# Patient Record
Sex: MALE | Race: WHITE | NOT HISPANIC OR LATINO | Employment: OTHER | URBAN - METROPOLITAN AREA
[De-identification: names, ages, dates, MRNs, and addresses within clinical notes are randomized per-mention and may not be internally consistent; named-entity substitution may affect disease eponyms.]

---

## 2017-01-18 RX ORDER — ALPRAZOLAM 0.5 MG/1
0.5 TABLET ORAL 2 TIMES DAILY PRN
Qty: 60 TABLET | Refills: 2 | OUTPATIENT
Start: 2017-01-18 | End: 2017-09-22 | Stop reason: SDUPTHER

## 2017-01-18 RX ORDER — LISINOPRIL AND HYDROCHLOROTHIAZIDE 20; 12.5 MG/1; MG/1
2 TABLET ORAL DAILY
Qty: 180 TABLET | Refills: 1 | Status: SHIPPED | OUTPATIENT
Start: 2017-01-18 | End: 2018-01-01 | Stop reason: SDUPTHER

## 2017-01-31 ENCOUNTER — CLINICAL SUPPORT (OUTPATIENT)
Dept: INTERNAL MEDICINE | Facility: CLINIC | Age: 82
End: 2017-01-31

## 2017-01-31 DIAGNOSIS — E53.8 VITAMIN B12 DEFICIENCY: Primary | ICD-10-CM

## 2017-01-31 PROCEDURE — 96372 THER/PROPH/DIAG INJ SC/IM: CPT | Performed by: INTERNAL MEDICINE

## 2017-01-31 RX ADMIN — CYANOCOBALAMIN 2 MCG: 1000 INJECTION, SOLUTION INTRAMUSCULAR; SUBCUTANEOUS at 15:49

## 2017-03-02 ENCOUNTER — CLINICAL SUPPORT (OUTPATIENT)
Dept: INTERNAL MEDICINE | Facility: CLINIC | Age: 82
End: 2017-03-02

## 2017-03-02 ENCOUNTER — CONVERSION ENCOUNTER (OUTPATIENT)
Dept: INTERNAL MEDICINE | Facility: CLINIC | Age: 82
End: 2017-03-02

## 2017-03-02 DIAGNOSIS — E53.8 VITAMIN B12 DEFICIENCY: Primary | ICD-10-CM

## 2017-03-02 PROCEDURE — 96372 THER/PROPH/DIAG INJ SC/IM: CPT | Performed by: INTERNAL MEDICINE

## 2017-03-02 RX ADMIN — CYANOCOBALAMIN 2000 MCG: 1000 INJECTION, SOLUTION INTRAMUSCULAR; SUBCUTANEOUS at 09:46

## 2017-03-05 LAB
25(OH)D3+25(OH)D2 SERPL-MCNC: 47.4 NG/ML (ref 30–100)
ALBUMIN SERPL-MCNC: 4.7 G/DL (ref 3.5–5.2)
ALBUMIN/GLOB SERPL: 2.4 G/DL
ALP SERPL-CCNC: 69 U/L (ref 39–117)
ALT SERPL-CCNC: 20 U/L (ref 1–41)
AST SERPL-CCNC: 18 U/L (ref 1–40)
BILIRUB SERPL-MCNC: 0.5 MG/DL (ref 0.1–1.2)
BUN SERPL-MCNC: 25 MG/DL (ref 8–23)
BUN/CREAT SERPL: 12.6 (ref 7–25)
CALCIUM SERPL-MCNC: 9.6 MG/DL (ref 8.6–10.5)
CHLORIDE SERPL-SCNC: 103 MMOL/L (ref 98–107)
CHOLEST SERPL-MCNC: 137 MG/DL (ref 100–199)
CK SERPL-CCNC: 91 U/L (ref 20–200)
CO2 SERPL-SCNC: 29.9 MMOL/L (ref 22–29)
CREAT SERPL-MCNC: 1.98 MG/DL (ref 0.76–1.27)
ERYTHROCYTE [DISTWIDTH] IN BLOOD BY AUTOMATED COUNT: 13.7 % (ref 11.5–14.5)
GLOBULIN SER CALC-MCNC: 2 GM/DL
GLUCOSE SERPL-MCNC: 117 MG/DL (ref 65–99)
HBA1C MFR BLD: 6.1 % (ref 4.8–5.6)
HCT VFR BLD AUTO: 38.5 % (ref 40.4–52.2)
HDL SERPL-SCNC: 34.5 UMOL/L
HDLC SERPL-MCNC: 47 MG/DL
HGB BLD-MCNC: 12.4 G/DL (ref 13.7–17.6)
LDL SERPL QN: 20.5 NM
LDL SERPL-SCNC: 823 NMOL/L
LDL SMALL SERPL-SCNC: 434 NMOL/L
LDLC SERPL CALC-MCNC: 69 MG/DL (ref 0–99)
MCH RBC QN AUTO: 29.9 PG (ref 27–32.7)
MCHC RBC AUTO-ENTMCNC: 32.2 G/DL (ref 32.6–36.4)
MCV RBC AUTO: 92.8 FL (ref 79.8–96.2)
PLATELET # BLD AUTO: 224 10*3/MM3 (ref 140–500)
POTASSIUM SERPL-SCNC: 4.6 MMOL/L (ref 3.5–5.2)
PROT SERPL-MCNC: 6.7 G/DL (ref 6–8.5)
RBC # BLD AUTO: 4.15 10*6/MM3 (ref 4.6–6)
SODIUM SERPL-SCNC: 143 MMOL/L (ref 136–145)
TRIGL SERPL-MCNC: 105 MG/DL (ref 0–149)
WBC # BLD AUTO: 5.71 10*3/MM3 (ref 4.5–10.7)

## 2017-03-08 PROBLEM — Z01.00 DIABETIC EYE EXAM (HCC): Status: ACTIVE | Noted: 2017-03-08

## 2017-03-08 PROBLEM — E11.9 ENCOUNTER FOR DIABETIC FOOT EXAM: Status: ACTIVE | Noted: 2017-03-08

## 2017-03-08 PROBLEM — E11.9 DIABETIC EYE EXAM: Status: ACTIVE | Noted: 2017-03-08

## 2017-03-09 ENCOUNTER — OFFICE VISIT (OUTPATIENT)
Dept: INTERNAL MEDICINE | Facility: CLINIC | Age: 82
End: 2017-03-09

## 2017-03-09 VITALS
BODY MASS INDEX: 30.13 KG/M2 | HEIGHT: 67 IN | WEIGHT: 192 LBS | HEART RATE: 107 BPM | OXYGEN SATURATION: 96 % | SYSTOLIC BLOOD PRESSURE: 136 MMHG | DIASTOLIC BLOOD PRESSURE: 74 MMHG

## 2017-03-09 DIAGNOSIS — E11.9 DIABETIC EYE EXAM (HCC): Chronic | ICD-10-CM

## 2017-03-09 DIAGNOSIS — Z01.00 DIABETIC EYE EXAM (HCC): Chronic | ICD-10-CM

## 2017-03-09 DIAGNOSIS — I65.23 ATHEROSCLEROSIS OF BOTH CAROTID ARTERIES: Chronic | ICD-10-CM

## 2017-03-09 DIAGNOSIS — D63.1 ANEMIA DUE TO CHRONIC KIDNEY DISEASE: Chronic | ICD-10-CM

## 2017-03-09 DIAGNOSIS — M85.852 OSTEOPENIA OF THIGH, BILATERAL: ICD-10-CM

## 2017-03-09 DIAGNOSIS — E55.9 VITAMIN D DEFICIENCY: Chronic | ICD-10-CM

## 2017-03-09 DIAGNOSIS — I10 BENIGN ESSENTIAL HYPERTENSION: Chronic | ICD-10-CM

## 2017-03-09 DIAGNOSIS — E11.9 ENCOUNTER FOR DIABETIC FOOT EXAM (HCC): Chronic | ICD-10-CM

## 2017-03-09 DIAGNOSIS — E78.5 HYPERLIPIDEMIA, UNSPECIFIED HYPERLIPIDEMIA TYPE: Chronic | ICD-10-CM

## 2017-03-09 DIAGNOSIS — E53.8 VITAMIN B12 DEFICIENCY: Chronic | ICD-10-CM

## 2017-03-09 DIAGNOSIS — M85.80 OSTEOPENIA: Chronic | ICD-10-CM

## 2017-03-09 DIAGNOSIS — Z51.81 THERAPEUTIC DRUG MONITORING: ICD-10-CM

## 2017-03-09 DIAGNOSIS — E11.9 TYPE 2 DIABETES MELLITUS WITHOUT COMPLICATION, WITHOUT LONG-TERM CURRENT USE OF INSULIN (HCC): Primary | Chronic | ICD-10-CM

## 2017-03-09 DIAGNOSIS — M85.851 OSTEOPENIA OF THIGH, BILATERAL: ICD-10-CM

## 2017-03-09 DIAGNOSIS — N40.0 BENIGN NON-NODULAR PROSTATIC HYPERPLASIA WITHOUT LOWER URINARY TRACT SYMPTOMS: Chronic | ICD-10-CM

## 2017-03-09 DIAGNOSIS — N18.9 ANEMIA DUE TO CHRONIC KIDNEY DISEASE: Chronic | ICD-10-CM

## 2017-03-09 DIAGNOSIS — N18.4 CHRONIC RENAL INSUFFICIENCY, STAGE IV (SEVERE) (HCC): Chronic | ICD-10-CM

## 2017-03-09 DIAGNOSIS — K21.9 GASTROESOPHAGEAL REFLUX DISEASE WITHOUT ESOPHAGITIS: Chronic | ICD-10-CM

## 2017-03-09 DIAGNOSIS — R73.01 IMPAIRED FASTING GLUCOSE: Chronic | ICD-10-CM

## 2017-03-09 PROBLEM — Z92.89 HISTORY OF DOPPLER ULTRASOUND: Status: ACTIVE | Noted: 2017-03-09

## 2017-03-09 PROCEDURE — 99214 OFFICE O/P EST MOD 30 MIN: CPT | Performed by: INTERNAL MEDICINE

## 2017-03-09 NOTE — PROGRESS NOTES
03/09/2017    Patient Information  Radha Win                                                                                          [address]  [facility] [place] [address]      7/26/1932  [phone]      Chief Complaint:     Follow-up type 2 diabetes, hyperlipidemia, chronic renal insufficiency, BPH, recent prostate biopsy, carotid artery plaque and recent carotid Doppler study, vitamin B12 and vitamin D deficiency, anemia of chronic renal disease, hypertension, reflux, osteopenia.  No new acute complaints.    History of Present Illness:    Patient with a history of multiple medical problems as outlined in the chief complaint that have been fairly stable over the past year presents today for a routine follow-up with lab to monitor the chronic medical issues.  He is tolerating his medications well and has no new acute complaints.  His past medical history reviewed and updated where necessary including his health maintenance parameters.  This reveals he needs a diabetic foot examination which we will do today.  Also patient has a history of elevated PSA and BPH and underwent a prostate biopsy in December of last year which fortunately returned negative.  Unfortunately, I do not have the formal report but I am attempting to obtain that currently.    Review of Systems   Constitution: Negative.   HENT: Negative.    Eyes: Negative.    Cardiovascular: Negative.    Respiratory: Negative.    Endocrine: Negative.    Hematologic/Lymphatic: Negative.    Skin: Negative.    Musculoskeletal: Negative.    Gastrointestinal: Negative.    Genitourinary: Negative.    Neurological: Negative.    Psychiatric/Behavioral: Negative.    Allergic/Immunologic: Negative.        Active Problems:    Patient Active Problem List   Diagnosis   • Bilateral carotid artery plaque, 09/21/2016--16-49% bilateral carotid artery stenosis   • Benign prostatic hypertrophy   • Chronic renal insufficiency, stage IV (severe),  02/09/2016--creatinine 1.85   • Diverticulosis of colon   • Hyperlipidemia   • Osteopenia, 07/07/2014--lumbar spine -0.5.  Left femoral neck -1.3.  Right femoral neck -2.0.   • Vitamin B12 deficiency   • Vitamin D deficiency   • Allergic rhinitis   • Anemia due to chronic kidney disease   • Generalized anxiety disorder   • Benign essential hypertension   • Gastroesophageal reflux disease without esophagitis   • Folic acid deficiency   • Multiple environmental allergies   • Therapeutic drug monitoring   • Bilateral renal cysts   • Primary osteoarthritis of knees, bilateral   • Diabetic eye exam   • Diabetic foot exam   • Impaired fasting glucose         Past Medical History   Diagnosis Date   • History of Biceps tendinitis on right 7/25/2016 08/09/2016--patient seen in follow-up and the severe pain anteriorly appears to have resolved but now he has an ache that seems to be in the joint itself and is worse at night particularly when sleeping.  I think that patient may actually have had 2 problems consisting of bicipital tendinitis and rotator cuff tendinitis.  Depo-Medrol 40 mg with 3 cc of Xylocaine injected into the shoulder via a posterior approach.  If this does not take care of the patient's pain then we will need to pursue with further workup including MRI.  07/25/2016--patient reports a 2 to three-week history of pain involving his right upper arm below the shoulder anteriorly.  No known trauma.  The pain is worse with certain movements and activities.  The pain is quite severe particularly at night and this interferes with his sleep.  Examination reveals no gross deformity.  There is tenderness to palpation over the long head of the biceps shoulder joint proper.  The area was injected with 40 mg of Depo-Medrol and 3 cc of Xylocaine.   • History of Bicipital tendinitis of left shoulder 01/16/2015 01/16/2015--patient presents with a two-month history of left anterior shoulder pain without known injury.  The pain is worse with certain movements. Examination reveals tenderness over the bicipital tendons. X-ray ordered and revealed no acute fracture or bony malalignment. Degenerative arthritis noted in the left acromioclavicular joint.   • History of bone density study 07/07/2014 07/07/2014-DEXA scan revealed average lumbar spine T score -0.5. Left proximal femoral T score is zero. Left femoral neck T score -1.3. Right proximal femoral T score 0.1. Right femoral neck T score -2.0. Osteopenia.   • History of BPPV (benign paroxysmal positional vertigo) 3/21/2016     08/09/2016--patient seen in follow-up and reports his symptoms have resolved.  05/13/2016--patient was evaluated by ENT and [place]-[facility] testing was negative at this time.  However, patient was asymptomatic.  03/21/2016--patient presents with a 2 day history of dizziness that he describes as an off-balance or spinning sensation.  This seems to be precipitated by movement such as when he stands up or bends over.  Rolling over in bed does not seem to precipitate it.  Patient has no other symptoms associated with this.  The episodes last 5-10 minutes and he seems to get relief by not moving.  He was evaluated earlier at the urgent care and the physician there 100 start him on diabetes medication because his blood sugar was 157 nonfasting.  His matter fact she gave me a phone call to explain the situation to me and I had her just go ahead and send the patient here.  Patient was a little concerned because his blood sugars have been running a little high of late.  Examination today unremarkable.  We were able t   • History of carotid Doppler/vascular screen 09/21/2016 09/21/2016--vascular screen reveals 16-49% bilateral carotid artery stenosis, negative for AAA, negative for PAD.  10/10/2008--vascular screen revealed mild bilateral cartoid plaque, no eveidence of aortic aneurysm, no eveidence of PAD.   • History of chest x-ray 04/23/2014      04/23/2014-chest x-ray reveals aortic calcification. Mild interstitial prominence, granulomatous calcification. No acute process. 03/03/2009-chest x-ray reveals no active disease.   • History of chronic right knee pain 06/26/2014 08/20/2014--patient reports his knee pain has resolved.  07/03/2014--patient seen in follow up and reports his pain is much improved with the prednisone.  06/26/2014--x-ray of the right knee was negative except for osteopenia. DEXA scan ordered.  06/26/2014--patient presents with a 5-6 week history of right knee pain that is primarily anterior. The pain only comes on when patient kneels down such    • History of diverticulitis of colon 2009 and 2003 05/08/2009-acute diverticulitis without complication. 09/05/2003-acute diverticulitis without complication.    • History of Plantar fasciitis of right foot 06/26/2014 08/20/2014--patient reports his symptoms are much improved but not resolved. He has some discomfort approximately one hour after awakening and walking.  07/03/2014--patient seen in follow up and reports his pain is much improved with the prednisone. DEXA scan ordered.   06/26/2014--x-ray of the right foot is negative except for osteopenia.   06/26/2014--patient presents with a 3-4 week history of    • History of pneumococcal vaccination 06/01/2015 06/01/2015--Prevnar 13 given. Patient reports he received his initial pneumococcal vaccination after the age of 65 and therefore no further pneumococcal vaccination required after today's Prevnar.   • History of Right rotator cuff tendinitis 8/9/2016 08/26/2016--patient seen in follow-up and reports his pain has resolved.  08/09/2016--patient seen in follow-up and the severe pain anteriorly appears to have resolved but now he has an ache that seems to be in the joint itself and is worse at night particularly when sleeping.  I think that patient may actually have had 2 problems consisting of bicipital tendinitis and  rotator cuff tendinitis.  Depo-Medrol 40 mg with 3 cc of Xylocaine injected into the shoulder via a posterior approach.  If this does not take care of the patient's pain then we will need to pursue with further workup including MRI.  07/25/2016--patient reports a 2 to three-week history of pain involving his right upper arm below the shoulder anteriorly.  No known trauma.  The pain is worse with certain movements and activities.  The pain is quite severe particularly at night and this interferes with his sleep.  Examination reveals no gross deformity.  There is tenderness to palpation over the long head of the biceps shoulder joint proper.    • History of trigger finger 06/01/2015 08/09/2016--patient seen in follow-up and reports his trigger finger resolved without any intervention.  06/01/2015--patient presents with a 3 month history of discomfort involving his left index finger and occasionally the finger will lock in the flexed position requiring him to forcibly extended.  I gave patient Dr. Chacorta Oneal phone number and he can call him if he decides that this is bad e   • History of Zostavax 08/2009 August 2009-Zostavax given         Past Surgical History   Procedure Laterality Date   • Colonoscopy  06/11/2002 06/11/2002--incomplete colonoscopy due to redundant colon. Not able to get past the hepatic flexure. Patient had followup barium contrast enema which showed extensive diverticulosis   • Colonoscopy  10/03/2013     10/03/2013--colonoscopy revealed markedly redundant colon, pancolonic diverticulosis with several impacted fecalith. No evidence of diverticulitis or stricture. Was only able to reach the ascending colon. Follow up barium enema revealed extensive diverticulosis. No polyp or other mucosal mass seen.   • Cataract extraction Left 12/12/2011 12/12/2011--cataract extirpation with intraocular lens implantation left eye.   • Cystoscopy  05/18/2016 05/18/2016--urology consultation.   Cystoscopy performed for possible bladder mass is negative.   • Cataract extraction Right 12/2011 December 2011--right cataract extirpation with intraocular lens implantation.   • Prostate biopsy  12/21/2016 12/21/2016--prostate biopsy reportedly negative.  I will try to obtain the report.         No Known Allergies        Current Outpatient Prescriptions:   •  ALPRAZolam (XANAX) 0.5 MG tablet, Take 1 tablet by mouth 2 (Two) Times a Day As Needed for anxiety., Disp: 60 tablet, Rfl: 2  •  amLODIPine (NORVASC) 5 MG tablet, Take 1 tablet by mouth every morning., Disp: , Rfl:   •  aspirin (ASPIRIN LOW DOSE) 81 MG tablet, Take 1 tablet by mouth daily., Disp: , Rfl:   •  Calcium Carb-Cholecalciferol (CALCIUM 600 + D) 600-200 MG-UNIT tablet, Take 2 tablets by mouth daily., Disp: , Rfl:   •  Cholecalciferol (VITAMIN D3) 5000 UNITS capsule capsule, Take 1 capsule by mouth daily., Disp: , Rfl:   •  cyanocobalamin 1000 MCG/ML injection, 1 mL every 30 (thirty) days., Disp: , Rfl:   •  ezetimibe-simvastatin (VYTORIN) 10-40 MG per tablet, Take 1 tablet by mouth nightly., Disp: , Rfl:   •  folic acid (FOLVITE) 1 MG tablet, TAKE ONE TABLET BY MOUTH TWICE A DAY, Disp: 180 tablet, Rfl: 0  •  lisinopril-hydrochlorothiazide (PRINZIDE,ZESTORETIC) 20-12.5 MG per tablet, Take 2 tablets by mouth Daily., Disp: 180 tablet, Rfl: 1  •  omeprazole (priLOSEC) 40 MG capsule, TAKE ONE CAPSULE BY MOUTH DAILY, Disp: 30 capsule, Rfl: 3    Current Facility-Administered Medications:   •  cyanocobalamin injection 1,000 mcg, 1,000 mcg, Intramuscular, Q28 Days, Delgado Patricia MD, 2,000 mcg at 03/02/17 0946      Family History   Problem Relation Age of Onset   • Diabetes Mother    • Heart disease Mother          Social History     Social History   • Marital status:      Spouse name: N/A   • Number of children: N/A   • Years of education: N/A     Occupational History   • Retired  [employer]     Social History Main Topics   • Smoking  "status: Never Smoker   • Smokeless tobacco: Never Used   • Alcohol use Yes      Comment: Moderate alcohol consumption   • Drug use: No   • Sexual activity: Yes     Partners: Female     Other Topics Concern   • Not on file     Social History Narrative         Vitals:    03/09/17 0930 03/09/17 1015   BP: 152/70 136/74   BP Location:  Right arm   Patient Position:  Sitting   Cuff Size:  Large Adult   Pulse: 107    SpO2: 96%    Weight: 192 lb (87.1 kg)    Height: 67\" (170.2 cm)           Physical Exam:    General: Alert and oriented x 3.  No acute distress.  Normal affect.  HEENT: Pupils equal, round, reactive to light; extraocular movements intact; sclerae nonicteric; pharynx, ear canals and TMs normal.  Neck: Without JVD, thyromegaly, bruit, or adenopathy.  Lungs: Clear to auscultation in all fields.  Heart: Regular rate and rhythm without murmur, rub, gallop, or click.  Abdomen: Soft, nontender, without hepatosplenomegaly or hernia.  Bowel sounds normal.  : Deferred.  Rectal: Deferred.  Extremities: Without clubbing, cyanosis, edema, or pulse deficit.  Neurologic: Intact without focal deficit.  Normal station and gait observed during ingress and egress from the examination room.  Skin: Without significant lesion.  Musculoskeletal: Unremarkable.      Lab/other results:    NMR is absolutely perfect.  CMP normal except blood sugar elevated 117, BUN 25, creatinine 1.98.  CBC normal except hemoglobin low at 12.4, hematocrit low at 38.5.  Hemoglobin A1c 6.1.  Vitamin D normal at 47.4.  CPK normal.    Assessment/Plan:     Diagnosis Plan   1. Type 2 diabetes mellitus without complication, without long-term current use of insulin     2. Hyperlipidemia, unspecified hyperlipidemia type     3. Chronic renal insufficiency, stage IV (severe), 02/09/2016--creatinine 1.85     4. Benign prostatic hypertrophy     5. Bilateral carotid artery plaque, 10/10/2008--mild bilateral carotid artery plaque.     6. Vitamin B12 deficiency   "   7. Vitamin D deficiency     8. Anemia due to chronic kidney disease     9. Benign essential hypertension     10. Gastroesophageal reflux disease without esophagitis     11. Diabetic eye exam     12. Diabetic foot exam     13. Osteopenia, 07/07/2014--lumbar spine -0.5.  Left femoral neck -1.3.  Right femoral neck -2.0.     14. Therapeutic drug monitoring     15. Impaired fasting glucose       Patient's type 2 diabetes has resolved.  He now has impaired fasting glucose.  Despite that fact, we did a diabetic foot exam which is well documented under that diagnosis and is normal.  Even though patient does not have diabetes she gets a regular eye examination and we will continue to monitor this.  Hyperlipidemia under excellent control.  His chronic renal insufficiency is stable.  He is followed by the nephrologist.  He has BPH and underwent a prostate biopsy that was negative in December 2016.  He has carotid artery plaque and had a fairly recent carotid Doppler study that is documented under that diagnosis.  He has vitamin B12 deficiency and receives B12 injections on a regular basis.  Vitamin D is therapeutic.  His anemia of chronic kidney disease is stable.  Reflux symptoms stable.  He has osteopenia and needs a DEXA scan.  New diagnosis of impaired fasting glucose entered.    Plan is as follows: DEXA scan ordered.  No change in current medical regimen.  Patient will follow-up in 3 months with lab prior.  We will do his Medicare wellness visit at that time.            Procedures

## 2017-03-10 ENCOUNTER — RESULTS ENCOUNTER (OUTPATIENT)
Dept: INTERNAL MEDICINE | Facility: CLINIC | Age: 82
End: 2017-03-10

## 2017-03-10 DIAGNOSIS — E55.9 VITAMIN D DEFICIENCY: Chronic | ICD-10-CM

## 2017-03-10 DIAGNOSIS — N18.4 CHRONIC RENAL INSUFFICIENCY, STAGE IV (SEVERE) (HCC): Chronic | ICD-10-CM

## 2017-03-10 DIAGNOSIS — E78.5 HYPERLIPIDEMIA, UNSPECIFIED HYPERLIPIDEMIA TYPE: Chronic | ICD-10-CM

## 2017-03-10 DIAGNOSIS — E11.9 TYPE 2 DIABETES MELLITUS WITHOUT COMPLICATION, WITHOUT LONG-TERM CURRENT USE OF INSULIN (HCC): Chronic | ICD-10-CM

## 2017-03-14 ENCOUNTER — HOSPITAL ENCOUNTER (OUTPATIENT)
Dept: BONE DENSITY | Facility: HOSPITAL | Age: 82
Discharge: HOME | End: 2017-03-14
Admitting: INTERNAL MEDICINE

## 2017-03-14 PROCEDURE — 77080 DXA BONE DENSITY AXIAL: CPT

## 2017-03-31 ENCOUNTER — CLINICAL SUPPORT (OUTPATIENT)
Dept: INTERNAL MEDICINE | Facility: CLINIC | Age: 82
End: 2017-03-31

## 2017-03-31 DIAGNOSIS — E53.8 VITAMIN B12 DEFICIENCY: Primary | Chronic | ICD-10-CM

## 2017-03-31 PROCEDURE — 96372 THER/PROPH/DIAG INJ SC/IM: CPT | Performed by: INTERNAL MEDICINE

## 2017-03-31 RX ADMIN — CYANOCOBALAMIN 1000 MCG: 1000 INJECTION, SOLUTION INTRAMUSCULAR; SUBCUTANEOUS at 14:33

## 2017-04-03 RX ORDER — OMEPRAZOLE 40 MG/1
CAPSULE, DELAYED RELEASE ORAL
Qty: 30 CAPSULE | Refills: 0 | Status: SHIPPED | OUTPATIENT
Start: 2017-04-03 | End: 2017-04-28 | Stop reason: SDUPTHER

## 2017-04-06 RX ORDER — EZETIMIBE AND SIMVASTATIN 10; 40 MG/1; MG/1
1 TABLET ORAL NIGHTLY
Qty: 90 TABLET | Refills: 1 | Status: SHIPPED | OUTPATIENT
Start: 2017-04-06 | End: 2017-04-13 | Stop reason: SDUPTHER

## 2017-04-13 RX ORDER — EZETIMIBE AND SIMVASTATIN 10; 40 MG/1; MG/1
1 TABLET ORAL NIGHTLY
Qty: 90 TABLET | Refills: 1 | Status: SHIPPED | OUTPATIENT
Start: 2017-04-13 | End: 2017-06-16

## 2017-04-28 RX ORDER — OMEPRAZOLE 40 MG/1
CAPSULE, DELAYED RELEASE ORAL
Qty: 30 CAPSULE | Refills: 2 | Status: SHIPPED | OUTPATIENT
Start: 2017-04-28 | End: 2017-07-30 | Stop reason: SDUPTHER

## 2017-05-11 ENCOUNTER — CLINICAL SUPPORT (OUTPATIENT)
Dept: INTERNAL MEDICINE | Facility: CLINIC | Age: 82
End: 2017-05-11

## 2017-05-11 DIAGNOSIS — E53.8 VITAMIN B12 DEFICIENCY: Primary | Chronic | ICD-10-CM

## 2017-05-11 PROCEDURE — 96372 THER/PROPH/DIAG INJ SC/IM: CPT | Performed by: INTERNAL MEDICINE

## 2017-05-11 RX ADMIN — CYANOCOBALAMIN 2000 MCG: 1000 INJECTION, SOLUTION INTRAMUSCULAR; SUBCUTANEOUS at 15:42

## 2017-06-02 RX ORDER — FOLIC ACID 1 MG/1
TABLET ORAL
Qty: 180 TABLET | Refills: 1 | Status: SHIPPED | OUTPATIENT
Start: 2017-06-02 | End: 2017-09-07 | Stop reason: DRUGHIGH

## 2017-06-11 LAB
25(OH)D3+25(OH)D2 SERPL-MCNC: 46.4 NG/ML (ref 30–100)
ALBUMIN SERPL-MCNC: 4.3 G/DL (ref 3.5–4.7)
ALBUMIN/GLOB SERPL: 1.9 {RATIO} (ref 1.2–2.2)
ALP SERPL-CCNC: 68 IU/L (ref 39–117)
ALT SERPL-CCNC: 15 IU/L (ref 0–44)
AST SERPL-CCNC: 19 IU/L (ref 0–40)
BILIRUB SERPL-MCNC: 0.5 MG/DL (ref 0–1.2)
BUN SERPL-MCNC: 30 MG/DL (ref 8–27)
BUN/CREAT SERPL: 15 (ref 10–24)
CALCIUM SERPL-MCNC: 8.8 MG/DL (ref 8.6–10.2)
CHLORIDE SERPL-SCNC: 102 MMOL/L (ref 96–106)
CHOLEST SERPL-MCNC: 154 MG/DL (ref 100–199)
CK SERPL-CCNC: 115 U/L (ref 24–204)
CO2 SERPL-SCNC: 23 MMOL/L (ref 18–29)
CREAT SERPL-MCNC: 1.97 MG/DL (ref 0.76–1.27)
ERYTHROCYTE [DISTWIDTH] IN BLOOD BY AUTOMATED COUNT: 13.5 % (ref 12.3–15.4)
GLOBULIN SER CALC-MCNC: 2.3 G/DL (ref 1.5–4.5)
GLUCOSE SERPL-MCNC: 110 MG/DL (ref 65–99)
HBA1C MFR BLD: 6.9 % (ref 4.8–5.6)
HCT VFR BLD AUTO: 40 % (ref 37.5–51)
HDL SERPL-SCNC: 31.5 UMOL/L
HDLC SERPL-MCNC: 43 MG/DL
HGB BLD-MCNC: 13 G/DL (ref 12.6–17.7)
LDL SERPL QN: 20.4 NM
LDL SERPL-SCNC: 1046 NMOL/L
LDL SMALL SERPL-SCNC: 538 NMOL/L
LDLC SERPL CALC-MCNC: 87 MG/DL (ref 0–99)
MCH RBC QN AUTO: 28.1 PG (ref 26.6–33)
MCHC RBC AUTO-ENTMCNC: 32.5 G/DL (ref 31.5–35.7)
MCV RBC AUTO: 86 FL (ref 79–97)
PLATELET # BLD AUTO: 196 X10E3/UL (ref 150–379)
POTASSIUM SERPL-SCNC: 5.4 MMOL/L (ref 3.5–5.2)
PROT SERPL-MCNC: 6.6 G/DL (ref 6–8.5)
RBC # BLD AUTO: 4.63 X10E6/UL (ref 4.14–5.8)
SODIUM SERPL-SCNC: 142 MMOL/L (ref 134–144)
T3FREE SERPL-MCNC: 3.2 PG/ML (ref 2–4.4)
T4 FREE SERPL-MCNC: 1.19 NG/DL (ref 0.82–1.77)
TRIGL SERPL-MCNC: 122 MG/DL (ref 0–149)
TSH SERPL DL<=0.005 MIU/L-ACNC: 2.56 UIU/ML (ref 0.45–4.5)
WBC # BLD AUTO: 5.6 X10E3/UL (ref 3.4–10.8)

## 2017-06-16 ENCOUNTER — OFFICE VISIT (OUTPATIENT)
Dept: INTERNAL MEDICINE | Facility: CLINIC | Age: 82
End: 2017-06-16

## 2017-06-16 VITALS
HEIGHT: 67 IN | DIASTOLIC BLOOD PRESSURE: 60 MMHG | HEART RATE: 117 BPM | SYSTOLIC BLOOD PRESSURE: 134 MMHG | WEIGHT: 186 LBS | OXYGEN SATURATION: 96 % | BODY MASS INDEX: 29.19 KG/M2

## 2017-06-16 DIAGNOSIS — N18.9 ANEMIA DUE TO CHRONIC KIDNEY DISEASE: Chronic | ICD-10-CM

## 2017-06-16 DIAGNOSIS — M85.80 OSTEOPENIA: Chronic | ICD-10-CM

## 2017-06-16 DIAGNOSIS — G72.0 STATIN MYOPATHY: ICD-10-CM

## 2017-06-16 DIAGNOSIS — Z00.00 INITIAL MEDICARE ANNUAL WELLNESS VISIT: Primary | ICD-10-CM

## 2017-06-16 DIAGNOSIS — E53.8 VITAMIN B12 DEFICIENCY: Chronic | ICD-10-CM

## 2017-06-16 DIAGNOSIS — N18.4 CHRONIC RENAL INSUFFICIENCY, STAGE IV (SEVERE) (HCC): Chronic | ICD-10-CM

## 2017-06-16 DIAGNOSIS — I10 BENIGN ESSENTIAL HYPERTENSION: Chronic | ICD-10-CM

## 2017-06-16 DIAGNOSIS — M85.852 OSTEOPENIA OF THIGH, BILATERAL: ICD-10-CM

## 2017-06-16 DIAGNOSIS — E55.9 VITAMIN D DEFICIENCY: Chronic | ICD-10-CM

## 2017-06-16 DIAGNOSIS — N40.0 BENIGN NON-NODULAR PROSTATIC HYPERPLASIA WITHOUT LOWER URINARY TRACT SYMPTOMS: Chronic | ICD-10-CM

## 2017-06-16 DIAGNOSIS — Z91.09 MULTIPLE ENVIRONMENTAL ALLERGIES: Chronic | ICD-10-CM

## 2017-06-16 DIAGNOSIS — T46.6X5A STATIN MYOPATHY: ICD-10-CM

## 2017-06-16 DIAGNOSIS — I65.23 ATHEROSCLEROSIS OF BOTH CAROTID ARTERIES: Chronic | ICD-10-CM

## 2017-06-16 DIAGNOSIS — M17.0 PRIMARY OSTEOARTHRITIS OF KNEES, BILATERAL: Chronic | ICD-10-CM

## 2017-06-16 DIAGNOSIS — E78.5 HYPERLIPIDEMIA, UNSPECIFIED HYPERLIPIDEMIA TYPE: Chronic | ICD-10-CM

## 2017-06-16 DIAGNOSIS — R73.01 IMPAIRED FASTING GLUCOSE: Chronic | ICD-10-CM

## 2017-06-16 DIAGNOSIS — E29.1 HYPOGONADISM IN MALE: ICD-10-CM

## 2017-06-16 DIAGNOSIS — M85.851 OSTEOPENIA OF THIGH, BILATERAL: ICD-10-CM

## 2017-06-16 DIAGNOSIS — D63.1 ANEMIA DUE TO CHRONIC KIDNEY DISEASE: Chronic | ICD-10-CM

## 2017-06-16 DIAGNOSIS — Z51.81 THERAPEUTIC DRUG MONITORING: ICD-10-CM

## 2017-06-16 DIAGNOSIS — K21.9 GASTROESOPHAGEAL REFLUX DISEASE WITHOUT ESOPHAGITIS: Chronic | ICD-10-CM

## 2017-06-16 PROCEDURE — 99214 OFFICE O/P EST MOD 30 MIN: CPT | Performed by: INTERNAL MEDICINE

## 2017-06-16 PROCEDURE — G0438 PPPS, INITIAL VISIT: HCPCS | Performed by: INTERNAL MEDICINE

## 2017-06-16 RX ADMIN — CYANOCOBALAMIN 2000 MCG: 1000 INJECTION, SOLUTION INTRAMUSCULAR; SUBCUTANEOUS at 10:01

## 2017-06-16 NOTE — PROGRESS NOTES
06/16/2017    Patient Information  Radha Win                                                                                          [address]  [place] [address]      7/26/1932  [phone]      Chief Complaint:     Follow-up impaired fasting glucose, hyperlipidemia, chronic renal insufficiency, carotid artery plaque, osteopenia, vitamin B12 and vitamin D deficiency, anemia of chronic renal disease, hypertension, esophageal reflux, environmental allergies, osteoarthritis of the knees, BPH, hypogonadism.  Medicare wellness visit.    History of Present Illness:    Patient with a history of medical problems as outlined in the chief complaint presents today for a follow-up with lab prior in order to assess his chronic medical issues he is tolerating his medications well and has no new acute complaints.  Past medical history reviewed and updated where necessary including health maintenance parameters.  This reveals he will be up-to-date after today's visit.    Review of Systems   Constitution: Negative.   HENT: Negative.    Eyes: Negative.    Cardiovascular: Negative.    Respiratory: Negative.    Endocrine: Negative.    Hematologic/Lymphatic: Negative.    Skin: Negative.    Musculoskeletal: Positive for arthritis and joint pain.   Gastrointestinal: Negative.    Genitourinary: Negative.    Neurological: Negative.    Psychiatric/Behavioral: Negative.    Allergic/Immunologic: Negative.        Active Problems:    Patient Active Problem List   Diagnosis   • Bilateral carotid artery plaque, 09/21/2016--16-49% bilateral carotid artery stenosis   • Benign prostatic hypertrophy   • Chronic renal insufficiency, stage IV (severe), 02/09/2016--creatinine 1.85   • Diverticulosis of colon   • Hyperlipidemia   • Osteopenia, 07/07/2014--lumbar spine -0.5.  Left femoral neck -1.3.  Right femoral neck -2.0.   • Vitamin B12 deficiency   • Vitamin D deficiency   • Allergic rhinitis   • Anemia due to  chronic kidney disease   • Generalized anxiety disorder   • Benign essential hypertension   • Gastroesophageal reflux disease without esophagitis   • Folic acid deficiency   • Multiple environmental allergies   • Therapeutic drug monitoring   • Bilateral renal cysts   • Primary osteoarthritis of knees, bilateral   • Diabetic eye exam   • Diabetic foot exam   • Impaired fasting glucose   • Statin myopathy   • Hypogonadism in male, on TRT, testosterone pellets, per          Past Medical History:   Diagnosis Date   • History of Biceps tendinitis on right 7/25/2016 08/09/2016--patient seen in follow-up and the severe pain anteriorly appears to have resolved but now he has an ache that seems to be in the joint itself and is worse at night particularly when sleeping.  I think that patient may actually have had 2 problems consisting of bicipital tendinitis and rotator cuff tendinitis.  Depo-Medrol 40 mg with 3 cc of Xylocaine injected into the shoulder via a posterior approach.  If this does not take care of the patient's pain then we will need to pursue with further workup including MRI.  07/25/2016--patient reports a 2 to three-week history of pain involving his right upper arm below the shoulder anteriorly.  No known trauma.  The pain is worse with certain movements and activities.  The pain is quite severe particularly at night and this interferes with his sleep.  Examination reveals no gross deformity.  There is tenderness to palpation over the long head of the biceps shoulder joint proper.  The area was injected with 40 mg of Depo-Medrol and 3 cc of Xylocaine.   • History of Bicipital tendinitis of left shoulder 01/16/2015 01/16/2015--patient presents with a two-month history of left anterior shoulder pain without known injury. The pain is worse with certain movements. Examination reveals tenderness over the bicipital tendons. X-ray ordered and revealed no acute fracture or bony malalignment. Degenerative  arthritis noted in the left acromioclavicular joint.   • History of bone density study 07/07/2014 07/07/2014-DEXA scan revealed average lumbar spine T score -0.5. Left proximal femoral T score is zero. Left femoral neck T score -1.3. Right proximal femoral T score 0.1. Right femoral neck T score -2.0. Osteopenia.   • History of BPPV (benign paroxysmal positional vertigo) 3/21/2016    08/09/2016--patient seen in follow-up and reports his symptoms have resolved.  05/13/2016--patient was evaluated by ENT and [place] testing was negative at this time.  However, patient was asymptomatic.  03/21/2016--patient presents with a 2 day history of dizziness that he describes as an off-balance or spinning sensation.  This seems to be precipitated by movement such as when he stands up or bends over.  Rolling over in bed does not seem to precipitate it.  Patient has no other symptoms associated with this.  The episodes last 5-10 minutes and he seems to get relief by not moving.  He was evaluated earlier at the urgent care and the physician there 100 start him on diabetes medication because his blood sugar was 157 nonfasting.  His matter fact she gave me a phone call to explain the situation to me and I had her just go ahead and send the patient here.  Patient was a little concerned because his blood sugars have been running a little high of late.  Examination today unremarkable.  We were able t   • History of carotid Doppler/vascular screen 09/21/2016 09/21/2016--vascular screen reveals 16-49% bilateral carotid artery stenosis, negative for AAA, negative for PAD.  10/10/2008--vascular screen revealed mild bilateral cartoid plaque, no eveidence of aortic aneurysm, no eveidence of PAD.   • History of chest x-ray 04/23/2014 04/23/2014-chest x-ray reveals aortic calcification. Mild interstitial prominence, granulomatous calcification. No acute process. 03/03/2009-chest x-ray reveals no active disease.   • History of  chronic right knee pain 06/26/2014 08/20/2014--patient reports his knee pain has resolved.  07/03/2014--patient seen in follow up and reports his pain is much improved with the prednisone.  06/26/2014--x-ray of the right knee was negative except for osteopenia. DEXA scan ordered.  06/26/2014--patient presents with a 5-6 week history of right knee pain that is primarily anterior. The pain only comes on when patient kneels down such    • History of diverticulitis of colon 2009 and 2003 05/08/2009-acute diverticulitis without complication. 09/05/2003-acute diverticulitis without complication.    • History of Plantar fasciitis of right foot 06/26/2014 08/20/2014--patient reports his symptoms are much improved but not resolved. He has some discomfort approximately one hour after awakening and walking.  07/03/2014--patient seen in follow up and reports his pain is much improved with the prednisone. DEXA scan ordered.   06/26/2014--x-ray of the right foot is negative except for osteopenia.   06/26/2014--patient presents with a 3-4 week history of    • History of pneumococcal vaccination 06/01/2015 06/01/2015--Prevnar 13 given. Patient reports he received his initial pneumococcal vaccination after the age of 65 and therefore no further pneumococcal vaccination required after today's Prevnar.   • History of Right rotator cuff tendinitis 8/9/2016 08/26/2016--patient seen in follow-up and reports his pain has resolved.  08/09/2016--patient seen in follow-up and the severe pain anteriorly appears to have resolved but now he has an ache that seems to be in the joint itself and is worse at night particularly when sleeping.  I think that patient may actually have had 2 problems consisting of bicipital tendinitis and rotator cuff tendinitis.  Depo-Medrol 40 mg with 3 cc of Xylocaine injected into the shoulder via a posterior approach.  If this does not take care of the patient's pain then we will need to pursue with  further workup including MRI.  07/25/2016--patient reports a 2 to three-week history of pain involving his right upper arm below the shoulder anteriorly.  No known trauma.  The pain is worse with certain movements and activities.  The pain is quite severe particularly at night and this interferes with his sleep.  Examination reveals no gross deformity.  There is tenderness to palpation over the long head of the biceps shoulder joint proper.    • History of trigger finger 06/01/2015 08/09/2016--patient seen in follow-up and reports his trigger finger resolved without any intervention.  06/01/2015--patient presents with a 3 month history of discomfort involving his left index finger and occasionally the finger will lock in the flexed position requiring him to forcibly extended.  I gave patient Dr. Chacorta Oneal phone number and he can call him if he decides that this is bad e   • History of Zostavax 08/2009 August 2009-Zostavax given         Past Surgical History:   Procedure Laterality Date   • CATARACT EXTRACTION Left 12/12/2011 12/12/2011--cataract extirpation with intraocular lens implantation left eye.   • CATARACT EXTRACTION Right 12/2011 December 2011--right cataract extirpation with intraocular lens implantation.   • COLONOSCOPY  06/11/2002 06/11/2002--incomplete colonoscopy due to redundant colon. Not able to get past the hepatic flexure. Patient had followup barium contrast enema which showed extensive diverticulosis   • COLONOSCOPY  10/03/2013    10/03/2013--colonoscopy revealed markedly redundant colon, pancolonic diverticulosis with several impacted fecalith. No evidence of diverticulitis or stricture. Was only able to reach the ascending colon. Follow up barium enema revealed extensive diverticulosis. No polyp or other mucosal mass seen.   • CYSTOSCOPY  05/18/2016 05/18/2016--urology consultation.  Cystoscopy performed for possible bladder mass is negative.   • PROSTATE BIOPSY   12/21/2016 12/21/2016--prostate biopsy reportedly negative.  I will try to obtain the report.         No Known Allergies        Current Outpatient Prescriptions:   •  ALPRAZolam (XANAX) 0.5 MG tablet, Take 1 tablet by mouth 2 (Two) Times a Day As Needed for anxiety., Disp: 60 tablet, Rfl: 2  •  amLODIPine (NORVASC) 5 MG tablet, Take 1 tablet by mouth every morning., Disp: , Rfl:   •  aspirin (ASPIRIN LOW DOSE) 81 MG tablet, Take 1 tablet by mouth daily., Disp: , Rfl:   •  Calcium Carb-Cholecalciferol (CALCIUM 600 + D) 600-200 MG-UNIT tablet, Take 2 tablets by mouth daily., Disp: , Rfl:   •  Cholecalciferol (VITAMIN D3) 5000 UNITS capsule capsule, Take 1 capsule by mouth daily., Disp: , Rfl:   •  cyanocobalamin 1000 MCG/ML injection, 1 mL every 30 (thirty) days., Disp: , Rfl:   •  ezetimibe-simvastatin (VYTORIN) 10-40 MG per tablet, Take 1 tablet by mouth Every Night., Disp: 90 tablet, Rfl: 1  •  folic acid (FOLVITE) 1 MG tablet, TAKE ONE TABLET BY MOUTH TWICE A DAY, Disp: 180 tablet, Rfl: 1  •  lisinopril-hydrochlorothiazide (PRINZIDE,ZESTORETIC) 20-12.5 MG per tablet, Take 2 tablets by mouth Daily., Disp: 180 tablet, Rfl: 1  •  omeprazole (priLOSEC) 40 MG capsule, TAKE ONE CAPSULE BY MOUTH DAILY, Disp: 30 capsule, Rfl: 2    Current Facility-Administered Medications:   •  cyanocobalamin injection 1,000 mcg, 1,000 mcg, Intramuscular, Q28 Days, Delgado Patricia MD, 2,000 mcg at 06/16/17 1001      Family History   Problem Relation Age of Onset   • Diabetes Mother    • Heart disease Mother          Social History     Social History   • Marital status:      Spouse name: N/A   • Number of children: N/A   • Years of education: N/A     Occupational History   • Retired  [employer]     Social History Main Topics   • Smoking status: Never Smoker   • Smokeless tobacco: Never Used   • Alcohol use Yes      Comment: Moderate alcohol consumption   • Drug use: No   • Sexual activity: Yes     Partners: Female     Other  "Topics Concern   • Not on file     Social History Narrative         Vitals:    06/16/17 0953   BP: 134/60   BP Location: Right arm   Patient Position: Sitting   Cuff Size: Large Adult   Pulse: 117   SpO2: 96%   Weight: 186 lb (84.4 kg)   Height: 67\" (170.2 cm)          Physical Exam:    General: Alert and oriented x 3.  No acute distress. Obese.   Normal affect.  HEENT: Pupils equal, round, reactive to light; extraocular movements intact; sclerae nonicteric; pharynx, ear canals and TMs normal.  Neck: Without JVD, thyromegaly, bruit, or adenopathy.  Lungs: Clear to auscultation in all fields.  Heart: Regular rate and rhythm without murmur, rub, gallop, or click.  Abdomen: Soft, nontender, without hepatosplenomegaly or hernia.  Bowel sounds normal.  : Deferred.  Rectal: Deferred.  Extremities: Without clubbing, cyanosis, edema, or pulse deficit.  Neurologic: Intact without focal deficit.  Normal station and gait observed during ingress and egress from the examination room.  Skin: Without significant lesion.  Musculoskeletal: Unremarkable.    Lab/other results:    NMR reveals total cholesterol 254.  Triglycerides 122.  LDL particle number mildly elevated 1046.  Small LDL particle number mildly elevated at 538.  HDL particle number is normal at 31.5.  CMP normal except blood sugar elevated at 110, BUN elevated at 30, creatinine elevated at 1.97, potassium slightly elevated at 5.4.  CBC is normal.  Hemoglobin A1c excellent at 6.9.  Thyroid function tests are normal.  Vitamin D normal.  CPK is normal.    Assessment/Plan:     Diagnosis Plan   1. Initial Medicare annual wellness visit     2. Impaired fasting glucose     3. Hyperlipidemia, unspecified hyperlipidemia type     4. Chronic renal insufficiency, stage IV (severe), 02/09/2016--creatinine 1.85     5. Bilateral carotid artery plaque, 09/21/2016--16-49% bilateral carotid artery stenosis     6. Osteopenia, 07/07/2014--lumbar spine -0.5.  Left femoral neck -1.3.  " Right femoral neck -2.0.     7. Vitamin B12 deficiency     8. Vitamin D deficiency     9. Anemia due to chronic kidney disease     10. Benign essential hypertension     11. Gastroesophageal reflux disease without esophagitis     12. Multiple environmental allergies     13. Primary osteoarthritis of knees, bilateral     14. Therapeutic drug monitoring     15. Benign prostatic hypertrophy     16. Statin myopathy     17. Hypogonadism in male, on TRT, testosterone pellets, per          The initial Medicare wellness visit is documented on a separate note.  It appears that his impaired fasting glucose may have evolved into overt diabetes given his hemoglobin A1c of 6.9.  3 months ago his hemoglobin A1c was 6.1.  Patient has had significant hyperlipidemia in the past requiring Vytorin 10/40.  He reports that he discontinued the Vytorin 10-14 days prior to this blood draw.  However, the NMR profile does not look all that bad.  I suspect his cholesterol has not equilibrated back to baseline.  This will need to be reassessed in the near future.  His chronic renal insufficiency is stable.  He has carotid artery plaque and had a carotid Doppler study less than one year ago.  He has osteopenia and Fosamax was initiated 07/18/2014 and remained on his medication list up until the first part of this year.  Patient does not think he ever started at all.  This needs to be reassessed with a repeat DEXA scan.  He has a vitamin B12 deficiency and receives monthly B12 injections.  Vitamin D is therapeutic.  His anemia of chronic renal disease has actually improved.  Blood pressure appears to be controlled.  Reflux is controlled with omeprazole.  He has BPH and had a negative prostate biopsy in December of last year.  Also patient tells me that the urologist started testosterone replacement therapy for hypogonadism consisting of testosterone pellets every 6 months.  Certainly this could help his osteopenia that has been severe and  probably by mouth may be osteoporosis.  Patient has new complaint of muscle cramps related to Vytorin.    Plan is as follows: Patient should stay off the Vytorin and I will formerly discontinue that medication today.  I will have him obtain fasting lab work, specifically NMR, and follow-up after the results are known.  We will order a DEXA scan and review that at the next visit as well.          Procedures         99

## 2017-06-16 NOTE — PROGRESS NOTES
QUICK REFERENCE INFORMATION:  The ABCs of the Annual Wellness Visit    Initial Medicare Wellness Visit    HEALTH RISK ASSESSMENT    7/26/1932    Recent Hospitalizations:  No hospitalization(s) within the last year..        Current Medical Providers:  Patient Care Team:  Delgado Patricia MD as PCP - General (Internal Medicine)  Shreyas Lozada MD as PCP - Claims Attributed        Smoking Status:  History   Smoking Status   • Never Smoker   Smokeless Tobacco   • Never Used       Alcohol Consumption:  History   Alcohol Use   • Yes     Comment: Moderate alcohol consumption       Depression Screen:   PHQ-9 Depression Screening 6/16/2017   Little interest or pleasure in doing things 0   Feeling down, depressed, or hopeless 0   Trouble falling or staying asleep, or sleeping too much -   Feeling tired or having little energy -   Poor appetite or overeating -   Feeling bad about yourself - or that you are a failure or have let yourself or your family down -   Trouble concentrating on things, such as reading the newspaper or watching television -   Moving or speaking so slowly that other people could have noticed. Or the opposite - being so fidgety or restless that you have been moving around a lot more than usual -   Thoughts that you would be better off dead, or of hurting yourself in some way -   PHQ-9 Total Score 0       Health Habits and Functional and Cognitive Screening:  Functional & Cognitive Status 6/16/2017   Do you have difficulty preparing food and eating? No   Do you have difficulty bathing yourself? No   Do you have difficulty getting dressed? No   Do you have difficulty using the toilet? No   Do you have difficulty moving around from place to place? No   In the past year have you fallen or experienced a near fall? No   Do you need help using the phone?  No   Are you deaf or do you have serious difficulty hearing?  No   Do you need help with transportation? No   Do you need help shopping? No   Do you need  help preparing meals?  No   Do you need help with housework?  No   Do you need help with laundry? No   Do you need help taking your medications? No   Do you need help managing money? No   Do you have difficulty concentrating, remembering or making decisions? No       Health Habits  Current Diet: Well Balanced Diet  Dental Exam: Up to date  Eye Exam: Up to date  Exercise (times per week): 2 times per week  Current Exercise Activities Include: Yard Work          Does the patient have evidence of cognitive impairment? No    Asiprin use counseling: Taking ASA appropriately as indicated      Recent Lab Results:    Visual Acuity:  No exam data present    Age-appropriate Screening Schedule:  Refer to the list below for future screening recommendations based on patient's age, sex and/or medical conditions. Orders for these recommended tests are listed in the plan section. The patient has been provided with a written plan.    Health Maintenance   Topic Date Due   • DIABETIC EYE EXAM  07/01/2017   • INFLUENZA VACCINE  08/01/2017   • HEMOGLOBIN A1C  12/09/2017   • DIABETIC FOOT EXAM  03/09/2018   • LIPID PANEL  06/09/2018   • DXA SCAN  03/14/2019   • TDAP/TD VACCINES (2 - Td) 03/09/2027   • PNEUMOCOCCAL VACCINES (65+ LOW/MEDIUM RISK)  Completed   • ZOSTER VACCINE  Completed        Subjective   History of Present Illness    Radha Win is a 84 y.o. male who presents for an Annual Wellness Visit.    The following portions of the patient's history were reviewed and updated as appropriate: allergies, current medications, past family history, past medical history, past social history, past surgical history and problem list.    Outpatient Medications Prior to Visit   Medication Sig Dispense Refill   • ALPRAZolam (XANAX) 0.5 MG tablet Take 1 tablet by mouth 2 (Two) Times a Day As Needed for anxiety. 60 tablet 2   • amLODIPine (NORVASC) 5 MG tablet Take 1 tablet by mouth every morning.     • aspirin (ASPIRIN LOW DOSE) 81 MG  tablet Take 1 tablet by mouth daily.     • Calcium Carb-Cholecalciferol (CALCIUM 600 + D) 600-200 MG-UNIT tablet Take 2 tablets by mouth daily.     • Cholecalciferol (VITAMIN D3) 5000 UNITS capsule capsule Take 1 capsule by mouth daily.     • cyanocobalamin 1000 MCG/ML injection 1 mL every 30 (thirty) days.     • ezetimibe-simvastatin (VYTORIN) 10-40 MG per tablet Take 1 tablet by mouth Every Night. 90 tablet 1   • folic acid (FOLVITE) 1 MG tablet TAKE ONE TABLET BY MOUTH TWICE A  tablet 1   • lisinopril-hydrochlorothiazide (PRINZIDE,ZESTORETIC) 20-12.5 MG per tablet Take 2 tablets by mouth Daily. 180 tablet 1   • omeprazole (priLOSEC) 40 MG capsule TAKE ONE CAPSULE BY MOUTH DAILY 30 capsule 2     Facility-Administered Medications Prior to Visit   Medication Dose Route Frequency Provider Last Rate Last Dose   • cyanocobalamin injection 1,000 mcg  1,000 mcg Intramuscular Q28 Days Delgado Patricia MD   2,000 mcg at 06/16/17 1001   • methylPREDNISolone acetate (DEPO-medrol) injection 40 mg  40 mg Intramuscular Once Delgado Patricia MD           Patient Active Problem List   Diagnosis   • Bilateral carotid artery plaque, 09/21/2016--16-49% bilateral carotid artery stenosis   • Benign prostatic hypertrophy   • Chronic renal insufficiency, stage IV (severe), 02/09/2016--creatinine 1.85   • Diverticulosis of colon   • Hyperlipidemia   • Osteopenia, 07/07/2014--lumbar spine -0.5.  Left femoral neck -1.3.  Right femoral neck -2.0.   • Vitamin B12 deficiency   • Vitamin D deficiency   • Allergic rhinitis   • Anemia due to chronic kidney disease   • Generalized anxiety disorder   • Benign essential hypertension   • Gastroesophageal reflux disease without esophagitis   • Folic acid deficiency   • Multiple environmental allergies   • Therapeutic drug monitoring   • Bilateral renal cysts   • Primary osteoarthritis of knees, bilateral   • Diabetic eye exam   • Diabetic foot exam   • Impaired fasting glucose       Advance  "Care Planning:  has an advance directive - a copy has been provided and is in file    Identification of Risk Factors:  Risk factors include: weight  and cardiovascular risk.    Review of Systems   Constitutional: Negative.    HENT: Negative.    Eyes: Negative.    Respiratory: Negative.    Cardiovascular: Negative.    Gastrointestinal: Negative.    Endocrine: Negative.    Genitourinary: Negative.    Musculoskeletal:        Muscle cramps   Skin: Negative.    Allergic/Immunologic: Negative.    Neurological: Negative.    Hematological: Negative.    Psychiatric/Behavioral: Negative.        Compared to one year ago, the patient feels his physical health is the same.  Compared to one year ago, the patient feels his mental health is the same.    Objective       Physical Exam    General: Alert and oriented x 3.  No acute distress. Obese.   Normal affect.  HEENT: Pupils equal, round, reactive to light; extraocular movements intact; sclerae nonicteric; pharynx, ear canals and TMs normal.  Neck: Without JVD, thyromegaly, bruit, or adenopathy.  Lungs: Clear to auscultation in all fields.  Heart: Regular rate and rhythm without murmur, rub, gallop, or click.  Abdomen: Soft, nontender, without hepatosplenomegaly or hernia.  Bowel sounds normal.  : Deferred.  Rectal: Deferred.  Extremities: Without clubbing, cyanosis, edema, or pulse deficit.  Neurologic: Intact without focal deficit.  Normal station and gait observed during ingress and egress from the examination room.  Skin: Without significant lesion.  Musculoskeletal: Unremarkable.    Vitals:    06/16/17 0953   BP: 134/60   BP Location: Right arm   Patient Position: Sitting   Cuff Size: Large Adult   Pulse: 117   SpO2: 96%   Weight: 186 lb (84.4 kg)   Height: 67\" (170.2 cm)   PainSc: 0-No pain       Body mass index is 29.13 kg/(m^2).  Discussed the patient's BMI with him. The BMI is above average; BMI management plan is completed.    Assessment/Plan   Patient Self-Management " and Personalized Health Advice  The patient has been provided with information about: diet, exercise, weight management, prevention of cardiac or vascular disease and fall prevention and preventive services including:   · Bone densitometry screening, Exercise counseling provided, Fall Risk assessment done, Glaucoma screening recommended, Prostate cancer screening discussed.    Visit Diagnoses:    ICD-10-CM ICD-9-CM   1. Initial Medicare annual wellness visit Z00.00 V70.0   2. Impaired fasting glucose R73.01 790.21   3. Hyperlipidemia, unspecified hyperlipidemia type E78.5 272.4   4. Chronic renal insufficiency, stage IV (severe), 02/09/2016--creatinine 1.85 N18.4 585.4   5. Bilateral carotid artery plaque, 09/21/2016--16-49% bilateral carotid artery stenosis I65.23 433.10     433.30   6. Osteopenia, 07/07/2014--lumbar spine -0.5.  Left femoral neck -1.3.  Right femoral neck -2.0. M85.80 733.90   7. Vitamin B12 deficiency E53.8 266.2   8. Vitamin D deficiency E55.9 268.9   9. Anemia due to chronic kidney disease N18.9 285.21    D63.1    10. Benign essential hypertension I10 401.1   11. Gastroesophageal reflux disease without esophagitis K21.9 530.81   12. Multiple environmental allergies Z91.09 V15.09   13. Primary osteoarthritis of knees, bilateral M17.0 715.16   14. Therapeutic drug monitoring Z51.81 V58.83   15. Benign prostatic hypertrophy N40.0 600.90       No orders of the defined types were placed in this encounter.      Outpatient Encounter Prescriptions as of 6/16/2017   Medication Sig Dispense Refill   • ALPRAZolam (XANAX) 0.5 MG tablet Take 1 tablet by mouth 2 (Two) Times a Day As Needed for anxiety. 60 tablet 2   • amLODIPine (NORVASC) 5 MG tablet Take 1 tablet by mouth every morning.     • aspirin (ASPIRIN LOW DOSE) 81 MG tablet Take 1 tablet by mouth daily.     • Calcium Carb-Cholecalciferol (CALCIUM 600 + D) 600-200 MG-UNIT tablet Take 2 tablets by mouth daily.     • Cholecalciferol (VITAMIN D3) 5000  UNITS capsule capsule Take 1 capsule by mouth daily.     • cyanocobalamin 1000 MCG/ML injection 1 mL every 30 (thirty) days.     • ezetimibe-simvastatin (VYTORIN) 10-40 MG per tablet Take 1 tablet by mouth Every Night. 90 tablet 1   • folic acid (FOLVITE) 1 MG tablet TAKE ONE TABLET BY MOUTH TWICE A  tablet 1   • lisinopril-hydrochlorothiazide (PRINZIDE,ZESTORETIC) 20-12.5 MG per tablet Take 2 tablets by mouth Daily. 180 tablet 1   • omeprazole (priLOSEC) 40 MG capsule TAKE ONE CAPSULE BY MOUTH DAILY 30 capsule 2     Facility-Administered Encounter Medications as of 6/16/2017   Medication Dose Route Frequency Provider Last Rate Last Dose   • cyanocobalamin injection 1,000 mcg  1,000 mcg Intramuscular Q28 Days Delgado Patricia MD   2,000 mcg at 06/16/17 1001   • [DISCONTINUED] methylPREDNISolone acetate (DEPO-medrol) injection 40 mg  40 mg Intramuscular Once Delgado Patricia MD           Reviewed use of high risk medication in the elderly: yes  Reviewed for potential of harmful drug interactions in the elderly: yes    Follow Up:  No Follow-up on file.     An After Visit Summary and PPPS with all of these plans were given to the patient.

## 2017-07-10 DIAGNOSIS — E78.5 HYPERLIPIDEMIA, UNSPECIFIED HYPERLIPIDEMIA TYPE: Chronic | ICD-10-CM

## 2017-07-11 LAB
CHOLEST SERPL-MCNC: 205 MG/DL (ref 100–199)
HDL SERPL-SCNC: 28.8 UMOL/L
HDLC SERPL-MCNC: 42 MG/DL
LDL SERPL QN: 20.7 NM
LDL SERPL-SCNC: 1734 NMOL/L
LDL SMALL SERPL-SCNC: 703 NMOL/L
LDLC SERPL CALC-MCNC: 137 MG/DL (ref 0–99)
TRIGL SERPL-MCNC: 130 MG/DL (ref 0–149)

## 2017-07-17 ENCOUNTER — OFFICE VISIT (OUTPATIENT)
Dept: INTERNAL MEDICINE | Facility: CLINIC | Age: 82
End: 2017-07-17

## 2017-07-17 VITALS
DIASTOLIC BLOOD PRESSURE: 70 MMHG | SYSTOLIC BLOOD PRESSURE: 130 MMHG | HEIGHT: 67 IN | WEIGHT: 187 LBS | BODY MASS INDEX: 29.35 KG/M2 | HEART RATE: 91 BPM | OXYGEN SATURATION: 97 %

## 2017-07-17 DIAGNOSIS — M85.80 OSTEOPENIA: Chronic | ICD-10-CM

## 2017-07-17 DIAGNOSIS — E78.5 HYPERLIPIDEMIA, UNSPECIFIED HYPERLIPIDEMIA TYPE: Primary | Chronic | ICD-10-CM

## 2017-07-17 DIAGNOSIS — R73.01 IMPAIRED FASTING GLUCOSE: Chronic | ICD-10-CM

## 2017-07-17 DIAGNOSIS — Z51.81 THERAPEUTIC DRUG MONITORING: ICD-10-CM

## 2017-07-17 DIAGNOSIS — I10 BENIGN ESSENTIAL HYPERTENSION: Chronic | ICD-10-CM

## 2017-07-17 DIAGNOSIS — E53.8 VITAMIN B12 DEFICIENCY: Chronic | ICD-10-CM

## 2017-07-17 DIAGNOSIS — R25.2 MUSCLE CRAMPS: ICD-10-CM

## 2017-07-17 DIAGNOSIS — N18.4 CHRONIC RENAL INSUFFICIENCY, STAGE IV (SEVERE) (HCC): Chronic | ICD-10-CM

## 2017-07-17 PROBLEM — E29.1 HYPOGONADISM IN MALE: Chronic | Status: ACTIVE | Noted: 2017-06-16

## 2017-07-17 PROCEDURE — 99214 OFFICE O/P EST MOD 30 MIN: CPT | Performed by: INTERNAL MEDICINE

## 2017-07-17 PROCEDURE — 96372 THER/PROPH/DIAG INJ SC/IM: CPT | Performed by: INTERNAL MEDICINE

## 2017-07-17 RX ORDER — CYANOCOBALAMIN 1000 UG/ML
1000 INJECTION, SOLUTION INTRAMUSCULAR; SUBCUTANEOUS
Status: DISCONTINUED | OUTPATIENT
Start: 2017-07-17 | End: 2018-06-28

## 2017-07-17 RX ORDER — PRAVASTATIN SODIUM 40 MG
TABLET ORAL
Qty: 30 TABLET | Refills: 2 | Status: SHIPPED | OUTPATIENT
Start: 2017-07-17 | End: 2017-08-18 | Stop reason: SDUPTHER

## 2017-07-17 NOTE — PROGRESS NOTES
07/17/2017    Patient Information  Radha Win                                                                                          [address]  [facility] [address]      7/26/1932  [phone]      Chief Complaint:     Follow-up hyperlipidemia, osteopenia, muscle cramps and possible statin intolerance, recent DEXA scan, chronic renal insufficiency, hypertension, vitamin B12 deficiency, impaired fasting glucose.  No new acute complaints.    History of Present Illness:    Patient with history of medical problems as outlined in the chief complaint that have been fairly stable over the past year except patient recently began to develop muscle cramps.  He discontinued Vytorin which he had been on for some time other than the fact that he started getting it from a [place] pharmacy.  He began to develop cramps primarily in his legs and he went off of the Vytorin.  The history regarding this is described below.  Past medical history reviewed and updated where necessary including health maintenance parameters.  This reveals he is up-to-date.  Patient also has osteopenia and had a DEXA scan back in March of this year which we will review.  We will also assess his blood pressure, particularly given his chronic renal insufficiency.  Patient has a vitamin B12 deficiency and needs a B12 injection today.    The history regarding possible statin intolerance and muscle cramps:    07/17/2017--patient seen in follow-up and reports his muscle cramps are better but he still gets muscle cramps in his feet.  NMR reveals a total cholesterol 205.  LDL particle number elevated at 1734.  Small LDL particle number elevated at 703.  HDL particle number is low at 28.8.  We will start pravastatin 40 mg one half by mouth per day and reassess in about 6 weeks.    06/16/2017--patient reports he had to discontinue Vytorin which he has been receiving from [facility] because of worsening muscle cramps.  He has been  taking coenzyme Q10 but only at a 200 mg per day dose.  He reports the cramps got better after discontinuation of the Vytorin but had not totally resolved.  Vytorin formally discontinued.    Review of Systems   Constitution: Negative.   HENT: Negative.    Eyes: Negative.    Cardiovascular: Negative.    Respiratory: Negative.    Endocrine: Negative.    Hematologic/Lymphatic: Negative.    Skin: Negative.    Musculoskeletal: Negative.    Gastrointestinal: Negative.    Genitourinary: Negative.    Neurological: Negative.    Psychiatric/Behavioral: Negative.    Allergic/Immunologic: Negative.        Active Problems:    Patient Active Problem List   Diagnosis   • Bilateral carotid artery plaque, 09/21/2016--16-49% bilateral carotid artery stenosis   • Benign prostatic hypertrophy   • Chronic renal insufficiency, stage IV (severe), 02/09/2016--creatinine 1.85   • Diverticulosis of colon   • Hyperlipidemia   • Osteopenia, 03/14/2017--lumbar spine -0.3.  Left femoral neck -1.6.  Right femoral neck -2.0.   • Vitamin B12 deficiency   • Vitamin D deficiency   • Allergic rhinitis   • Anemia due to chronic kidney disease   • Generalized anxiety disorder   • Benign essential hypertension   • Gastroesophageal reflux disease without esophagitis   • Folic acid deficiency   • Multiple environmental allergies   • Therapeutic drug monitoring   • Bilateral renal cysts   • Primary osteoarthritis of knees, bilateral   • Diabetic eye exam   • Diabetic foot exam   • Impaired fasting glucose   • Statin myopathy   • Hypogonadism in male, on TRT, testosterone pellets, per          Past Medical History:   Diagnosis Date   • History of Biceps tendinitis on right 7/25/2016 08/09/2016--patient seen in follow-up and the severe pain anteriorly appears to have resolved but now he has an ache that seems to be in the joint itself and is worse at night particularly when sleeping.  I think that patient may actually have had 2 problems consisting of  bicipital tendinitis and rotator cuff tendinitis.  Depo-Medrol 40 mg with 3 cc of Xylocaine injected into the shoulder via a posterior approach.  If this does not take care of the patient's pain then we will need to pursue with further workup including MRI.  07/25/2016--patient reports a 2 to three-week history of pain involving his right upper arm below the shoulder anteriorly.  No known trauma.  The pain is worse with certain movements and activities.  The pain is quite severe particularly at night and this interferes with his sleep.  Examination reveals no gross deformity.  There is tenderness to palpation over the long head of the biceps shoulder joint proper.  The area was injected with 40 mg of Depo-Medrol and 3 cc of Xylocaine.   • History of Bicipital tendinitis of left shoulder 01/16/2015 01/16/2015--patient presents with a two-month history of left anterior shoulder pain without known injury. The pain is worse with certain movements. Examination reveals tenderness over the bicipital tendons. X-ray ordered and revealed no acute fracture or bony malalignment. Degenerative arthritis noted in the left acromioclavicular joint.   • History of bone density study 07/07/2014 07/07/2014-DEXA scan revealed average lumbar spine T score -0.5. Left proximal femoral T score is zero. Left femoral neck T score -1.3. Right proximal femoral T score 0.1. Right femoral neck T score -2.0. Osteopenia.   • History of BPPV (benign paroxysmal positional vertigo) 3/21/2016    08/09/2016--patient seen in follow-up and reports his symptoms have resolved.  05/13/2016--patient was evaluated by ENT and Izzy-Hallpike testing was negative at this time.  However, patient was asymptomatic.  03/21/2016--patient presents with a 2 day history of dizziness that he describes as an off-balance or spinning sensation.  This seems to be precipitated by movement such as when he stands up or bends over.  Rolling over in bed does not seem to  precipitate it.  Patient has no other symptoms associated with this.  The episodes last 5-10 minutes and he seems to get relief by not moving.  He was evaluated earlier at the urgent care and the physician there 100 start him on diabetes medication because his blood sugar was 157 nonfasting.  His matter fact she gave me a phone call to explain the situation to me and I had her just go ahead and send the patient here.  Patient was a little concerned because his blood sugars have been running a little high of late.  Examination today unremarkable.  We were able t   • History of carotid Doppler/vascular screen 09/21/2016 09/21/2016--vascular screen reveals 16-49% bilateral carotid artery stenosis, negative for AAA, negative for PAD.  10/10/2008--vascular screen revealed mild bilateral cartoid plaque, no eveidence of aortic aneurysm, no eveidence of PAD.   • History of chest x-ray 04/23/2014 04/23/2014-chest x-ray reveals aortic calcification. Mild interstitial prominence, granulomatous calcification. No acute process. 03/03/2009-chest x-ray reveals no active disease.   • History of chronic right knee pain 06/26/2014 08/20/2014--patient reports his knee pain has resolved.  07/03/2014--patient seen in follow up and reports his pain is much improved with the prednisone.  06/26/2014--x-ray of the right knee was negative except for osteopenia. DEXA scan ordered.  06/26/2014--patient presents with a 5-6 week history of right knee pain that is primarily anterior. The pain only comes on when patient kneels down such    • History of diverticulitis of colon 2009 and 2003 05/08/2009-acute diverticulitis without complication. 09/05/2003-acute diverticulitis without complication.    • History of Plantar fasciitis of right foot 06/26/2014 08/20/2014--patient reports his symptoms are much improved but not resolved. He has some discomfort approximately one hour after awakening and walking.  07/03/2014--patient seen in  follow up and reports his pain is much improved with the prednisone. DEXA scan ordered.   06/26/2014--x-ray of the right foot is negative except for osteopenia.   06/26/2014--patient presents with a 3-4 week history of    • History of pneumococcal vaccination 06/01/2015 06/01/2015--Prevnar 13 given. Patient reports he received his initial pneumococcal vaccination after the age of 65 and therefore no further pneumococcal vaccination required after today's Prevnar.   • History of Right rotator cuff tendinitis 8/9/2016 08/26/2016--patient seen in follow-up and reports his pain has resolved.  08/09/2016--patient seen in follow-up and the severe pain anteriorly appears to have resolved but now he has an ache that seems to be in the joint itself and is worse at night particularly when sleeping.  I think that patient may actually have had 2 problems consisting of bicipital tendinitis and rotator cuff tendinitis.  Depo-Medrol 40 mg with 3 cc of Xylocaine injected into the shoulder via a posterior approach.  If this does not take care of the patient's pain then we will need to pursue with further workup including MRI.  07/25/2016--patient reports a 2 to three-week history of pain involving his right upper arm below the shoulder anteriorly.  No known trauma.  The pain is worse with certain movements and activities.  The pain is quite severe particularly at night and this interferes with his sleep.  Examination reveals no gross deformity.  There is tenderness to palpation over the long head of the biceps shoulder joint proper.    • History of trigger finger 06/01/2015 08/09/2016--patient seen in follow-up and reports his trigger finger resolved without any intervention.  06/01/2015--patient presents with a 3 month history of discomfort involving his left index finger and occasionally the finger will lock in the flexed position requiring him to forcibly extended.  I gave patient Dr. Chacorta Oneal phone number and he  can call him if he decides that this is bad e   • History of Zostavax 08/2009 August 2009-Zostavax given         Past Surgical History:   Procedure Laterality Date   • CATARACT EXTRACTION Left 12/12/2011 12/12/2011--cataract extirpation with intraocular lens implantation left eye.   • CATARACT EXTRACTION Right 12/2011 December 2011--right cataract extirpation with intraocular lens implantation.   • COLONOSCOPY  06/11/2002 06/11/2002--incomplete colonoscopy due to redundant colon. Not able to get past the hepatic flexure. Patient had followup barium contrast enema which showed extensive diverticulosis   • COLONOSCOPY  10/03/2013    10/03/2013--colonoscopy revealed markedly redundant colon, pancolonic diverticulosis with several impacted fecalith. No evidence of diverticulitis or stricture. Was only able to reach the ascending colon. Follow up barium enema revealed extensive diverticulosis. No polyp or other mucosal mass seen.   • CYSTOSCOPY  05/18/2016 05/18/2016--urology consultation.  Cystoscopy performed for possible bladder mass is negative.   • PROSTATE BIOPSY  12/21/2016 12/21/2016--prostate biopsy reportedly negative.  I will try to obtain the report.         No Known Allergies        Current Outpatient Prescriptions:   •  ALPRAZolam (XANAX) 0.5 MG tablet, Take 1 tablet by mouth 2 (Two) Times a Day As Needed for anxiety., Disp: 60 tablet, Rfl: 2  •  amLODIPine (NORVASC) 5 MG tablet, Take 1 tablet by mouth every morning., Disp: , Rfl:   •  aspirin (ASPIRIN LOW DOSE) 81 MG tablet, Take 1 tablet by mouth daily., Disp: , Rfl:   •  Calcium Carb-Cholecalciferol (CALCIUM 600 + D) 600-200 MG-UNIT tablet, Take 2 tablets by mouth daily., Disp: , Rfl:   •  Cholecalciferol (VITAMIN D3) 5000 UNITS capsule capsule, Take 1 capsule by mouth daily., Disp: , Rfl:   •  cyanocobalamin 1000 MCG/ML injection, 1 mL every 30 (thirty) days., Disp: , Rfl:   •  folic acid (FOLVITE) 1 MG tablet, TAKE ONE TABLET BY MOUTH  "TWICE A DAY, Disp: 180 tablet, Rfl: 1  •  lisinopril-hydrochlorothiazide (PRINZIDE,ZESTORETIC) 20-12.5 MG per tablet, Take 2 tablets by mouth Daily., Disp: 180 tablet, Rfl: 1  •  omeprazole (priLOSEC) 40 MG capsule, TAKE ONE CAPSULE BY MOUTH DAILY, Disp: 30 capsule, Rfl: 2    Current Facility-Administered Medications:   •  cyanocobalamin injection 1,000 mcg, 1,000 mcg, Intramuscular, Q28 Days, Delgado Patricia MD, 2,000 mcg at 06/16/17 1001      Family History   Problem Relation Age of Onset   • Diabetes Mother    • Heart disease Mother          Social History     Social History   • Marital status:      Spouse name: N/A   • Number of children: N/A   • Years of education: N/A     Occupational History   • Retired  [employer]     Social History Main Topics   • Smoking status: Never Smoker   • Smokeless tobacco: Never Used   • Alcohol use Yes      Comment: Moderate alcohol consumption   • Drug use: No   • Sexual activity: Yes     Partners: Female     Other Topics Concern   • Not on file     Social History Narrative         Vitals:    07/17/17 1455   BP: 130/70   Pulse: 91   SpO2: 97%   Weight: 187 lb (84.8 kg)   Height: 67\" (170.2 cm)          Physical Exam:    General: Alert and oriented x 3.  No acute distress.  Normal affect.  HEENT: Pupils equal, round, reactive to light; extraocular movements intact; sclerae nonicteric; pharynx, ear canals and TMs normal.  Neck: Without JVD, thyromegaly, bruit, or adenopathy.  Lungs: Clear to auscultation in all fields.  Heart: Regular rate and rhythm without murmur, rub, gallop, or click.  Abdomen: Soft, nontender, without hepatosplenomegaly or hernia.  Bowel sounds normal.  : Deferred.  Rectal: Deferred.  Extremities: Without clubbing, cyanosis, edema, or pulse deficit.  Neurologic: Intact without focal deficit.  Normal station and gait observed during ingress and egress from the examination room.  Skin: Without significant lesion.  Musculoskeletal: " Unremarkable.      Lab/other results:    NMR reveals a total cholesterol 205, triglycerides normal at 130, LDL particle number elevated at 1734.  Small LDL particle number elevated at 703.  HDL particle number low at 28.8.    Assessment/Plan:     Diagnosis Plan   1. Hyperlipidemia, unspecified hyperlipidemia type     2. Osteopenia, 03/14/2017--lumbar spine -0.3.  Left femoral neck -1.6.  Right femoral neck -2.0.     3. Chronic renal insufficiency, stage IV (severe), 02/09/2016--creatinine 1.85     4. Benign essential hypertension     5. Vitamin B12 deficiency     6. Statin myopathy       Patient with hyperlipidemia that should be treated.  However, he developed intolerance to Vytorin as described.  After reviewing his NMR profile, I think he may be able to get by with pravastatin which has much less incidence of muscle symptoms.  He has osteopenia which shows just mild worsening.  We will continue to monitor this with a DEXA scan every other year.  His chronic renal insufficiency is stable.  Blood pressure seems adequately controlled.  He needs a B12 injection.    Plan is as follows: Start pravastatin 40 mg, one half by mouth daily.  Coenzyme Q 10 400 mg by mouth daily with food.  Patient will obtain fasting lab work in 6 weeks in follow-up to assess his cholesterol, his muscle cramps, as well as his impaired fasting glucose.              Procedures

## 2017-07-31 RX ORDER — OMEPRAZOLE 40 MG/1
CAPSULE, DELAYED RELEASE ORAL
Qty: 30 CAPSULE | Refills: 5 | Status: SHIPPED | OUTPATIENT
Start: 2017-07-31 | End: 2018-01-26 | Stop reason: SDUPTHER

## 2017-08-18 DIAGNOSIS — E78.5 HYPERLIPIDEMIA, UNSPECIFIED HYPERLIPIDEMIA TYPE: Chronic | ICD-10-CM

## 2017-08-18 RX ORDER — PRAVASTATIN SODIUM 40 MG
TABLET ORAL
Qty: 90 TABLET | Refills: 3 | Status: SHIPPED | OUTPATIENT
Start: 2017-08-18 | End: 2018-08-30

## 2017-08-25 DIAGNOSIS — R73.01 IMPAIRED FASTING GLUCOSE: Chronic | ICD-10-CM

## 2017-08-25 DIAGNOSIS — E78.5 HYPERLIPIDEMIA, UNSPECIFIED HYPERLIPIDEMIA TYPE: Chronic | ICD-10-CM

## 2017-08-25 DIAGNOSIS — Z51.81 THERAPEUTIC DRUG MONITORING: ICD-10-CM

## 2017-08-28 LAB
ALBUMIN SERPL-MCNC: 4.1 G/DL (ref 3.5–5.2)
ALBUMIN/GLOB SERPL: 2.1 G/DL
ALP SERPL-CCNC: 77 U/L (ref 39–117)
ALT SERPL-CCNC: 17 U/L (ref 1–41)
AST SERPL-CCNC: 16 U/L (ref 1–40)
BILIRUB SERPL-MCNC: 0.3 MG/DL (ref 0.1–1.2)
BUN SERPL-MCNC: 30 MG/DL (ref 8–23)
BUN/CREAT SERPL: 16 (ref 7–25)
CALCIUM SERPL-MCNC: 8.6 MG/DL (ref 8.6–10.5)
CHLORIDE SERPL-SCNC: 106 MMOL/L (ref 98–107)
CK SERPL-CCNC: 77 U/L (ref 20–200)
CO2 SERPL-SCNC: 25.5 MMOL/L (ref 22–29)
CREAT SERPL-MCNC: 1.87 MG/DL (ref 0.76–1.27)
ERYTHROCYTE [DISTWIDTH] IN BLOOD BY AUTOMATED COUNT: 13.8 % (ref 11.5–14.5)
GLOBULIN SER CALC-MCNC: 2 GM/DL
GLUCOSE SERPL-MCNC: 126 MG/DL (ref 65–99)
HBA1C MFR BLD: 6.7 % (ref 4.8–5.6)
HCT VFR BLD AUTO: 35.7 % (ref 40.4–52.2)
HGB BLD-MCNC: 11.5 G/DL (ref 13.7–17.6)
MCH RBC QN AUTO: 29.6 PG (ref 27–32.7)
MCHC RBC AUTO-ENTMCNC: 32.2 G/DL (ref 32.6–36.4)
MCV RBC AUTO: 91.8 FL (ref 79.8–96.2)
PLATELET # BLD AUTO: 192 10*3/MM3 (ref 140–500)
POTASSIUM SERPL-SCNC: 4.9 MMOL/L (ref 3.5–5.2)
PROT SERPL-MCNC: 6.1 G/DL (ref 6–8.5)
RBC # BLD AUTO: 3.89 10*6/MM3 (ref 4.6–6)
SODIUM SERPL-SCNC: 144 MMOL/L (ref 136–145)
T4 FREE SERPL-MCNC: 1.08 NG/DL (ref 0.93–1.7)
TSH SERPL DL<=0.005 MIU/L-ACNC: 2.51 MIU/ML (ref 0.27–4.2)
WBC # BLD AUTO: 5.43 10*3/MM3 (ref 4.5–10.7)

## 2017-08-29 LAB
CHOLEST SERPL-MCNC: 168 MG/DL (ref 100–199)
HDL SERPL-SCNC: 33.9 UMOL/L
HDLC SERPL-MCNC: 44 MG/DL
LDL SERPL QN: 20.5 NM
LDL SERPL-SCNC: 1329 NMOL/L
LDL SMALL SERPL-SCNC: 800 NMOL/L
LDLC SERPL CALC-MCNC: 99 MG/DL (ref 0–99)
T3FREE SERPL-MCNC: 2.6 PG/ML (ref 2–4.4)
TRIGL SERPL-MCNC: 125 MG/DL (ref 0–149)

## 2017-09-07 ENCOUNTER — OFFICE VISIT (OUTPATIENT)
Dept: INTERNAL MEDICINE | Facility: CLINIC | Age: 82
End: 2017-09-07

## 2017-09-07 VITALS
HEART RATE: 97 BPM | SYSTOLIC BLOOD PRESSURE: 126 MMHG | WEIGHT: 189 LBS | OXYGEN SATURATION: 95 % | DIASTOLIC BLOOD PRESSURE: 66 MMHG | HEIGHT: 67 IN | BODY MASS INDEX: 29.66 KG/M2

## 2017-09-07 DIAGNOSIS — D63.1 ANEMIA DUE TO CHRONIC KIDNEY DISEASE: Chronic | ICD-10-CM

## 2017-09-07 DIAGNOSIS — R25.2 MUSCLE CRAMPS: Primary | ICD-10-CM

## 2017-09-07 DIAGNOSIS — Z51.81 THERAPEUTIC DRUG MONITORING: ICD-10-CM

## 2017-09-07 DIAGNOSIS — E55.9 VITAMIN D DEFICIENCY: Chronic | ICD-10-CM

## 2017-09-07 DIAGNOSIS — N18.9 ANEMIA DUE TO CHRONIC KIDNEY DISEASE: Chronic | ICD-10-CM

## 2017-09-07 DIAGNOSIS — E53.8 VITAMIN B12 DEFICIENCY: Chronic | ICD-10-CM

## 2017-09-07 DIAGNOSIS — E78.5 HYPERLIPIDEMIA, UNSPECIFIED HYPERLIPIDEMIA TYPE: Chronic | ICD-10-CM

## 2017-09-07 DIAGNOSIS — I10 BENIGN ESSENTIAL HYPERTENSION: Chronic | ICD-10-CM

## 2017-09-07 DIAGNOSIS — R73.01 IMPAIRED FASTING GLUCOSE: Chronic | ICD-10-CM

## 2017-09-07 DIAGNOSIS — N18.4 CHRONIC RENAL INSUFFICIENCY, STAGE IV (SEVERE) (HCC): Chronic | ICD-10-CM

## 2017-09-07 DIAGNOSIS — I65.23 ATHEROSCLEROSIS OF BOTH CAROTID ARTERIES: Chronic | ICD-10-CM

## 2017-09-07 PROCEDURE — 96372 THER/PROPH/DIAG INJ SC/IM: CPT | Performed by: INTERNAL MEDICINE

## 2017-09-07 PROCEDURE — 99214 OFFICE O/P EST MOD 30 MIN: CPT | Performed by: INTERNAL MEDICINE

## 2017-09-07 RX ORDER — FOLIC ACID 1 MG/1
1 TABLET ORAL DAILY
COMMUNITY
End: 2018-06-28 | Stop reason: SDUPTHER

## 2017-09-07 RX ADMIN — CYANOCOBALAMIN 1000 MCG: 1000 INJECTION, SOLUTION INTRAMUSCULAR; SUBCUTANEOUS at 09:17

## 2017-09-07 NOTE — PROGRESS NOTES
09/07/2017    Patient Information  Radha Win                                                                                          [address]  [facility] [address]      7/26/1932  [phone]      Chief Complaint:     Follow-up muscle cramps/myalgias, recent medication change, hyperlipidemia, chronic renal insufficiency, carotid artery plaque, vitamin B12 deficiency, anemia of chronic renal disease, hypertension, impaired fasting glucose.  Patient has no new acute complaints.    History of Present Illness:    Patient with a history of medical problems as outlined in the chief complaint that have been fairly stable over the past year with the exception of complaints of muscle cramps that are possibly related to Vytorin.  This will be described in detail below.  Patient also has impaired fasting glucose which we will assess along with his blood pressure.  His past medical history reviewed and updated where necessary including health maintenance parameters.  This reveals he is up-to-date except for influenza vaccination and it is too early for that at this time.  He is aware to get in mid October.    Review of Systems   Constitution: Negative.   HENT: Negative.    Eyes: Negative.    Cardiovascular: Negative.    Respiratory: Negative.    Endocrine: Negative.    Hematologic/Lymphatic: Negative.    Skin: Negative.    Musculoskeletal: Negative.    Gastrointestinal: Negative.    Genitourinary: Negative.    Neurological: Negative.    Psychiatric/Behavioral: Negative.    Allergic/Immunologic: Negative.        Active Problems:    Patient Active Problem List   Diagnosis   • Bilateral carotid artery plaque, 09/21/2016--16-49% bilateral carotid artery stenosis   • Benign prostatic hypertrophy   • Chronic renal insufficiency, stage IV (severe), 02/09/2016--creatinine 1.85   • Diverticulosis of colon   • Hyperlipidemia   • Osteopenia, 03/14/2017--lumbar spine -0.3.  Left femoral neck -1.6.   Assessment/Plan:       ICD-10-CM ICD-9-CM    1. Controlled type 2 diabetes mellitus without complication, without long-term current use of insulin (HCC) E11.9 250.00 AMB POC GLUCOSE BLOOD, BY GLUCOSE MONITORING DEVICE      metFORMIN ER (GLUCOPHAGE XR) 500 mg tablet   2. Unspecified essential hypertension I10 401.9 metoprolol tartrate (LOPRESSOR) 25 mg tablet      triamterene-hydroCHLOROthiazide (MAXZIDE) 37.5-25 mg per tablet   3. High cholesterol E78.00 272.0 LIPID PANEL      lovastatin (MEVACOR) 20 mg tablet   4. Colon cancer screening Z12.11 V76.51 AMB POC OCCULT, OTHER SOURCE      AMB POC FECAL BLOOD, OCCULT, QL 3 CARDS    Greater than 50% of this 25 minute visit was spent in face-to-face counseling/coordination of care regarding diabetes management. Follow-up Disposition:  Return in about 3 months (around 7/4/2017) for diabetes, hypertension. Health Maintenance Due   Topic    Hepatitis C Screening     EYE EXAM RETINAL OR DILATED Q1     Pneumococcal 19-64 Medium Risk (1 of 1 - PPSV23)    DTaP/Tdap/Td series (1 - Tdap)    FOBT Q 1 YEAR AGE 50-75 - done today, but I am not able to air[place] [facility] [place]         [facility]  Subjective:   Sinai Callahan is a 46 y.o. PATIENT REFUSED male who speaks [place]. Chief Complaint   Patient presents with    Diabetes     f/u, med refill   Brought in medications? no Last took them: metformin yesterday, bp was this morning. Measuring glucoses? no    Last ate: yesterday; he is spending more time gardening and exercising. He ran the [employer] race and finished it. He has been walking his dogs more. Eating salads, some from his garden. Also growing peas, onions. Spinach. No hamburger since January. Activity/exercise: yes   ROS:   no polyuria or polydipsia, no chest pain, dyspnea or TIA's, no numbness, tingling or pain in extremities. Social History: He reports that he has never smoked.  He does not have any smokeless tobacco Right femoral neck -2.0.   • Vitamin B12 deficiency   • Vitamin D deficiency   • Allergic rhinitis   • Anemia due to chronic kidney disease   • Generalized anxiety disorder   • Benign essential hypertension   • Gastroesophageal reflux disease without esophagitis   • Folic acid deficiency   • Multiple environmental allergies   • Therapeutic drug monitoring   • Bilateral renal cysts   • Primary osteoarthritis of knees, bilateral   • Diabetic eye exam   • Diabetic foot exam   • Impaired fasting glucose   • Muscle cramps, possibly statin related, specifically Vytorin.   • Hypogonadism in male, on TRT, testosterone pellets, per          Past Medical History:   Diagnosis Date   • Allergic rhinitis 2/11/2016   • Anemia due to chronic kidney disease 2/11/2016   • Benign essential hypertension 2/11/2016   • Benign prostatic hypertrophy 2/11/2016 12/21/2016--prostate biopsy reportedly negative.  I will try to obtain the report.  Patient sees urologist.  PSAs run from the 3-8 range   • Bilateral carotid artery plaque, 09/21/2016--16-49% bilateral carotid artery stenosis 2/11/2016 09/21/2016--vascular screen reveals 16-49% bilateral carotid artery stenosis, negative for AAA, negative for PAD.  10/10/2008--vascular screening study revealed mild bilateral carotid plaque, no aortic aneurysm, no evidence of PAD   • Bilateral renal cysts 2/14/2016 04/07/2016--ultrasound of the kidneys reveals the previously described renal cysts are not well seen.  However, questionable incidental bladder tumor noted.  05/26/2010--ultrasound the kidneys was negative bilaterally except for small renal cysts.   • Chronic renal insufficiency, stage IV (severe), 02/09/2016--creatinine 1.85 2/11/2016 02/09/2016--serum creatinine 1.85.  BUN 29.  07/20/2015--patient was evaluated by the nephrologist.  Ultrasound of the kidneys ordered but this was never done.  05/22/2015--BUN 35, creatinine 2.3.  Patient referred to nephrology, Dr. Devyn Muniz.  history on file. He reports that he does not drink alcohol or use illicit drugs. Family History: His family history is not on file. Outpatient Medications Prior to Visit   Medication Sig Dispense Refill    metoprolol tartrate (LOPRESSOR) 25 mg tablet TAKE ONE TABLET BY MOUTH TWICE DAILY for hypertension 60 Tab 1    metFORMIN ER (GLUCOPHAGE XR) 500 mg tablet Take 2 pills with breakfast and then 2 with dinner 120 Tab 1    triamterene-hydroCHLOROthiazide (MAXZIDE) 37.5-25 mg per tablet TAKE ONE TABLET BY MOUTH ONE TIME DAILY 30 Tab 1    lovastatin (MEVACOR) 20 mg tablet Take 2 Tabs by mouth nightly. 60 Tab 5     No facility-administered medications prior to visit. Surgical History: No past surgical history on file. Objective:     Vitals:    04/04/17 1256   BP: 134/87   Pulse: 64   Temp: 98.6 °F (37 °C)   TempSrc: Oral   Weight: 247 lb 3.2 oz (112.1 kg)   Height: 5' 8.58\" (1.742 m)     Results for orders placed or performed in visit on 04/04/17   AMB POC GLUCOSE BLOOD, BY GLUCOSE MONITORING DEVICE   Result Value Ref Range    Glucose POC fasting 132 mg/dL   AMB POC OCCULT, OTHER SOURCE   Result Value Ref Range    VALID INTERNAL CONTROL POC Yes     Hemoccult (POC) Negative Negative     Wt Readings from Last 2 Encounters:   04/04/17 247 lb 3.2 oz (112.1 kg)   02/20/17 254 lb (115.2 kg)    Weight decreased; Hemoglobin A1c   Date Value Ref Range Status   02/20/2017 8.2 (H) 4.2 - 6.3 % Final   07/29/2016 7.5 (H) 4.2 - 6.3 % Final    A1C increased;   Lab Results   Component Value Date/Time    Microalbumin/Creat ratio (mg/g creat)  02/20/2017 10:54 AM     Cannot calculate ratio due to micro albumin result outside reportable range.     Microalbumin,urine random <0.50 02/20/2017 10:54 AM    Creatinine 1.21 02/20/2017 10:54 AM      Lab Results   Component Value Date/Time    GFR est AA >60 02/20/2017 10:54 AM    GFR est non-AA >60 02/20/2017 10:54 AM       Lab Results   Component Value Date/Time    Cholesterol, total  04/16/2014--BUN 25, creatinine 1.77.  01/03/2013--BUN 37, creatinine 2.14.    05/26/2010---ultrasound of the kidneys reveal a small cyst x 2 right kidney.  Otherwise normal.    Baseline creatinine approximately 1.9-2.0.   • Diabetic eye exam 3/8/2017    07/03/2017--patient reports a negative diabetic eye exam.  07/01/2016--patient reports a negative diabetic eye exam.   • Diabetic foot exam 3/8/2017    03/09/2017--routine diabetic eye examination reveals no evidence of diabetic foot ulcer or pre-ulcerative callus.  Very bounding distal pulses without signs of ischemia.  No skin changes.  Sensation intact per monofilament.   • Diverticulosis of colon 2/11/2016    10/03/2013--colonoscopy revealed markedly redundant colon, pancolonic diverticulosis with several impacted fecalith.  No evidence of diverticulitis or stricture.  Was only able to reach the ascending colon.  Follow up barium enema revealed extensive diverticulosis.  No polyp or other mucosal mass seen.    06/11/2002--incomplete colonoscopy due to redundant colon.  Not able to get past the hepatic flexure.  Patient had followup barium contrast enema which showed extensive diverticulosis.   • Folic acid deficiency 2/11/2016   • Gastroesophageal reflux disease without esophagitis 2/11/2016   • Generalized anxiety disorder 2/11/2016   • History of Biceps tendinitis on right 7/25/2016 08/09/2016--patient seen in follow-up and the severe pain anteriorly appears to have resolved but now he has an ache that seems to be in the joint itself and is worse at night particularly when sleeping.  I think that patient may actually have had 2 problems consisting of bicipital tendinitis and rotator cuff tendinitis.  Depo-Medrol 40 mg with 3 cc of Xylocaine injected into the shoulder via a posterior approach.  If this does not take care of the patient's pain then we will need to pursue with further workup including MRI.  07/25/2016--patient reports a 2 to three-week history  143 11/24/2015 02:03 PM    HDL Cholesterol 47 11/24/2015 02:03 PM    LDL, calculated 71 11/24/2015 02:03 PM    Triglyceride 125 11/24/2015 02:03 PM    CHOL/HDL Ratio 3.0 11/24/2015 02:03 PM      Lab Results   Component Value Date/Time    ALT (SGPT) 51 02/20/2017 10:54 AM    AST (SGOT) 31 02/20/2017 10:54 AM    Alk. phosphatase 102 02/20/2017 10:54 AM    Bilirubin, total 0.4 02/20/2017 10:54 AM     Constitutional: He appears well-developed. Eyes: EOM are normal. Pupils are equal, round, and reactive to light. Neck: Neck supple. No thyromegaly present. Cardiovascular: Normal rate, regular rhythm, normal heart sounds and intact distal pulses. No murmur heard. Pulmonary/Chest: Effort normal and breath sounds normal.   Musculoskeletal: He exhibits no edema. No ulcers of the lower extremities. Assessment/Plan:   Rodney Pina was seen today for diabetes. Diagnoses and all orders for this visit:    Controlled type 2 diabetes mellitus without complication, without long-term current use of insulin (HCC)  -     AMB POC GLUCOSE BLOOD, BY GLUCOSE MONITORING DEVICE  -     metFORMIN ER (GLUCOPHAGE XR) 500 mg tablet; Take 2 pills with breakfast and then 2 with dinner    Unspecified essential hypertension  -     metoprolol tartrate (LOPRESSOR) 25 mg tablet; TAKE ONE TABLET BY MOUTH TWICE DAILY for hypertension  -     triamterene-hydroCHLOROthiazide (MAXZIDE) 37.5-25 mg per tablet; TAKE ONE TABLET BY MOUTH ONE TIME DAILY    High cholesterol  -     LIPID PANEL; Future  -     lovastatin (MEVACOR) 20 mg tablet; Take 2 Tabs by mouth nightly. Colon cancer screening  -     AMB FECAL OCCULT BLOOD QL-3 CARDS - negative    Diabetes Mellitus type 2, under better control based on increased activity, change in dietary habits/more vegetables, exercise, fasting glucose today, and weight loss. Blood pressure under better control.   Renal function, normal.    Greater than 50% of this 25 minute visit was spent in face-to-face counseling/coordination of care regarding diabetes management. Follow-up Disposition:  Return in about 3 months (around 7/4/2017) for diabetes, hypertension. Discussed today that he is feeling well and is enjoying being more active. Discussed that activity levels do vary over time; keep in mind that some activity on a regular basis is one of the best ways to maintain health, even if it is not as much as he is doing now. Last winter he did no activity. As the seasons change, he can choose to avoid being inactive at whatever level he can maintain. Emely Baker, MSN, RN, FNP-BC, BC-ADM  Frankjake Wilder expressed understanding of this plan. of pain involving his right upper arm below the shoulder anteriorly.  No known trauma.  The pain is worse with certain movements and activities.  The pain is quite severe particularly at night and this interferes with his sleep.  Examination reveals no gross deformity.  There is tenderness to palpation over the long head of the biceps shoulder joint proper.  The area was injected with 40 mg of Depo-Medrol and 3 cc of Xylocaine.   • History of Bicipital tendinitis of left shoulder 01/16/2015 01/16/2015--patient presents with a two-month history of left anterior shoulder pain without known injury. The pain is worse with certain movements. Examination reveals tenderness over the bicipital tendons. X-ray ordered and revealed no acute fracture or bony malalignment. Degenerative arthritis noted in the left acromioclavicular joint.   • History of bone density study 07/07/2014 07/07/2014-DEXA scan revealed average lumbar spine T score -0.5. Left proximal femoral T score is zero. Left femoral neck T score -1.3. Right proximal femoral T score 0.1. Right femoral neck T score -2.0. Osteopenia.   • History of BPPV (benign paroxysmal positional vertigo) 3/21/2016    08/09/2016--patient seen in follow-up and reports his symptoms have resolved.  05/13/2016--patient was evaluated by ENT and Izzy-Hallpike testing was negative at this time.  However, patient was asymptomatic.  03/21/2016--patient presents with a 2 day history of dizziness that he describes as an off-balance or spinning sensation.  This seems to be precipitated by movement such as when he stands up or bends over.  Rolling over in bed does not seem to precipitate it.  Patient has no other symptoms associated with this.  The episodes last 5-10 minutes and he seems to get relief by not moving.  He was evaluated earlier at the urgent care and the physician there 100 start him on diabetes medication because his blood sugar was 157 nonfasting.  His matter fact she gave  me a phone call to explain the situation to me and I had her just go ahead and send the patient here.  Patient was a little concerned because his blood sugars have been running a little high of late.  Examination today unremarkable.  We were able t   • History of carotid Doppler/vascular screen 09/21/2016 09/21/2016--vascular screen reveals 16-49% bilateral carotid artery stenosis, negative for AAA, negative for PAD.  10/10/2008--vascular screen revealed mild bilateral cartoid plaque, no eveidence of aortic aneurysm, no eveidence of PAD.   • History of chest x-ray 04/23/2014 04/23/2014-chest x-ray reveals aortic calcification. Mild interstitial prominence, granulomatous calcification. No acute process. 03/03/2009-chest x-ray reveals no active disease.   • History of chronic right knee pain 06/26/2014 08/20/2014--patient reports his knee pain has resolved.  07/03/2014--patient seen in follow up and reports his pain is much improved with the prednisone.  06/26/2014--x-ray of the right knee was negative except for osteopenia. DEXA scan ordered.  06/26/2014--patient presents with a 5-6 week history of right knee pain that is primarily anterior. The pain only comes on when patient kneels down such    • History of diverticulitis of colon 2009 and 2003 05/08/2009-acute diverticulitis without complication. 09/05/2003-acute diverticulitis without complication.    • History of Plantar fasciitis of right foot 06/26/2014 08/20/2014--patient reports his symptoms are much improved but not resolved. He has some discomfort approximately one hour after awakening and walking.  07/03/2014--patient seen in follow up and reports his pain is much improved with the prednisone. DEXA scan ordered.   06/26/2014--x-ray of the right foot is negative except for osteopenia.   06/26/2014--patient presents with a 3-4 week history of    • History of pneumococcal vaccination 06/01/2015 06/01/2015--Prevnar 13 given. Patient reports  he received his initial pneumococcal vaccination after the age of 65 and therefore no further pneumococcal vaccination required after today's Prevnar.   • History of Right rotator cuff tendinitis 8/9/2016 08/26/2016--patient seen in follow-up and reports his pain has resolved.  08/09/2016--patient seen in follow-up and the severe pain anteriorly appears to have resolved but now he has an ache that seems to be in the joint itself and is worse at night particularly when sleeping.  I think that patient may actually have had 2 problems consisting of bicipital tendinitis and rotator cuff tendinitis.  Depo-Medrol 40 mg with 3 cc of Xylocaine injected into the shoulder via a posterior approach.  If this does not take care of the patient's pain then we will need to pursue with further workup including MRI.  07/25/2016--patient reports a 2 to three-week history of pain involving his right upper arm below the shoulder anteriorly.  No known trauma.  The pain is worse with certain movements and activities.  The pain is quite severe particularly at night and this interferes with his sleep.  Examination reveals no gross deformity.  There is tenderness to palpation over the long head of the biceps shoulder joint proper.    • History of trigger finger 06/01/2015 08/09/2016--patient seen in follow-up and reports his trigger finger resolved without any intervention.  06/01/2015--patient presents with a 3 month history of discomfort involving his left index finger and occasionally the finger will lock in the flexed position requiring him to forcibly extended.  I gave patient Dr. Chacorta Oneal phone number and he can call him if he decides that this is bad e   • History of Zostavax 08/2009 August 2009-Zostavax given   • Hyperlipidemia 2/11/2016 01/29/2005--treatment for hyperlipidemia begun.   • Hypogonadism in male, on TRT, testosterone pellets, per  6/16/2017 January 2017--patient reports his urologist initiated  testosterone replacement therapy consisting of testosterone pellets every 6 months.   • Multiple environmental allergies 2/12/2016    Patient sees the allergist regularly and has been on immunotherapy.   • Osteopenia, 03/14/2017--lumbar spine -0.3.  Left femoral neck -1.6.  Right femoral neck -2.0. 2/11/2016 06/16/2017--patient seen in follow-up and reports he doesn't think he ever started Fosamax.  Osteopenia and needs to be reassessed with a DEXA scan.  03/14/2017--DEXA scan reveals lumbar spine T score -0.3.  Unchanged.  Left femoral neck T score -1.6, 6% decrease.  Right femoral neck T score -2.0.  Unchanged.  Assessment is osteopenia with a statistically significant interval decrease in the left hip.  07/18/2014--Fosamax 70 mg per day  initiated.  Calcium 1200 mg per day.    07/07/2014--DEXA scan revealed average lumbar spine T score -0.5.  Left proximal femoral T score is zero.  Left femoral neck T score -1.3.  Right proximal femoral T score 0.1.  Right femoral neck T score -2.0.  Osteopenia.   • Primary osteoarthritis of knees, bilateral 9/12/2016 09/12/2016--patient called in the office requesting a steriod injection in his knees.  I explained to him that I do no longer perform these injections and have referred him to Dr. Manuel.   • Vitamin B12 deficiency 2/11/2016 06/25/2009--B12 injections begun.   • Vitamin D deficiency 2/11/2016         Past Surgical History:   Procedure Laterality Date   • CATARACT EXTRACTION Left 12/12/2011 12/12/2011--cataract extirpation with intraocular lens implantation left eye.   • CATARACT EXTRACTION Right 12/2011 December 2011--right cataract extirpation with intraocular lens implantation.   • COLONOSCOPY  06/11/2002 06/11/2002--incomplete colonoscopy due to redundant colon. Not able to get past the hepatic flexure. Patient had followup barium contrast enema which showed extensive diverticulosis   • COLONOSCOPY  10/03/2013    10/03/2013--colonoscopy revealed  markedly redundant colon, pancolonic diverticulosis with several impacted fecalith. No evidence of diverticulitis or stricture. Was only able to reach the ascending colon. Follow up barium enema revealed extensive diverticulosis. No polyp or other mucosal mass seen.   • CYSTOSCOPY  05/18/2016 05/18/2016--urology consultation.  Cystoscopy performed for possible bladder mass is negative.   • PROSTATE BIOPSY  12/21/2016 12/21/2016--prostate biopsy reportedly negative.  I will try to obtain the report.         No Known Allergies        Current Outpatient Prescriptions:   •  ALPRAZolam (XANAX) 0.5 MG tablet, Take 1 tablet by mouth 2 (Two) Times a Day As Needed for anxiety., Disp: 60 tablet, Rfl: 2  •  amLODIPine (NORVASC) 5 MG tablet, Take 1 tablet by mouth every morning., Disp: , Rfl:   •  aspirin (ASPIRIN LOW DOSE) 81 MG tablet, Take 1 tablet by mouth daily., Disp: , Rfl:   •  Calcium Carb-Cholecalciferol (CALCIUM 600 + D) 600-200 MG-UNIT tablet, Take 2 tablets by mouth daily., Disp: , Rfl:   •  Cholecalciferol (VITAMIN D3) 5000 UNITS capsule capsule, Take 1 capsule by mouth daily., Disp: , Rfl:   •  Coenzyme Q10 (COQ10 MAXIMUM STRENGTH) 400 MG capsule, 1 by mouth daily with food, Disp: 30 each, Rfl: 0  •  folic acid (FOLVITE) 1 MG tablet, Take 1 mg by mouth Daily., Disp: , Rfl:   •  lisinopril-hydrochlorothiazide (PRINZIDE,ZESTORETIC) 20-12.5 MG per tablet, Take 2 tablets by mouth Daily., Disp: 180 tablet, Rfl: 1  •  omeprazole (priLOSEC) 40 MG capsule, TAKE ONE CAPSULE BY MOUTH DAILY, Disp: 30 capsule, Rfl: 5  •  pravastatin (PRAVACHOL) 40 MG tablet, 1 by mouth daily for cholesterol, Disp: 90 tablet, Rfl: 3    Current Facility-Administered Medications:   •  cyanocobalamin injection 1,000 mcg, 1,000 mcg, Intramuscular, Q28 Days, Delgado Patricia MD, 1,000 mcg at 09/07/17 0917      Family History   Problem Relation Age of Onset   • Diabetes Mother    • Heart disease Mother          Social History     Social  "History   • Marital status:      Spouse name: N/A   • Number of children: N/A   • Years of education: N/A     Occupational History   • Retired  [place]     Social History Main Topics   • Smoking status: Never Smoker   • Smokeless tobacco: Never Used   • Alcohol use Yes      Comment: Moderate alcohol consumption   • Drug use: No   • Sexual activity: Yes     Partners: Female     Other Topics Concern   • Not on file     Social History Narrative         Vitals:    09/07/17 0913   BP: 126/66   Pulse: 97   SpO2: 95%   Weight: 189 lb (85.7 kg)   Height: 67\" (170.2 cm)          Physical Exam:    General: Alert and oriented x 3.  No acute distress.  Normal affect.  HEENT: Pupils equal, round, reactive to light; extraocular movements intact; sclerae nonicteric; pharynx, ear canals and TMs normal.  Neck: Without JVD, thyromegaly, bruit, or adenopathy.  Lungs: Clear to auscultation in all fields.  Heart: Regular rate and rhythm without murmur, rub, gallop, or click.  Abdomen: Soft, nontender, without hepatosplenomegaly or hernia.  Bowel sounds normal.  : Deferred.  Rectal: Deferred.  Extremities: Without clubbing, cyanosis, edema, or pulse deficit.  Neurologic: Intact without focal deficit.  Normal station and gait observed during ingress and egress from the examination room.  Skin: Without significant lesion.  Musculoskeletal: Unremarkable.      Lab/other results:    NMR reveals a total cholesterol 168.  Triglycerides are 125.  LDL particle number elevated at 1329.  Small LDL particle number elevated at 800.  HDL particle number is normal at 33.9.  CMP normal except blood sugar elevated at 126, BUN 30, creatinine 1.87.  CBC is normal except hemoglobin low 11.5 and hematocrit low at 35.7.  Hemoglobin A1c 6.7.  Thyroid function tests normal.  CPK normal.    Assessment/Plan:     Diagnosis Plan   1. Muscle cramps, possibly statin related, specifically Vytorin.     2. Hyperlipidemia, unspecified hyperlipidemia type   "   3. Chronic renal insufficiency, stage IV (severe), 02/09/2016--creatinine 1.85     4. Bilateral carotid artery plaque, 09/21/2016--16-49% bilateral carotid artery stenosis     5. Vitamin B12 deficiency     6. Anemia due to chronic kidney disease     7. Benign essential hypertension     8. Impaired fasting glucose     9. Therapeutic drug monitoring         Patient's muscle cramps appear to be related to the use of Vytorin.  This is somewhat unfortunate in that his cholesterol profile was excellent on that drug and I think this is particularly important because of his known carotid artery disease.  However, it may be that if we increased the pravastatin dose to 40 mg that patient's cholesterol profile with the as good as I feel is necessary and hopefully he does not develop muscle cramps.  See below.  He has chronic renal insufficiency that is stable.  The carotid artery plaque was assessed about one year ago with a carotid Doppler study.  He has a vitamin B12 deficiency and received a vitamin B12 injection today.  His anemia of chronic renal disease appears to be mild and stable.  His blood pressure also appears to be controlled.  He has mild impaired fasting glucose that has progressed to the point of mild diabetes.  However, I will not change the diagnosis of impaired fasting glucose at this time and instead will have patient work on his diet and exercise as well as decrease carbohydrate intake and hopefully the A1c will return less than 6.4 when he follows up in the near future.    Plan is as follows: Cyanocobalamin 1000 µg subcutaneously ×1.  I will have patient increase the pravastatin to a full 40 mg pill daily if he has return of muscle cramps then we would have no choice but to decrease it back to a half a pill.  However, if he tolerates  The 40 mg dose and I will have him follow back up in 4 months to reassess the entire situation.  I've asked him to give me a call if he has to reduce the pravastatin dose  back down to 20 mg.  Continue monthly B12 injections as directed.          Procedures

## 2017-09-22 RX ORDER — ALPRAZOLAM 0.5 MG/1
0.5 TABLET ORAL 2 TIMES DAILY PRN
Qty: 60 TABLET | Refills: 3 | OUTPATIENT
Start: 2017-09-22 | End: 2018-07-20 | Stop reason: SDUPTHER

## 2017-10-17 ENCOUNTER — CLINICAL SUPPORT (OUTPATIENT)
Dept: INTERNAL MEDICINE | Facility: CLINIC | Age: 82
End: 2017-10-17

## 2017-10-17 DIAGNOSIS — Z23 NEED FOR IMMUNIZATION AGAINST INFLUENZA: ICD-10-CM

## 2017-10-17 DIAGNOSIS — E53.8 VITAMIN B12 DEFICIENCY: Primary | Chronic | ICD-10-CM

## 2017-10-17 PROCEDURE — 96372 THER/PROPH/DIAG INJ SC/IM: CPT | Performed by: INTERNAL MEDICINE

## 2017-10-17 PROCEDURE — G0008 ADMIN INFLUENZA VIRUS VAC: HCPCS | Performed by: INTERNAL MEDICINE

## 2017-10-17 RX ADMIN — CYANOCOBALAMIN 1000 MCG: 1000 INJECTION, SOLUTION INTRAMUSCULAR; SUBCUTANEOUS at 15:41

## 2017-12-18 ENCOUNTER — CLINICAL SUPPORT (OUTPATIENT)
Dept: INTERNAL MEDICINE | Facility: CLINIC | Age: 82
End: 2017-12-18

## 2017-12-18 DIAGNOSIS — E53.8 VITAMIN B12 DEFICIENCY: Primary | Chronic | ICD-10-CM

## 2017-12-18 PROCEDURE — 96372 THER/PROPH/DIAG INJ SC/IM: CPT | Performed by: INTERNAL MEDICINE

## 2017-12-18 RX ADMIN — CYANOCOBALAMIN 1000 MCG: 1000 INJECTION, SOLUTION INTRAMUSCULAR; SUBCUTANEOUS at 14:15

## 2018-01-02 RX ORDER — LISINOPRIL AND HYDROCHLOROTHIAZIDE 20; 12.5 MG/1; MG/1
TABLET ORAL
Qty: 180 TABLET | Refills: 0 | Status: SHIPPED | OUTPATIENT
Start: 2018-01-02 | End: 2018-07-19 | Stop reason: SDUPTHER

## 2018-01-10 DIAGNOSIS — E78.5 HYPERLIPIDEMIA, UNSPECIFIED HYPERLIPIDEMIA TYPE: Chronic | ICD-10-CM

## 2018-01-10 DIAGNOSIS — R73.01 IMPAIRED FASTING GLUCOSE: Chronic | ICD-10-CM

## 2018-01-10 DIAGNOSIS — E55.9 VITAMIN D DEFICIENCY: Chronic | ICD-10-CM

## 2018-01-10 DIAGNOSIS — N18.4 CHRONIC RENAL INSUFFICIENCY, STAGE IV (SEVERE) (HCC): Chronic | ICD-10-CM

## 2018-01-10 DIAGNOSIS — N18.9 ANEMIA DUE TO CHRONIC KIDNEY DISEASE: Chronic | ICD-10-CM

## 2018-01-10 DIAGNOSIS — D63.1 ANEMIA DUE TO CHRONIC KIDNEY DISEASE: Chronic | ICD-10-CM

## 2018-01-12 LAB
25(OH)D3+25(OH)D2 SERPL-MCNC: 44.3 NG/ML (ref 30–100)
ALBUMIN SERPL-MCNC: 4.6 G/DL (ref 3.5–5.2)
ALBUMIN/CREAT UR: 15.6 MG/G CREAT (ref 0–30)
ALBUMIN/GLOB SERPL: 2 G/DL
ALP SERPL-CCNC: 72 U/L (ref 39–117)
ALT SERPL-CCNC: 13 U/L (ref 1–41)
AST SERPL-CCNC: 14 U/L (ref 1–40)
BILIRUB SERPL-MCNC: 0.4 MG/DL (ref 0.1–1.2)
BUN SERPL-MCNC: 32 MG/DL (ref 8–23)
BUN/CREAT SERPL: 15.2 (ref 7–25)
CALCIUM SERPL-MCNC: 8.8 MG/DL (ref 8.6–10.5)
CHLORIDE SERPL-SCNC: 103 MMOL/L (ref 98–107)
CHOLEST SERPL-MCNC: 166 MG/DL (ref 100–199)
CK SERPL-CCNC: 119 U/L (ref 20–200)
CO2 SERPL-SCNC: 25.4 MMOL/L (ref 22–29)
CREAT SERPL-MCNC: 2.1 MG/DL (ref 0.76–1.27)
CREAT UR-MCNC: 136.9 MG/DL
ERYTHROCYTE [DISTWIDTH] IN BLOOD BY AUTOMATED COUNT: 12.6 % (ref 11.5–14.5)
GLOBULIN SER CALC-MCNC: 2.3 GM/DL
GLUCOSE SERPL-MCNC: 106 MG/DL (ref 65–99)
HBA1C MFR BLD: 6.1 % (ref 4.8–5.6)
HCT VFR BLD AUTO: 43.1 % (ref 40.4–52.2)
HDL SERPL-SCNC: 28.7 UMOL/L
HDLC SERPL-MCNC: 38 MG/DL
HGB BLD-MCNC: 13.5 G/DL (ref 13.7–17.6)
LDL SERPL QN: 20.8 NM
LDL SERPL-SCNC: 1160 NMOL/L
LDL SMALL SERPL-SCNC: 526 NMOL/L
LDLC SERPL CALC-MCNC: 101 MG/DL (ref 0–99)
MCH RBC QN AUTO: 29.3 PG (ref 27–32.7)
MCHC RBC AUTO-ENTMCNC: 31.3 G/DL (ref 32.6–36.4)
MCV RBC AUTO: 93.5 FL (ref 79.8–96.2)
MICROALBUMIN UR-MCNC: 21.4 UG/ML
PLATELET # BLD AUTO: 189 10*3/MM3 (ref 140–500)
POTASSIUM SERPL-SCNC: 5 MMOL/L (ref 3.5–5.2)
PROT SERPL-MCNC: 6.9 G/DL (ref 6–8.5)
RBC # BLD AUTO: 4.61 10*6/MM3 (ref 4.6–6)
SODIUM SERPL-SCNC: 142 MMOL/L (ref 136–145)
TRIGL SERPL-MCNC: 137 MG/DL (ref 0–149)
WBC # BLD AUTO: 5.72 10*3/MM3 (ref 4.5–10.7)

## 2018-01-19 ENCOUNTER — OFFICE VISIT (OUTPATIENT)
Dept: INTERNAL MEDICINE | Facility: CLINIC | Age: 83
End: 2018-01-19

## 2018-01-19 VITALS
OXYGEN SATURATION: 98 % | HEART RATE: 91 BPM | DIASTOLIC BLOOD PRESSURE: 78 MMHG | BODY MASS INDEX: 29.98 KG/M2 | HEIGHT: 67 IN | WEIGHT: 191 LBS | SYSTOLIC BLOOD PRESSURE: 142 MMHG

## 2018-01-19 DIAGNOSIS — E53.8 VITAMIN B12 DEFICIENCY: Chronic | ICD-10-CM

## 2018-01-19 DIAGNOSIS — E78.5 HYPERLIPIDEMIA, UNSPECIFIED HYPERLIPIDEMIA TYPE: Chronic | ICD-10-CM

## 2018-01-19 DIAGNOSIS — N18.4 CHRONIC RENAL INSUFFICIENCY, STAGE IV (SEVERE) (HCC): Chronic | ICD-10-CM

## 2018-01-19 DIAGNOSIS — R25.2 MUSCLE CRAMPS: ICD-10-CM

## 2018-01-19 DIAGNOSIS — D63.1 ANEMIA DUE TO CHRONIC KIDNEY DISEASE: Chronic | ICD-10-CM

## 2018-01-19 DIAGNOSIS — E55.9 VITAMIN D DEFICIENCY: Chronic | ICD-10-CM

## 2018-01-19 DIAGNOSIS — R73.01 IMPAIRED FASTING GLUCOSE: Primary | Chronic | ICD-10-CM

## 2018-01-19 DIAGNOSIS — N18.9 ANEMIA DUE TO CHRONIC KIDNEY DISEASE: Chronic | ICD-10-CM

## 2018-01-19 DIAGNOSIS — M85.89 OSTEOPENIA OF MULTIPLE SITES: Chronic | ICD-10-CM

## 2018-01-19 DIAGNOSIS — Z51.81 THERAPEUTIC DRUG MONITORING: ICD-10-CM

## 2018-01-19 DIAGNOSIS — N40.0 BENIGN NON-NODULAR PROSTATIC HYPERPLASIA WITHOUT LOWER URINARY TRACT SYMPTOMS: Chronic | ICD-10-CM

## 2018-01-19 DIAGNOSIS — I10 BENIGN ESSENTIAL HYPERTENSION: Chronic | ICD-10-CM

## 2018-01-19 DIAGNOSIS — E29.1 HYPOGONADISM IN MALE: Chronic | ICD-10-CM

## 2018-01-19 DIAGNOSIS — K21.9 GASTROESOPHAGEAL REFLUX DISEASE WITHOUT ESOPHAGITIS: Chronic | ICD-10-CM

## 2018-01-19 DIAGNOSIS — I65.23 ATHEROSCLEROSIS OF BOTH CAROTID ARTERIES: Chronic | ICD-10-CM

## 2018-01-19 PROCEDURE — 99214 OFFICE O/P EST MOD 30 MIN: CPT | Performed by: INTERNAL MEDICINE

## 2018-01-19 RX ORDER — KETOCONAZOLE 20 MG/G
CREAM TOPICAL
COMMUNITY
Start: 2018-01-09 | End: 2018-06-28

## 2018-01-19 NOTE — PROGRESS NOTES
01/19/2018    Patient Information  Radha Win                                                                                          [address]  [place] [address]      7/26/1932  [phone]      Chief Complaint:     Follow-up impaired fasting glucose, hyperlipidemia, chronic renal insufficiency, carotid artery stenosis/plaque, BPH, osteopenia, vitamin B12 and vitamin D deficiency, anemia of chronic renal disease, hypertension, esophageal reflux, symptomatic hypogonadism, muscle cramps related to Vytorin.    History of Present Illness:    Patient with a history of medical problems as outlined in the chief complaint that have been fairly stable over the past year.  He presents today for a follow-up with lab prior in order to monitor his chronic medical issues.  He said problems with myalgias related to Vytorin seems to be tolerating pravastatin 40 mg, one half by mouth daily.  Past medical history reviewed and updated where necessary including health maintenance parameters.  This reveals he is up-to-date.    Review of Systems   Constitution: Negative.   HENT: Negative.    Eyes: Negative.    Cardiovascular: Negative.    Respiratory: Negative.    Endocrine: Negative.    Hematologic/Lymphatic: Negative.    Skin: Negative.    Musculoskeletal: Negative.    Gastrointestinal: Negative.    Genitourinary: Negative.    Neurological: Negative.    Psychiatric/Behavioral: Negative.    Allergic/Immunologic: Negative.        Active Problems:    Patient Active Problem List   Diagnosis   • Bilateral carotid artery plaque, 09/21/2016--16-49% bilateral carotid artery stenosis   • Benign prostatic hypertrophy   • Chronic renal insufficiency, stage IV (severe), 02/09/2016--creatinine 1.85   • Diverticulosis of colon   • Hyperlipidemia   • Osteopenia, 03/14/2017--lumbar spine -0.3.  Left femoral neck -1.6.  Right femoral neck -2.0.   • Vitamin B12 deficiency   • Vitamin D deficiency   • Allergic  rhinitis   • Anemia due to chronic kidney disease   • Generalized anxiety disorder   • Benign essential hypertension   • Gastroesophageal reflux disease without esophagitis   • Folic acid deficiency   • Multiple environmental allergies   • Therapeutic drug monitoring   • Bilateral renal cysts   • Primary osteoarthritis of knees, bilateral   • Diabetic eye exam   • Diabetic foot exam   • Impaired fasting glucose   • Muscle cramps, possibly statin related, specifically Vytorin.   • Hypogonadism in male, on TRT, testosterone pellets, per          Past Medical History:   Diagnosis Date   • Allergic rhinitis 2/11/2016   • Anemia due to chronic kidney disease 2/11/2016   • Benign essential hypertension 2/11/2016   • Benign prostatic hypertrophy 2/11/2016 12/21/2016--prostate biopsy reportedly negative.  I will try to obtain the report.  Patient sees urologist.  PSAs run from the 3-8 range   • Bilateral carotid artery plaque, 09/21/2016--16-49% bilateral carotid artery stenosis 2/11/2016 09/21/2016--vascular screen reveals 16-49% bilateral carotid artery stenosis, negative for AAA, negative for PAD.  10/10/2008--vascular screening study revealed mild bilateral carotid plaque, no aortic aneurysm, no evidence of PAD   • Bilateral renal cysts 2/14/2016 04/07/2016--ultrasound of the kidneys reveals the previously described renal cysts are not well seen.  However, questionable incidental bladder tumor noted.  05/26/2010--ultrasound the kidneys was negative bilaterally except for small renal cysts.   • Chronic renal insufficiency, stage IV (severe), 02/09/2016--creatinine 1.85 2/11/2016 02/09/2016--serum creatinine 1.85.  BUN 29.  07/20/2015--patient was evaluated by the nephrologist.  Ultrasound of the kidneys ordered but this was never done.  05/22/2015--BUN 35, creatinine 2.3.  Patient referred to nephrology, Dr. Devyn Muniz.  04/16/2014--BUN 25, creatinine 1.77.  01/03/2013--BUN 37, creatinine 2.14.     05/26/2010---ultrasound of the kidneys reveal a small cyst x 2 right kidney.  Otherwise normal.    Baseline creatinine approximately 1.9-2.0.   • Diabetic eye exam 3/8/2017    07/03/2017--patient reports a negative diabetic eye exam.  07/01/2016--patient reports a negative diabetic eye exam.   • Diabetic foot exam 3/8/2017    03/09/2017--routine diabetic eye examination reveals no evidence of diabetic foot ulcer or pre-ulcerative callus.  Very bounding distal pulses without signs of ischemia.  No skin changes.  Sensation intact per monofilament.   • Diverticulosis of colon 2/11/2016    10/03/2013--colonoscopy revealed markedly redundant colon, pancolonic diverticulosis with several impacted fecalith.  No evidence of diverticulitis or stricture.  Was only able to reach the ascending colon.  Follow up barium enema revealed extensive diverticulosis.  No polyp or other mucosal mass seen.    06/11/2002--incomplete colonoscopy due to redundant colon.  Not able to get past the hepatic flexure.  Patient had followup barium contrast enema which showed extensive diverticulosis.   • Folic acid deficiency 2/11/2016   • Gastroesophageal reflux disease without esophagitis 2/11/2016   • Generalized anxiety disorder 2/11/2016   • History of Biceps tendinitis on right 7/25/2016 08/09/2016--patient seen in follow-up and the severe pain anteriorly appears to have resolved but now he has an ache that seems to be in the joint itself and is worse at night particularly when sleeping.  I think that patient may actually have had 2 problems consisting of bicipital tendinitis and rotator cuff tendinitis.  Depo-Medrol 40 mg with 3 cc of Xylocaine injected into the shoulder via a posterior approach.  If this does not take care of the patient's pain then we will need to pursue with further workup including MRI.  07/25/2016--patient reports a 2 to three-week history of pain involving his right upper arm below the shoulder anteriorly.  No known  trauma.  The pain is worse with certain movements and activities.  The pain is quite severe particularly at night and this interferes with his sleep.  Examination reveals no gross deformity.  There is tenderness to palpation over the long head of the biceps shoulder joint proper.  The area was injected with 40 mg of Depo-Medrol and 3 cc of Xylocaine.   • History of Bicipital tendinitis of left shoulder 01/16/2015 01/16/2015--patient presents with a two-month history of left anterior shoulder pain without known injury. The pain is worse with certain movements. Examination reveals tenderness over the bicipital tendons. X-ray ordered and revealed no acute fracture or bony malalignment. Degenerative arthritis noted in the left acromioclavicular joint.   • History of bone density study 07/07/2014 07/07/2014-DEXA scan revealed average lumbar spine T score -0.5. Left proximal femoral T score is zero. Left femoral neck T score -1.3. Right proximal femoral T score 0.1. Right femoral neck T score -2.0. Osteopenia.   • History of BPPV (benign paroxysmal positional vertigo) 3/21/2016    08/09/2016--patient seen in follow-up and reports his symptoms have resolved.  05/13/2016--patient was evaluated by ENT and Izzy-Hallpike testing was negative at this time.  However, patient was asymptomatic.  03/21/2016--patient presents with a 2 day history of dizziness that he describes as an off-balance or spinning sensation.  This seems to be precipitated by movement such as when he stands up or bends over.  Rolling over in bed does not seem to precipitate it.  Patient has no other symptoms associated with this.  The episodes last 5-10 minutes and he seems to get relief by not moving.  He was evaluated earlier at the urgent care and the physician there 100 start him on diabetes medication because his blood sugar was 157 nonfasting.  His matter fact she gave me a phone call to explain the situation to me and I had her just go ahead and  send the patient here.  Patient was a little concerned because his blood sugars have been running a little high of late.  Examination today unremarkable.  We were able t   • History of carotid Doppler/vascular screen 09/21/2016 09/21/2016--vascular screen reveals 16-49% bilateral carotid artery stenosis, negative for AAA, negative for PAD.  10/10/2008--vascular screen revealed mild bilateral cartoid plaque, no eveidence of aortic aneurysm, no eveidence of PAD.   • History of chest x-ray 04/23/2014 04/23/2014-chest x-ray reveals aortic calcification. Mild interstitial prominence, granulomatous calcification. No acute process. 03/03/2009-chest x-ray reveals no active disease.   • History of chronic right knee pain 06/26/2014 08/20/2014--patient reports his knee pain has resolved.  07/03/2014--patient seen in follow up and reports his pain is much improved with the prednisone.  06/26/2014--x-ray of the right knee was negative except for osteopenia. DEXA scan ordered.  06/26/2014--patient presents with a 5-6 week history of right knee pain that is primarily anterior. The pain only comes on when patient kneels down such    • History of diverticulitis of colon 2009 and 2003 05/08/2009-acute diverticulitis without complication. 09/05/2003-acute diverticulitis without complication.    • History of Plantar fasciitis of right foot 06/26/2014 08/20/2014--patient reports his symptoms are much improved but not resolved. He has some discomfort approximately one hour after awakening and walking.  07/03/2014--patient seen in follow up and reports his pain is much improved with the prednisone. DEXA scan ordered.   06/26/2014--x-ray of the right foot is negative except for osteopenia.   06/26/2014--patient presents with a 3-4 week history of    • History of pneumococcal vaccination 06/01/2015 06/01/2015--Prevnar 13 given. Patient reports he received his initial pneumococcal vaccination after the age of 65 and  therefore no further pneumococcal vaccination required after today's Prevnar.   • History of Right rotator cuff tendinitis 8/9/2016 08/26/2016--patient seen in follow-up and reports his pain has resolved.  08/09/2016--patient seen in follow-up and the severe pain anteriorly appears to have resolved but now he has an ache that seems to be in the joint itself and is worse at night particularly when sleeping.  I think that patient may actually have had 2 problems consisting of bicipital tendinitis and rotator cuff tendinitis.  Depo-Medrol 40 mg with 3 cc of Xylocaine injected into the shoulder via a posterior approach.  If this does not take care of the patient's pain then we will need to pursue with further workup including MRI.  07/25/2016--patient reports a 2 to three-week history of pain involving his right upper arm below the shoulder anteriorly.  No known trauma.  The pain is worse with certain movements and activities.  The pain is quite severe particularly at night and this interferes with his sleep.  Examination reveals no gross deformity.  There is tenderness to palpation over the long head of the biceps shoulder joint proper.    • History of trigger finger 06/01/2015 08/09/2016--patient seen in follow-up and reports his trigger finger resolved without any intervention.  06/01/2015--patient presents with a 3 month history of discomfort involving his left index finger and occasionally the finger will lock in the flexed position requiring him to forcibly extended.  I gave patient Dr. Chacorta Oneal phone number and he can call him if he decides that this is bad e   • History of Zostavax 08/2009 August 2009-Zostavax given   • Hyperlipidemia 2/11/2016 01/29/2005--treatment for hyperlipidemia begun.   • Hypogonadism in male, on TRT, testosterone pellets, per  6/16/2017 January 2017--patient reports his urologist initiated testosterone replacement therapy consisting of testosterone pellets every 6  months.   • Multiple environmental allergies 2/12/2016    Patient sees the allergist regularly and has been on immunotherapy.   • Osteopenia, 03/14/2017--lumbar spine -0.3.  Left femoral neck -1.6.  Right femoral neck -2.0. 2/11/2016 06/16/2017--patient seen in follow-up and reports he doesn't think he ever started Fosamax.  Osteopenia and needs to be reassessed with a DEXA scan.  03/14/2017--DEXA scan reveals lumbar spine T score -0.3.  Unchanged.  Left femoral neck T score -1.6, 6% decrease.  Right femoral neck T score -2.0.  Unchanged.  Assessment is osteopenia with a statistically significant interval decrease in the left hip.  07/18/2014--Fosamax 70 mg per day  initiated.  Calcium 1200 mg per day.    07/07/2014--DEXA scan revealed average lumbar spine T score -0.5.  Left proximal femoral T score is zero.  Left femoral neck T score -1.3.  Right proximal femoral T score 0.1.  Right femoral neck T score -2.0.  Osteopenia.   • Primary osteoarthritis of knees, bilateral 9/12/2016 09/12/2016--patient called in the office requesting a steriod injection in his knees.  I explained to him that I do no longer perform these injections and have referred him to Dr. Manuel.   • Vitamin B12 deficiency 2/11/2016 06/25/2009--B12 injections begun.   • Vitamin D deficiency 2/11/2016         Past Surgical History:   Procedure Laterality Date   • CATARACT EXTRACTION Left 12/12/2011 12/12/2011--cataract extirpation with intraocular lens implantation left eye.   • CATARACT EXTRACTION Right 12/2011 December 2011--right cataract extirpation with intraocular lens implantation.   • COLONOSCOPY  06/11/2002 06/11/2002--incomplete colonoscopy due to redundant colon. Not able to get past the hepatic flexure. Patient had followup barium contrast enema which showed extensive diverticulosis   • COLONOSCOPY  10/03/2013    10/03/2013--colonoscopy revealed markedly redundant colon, pancolonic diverticulosis with several impacted  fecalith. No evidence of diverticulitis or stricture. Was only able to reach the ascending colon. Follow up barium enema revealed extensive diverticulosis. No polyp or other mucosal mass seen.   • CYSTOSCOPY  05/18/2016 05/18/2016--urology consultation.  Cystoscopy performed for possible bladder mass is negative.   • PROSTATE BIOPSY  12/21/2016 12/21/2016--prostate biopsy reportedly negative.  I will try to obtain the report.         No Known Allergies        Current Outpatient Prescriptions:   •  ALPRAZolam (XANAX) 0.5 MG tablet, Take 1 tablet by mouth 2 (Two) Times a Day As Needed for Anxiety., Disp: 60 tablet, Rfl: 3  •  amLODIPine (NORVASC) 5 MG tablet, Take 1 tablet by mouth every morning., Disp: , Rfl:   •  aspirin (ASPIRIN LOW DOSE) 81 MG tablet, Take 1 tablet by mouth daily., Disp: , Rfl:   •  Calcium Carb-Cholecalciferol (CALCIUM 600 + D) 600-200 MG-UNIT tablet, Take 2 tablets by mouth daily., Disp: , Rfl:   •  Cholecalciferol (VITAMIN D3) 5000 UNITS capsule capsule, Take 1 capsule by mouth daily., Disp: , Rfl:   •  Coenzyme Q10 (COQ10 MAXIMUM STRENGTH) 400 MG capsule, 1 by mouth daily with food, Disp: 30 each, Rfl: 0  •  folic acid (FOLVITE) 1 MG tablet, Take 1 mg by mouth Daily., Disp: , Rfl:   •  ketoconazole (NIZORAL) 2 % cream, , Disp: , Rfl:   •  lisinopril-hydrochlorothiazide (PRINZIDE,ZESTORETIC) 20-12.5 MG per tablet, TAKE TWO TABLETS BY MOUTH DAILY, Disp: 180 tablet, Rfl: 0  •  omeprazole (priLOSEC) 40 MG capsule, TAKE ONE CAPSULE BY MOUTH DAILY, Disp: 30 capsule, Rfl: 5  •  pravastatin (PRAVACHOL) 40 MG tablet, 1 by mouth daily for cholesterol, Disp: 90 tablet, Rfl: 3    Current Facility-Administered Medications:   •  cyanocobalamin injection 1,000 mcg, 1,000 mcg, Intramuscular, Q28 Days, Delgado Patricia MD, 1,000 mcg at 12/18/17 1415      Family History   Problem Relation Age of Onset   • Diabetes Mother    • Heart disease Mother          Social History     Social History   • Marital  "status:      Spouse name: N/A   • Number of children: N/A   • Years of education: N/A     Occupational History   • Retired  [place]     Social History Main Topics   • Smoking status: Never Smoker   • Smokeless tobacco: Never Used   • Alcohol use Yes      Comment: Moderate alcohol consumption   • Drug use: No   • Sexual activity: Yes     Partners: Female     Other Topics Concern   • Not on file     Social History Narrative         Vitals:    01/19/18 0747   BP: 142/78   Pulse: 91   SpO2: 98%   Weight: 86.6 kg (191 lb)   Height: 170.2 cm (67.01\")          Physical Exam:    General: Alert and oriented x 3.  No acute distress.  Normal affect.  HEENT: Pupils equal, round, reactive to light; extraocular movements intact; sclerae nonicteric; pharynx, ear canals and TMs normal.  Neck: Without JVD, thyromegaly, bruit, or adenopathy.  Lungs: Clear to auscultation in all fields.  Heart: Regular rate and rhythm without murmur, rub, gallop, or click.  Abdomen: Soft, nontender, without hepatosplenomegaly or hernia.  Bowel sounds normal.  : Deferred.  Rectal: Deferred.  Extremities: Without clubbing, cyanosis, edema, or pulse deficit.  Neurologic: Intact without focal deficit.  Normal station and gait observed during ingress and egress from the examination room.  Skin: Without significant lesion.  Musculoskeletal: Unremarkable.      Lab/other results:    NMR reveals total cholesterol 166.  Triglycerides 137.  LDL particle number mildly elevated at 1160.  Small LDL particle number excellent at 526.  HDL particle number low at 28.7.  CMP normal except blood sugar elevated 106, BUN 32, creatinine 1.2.  Hemoglobin slightly low at 13.5 but hematocrit is normal at 43.1.  Albumin/creatinine ratio was normal.  Hemoglobin A1c excellent at 6.1.  Vitamin D normal.  CPK normal.    Assessment/Plan:     Diagnosis Plan   1. Impaired fasting glucose     2. Hyperlipidemia, unspecified hyperlipidemia type     3. Chronic renal " insufficiency, stage IV (severe), 02/09/2016--creatinine 1.85     4. Bilateral carotid artery plaque, 09/21/2016--16-49% bilateral carotid artery stenosis     5. Benign prostatic hypertrophy     6. Osteopenia of multiple sites     7. Vitamin B12 deficiency     8. Vitamin D deficiency     9. Anemia due to chronic kidney disease     10. Benign essential hypertension     11. Gastroesophageal reflux disease without esophagitis     12. Hypogonadism in male, on TRT, testosterone pellets, per      13. Therapeutic drug monitoring     14. Muscle cramps, possibly statin related, specifically Vytorin.         Patient has hyperlipidemia which is technically not at goal but it is under reasonable control.  Patient has had problems with high-dose Vytorin and also is problems with the pravastatin 40 mg dose.  He is able to tolerate the 20 mg dose.  His chronic renal insufficiency seems stable and is being monitored by the nephrologist.  He has carotid artery plaque and will need a repeat carotid study later this year before the next follow-up visit.  He has BPH and also symptomatic hypogonadism which is being monitored by the urologist.  Osteopenia was recently assessed with a DEXA scan less than one year ago.  He has vitamin B12 deficiency and we are giving him B12 injections once a month.  Vitamin D is therapeutic.  His anemia of chronic renal disease is mild and very stable.  Blood pressure seems to be adequate.  Reflux symptoms controlled with omeprazole.    Plan is as follows: Schedule carotid Doppler study to be performed just prior to the next follow-up appointment after 06/16/2018.  Urinalysis will also be subsequent Medicare wellness visit.      Procedures

## 2018-01-29 RX ORDER — OMEPRAZOLE 40 MG/1
CAPSULE, DELAYED RELEASE ORAL
Qty: 30 CAPSULE | Refills: 4 | Status: SHIPPED | OUTPATIENT
Start: 2018-01-29 | End: 2018-07-08 | Stop reason: SDUPTHER

## 2018-04-26 ENCOUNTER — CLINICAL SUPPORT (OUTPATIENT)
Dept: INTERNAL MEDICINE | Facility: CLINIC | Age: 83
End: 2018-04-26

## 2018-04-26 DIAGNOSIS — E53.8 VITAMIN B12 DEFICIENCY: Primary | Chronic | ICD-10-CM

## 2018-04-26 PROCEDURE — 96372 THER/PROPH/DIAG INJ SC/IM: CPT | Performed by: INTERNAL MEDICINE

## 2018-04-26 RX ADMIN — CYANOCOBALAMIN 1000 MCG: 1000 INJECTION, SOLUTION INTRAMUSCULAR; SUBCUTANEOUS at 13:52

## 2018-05-02 RX ORDER — FOLIC ACID 1 MG/1
TABLET ORAL
Qty: 180 TABLET | Refills: 1 | Status: SHIPPED | OUTPATIENT
Start: 2018-05-02 | End: 2019-10-15 | Stop reason: SDUPTHER

## 2018-05-24 ENCOUNTER — HOSPITAL ENCOUNTER (OUTPATIENT)
Dept: CARDIOLOGY | Facility: HOSPITAL | Age: 83
Discharge: HOME | End: 2018-05-24
Admitting: INTERNAL MEDICINE

## 2018-05-24 LAB
BH CV XLRA MEAS LEFT DIST CCA EDV: 17 CM/SEC
BH CV XLRA MEAS LEFT DIST CCA PSV: 82.1 CM/SEC
BH CV XLRA MEAS LEFT DIST ICA EDV: -12.3 CM/SEC
BH CV XLRA MEAS LEFT DIST ICA PSV: -113 CM/SEC
BH CV XLRA MEAS LEFT MID ICA EDV: -19.8 CM/SEC
BH CV XLRA MEAS LEFT MID ICA PSV: -74.3 CM/SEC
BH CV XLRA MEAS LEFT PROX CCA EDV: 15.2 CM/SEC
BH CV XLRA MEAS LEFT PROX CCA PSV: 117 CM/SEC
BH CV XLRA MEAS LEFT PROX ECA EDV: -11.3 CM/SEC
BH CV XLRA MEAS LEFT PROX ECA PSV: -100 CM/SEC
BH CV XLRA MEAS LEFT PROX ICA EDV: 22.6 CM/SEC
BH CV XLRA MEAS LEFT PROX ICA PSV: 96.9 CM/SEC
BH CV XLRA MEAS LEFT PROX SCLA PSV: 224 CM/SEC
BH CV XLRA MEAS LEFT VERTEBRAL A EDV: 11.9 CM/SEC
BH CV XLRA MEAS LEFT VERTEBRAL A PSV: 46.9 CM/SEC
BH CV XLRA MEAS RIGHT CCA RATIO VEL: 103 CM/SEC
BH CV XLRA MEAS RIGHT DIST CCA EDV: 15.6 CM/SEC
BH CV XLRA MEAS RIGHT DIST CCA PSV: 103 CM/SEC
BH CV XLRA MEAS RIGHT DIST ICA EDV: -18.3 CM/SEC
BH CV XLRA MEAS RIGHT DIST ICA PSV: -72.5 CM/SEC
BH CV XLRA MEAS RIGHT ICA RATIO VEL: -117 CM/SEC
BH CV XLRA MEAS RIGHT ICA/CCA RATIO: -1.1
BH CV XLRA MEAS RIGHT MID ICA EDV: -21.8 CM/SEC
BH CV XLRA MEAS RIGHT MID ICA PSV: -73 CM/SEC
BH CV XLRA MEAS RIGHT PROX CCA EDV: 12.7 CM/SEC
BH CV XLRA MEAS RIGHT PROX CCA PSV: 99.1 CM/SEC
BH CV XLRA MEAS RIGHT PROX ECA EDV: 16.6 CM/SEC
BH CV XLRA MEAS RIGHT PROX ECA PSV: 147 CM/SEC
BH CV XLRA MEAS RIGHT PROX ICA EDV: -17.8 CM/SEC
BH CV XLRA MEAS RIGHT PROX ICA PSV: -117 CM/SEC
BH CV XLRA MEAS RIGHT PROX SCLA PSV: 152 CM/SEC
BH CV XLRA MEAS RIGHT VERTEBRAL A EDV: 10.4 CM/SEC
BH CV XLRA MEAS RIGHT VERTEBRAL A PSV: 54.5 CM/SEC
LEFT ARM BP: NORMAL MMHG
RIGHT ARM BP: NORMAL MMHG

## 2018-05-24 PROCEDURE — 93880 EXTRACRANIAL BILAT STUDY: CPT

## 2018-06-13 DIAGNOSIS — E78.5 HYPERLIPIDEMIA, UNSPECIFIED HYPERLIPIDEMIA TYPE: Chronic | ICD-10-CM

## 2018-06-13 DIAGNOSIS — N18.9 ANEMIA DUE TO CHRONIC KIDNEY DISEASE: Chronic | ICD-10-CM

## 2018-06-13 DIAGNOSIS — D63.1 ANEMIA DUE TO CHRONIC KIDNEY DISEASE: Chronic | ICD-10-CM

## 2018-06-13 DIAGNOSIS — R73.01 IMPAIRED FASTING GLUCOSE: Chronic | ICD-10-CM

## 2018-06-13 DIAGNOSIS — E55.9 VITAMIN D DEFICIENCY: Chronic | ICD-10-CM

## 2018-06-14 ENCOUNTER — CLINICAL SUPPORT (OUTPATIENT)
Dept: INTERNAL MEDICINE | Facility: CLINIC | Age: 83
End: 2018-06-14

## 2018-06-14 DIAGNOSIS — E53.8 VITAMIN B12 DEFICIENCY: Primary | Chronic | ICD-10-CM

## 2018-06-14 PROCEDURE — 96372 THER/PROPH/DIAG INJ SC/IM: CPT | Performed by: INTERNAL MEDICINE

## 2018-06-14 RX ORDER — CYANOCOBALAMIN 1000 UG/ML
1000 INJECTION, SOLUTION INTRAMUSCULAR; SUBCUTANEOUS
Status: DISCONTINUED | OUTPATIENT
Start: 2018-06-14 | End: 2018-06-28

## 2018-06-14 RX ADMIN — CYANOCOBALAMIN 1000 MCG: 1000 INJECTION, SOLUTION INTRAMUSCULAR; SUBCUTANEOUS at 09:06

## 2018-06-16 LAB
25(OH)D3+25(OH)D2 SERPL-MCNC: 56.2 NG/ML (ref 30–100)
ALBUMIN SERPL-MCNC: 4.4 G/DL (ref 3.5–5.2)
ALBUMIN/GLOB SERPL: 2 G/DL
ALP SERPL-CCNC: 66 U/L (ref 39–117)
ALT SERPL-CCNC: 20 U/L (ref 1–41)
AST SERPL-CCNC: 18 U/L (ref 1–40)
BILIRUB SERPL-MCNC: 0.3 MG/DL (ref 0.1–1.2)
BUN SERPL-MCNC: 33 MG/DL (ref 8–23)
BUN/CREAT SERPL: 15.9 (ref 7–25)
CALCIUM SERPL-MCNC: 9.4 MG/DL (ref 8.6–10.5)
CHLORIDE SERPL-SCNC: 105 MMOL/L (ref 98–107)
CHOLEST SERPL-MCNC: 182 MG/DL (ref 100–199)
CK SERPL-CCNC: 101 U/L (ref 20–200)
CO2 SERPL-SCNC: 27.1 MMOL/L (ref 22–29)
CREAT SERPL-MCNC: 2.07 MG/DL (ref 0.76–1.27)
ERYTHROCYTE [DISTWIDTH] IN BLOOD BY AUTOMATED COUNT: 12.4 % (ref 11.5–14.5)
GFR SERPLBLD CREATININE-BSD FMLA CKD-EPI: 31 ML/MIN/1.73
GFR SERPLBLD CREATININE-BSD FMLA CKD-EPI: 37 ML/MIN/1.73
GLOBULIN SER CALC-MCNC: 2.2 GM/DL
GLUCOSE SERPL-MCNC: 121 MG/DL (ref 65–99)
HBA1C MFR BLD: 6.3 % (ref 4.8–5.6)
HCT VFR BLD AUTO: 42.2 % (ref 40.4–52.2)
HDL SERPL-SCNC: 31.3 UMOL/L
HDLC SERPL-MCNC: 40 MG/DL
HGB BLD-MCNC: 13.6 G/DL (ref 13.7–17.6)
LDL SERPL QN: 20.5 NM
LDL SERPL-SCNC: 1535 NMOL/L
LDL SMALL SERPL-SCNC: 969 NMOL/L
LDLC SERPL CALC-MCNC: 115 MG/DL (ref 0–99)
MCH RBC QN AUTO: 29.7 PG (ref 27–32.7)
MCHC RBC AUTO-ENTMCNC: 32.2 G/DL (ref 32.6–36.4)
MCV RBC AUTO: 92.1 FL (ref 79.8–96.2)
PLATELET # BLD AUTO: 186 10*3/MM3 (ref 140–500)
POTASSIUM SERPL-SCNC: 5.9 MMOL/L (ref 3.5–5.2)
PROT SERPL-MCNC: 6.6 G/DL (ref 6–8.5)
RBC # BLD AUTO: 4.58 10*6/MM3 (ref 4.6–6)
SODIUM SERPL-SCNC: 144 MMOL/L (ref 136–145)
T3FREE SERPL-MCNC: 3.5 PG/ML (ref 2–4.4)
T4 FREE SERPL-MCNC: 1.19 NG/DL (ref 0.93–1.7)
TRIGL SERPL-MCNC: 134 MG/DL (ref 0–149)
TSH SERPL DL<=0.005 MIU/L-ACNC: 2.76 MIU/ML (ref 0.27–4.2)
WBC # BLD AUTO: 5.32 10*3/MM3 (ref 4.5–10.7)

## 2018-06-20 LAB
2ME-CITRATE SERPL-SCNC: 319 NMOL/L (ref 60–228)
CYSTATHIONIN SERPL-SCNC: 239 NMOL/L (ref 44–342)
HCYS SERPL-SCNC: 12.3 UMOL/L (ref 5.1–13.9)
Lab: ABNORMAL
METHYLMALONATE SERPL-SCNC: 363 NMOL/L (ref 0–378)

## 2018-06-28 ENCOUNTER — HOSPITAL ENCOUNTER (OUTPATIENT)
Dept: GENERAL RADIOLOGY | Facility: HOSPITAL | Age: 83
Discharge: HOME | End: 2018-06-28
Admitting: INTERNAL MEDICINE

## 2018-06-28 ENCOUNTER — OFFICE VISIT (OUTPATIENT)
Dept: INTERNAL MEDICINE | Facility: CLINIC | Age: 83
End: 2018-06-28

## 2018-06-28 VITALS
WEIGHT: 184 LBS | HEIGHT: 67 IN | BODY MASS INDEX: 28.88 KG/M2 | HEART RATE: 88 BPM | OXYGEN SATURATION: 96 % | SYSTOLIC BLOOD PRESSURE: 140 MMHG | DIASTOLIC BLOOD PRESSURE: 72 MMHG

## 2018-06-28 DIAGNOSIS — I10 BENIGN ESSENTIAL HYPERTENSION: Chronic | ICD-10-CM

## 2018-06-28 DIAGNOSIS — N40.0 BENIGN NON-NODULAR PROSTATIC HYPERPLASIA WITHOUT LOWER URINARY TRACT SYMPTOMS: Chronic | ICD-10-CM

## 2018-06-28 DIAGNOSIS — E55.9 VITAMIN D DEFICIENCY: Chronic | ICD-10-CM

## 2018-06-28 DIAGNOSIS — M54.2 CHRONIC NECK PAIN: ICD-10-CM

## 2018-06-28 DIAGNOSIS — M17.0 PRIMARY OSTEOARTHRITIS OF KNEES, BILATERAL: Chronic | ICD-10-CM

## 2018-06-28 DIAGNOSIS — G89.29 CHRONIC NECK PAIN: ICD-10-CM

## 2018-06-28 DIAGNOSIS — K21.9 GASTROESOPHAGEAL REFLUX DISEASE WITHOUT ESOPHAGITIS: Chronic | ICD-10-CM

## 2018-06-28 DIAGNOSIS — R73.01 IMPAIRED FASTING GLUCOSE: Chronic | ICD-10-CM

## 2018-06-28 DIAGNOSIS — Z51.81 THERAPEUTIC DRUG MONITORING: ICD-10-CM

## 2018-06-28 DIAGNOSIS — M85.89 OSTEOPENIA OF MULTIPLE SITES: Chronic | ICD-10-CM

## 2018-06-28 DIAGNOSIS — N18.9 ANEMIA DUE TO CHRONIC KIDNEY DISEASE: Chronic | ICD-10-CM

## 2018-06-28 DIAGNOSIS — M54.12 LEFT CERVICAL RADICULOPATHY: ICD-10-CM

## 2018-06-28 DIAGNOSIS — E78.5 HYPERLIPIDEMIA, UNSPECIFIED HYPERLIPIDEMIA TYPE: Chronic | ICD-10-CM

## 2018-06-28 DIAGNOSIS — Z00.00 MEDICARE ANNUAL WELLNESS VISIT, SUBSEQUENT: Primary | ICD-10-CM

## 2018-06-28 DIAGNOSIS — E29.1 HYPOGONADISM IN MALE: Chronic | ICD-10-CM

## 2018-06-28 DIAGNOSIS — D63.1 ANEMIA DUE TO CHRONIC KIDNEY DISEASE: Chronic | ICD-10-CM

## 2018-06-28 DIAGNOSIS — Z91.09 MULTIPLE ENVIRONMENTAL ALLERGIES: Chronic | ICD-10-CM

## 2018-06-28 DIAGNOSIS — R25.2 MUSCLE CRAMPS: ICD-10-CM

## 2018-06-28 DIAGNOSIS — E53.8 VITAMIN B12 DEFICIENCY: Chronic | ICD-10-CM

## 2018-06-28 DIAGNOSIS — N18.4 CHRONIC RENAL INSUFFICIENCY, STAGE IV (SEVERE) (HCC): Chronic | ICD-10-CM

## 2018-06-28 DIAGNOSIS — I65.23 ATHEROSCLEROSIS OF BOTH CAROTID ARTERIES: Chronic | ICD-10-CM

## 2018-06-28 PROBLEM — E11.9 ENCOUNTER FOR DIABETIC FOOT EXAM: Chronic | Status: RESOLVED | Noted: 2017-03-08 | Resolved: 2018-06-28

## 2018-06-28 PROBLEM — Z01.00 DIABETIC EYE EXAM: Chronic | Status: RESOLVED | Noted: 2017-03-08 | Resolved: 2018-06-28

## 2018-06-28 PROBLEM — E11.9 DIABETIC EYE EXAM: Chronic | Status: RESOLVED | Noted: 2017-03-08 | Resolved: 2018-06-28

## 2018-06-28 PROCEDURE — 99214 OFFICE O/P EST MOD 30 MIN: CPT | Performed by: INTERNAL MEDICINE

## 2018-06-28 PROCEDURE — 72050 X-RAY EXAM NECK SPINE 4/5VWS: CPT

## 2018-06-28 PROCEDURE — G0439 PPPS, SUBSEQ VISIT: HCPCS | Performed by: INTERNAL MEDICINE

## 2018-06-28 RX ORDER — PREDNISONE 10 MG/1
TABLET ORAL
Qty: 56 TABLET | Refills: 0 | Status: SHIPPED | OUTPATIENT
Start: 2018-06-28 | End: 2018-08-30

## 2018-06-28 RX ORDER — CALCITRIOL 0.25 UG/1
1 CAPSULE, LIQUID FILLED ORAL 3 TIMES WEEKLY
COMMUNITY
Start: 2018-06-04 | End: 2020-08-19

## 2018-06-28 NOTE — PROGRESS NOTES
06/28/2018    Patient Information  Radha Win                                                                                          [address]  [place] [address]      7/26/1932  [phone]     Patient has been erroneously marked as diabetic. Based on the available clinical information, he does not have diabetes and should therefore be excluded from diabetic health maintenance and quality measures for the remainder of the reporting period.    Chief Complaint:     Subsequent Medicare wellness visit.  Follow-up impaired fasting glucose, hypertension, hyperlipidemia, chronic renal insufficiency, carotid artery plaque, BPH, osteopenia, vitamin B12 and vitamin D deficiency, anemia of chronic kidney disease, esophageal reflux, environmental allergies, osteoarthritis of the knees, hypogonadism, muscle cramps related to Vytorin.  Patient complaining of left neck and shoulder pain.  This will be described in detail below.  Past medical history reviewed and updated where necessary including health maintenance parameters.  This reveals he will be up-to-date after today's visit with the exception of the new shingles vaccination.  I recommended that he check with local drugstore and see if it is covered at a reasonable cost.    The history regarding chronic neck pain and left-sided cervical radicular symptoms:    06/28/2018--patient presents with at least a one-month history of left sided neck and proximal shoulder pain which is actually in the supraspinatus area.  No known trauma.  The pain is present only at nighttime when he lies down to go to sleep.  It is interfering with his sleep.  When he is up and about and active he does not notice the discomfort.  No numbness or paresthesias.  No weakness in his upper extremities.  Examination is not particularly revealing.  Patient does have fairly good passive range of motion.  Patient initially felt that he was having a shoulder problem but  after evaluation I really think that the symptoms may be coming from his neck.  X-ray of the cervical spine ordered.  Patient will follow-up on the phone for the results and possible further instructions.  Given the prolonged duration and severity of the symptoms, I will treat him empirically with prednisone 50 mg by mouth daily ×7 days, taper and discontinue.  If symptoms persist, he may need further evaluation such as MRI.    History of Present Illness:      Review of Systems   Constitution: Negative.   HENT: Negative.    Eyes: Negative.    Cardiovascular: Negative.    Respiratory: Negative.    Endocrine: Negative.    Hematologic/Lymphatic: Negative.    Skin: Negative.    Musculoskeletal: Positive for arthritis and neck pain.   Gastrointestinal: Negative.    Genitourinary: Negative.    Neurological: Negative.    Psychiatric/Behavioral: Negative.    Allergic/Immunologic: Negative.        Active Problems:    Patient Active Problem List   Diagnosis   • Bilateral carotid artery plaque, 09/21/2016--16-49% bilateral carotid artery stenosis   • Benign prostatic hypertrophy   • Chronic renal insufficiency, stage IV (severe), 02/09/2016--creatinine 1.85   • Diverticulosis of colon   • Hyperlipidemia   • Osteopenia, 03/14/2017--lumbar spine -0.3.  Left femoral neck -1.6.  Right femoral neck -2.0.   • Vitamin B12 deficiency   • Vitamin D deficiency   • Allergic rhinitis   • Anemia due to chronic kidney disease   • Generalized anxiety disorder   • Benign essential hypertension   • Gastroesophageal reflux disease without esophagitis   • Folic acid deficiency   • Multiple environmental allergies   • Therapeutic drug monitoring   • Bilateral renal cysts   • Primary osteoarthritis of knees, bilateral   • Impaired fasting glucose   • Muscle cramps, possibly statin related, specifically Vytorin.   • Hypogonadism in male, on TRT, testosterone pellets, per          Past Medical History:   Diagnosis Date   • Allergic rhinitis  2/11/2016   • Anemia due to chronic kidney disease 2/11/2016   • Benign essential hypertension 2/11/2016   • Benign prostatic hypertrophy 2/11/2016 12/21/2016--prostate biopsy reportedly negative.  I will try to obtain the report.  Patient sees urologist.  PSAs run from the 3-8 range   • Bilateral carotid artery plaque, 09/21/2016--16-49% bilateral carotid artery stenosis 2/11/2016 09/21/2016--vascular screen reveals 16-49% bilateral carotid artery stenosis, negative for AAA, negative for PAD.  10/10/2008--vascular screening study revealed mild bilateral carotid plaque, no aortic aneurysm, no evidence of PAD   • Bilateral renal cysts 2/14/2016 04/07/2016--ultrasound of the kidneys reveals the previously described renal cysts are not well seen.  However, questionable incidental bladder tumor noted.  05/26/2010--ultrasound the kidneys was negative bilaterally except for small renal cysts.   • Chronic renal insufficiency, stage IV (severe), 02/09/2016--creatinine 1.85 2/11/2016 02/09/2016--serum creatinine 1.85.  BUN 29.  07/20/2015--patient was evaluated by the nephrologist.  Ultrasound of the kidneys ordered but this was never done.  05/22/2015--BUN 35, creatinine 2.3.  Patient referred to nephrology, Dr. Devyn Muniz.  04/16/2014--BUN 25, creatinine 1.77.  01/03/2013--BUN 37, creatinine 2.14.    05/26/2010---ultrasound of the kidneys reveal a small cyst x 2 right kidney.  Otherwise normal.    Baseline creatinine approximately 1.9-2.0.   • Diverticulosis of colon 2/11/2016    10/03/2013--colonoscopy revealed markedly redundant colon, pancolonic diverticulosis with several impacted fecalith.  No evidence of diverticulitis or stricture.  Was only able to reach the ascending colon.  Follow up barium enema revealed extensive diverticulosis.  No polyp or other mucosal mass seen.    06/11/2002--incomplete colonoscopy due to redundant colon.  Not able to get past the hepatic flexure.  Patient had followup barium  contrast enema which showed extensive diverticulosis.   • Folic acid deficiency 2/11/2016   • Gastroesophageal reflux disease without esophagitis 2/11/2016   • Generalized anxiety disorder 2/11/2016   • History of diverticulitis of colon 2009 and 2003 05/08/2009-acute diverticulitis without complication. 09/05/2003-acute diverticulitis without complication.    • Hyperlipidemia 2/11/2016 01/29/2005--treatment for hyperlipidemia begun.   • Hypogonadism in male, on TRT, testosterone pellets, per  6/16/2017 January 2017--patient reports his urologist initiated testosterone replacement therapy consisting of testosterone pellets every 6 months.   • Multiple environmental allergies 2/12/2016    Patient sees the allergist regularly and has been on immunotherapy.   • Osteopenia, 03/14/2017--lumbar spine -0.3.  Left femoral neck -1.6.  Right femoral neck -2.0. 2/11/2016 06/16/2017--patient seen in follow-up and reports he doesn't think he ever started Fosamax.  Osteopenia and needs to be reassessed with a DEXA scan.  03/14/2017--DEXA scan reveals lumbar spine T score -0.3.  Unchanged.  Left femoral neck T score -1.6, 6% decrease.  Right femoral neck T score -2.0.  Unchanged.  Assessment is osteopenia with a statistically significant interval decrease in the left hip.  07/18/2014--Fosamax 70 mg per day  initiated.  Calcium 1200 mg per day.    07/07/2014--DEXA scan revealed average lumbar spine T score -0.5.  Left proximal femoral T score is zero.  Left femoral neck T score -1.3.  Right proximal femoral T score 0.1.  Right femoral neck T score -2.0.  Osteopenia.   • Primary osteoarthritis of knees, bilateral 9/12/2016 09/12/2016--patient called in the office requesting a steriod injection in his knees.  I explained to him that I do no longer perform these injections and have referred him to Dr. Manuel.   • Vitamin B12 deficiency 2/11/2016 06/25/2009--B12 injections begun.   • Vitamin D deficiency 2/11/2016          Past Surgical History:   Procedure Laterality Date   • CATARACT EXTRACTION Left 12/12/2011 12/12/2011--cataract extirpation with intraocular lens implantation left eye.   • CATARACT EXTRACTION Right 12/2011 December 2011--right cataract extirpation with intraocular lens implantation.   • COLONOSCOPY  06/11/2002 06/11/2002--incomplete colonoscopy due to redundant colon. Not able to get past the hepatic flexure. Patient had followup barium contrast enema which showed extensive diverticulosis   • COLONOSCOPY  10/03/2013    10/03/2013--colonoscopy revealed markedly redundant colon, pancolonic diverticulosis with several impacted fecalith. No evidence of diverticulitis or stricture. Was only able to reach the ascending colon. Follow up barium enema revealed extensive diverticulosis. No polyp or other mucosal mass seen.   • CYSTOSCOPY  05/18/2016 05/18/2016--urology consultation.  Cystoscopy performed for possible bladder mass is negative.   • PROSTATE BIOPSY  12/21/2016 12/21/2016--prostate biopsy reportedly negative.  I will try to obtain the report.         No Known Allergies        Current Outpatient Prescriptions:   •  amLODIPine (NORVASC) 5 MG tablet, Take 1 tablet by mouth every morning., Disp: , Rfl:   •  aspirin (ASPIRIN LOW DOSE) 81 MG tablet, Take 1 tablet by mouth daily., Disp: , Rfl:   •  calcitriol (ROCALTROL) 0.25 MCG capsule, 1 capsule 3 (Three) Times a Week., Disp: , Rfl:   •  Calcium Carb-Cholecalciferol (CALCIUM 600 + D) 600-200 MG-UNIT tablet, Take 2 tablets by mouth daily., Disp: , Rfl:   •  Cholecalciferol (VITAMIN D3) 5000 UNITS capsule capsule, Take 1 capsule by mouth daily., Disp: , Rfl:   •  Coenzyme Q10 (COQ10 MAXIMUM STRENGTH) 400 MG capsule, 1 by mouth daily with food, Disp: 30 each, Rfl: 0  •  folic acid (FOLVITE) 1 MG tablet, TAKE ONE TABLET BY MOUTH TWICE A DAY, Disp: 180 tablet, Rfl: 1  •  lisinopril-hydrochlorothiazide (PRINZIDE,ZESTORETIC) 20-12.5 MG per tablet,  "TAKE TWO TABLETS BY MOUTH DAILY, Disp: 180 tablet, Rfl: 0  •  omeprazole (priLOSEC) 40 MG capsule, TAKE ONE CAPSULE BY MOUTH DAILY, Disp: 30 capsule, Rfl: 4  •  pravastatin (PRAVACHOL) 40 MG tablet, 1 by mouth daily for cholesterol, Disp: 90 tablet, Rfl: 3  •  ALPRAZolam (XANAX) 0.5 MG tablet, Take 1 tablet by mouth 2 (Two) Times a Day As Needed for Anxiety., Disp: 60 tablet, Rfl: 3  No current facility-administered medications for this visit.       Family History   Problem Relation Age of Onset   • Diabetes Mother    • Heart disease Mother          Social History     Social History   • Marital status:      Spouse name: N/A   • Number of children: N/A   • Years of education: N/A     Occupational History   • [employer]     Social History Main Topics   • Smoking status: Never Smoker   • Smokeless tobacco: Never Used   • Alcohol use Yes      Comment: Moderate alcohol consumption   • Drug use: No   • Sexual activity: Yes     Partners: Female     Other Topics Concern   • Not on file     Social History Narrative   • No narrative on file         Vitals:    06/28/18 1011   BP: 140/72   BP Location: Right arm   Patient Position: Sitting   Cuff Size: Adult   Pulse: 88   SpO2: 96%   Weight: 83.5 kg (184 lb)   Height: 170.2 cm (67.01\")          Physical Exam:    General: Alert and oriented x 3.  No acute distress.  Normal affect.  HEENT: Pupils equal, round, reactive to light; extraocular movements intact; sclerae nonicteric; pharynx, ear canals and TMs normal.  Neck: Without JVD, thyromegaly, bruit, or adenopathy.  Lungs: Clear to auscultation in all fields.  Heart: Regular rate and rhythm without murmur, rub, gallop, or click.  Abdomen: Soft, nontender, without hepatosplenomegaly or hernia.  Bowel sounds normal.  : Deferred.  Rectal: Deferred.  Extremities: Without clubbing, cyanosis, edema, or pulse deficit.  Neurologic: Intact without focal deficit.  Normal station and gait observed during ingress and " egress from the examination room.  Skin: Without significant lesion.  Musculoskeletal: Unremarkable.      Lab/other results:    NMR reveals a total cholesterol 182.  Triglycerides 134.  LDL particle number elevated at 1535.  Small LDL particle number elevated at 969.  HDL particle number is normal at 31.3.  CMP normal except blood sugar 121, BUN 33, creatinine 2.07.  Potassium is elevated at 5.9.  CBC normal except hemoglobin low at 13.6.  Hemoglobin A1c 6.3.  Thyroid function tests normal.  Vitamin D normal.  CPK normal.    I reviewed the results of the carotid Doppler study which reveals bilateral 16-49% stenosis which is unchanged.    Assessment/Plan:     Diagnosis Plan   1. Medicare annual wellness visit, subsequent     2. Impaired fasting glucose     3. Benign essential hypertension     4. Hyperlipidemia, unspecified hyperlipidemia type     5. Chronic renal insufficiency, stage IV (severe), 02/09/2016--creatinine 1.85     6. Bilateral carotid artery plaque, 09/21/2016--16-49% bilateral carotid artery stenosis     7. Benign prostatic hypertrophy     8. Osteopenia of multiple sites     9. Vitamin B12 deficiency     10. Vitamin D deficiency     11. Anemia due to chronic kidney disease     12. Gastroesophageal reflux disease without esophagitis     13. Multiple environmental allergies     14. Primary osteoarthritis of knees, bilateral     15. Hypogonadism in male, on TRT, testosterone pellets, per      16. Muscle cramps, possibly statin related, specifically Vytorin.     17. Therapeutic drug monitoring       The subsequent Medicare wellness visit is documented on a separate note.    Patient has impaired fasting glucose and does not have diabetes as indicated in the electronic medical record.  Patient is close to developing mild overt diabetes and therefore strict diet, exercise, and weight loss is indicated.  Recommend markedly decreasing carbohydrates and calories.  Blood pressure is slightly elevated today but  not enough to make any changes, particularly given his renal insufficiency.  The renal insufficiency is stable and he is followed by the nephrology service.  Patient has stable bilateral carotid artery plaque.  BPH symptoms controlled.  Patient has osteopenia which was assessed below more than one year ago with a DEXA scan.  We will repeat that study next year.  He has vitamin B12 and vitamin D deficiency which seems to be stable.  He receives monthly B12 injections.  His anemia is due to chronic renal disease and is a very mild and stable.  Reflux controlled.  He has primary osteoarthritis of the knees and is doing much better after the orthopedist gave him steroid injections.  His muscle cramps are better after discontinuation of Vytorin and changing to pravastatin although his cholesterol was not as good as it was previously.  However, I cannot expect him to take a medication is going to make him feel bad.    Plan is as follows: X-ray of the cervical spine.  Prednisone 50 mg by mouth daily ×7 days, taper and discontinue.  Patient will follow-up on the phone for the results of the x-ray and possibly further instructions.  If his symptoms persist then we may need to proceed with further evaluation including MRI.  No other changes in medical regimen.  Strongly recommend that patient lose weight and decrease carbohydrate intake.  Exercise is helpful as well.patient will follow-up in 6 months with lab prior.    Procedures

## 2018-06-28 NOTE — PROGRESS NOTES
QUICK REFERENCE INFORMATION:  The ABCs of the Annual Wellness Visit    Subsequent Medicare Wellness Visit    HEALTH RISK ASSESSMENT    7/26/1932    Recent Hospitalizations:  No hospitalization(s) within the last year..        Current Medical Providers:  Patient Care Team:  Delgado Patricia MD as PCP - General (Internal Medicine)  Delgado Patricia MD as PCP - Claims Attributed        Smoking Status:  History   Smoking Status   • Never Smoker   Smokeless Tobacco   • Never Used       Alcohol Consumption:  History   Alcohol Use   • Yes     Comment: Moderate alcohol consumption       Depression Screen:   PHQ-2/PHQ-9 Depression Screening 6/28/2018   Little interest or pleasure in doing things 0   Feeling down, depressed, or hopeless 0   Trouble falling or staying asleep, or sleeping too much -   Feeling tired or having little energy -   Poor appetite or overeating -   Feeling bad about yourself - or that you are a failure or have let yourself or your family down -   Trouble concentrating on things, such as reading the newspaper or watching television -   Moving or speaking so slowly that other people could have noticed. Or the opposite - being so fidgety or restless that you have been moving around a lot more than usual -   Thoughts that you would be better off dead, or of hurting yourself in some way -   Total Score 0       Health Habits and Functional and Cognitive Screening:  Functional & Cognitive Status 6/28/2018   Do you have difficulty preparing food and eating? No   Do you have difficulty bathing yourself, getting dressed or grooming yourself? No   Do you have difficulty using the toilet? No   Do you have difficulty moving around from place to place? No   Do you have trouble with steps or getting out of a bed or a chair? No   In the past year have you fallen or experienced a near fall? No   Current Diet Well Balanced Diet   Dental Exam Up to date   Eye Exam Up to date   Exercise (times per week) 2 times per week    Current Exercise Activities Include Yard Work   Do you need help using the phone?  No   Are you deaf or do you have serious difficulty hearing?  No   Do you need help with transportation? No   Do you need help shopping? No   Do you need help preparing meals?  No   Do you need help with housework?  No   Do you need help with laundry? No   Do you need help taking your medications? No   Do you need help managing money? No   Do you ever drive or ride in a car without wearing a seat belt? No   Have you felt unusual stress, anger or loneliness in the last month? No   Who do you live with? Spouse   If you need help, do you have trouble finding someone available to you? No   Have you been bothered in the last four weeks by sexual problems? No   Do you have difficulty concentrating, remembering or making decisions? No           Does the patient have evidence of cognitive impairment? No    Aspirin use counseling: Taking ASA appropriately as indicated      Recent Lab Results:  CMP:  Lab Results   Component Value Date     (H) 06/14/2018    BUN 33 (H) 06/14/2018    CREATININE 2.07 (H) 06/14/2018    EGFRIFNONA 31 (L) 06/14/2018    EGFRIFAFRI 37 (L) 06/14/2018    BCR 15.9 06/14/2018     06/14/2018    K 5.9 (H) 06/14/2018    CO2 27.1 06/14/2018    CALCIUM 9.4 06/14/2018    PROTENTOTREF 6.6 06/14/2018    ALBUMIN 4.40 06/14/2018    LABGLOBREF 2.2 06/14/2018    LABIL2 2.0 06/14/2018    BILITOT 0.3 06/14/2018    ALKPHOS 66 06/14/2018    AST 18 06/14/2018    ALT 20 06/14/2018     Lipid Panel:  Lab Results   Component Value Date    TRIG 134 06/14/2018     HbA1c:  Lab Results   Component Value Date    HGBA1C 6.30 (H) 06/14/2018       Visual Acuity:  No exam data present    Age-appropriate Screening Schedule:  Refer to the list below for future screening recommendations based on patient's age, sex and/or medical conditions. Orders for these recommended tests are listed in the plan section. The patient has been provided with  a written plan.    Health Maintenance   Topic Date Due   • INFLUENZA VACCINE  08/01/2018   • HEMOGLOBIN A1C  12/14/2018   • URINE MICROALBUMIN  01/11/2019   • DXA SCAN  03/14/2019   • LIPID PANEL  06/14/2019   • DIABETIC FOOT EXAM  06/28/2019   • TDAP/TD VACCINES (2 - Td) 03/09/2027   • PNEUMOCOCCAL VACCINES (65+ LOW/MEDIUM RISK)  Completed   • ZOSTER VACCINE  Excluded        Subjective   History of Present Illness    Radha Win is a 85 y.o. male who presents for an Subsequent Wellness Visit.    The following portions of the patient's history were reviewed and updated as appropriate: allergies, current medications, past family history, past medical history, past social history, past surgical history and problem list.    Outpatient Medications Prior to Visit   Medication Sig Dispense Refill   • amLODIPine (NORVASC) 5 MG tablet Take 1 tablet by mouth every morning.     • aspirin (ASPIRIN LOW DOSE) 81 MG tablet Take 1 tablet by mouth daily.     • Calcium Carb-Cholecalciferol (CALCIUM 600 + D) 600-200 MG-UNIT tablet Take 2 tablets by mouth daily.     • Cholecalciferol (VITAMIN D3) 5000 UNITS capsule capsule Take 1 capsule by mouth daily.     • Coenzyme Q10 (COQ10 MAXIMUM STRENGTH) 400 MG capsule 1 by mouth daily with food 30 each 0   • folic acid (FOLVITE) 1 MG tablet TAKE ONE TABLET BY MOUTH TWICE A  tablet 1   • lisinopril-hydrochlorothiazide (PRINZIDE,ZESTORETIC) 20-12.5 MG per tablet TAKE TWO TABLETS BY MOUTH DAILY 180 tablet 0   • omeprazole (priLOSEC) 40 MG capsule TAKE ONE CAPSULE BY MOUTH DAILY 30 capsule 4   • pravastatin (PRAVACHOL) 40 MG tablet 1 by mouth daily for cholesterol 90 tablet 3   • ALPRAZolam (XANAX) 0.5 MG tablet Take 1 tablet by mouth 2 (Two) Times a Day As Needed for Anxiety. 60 tablet 3   • folic acid (FOLVITE) 1 MG tablet Take 1 mg by mouth Daily.     • ketoconazole (NIZORAL) 2 % cream        Facility-Administered Medications Prior to Visit   Medication Dose Route Frequency  Provider Last Rate Last Dose   • cyanocobalamin injection 1,000 mcg  1,000 mcg Intramuscular Q28 Days Delgado Patricia MD   1,000 mcg at 04/26/18 1352   • cyanocobalamin injection 1,000 mcg  1,000 mcg Intramuscular Q28 Days Delgado Patricia MD   1,000 mcg at 06/14/18 0906       Patient Active Problem List   Diagnosis   • Bilateral carotid artery plaque, 05/24/2018--16-49% bilateral carotid artery stenosis   • Benign prostatic hypertrophy   • Chronic renal insufficiency, stage IV (severe), 02/09/2016--creatinine 1.85   • Diverticulosis of colon   • Hyperlipidemia   • Osteopenia, 03/14/2017--lumbar spine -0.3.  Left femoral neck -1.6.  Right femoral neck -2.0.   • Vitamin B12 deficiency   • Vitamin D deficiency   • Allergic rhinitis   • Anemia due to chronic kidney disease   • Generalized anxiety disorder   • Benign essential hypertension   • Gastroesophageal reflux disease without esophagitis   • Folic acid deficiency   • Multiple environmental allergies   • Therapeutic drug monitoring   • Bilateral renal cysts   • Primary osteoarthritis of knees, bilateral   • Impaired fasting glucose   • Muscle cramps, possibly statin related, specifically Vytorin.   • Hypogonadism in male, on TRT, testosterone pellets, per    • Chronic neck pain   • Left cervical radiculopathy       Advance Care Planning:  has an advance directive - a copy has been provided and is in file    Identification of Risk Factors:  Risk factors include: weight , cardiovascular risk and increased fall risk.    Review of Systems   Musculoskeletal: Positive for arthralgias and neck pain.   All other systems reviewed and are negative.      Compared to one year ago, the patient feels his physical health is the same.  Compared to one year ago, the patient feels his mental health is the same.    Objective       Physical Exam    General: Alert and oriented x 3. Obese.  No acute distress.  Normal affect.  HEENT: Pupils equal, round, reactive to light;  "extraocular movements intact; sclerae nonicteric; pharynx, ear canals and TMs normal.  Neck: Without JVD, thyromegaly, bruit, or adenopathy.  Passive range of motion appears essentially normal.  Lungs: Clear to auscultation in all fields.  Heart: Regular rate and rhythm without murmur, rub, gallop, or click.  Abdomen: Soft, nontender, without hepatosplenomegaly or hernia.  Bowel sounds normal.  : Deferred.  Rectal: Deferred.  Extremities: Without clubbing, cyanosis, edema, or pulse deficit.  Neurologic: Intact without focal deficit.  Normal station and gait observed during ingress and egress from the examination room.  Skin: Without significant lesion.  Musculoskeletal: Unremarkable.    Vitals:    06/28/18 1011   BP: 140/72   BP Location: Right arm   Patient Position: Sitting   Cuff Size: Adult   Pulse: 88   SpO2: 96%   Weight: 83.5 kg (184 lb)   Height: 170.2 cm (67.01\")   PainSc:   9   PainLoc: Shoulder  Comment: left       Patient's Body mass index is 28.81 kg/m². BMI is above normal parameters. Recommendations include: exercise counseling, nutrition counseling and referral to primary care.      Assessment/Plan   Patient Self-Management and Personalized Health Advice  The patient has been provided with information about: diet, exercise, weight management, prevention of cardiac or vascular disease and fall prevention and preventive services including:   · Counseling for cardiovascular disease risk reduction, Diabetes screening, see lab orders, Exercise counseling provided, Fall Risk assessment done, Glaucoma screening recommended, Nutrition counseling provided, Prostate cancer screening discussed, Zostavax vaccine (Herpes Zoster).    Visit Diagnoses:    ICD-10-CM ICD-9-CM   1. Medicare annual wellness visit, subsequent Z00.00 V70.0   2. Impaired fasting glucose R73.01 790.21   3. Benign essential hypertension I10 401.1   4. Hyperlipidemia, unspecified hyperlipidemia type E78.5 272.4   5. Chronic renal " insufficiency, stage IV (severe), 02/09/2016--creatinine 1.85 N18.4 585.4   6. Bilateral carotid artery plaque, 09/21/2016--16-49% bilateral carotid artery stenosis I65.23 433.10     433.30   7. Benign prostatic hypertrophy N40.0 600.90   8. Osteopenia of multiple sites M85.89 733.90   9. Vitamin B12 deficiency E53.8 266.2   10. Vitamin D deficiency E55.9 268.9   11. Anemia due to chronic kidney disease N18.9 285.21    D63.1    12. Gastroesophageal reflux disease without esophagitis K21.9 530.81   13. Multiple environmental allergies Z91.09 V15.09   14. Primary osteoarthritis of knees, bilateral M17.0 715.16   15. Hypogonadism in male, on TRT, testosterone pellets, per  E29.1 257.2   16. Muscle cramps, possibly statin related, specifically Vytorin. R25.2 729.82   17. Therapeutic drug monitoring Z51.81 V58.83   18. Chronic neck pain M54.2 723.1    G89.29 338.29   19. Left cervical radiculopathy M54.12 723.4       Orders Placed This Encounter   Procedures   • CBC (No Diff)     Standing Status:   Future     Standing Expiration Date:   6/28/2020   • CK     Standing Status:   Future     Standing Expiration Date:   6/28/2020   • Comprehensive Metabolic Panel     Standing Status:   Future     Standing Expiration Date:   6/28/2020   • Hemoglobin A1c     Standing Status:   Future     Standing Expiration Date:   6/28/2020   • NMR LipoProfile     Standing Status:   Future     Standing Expiration Date:   6/28/2020   • TSH     Standing Status:   Future     Standing Expiration Date:   6/28/2020   • T4, Free     Standing Status:   Future     Standing Expiration Date:   6/28/2020   • T3, Free     Standing Status:   Future     Standing Expiration Date:   6/28/2020   • Vitamin D 25 Hydroxy     Standing Status:   Future     Standing Expiration Date:   6/28/2020       Outpatient Encounter Prescriptions as of 6/28/2018   Medication Sig Dispense Refill   • amLODIPine (NORVASC) 5 MG tablet Take 1 tablet by mouth every morning.     •  aspirin (ASPIRIN LOW DOSE) 81 MG tablet Take 1 tablet by mouth daily.     • calcitriol (ROCALTROL) 0.25 MCG capsule 1 capsule 3 (Three) Times a Week.     • Calcium Carb-Cholecalciferol (CALCIUM 600 + D) 600-200 MG-UNIT tablet Take 2 tablets by mouth daily.     • Cholecalciferol (VITAMIN D3) 5000 UNITS capsule capsule Take 1 capsule by mouth daily.     • Coenzyme Q10 (COQ10 MAXIMUM STRENGTH) 400 MG capsule 1 by mouth daily with food 30 each 0   • folic acid (FOLVITE) 1 MG tablet TAKE ONE TABLET BY MOUTH TWICE A  tablet 1   • lisinopril-hydrochlorothiazide (PRINZIDE,ZESTORETIC) 20-12.5 MG per tablet TAKE TWO TABLETS BY MOUTH DAILY 180 tablet 0   • omeprazole (priLOSEC) 40 MG capsule TAKE ONE CAPSULE BY MOUTH DAILY 30 capsule 4   • pravastatin (PRAVACHOL) 40 MG tablet 1 by mouth daily for cholesterol 90 tablet 3   • ALPRAZolam (XANAX) 0.5 MG tablet Take 1 tablet by mouth 2 (Two) Times a Day As Needed for Anxiety. 60 tablet 3   • [DISCONTINUED] folic acid (FOLVITE) 1 MG tablet Take 1 mg by mouth Daily.     • [DISCONTINUED] ketoconazole (NIZORAL) 2 % cream        Facility-Administered Encounter Medications as of 6/28/2018   Medication Dose Route Frequency Provider Last Rate Last Dose   • [DISCONTINUED] cyanocobalamin injection 1,000 mcg  1,000 mcg Intramuscular Q28 Days Delgado Patricia MD   1,000 mcg at 04/26/18 1352   • [DISCONTINUED] cyanocobalamin injection 1,000 mcg  1,000 mcg Intramuscular Q28 Days Delgado Patricia MD   1,000 mcg at 06/14/18 0906       Reviewed use of high risk medication in the elderly: yes  Reviewed for potential of harmful drug interactions in the elderly: yes    Follow Up:  Return in about 6 months (around 12/28/2018) for Next scheduled follow up with lab prior.     An After Visit Summary and PPPS with all of these plans were given to the patient.

## 2018-07-09 RX ORDER — OMEPRAZOLE 40 MG/1
CAPSULE, DELAYED RELEASE ORAL
Qty: 30 CAPSULE | Refills: 5 | Status: SHIPPED | OUTPATIENT
Start: 2018-07-09 | End: 2019-01-04 | Stop reason: SDUPTHER

## 2018-07-19 RX ORDER — LISINOPRIL AND HYDROCHLOROTHIAZIDE 20; 12.5 MG/1; MG/1
TABLET ORAL
Qty: 180 TABLET | Refills: 1 | Status: SHIPPED | OUTPATIENT
Start: 2018-07-19 | End: 2019-01-14 | Stop reason: SDUPTHER

## 2018-07-20 RX ORDER — ALPRAZOLAM 0.5 MG/1
0.5 TABLET ORAL 2 TIMES DAILY PRN
Qty: 60 TABLET | Refills: 4 | OUTPATIENT
Start: 2018-07-20 | End: 2019-05-23 | Stop reason: SDUPTHER

## 2018-08-02 ENCOUNTER — CLINICAL SUPPORT (OUTPATIENT)
Dept: INTERNAL MEDICINE | Facility: CLINIC | Age: 83
End: 2018-08-02

## 2018-08-02 DIAGNOSIS — E53.8 VITAMIN B12 DEFICIENCY: Primary | Chronic | ICD-10-CM

## 2018-08-02 PROCEDURE — 96372 THER/PROPH/DIAG INJ SC/IM: CPT | Performed by: INTERNAL MEDICINE

## 2018-08-02 RX ORDER — CYANOCOBALAMIN 1000 UG/ML
1000 INJECTION, SOLUTION INTRAMUSCULAR; SUBCUTANEOUS
Status: DISCONTINUED | OUTPATIENT
Start: 2018-08-02 | End: 2018-10-02

## 2018-08-02 RX ADMIN — CYANOCOBALAMIN 1000 MCG: 1000 INJECTION, SOLUTION INTRAMUSCULAR; SUBCUTANEOUS at 10:16

## 2018-08-30 ENCOUNTER — OFFICE VISIT (OUTPATIENT)
Dept: INTERNAL MEDICINE | Facility: CLINIC | Age: 83
End: 2018-08-30

## 2018-08-30 VITALS
SYSTOLIC BLOOD PRESSURE: 124 MMHG | DIASTOLIC BLOOD PRESSURE: 70 MMHG | HEART RATE: 94 BPM | HEIGHT: 67 IN | BODY MASS INDEX: 29.51 KG/M2 | WEIGHT: 188 LBS | OXYGEN SATURATION: 98 %

## 2018-08-30 DIAGNOSIS — E78.5 HYPERLIPIDEMIA, UNSPECIFIED HYPERLIPIDEMIA TYPE: Chronic | ICD-10-CM

## 2018-08-30 DIAGNOSIS — M54.2 CHRONIC NECK PAIN: ICD-10-CM

## 2018-08-30 DIAGNOSIS — N18.4 CHRONIC RENAL INSUFFICIENCY, STAGE IV (SEVERE) (HCC): Chronic | ICD-10-CM

## 2018-08-30 DIAGNOSIS — I10 BENIGN ESSENTIAL HYPERTENSION: Chronic | ICD-10-CM

## 2018-08-30 DIAGNOSIS — M54.12 LEFT CERVICAL RADICULOPATHY: Primary | ICD-10-CM

## 2018-08-30 DIAGNOSIS — R25.2 MUSCLE CRAMPS: ICD-10-CM

## 2018-08-30 DIAGNOSIS — Z23 NEED FOR INFLUENZA VACCINATION: ICD-10-CM

## 2018-08-30 DIAGNOSIS — G89.29 CHRONIC NECK PAIN: ICD-10-CM

## 2018-08-30 DIAGNOSIS — D63.1 ANEMIA DUE TO CHRONIC KIDNEY DISEASE: Chronic | ICD-10-CM

## 2018-08-30 DIAGNOSIS — R73.01 IMPAIRED FASTING GLUCOSE: Chronic | ICD-10-CM

## 2018-08-30 DIAGNOSIS — E53.8 VITAMIN B12 DEFICIENCY: Chronic | ICD-10-CM

## 2018-08-30 DIAGNOSIS — E29.1 HYPOGONADISM IN MALE: Chronic | ICD-10-CM

## 2018-08-30 DIAGNOSIS — I65.23 ATHEROSCLEROSIS OF BOTH CAROTID ARTERIES: Chronic | ICD-10-CM

## 2018-08-30 DIAGNOSIS — Z78.9 STATIN INTOLERANCE: ICD-10-CM

## 2018-08-30 DIAGNOSIS — N18.9 ANEMIA DUE TO CHRONIC KIDNEY DISEASE: Chronic | ICD-10-CM

## 2018-08-30 PROCEDURE — 90662 IIV NO PRSV INCREASED AG IM: CPT | Performed by: INTERNAL MEDICINE

## 2018-08-30 PROCEDURE — 99214 OFFICE O/P EST MOD 30 MIN: CPT | Performed by: INTERNAL MEDICINE

## 2018-08-30 PROCEDURE — G0008 ADMIN INFLUENZA VIRUS VAC: HCPCS | Performed by: INTERNAL MEDICINE

## 2018-08-30 PROCEDURE — 96372 THER/PROPH/DIAG INJ SC/IM: CPT | Performed by: INTERNAL MEDICINE

## 2018-08-30 RX ORDER — CYANOCOBALAMIN 1000 UG/ML
1000 INJECTION, SOLUTION INTRAMUSCULAR; SUBCUTANEOUS
Status: DISCONTINUED | OUTPATIENT
Start: 2018-08-30 | End: 2018-10-02

## 2018-08-30 RX ADMIN — CYANOCOBALAMIN 1000 MCG: 1000 INJECTION, SOLUTION INTRAMUSCULAR; SUBCUTANEOUS at 16:34

## 2018-08-30 NOTE — PROGRESS NOTES
08/30/2018    Patient Information  Radha RANGEL [facility]                                                                                          [address]  [facility] [address]      7/26/1932  [phone]      Chief Complaint:     Complaining of left-sided neck, shoulder, and upper extremity pain.  Complaining of return of muscle cramps.    History of Present Illness:    Patient with a history of hyperlipidemia, carotid artery plaque, chronic renal insufficiency, vitamin B12 deficiency, hypertension, impaired fasting glucose, symptomatic hypogonadism, and anemia of chronic renal disease.  Patient also has suspected cervical radiculopathy.  He presents today with 2 concerns As described below.  Past medical history reviewed and updated where necessary including health maintenance parameters.  This reveals he is up-to-date or else accounted for except for he needs influenza vaccination today.  Will also discussed the new shingles vaccination.    The history regarding cervical radiculopathy and left neck pain:    08/30/2018--patient continues to have left-sided neck and shoulder discomfort that radiates into his arm down to about the level of the elbow.  MRI of the cervical spine ordered.    06/28/2018--x-ray of the cervical spine reveals posteriorly and indenting osteophytic changes at C4-C5 and C5-C6. Bilateral foraminal stenotic changes are identified at C4-C5 and C5-C6.  Recommend MRI of the cervical spine.    06/28/2018--patient presents with at least a one-month history of left sided neck and proximal shoulder pain which is actually in the supraspinatus area.  No known trauma.  The pain is present only at nighttime when he lies down to go to sleep.  It is interfering with his sleep.  When he is up and about and active he does not notice the discomfort.  No numbness or paresthesias.  No weakness in his upper extremities.  Examination is not particularly revealing.  Patient does have  fairly good passive range of motion.  Patient initially felt that he was having a shoulder problem but after evaluation I really think that the symptoms may be coming from his neck.  X-ray of the cervical spine ordered.  Patient will follow-up on the phone for the results and possible further instructions.  Given the prolonged duration and severity of the symptoms, I will treat him empirically with prednisone 50 mg by mouth daily ×7 days, taper and discontinue.  If symptoms persist, he may need further evaluation such as MRI.    The history regarding muscle cramps/statin intolerance:    08/30/2018--patient presents with complaints of muscle cramps.  He discontinue pravastatin and the cramps went away.  Cramps returned even with a half dose.  This is despite the fact he was taking coenzyme every 10 400 mg per day with food.  Options are very limited and at the present time we will discontinue statin therapy altogether and have to readdress this issue at a later date due to intercurrent problems.    09/07/2017--patient seen in follow-up and reports his muscle cramps are much better after medication change.  Not totally but mostly resolved.  His lipid profile is adequate on the Pravachol.    07/17/2017--patient seen in follow-up and reports his muscle cramps are better but he still gets muscle cramps in his feet.  NMR reveals a total cholesterol 205.  LDL particle number elevated at 1734.  Small LDL particle number elevated at 703.  HDL particle number is low at 28.8.  We will start pravastatin 40 mg one half by mouth per day and reassess in about 6 weeks.    06/16/2017--patient reports he had to discontinue Vytorin which he has been receiving from [facility] because of worsening muscle cramps.  He has been taking coenzyme Q10 but only at a 200 mg per day dose.  He reports the cramps got better after discontinuation of the Vytorin but had not totally resolved.  Vytorin formally discontinued.    Review of Systems    Constitution: Negative.   HENT: Negative.    Eyes: Negative.    Cardiovascular: Negative.    Respiratory: Negative.    Endocrine: Negative.    Hematologic/Lymphatic: Negative.    Skin: Negative.    Musculoskeletal: Positive for neck pain.   Gastrointestinal: Negative.    Genitourinary: Negative.    Neurological: Positive for paresthesias. Negative for numbness.        Slight paresthesias of the left upper extremity.  No numbness.   Psychiatric/Behavioral: Negative.    Allergic/Immunologic: Negative.        Active Problems:    Patient Active Problem List   Diagnosis   • Bilateral carotid artery plaque, 05/24/2018--16-49% bilateral carotid artery stenosis   • Benign prostatic hypertrophy   • Chronic renal insufficiency, stage IV (severe), 02/09/2016--creatinine 1.85   • Diverticulosis of colon   • Hyperlipidemia   • Osteopenia, 03/14/2017--lumbar spine -0.3.  Left femoral neck -1.6.  Right femoral neck -2.0.   • Vitamin B12 deficiency   • Vitamin D deficiency   • Allergic rhinitis   • Anemia due to chronic kidney disease   • Generalized anxiety disorder   • Benign essential hypertension   • Gastroesophageal reflux disease without esophagitis   • Folic acid deficiency   • Multiple environmental allergies   • Therapeutic drug monitoring   • Bilateral renal cysts   • Primary osteoarthritis of knees, bilateral   • Impaired fasting glucose   • Muscle cramps, possibly statin related, specifically Vytorin.   • Hypogonadism in male, on TRT, testosterone pellets, per    • Chronic neck pain   • Left cervical radiculopathy   • Statin intolerance         Past Medical History:   Diagnosis Date   • Allergic rhinitis 2/11/2016   • Anemia due to chronic kidney disease 2/11/2016   • Benign essential hypertension 2/11/2016   • Benign prostatic hypertrophy 2/11/2016 12/21/2016--prostate biopsy reportedly negative.  I will try to obtain the report.  Patient sees urologist.  PSAs run from the 3-8 range   • Bilateral carotid artery  plaque, 09/21/2016--16-49% bilateral carotid artery stenosis 2/11/2016 09/21/2016--vascular screen reveals 16-49% bilateral carotid artery stenosis, negative for AAA, negative for PAD.  10/10/2008--vascular screening study revealed mild bilateral carotid plaque, no aortic aneurysm, no evidence of PAD   • Bilateral renal cysts 2/14/2016 04/07/2016--ultrasound of the kidneys reveals the previously described renal cysts are not well seen.  However, questionable incidental bladder tumor noted.  05/26/2010--ultrasound the kidneys was negative bilaterally except for small renal cysts.   • Chronic renal insufficiency, stage IV (severe), 02/09/2016--creatinine 1.85 2/11/2016 02/09/2016--serum creatinine 1.85.  BUN 29.  07/20/2015--patient was evaluated by the nephrologist.  Ultrasound of the kidneys ordered but this was never done.  05/22/2015--BUN 35, creatinine 2.3.  Patient referred to nephrology, Dr. Devyn Muniz.  04/16/2014--BUN 25, creatinine 1.77.  01/03/2013--BUN 37, creatinine 2.14.    05/26/2010---ultrasound of the kidneys reveal a small cyst x 2 right kidney.  Otherwise normal.    Baseline creatinine approximately 1.9-2.0.   • Diverticulosis of colon 2/11/2016    10/03/2013--colonoscopy revealed markedly redundant colon, pancolonic diverticulosis with several impacted fecalith.  No evidence of diverticulitis or stricture.  Was only able to reach the ascending colon.  Follow up barium enema revealed extensive diverticulosis.  No polyp or other mucosal mass seen.    06/11/2002--incomplete colonoscopy due to redundant colon.  Not able to get past the hepatic flexure.  Patient had followup barium contrast enema which showed extensive diverticulosis.   • Folic acid deficiency 2/11/2016   • Gastroesophageal reflux disease without esophagitis 2/11/2016   • Generalized anxiety disorder 2/11/2016   • History of diverticulitis of colon 2009 and 2003 05/08/2009-acute diverticulitis without complication.  09/05/2003-acute diverticulitis without complication.    • Hyperlipidemia 2/11/2016 01/29/2005--treatment for hyperlipidemia begun.   • Hypogonadism in male, on TRT, testosterone pellets, per  6/16/2017 January 2017--patient reports his urologist initiated testosterone replacement therapy consisting of testosterone pellets every 6 months.   • Multiple environmental allergies 2/12/2016    Patient sees the allergist regularly and has been on immunotherapy.   • Osteopenia, 03/14/2017--lumbar spine -0.3.  Left femoral neck -1.6.  Right femoral neck -2.0. 2/11/2016 06/16/2017--patient seen in follow-up and reports he doesn't think he ever started Fosamax.  Osteopenia and needs to be reassessed with a DEXA scan.  03/14/2017--DEXA scan reveals lumbar spine T score -0.3.  Unchanged.  Left femoral neck T score -1.6, 6% decrease.  Right femoral neck T score -2.0.  Unchanged.  Assessment is osteopenia with a statistically significant interval decrease in the left hip.  07/18/2014--Fosamax 70 mg per day  initiated.  Calcium 1200 mg per day.    07/07/2014--DEXA scan revealed average lumbar spine T score -0.5.  Left proximal femoral T score is zero.  Left femoral neck T score -1.3.  Right proximal femoral T score 0.1.  Right femoral neck T score -2.0.  Osteopenia.   • Primary osteoarthritis of knees, bilateral 9/12/2016 09/12/2016--patient called in the office requesting a steriod injection in his knees.  I explained to him that I do no longer perform these injections and have referred him to Dr. Manuel.   • Vitamin B12 deficiency 2/11/2016 06/25/2009--B12 injections begun.   • Vitamin D deficiency 2/11/2016         Past Surgical History:   Procedure Laterality Date   • CATARACT EXTRACTION Left 12/12/2011 12/12/2011--cataract extirpation with intraocular lens implantation left eye.   • CATARACT EXTRACTION Right 12/2011 December 2011--right cataract extirpation with intraocular lens implantation.   •  COLONOSCOPY  06/11/2002 06/11/2002--incomplete colonoscopy due to redundant colon. Not able to get past the hepatic flexure. Patient had followup barium contrast enema which showed extensive diverticulosis   • COLONOSCOPY  10/03/2013    10/03/2013--colonoscopy revealed markedly redundant colon, pancolonic diverticulosis with several impacted fecalith. No evidence of diverticulitis or stricture. Was only able to reach the ascending colon. Follow up barium enema revealed extensive diverticulosis. No polyp or other mucosal mass seen.   • CYSTOSCOPY  05/18/2016 05/18/2016--urology consultation.  Cystoscopy performed for possible bladder mass is negative.   • PROSTATE BIOPSY  12/21/2016 12/21/2016--prostate biopsy reportedly negative.  I will try to obtain the report.         No Known Allergies        Current Outpatient Prescriptions:   •  ALPRAZolam (XANAX) 0.5 MG tablet, Take 1 tablet by mouth 2 (Two) Times a Day As Needed for Anxiety., Disp: 60 tablet, Rfl: 4  •  amLODIPine (NORVASC) 5 MG tablet, Take 1 tablet by mouth every morning., Disp: , Rfl:   •  aspirin (ASPIRIN LOW DOSE) 81 MG tablet, Take 1 tablet by mouth daily., Disp: , Rfl:   •  calcitriol (ROCALTROL) 0.25 MCG capsule, 1 capsule 3 (Three) Times a Week., Disp: , Rfl:   •  Cholecalciferol (VITAMIN D3) 5000 UNITS capsule capsule, Take 1 capsule by mouth daily., Disp: , Rfl:   •  Coenzyme Q10 (COQ10 MAXIMUM STRENGTH) 400 MG capsule, 1 by mouth daily with food, Disp: 30 each, Rfl: 0  •  folic acid (FOLVITE) 1 MG tablet, TAKE ONE TABLET BY MOUTH TWICE A DAY, Disp: 180 tablet, Rfl: 1  •  lisinopril-hydrochlorothiazide (PRINZIDE,ZESTORETIC) 20-12.5 MG per tablet, TAKE TWO TABLETS BY MOUTH DAILY, Disp: 180 tablet, Rfl: 1  •  omeprazole (priLOSEC) 40 MG capsule, TAKE ONE CAPSULE BY MOUTH DAILY, Disp: 30 capsule, Rfl: 5    Current Facility-Administered Medications:   •  cyanocobalamin injection 1,000 mcg, 1,000 mcg, Intramuscular, Q30 Days, Delgado Patricia  "MD MARILU, 1,000 mcg at 08/02/18 1016      Family History   Problem Relation Age of Onset   • Diabetes Mother    • Heart disease Mother          Social History     Social History   • Marital status:      Spouse name: N/A   • Number of children: N/A   • Years of education: N/A     Occupational History   • Retired  [employer]     Social History Main Topics   • Smoking status: Never Smoker   • Smokeless tobacco: Never Used   • Alcohol use Yes      Comment: Moderate alcohol consumption   • Drug use: No   • Sexual activity: Yes     Partners: Female     Other Topics Concern   • Not on file     Social History Narrative   • No narrative on file         Vitals:    08/30/18 1523   BP: 124/70   Pulse: 94   SpO2: 98%   Weight: 85.3 kg (188 lb)   Height: 170.2 cm (67.01\")          Physical Exam:    General: Alert and oriented x 3.  No acute distress.  Normal affect.  HEENT: Pupils equal, round, reactive to light; extraocular movements intact; sclerae nonicteric; pharynx, ear canals and TMs normal.  Neck: Without JVD, thyromegaly, bruit, or adenopathy.  Lungs: Clear to auscultation in all fields.  Heart: Regular rate and rhythm without murmur, rub, gallop, or click.  Abdomen: Soft, nontender, without hepatosplenomegaly or hernia.  Bowel sounds normal.  : Deferred.  Rectal: Deferred.  Extremities: Without clubbing, cyanosis, edema, or pulse deficit.  Neurologic: Intact without focal deficit.  Normal station and gait observed during ingress and egress from the examination room.  Skin: Without significant lesion.  Musculoskeletal: Unremarkable.      Lab/other results:    I reviewed the results of the x-ray of the cervical spine which reveals likely cervical spinal stenosis as well as multilevel foraminal stenosis.    Assessment/Plan:     Diagnosis Plan   1. Left cervical radiculopathy     2. Chronic neck pain     3. Muscle cramps, possibly statin related, specifically Vytorin.     4. Statin intolerance     5. " Hyperlipidemia, unspecified hyperlipidemia type     6. Bilateral carotid artery plaque, 05/24/2018--16-49% bilateral carotid artery stenosis     7. Chronic renal insufficiency, stage IV (severe), 02/09/2016--creatinine 1.85     8. Vitamin B12 deficiency     9. Benign essential hypertension     10. Impaired fasting glucose     11. Hypogonadism in male, on TRT, testosterone pellets, per      12. Anemia due to chronic kidney disease         Patient presents with continued symptoms consistent with left cervical radiculopathy.  He has plain film x-ray radiographic evidence of neural foraminal compromise.  MRI of the cervical spine clearly indicated.  Patient also has redeveloped statin intolerance.  He was previously on a different statin that we had to discontinue your urine a half ago and was placed on pravastatin which she tolerated for almost a year or maybe a little more and now he has return of symptoms.  Our options are becoming somewhat limited but we could consider Zetia although this is expensive.  Patient would not qualify for one of the new injectable medications due to the fact that he does not have coronary artery disease documented.  Patient has significant chronic renal insufficiency and fortunately the MRI of the cervical spine will not require contrast.  Blood pressure seems to be controlled at the present time.    Plan is as follows: MRI of the cervical spine ordered as soon as possible.  High-dose influenza vaccination given today.  Vitamin B12 injection given today.  Patient has a follow-up appointment next week and hopefully he will have the MRI report by the although this is not definite.  May have to reschedule.      Procedures

## 2018-09-06 ENCOUNTER — HOSPITAL ENCOUNTER (OUTPATIENT)
Dept: MRI IMAGING | Facility: HOSPITAL | Age: 83
Discharge: HOME | End: 2018-09-06
Admitting: INTERNAL MEDICINE

## 2018-09-06 DIAGNOSIS — G89.29 CHRONIC NECK PAIN: ICD-10-CM

## 2018-09-06 DIAGNOSIS — M54.12 LEFT CERVICAL RADICULOPATHY: ICD-10-CM

## 2018-09-06 DIAGNOSIS — M54.2 CHRONIC NECK PAIN: ICD-10-CM

## 2018-09-06 PROCEDURE — 72141 MRI NECK SPINE W/O DYE: CPT

## 2018-10-02 ENCOUNTER — OFFICE VISIT (OUTPATIENT)
Dept: INTERNAL MEDICINE | Facility: CLINIC | Age: 83
End: 2018-10-02

## 2018-10-02 VITALS
BODY MASS INDEX: 29.51 KG/M2 | SYSTOLIC BLOOD PRESSURE: 122 MMHG | WEIGHT: 188 LBS | DIASTOLIC BLOOD PRESSURE: 62 MMHG | HEIGHT: 67 IN | OXYGEN SATURATION: 98 % | HEART RATE: 112 BPM

## 2018-10-02 DIAGNOSIS — I10 BENIGN ESSENTIAL HYPERTENSION: Chronic | ICD-10-CM

## 2018-10-02 DIAGNOSIS — E53.8 VITAMIN B12 DEFICIENCY: Chronic | ICD-10-CM

## 2018-10-02 DIAGNOSIS — G89.29 CHRONIC NECK PAIN: Primary | ICD-10-CM

## 2018-10-02 DIAGNOSIS — Z78.9 STATIN INTOLERANCE: ICD-10-CM

## 2018-10-02 DIAGNOSIS — I65.23 ATHEROSCLEROSIS OF BOTH CAROTID ARTERIES: Chronic | ICD-10-CM

## 2018-10-02 DIAGNOSIS — R25.2 MUSCLE CRAMPS: ICD-10-CM

## 2018-10-02 DIAGNOSIS — N18.4 CHRONIC RENAL INSUFFICIENCY, STAGE IV (SEVERE) (HCC): Chronic | ICD-10-CM

## 2018-10-02 DIAGNOSIS — M54.12 LEFT CERVICAL RADICULOPATHY: ICD-10-CM

## 2018-10-02 DIAGNOSIS — M54.2 CHRONIC NECK PAIN: Primary | ICD-10-CM

## 2018-10-02 DIAGNOSIS — E78.5 HYPERLIPIDEMIA, UNSPECIFIED HYPERLIPIDEMIA TYPE: Chronic | ICD-10-CM

## 2018-10-02 PROCEDURE — 96372 THER/PROPH/DIAG INJ SC/IM: CPT | Performed by: INTERNAL MEDICINE

## 2018-10-02 PROCEDURE — 99214 OFFICE O/P EST MOD 30 MIN: CPT | Performed by: INTERNAL MEDICINE

## 2018-10-02 RX ORDER — CYANOCOBALAMIN 1000 UG/ML
1000 INJECTION, SOLUTION INTRAMUSCULAR; SUBCUTANEOUS ONCE
Status: COMPLETED | OUTPATIENT
Start: 2018-10-02 | End: 2018-10-02

## 2018-10-02 RX ADMIN — CYANOCOBALAMIN 1000 MCG: 1000 INJECTION, SOLUTION INTRAMUSCULAR; SUBCUTANEOUS at 17:00

## 2018-10-02 NOTE — PROGRESS NOTES
10/02/2018    Patient Information  Radha Win                                                                                          [address]  [place] [address]      7/26/1932  [phone]      Chief Complaint:     Follow-up chronic neck and shoulder pain with suspected left cervical radiculopathy.  Follow-up statin intolerance and muscle cramps as well as hyperlipidemia and history of carotid artery plaque.  Follow-up hypertension and chronic renal insufficiency.  Patient continued to have symptoms in his left shoulder.    History of Present Illness:    Patient with a history of medical problems as outlined in chief complaint presents today to follow-up on several issues.  The first issue is that of chronic neck pain and suspected radiculopathy as described below.  The other issue is that of statin intolerance and hyperlipidemia in a patient with a history of carotid artery plaque.  This will be described below was well.  Past medical history reviewed and updated where necessary including health maintenance parameters.  This reveals he is up-to-date or else accounted for.    The history regarding chronic neck pain/cervical radiculopathy:    10/02/2018--patient seen in follow-up and the results of the MRI discussed.  He continues to have intermittent radicular symptoms which is currently only on the left.  Discussed options with patient and I have recommended neurosurgery consultation.  Patient may benefit from epidural injections.    09/06/2018--MRI of the cervical spine reveals moderate neural foraminal compromise on the right at C3-C4.  There is mild canal stenosis at C4-C5 with moderate to severe neural foraminal compromise bilaterally.  At C5-C6 there is moderate neural foraminal compromise on the left and moderate to severe neural foraminal compromise on the right.  There is mild canal stenosis centrally.  At C6-C7 there is facet degenerative disease and a mild central  disc osteophyte complex but no significant neural foraminal compromise.  At C7-T1 there is no significant pathology.    08/30/2018--patient continues to have left-sided neck and shoulder discomfort that radiates into his arm down to about the level of the elbow.  MRI of the cervical spine ordered.    06/28/2018--x-ray of the cervical spine reveals posteriorly and indenting osteophytic changes at C4-C5 and C5-C6. Bilateral foraminal stenotic changes are identified at C4-C5 and C5-C6.  Recommend MRI of the cervical spine.    06/28/2018--patient presents with at least a one-month history of left sided neck and proximal shoulder pain which is actually in the supraspinatus area.  No known trauma.  The pain is present only at nighttime when he lies down to go to sleep.  It is interfering with his sleep.  When he is up and about and active he does not notice the discomfort.  No numbness or paresthesias.  No weakness in his upper extremities.  Examination is not particularly revealing.  Patient does have fairly good passive range of motion.  Patient initially felt that he was having a shoulder problem but after evaluation I really think that the symptoms may be coming from his neck.  X-ray of the cervical spine ordered.  Patient will follow-up on the phone for the results and possible further instructions.  Given the prolonged duration and severity of the symptoms, I will treat him empirically with prednisone 50 mg by mouth daily ×7 days, taper and discontinue.  If symptoms persist, he may need further evaluation such as MRI.    The history regarding muscle cramps/statin intolerance:    10/02/2018--Livalo 2 mg per day initiated.  Recommend CoQ10 400 mg per day with food for better absorption.  Reassess with lab in about 8 weeks.    08/30/2018--patient presents with complaints of muscle cramps.  He discontinue pravastatin and the cramps went away.  Cramps returned even with a half dose.  This is despite the fact he was  taking coenzyme every 10 400 mg per day with food.  Options are very limited and at the present time we will discontinue statin therapy altogether and have to readdress this issue at a later date due to intercurrent problems.    09/07/2017--patient seen in follow-up and reports his muscle cramps are much better after medication change.  Not totally but mostly resolved.  His lipid profile is adequate on the Pravachol.    07/17/2017--patient seen in follow-up and reports his muscle cramps are better but he still gets muscle cramps in his feet.  NMR reveals a total cholesterol 205.  LDL particle number elevated at 1734.  Small LDL particle number elevated at 703.  HDL particle number is low at 28.8.  We will start pravastatin 40 mg one half by mouth per day and reassess in about 6 weeks.    06/16/2017--patient reports he had to discontinue Vytorin which he has been receiving from [facility] because of worsening muscle cramps.  He has been taking coenzyme Q10 but only at a 200 mg per day dose.  He reports the cramps got better after discontinuation of the Vytorin but had not totally resolved.  Vytorin formally discontinued.    Review of Systems   Constitution: Negative.   HENT: Negative.    Eyes: Negative.    Cardiovascular: Negative.    Respiratory: Negative.    Endocrine: Negative.    Hematologic/Lymphatic: Negative.    Skin: Negative.    Musculoskeletal: Positive for arthritis and neck pain.   Gastrointestinal: Negative.    Genitourinary: Negative.    Neurological: Positive for numbness and paresthesias.   Psychiatric/Behavioral: Negative.    Allergic/Immunologic: Negative.        Active Problems:    Patient Active Problem List   Diagnosis   • Bilateral carotid artery plaque, 05/24/2018--16-49% bilateral carotid artery stenosis   • Benign prostatic hypertrophy   • Chronic renal insufficiency, stage IV (severe), 02/09/2016--creatinine 1.85   • Diverticulosis of colon   • Hyperlipidemia   • Osteopenia, 03/14/2017--lumbar  spine -0.3.  Left femoral neck -1.6.  Right femoral neck -2.0.   • Vitamin B12 deficiency   • Vitamin D deficiency   • Allergic rhinitis   • Anemia due to chronic kidney disease   • Generalized anxiety disorder   • Benign essential hypertension   • Gastroesophageal reflux disease without esophagitis   • Folic acid deficiency   • Multiple environmental allergies   • Therapeutic drug monitoring   • Bilateral renal cysts   • Primary osteoarthritis of knees, bilateral   • Impaired fasting glucose   • Muscle cramps, possibly statin related, specifically Vytorin.   • Hypogonadism in male, on TRT, testosterone pellets, per    • Chronic neck pain   • Left cervical radiculopathy   • Statin intolerance         Past Medical History:   Diagnosis Date   • Allergic rhinitis 2/11/2016   • Anemia due to chronic kidney disease 2/11/2016   • Benign essential hypertension 2/11/2016   • Benign prostatic hypertrophy 2/11/2016 12/21/2016--prostate biopsy reportedly negative.  I will try to obtain the report.  Patient sees urologist.  PSAs run from the 3-8 range   • Bilateral carotid artery plaque, 09/21/2016--16-49% bilateral carotid artery stenosis 2/11/2016 09/21/2016--vascular screen reveals 16-49% bilateral carotid artery stenosis, negative for AAA, negative for PAD.  10/10/2008--vascular screening study revealed mild bilateral carotid plaque, no aortic aneurysm, no evidence of PAD   • Bilateral renal cysts 2/14/2016 04/07/2016--ultrasound of the kidneys reveals the previously described renal cysts are not well seen.  However, questionable incidental bladder tumor noted.  05/26/2010--ultrasound the kidneys was negative bilaterally except for small renal cysts.   • Chronic renal insufficiency, stage IV (severe), 02/09/2016--creatinine 1.85 2/11/2016 02/09/2016--serum creatinine 1.85.  BUN 29.  07/20/2015--patient was evaluated by the nephrologist.  Ultrasound of the kidneys ordered but this was never done.   05/22/2015--BUN 35, creatinine 2.3.  Patient referred to nephrology, Dr. Devyn Muniz.  04/16/2014--BUN 25, creatinine 1.77.  01/03/2013--BUN 37, creatinine 2.14.    05/26/2010---ultrasound of the kidneys reveal a small cyst x 2 right kidney.  Otherwise normal.    Baseline creatinine approximately 1.9-2.0.   • Diverticulosis of colon 2/11/2016    10/03/2013--colonoscopy revealed markedly redundant colon, pancolonic diverticulosis with several impacted fecalith.  No evidence of diverticulitis or stricture.  Was only able to reach the ascending colon.  Follow up barium enema revealed extensive diverticulosis.  No polyp or other mucosal mass seen.    06/11/2002--incomplete colonoscopy due to redundant colon.  Not able to get past the hepatic flexure.  Patient had followup barium contrast enema which showed extensive diverticulosis.   • Folic acid deficiency 2/11/2016   • Gastroesophageal reflux disease without esophagitis 2/11/2016   • Generalized anxiety disorder 2/11/2016   • History of diverticulitis of colon 2009 and 2003 05/08/2009-acute diverticulitis without complication. 09/05/2003-acute diverticulitis without complication.    • Hyperlipidemia 2/11/2016 01/29/2005--treatment for hyperlipidemia begun.   • Hypogonadism in male, on TRT, testosterone pellets, per  6/16/2017 January 2017--patient reports his urologist initiated testosterone replacement therapy consisting of testosterone pellets every 6 months.   • Multiple environmental allergies 2/12/2016    Patient sees the allergist regularly and has been on immunotherapy.   • Osteopenia, 03/14/2017--lumbar spine -0.3.  Left femoral neck -1.6.  Right femoral neck -2.0. 2/11/2016 06/16/2017--patient seen in follow-up and reports he doesn't think he ever started Fosamax.  Osteopenia and needs to be reassessed with a DEXA scan.  03/14/2017--DEXA scan reveals lumbar spine T score -0.3.  Unchanged.  Left femoral neck T score -1.6, 6% decrease.  Right  femoral neck T score -2.0.  Unchanged.  Assessment is osteopenia with a statistically significant interval decrease in the left hip.  07/18/2014--Fosamax 70 mg per day  initiated.  Calcium 1200 mg per day.    07/07/2014--DEXA scan revealed average lumbar spine T score -0.5.  Left proximal femoral T score is zero.  Left femoral neck T score -1.3.  Right proximal femoral T score 0.1.  Right femoral neck T score -2.0.  Osteopenia.   • Primary osteoarthritis of knees, bilateral 9/12/2016 09/12/2016--patient called in the office requesting a steriod injection in his knees.  I explained to him that I do no longer perform these injections and have referred him to Dr. Manuel.   • Vitamin B12 deficiency 2/11/2016 06/25/2009--B12 injections begun.   • Vitamin D deficiency 2/11/2016         Past Surgical History:   Procedure Laterality Date   • CATARACT EXTRACTION Left 12/12/2011 12/12/2011--cataract extirpation with intraocular lens implantation left eye.   • CATARACT EXTRACTION Right 12/2011 December 2011--right cataract extirpation with intraocular lens implantation.   • COLONOSCOPY  06/11/2002 06/11/2002--incomplete colonoscopy due to redundant colon. Not able to get past the hepatic flexure. Patient had followup barium contrast enema which showed extensive diverticulosis   • COLONOSCOPY  10/03/2013    10/03/2013--colonoscopy revealed markedly redundant colon, pancolonic diverticulosis with several impacted fecalith. No evidence of diverticulitis or stricture. Was only able to reach the ascending colon. Follow up barium enema revealed extensive diverticulosis. No polyp or other mucosal mass seen.   • CYSTOSCOPY  05/18/2016 05/18/2016--urology consultation.  Cystoscopy performed for possible bladder mass is negative.   • PROSTATE BIOPSY  12/21/2016 12/21/2016--prostate biopsy reportedly negative.  I will try to obtain the report.         No Known Allergies        Current Outpatient Prescriptions:   •   "ALPRAZolam (XANAX) 0.5 MG tablet, Take 1 tablet by mouth 2 (Two) Times a Day As Needed for Anxiety., Disp: 60 tablet, Rfl: 4  •  amLODIPine (NORVASC) 5 MG tablet, Take 1 tablet by mouth every morning., Disp: , Rfl:   •  aspirin (ASPIRIN LOW DOSE) 81 MG tablet, Take 1 tablet by mouth daily., Disp: , Rfl:   •  calcitriol (ROCALTROL) 0.25 MCG capsule, 1 capsule 3 (Three) Times a Week., Disp: , Rfl:   •  Cholecalciferol (VITAMIN D3) 5000 UNITS capsule capsule, Take 1 capsule by mouth daily., Disp: , Rfl:   •  folic acid (FOLVITE) 1 MG tablet, TAKE ONE TABLET BY MOUTH TWICE A DAY, Disp: 180 tablet, Rfl: 1  •  lisinopril-hydrochlorothiazide (PRINZIDE,ZESTORETIC) 20-12.5 MG per tablet, TAKE TWO TABLETS BY MOUTH DAILY, Disp: 180 tablet, Rfl: 1  •  omeprazole (priLOSEC) 40 MG capsule, TAKE ONE CAPSULE BY MOUTH DAILY, Disp: 30 capsule, Rfl: 5  •  pitavastatin calcium (LIVALO) 2 MG tablet tablet, 1 by mouth daily for high cholesterol, Disp: 30 tablet, Rfl: 3    Current Facility-Administered Medications:   •  cyanocobalamin injection 1,000 mcg, 1,000 mcg, Intramuscular, Q30 Days, Delgado Patricia MD, 1,000 mcg at 08/02/18 1016      Family History   Problem Relation Age of Onset   • Diabetes Mother    • Heart disease Mother          Social History     Social History   • Marital status:      Spouse name: N/A   • Number of children: N/A   • Years of education: N/A     Occupational History   • Retired  [employer]     Social History Main Topics   • Smoking status: Never Smoker   • Smokeless tobacco: Never Used   • Alcohol use Yes      Comment: Moderate alcohol consumption   • Drug use: No   • Sexual activity: Yes     Partners: Female     Other Topics Concern   • Not on file     Social History Narrative   • No narrative on file         Vitals:    10/02/18 1526   BP: 122/62   Pulse: 112   SpO2: 98%   Weight: 85.3 kg (188 lb)   Height: 170.2 cm (67.01\")          Physical Exam:    General: Alert and oriented x 3.  No acute " distress.  Normal affect.  HEENT: Pupils equal, round, reactive to light; extraocular movements intact; sclerae nonicteric; pharynx, ear canals and TMs normal.  Neck: Without JVD, thyromegaly, bruit, or adenopathy.  Lungs: Clear to auscultation in all fields.  Heart: Regular rate and rhythm without murmur, rub, gallop, or click.  Abdomen: Soft, nontender, without hepatosplenomegaly or hernia.  Bowel sounds normal.  : Deferred.  Rectal: Deferred.  Extremities: Without clubbing, cyanosis, edema, or pulse deficit.  Neurologic: Intact without focal deficit.  Normal station and gait observed during ingress and egress from the examination room.  Skin: Without significant lesion.  Musculoskeletal: Unremarkable.      Lab/other results:    I reviewed the results of the cervical spine x-ray as well as an MRI of the cervical spine.  Also reviewed his latest NMR profile.    Assessment/Plan:     Diagnosis Plan   1. Chronic neck pain  Ambulatory Referral to Neurosurgery   2. Left cervical radiculopathy  Ambulatory Referral to Neurosurgery   3. Muscle cramps, possibly statin related, specifically Vytorin.     4. Statin intolerance     5. Hyperlipidemia, unspecified hyperlipidemia type  pitavastatin calcium (LIVALO) 2 MG tablet tablet   6. Bilateral carotid artery plaque, 05/24/2018--16-49% bilateral carotid artery stenosis     7. Benign essential hypertension     8. Chronic renal insufficiency, stage IV (severe), 02/09/2016--creatinine 1.85     9. Vitamin B12 deficiency       Patient presents with complaints of chronic pain in his left shoulder and left upper extremity and periodically associated with numbness and paresthesias.  Workup reveals evidence of neural foraminal compromise at multi levels and I think his symptoms are definitely related to cervical disc disease and cervical radiculopathy.  May benefit from epidural injections but this will have to be at the direction of neurosurgery.  Patient has statin intolerance,  specifically did not tolerate pravastatin or Vytorin.  Another good option would be Livalo.  This is particularly important given his carotid artery plaque and his risk for cardiovascular disease.  Blood pressure seems to be well controlled on the current regimen and his renal function has been fairly stable.  Patient has vitamin B12 deficiency and needs a B12 injection today.    Plan is as follows: Neurosurgery referral given.  Start Livalo 2 mg per day.  She has a lab and follow-up in December which he should keep.  He should contact me if he develops return of significant musculoskeletal symptoms after starting Livalo.  He should stay on CoQ10 400 mg daily with food.  Cyanocobalamin 1000 µg subcutaneous ×1 today.        Procedures

## 2018-10-30 ENCOUNTER — OFFICE VISIT (OUTPATIENT)
Dept: NEUROSURGERY | Facility: CLINIC | Age: 83
End: 2018-10-30

## 2018-10-30 VITALS
HEART RATE: 100 BPM | WEIGHT: 188.8 LBS | SYSTOLIC BLOOD PRESSURE: 136 MMHG | DIASTOLIC BLOOD PRESSURE: 83 MMHG | HEIGHT: 67 IN | BODY MASS INDEX: 29.63 KG/M2

## 2018-10-30 DIAGNOSIS — M54.12 LEFT CERVICAL RADICULOPATHY: Primary | ICD-10-CM

## 2018-10-30 PROCEDURE — 99203 OFFICE O/P NEW LOW 30 MIN: CPT | Performed by: PHYSICIAN ASSISTANT

## 2018-10-30 NOTE — PROGRESS NOTES
Subjective   Patient ID: Radha Win is a 86 y.o. male is being seen for consultation today at the request of Delgado Patricia MD for intermittent neck and left arm pain.  He denies any cause or injury.  He has not had any treatments. Mr. Win is not taking any pain medications at this time.     Neck Pain    This is a new problem. The current episode started more than 1 month ago (2-3 months ). The problem occurs intermittently. The problem has been unchanged. The pain is associated with nothing. The quality of the pain is described as aching. The pain is at a severity of 4/10. The pain is moderate. The symptoms are aggravated by position. Associated symptoms include numbness and tingling. Pertinent negatives include no weakness. He has tried nothing for the symptoms.   Arm Pain    There was no injury mechanism. The pain is present in the upper left arm. The quality of the pain is described as aching. The pain radiates to the left arm. The pain is at a severity of 4/10. The pain is moderate. The pain has been intermittent since the incident. Associated symptoms include numbness and tingling. He has tried nothing for the symptoms.       The following portions of the patient's history were reviewed and updated as appropriate: allergies, current medications, past family history, past medical history, past social history, past surgical history and problem list.    Review of Systems   Genitourinary: Negative for difficulty urinating.   Musculoskeletal: Positive for back pain, myalgias and neck pain (left arm pain ).   Neurological: Positive for tingling and numbness. Negative for weakness.   All other systems reviewed and are negative.      Objective   Physical Exam   Constitutional: He is oriented to person, place, and time. He appears well-developed and well-nourished.   HENT:   Head: Normocephalic and atraumatic.   Right Ear: External ear normal.   Left Ear: External ear normal.   Eyes: Pupils are equal,  round, and reactive to light. Conjunctivae and EOM are normal. Right eye exhibits no discharge. Left eye exhibits no discharge.   Neck: Normal range of motion. Neck supple. No tracheal deviation present.   Pulmonary/Chest: Effort normal. No stridor. No respiratory distress.   Musculoskeletal: Normal range of motion. He exhibits no edema, tenderness or deformity.   Neurological: He is alert and oriented to person, place, and time. He has normal strength and normal reflexes. He displays no atrophy, no tremor and normal reflexes. No cranial nerve deficit or sensory deficit. He exhibits normal muscle tone. He displays a negative Romberg sign. He displays no seizure activity. Coordination and gait normal.   No long tract signs   Skin: Skin is warm and dry.   Psychiatric: He has a normal mood and affect. His behavior is normal. Judgment and thought content normal.   Nursing note and vitals reviewed.    Neurologic Exam     Mental Status   Oriented to person, place, and time.     Cranial Nerves     CN III, IV, VI   Pupils are equal, round, and reactive to light.  Extraocular motions are normal.     Motor Exam     Strength   Strength 5/5 throughout.       Assessment/Plan   Independent Review of Radiographic Studies:    I did review the cervical spine MRI from September 6, 2018.  It shows multilevel degenerative disc disease.  There is multilevel facet arthropathy and mild disc bulging with moderate right foraminal narrowing at C2-C3 moderate foraminal narrowing bilaterally at C4-C5 and moderate to severe right foraminal narrowing at C5-C6.  No severe stenosis.  Medical Decision Making:    Mr. Win was referred to us by Dr. Patricia for a 3-6 month history of left shoulder and arm pain as well as arm numbness and tingling.  The symptoms began without accident or injury.  The pain is described as a deep ache in the shoulder with a tingling and mild discomfort down the arm.  He denies any weakness or gait issues or  incontinence.  He has not had any treatment at this time.  The pain is fairly constant and there are not any specific alleviating or exacerbating factors.    Review the MRI findings with the patient.  We discussed both physical therapy as well as epidural injections.  The pain has improved on its own over the last few weeks and he would like to try physical therapy first.  If that does not help then epidurals could be considered and a myelogram would be the last resort if all conservative management failed.  He will call if the physical therapy does not help.  Radha was seen today for neck pain and arm pain.    Diagnoses and all orders for this visit:    Left cervical radiculopathy  -     Ambulatory Referral to Physical Therapy Evaluate and treat (2-3 times per week for 4wks. )      Return if symptoms worsen or fail to improve.

## 2018-11-12 ENCOUNTER — HOSPITAL ENCOUNTER (OUTPATIENT)
Dept: PHYSICAL THERAPY | Facility: HOSPITAL | Age: 83
Setting detail: THERAPIES SERIES
Discharge: HOME | End: 2018-11-12

## 2018-11-12 DIAGNOSIS — M79.602 PAIN OF LEFT UPPER EXTREMITY: ICD-10-CM

## 2018-11-12 DIAGNOSIS — M54.2 CERVICAL PAIN: Primary | ICD-10-CM

## 2018-11-12 DIAGNOSIS — R20.2 TINGLING OF LEFT UPPER EXTREMITY: ICD-10-CM

## 2018-11-12 PROCEDURE — G8991 OTHER PT/OT GOAL STATUS: HCPCS | Performed by: PHYSICAL THERAPIST

## 2018-11-12 PROCEDURE — 97161 PT EVAL LOW COMPLEX 20 MIN: CPT | Performed by: PHYSICAL THERAPIST

## 2018-11-12 PROCEDURE — G8990 OTHER PT/OT CURRENT STATUS: HCPCS | Performed by: PHYSICAL THERAPIST

## 2018-11-12 PROCEDURE — 97110 THERAPEUTIC EXERCISES: CPT | Performed by: PHYSICAL THERAPIST

## 2018-11-12 PROCEDURE — 97140 MANUAL THERAPY 1/> REGIONS: CPT | Performed by: PHYSICAL THERAPIST

## 2018-11-13 NOTE — THERAPY EVALUATION
Outpatient Physical Therapy Ortho Initial Evaluation  [facility]     Patient Name: Radha Win  : 1932  MRN: [id number]  Today's Date: 2018      Visit Date: 2018    Patient Active Problem List   Diagnosis   • Bilateral carotid artery plaque, 2018--16-49% bilateral carotid artery stenosis   • Benign prostatic hypertrophy   • Chronic renal insufficiency, stage IV (severe), 2016--creatinine 1.85   • Diverticulosis of colon   • Hyperlipidemia   • Osteopenia, 2017--lumbar spine -0.3.  Left femoral neck -1.6.  Right femoral neck -2.0.   • Vitamin B12 deficiency   • Vitamin D deficiency   • Allergic rhinitis   • Anemia due to chronic kidney disease   • Generalized anxiety disorder   • Benign essential hypertension   • Gastroesophageal reflux disease without esophagitis   • Folic acid deficiency   • Multiple environmental allergies   • Therapeutic drug monitoring   • Bilateral renal cysts   • Primary osteoarthritis of knees, bilateral   • Impaired fasting glucose   • Muscle cramps, possibly statin related, specifically Vytorin.   • Hypogonadism in male, on TRT, testosterone pellets, per    • Chronic neck pain   • Left cervical radiculopathy   • Statin intolerance        Past Medical History:   Diagnosis Date   • Allergic rhinitis 2016   • Anemia due to chronic kidney disease 2016   • Benign essential hypertension 2016   • Benign prostatic hypertrophy 2016--prostate biopsy reportedly negative.  I will try to obtain the report.  Patient sees urologist.  PSAs run from the 3-8 range   • Bilateral carotid artery plaque, 2016--16-49% bilateral carotid artery stenosis 2016--vascular screen reveals 16-49% bilateral carotid artery stenosis, negative for AAA, negative for PAD.  10/10/2008--vascular screening study revealed mild bilateral carotid plaque, no aortic aneurysm, no evidence of PAD   • Bilateral renal cysts  2/14/2016 04/07/2016--ultrasound of the kidneys reveals the previously described renal cysts are not well seen.  However, questionable incidental bladder tumor noted.  05/26/2010--ultrasound the kidneys was negative bilaterally except for small renal cysts.   • Chronic renal insufficiency, stage IV (severe), 02/09/2016--creatinine 1.85 2/11/2016 02/09/2016--serum creatinine 1.85.  BUN 29.  07/20/2015--patient was evaluated by the nephrologist.  Ultrasound of the kidneys ordered but this was never done.  05/22/2015--BUN 35, creatinine 2.3.  Patient referred to nephrology, Dr. Devyn Muniz.  04/16/2014--BUN 25, creatinine 1.77.  01/03/2013--BUN 37, creatinine 2.14.    05/26/2010---ultrasound of the kidneys reveal a small cyst x 2 right kidney.  Otherwise normal.    Baseline creatinine approximately 1.9-2.0.   • Diverticulosis of colon 2/11/2016    10/03/2013--colonoscopy revealed markedly redundant colon, pancolonic diverticulosis with several impacted fecalith.  No evidence of diverticulitis or stricture.  Was only able to reach the ascending colon.  Follow up barium enema revealed extensive diverticulosis.  No polyp or other mucosal mass seen.    06/11/2002--incomplete colonoscopy due to redundant colon.  Not able to get past the hepatic flexure.  Patient had followup barium contrast enema which showed extensive diverticulosis.   • Folic acid deficiency 2/11/2016   • Gastroesophageal reflux disease without esophagitis 2/11/2016   • Generalized anxiety disorder 2/11/2016   • History of diverticulitis of colon 2009 and 2003 05/08/2009-acute diverticulitis without complication. 09/05/2003-acute diverticulitis without complication.    • Hyperlipidemia 2/11/2016 01/29/2005--treatment for hyperlipidemia begun.   • Hypogonadism in male, on TRT, testosterone pellets, per  6/16/2017 January 2017--patient reports his urologist initiated testosterone replacement therapy consisting of testosterone pellets every 6  months.   • Multiple environmental allergies 2/12/2016    Patient sees the allergist regularly and has been on immunotherapy.   • Osteopenia, 03/14/2017--lumbar spine -0.3.  Left femoral neck -1.6.  Right femoral neck -2.0. 2/11/2016 06/16/2017--patient seen in follow-up and reports he doesn't think he ever started Fosamax.  Osteopenia and needs to be reassessed with a DEXA scan.  03/14/2017--DEXA scan reveals lumbar spine T score -0.3.  Unchanged.  Left femoral neck T score -1.6, 6% decrease.  Right femoral neck T score -2.0.  Unchanged.  Assessment is osteopenia with a statistically significant interval decrease in the left hip.  07/18/2014--Fosamax 70 mg per day  initiated.  Calcium 1200 mg per day.    07/07/2014--DEXA scan revealed average lumbar spine T score -0.5.  Left proximal femoral T score is zero.  Left femoral neck T score -1.3.  Right proximal femoral T score 0.1.  Right femoral neck T score -2.0.  Osteopenia.   • Primary osteoarthritis of knees, bilateral 9/12/2016 09/12/2016--patient called in the office requesting a steriod injection in his knees.  I explained to him that I do no longer perform these injections and have referred him to Dr. Manuel.   • Vitamin B12 deficiency 2/11/2016 06/25/2009--B12 injections begun.   • Vitamin D deficiency 2/11/2016        Past Surgical History:   Procedure Laterality Date   • CATARACT EXTRACTION Left 12/12/2011 12/12/2011--cataract extirpation with intraocular lens implantation left eye.   • CATARACT EXTRACTION Right 12/2011 December 2011--right cataract extirpation with intraocular lens implantation.   • COLONOSCOPY  06/11/2002 06/11/2002--incomplete colonoscopy due to redundant colon. Not able to get past the hepatic flexure. Patient had followup barium contrast enema which showed extensive diverticulosis   • COLONOSCOPY  10/03/2013    10/03/2013--colonoscopy revealed markedly redundant colon, pancolonic diverticulosis with several impacted  "fecalith. No evidence of diverticulitis or stricture. Was only able to reach the ascending colon. Follow up barium enema revealed extensive diverticulosis. No polyp or other mucosal mass seen.   • CYSTOSCOPY  05/18/2016 05/18/2016--urology consultation.  Cystoscopy performed for possible bladder mass is negative.   • PROSTATE BIOPSY  12/21/2016 12/21/2016--prostate biopsy reportedly negative.  I will try to obtain the report.       Visit Dx:     ICD-10-CM ICD-9-CM   1. Cervical pain M54.2 723.1   2. Pain of left upper extremity M79.602 729.5   3. Tingling of left upper extremity R20.2 782.0       Patient History     Row Name 11/12/18 1100             History    Chief Complaint  Pain  -RA      Type of Pain  Neck pain;Upper Extremity / Arm  -RA      Date Current Problem(s) Began  10/30/18  -RA      Brief Description of Current Complaint  Mr. Win was referred to PT for 3-6 month history of L shoulder/ arm pain, numbness, and tingling to elbow at times.  He reports onset of symptoms without known trigger, denies any accident or injury.  He describes pain primarily as an ache in the L shoulder and down the arm with intermittent sharp shooting pains and sensation \"like arm's going to sleep\".  He has not tried meds, heat, or ice.  His pain has improved over time and is minimal at this time.  It does seem to be more bothersome at the end of the day and sometimes will wake him from sleep.  He does report applying pressure to the top of L shoulder and lying down as things that helped a little when his pain was really bad.  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  Does volunteer work  -RA      Patient seeing anyone else for problem(s)?  neurosurgery  -RA      How has patient tried to help current problem?  nothing  -RA " "     What clinical tests have you had for this problem?  X-ray;MRI  -RA      Results of Clinical Tests  arthritis, degenerative changes.  -RA         Pain     Pain Location  Shoulder;Neck shoulder blade   -RA      Pain at Present  2  -RA      Pain at Best  0  -RA      Pain at Worst  5  -RA      Pain Frequency  Intermittent  -RA      Pain Description  Aching;Sharp;Radiating to elbow \"like it was going to sleep\"  -RA      What Performance Factors Make the Current Problem(s) WORSE?  symptoms seem to be worse at end of day  -RA      What Performance Factors Make the Current Problem(s) BETTER?  lying down, relaxing   -RA      Pain Comments  comes and goes  -RA      Is your sleep disturbed?  -- occasionally will wake from sleep 2/2 pain   -RA      What position do you sleep in?  Supine;Right sidelying;Left sidelying  -RA      Difficulties at work?  retired  -RA      Difficulties with ADL's?  mild pain with reaching, lifting activities but able to perform all ADL's .   -RA         Fall Risk Assessment    Any falls in the past year:  Yes  -RA      Number of falls reported in the last 12 months  1  -RA      Factors that contributed to the fall:  Tripped off sidewalk into yard  -RA         Daily Activities    Primary Language  English  -RA      How does patient learn best?  Listening;Reading  -RA      Teaching needs identified  Home Exercise Program;Management of Condition  -RA      Patient is concerned about/has problems with  Other (comment) annoying pain   -RA      Does patient have problems with the following?  Anxiety  -RA      Explanation of Functional Status Problem  independent with pain and/or modification   -RA      Pt Participated in POC and Goals  Yes  -RA         Safety    Are you being hurt, hit, or frightened by anyone at home or in your life?  No  -RA      Are you being neglected by a caregiver  No  -RA        User Key  (r) = Recorded By, (t) = Taken By, (c) = Cosigned By    Initials Name Provider Type    RA " "Kristie Copeland, PT Physical Therapist          PT Ortho     Row Name 11/12/18 1100       Subjective Comments    Subjective Comments  Initial Evaluation   -RA       Posture/Observations    Posture/Observations Comments  FH/rounded shoulder posture  -RA       Myotomal Screen- Upper Quarter Clearing    Shoulder flexion (C5)  Bilateral:;4+ (Good +)  -RA    Elbow flexion/wrist extension (C6)  Bilateral:;4 (Good);4+ (Good +) R mildlly waker than L   -RA    Elbow extension/wrist flexion (C7)  4 (Good);4+ (Good +)  -RA    Finger flexion/ (C8)  Bilateral:;4 (Good);4+ (Good +)  -RA    Finger abduction (T1)  Bilateral:;4 (Good)  -RA       Cervical/Shoulder ROM Screen    Cervical flexion  Normal  -RA    Cervical extension  Impaired  -RA    Cervical lateral flexion  Impaired  -RA    Cervical rotation  -- functional B R slightly limited vs L   -RA    Cervical quadrant (Spurling's)  Normal  -RA       Special Tests/Palpation    Special Tests/Palpation  Cervical/Thoracic  -RA       Cervical Palpation    Cervical Palpation- Location?  Levator scapula;Upper traps;Suboccipital;Cervical facets  -RA    Suboccipital  Bilateral:;Guarded/taut  -RA    Levator Scapula  Bilateral:;Tender;Guarded/taut;Trigger point L>R  -RA    Upper Traps  Bilateral:;Guarded/taut;Tender;Trigger point L>R  -RA       Cervical Accessory Motions    Cervical Accessory Motions Tested?  Yes  -RA       Thoracic Accessory Motions    Thoracic Accessory Motions Tested?  Yes  -RA       Cervical/Thoracic Special Tests    Cervical Compression (Forarminal Compression vs. Facet Pain)  Negative  -RA    Cervical Distraction (Foraminal Compression vs. Facet Pain)  -- \"feels better\"  -RA       General ROM    GENERAL ROM COMMENTS  shoulder AROM WFL B, no increased pain   -RA       MMT (Manual Muscle Testing)    General MMT Comments  cervical isometrics grossly 4/5, weakness of deep neck flexors, postural extensors  -RA       Flexibility    Flexibility Tested?  Upper Extremity "  -RA       Upper Extremity Flexibility    UE Flexibility Comments  tightness of pecs, UT, levator, lats L>R   -RA       Pathomechanics    Spine Pathomechanics  Limited upper thoracic motion with cervical ROM  -RA      User Key  (r) = Recorded By, (t) = Taken By, (c) = Cosigned By    Initials Name Provider Type    Kristie Blake, PT Physical Therapist                            PT OP Goals     Row Name 11/12/18 1200          PT Short Term Goals    STG 1  Patient will be independent with basic HEP for posture and ROM/flexibility without increased symptoms.  -RA     STG 2  Patient will be educated on and demonstrate knowledge of optimal posture, decompression strategies, good body mechanics, and proper lifting techniques to minimize stresses to spine and associated soft tissues with daily functional activities.  -RA        Long Term Goals    LTG 1  Patient will be independent with established HEP for strength/stabilization to facilitate self management of symptoms/condition   -RA     LTG 2  Patient will report >/= 50% reduction in frequency/intensity of episodes of pain/UE symptoms.   -RA       User Key  (r) = Recorded By, (t) = Taken By, (c) = Cosigned By    Initials Name Provider Type    Kristie Blake, PT Physical Therapist          PT Assessment/Plan     Row Name 11/12/18 1302          PT Assessment    Functional Limitations  Performance in leisure activities;Other (comment);Limitation in home management interrupted sleep   -RA     Impairments  Range of motion;Pain;Posture;Impaired flexibility;Muscle strength  -RA     Assessment Comments  Mr Win is a fairly active 85yo M referred to therapy for seemingly insidious onset of neck/L shoulder/L UE symptoms (to elbow) a few months ago.  His condition is evolving.  He has comorbidity of arthritis, cervical degeneration (per report based on recent imaging).  His symptoms have improved constant/continuous to more intermittent over the past few weeks.  He  describes mild symptoms at present without specific aggravating/alleviating factors, however, does note symptoms do seem to be worse at night and lessen with lying down.  He demonstrates FH/rounded shoulder posture, functional B shoulder ROM without pain/symptoms, reduced cervical ext, lateral flexion, L>R rotation with mild discomfort, stiffness of mid/lower cervical/upper thoracic spine, good cervical/UE strength with exception of mild weakness of deep neck flexors/R wrist extensors (compared to L), taut/tender L>R UT/levator tissues, and tightness of UT/levator/anterior chest tissues.  He would benefit from skilled therapy for education, ROM/flexibility, strength/stabilization, and manual/modalities prn in an effort to resolve symptoms and facilitate self management of symptoms/condition.  -RA     Please refer to paper survey for additional self-reported information  Yes  -RA     Rehab Potential  Good  -RA     Patient/caregiver participated in establishment of treatment plan and goals  Yes  -RA     Patient would benefit from skilled therapy intervention  Yes  -RA        PT Plan    PT Frequency  1x/week to once every other week  -RA     Predicted Duration of Therapy Intervention (Therapy Eval)  3-6 visits  -RA     Planned CPT's?  PT EVAL LOW COMPLEXITY: 89315;PT NEUROMUSC RE-EDUCATION EA 15 MIN: 96553;PT MANUAL THERAPY EA 15 MIN: 12335;PT THER PROC EA 15 MIN: 59208;PT HOT OR COLD PACK TREAT MCARE;PT SELF CARE/HOME MGMT/TRAIN EA 15: 17530;PT ELECTRICAL STIM UNATTEND: ;PT ULTRASOUND EA 15 MIN: 59208;PT TRACTION CERVICAL: 48163  -RA     PT Plan Comments  Skilled PT for cervical radiculopathy to allow for full function without pain/discomfort and facilitate self management of symptoms/condition   -RA       User Key  (r) = Recorded By, (t) = Taken By, (c) = Cosigned By    Initials Name Provider Type    RA Kristie Copeland, PT Physical Therapist            Exercises     Row Name 11/12/18 1100             Subjective  Comments    Subjective Comments  Initial Evaluation   -RA         Total Minutes    02905 - PT Therapeutic Exercise Minutes  12  -RA      45336 - PT Manual Therapy Minutes  12  -RA         Exercise 1    Exercise Name 1  supine chin tucks  -RA      Cueing 1  Verbal;Demo;Tactile  -RA      Reps 1  10  -RA      Time 1  5s   -RA      Additional Comments  50% intensity  -RA         Exercise 2    Exercise Name 2  reverse shoulder rolls   -RA      Cueing 2  Verbal;Demo  -RA      Reps 2  10  -RA         Exercise 3    Exercise Name 3  scap ret/dep  -RA      Cueing 3  Verbal;Tactile;Demo  -RA      Reps 3  10  -RA      Time 3  5s  -RA         Exercise 4    Exercise Name 4  cervical isometrics  -RA         Exercise 5    Exercise Name 5  UT/levator stretches - attempted, but patient not getting appropriate stretch so discontinued for now  -RA         Exercise 6    Exercise Name 6  doorway pec stretch  -RA        User Key  (r) = Recorded By, (t) = Taken By, (c) = Cosigned By    Initials Name Provider Type    Kristie Blake, PT Physical Therapist           Manual Rx (last 36 hours)      Manual Treatments     Row Name 11/12/18 1100             Total Minutes    51361 - PT Manual Therapy Minutes  12  -RA         Manual Rx 1    Manual Rx 1 Location  cervical region  -RA      Manual Rx 1 Type  PROM, gentle distraction, PA's, STM to paraspinals, UT, levator B, passive stretching UT/levator   -RA        User Key  (r) = Recorded By, (t) = Taken By, (c) = Cosigned By    Initials Name Provider Type    Kristie Blake, PT Physical Therapist                      Outcome Measure Options: Neck Disability Index (NDI)  Neck Disability Index  Section 1 - Pain Intensity: The pain is very mild at the moment.  Section 2 - Personal Care: I can look after myself normally without causing extra pain.  Section 3 - Lifting: I can lift heavy weights without causing extra pain  Section 4 - Work: I can do as much work as I want.  Section 5 - Headaches:  I have no headaches at all.  Section 6 - Concentration: I can concentrate fully without difficulty.  Section 7 - Sleeping: My sleep is slightly disturbed for less than 1 hour.  Section 8 - Driving: I can drive my car without neck pain  Section 9 - Reading: I can read as much as I want with no neck pain.  Section 10 - Recreation: I have some neck pain with all recreational activities.  Neck Disability Index Score: 3      Time Calculation:     Therapy Suggested Charges     Code   Minutes Charges    45206 (CPT®) Hc Pt Neuromusc Re Education Ea 15 Min      82962 (CPT®) Hc Pt Ther Proc Ea 15 Min 12 1    94818 (CPT®) Hc Gait Training Ea 15 Min      64644 (CPT®) Hc Pt Therapeutic Act Ea 15 Min      45026 (CPT®) Hc Pt Manual Therapy Ea 15 Min 12 1    13455 (CPT®) Hc Pt Ther Massage- Per 15 Min      34140 (CPT®) Hc Pt Iontophoresis Ea 15 Min      51261 (CPT®) Hc Pt Elec Stim Ea-Per 15 Min      92435 (CPT®) Hc Pt Ultrasound Ea 15 Min      18298 (CPT®) Hc Pt Self Care/Mgmt/Train Ea 15 Min      62427 (CPT®) Hc Pt Prosthetic (S) Train Initial Encounter, Each 15 Min      76537 (CPT®) Hc Orthotic(S) Mgmt/Train Initial Encounter, Each 15min      12311 (CPT®) Hc Pt Aquatic Therapy Ea 15 Min      49470 (CPT®) Hc Pt Orthotic(S)/Prosthetic(S) Encounter, Each 15 Min       (CPT®) Hc Pt Electrical Stim Unattended      Total  24 2          Start Time: 1132  Stop Time: 1220  Time Calculation (min): 48 min     Therapy Charges for Today     Code Description Service Date Service Provider Modifiers Qty    46299182964 HC PT OTHER PRIME FUNCT CURRENT 11/12/2018 Kristie Copeland, PT GP, CI 1    20143932787 HC PT OTHER PRIME FUNCT PROJECTED 11/12/2018 Kristie Copeland, PT GP, CI 1    23901527716 HC PT THER PROC EA 15 MIN 11/12/2018 Kristie Copeland, PT GP 1    53686525248 HC PT MANUAL THERAPY EA 15 MIN 11/12/2018 Kristie Copeland, PT GP 1    16212226636 HC PT EVAL LOW COMPLEXITY 2 11/12/2018 Kristie Copeland, PT GP 1          PT G-Codes  PT  Professional Judgement Used?: Yes  Outcome Measure Options: Neck Disability Index (NDI)  Neck Disability Index Score: 3  Functional Limitation: Other PT primary  Other PT Primary Current Status (): At least 1 percent but less than 20 percent impaired, limited or restricted  Other PT Primary Goal Status (): At least 1 percent but less than 20 percent impaired, limited or restricted         Kristie Copeland, PT  11/12/2018

## 2018-11-21 ENCOUNTER — HOSPITAL ENCOUNTER (OUTPATIENT)
Dept: PHYSICAL THERAPY | Facility: HOSPITAL | Age: 83
Setting detail: THERAPIES SERIES
Discharge: HOME | End: 2018-11-21

## 2018-11-21 DIAGNOSIS — M54.2 CERVICAL PAIN: Primary | ICD-10-CM

## 2018-11-21 DIAGNOSIS — R20.2 TINGLING OF LEFT UPPER EXTREMITY: ICD-10-CM

## 2018-11-21 DIAGNOSIS — M79.602 PAIN OF LEFT UPPER EXTREMITY: ICD-10-CM

## 2018-11-21 PROCEDURE — 97140 MANUAL THERAPY 1/> REGIONS: CPT | Performed by: PHYSICAL THERAPIST

## 2018-11-21 PROCEDURE — 97110 THERAPEUTIC EXERCISES: CPT | Performed by: PHYSICAL THERAPIST

## 2018-11-21 NOTE — THERAPY TREATMENT NOTE
Outpatient Physical Therapy Ortho Treatment Note  [facility]     Patient Name: Radha Win  : 1932  MRN: [id number]  Today's Date: 2018      Visit Date: 2018    Visit Dx:    ICD-10-CM ICD-9-CM   1. Cervical pain M54.2 723.1   2. Pain of left upper extremity M79.602 729.5   3. Tingling of left upper extremity R20.2 782.0       Patient Active Problem List   Diagnosis   • Bilateral carotid artery plaque, 2018--16-49% bilateral carotid artery stenosis   • Benign prostatic hypertrophy   • Chronic renal insufficiency, stage IV (severe), 2016--creatinine 1.85   • Diverticulosis of colon   • Hyperlipidemia   • Osteopenia, 2017--lumbar spine -0.3.  Left femoral neck -1.6.  Right femoral neck -2.0.   • Vitamin B12 deficiency   • Vitamin D deficiency   • Allergic rhinitis   • Anemia due to chronic kidney disease   • Generalized anxiety disorder   • Benign essential hypertension   • Gastroesophageal reflux disease without esophagitis   • Folic acid deficiency   • Multiple environmental allergies   • Therapeutic drug monitoring   • Bilateral renal cysts   • Primary osteoarthritis of knees, bilateral   • Impaired fasting glucose   • Muscle cramps, possibly statin related, specifically Vytorin.   • Hypogonadism in male, on TRT, testosterone pellets, per    • Chronic neck pain   • Left cervical radiculopathy   • Statin intolerance        Past Medical History:   Diagnosis Date   • Allergic rhinitis 2016   • Anemia due to chronic kidney disease 2016   • Benign essential hypertension 2016   • Benign prostatic hypertrophy 2016--prostate biopsy reportedly negative.  I will try to obtain the report.  Patient sees urologist.  PSAs run from the 3-8 range   • Bilateral carotid artery plaque, 2016--16-49% bilateral carotid artery stenosis 2016--vascular screen reveals 16-49% bilateral carotid artery stenosis, negative for AAA,  negative for PAD.  10/10/2008--vascular screening study revealed mild bilateral carotid plaque, no aortic aneurysm, no evidence of PAD   • Bilateral renal cysts 2/14/2016 04/07/2016--ultrasound of the kidneys reveals the previously described renal cysts are not well seen.  However, questionable incidental bladder tumor noted.  05/26/2010--ultrasound the kidneys was negative bilaterally except for small renal cysts.   • Chronic renal insufficiency, stage IV (severe), 02/09/2016--creatinine 1.85 2/11/2016 02/09/2016--serum creatinine 1.85.  BUN 29.  07/20/2015--patient was evaluated by the nephrologist.  Ultrasound of the kidneys ordered but this was never done.  05/22/2015--BUN 35, creatinine 2.3.  Patient referred to nephrology, Dr. Devyn Muniz.  04/16/2014--BUN 25, creatinine 1.77.  01/03/2013--BUN 37, creatinine 2.14.    05/26/2010---ultrasound of the kidneys reveal a small cyst x 2 right kidney.  Otherwise normal.    Baseline creatinine approximately 1.9-2.0.   • Diverticulosis of colon 2/11/2016    10/03/2013--colonoscopy revealed markedly redundant colon, pancolonic diverticulosis with several impacted fecalith.  No evidence of diverticulitis or stricture.  Was only able to reach the ascending colon.  Follow up barium enema revealed extensive diverticulosis.  No polyp or other mucosal mass seen.    06/11/2002--incomplete colonoscopy due to redundant colon.  Not able to get past the hepatic flexure.  Patient had followup barium contrast enema which showed extensive diverticulosis.   • Folic acid deficiency 2/11/2016   • Gastroesophageal reflux disease without esophagitis 2/11/2016   • Generalized anxiety disorder 2/11/2016   • History of diverticulitis of colon 2009 and 2003 05/08/2009-acute diverticulitis without complication. 09/05/2003-acute diverticulitis without complication.    • Hyperlipidemia 2/11/2016 01/29/2005--treatment for hyperlipidemia begun.   • Hypogonadism in male, on TRT,  testosterone pellets, per  6/16/2017 January 2017--patient reports his urologist initiated testosterone replacement therapy consisting of testosterone pellets every 6 months.   • Multiple environmental allergies 2/12/2016    Patient sees the allergist regularly and has been on immunotherapy.   • Osteopenia, 03/14/2017--lumbar spine -0.3.  Left femoral neck -1.6.  Right femoral neck -2.0. 2/11/2016 06/16/2017--patient seen in follow-up and reports he doesn't think he ever started Fosamax.  Osteopenia and needs to be reassessed with a DEXA scan.  03/14/2017--DEXA scan reveals lumbar spine T score -0.3.  Unchanged.  Left femoral neck T score -1.6, 6% decrease.  Right femoral neck T score -2.0.  Unchanged.  Assessment is osteopenia with a statistically significant interval decrease in the left hip.  07/18/2014--Fosamax 70 mg per day  initiated.  Calcium 1200 mg per day.    07/07/2014--DEXA scan revealed average lumbar spine T score -0.5.  Left proximal femoral T score is zero.  Left femoral neck T score -1.3.  Right proximal femoral T score 0.1.  Right femoral neck T score -2.0.  Osteopenia.   • Primary osteoarthritis of knees, bilateral 9/12/2016 09/12/2016--patient called in the office requesting a steriod injection in his knees.  I explained to him that I do no longer perform these injections and have referred him to Dr. Manuel.   • Vitamin B12 deficiency 2/11/2016 06/25/2009--B12 injections begun.   • Vitamin D deficiency 2/11/2016        Past Surgical History:   Procedure Laterality Date   • CATARACT EXTRACTION Left 12/12/2011 12/12/2011--cataract extirpation with intraocular lens implantation left eye.   • CATARACT EXTRACTION Right 12/2011 December 2011--right cataract extirpation with intraocular lens implantation.   • COLONOSCOPY  06/11/2002 06/11/2002--incomplete colonoscopy due to redundant colon. Not able to get past the hepatic flexure. Patient had followup barium contrast enema which  showed extensive diverticulosis   • COLONOSCOPY  10/03/2013    10/03/2013--colonoscopy revealed markedly redundant colon, pancolonic diverticulosis with several impacted fecalith. No evidence of diverticulitis or stricture. Was only able to reach the ascending colon. Follow up barium enema revealed extensive diverticulosis. No polyp or other mucosal mass seen.   • CYSTOSCOPY  05/18/2016 05/18/2016--urology consultation.  Cystoscopy performed for possible bladder mass is negative.   • PROSTATE BIOPSY  12/21/2016 12/21/2016--prostate biopsy reportedly negative.  I will try to obtain the report.                       PT Assessment/Plan     Row Name 11/21/18 1310          PT Assessment    Assessment Comments  Patient doing well with current HEP and minimal to no symptoms.  Progressed ther ex this visit without issue during session and updated HEP.  Patient did need some verbal/tactile cuing with ther ex and will likely need review of new ex next appt.  Noted less muscle tension and reduced tenderness with manual techniques today.    -RA        PT Plan    PT Plan Comments  Continue skilled therapy for cervical radiculopathy to help reduce symptoms and facilitate self management of symptoms/condition with return to prior function.  -RA       User Key  (r) = Recorded By, (t) = Taken By, (c) = Cosigned By    Initials Name Provider Type    RA Kristie Copeland, PT Physical Therapist              Exercises     Row Name 11/21/18 1300 11/21/18 0900          Subjective Comments    Subjective Comments  --  Exercises seem to be helping.  Can feel a little something in L side of neck/shoulder when I do the chin tuck.    -RA        Subjective Pain    Able to rate subjective pain?  --  yes  -RA     Pre-Treatment Pain Level  --  0  -RA        Total Minutes    17335 - PT Therapeutic Exercise Minutes  --  25  -RA     76840 - PT Manual Therapy Minutes  15  -RA  --        Exercise 1    Exercise Name 1  --  supine chin tucks  -RA      Cueing 1  --  Verbal;Demo;Tactile  -RA     Reps 1  --  10  -RA     Time 1  --  5s   -RA     Additional Comments  --  50% intensity  -RA        Exercise 2    Exercise Name 2  --  reverse shoulder rolls   -RA     Cueing 2  --  Verbal;Demo  -RA     Reps 2  --  10  -RA        Exercise 3    Exercise Name 3  --  scap ret/dep  -RA     Cueing 3  --  Verbal;Tactile;Demo  -RA     Reps 3  --  10  -RA     Time 3  --  5s  -RA        Exercise 4    Exercise Name 4  --  cervical isometrics  -RA     Reps 4  --  5  -RA     Time 4  --  5 sec  -RA        Exercise 5    Exercise Name 5  --  --  -RA        Exercise 6    Exercise Name 6  --  doorway pec stretch  -RA     Reps 6  --  3  -RA     Time 6  --  15-20 sec  -RA        Exercise 7    Exercise Name 7  --  TB shoulder ext   -RA     Reps 7  --  10  -RA     Additional Comments  --  red TB  -RA        Exercise 8    Exercise Name 8  --  TB shoulder row   -RA     Reps 8  --  10  -RA     Additional Comments  --  red TB  -RA        Exercise 9    Exercise Name 9  --  TB supine horiz abd   -RA     Reps 9  --  10  -RA     Additional Comments  --  red TB   -RA       User Key  (r) = Recorded By, (t) = Taken By, (c) = Cosigned By    Initials Name Provider Type    Kristie Blake, PT Physical Therapist                        Manual Rx (last 36 hours)      Manual Treatments     Row Name 11/21/18 1300             Total Minutes    70207 - PT Manual Therapy Minutes  15  -RA         Manual Rx 1    Manual Rx 1 Location  cervical region  -RA      Manual Rx 1 Type  PROM, gentle distraction, PA's, STM to paraspinals, UT, levator B, passive stretching UT/levator   -RA        User Key  (r) = Recorded By, (t) = Taken By, (c) = Cosigned By    Initials Name Provider Type    Kristie Blake PT Physical Therapist              Therapy Education  Given: HEP, Symptoms/condition management, Posture/body mechanics  Program: Progressed  How Provided: Verbal, Demonstration, Written  Provided to: Patient  Level of  Understanding: Teach back education performed, Verbalized              Time Calculation:   Start Time: 0921  Stop Time: 1003  Time Calculation (min): 42 min  Therapy Suggested Charges     Code   Minutes Charges    36270 (CPT®) Hc Pt Neuromusc Re Education Ea 15 Min      38170 (CPT®) Hc Pt Ther Proc Ea 15 Min      80909 (CPT®) Hc Gait Training Ea 15 Min      00833 (CPT®) Hc Pt Therapeutic Act Ea 15 Min      15094 (CPT®) Hc Pt Manual Therapy Ea 15 Min 15 1    41057 (CPT®) Hc Pt Ther Massage- Per 15 Min      15490 (CPT®) Hc Pt Iontophoresis Ea 15 Min      03865 (CPT®) Hc Pt Elec Stim Ea-Per 15 Min      24851 (CPT®) Hc Pt Ultrasound Ea 15 Min      31515 (CPT®) Hc Pt Self Care/Mgmt/Train Ea 15 Min      94254 (CPT®) Hc Pt Prosthetic (S) Train Initial Encounter, Each 15 Min      26665 (CPT®) Hc Orthotic(S) Mgmt/Train Initial Encounter, Each 15min      06833 (CPT®) Hc Pt Aquatic Therapy Ea 15 Min      82116 (CPT®) Hc Pt Orthotic(S)/Prosthetic(S) Encounter, Each 15 Min       (CPT®) Hc Pt Electrical Stim Unattended      Total  15 1        Therapy Charges for Today     Code Description Service Date Service Provider Modifiers Qty    70556594572 HC PT MANUAL THERAPY EA 15 MIN 11/21/2018 Kristie Copeland, PT GP 1    12489567808 HC PT THER PROC EA 15 MIN 11/21/2018 Kristie Copeland, PT GP 2                    Kristie Copeland, PT  11/21/2018

## 2018-11-27 ENCOUNTER — CLINICAL SUPPORT (OUTPATIENT)
Dept: INTERNAL MEDICINE | Facility: CLINIC | Age: 83
End: 2018-11-27

## 2018-11-27 DIAGNOSIS — E53.8 VITAMIN B12 DEFICIENCY: Primary | Chronic | ICD-10-CM

## 2018-11-27 PROCEDURE — 96372 THER/PROPH/DIAG INJ SC/IM: CPT | Performed by: INTERNAL MEDICINE

## 2018-11-27 RX ORDER — CYANOCOBALAMIN 1000 UG/ML
1000 INJECTION, SOLUTION INTRAMUSCULAR; SUBCUTANEOUS
Status: DISCONTINUED | OUTPATIENT
Start: 2018-11-27 | End: 2021-07-19

## 2018-11-27 RX ADMIN — CYANOCOBALAMIN 1000 MCG: 1000 INJECTION, SOLUTION INTRAMUSCULAR; SUBCUTANEOUS at 15:52

## 2018-11-28 ENCOUNTER — HOSPITAL ENCOUNTER (OUTPATIENT)
Dept: PHYSICAL THERAPY | Facility: HOSPITAL | Age: 83
Setting detail: THERAPIES SERIES
Discharge: HOME | End: 2018-11-28

## 2018-11-28 DIAGNOSIS — M79.602 PAIN OF LEFT UPPER EXTREMITY: ICD-10-CM

## 2018-11-28 DIAGNOSIS — R20.2 TINGLING OF LEFT UPPER EXTREMITY: ICD-10-CM

## 2018-11-28 DIAGNOSIS — M54.2 CERVICAL PAIN: Primary | ICD-10-CM

## 2018-11-28 PROCEDURE — 97110 THERAPEUTIC EXERCISES: CPT | Performed by: PHYSICAL THERAPIST

## 2018-11-28 PROCEDURE — 97140 MANUAL THERAPY 1/> REGIONS: CPT | Performed by: PHYSICAL THERAPIST

## 2018-11-28 NOTE — THERAPY TREATMENT NOTE
Outpatient Physical Therapy Ortho Treatment Note  [facility]     Patient Name: Radha Win  : 1932  MRN: [id number]  Today's Date: 2018      Visit Date: 2018    Visit Dx:    ICD-10-CM ICD-9-CM   1. Cervical pain M54.2 723.1   2. Pain of left upper extremity M79.602 729.5   3. Tingling of left upper extremity R20.2 782.0       Patient Active Problem List   Diagnosis   • Bilateral carotid artery plaque, 2018--16-49% bilateral carotid artery stenosis   • Benign prostatic hypertrophy   • Chronic renal insufficiency, stage IV (severe), 2016--creatinine 1.85   • Diverticulosis of colon   • Hyperlipidemia   • Osteopenia, 2017--lumbar spine -0.3.  Left femoral neck -1.6.  Right femoral neck -2.0.   • Vitamin B12 deficiency   • Vitamin D deficiency   • Allergic rhinitis   • Anemia due to chronic kidney disease   • Generalized anxiety disorder   • Benign essential hypertension   • Gastroesophageal reflux disease without esophagitis   • Folic acid deficiency   • Multiple environmental allergies   • Therapeutic drug monitoring   • Bilateral renal cysts   • Primary osteoarthritis of knees, bilateral   • Impaired fasting glucose   • Muscle cramps, possibly statin related, specifically Vytorin.   • Hypogonadism in male, on TRT, testosterone pellets, per    • Chronic neck pain   • Left cervical radiculopathy   • Statin intolerance        Past Medical History:   Diagnosis Date   • Allergic rhinitis 2016   • Anemia due to chronic kidney disease 2016   • Benign essential hypertension 2016   • Benign prostatic hypertrophy 2016--prostate biopsy reportedly negative.  I will try to obtain the report.  Patient sees urologist.  PSAs run from the 3-8 range   • Bilateral carotid artery plaque, 2016--16-49% bilateral carotid artery stenosis 2016--vascular screen reveals 16-49% bilateral carotid artery stenosis, negative for AAA,  negative for PAD.  10/10/2008--vascular screening study revealed mild bilateral carotid plaque, no aortic aneurysm, no evidence of PAD   • Bilateral renal cysts 2/14/2016 04/07/2016--ultrasound of the kidneys reveals the previously described renal cysts are not well seen.  However, questionable incidental bladder tumor noted.  05/26/2010--ultrasound the kidneys was negative bilaterally except for small renal cysts.   • Chronic renal insufficiency, stage IV (severe), 02/09/2016--creatinine 1.85 2/11/2016 02/09/2016--serum creatinine 1.85.  BUN 29.  07/20/2015--patient was evaluated by the nephrologist.  Ultrasound of the kidneys ordered but this was never done.  05/22/2015--BUN 35, creatinine 2.3.  Patient referred to nephrology, Dr. Devyn Muniz.  04/16/2014--BUN 25, creatinine 1.77.  01/03/2013--BUN 37, creatinine 2.14.    05/26/2010---ultrasound of the kidneys reveal a small cyst x 2 right kidney.  Otherwise normal.    Baseline creatinine approximately 1.9-2.0.   • Diverticulosis of colon 2/11/2016    10/03/2013--colonoscopy revealed markedly redundant colon, pancolonic diverticulosis with several impacted fecalith.  No evidence of diverticulitis or stricture.  Was only able to reach the ascending colon.  Follow up barium enema revealed extensive diverticulosis.  No polyp or other mucosal mass seen.    06/11/2002--incomplete colonoscopy due to redundant colon.  Not able to get past the hepatic flexure.  Patient had followup barium contrast enema which showed extensive diverticulosis.   • Folic acid deficiency 2/11/2016   • Gastroesophageal reflux disease without esophagitis 2/11/2016   • Generalized anxiety disorder 2/11/2016   • History of diverticulitis of colon 2009 and 2003 05/08/2009-acute diverticulitis without complication. 09/05/2003-acute diverticulitis without complication.    • Hyperlipidemia 2/11/2016 01/29/2005--treatment for hyperlipidemia begun.   • Hypogonadism in male, on TRT,  testosterone pellets, per  6/16/2017 January 2017--patient reports his urologist initiated testosterone replacement therapy consisting of testosterone pellets every 6 months.   • Multiple environmental allergies 2/12/2016    Patient sees the allergist regularly and has been on immunotherapy.   • Osteopenia, 03/14/2017--lumbar spine -0.3.  Left femoral neck -1.6.  Right femoral neck -2.0. 2/11/2016 06/16/2017--patient seen in follow-up and reports he doesn't think he ever started Fosamax.  Osteopenia and needs to be reassessed with a DEXA scan.  03/14/2017--DEXA scan reveals lumbar spine T score -0.3.  Unchanged.  Left femoral neck T score -1.6, 6% decrease.  Right femoral neck T score -2.0.  Unchanged.  Assessment is osteopenia with a statistically significant interval decrease in the left hip.  07/18/2014--Fosamax 70 mg per day  initiated.  Calcium 1200 mg per day.    07/07/2014--DEXA scan revealed average lumbar spine T score -0.5.  Left proximal femoral T score is zero.  Left femoral neck T score -1.3.  Right proximal femoral T score 0.1.  Right femoral neck T score -2.0.  Osteopenia.   • Primary osteoarthritis of knees, bilateral 9/12/2016 09/12/2016--patient called in the office requesting a steriod injection in his knees.  I explained to him that I do no longer perform these injections and have referred him to Dr. Manuel.   • Vitamin B12 deficiency 2/11/2016 06/25/2009--B12 injections begun.   • Vitamin D deficiency 2/11/2016        Past Surgical History:   Procedure Laterality Date   • CATARACT EXTRACTION Left 12/12/2011 12/12/2011--cataract extirpation with intraocular lens implantation left eye.   • CATARACT EXTRACTION Right 12/2011 December 2011--right cataract extirpation with intraocular lens implantation.   • COLONOSCOPY  06/11/2002 06/11/2002--incomplete colonoscopy due to redundant colon. Not able to get past the hepatic flexure. Patient had followup barium contrast enema which  showed extensive diverticulosis   • COLONOSCOPY  10/03/2013    10/03/2013--colonoscopy revealed markedly redundant colon, pancolonic diverticulosis with several impacted fecalith. No evidence of diverticulitis or stricture. Was only able to reach the ascending colon. Follow up barium enema revealed extensive diverticulosis. No polyp or other mucosal mass seen.   • CYSTOSCOPY  05/18/2016 05/18/2016--urology consultation.  Cystoscopy performed for possible bladder mass is negative.   • PROSTATE BIOPSY  12/21/2016 12/21/2016--prostate biopsy reportedly negative.  I will try to obtain the report.                       PT Assessment/Plan     Row Name 11/28/18 1304          PT Assessment    Assessment Comments  Patient pleased with progress thus far.  His overall symptoms are much improved but he still notes pain with extended sitting (in hard chair) or driving 1-2 hours.  Review of posture with sitting and encouraged decompression as necessary.  Progressed reps with some ther ex and added TB B ER to home program.  -RA        PT Plan    PT Plan Comments  Patient to try independent self management for next 2-3 weeks with follow up mid/late December if needed.   -RA       User Key  (r) = Recorded By, (t) = Taken By, (c) = Cosigned By    Initials Name Provider Type    Kristie Blake, PT Physical Therapist              Exercises     Row Name 11/28/18 1300             Subjective Comments    Subjective Comments  Seems like pain starts after I've been sitting upright in hard chair or driving for 1-2 hours.  Haven't had the symptoms going down to my elbow for last 1-1/2 - 2 weeks.   -RA         Subjective Pain    Able to rate subjective pain?  yes  -RA      Pre-Treatment Pain Level  0  -RA         Total Minutes    50769 - PT Therapeutic Exercise Minutes  28  -RA      86486 - PT Manual Therapy Minutes  20  -RA         Exercise 1    Exercise Name 1  supine chin tucks  -RA      Cueing 1  Verbal;Demo;Tactile  -RA      Reps  1  10  -RA      Time 1  5s   -RA      Additional Comments  50% intensity  -RA         Exercise 2    Exercise Name 2  reverse shoulder rolls   -RA      Cueing 2  Verbal;Demo;Tactile  -RA      Reps 2  15  -RA      Additional Comments  2#, requires considerable cuing for proper performance of this ex  -RA         Exercise 3    Exercise Name 3  scap ret/dep  -RA      Cueing 3  Verbal;Tactile;Demo  -RA      Reps 3  10  -RA      Time 3  5s  -RA         Exercise 4    Exercise Name 4  cervical isometrics  -RA      Reps 4  10  -RA      Time 4  5 sec  -RA      Additional Comments  standing at wall w/ towel/ball   -RA         Exercise 5    Exercise Name 5  UBE   -RA      Time 5  4 min  -RA         Exercise 6    Exercise Name 6  doorway pec stretch  -RA      Reps 6  3  -RA      Time 6  15-20 sec  -RA         Exercise 7    Exercise Name 7  TB shoulder ext   -RA      Sets 7  2  -RA      Reps 7  10  -RA      Additional Comments  red TB   -RA         Exercise 8    Exercise Name 8  TB shoulder row   -RA      Sets 8  2  -RA      Reps 8  10  -RA      Additional Comments  red TB   -RA         Exercise 9    Exercise Name 9  TB supine horiz abd   -RA      Sets 9  2  -RA      Reps 9  10  -RA      Additional Comments  red TB   -RA         Exercise 10    Exercise Name 10  supine B ER w/ TB   -RA      Reps 10  12  -RA      Additional Comments  red  -RA        User Key  (r) = Recorded By, (t) = Taken By, (c) = Cosigned By    Initials Name Provider Type    Kristie Blake, PT Physical Therapist                        Manual Rx (last 36 hours)      Manual Treatments     Row Name 11/28/18 1300             Total Minutes    35650 - PT Manual Therapy Minutes  20  -RA         Manual Rx 1    Manual Rx 1 Location  cervical region  -RA      Manual Rx 1 Type  PROM, gentle distraction, PA's, STM to paraspinals, UT, levator B, passive stretching UT/levator, ischemic pressure release to L UT/llevator tissues  -RA        User Key  (r) = Recorded By, (t)  = Taken By, (c) = Cosigned By    Initials Name Provider Type    Kristie Blake, PT Physical Therapist          PT OP Goals     Row Name 11/28/18 1400          PT Short Term Goals    STG 1  Patient will be independent with basic HEP for posture and ROM/flexibility without increased symptoms.  -RA     STG 1 Progress  Met  -RA     STG 2  Patient will be educated on and demonstrate knowledge of optimal posture, decompression strategies, good body mechanics, and proper lifting techniques to minimize stresses to spine and associated soft tissues with daily functional activities.  -RA     STG 2 Progress  Ongoing  -RA        Long Term Goals    LTG 1  Patient will be independent with established HEP for strength/stabilization to facilitate self management of symptoms/condition   -RA     LTG 1 Progress  Progressing  -RA     LTG 2  Patient will report >/= 50% reduction in frequency/intensity of episodes of pain/UE symptoms.   -RA     LTG 2 Progress  Progressing  -RA     LTG 2 Progress Comments  Has not had symptoms going down to L elbow last couple of weeks.  Reduction in elpisodes of L neck/shoulder pain to mybe 3 x week, typically with extended sitting upright in hard chair  -RA       User Key  (r) = Recorded By, (t) = Taken By, (c) = Cosigned By    Initials Name Provider Type    Kristie Blake, PT Physical Therapist          Therapy Education  Given: HEP, Symptoms/condition management, Posture/body mechanics  Program: Reinforced, Progressed  How Provided: Verbal, Demonstration  Provided to: Patient  Level of Understanding: Teach back education performed, Verbalized, Demonstrated              Time Calculation:   Start Time: 1309  Stop Time: 1358  Time Calculation (min): 49 min  Therapy Suggested Charges     Code   Minutes Charges    52740 (CPT®) Hc Pt Neuromusc Re Education Ea 15 Min      55251 (CPT®) Hc Pt Ther Proc Ea 15 Min 28 2    81059 (CPT®) Hc Gait Training Ea 15 Min      16109 (CPT®) Hc Pt Therapeutic Act  Ea 15 Min      10801 (CPT®) Hc Pt Manual Therapy Ea 15 Min 20 1    25262 (CPT®) Hc Pt Ther Massage- Per 15 Min      55710 (CPT®) Hc Pt Iontophoresis Ea 15 Min      06653 (CPT®) Hc Pt Elec Stim Ea-Per 15 Min      56255 (CPT®) Hc Pt Ultrasound Ea 15 Min      54389 (CPT®) Hc Pt Self Care/Mgmt/Train Ea 15 Min      81815 (CPT®) Hc Pt Prosthetic (S) Train Initial Encounter, Each 15 Min      99116 (CPT®) Hc Orthotic(S) Mgmt/Train Initial Encounter, Each 15min      23332 (CPT®) Hc Pt Aquatic Therapy Ea 15 Min      27266 (CPT®) Hc Pt Orthotic(S)/Prosthetic(S) Encounter, Each 15 Min       (CPT®) Hc Pt Electrical Stim Unattended      Total  48 3        Therapy Charges for Today     Code Description Service Date Service Provider Modifiers Qty    13751694906 HC PT THER PROC EA 15 MIN 11/28/2018 Kristie Copeland, PT GP 2    98687227272 HC PT MANUAL THERAPY EA 15 MIN 11/28/2018 Kristie Copeland, PT GP 1                    Kristie Copeland, PT  11/28/2018

## 2018-12-14 DIAGNOSIS — N18.9 ANEMIA DUE TO CHRONIC KIDNEY DISEASE: Chronic | ICD-10-CM

## 2018-12-14 DIAGNOSIS — E78.5 HYPERLIPIDEMIA, UNSPECIFIED HYPERLIPIDEMIA TYPE: Chronic | ICD-10-CM

## 2018-12-14 DIAGNOSIS — D63.1 ANEMIA DUE TO CHRONIC KIDNEY DISEASE: Chronic | ICD-10-CM

## 2018-12-14 DIAGNOSIS — E55.9 VITAMIN D DEFICIENCY: Chronic | ICD-10-CM

## 2018-12-14 DIAGNOSIS — R73.01 IMPAIRED FASTING GLUCOSE: Chronic | ICD-10-CM

## 2018-12-17 LAB
25(OH)D3+25(OH)D2 SERPL-MCNC: 62.7 NG/ML (ref 30–100)
ALBUMIN SERPL-MCNC: 4.3 G/DL (ref 3.5–5.2)
ALBUMIN/GLOB SERPL: 1.9 G/DL
ALP SERPL-CCNC: 79 U/L (ref 39–117)
ALT SERPL-CCNC: 17 U/L (ref 1–41)
AST SERPL-CCNC: 19 U/L (ref 1–40)
BILIRUB SERPL-MCNC: 0.4 MG/DL (ref 0.1–1.2)
BUN SERPL-MCNC: 31 MG/DL (ref 8–23)
BUN/CREAT SERPL: 15 (ref 7–25)
CALCIUM SERPL-MCNC: 9.4 MG/DL (ref 8.6–10.5)
CHLORIDE SERPL-SCNC: 105 MMOL/L (ref 98–107)
CHOLEST SERPL-MCNC: 157 MG/DL (ref 100–199)
CK SERPL-CCNC: 102 U/L (ref 20–200)
CO2 SERPL-SCNC: 26.9 MMOL/L (ref 22–29)
CREAT SERPL-MCNC: 2.07 MG/DL (ref 0.76–1.27)
ERYTHROCYTE [DISTWIDTH] IN BLOOD BY AUTOMATED COUNT: 12.3 % (ref 11.5–14.5)
GLOBULIN SER CALC-MCNC: 2.3 GM/DL
GLUCOSE SERPL-MCNC: 118 MG/DL (ref 65–99)
HBA1C MFR BLD: 6 % (ref 4.8–5.6)
HCT VFR BLD AUTO: 35 % (ref 40.4–52.2)
HDL SERPL-SCNC: 33.7 UMOL/L
HDLC SERPL-MCNC: 41 MG/DL
HGB BLD-MCNC: 11.6 G/DL (ref 13.7–17.6)
LDL SERPL QN: 20.3 NM
LDL SERPL-SCNC: 1183 NMOL/L
LDL SMALL SERPL-SCNC: 707 NMOL/L
LDLC SERPL CALC-MCNC: 93 MG/DL (ref 0–99)
MCH RBC QN AUTO: 29.4 PG (ref 27–32.7)
MCHC RBC AUTO-ENTMCNC: 33.1 G/DL (ref 32.6–36.4)
MCV RBC AUTO: 88.8 FL (ref 79.8–96.2)
PLATELET # BLD AUTO: 208 10*3/MM3 (ref 140–500)
POTASSIUM SERPL-SCNC: 5.5 MMOL/L (ref 3.5–5.2)
PROT SERPL-MCNC: 6.6 G/DL (ref 6–8.5)
RBC # BLD AUTO: 3.94 10*6/MM3 (ref 4.6–6)
SODIUM SERPL-SCNC: 143 MMOL/L (ref 136–145)
T3FREE SERPL-MCNC: 3 PG/ML (ref 2–4.4)
T4 FREE SERPL-MCNC: 1.12 NG/DL (ref 0.93–1.7)
TRIGL SERPL-MCNC: 116 MG/DL (ref 0–149)
TSH SERPL DL<=0.005 MIU/L-ACNC: 2.76 MIU/ML (ref 0.27–4.2)
WBC # BLD AUTO: 6.01 10*3/MM3 (ref 4.5–10.7)

## 2018-12-21 ENCOUNTER — HOSPITAL ENCOUNTER (OUTPATIENT)
Dept: PHYSICAL THERAPY | Facility: HOSPITAL | Age: 83
Setting detail: THERAPIES SERIES
Discharge: HOME | End: 2018-12-21

## 2018-12-21 ENCOUNTER — OFFICE VISIT (OUTPATIENT)
Dept: INTERNAL MEDICINE | Facility: CLINIC | Age: 83
End: 2018-12-21

## 2018-12-21 VITALS
SYSTOLIC BLOOD PRESSURE: 126 MMHG | HEART RATE: 112 BPM | OXYGEN SATURATION: 98 % | HEIGHT: 67 IN | DIASTOLIC BLOOD PRESSURE: 68 MMHG | WEIGHT: 187 LBS | BODY MASS INDEX: 29.35 KG/M2

## 2018-12-21 DIAGNOSIS — R20.2 TINGLING OF LEFT UPPER EXTREMITY: ICD-10-CM

## 2018-12-21 DIAGNOSIS — N18.4 CHRONIC RENAL INSUFFICIENCY, STAGE IV (SEVERE) (HCC): Chronic | ICD-10-CM

## 2018-12-21 DIAGNOSIS — R25.2 MUSCLE CRAMPS: Primary | ICD-10-CM

## 2018-12-21 DIAGNOSIS — M54.2 CERVICAL PAIN: Primary | ICD-10-CM

## 2018-12-21 DIAGNOSIS — E55.9 VITAMIN D DEFICIENCY: Chronic | ICD-10-CM

## 2018-12-21 DIAGNOSIS — R73.01 IMPAIRED FASTING GLUCOSE: Chronic | ICD-10-CM

## 2018-12-21 DIAGNOSIS — E53.8 VITAMIN B12 DEFICIENCY: Chronic | ICD-10-CM

## 2018-12-21 DIAGNOSIS — E78.5 HYPERLIPIDEMIA, UNSPECIFIED HYPERLIPIDEMIA TYPE: Chronic | ICD-10-CM

## 2018-12-21 DIAGNOSIS — M79.602 PAIN OF LEFT UPPER EXTREMITY: ICD-10-CM

## 2018-12-21 DIAGNOSIS — Z51.81 THERAPEUTIC DRUG MONITORING: ICD-10-CM

## 2018-12-21 DIAGNOSIS — D63.1 ANEMIA DUE TO STAGE 3 CHRONIC KIDNEY DISEASE (HCC): Chronic | ICD-10-CM

## 2018-12-21 DIAGNOSIS — I10 BENIGN ESSENTIAL HYPERTENSION: Chronic | ICD-10-CM

## 2018-12-21 DIAGNOSIS — N18.30 ANEMIA DUE TO STAGE 3 CHRONIC KIDNEY DISEASE (HCC): Chronic | ICD-10-CM

## 2018-12-21 PROBLEM — G89.29 CHRONIC NECK PAIN: Chronic | Status: ACTIVE | Noted: 2018-06-28

## 2018-12-21 PROBLEM — Z78.9 STATIN INTOLERANCE: Status: ACTIVE | Noted: 2018-12-21

## 2018-12-21 PROBLEM — Z78.9 STATIN INTOLERANCE: Chronic | Status: ACTIVE | Noted: 2018-12-21

## 2018-12-21 PROCEDURE — 99214 OFFICE O/P EST MOD 30 MIN: CPT | Performed by: INTERNAL MEDICINE

## 2018-12-21 PROCEDURE — 97140 MANUAL THERAPY 1/> REGIONS: CPT | Performed by: PHYSICAL THERAPIST

## 2018-12-21 PROCEDURE — 97110 THERAPEUTIC EXERCISES: CPT | Performed by: PHYSICAL THERAPIST

## 2018-12-21 PROCEDURE — 96372 THER/PROPH/DIAG INJ SC/IM: CPT | Performed by: INTERNAL MEDICINE

## 2018-12-21 RX ORDER — CYANOCOBALAMIN 1000 UG/ML
1000 INJECTION, SOLUTION INTRAMUSCULAR; SUBCUTANEOUS ONCE
Status: COMPLETED | OUTPATIENT
Start: 2018-12-21 | End: 2018-12-21

## 2018-12-21 RX ADMIN — CYANOCOBALAMIN 1000 MCG: 1000 INJECTION, SOLUTION INTRAMUSCULAR; SUBCUTANEOUS at 10:00

## 2018-12-21 NOTE — PROGRESS NOTES
12/21/2018    Patient Information  Radha Win                                                                                          [id number]  [facility] [address]      7/26/1932  [unfilled]  There is no work phone number on file.    Chief Complaint:     Follow-up muscle cramps related to certain statins, hyperlipidemia, recent medication change, hypertension, impaired fasting glucose, chronic renal insufficiency, vitamin B12 and vitamin D deficiency, anemia of chronic renal disease.  Currently no new acute complaints.    History of Present Illness:    Patient with a history of medical problems as outlined in chief complaint presents today for a follow-up with lab prior in order to monitor his chronic medical issues.  He said problems with muscle cramps related to Vytorin and subsequently pravastatin.  We changed him to 2 mg of Livalo per day and he seems to be tolerating that was minimal muscle cramps.  His past medical history reviewed and updated where necessary including health maintenance parameters.  This reveals he is up-to-date or else accounted for.    Review of Systems   Constitution: Negative.   HENT: Negative.    Eyes: Negative.    Cardiovascular: Negative.    Respiratory: Negative.    Endocrine: Negative.    Hematologic/Lymphatic: Negative.    Skin: Negative.    Musculoskeletal: Negative.    Gastrointestinal: Negative.    Genitourinary: Negative.    Neurological: Negative.    Psychiatric/Behavioral: Negative.    Allergic/Immunologic: Negative.        Active Problems:    Patient Active Problem List   Diagnosis   • Bilateral carotid artery plaque, 05/24/2018--16-49% bilateral carotid artery stenosis   • Benign prostatic hypertrophy   • Chronic renal insufficiency, stage IV (severe), 02/09/2016--creatinine 1.85   • Diverticulosis of colon   • Hyperlipidemia   • Osteopenia, 03/14/2017--lumbar spine -0.3.  Left femoral neck -1.6.  Right femoral neck -2.0.   • Vitamin  B12 deficiency   • Vitamin D deficiency   • Allergic rhinitis   • Anemia due to chronic kidney disease   • Generalized anxiety disorder   • Benign essential hypertension   • Gastroesophageal reflux disease without esophagitis   • Folic acid deficiency   • Multiple environmental allergies   • Therapeutic drug monitoring   • Bilateral renal cysts   • Primary osteoarthritis of knees, bilateral   • Impaired fasting glucose   • Muscle cramps, statin related, Vytorin and pravastatin.  Tolerates Livalo.   • Hypogonadism in male, on TRT, testosterone pellets, per    • Chronic neck pain   • Left cervical radiculopathy         Past Medical History:   Diagnosis Date   • Allergic rhinitis 2/11/2016   • Anemia due to chronic kidney disease 2/11/2016   • Benign essential hypertension 2/11/2016   • Benign prostatic hypertrophy 2/11/2016 12/21/2016--prostate biopsy reportedly negative.  I will try to obtain the report.  Patient sees urologist.  PSAs run from the 3-8 range   • Bilateral carotid artery plaque, 09/21/2016--16-49% bilateral carotid artery stenosis 2/11/2016 09/21/2016--vascular screen reveals 16-49% bilateral carotid artery stenosis, negative for AAA, negative for PAD.  10/10/2008--vascular screening study revealed mild bilateral carotid plaque, no aortic aneurysm, no evidence of PAD   • Bilateral renal cysts 2/14/2016 04/07/2016--ultrasound of the kidneys reveals the previously described renal cysts are not well seen.  However, questionable incidental bladder tumor noted.  05/26/2010--ultrasound the kidneys was negative bilaterally except for small renal cysts.   • Chronic renal insufficiency, stage IV (severe), 02/09/2016--creatinine 1.85 2/11/2016 02/09/2016--serum creatinine 1.85.  BUN 29.  07/20/2015--patient was evaluated by the nephrologist.  Ultrasound of the kidneys ordered but this was never done.  05/22/2015--BUN 35, creatinine 2.3.  Patient referred to nephrology, Dr. Devyn Muniz.   04/16/2014--BUN 25, creatinine 1.77.  01/03/2013--BUN 37, creatinine 2.14.    05/26/2010---ultrasound of the kidneys reveal a small cyst x 2 right kidney.  Otherwise normal.    Baseline creatinine approximately 1.9-2.0.   • Diverticulosis of colon 2/11/2016    10/03/2013--colonoscopy revealed markedly redundant colon, pancolonic diverticulosis with several impacted fecalith.  No evidence of diverticulitis or stricture.  Was only able to reach the ascending colon.  Follow up barium enema revealed extensive diverticulosis.  No polyp or other mucosal mass seen.    06/11/2002--incomplete colonoscopy due to redundant colon.  Not able to get past the hepatic flexure.  Patient had followup barium contrast enema which showed extensive diverticulosis.   • Folic acid deficiency 2/11/2016   • Gastroesophageal reflux disease without esophagitis 2/11/2016   • Generalized anxiety disorder 2/11/2016   • History of diverticulitis of colon 2009 and 2003 05/08/2009-acute diverticulitis without complication. 09/05/2003-acute diverticulitis without complication.    • Hyperlipidemia 2/11/2016 01/29/2005--treatment for hyperlipidemia begun.   • Hypogonadism in male, on TRT, testosterone pellets, per  6/16/2017 January 2017--patient reports his urologist initiated testosterone replacement therapy consisting of testosterone pellets every 6 months.   • Multiple environmental allergies 2/12/2016    Patient sees the allergist regularly and has been on immunotherapy.   • Osteopenia, 03/14/2017--lumbar spine -0.3.  Left femoral neck -1.6.  Right femoral neck -2.0. 2/11/2016 06/16/2017--patient seen in follow-up and reports he doesn't think he ever started Fosamax.  Osteopenia and needs to be reassessed with a DEXA scan.  03/14/2017--DEXA scan reveals lumbar spine T score -0.3.  Unchanged.  Left femoral neck T score -1.6, 6% decrease.  Right femoral neck T score -2.0.  Unchanged.  Assessment is osteopenia with a statistically  significant interval decrease in the left hip.  07/18/2014--Fosamax 70 mg per day  initiated.  Calcium 1200 mg per day.    07/07/2014--DEXA scan revealed average lumbar spine T score -0.5.  Left proximal femoral T score is zero.  Left femoral neck T score -1.3.  Right proximal femoral T score 0.1.  Right femoral neck T score -2.0.  Osteopenia.   • Primary osteoarthritis of knees, bilateral 9/12/2016 09/12/2016--patient called in the office requesting a steriod injection in his knees.  I explained to him that I do no longer perform these injections and have referred him to Dr. Manuel.   • Vitamin B12 deficiency 2/11/2016 06/25/2009--B12 injections begun.   • Vitamin D deficiency 2/11/2016         Past Surgical History:   Procedure Laterality Date   • CATARACT EXTRACTION Left 12/12/2011 12/12/2011--cataract extirpation with intraocular lens implantation left eye.   • CATARACT EXTRACTION Right 12/2011 December 2011--right cataract extirpation with intraocular lens implantation.   • COLONOSCOPY  06/11/2002 06/11/2002--incomplete colonoscopy due to redundant colon. Not able to get past the hepatic flexure. Patient had followup barium contrast enema which showed extensive diverticulosis   • COLONOSCOPY  10/03/2013    10/03/2013--colonoscopy revealed markedly redundant colon, pancolonic diverticulosis with several impacted fecalith. No evidence of diverticulitis or stricture. Was only able to reach the ascending colon. Follow up barium enema revealed extensive diverticulosis. No polyp or other mucosal mass seen.   • CYSTOSCOPY  05/18/2016 05/18/2016--urology consultation.  Cystoscopy performed for possible bladder mass is negative.   • PROSTATE BIOPSY  12/21/2016 12/21/2016--prostate biopsy reportedly negative.  I will try to obtain the report.         No Known Allergies        Current Outpatient Medications:   •  ALPRAZolam (XANAX) 0.5 MG tablet, Take 1 tablet by mouth 2 (Two) Times a Day As Needed for  Anxiety., Disp: 60 tablet, Rfl: 4  •  amLODIPine (NORVASC) 5 MG tablet, Take 1 tablet by mouth every morning., Disp: , Rfl:   •  aspirin (ASPIRIN LOW DOSE) 81 MG tablet, Take 1 tablet by mouth daily., Disp: , Rfl:   •  calcitriol (ROCALTROL) 0.25 MCG capsule, 1 capsule 3 (Three) Times a Week., Disp: , Rfl:   •  Cholecalciferol (VITAMIN D3) 5000 UNITS capsule capsule, Take 1 capsule by mouth daily., Disp: , Rfl:   •  Coenzyme Q10 (COQ10) 400 MG capsule, Take  by mouth., Disp: , Rfl:   •  folic acid (FOLVITE) 1 MG tablet, TAKE ONE TABLET BY MOUTH TWICE A DAY, Disp: 180 tablet, Rfl: 1  •  lisinopril-hydrochlorothiazide (PRINZIDE,ZESTORETIC) 20-12.5 MG per tablet, TAKE TWO TABLETS BY MOUTH DAILY, Disp: 180 tablet, Rfl: 1  •  omeprazole (priLOSEC) 40 MG capsule, TAKE ONE CAPSULE BY MOUTH DAILY, Disp: 30 capsule, Rfl: 5  •  pitavastatin calcium (LIVALO) 2 MG tablet tablet, 1 by mouth daily for high cholesterol, Disp: 30 tablet, Rfl: 3    Current Facility-Administered Medications:   •  cyanocobalamin injection 1,000 mcg, 1,000 mcg, Intramuscular, Q28 Days, Delgado Patricia MD, 1,000 mcg at 11/27/18 1552      Family History   Problem Relation Age of Onset   • Diabetes Mother    • Heart disease Mother    • Stroke Father          Social History     Socioeconomic History   • Marital status:      Spouse name: Not on file   • Number of children: 2   • Years of education: 14   • Highest education level: Some college, no degree   Social Needs   • Financial resource strain: Not hard at all   • Food insecurity - worry: Never true   • Food insecurity - inability: Never true   • Transportation needs - medical: No   • Transportation needs - non-medical: No   Occupational History   • Occupation: Retired      Employer: [employer]   Tobacco Use   • Smoking status: Never Smoker   • Smokeless tobacco: Never Used   Substance and Sexual Activity   • Alcohol use: Yes     Frequency: 2-3 times a week     Drinks per session: 1 or 2     " Comment: Moderate alcohol consumption   • Drug use: No   • Sexual activity: Yes     Partners: Female   Other Topics Concern   • Not on file   Social History Narrative    LIVES WITH WIFE         Vitals:    12/21/18 0931   BP: 126/68   Pulse: 112   SpO2: 98%   Weight: 84.8 kg (187 lb)   Height: 170.2 cm (67.01\")          Physical Exam:    General: Alert and oriented x 3.  No acute distress.  Normal affect.  HEENT: Pupils equal, round, reactive to light; extraocular movements intact; sclerae nonicteric; pharynx, ear canals and TMs normal.  Neck: Without JVD, thyromegaly, bruit, or adenopathy.  Lungs: Clear to auscultation in all fields.  Heart: Regular rate and rhythm without murmur, rub, gallop, or click.  Abdomen: Soft, nontender, without hepatosplenomegaly or hernia.  Bowel sounds normal.  : Deferred.  Rectal: Deferred.  Extremities: Without clubbing, cyanosis, edema, or pulse deficit.  Neurologic: Intact without focal deficit.  Normal station and gait observed during ingress and egress from the examination room.  Skin: Without significant lesion.  Musculoskeletal: Unremarkable.    Lab/other results:    Total cholesterol 257.  Triglycerides 116.  LDL particle number mildly elevated 1183.  Small LDL particle number mildly elevated at 707.  HDL particle number is normal at 33.7.  CMP normal except blood sugar 118, BUN 31, creatinine 2.0.  Potassium is 5.5.  Hemoglobin low 11.6, hematocrit low at 35.0.  Hemoglobin A1c excellent at 6.0.  Thyroid function tests normal.  Vitamin D normal.  CPK normal.    Assessment/Plan:     Diagnosis Plan   1. Muscle cramps, statin related, Vytorin and pravastatin.  Tolerates Livalo.     2. Hyperlipidemia, unspecified hyperlipidemia type     3. Benign essential hypertension     4. Impaired fasting glucose     5. Chronic renal insufficiency, stage IV (severe), 02/09/2016--creatinine 1.85     6. Vitamin B12 deficiency     7. Vitamin D deficiency     8. Anemia due to stage 3 chronic " kidney disease (CMS/HCC)     9. Therapeutic drug monitoring         Patient's muscle cramps are much better after medication change.  It appears he can tolerate Livalo at 2 mg per day.  His NMR profile was not that bad.  Blood pressure seems controlled.  He has mild impaired fasting glucose that does not require medication.  His chronic renal insufficiency is fairly stable along with his hyperkalemia.  He has vitamin B12 deficiency and needs a B12 injection today.  Vitamin D is therapeutic.  His anemia is related to his chronic renal disease.    Plan is as follows: Cyanocobalamin injection given today.  No change in current medical regimen.  Patient will follow-up after 06/28/2019 with lab prior and this will be his subsequent Medicare wellness visit.      Procedures

## 2018-12-21 NOTE — THERAPY EVALUATION
Outpatient Physical Therapy Ortho Progress Note  [facility]     Patient Name: Radha Win  : 1932  MRN: [id number]  Today's Date: 2018      Visit Date: 2018    Patient Active Problem List   Diagnosis   • Bilateral carotid artery plaque, 2018--16-49% bilateral carotid artery stenosis   • Benign prostatic hypertrophy   • Chronic renal insufficiency, stage IV (severe), 2016--creatinine 1.85   • Diverticulosis of colon   • Hyperlipidemia   • Osteopenia, 2017--lumbar spine -0.3.  Left femoral neck -1.6.  Right femoral neck -2.0.   • Vitamin B12 deficiency   • Vitamin D deficiency   • Allergic rhinitis   • Anemia due to chronic kidney disease   • Generalized anxiety disorder   • Benign essential hypertension   • Gastroesophageal reflux disease without esophagitis   • Folic acid deficiency   • Multiple environmental allergies   • Therapeutic drug monitoring   • Bilateral renal cysts   • Primary osteoarthritis of knees, bilateral   • Impaired fasting glucose   • Muscle cramps, statin related, Vytorin and pravastatin.  Tolerates Livalo.   • Hypogonadism in male, on TRT, testosterone pellets, per    • Chronic neck pain   • Left cervical radiculopathy   • Statin intolerance, Vytorin and pravastatin        Past Medical History:   Diagnosis Date   • Allergic rhinitis 2016   • Anemia due to chronic kidney disease 2016   • Benign essential hypertension 2016   • Benign prostatic hypertrophy 2016--prostate biopsy reportedly negative.  I will try to obtain the report.  Patient sees urologist.  PSAs run from the 3-8 range   • Bilateral carotid artery plaque, 2016--16-49% bilateral carotid artery stenosis 2016--vascular screen reveals 16-49% bilateral carotid artery stenosis, negative for AAA, negative for PAD.  10/10/2008--vascular screening study revealed mild bilateral carotid plaque, no aortic aneurysm, no evidence of  PAD   • Bilateral renal cysts 2/14/2016 04/07/2016--ultrasound of the kidneys reveals the previously described renal cysts are not well seen.  However, questionable incidental bladder tumor noted.  05/26/2010--ultrasound the kidneys was negative bilaterally except for small renal cysts.   • Chronic renal insufficiency, stage IV (severe), 02/09/2016--creatinine 1.85 2/11/2016 02/09/2016--serum creatinine 1.85.  BUN 29.  07/20/2015--patient was evaluated by the nephrologist.  Ultrasound of the kidneys ordered but this was never done.  05/22/2015--BUN 35, creatinine 2.3.  Patient referred to nephrology, Dr. Devyn Muniz.  04/16/2014--BUN 25, creatinine 1.77.  01/03/2013--BUN 37, creatinine 2.14.    05/26/2010---ultrasound of the kidneys reveal a small cyst x 2 right kidney.  Otherwise normal.    Baseline creatinine approximately 1.9-2.0.   • Diverticulosis of colon 2/11/2016    10/03/2013--colonoscopy revealed markedly redundant colon, pancolonic diverticulosis with several impacted fecalith.  No evidence of diverticulitis or stricture.  Was only able to reach the ascending colon.  Follow up barium enema revealed extensive diverticulosis.  No polyp or other mucosal mass seen.    06/11/2002--incomplete colonoscopy due to redundant colon.  Not able to get past the hepatic flexure.  Patient had followup barium contrast enema which showed extensive diverticulosis.   • Folic acid deficiency 2/11/2016   • Gastroesophageal reflux disease without esophagitis 2/11/2016   • Generalized anxiety disorder 2/11/2016   • History of diverticulitis of colon 2009 and 2003 05/08/2009-acute diverticulitis without complication. 09/05/2003-acute diverticulitis without complication.    • Hyperlipidemia 2/11/2016 01/29/2005--treatment for hyperlipidemia begun.   • Hypogonadism in male, on TRT, testosterone pellets, per  6/16/2017 January 2017--patient reports his urologist initiated testosterone replacement therapy consisting of  testosterone pellets every 6 months.   • Multiple environmental allergies 2/12/2016    Patient sees the allergist regularly and has been on immunotherapy.   • Osteopenia, 03/14/2017--lumbar spine -0.3.  Left femoral neck -1.6.  Right femoral neck -2.0. 2/11/2016 06/16/2017--patient seen in follow-up and reports he doesn't think he ever started Fosamax.  Osteopenia and needs to be reassessed with a DEXA scan.  03/14/2017--DEXA scan reveals lumbar spine T score -0.3.  Unchanged.  Left femoral neck T score -1.6, 6% decrease.  Right femoral neck T score -2.0.  Unchanged.  Assessment is osteopenia with a statistically significant interval decrease in the left hip.  07/18/2014--Fosamax 70 mg per day  initiated.  Calcium 1200 mg per day.    07/07/2014--DEXA scan revealed average lumbar spine T score -0.5.  Left proximal femoral T score is zero.  Left femoral neck T score -1.3.  Right proximal femoral T score 0.1.  Right femoral neck T score -2.0.  Osteopenia.   • Primary osteoarthritis of knees, bilateral 9/12/2016 09/12/2016--patient called in the office requesting a steriod injection in his knees.  I explained to him that I do no longer perform these injections and have referred him to Dr. Manuel.   • Statin intolerance, Vytorin and pravastatin 12/21/2018 12/21/2018--patient seems to be tolerating Livalo well.  10/02/2018--Livalo 2 mg per day initiated.  Recommend CoQ10 400 mg per day with food for better absorption.  Reassess with lab in about 8 weeks.  08/30/2018--patient presents with complaints of muscle cramps.  He discontinue pravastatin and the cramps went away.  Cramps returned even with a half dose.  This is despite the fact he was taking    • Vitamin B12 deficiency 2/11/2016 06/25/2009--B12 injections begun.   • Vitamin D deficiency 2/11/2016        Past Surgical History:   Procedure Laterality Date   • CATARACT EXTRACTION Left 12/12/2011 12/12/2011--cataract extirpation with intraocular lens  implantation left eye.   • CATARACT EXTRACTION Right 12/2011 December 2011--right cataract extirpation with intraocular lens implantation.   • COLONOSCOPY  06/11/2002 06/11/2002--incomplete colonoscopy due to redundant colon. Not able to get past the hepatic flexure. Patient had followup barium contrast enema which showed extensive diverticulosis   • COLONOSCOPY  10/03/2013    10/03/2013--colonoscopy revealed markedly redundant colon, pancolonic diverticulosis with several impacted fecalith. No evidence of diverticulitis or stricture. Was only able to reach the ascending colon. Follow up barium enema revealed extensive diverticulosis. No polyp or other mucosal mass seen.   • CYSTOSCOPY  05/18/2016 05/18/2016--urology consultation.  Cystoscopy performed for possible bladder mass is negative.   • PROSTATE BIOPSY  12/21/2016 12/21/2016--prostate biopsy reportedly negative.  I will try to obtain the report.       Visit Dx:     ICD-10-CM ICD-9-CM   1. Cervical pain M54.2 723.1   2. Pain of left upper extremity M79.602 729.5   3. Tingling of left upper extremity R20.2 782.0                           Therapy Education  Given: HEP, Symptoms/condition management, Posture/body mechanics  Program: Reinforced  How Provided: Verbal, Demonstration  Provided to: Patient  Level of Understanding: Teach back education performed, Verbalized     PT OP Goals     Row Name 12/21/18 1500          PT Short Term Goals    STG 1  Patient will be independent with basic HEP for posture and ROM/flexibility without increased symptoms.  -RA     STG 1 Progress  Met  -RA     STG 2  Patient will be educated on and demonstrate knowledge of optimal posture, decompression strategies, good body mechanics, and proper lifting techniques to minimize stresses to spine and associated soft tissues with daily functional activities.  -RA     STG 2 Progress  Ongoing  -RA     STG 2 Progress Comments  improving awareness of posture and body mechanics but  still needs some cuing at times for scap positioning.  -RA        Long Term Goals    LTG 1  Patient will be independent with established HEP for strength/stabilization to facilitate self management of symptoms/condition   -RA     LTG 1 Progress  Progressing  -RA     LTG 1 Progress Comments  advancing postural strengthening without issue thus far  -RA     LTG 2  Patient will report >/= 50% reduction in frequency/intensity of episodes of pain/UE symptoms.   -RA     LTG 2 Progress  Partially Met  -RA     LTG 2 Progress Comments  Increased duration between episodes of symptom exacerbations but still not certain what is triggering flare ups.   -RA       User Key  (r) = Recorded By, (t) = Taken By, (c) = Cosigned By    Initials Name Provider Type    RA Kristie Copeland, PT Physical Therapist          PT Assessment/Plan     Row Name 12/21/18 131          PT Assessment    Functional Limitations  Performance in leisure activities;Other (comment);Limitation in home management  -RA     Impairments  Range of motion;Pain;Posture;Impaired flexibility;Muscle strength  -RA     Assessment Comments  Patient reports doing well for about a week and a half after last session before having symptom flare up for a few days that has since eased up.  He demonstrates improving postural awareness and has been able to progress ther ex without increased symptoms but continues to have difficulty maintaining scap retraction/depression with tendency for overactivation of UT.  He can improve this with tactile cuing.  He reports compliance with HEP without issue.  Noted reduced muscle tension with manual techniques this visit.  Patient remains appropriate for continued skilled therapy working towards transition to independent self managment in the next few month or so.  -RA     Rehab Potential  Good  -RA     Patient/caregiver participated in establishment of treatment plan and goals  Yes  -RA     Patient would benefit from skilled therapy  intervention  Yes  -RA        PT Plan    PT Frequency  1x/week to once every other week  -RA     Predicted Duration of Therapy Intervention (Therapy Eval)  2-4 additional visits  -RA     Planned CPT's?  PT THER PROC EA 15 MIN: 45721;PT NEUROMUSC RE-EDUCATION EA 15 MIN: 25053;PT MANUAL THERAPY EA 15 MIN: 45348;PT HOT OR COLD PACK TREAT MCARE;PT SELF CARE/HOME MGMT/TRAIN EA 15: 22552;PT ELECTRICAL STIM UNATTEND: ;PT ULTRASOUND EA 15 MIN: 15513;PT TRACTION CERVICAL: 09204  -RA     PT Plan Comments  Continue with skilled therapy prn working towards independent self management of symptoms/condition.  -RA       User Key  (r) = Recorded By, (t) = Taken By, (c) = Cosigned By    Initials Name Provider Type    Kristie Blake, PT Physical Therapist            Exercises     Row Name 12/21/18 1300             Subjective Comments    Subjective Comments  Did good for about a week and a half but then had 2-3 bad days, then it eased up again.    -RA         Subjective Pain    Able to rate subjective pain?  yes  -RA         Total Minutes    85612 - PT Therapeutic Exercise Minutes  28  -RA      89357 - PT Manual Therapy Minutes  20  -RA         Exercise 1    Exercise Name 1  supine chin tucks  -RA      Cueing 1  Verbal;Demo;Tactile  -RA      Reps 1  10  -RA      Time 1  5s   -RA      Additional Comments  50% intensity   -RA         Exercise 2    Exercise Name 2  reverse shoulder rolls   -RA      Cueing 2  Verbal;Demo;Tactile  -RA      Reps 2  15  -RA      Additional Comments  significant cuing but still difficulty performing correctly  -RA         Exercise 3    Exercise Name 3  scap ret/dep  -RA      Cueing 3  Verbal;Tactile;Demo  -RA      Reps 3  10  -RA      Time 3  5s  -RA         Exercise 4    Exercise Name 4  --  -RA      Reps 4  --  -RA      Time 4  --  -RA      Additional Comments  --  -RA         Exercise 5    Exercise Name 5  UBE   -RA      Time 5  4 min  -RA         Exercise 6    Exercise Name 6  doorway pec  stretch  -RA      Reps 6  3  -RA      Time 6  15-20 sec  -RA         Exercise 7    Exercise Name 7  TB shoulder ext   -RA      Cueing 7  Verbal;Tactile  -RA      Sets 7  2  -RA      Reps 7  10  -RA      Additional Comments  red TB   -RA         Exercise 8    Exercise Name 8  TB shoulder row   -RA      Cueing 8  Verbal;Tactile  -RA      Sets 8  2  -RA      Reps 8  10  -RA      Additional Comments  red TB   -RA         Exercise 9    Exercise Name 9  TB supine horiz abd   -RA      Cueing 9  Verbal;Demo  -RA      Sets 9  2  -RA      Reps 9  10  -RA      Additional Comments  red TB   -RA         Exercise 10    Exercise Name 10  supine B ER w/ TB   -RA      Cueing 10  Verbal;Demo  -RA      Sets 10  2  -RA      Reps 10  10  -RA      Additional Comments  red TB   -RA         Exercise 11    Exercise Name 11  TB D2 flexion   -RA      Reps 11  10 B  -RA      Additional Comments  yellow   -RA        User Key  (r) = Recorded By, (t) = Taken By, (c) = Cosigned By    Initials Name Provider Type    Kristie Blake, PT Physical Therapist           Manual Rx (last 36 hours)      Manual Treatments     Row Name 12/21/18 1300             Total Minutes    70229 - PT Manual Therapy Minutes  20  -RA         Manual Rx 1    Manual Rx 1 Location  cervical region  -RA      Manual Rx 1 Type  PROM, gentle distraction, PA's, STM to paraspinals, UT, levator B, passive stretching UT/levator, ischemic pressure release to L UT/llevator tissues  -RA        User Key  (r) = Recorded By, (t) = Taken By, (c) = Cosigned By    Initials Name Provider Type    Kristie Blake, PT Physical Therapist                                Time Calculation:     Therapy Suggested Charges     Code   Minutes Charges    11532 (CPT®) Hc Pt Neuromusc Re Education Ea 15 Min      79419 (CPT®) Hc Pt Ther Proc Ea 15 Min 28 2    80429 (CPT®) Hc Gait Training Ea 15 Min      72240 (CPT®) Hc Pt Therapeutic Act Ea 15 Min      43319 (CPT®) Hc Pt Manual Therapy Ea 15 Min 20 1     18992 (CPT®) Hc Pt Ther Massage- Per 15 Min      77926 (CPT®) Hc Pt Iontophoresis Ea 15 Min      66519 (CPT®) Hc Pt Elec Stim Ea-Per 15 Min      36046 (CPT®) Hc Pt Ultrasound Ea 15 Min      11363 (CPT®) Hc Pt Self Care/Mgmt/Train Ea 15 Min      42745 (CPT®) Hc Pt Prosthetic (S) Train Initial Encounter, Each 15 Min      22818 (CPT®) Hc Orthotic(S) Mgmt/Train Initial Encounter, Each 15min      20111 (CPT®) Hc Pt Aquatic Therapy Ea 15 Min      85546 (CPT®) Hc Pt Orthotic(S)/Prosthetic(S) Encounter, Each 15 Min       (CPT®) Hc Pt Electrical Stim Unattended      Total  48 3          Start Time: 1312  Stop Time: 1600  Time Calculation (min): 168 min     Therapy Charges for Today     Code Description Service Date Service Provider Modifiers Qty    63590757615 HC PT THER PROC EA 15 MIN 12/21/2018 Kristie Copeland, PT GP 2    36365983938 HC PT MANUAL THERAPY EA 15 MIN 12/21/2018 Kristie Copeland, PT GP 1                    Kristie Copeland, PT  12/21/2018

## 2019-01-04 RX ORDER — OMEPRAZOLE 40 MG/1
40 CAPSULE, DELAYED RELEASE ORAL DAILY
Qty: 30 CAPSULE | Refills: 5 | Status: SHIPPED | OUTPATIENT
Start: 2019-01-04 | End: 2019-05-20

## 2019-01-11 ENCOUNTER — HOSPITAL ENCOUNTER (OUTPATIENT)
Dept: PHYSICAL THERAPY | Facility: HOSPITAL | Age: 84
Setting detail: THERAPIES SERIES
Discharge: HOME | End: 2019-01-11

## 2019-01-11 DIAGNOSIS — M54.2 CERVICAL PAIN: Primary | ICD-10-CM

## 2019-01-11 DIAGNOSIS — M79.602 PAIN OF LEFT UPPER EXTREMITY: ICD-10-CM

## 2019-01-11 DIAGNOSIS — R20.2 TINGLING OF LEFT UPPER EXTREMITY: ICD-10-CM

## 2019-01-11 PROCEDURE — 97110 THERAPEUTIC EXERCISES: CPT | Performed by: PHYSICAL THERAPIST

## 2019-01-11 PROCEDURE — 97140 MANUAL THERAPY 1/> REGIONS: CPT | Performed by: PHYSICAL THERAPIST

## 2019-01-11 NOTE — THERAPY TREATMENT NOTE
Outpatient Physical Therapy Ortho Treatment Note  [facility]     Patient Name: Radha Win  : 1932  MRN: [id number]  Today's Date: 2019      Visit Date: 2019    Visit Dx:    ICD-10-CM ICD-9-CM   1. Cervical pain M54.2 723.1   2. Pain of left upper extremity M79.602 729.5   3. Tingling of left upper extremity R20.2 782.0       Patient Active Problem List   Diagnosis   • Bilateral carotid artery plaque, 2018--16-49% bilateral carotid artery stenosis   • Benign prostatic hypertrophy   • Chronic renal insufficiency, stage IV (severe), 2016--creatinine 1.85   • Diverticulosis of colon   • Hyperlipidemia   • Osteopenia, 2017--lumbar spine -0.3.  Left femoral neck -1.6.  Right femoral neck -2.0.   • Vitamin B12 deficiency   • Vitamin D deficiency   • Allergic rhinitis   • Anemia due to chronic kidney disease   • Generalized anxiety disorder   • Benign essential hypertension   • Gastroesophageal reflux disease without esophagitis   • Folic acid deficiency   • Multiple environmental allergies   • Therapeutic drug monitoring   • Bilateral renal cysts   • Primary osteoarthritis of knees, bilateral   • Impaired fasting glucose   • Muscle cramps, statin related, Vytorin and pravastatin.  Tolerates Livalo.   • Hypogonadism in male, on TRT, testosterone pellets, per    • Chronic neck pain   • Left cervical radiculopathy   • Statin intolerance, Vytorin and pravastatin        Past Medical History:   Diagnosis Date   • Allergic rhinitis 2016   • Anemia due to chronic kidney disease 2016   • Benign essential hypertension 2016   • Benign prostatic hypertrophy 2016--prostate biopsy reportedly negative.  I will try to obtain the report.  Patient sees urologist.  PSAs run from the 3-8 range   • Bilateral carotid artery plaque, 2016--16-49% bilateral carotid artery stenosis 2016--vascular screen reveals 16-49% bilateral carotid  artery stenosis, negative for AAA, negative for PAD.  10/10/2008--vascular screening study revealed mild bilateral carotid plaque, no aortic aneurysm, no evidence of PAD   • Bilateral renal cysts 2/14/2016 04/07/2016--ultrasound of the kidneys reveals the previously described renal cysts are not well seen.  However, questionable incidental bladder tumor noted.  05/26/2010--ultrasound the kidneys was negative bilaterally except for small renal cysts.   • Chronic renal insufficiency, stage IV (severe), 02/09/2016--creatinine 1.85 2/11/2016 02/09/2016--serum creatinine 1.85.  BUN 29.  07/20/2015--patient was evaluated by the nephrologist.  Ultrasound of the kidneys ordered but this was never done.  05/22/2015--BUN 35, creatinine 2.3.  Patient referred to nephrology, Dr. Devyn Muniz.  04/16/2014--BUN 25, creatinine 1.77.  01/03/2013--BUN 37, creatinine 2.14.    05/26/2010---ultrasound of the kidneys reveal a small cyst x 2 right kidney.  Otherwise normal.    Baseline creatinine approximately 1.9-2.0.   • Diverticulosis of colon 2/11/2016    10/03/2013--colonoscopy revealed markedly redundant colon, pancolonic diverticulosis with several impacted fecalith.  No evidence of diverticulitis or stricture.  Was only able to reach the ascending colon.  Follow up barium enema revealed extensive diverticulosis.  No polyp or other mucosal mass seen.    06/11/2002--incomplete colonoscopy due to redundant colon.  Not able to get past the hepatic flexure.  Patient had followup barium contrast enema which showed extensive diverticulosis.   • Folic acid deficiency 2/11/2016   • Gastroesophageal reflux disease without esophagitis 2/11/2016   • Generalized anxiety disorder 2/11/2016   • History of diverticulitis of colon 2009 and 2003 05/08/2009-acute diverticulitis without complication. 09/05/2003-acute diverticulitis without complication.    • Hyperlipidemia 2/11/2016 01/29/2005--treatment for hyperlipidemia begun.   •  Hypogonadism in male, on TRT, testosterone pellets, per  6/16/2017 January 2017--patient reports his urologist initiated testosterone replacement therapy consisting of testosterone pellets every 6 months.   • Multiple environmental allergies 2/12/2016    Patient sees the allergist regularly and has been on immunotherapy.   • Osteopenia, 03/14/2017--lumbar spine -0.3.  Left femoral neck -1.6.  Right femoral neck -2.0. 2/11/2016 06/16/2017--patient seen in follow-up and reports he doesn't think he ever started Fosamax.  Osteopenia and needs to be reassessed with a DEXA scan.  03/14/2017--DEXA scan reveals lumbar spine T score -0.3.  Unchanged.  Left femoral neck T score -1.6, 6% decrease.  Right femoral neck T score -2.0.  Unchanged.  Assessment is osteopenia with a statistically significant interval decrease in the left hip.  07/18/2014--Fosamax 70 mg per day  initiated.  Calcium 1200 mg per day.    07/07/2014--DEXA scan revealed average lumbar spine T score -0.5.  Left proximal femoral T score is zero.  Left femoral neck T score -1.3.  Right proximal femoral T score 0.1.  Right femoral neck T score -2.0.  Osteopenia.   • Primary osteoarthritis of knees, bilateral 9/12/2016 09/12/2016--patient called in the office requesting a steriod injection in his knees.  I explained to him that I do no longer perform these injections and have referred him to Dr. Manuel.   • Statin intolerance, Vytorin and pravastatin 12/21/2018 12/21/2018--patient seems to be tolerating Livalo well.  10/02/2018--Livalo 2 mg per day initiated.  Recommend CoQ10 400 mg per day with food for better absorption.  Reassess with lab in about 8 weeks.  08/30/2018--patient presents with complaints of muscle cramps.  He discontinue pravastatin and the cramps went away.  Cramps returned even with a half dose.  This is despite the fact he was taking    • Vitamin B12 deficiency 2/11/2016 06/25/2009--B12 injections begun.   • Vitamin D  deficiency 2/11/2016        Past Surgical History:   Procedure Laterality Date   • CATARACT EXTRACTION Left 12/12/2011 12/12/2011--cataract extirpation with intraocular lens implantation left eye.   • CATARACT EXTRACTION Right 12/2011 December 2011--right cataract extirpation with intraocular lens implantation.   • COLONOSCOPY  06/11/2002 06/11/2002--incomplete colonoscopy due to redundant colon. Not able to get past the hepatic flexure. Patient had followup barium contrast enema which showed extensive diverticulosis   • COLONOSCOPY  10/03/2013    10/03/2013--colonoscopy revealed markedly redundant colon, pancolonic diverticulosis with several impacted fecalith. No evidence of diverticulitis or stricture. Was only able to reach the ascending colon. Follow up barium enema revealed extensive diverticulosis. No polyp or other mucosal mass seen.   • CYSTOSCOPY  05/18/2016 05/18/2016--urology consultation.  Cystoscopy performed for possible bladder mass is negative.   • PROSTATE BIOPSY  12/21/2016 12/21/2016--prostate biopsy reportedly negative.  I will try to obtain the report.                       PT Assessment/Plan     Row Name 01/11/19 1325          PT Assessment    Assessment Comments  Patient reports overall doing well since last therapy session.  He notes an episode of increased symptoms over the holidays but feels it was triggered by extended driving (3 road trips >/= 1-2 hours ea way in a short time frame).  He is becoming more aware of his posture and aggravating activities and doing better with self management.  He continues to have difficulty some maintaining scap retraction/depression with tendency for overactivation of UT with some ex but can improve posture and form with verbal/tactile cuing.  He has notably less tenderness with manual techniques as well as reduction in taut bands / trigger points.  Patient progressing towards independent self-management with reduction in frequency of  therapy visits.  Plan to seen on prn basis (1-2x month) over the next 4-6 weeks to facilitate successful transition to independent self-care upon D/C.  -RA        PT Plan    PT Plan Comments  Continue with skilled therapy prn working towards independent self management of symptoms/condition   -RA       User Key  (r) = Recorded By, (t) = Taken By, (c) = Cosigned By    Initials Name Provider Type    Kristie Blake, PT Physical Therapist              Exercises     Row Name 01/11/19 1300 01/11/19 1100          Subjective Comments    Subjective Comments  --  Had a little episode over the holidays but feel like it was caused by multiple 1-2 hour trips in a short period of time.  Have noticed I start to feel it in my neck if I sit in a hard chair for > 1 hour.    -RA        Subjective Pain    Able to rate subjective pain?  --  yes  -RA     Pre-Treatment Pain Level  --  0  -RA        Total Minutes    25219 - PT Therapeutic Exercise Minutes  --  25  -RA     24145 - PT Manual Therapy Minutes  20  -RA  --        Exercise 1    Exercise Name 1  --  supine chin tucks  -RA     Cueing 1  --  Verbal;Demo;Tactile  -RA     Reps 1  --  10  -RA     Time 1  --  5s   -RA     Additional Comments  --  50% intensity  -RA        Exercise 2    Exercise Name 2  --  --  -RA     Cueing 2  --  --  -RA     Reps 2  --  --  -RA        Exercise 3    Exercise Name 3  --  scap ret/dep  -RA     Cueing 3  --  Verbal;Tactile;Demo  -RA     Reps 3  --  10  -RA     Time 3  --  5s  -RA        Exercise 5    Exercise Name 5  --  UBE   -RA     Time 5  --  3 min  -RA        Exercise 6    Exercise Name 6  --  doorway pec stretch  -RA     Cueing 6  --  Verbal;Demo  -RA     Reps 6  --  3  -RA     Time 6  --  15-20 sec  -RA        Exercise 7    Exercise Name 7  --  TB shoulder ext   -RA     Cueing 7  --  Verbal;Tactile  -RA     Sets 7  --  2  -RA     Reps 7  --  10  -RA     Additional Comments  --  red TB   -RA        Exercise 8    Exercise Name 8  --  TB  shoulder row   -RA     Cueing 8  --  Verbal;Tactile  -RA     Sets 8  --  2  -RA     Reps 8  --  10  -RA     Additional Comments  --  red TB   -RA        Exercise 9    Exercise Name 9  --  TB supine horiz abd   -RA     Cueing 9  --  Verbal;Demo  -RA     Sets 9  --  2  -RA     Reps 9  --  10  -RA     Additional Comments  --  red TB   -RA        Exercise 10    Exercise Name 10  --  supine B ER w/ TB   -RA     Cueing 10  --  Verbal;Demo  -RA     Sets 10  --  2  -RA     Reps 10  --  10  -RA     Additional Comments  --  red TB   -RA        Exercise 11    Exercise Name 11  --  TB D2 flexion   -RA     Cueing 11  --  Verbal;Demo  -RA     Reps 11  --  10 B  -RA     Additional Comments  --  yellow TB  -RA        Exercise 12    Exercise Name 12  --  TB lat pull   -RA     Cueing 12  --  Verbal;Demo;Tactile  -RA     Reps 12  --  10  -RA       User Key  (r) = Recorded By, (t) = Taken By, (c) = Cosigned By    Initials Name Provider Type    Kristie Blake, PT Physical Therapist                        Manual Rx (last 36 hours)      Manual Treatments     Row Name 01/11/19 1300             Total Minutes    66533 - PT Manual Therapy Minutes  20  -RA         Manual Rx 1    Manual Rx 1 Location  cervical region  -RA      Manual Rx 1 Type  PROM, gentle distraction, PA's, STM to paraspinals, UT, levator B, passive stretching UT/levator, ischemic pressure release to L UT/llevator tissues  -RA        User Key  (r) = Recorded By, (t) = Taken By, (c) = Cosigned By    Initials Name Provider Type    Kristie Blake, PT Physical Therapist              Therapy Education  Given: HEP, Symptoms/condition management, Posture/body mechanics  Program: Reinforced, Progressed  How Provided: Verbal, Demonstration  Provided to: Patient  Level of Understanding: Teach back education performed, Verbalized              Time Calculation:   Start Time: 1140  Stop Time: 1225  Time Calculation (min): 45 min  Total Timed Code Minutes- PT: 45  minute(s)  Therapy Suggested Charges     Code   Minutes Charges    64652 (CPT®) Hc Pt Neuromusc Re Education Ea 15 Min      92585 (CPT®) Hc Pt Ther Proc Ea 15 Min 25 2    70404 (CPT®) Hc Gait Training Ea 15 Min      26478 (CPT®) Hc Pt Therapeutic Act Ea 15 Min      35164 (CPT®) Hc Pt Manual Therapy Ea 15 Min 20 1    34063 (CPT®) Hc Pt Ther Massage- Per 15 Min      74134 (CPT®) Hc Pt Iontophoresis Ea 15 Min      93008 (CPT®) Hc Pt Elec Stim Ea-Per 15 Min      27730 (CPT®) Hc Pt Ultrasound Ea 15 Min      16363 (CPT®) Hc Pt Self Care/Mgmt/Train Ea 15 Min      59704 (CPT®) Hc Pt Prosthetic (S) Train Initial Encounter, Each 15 Min      77076 (CPT®) Hc Orthotic(S) Mgmt/Train Initial Encounter, Each 15min      48318 (CPT®) Hc Pt Aquatic Therapy Ea 15 Min      59130 (CPT®) Hc Pt Orthotic(S)/Prosthetic(S) Encounter, Each 15 Min       (CPT®) Hc Pt Electrical Stim Unattended      Total  45 3        Therapy Charges for Today     Code Description Service Date Service Provider Modifiers Qty    86645351611 HC PT THER PROC EA 15 MIN 1/11/2019 Kristie Copeland, PT GP 2    21660089726 HC PT MANUAL THERAPY EA 15 MIN 1/11/2019 Kristie Copeland, PT GP 1                    Kristie Copeland, PT  1/11/2019

## 2019-01-14 RX ORDER — LISINOPRIL AND HYDROCHLOROTHIAZIDE 20; 12.5 MG/1; MG/1
2 TABLET ORAL DAILY
Qty: 180 TABLET | Refills: 1 | Status: SHIPPED | OUTPATIENT
Start: 2019-01-14 | End: 2020-02-27

## 2019-01-29 ENCOUNTER — HOSPITAL ENCOUNTER (OUTPATIENT)
Dept: PHYSICAL THERAPY | Facility: HOSPITAL | Age: 84
Setting detail: THERAPIES SERIES
Discharge: HOME | End: 2019-01-29

## 2019-01-29 DIAGNOSIS — R20.2 TINGLING OF LEFT UPPER EXTREMITY: ICD-10-CM

## 2019-01-29 DIAGNOSIS — M54.2 CERVICAL PAIN: Primary | ICD-10-CM

## 2019-01-29 DIAGNOSIS — M79.602 PAIN OF LEFT UPPER EXTREMITY: ICD-10-CM

## 2019-01-29 PROCEDURE — 97110 THERAPEUTIC EXERCISES: CPT | Performed by: PHYSICAL THERAPIST

## 2019-01-29 PROCEDURE — 97140 MANUAL THERAPY 1/> REGIONS: CPT | Performed by: PHYSICAL THERAPIST

## 2019-01-29 NOTE — THERAPY DISCHARGE NOTE
Outpatient Physical Therapy Ortho Treatment Note/Discharge Summary  [facility]     Patient Name: Radha Win  : 1932  MRN: [id number]  Today's Date: 2019      Visit Date: 2019    Visit Dx:    ICD-10-CM ICD-9-CM   1. Cervical pain M54.2 723.1   2. Pain of left upper extremity M79.602 729.5   3. Tingling of left upper extremity R20.2 782.0       Patient Active Problem List   Diagnosis   • Bilateral carotid artery plaque, 2018--16-49% bilateral carotid artery stenosis   • Benign prostatic hypertrophy   • Chronic renal insufficiency, stage IV (severe), 2016--creatinine 1.85   • Diverticulosis of colon   • Hyperlipidemia   • Osteopenia, 2017--lumbar spine -0.3.  Left femoral neck -1.6.  Right femoral neck -2.0.   • Vitamin B12 deficiency   • Vitamin D deficiency   • Allergic rhinitis   • Anemia due to chronic kidney disease   • Generalized anxiety disorder   • Benign essential hypertension   • Gastroesophageal reflux disease without esophagitis   • Folic acid deficiency   • Multiple environmental allergies   • Therapeutic drug monitoring   • Bilateral renal cysts   • Primary osteoarthritis of knees, bilateral   • Impaired fasting glucose   • Muscle cramps, statin related, Vytorin and pravastatin.  Tolerates Livalo.   • Hypogonadism in male, on TRT, testosterone pellets, per    • Chronic neck pain   • Left cervical radiculopathy   • Statin intolerance, Vytorin and pravastatin        Past Medical History:   Diagnosis Date   • Allergic rhinitis 2016   • Anemia due to chronic kidney disease 2016   • Benign essential hypertension 2016   • Benign prostatic hypertrophy 2016--prostate biopsy reportedly negative.  I will try to obtain the report.  Patient sees urologist.  PSAs run from the 3-8 range   • Bilateral carotid artery plaque, 2016--16-49% bilateral carotid artery stenosis 2016--vascular screen reveals 16-49%  bilateral carotid artery stenosis, negative for AAA, negative for PAD.  10/10/2008--vascular screening study revealed mild bilateral carotid plaque, no aortic aneurysm, no evidence of PAD   • Bilateral renal cysts 2/14/2016 04/07/2016--ultrasound of the kidneys reveals the previously described renal cysts are not well seen.  However, questionable incidental bladder tumor noted.  05/26/2010--ultrasound the kidneys was negative bilaterally except for small renal cysts.   • Chronic renal insufficiency, stage IV (severe), 02/09/2016--creatinine 1.85 2/11/2016 02/09/2016--serum creatinine 1.85.  BUN 29.  07/20/2015--patient was evaluated by the nephrologist.  Ultrasound of the kidneys ordered but this was never done.  05/22/2015--BUN 35, creatinine 2.3.  Patient referred to nephrology, Dr. Devyn Muniz.  04/16/2014--BUN 25, creatinine 1.77.  01/03/2013--BUN 37, creatinine 2.14.    05/26/2010---ultrasound of the kidneys reveal a small cyst x 2 right kidney.  Otherwise normal.    Baseline creatinine approximately 1.9-2.0.   • Diverticulosis of colon 2/11/2016    10/03/2013--colonoscopy revealed markedly redundant colon, pancolonic diverticulosis with several impacted fecalith.  No evidence of diverticulitis or stricture.  Was only able to reach the ascending colon.  Follow up barium enema revealed extensive diverticulosis.  No polyp or other mucosal mass seen.    06/11/2002--incomplete colonoscopy due to redundant colon.  Not able to get past the hepatic flexure.  Patient had followup barium contrast enema which showed extensive diverticulosis.   • Folic acid deficiency 2/11/2016   • Gastroesophageal reflux disease without esophagitis 2/11/2016   • Generalized anxiety disorder 2/11/2016   • History of diverticulitis of colon 2009 and 2003 05/08/2009-acute diverticulitis without complication. 09/05/2003-acute diverticulitis without complication.    • Hyperlipidemia 2/11/2016 01/29/2005--treatment for hyperlipidemia  begun.   • Hypogonadism in male, on TRT, testosterone pellets, per  6/16/2017 January 2017--patient reports his urologist initiated testosterone replacement therapy consisting of testosterone pellets every 6 months.   • Multiple environmental allergies 2/12/2016    Patient sees the allergist regularly and has been on immunotherapy.   • Osteopenia, 03/14/2017--lumbar spine -0.3.  Left femoral neck -1.6.  Right femoral neck -2.0. 2/11/2016 06/16/2017--patient seen in follow-up and reports he doesn't think he ever started Fosamax.  Osteopenia and needs to be reassessed with a DEXA scan.  03/14/2017--DEXA scan reveals lumbar spine T score -0.3.  Unchanged.  Left femoral neck T score -1.6, 6% decrease.  Right femoral neck T score -2.0.  Unchanged.  Assessment is osteopenia with a statistically significant interval decrease in the left hip.  07/18/2014--Fosamax 70 mg per day  initiated.  Calcium 1200 mg per day.    07/07/2014--DEXA scan revealed average lumbar spine T score -0.5.  Left proximal femoral T score is zero.  Left femoral neck T score -1.3.  Right proximal femoral T score 0.1.  Right femoral neck T score -2.0.  Osteopenia.   • Primary osteoarthritis of knees, bilateral 9/12/2016 09/12/2016--patient called in the office requesting a steriod injection in his knees.  I explained to him that I do no longer perform these injections and have referred him to Dr. Manuel.   • Statin intolerance, Vytorin and pravastatin 12/21/2018 12/21/2018--patient seems to be tolerating Livalo well.  10/02/2018--Livalo 2 mg per day initiated.  Recommend CoQ10 400 mg per day with food for better absorption.  Reassess with lab in about 8 weeks.  08/30/2018--patient presents with complaints of muscle cramps.  He discontinue pravastatin and the cramps went away.  Cramps returned even with a half dose.  This is despite the fact he was taking    • Vitamin B12 deficiency 2/11/2016 06/25/2009--B12 injections begun.   • Vitamin  D deficiency 2/11/2016        Past Surgical History:   Procedure Laterality Date   • CATARACT EXTRACTION Left 12/12/2011 12/12/2011--cataract extirpation with intraocular lens implantation left eye.   • CATARACT EXTRACTION Right 12/2011 December 2011--right cataract extirpation with intraocular lens implantation.   • COLONOSCOPY  06/11/2002 06/11/2002--incomplete colonoscopy due to redundant colon. Not able to get past the hepatic flexure. Patient had followup barium contrast enema which showed extensive diverticulosis   • COLONOSCOPY  10/03/2013    10/03/2013--colonoscopy revealed markedly redundant colon, pancolonic diverticulosis with several impacted fecalith. No evidence of diverticulitis or stricture. Was only able to reach the ascending colon. Follow up barium enema revealed extensive diverticulosis. No polyp or other mucosal mass seen.   • CYSTOSCOPY  05/18/2016 05/18/2016--urology consultation.  Cystoscopy performed for possible bladder mass is negative.   • PROSTATE BIOPSY  12/21/2016 12/21/2016--prostate biopsy reportedly negative.  I will try to obtain the report.                       PT Assessment/Plan     Row Name 01/29/19 0569          PT Assessment    Assessment Comments  Patient has progressed well with therapy with overall reducation in frequency/intensity of episodes of neck/UE symptoms.  He demonstrates improved postural awareness as well as awareness of positioning/activities that tend to trigger symptoms and how to alleviate symptoms.  He is independent with established HEP for postural strength/stability and is agreeable with plan for D/C to independent self management at this time.  -RA        PT Plan    PT Plan Comments  D/C to independent self management   -RA       User Key  (r) = Recorded By, (t) = Taken By, (c) = Cosigned By    Initials Name Provider Type    Kristie Blake, PT Physical Therapist              Exercises     Row Name 01/29/19 1408 01/29/19 2671           Subjective Comments    Subjective Comments  --  Still get occasional episodes of pain.  Do you treat knees   -RA        Subjective Pain    Able to rate subjective pain?  --  yes  -RA     Pre-Treatment Pain Level  --  0  -RA        Total Minutes    79507 - PT Therapeutic Exercise Minutes  --  -RA  33 including time spent on education and answering patient ?s  -RA     04340 - PT Manual Therapy Minutes  15  -RA  --        Exercise 1    Exercise Name 1  --  supine chin tucks  -RA     Cueing 1  --  Verbal;Demo;Tactile  -RA     Reps 1  --  10  -RA     Time 1  --  5s   -RA     Additional Comments  --  50% intensity  -RA        Exercise 3    Exercise Name 3  --  scap ret/dep  -RA     Cueing 3  --  Verbal;Tactile;Demo  -RA     Reps 3  --  10  -RA     Time 3  --  5s  -RA        Exercise 5    Exercise Name 5  --  UBE   -RA     Time 5  --  4 min  -RA        Exercise 6    Exercise Name 6  --  doorway pec stretch  -RA     Cueing 6  --  Verbal;Demo  -RA     Reps 6  --  3  -RA     Time 6  --  15-20 sec  -RA        Exercise 7    Exercise Name 7  --  TB shoulder ext   -RA     Cueing 7  --  Verbal;Tactile  -RA     Sets 7  --  2  -RA     Reps 7  --  10  -RA     Additional Comments  --  red TB   -RA        Exercise 8    Exercise Name 8  --  TB shoulder row   -RA     Cueing 8  --  Verbal;Tactile  -RA     Sets 8  --  2  -RA     Reps 8  --  10  -RA     Additional Comments  --  red TB  -RA        Exercise 9    Exercise Name 9  --  TB horiz abd standing w/ back against wall   -RA     Cueing 9  --  Verbal;Demo  -RA     Sets 9  --  2  -RA     Reps 9  --  10  -RA     Additional Comments  --  red TB   -RA        Exercise 10    Exercise Name 10  --  B ER w/ TB standing with back against wall  -RA     Cueing 10  --  Verbal;Demo  -RA     Sets 10  --  2  -RA     Reps 10  --  10  -RA     Additional Comments  --  red TB  -RA        Exercise 11    Exercise Name 11  --  TB D2 flexion   -RA     Cueing 11  --  Verbal;Demo  -RA     Reps 11  --  15  -RA      Additional Comments  --  yellow TB  -RA        Exercise 12    Exercise Name 12  --  TB lat pull (seated - better form)  -RA     Cueing 12  --  Verbal;Demo;Tactile  -RA     Reps 12  --  2 x 10  -RA     Additional Comments  --  red TB   -RA       User Key  (r) = Recorded By, (t) = Taken By, (c) = Cosigned By    Initials Name Provider Type    KATHE Edy Copelandmaggi MARTÍNEZ, PT Physical Therapist                        Manual Rx (last 36 hours)      Manual Treatments     Row Name 01/29/19 1404             Manual Rx 1    Manual Rx 1 Location  cervical region  -RA      Manual Rx 1 Type  PROM, gentle distraction, PA's, STM to paraspinals, UT, levator B, passive stretching UT/levator, ischemic pressure release to L UT/llevator tissues  -RA        User Key  (r) = Recorded By, (t) = Taken By, (c) = Cosigned By    Initials Name Provider Type    KATHE Edy Copelnadmaggi MARTÍNEZ, PT Physical Therapist          PT OP Goals     Row Name 01/29/19 1400          PT Short Term Goals    STG 1  Patient will be independent with basic HEP for posture and ROM/flexibility without increased symptoms.  -RA     STG 1 Progress  Met  -RA     STG 2  Patient will be educated on and demonstrate knowledge of optimal posture, decompression strategies, good body mechanics, and proper lifting techniques to minimize stresses to spine and associated soft tissues with daily functional activities.  -RA     STG 2 Progress  Met  -RA     STG 2 Progress Comments  Patient verbalizes understanding and demonstrates improved awareness of posture   -RA        Long Term Goals    LTG 1  Patient will be independent with established HEP for strength/stabilization to facilitate self management of symptoms/condition   -RA     LTG 1 Progress  Met  -RA     LTG 1 Progress Comments  Patient has printout of ex and TB to continue on his own   -RA     LTG 2  Patient will report >/= 50% reduction in frequency/intensity of episodes of pain/UE symptoms.   -RA     LTG 2 Progress  Met  -RA     LTG 2  Progress Comments  overall much improved, only occasional episodes of pain but usually can figure out what triggered it.   -RA       User Key  (r) = Recorded By, (t) = Taken By, (c) = Cosigned By    Initials Name Provider Type    Kristie Blake, PT Physical Therapist          Therapy Education  Given: HEP, Symptoms/condition management, Posture/body mechanics  Program: Reinforced  How Provided: Verbal, Demonstration  Provided to: Patient  Level of Understanding: Teach back education performed, Verbalized              Time Calculation:   Start Time: 1402  Stop Time: 1450  Time Calculation (min): 48 min  Therapy Suggested Charges     Code   Minutes Charges    76097 (CPT®) Hc Pt Neuromusc Re Education Ea 15 Min      79309 (CPT®) Hc Pt Ther Proc Ea 15 Min 33 2    39006 (CPT®) Hc Gait Training Ea 15 Min      34671 (CPT®) Hc Pt Therapeutic Act Ea 15 Min      27920 (CPT®) Hc Pt Manual Therapy Ea 15 Min 15 1    91139 (CPT®) Hc Pt Ther Massage- Per 15 Min      10078 (CPT®) Hc Pt Iontophoresis Ea 15 Min      46461 (CPT®) Hc Pt Elec Stim Ea-Per 15 Min      93223 (CPT®) Hc Pt Ultrasound Ea 15 Min      18686 (CPT®) Hc Pt Self Care/Mgmt/Train Ea 15 Min      53828 (CPT®) Hc Pt Prosthetic (S) Train Initial Encounter, Each 15 Min      59712 (CPT®) Hc Orthotic(S) Mgmt/Train Initial Encounter, Each 15min      72639 (CPT®) Hc Pt Aquatic Therapy Ea 15 Min      34970 (CPT®) Hc Pt Orthotic(S)/Prosthetic(S) Encounter, Each 15 Min       (CPT®) Hc Pt Electrical Stim Unattended      Total  48 3        Therapy Charges for Today     Code Description Service Date Service Provider Modifiers Qty    58210265484 HC PT THER PROC EA 15 MIN 1/29/2019 Kristie Copeland, PT GP 2    93579999222 HC PT MANUAL THERAPY EA 15 MIN 1/29/2019 Kristie Copeland, PT GP 1                OP PT Discharge Summary  Date of Discharge: 01/29/19  Reason for Discharge: Independent  Outcomes Achieved: Able to achieve all goals within established timeline  Discharge  Destination: Home with home program  Discharge Instructions/Additional Comments: Patient to continue with HEP as instructed and call if he has questions/concerns.        Kristie Copeland, PT  1/29/2019

## 2019-02-06 ENCOUNTER — CLINICAL SUPPORT (OUTPATIENT)
Dept: INTERNAL MEDICINE | Facility: CLINIC | Age: 84
End: 2019-02-06

## 2019-02-06 DIAGNOSIS — E53.8 VITAMIN B12 DEFICIENCY: Primary | Chronic | ICD-10-CM

## 2019-02-06 PROCEDURE — 96372 THER/PROPH/DIAG INJ SC/IM: CPT | Performed by: INTERNAL MEDICINE

## 2019-02-06 RX ADMIN — CYANOCOBALAMIN 1000 MCG: 1000 INJECTION, SOLUTION INTRAMUSCULAR; SUBCUTANEOUS at 15:17

## 2019-03-08 ENCOUNTER — TELEPHONE (OUTPATIENT)
Dept: NEUROSURGERY | Facility: CLINIC | Age: 84
End: 2019-03-08

## 2019-03-08 ENCOUNTER — CLINICAL SUPPORT (OUTPATIENT)
Dept: INTERNAL MEDICINE | Facility: CLINIC | Age: 84
End: 2019-03-08

## 2019-03-08 DIAGNOSIS — M54.12 LEFT CERVICAL RADICULOPATHY: Primary | ICD-10-CM

## 2019-03-08 DIAGNOSIS — E53.8 VITAMIN B12 DEFICIENCY: Primary | Chronic | ICD-10-CM

## 2019-03-08 PROCEDURE — 96372 THER/PROPH/DIAG INJ SC/IM: CPT | Performed by: INTERNAL MEDICINE

## 2019-03-08 RX ADMIN — CYANOCOBALAMIN 1000 MCG: 1000 INJECTION, SOLUTION INTRAMUSCULAR; SUBCUTANEOUS at 14:05

## 2019-03-08 NOTE — TELEPHONE ENCOUNTER
Pt called wanting to know if he can try another few weeks of physical therapy before coming back into the office. Pt states that he had his last PT visit on 1/29. I explained to the patient that he may have to come to the office before we can send the referral over, pt stated he understands. Please call and advise.

## 2019-03-11 NOTE — TELEPHONE ENCOUNTER
Spoke with pt he is still having upper back and neck pain.  He completed PT 5 weeks ago. He would like to have 3 more weeks of PT because it has helped but pain is gradually returning. He said if that does not help he will come in to discuss having and MRI.

## 2019-03-27 ENCOUNTER — HOSPITAL ENCOUNTER (OUTPATIENT)
Dept: PHYSICAL THERAPY | Facility: HOSPITAL | Age: 84
Setting detail: THERAPIES SERIES
Discharge: HOME | End: 2019-03-27

## 2019-03-27 DIAGNOSIS — M54.12 LEFT CERVICAL RADICULOPATHY: ICD-10-CM

## 2019-03-27 DIAGNOSIS — M54.2 CERVICAL PAIN: Primary | ICD-10-CM

## 2019-03-27 DIAGNOSIS — M79.602 PAIN OF LEFT UPPER EXTREMITY: ICD-10-CM

## 2019-03-27 PROCEDURE — 97161 PT EVAL LOW COMPLEX 20 MIN: CPT

## 2019-03-27 PROCEDURE — 97110 THERAPEUTIC EXERCISES: CPT

## 2019-03-28 NOTE — THERAPY EVALUATION
Outpatient Physical Therapy Ortho Initial Evaluation  [facility]     Patient Name: Radha Win  : 1932  MRN: [id number]  Today's Date: 3/27/2019      Visit Date: 2019    Patient Active Problem List   Diagnosis   • Bilateral carotid artery plaque, 2018--16-49% bilateral carotid artery stenosis   • Benign prostatic hypertrophy   • Chronic renal insufficiency, stage IV (severe), 2016--creatinine 1.85   • Diverticulosis of colon   • Hyperlipidemia   • Osteopenia, 2017--lumbar spine -0.3.  Left femoral neck -1.6.  Right femoral neck -2.0.   • Vitamin B12 deficiency   • Vitamin D deficiency   • Allergic rhinitis   • Anemia due to chronic kidney disease   • Generalized anxiety disorder   • Benign essential hypertension   • Gastroesophageal reflux disease without esophagitis   • Folic acid deficiency   • Multiple environmental allergies   • Therapeutic drug monitoring   • Bilateral renal cysts   • Primary osteoarthritis of knees, bilateral   • Impaired fasting glucose   • Muscle cramps, statin related, Vytorin and pravastatin.  Tolerates Livalo.   • Hypogonadism in male, on TRT, testosterone pellets, per    • Chronic neck pain   • Left cervical radiculopathy   • Statin intolerance, Vytorin and pravastatin        Past Medical History:   Diagnosis Date   • Allergic rhinitis 2016   • Anemia due to chronic kidney disease 2016   • Benign essential hypertension 2016   • Benign prostatic hypertrophy 2016--prostate biopsy reportedly negative.  I will try to obtain the report.  Patient sees urologist.  PSAs run from the 3-8 range   • Bilateral carotid artery plaque, 2016--16-49% bilateral carotid artery stenosis 2016--vascular screen reveals 16-49% bilateral carotid artery stenosis, negative for AAA, negative for PAD.  10/10/2008--vascular screening study revealed mild bilateral carotid plaque, no aortic aneurysm, no evidence  of PAD   • Bilateral renal cysts 2/14/2016 04/07/2016--ultrasound of the kidneys reveals the previously described renal cysts are not well seen.  However, questionable incidental bladder tumor noted.  05/26/2010--ultrasound the kidneys was negative bilaterally except for small renal cysts.   • Chronic renal insufficiency, stage IV (severe), 02/09/2016--creatinine 1.85 2/11/2016 02/09/2016--serum creatinine 1.85.  BUN 29.  07/20/2015--patient was evaluated by the nephrologist.  Ultrasound of the kidneys ordered but this was never done.  05/22/2015--BUN 35, creatinine 2.3.  Patient referred to nephrology, Dr. Devyn Muniz.  04/16/2014--BUN 25, creatinine 1.77.  01/03/2013--BUN 37, creatinine 2.14.    05/26/2010---ultrasound of the kidneys reveal a small cyst x 2 right kidney.  Otherwise normal.    Baseline creatinine approximately 1.9-2.0.   • Diverticulosis of colon 2/11/2016    10/03/2013--colonoscopy revealed markedly redundant colon, pancolonic diverticulosis with several impacted fecalith.  No evidence of diverticulitis or stricture.  Was only able to reach the ascending colon.  Follow up barium enema revealed extensive diverticulosis.  No polyp or other mucosal mass seen.    06/11/2002--incomplete colonoscopy due to redundant colon.  Not able to get past the hepatic flexure.  Patient had followup barium contrast enema which showed extensive diverticulosis.   • Folic acid deficiency 2/11/2016   • Gastroesophageal reflux disease without esophagitis 2/11/2016   • Generalized anxiety disorder 2/11/2016   • History of diverticulitis of colon 2009 and 2003 05/08/2009-acute diverticulitis without complication. 09/05/2003-acute diverticulitis without complication.    • Hyperlipidemia 2/11/2016 01/29/2005--treatment for hyperlipidemia begun.   • Hypogonadism in male, on TRT, testosterone pellets, per  6/16/2017 January 2017--patient reports his urologist initiated testosterone replacement therapy consisting  of testosterone pellets every 6 months.   • Multiple environmental allergies 2/12/2016    Patient sees the allergist regularly and has been on immunotherapy.   • Osteopenia, 03/14/2017--lumbar spine -0.3.  Left femoral neck -1.6.  Right femoral neck -2.0. 2/11/2016 06/16/2017--patient seen in follow-up and reports he doesn't think he ever started Fosamax.  Osteopenia and needs to be reassessed with a DEXA scan.  03/14/2017--DEXA scan reveals lumbar spine T score -0.3.  Unchanged.  Left femoral neck T score -1.6, 6% decrease.  Right femoral neck T score -2.0.  Unchanged.  Assessment is osteopenia with a statistically significant interval decrease in the left hip.  07/18/2014--Fosamax 70 mg per day  initiated.  Calcium 1200 mg per day.    07/07/2014--DEXA scan revealed average lumbar spine T score -0.5.  Left proximal femoral T score is zero.  Left femoral neck T score -1.3.  Right proximal femoral T score 0.1.  Right femoral neck T score -2.0.  Osteopenia.   • Primary osteoarthritis of knees, bilateral 9/12/2016 09/12/2016--patient called in the office requesting a steriod injection in his knees.  I explained to him that I do no longer perform these injections and have referred him to Dr. Manuel.   • Statin intolerance, Vytorin and pravastatin 12/21/2018 12/21/2018--patient seems to be tolerating Livalo well.  10/02/2018--Livalo 2 mg per day initiated.  Recommend CoQ10 400 mg per day with food for better absorption.  Reassess with lab in about 8 weeks.  08/30/2018--patient presents with complaints of muscle cramps.  He discontinue pravastatin and the cramps went away.  Cramps returned even with a half dose.  This is despite the fact he was taking    • Vitamin B12 deficiency 2/11/2016 06/25/2009--B12 injections begun.   • Vitamin D deficiency 2/11/2016        Past Surgical History:   Procedure Laterality Date   • CATARACT EXTRACTION Left 12/12/2011 12/12/2011--cataract extirpation with intraocular lens  implantation left eye.   • CATARACT EXTRACTION Right 12/2011 December 2011--right cataract extirpation with intraocular lens implantation.   • COLONOSCOPY  06/11/2002 06/11/2002--incomplete colonoscopy due to redundant colon. Not able to get past the hepatic flexure. Patient had followup barium contrast enema which showed extensive diverticulosis   • COLONOSCOPY  10/03/2013    10/03/2013--colonoscopy revealed markedly redundant colon, pancolonic diverticulosis with several impacted fecalith. No evidence of diverticulitis or stricture. Was only able to reach the ascending colon. Follow up barium enema revealed extensive diverticulosis. No polyp or other mucosal mass seen.   • CYSTOSCOPY  05/18/2016 05/18/2016--urology consultation.  Cystoscopy performed for possible bladder mass is negative.   • PROSTATE BIOPSY  12/21/2016 12/21/2016--prostate biopsy reportedly negative.  I will try to obtain the report.       Visit Dx:     ICD-10-CM ICD-9-CM   1. Cervical pain M54.2 723.1   2. Pain of left upper extremity M79.602 729.5   3. Left cervical radiculopathy M54.12 723.4         Patient History     Row Name 03/27/19 1400             History    Chief Complaint  Pain  -REILLY      Type of Pain  Neck pain;Upper Extremity / Arm  -REILLY      Brief Description of Current Complaint  Had PT before.  Currently, when driving [facility], 30 minutes in it hurts, L shoulder/neck junction.  Sitting in  a restaurant after 30 min it bothers him. Turning head L hurts on L, no problem with R.  Had a soreness in L chest but it went away.  He was worried about cancer-- worried because he has never had surgery or a problem.  Pain wakes him at night.  He has been continuing the HEP from before.  Rates pain 5-6/10 at night.  Driving pain is rated 6-7/10. Drives to [email or web address] a couple times a month.  -REILLY      Patient/Caregiver Goals  Relieve pain;Return to prior level of function  -ERILLY      Hand Dominance  right-handed  -REILLY       Occupation/sports/leisure activities  retired, worked in [employer] and for [employer]. Retired 10 years ago.  Does volunteer work  -JA      Patient seeing anyone else for problem(s)?  neurosurgery  -JA      How has patient tried to help current problem?  nothing  -JA      What clinical tests have you had for this problem?  X-ray;MRI  -JA      Results of Clinical Tests  arthritis, degenerative changes.  -JA         Pain     Pain Location  Shoulder;Neck L upper trap  -JA      Pain at Present  2  -JA      Pain at Best  0  -JA      Pain at Worst  7  -JA      Pain Frequency  Intermittent  -JA      Tolerance Time- Sitting  30 min pain begins but he can cont to sit with the pain  -JA      Is your sleep disturbed?  Yes  -JA         Fall Risk Assessment    Any falls in the past year:  Yes  -JA      Number of falls reported in the last 12 months  1  -JA      Factors that contributed to the fall:  Tripped  -JA         Daily Activities    Primary Language  English  -JA      How does patient learn best?  Listening;Reading  -JA      Teaching needs identified  Home Exercise Program;Management of Condition  -JA      Patient is concerned about/has problems with  Other (comment)  -JA      Does patient have problems with the following?  Anxiety  -JA      Explanation of Functional Status Problem  independent with pain and/or modification   -JA      Pt Participated in POC and Goals  Yes  -JA         Safety    Are you being hurt, hit, or frightened by anyone at home or in your life?  No  -JA      Are you being neglected by a caregiver  No  -JA        User Key  (r) = Recorded By, (t) = Taken By, (c) = Cosigned By    Initials Name Provider Type    Yu Ceron, PT Physical Therapist          PT Ortho     Row Name 03/27/19 1500       Cervical/Shoulder ROM Screen    Cervical flexion  Normal provokes his pain more than any other mvt  -JA    Cervical extension  Impaired triggers L pain but not as much as flex  -JA    Cervical  lateral flexion  Impaired soreness  -JA    Cervical rotation  Impaired ipsilateral pain with L rotation 50%, R 75% no pain  -JA       Special Tests/Palpation    Special Tests/Palpation  Cervical/Thoracic;Shoulder  -JA       Cervical Palpation    Cervical Palpation- Location?  Middle traps;Upper traps;Rhomboids;Lower traps  -JA    Suboccipital  Bilateral: mildly tender  -JA    Levator Scapula  Left:;Trigger point;Guarded/taut;Tender  -JA    Upper Traps  Left:;Guarded/taut;Trigger point;Tender  -JA    Middle Traps  Left:;Tender  -JA    Rhomboids  Left:;Tender  -JA       Shoulder Impingement/Rotator Cuff Special Tests    Bailey-Galindo Test (RC Lesion vs. Bursitis)  Left:;Negative  -JA    Neer Impingement Test (RC Lesion vs. Bursitis)  Left:;Negative  -JA       MMT (Manual Muscle Testing)    Rt Upper Ext  Rt Shoulder WFL  -JA    Lt Upper Ext  Lt Shoulder Flexion;Lt Shoulder ABduction;Lt Shoulder Horizontal ABduction;Lt Shoulder Internal Rotation;Lt Shoulder External Rotation;Lt Scapular ADduction & Depression;Lt Elbow Extension;Lt Elbow Flexion;Comments  -JA       MMT Left Upper Ext    Lt Shoulder Flexion MMT, Gross Movement  (3+/5) fair plus  -JA    Lt Shoulder ABduction MMT, Gross Movement  (3+/5) fair plus  -JA    Lt Shoulder Horizontal ABduction MMT, Gross Movement  (3/5) fair  -JA    Lt Shoulder Internal Rotation MMT, Gross Movement  (4-/5) good minus  -JA    Lt Shoulder External Rotation MMT, Gross Movement  (4-/5) good minus  -JA    Lt Scapular ADduction & Depression MMT, Gross Movement  (3/5) fair  -JA    Lt Elbow Flexion MMT, Gross Movement  (4+/5) good plus  -JA    Lt Elbow Extension MMT, Gross Movement  (4+/5) good plus  -JA         User Key  (r) = Recorded By, (t) = Taken By, (c) = Cosigned By    Initials Name Provider Type    Yu Ceron, PT Physical Therapist                     03/27/19 1500   PT Short Term Goals   STG Date to Achieve 04/11/19   STG 1 Pt will be independent with initial HEP.    STG 1 Progress New   STG 2 Pt will demonstrate correct postural alignment in sitting and standing ( mg head/neck/shoulder/scap) to reduce strain.   STG 2 Progress New   Long Term Goals   LTG Date to Achieve 04/27/19   LTG 1 Pt will be independent with advanced HEP for shoulder and scapular strengthening.   LTG 1 Progress New   LTG 2 Pt will be able to drive for 30 minutes with no more than 2/10 pain/discomfort.   LTG 2 Progress New   LTG 3 Patient will report >/= 50% reduction in frequency/intensity of episodes of pain/UE symptoms.    LTG 3 Progress New   Time Calculation   PT Goal Re-Cert Due Date 06/27/19 03/27/19 1600   PT Assessment   Functional Limitations Limitations in community activities;Performance in leisure activities;Performance in self-care ADL;Limitation in home management   Impairments Pain;Range of motion;Posture;Poor body mechanics;Muscle strength;Endurance   Assessment Comments Mr. Radha Win is a 86 y.o. male referred to outpatient physical therapy for evaluation and treatment of left cervical radiculopathy. He was seen previously for same/similar symptoms with good result however he has had an exacerbation of pain.  Patient presents with c/o pain near L cervicothoracic junction with tenderness to palpation in mid-distal levator scap, upper trapezius, rhomboids muscles, moderate weakness in parascapular/upper back muscles, mild weakness in shoulder flexion and abduction. He has forward head posture, rounded shoulders.  He cannot sit longer than 30 minutes without onset of pain. Pertinent comorbidities and personal factors that may affect progress include, but are not limited to, osteoarthritis and HTN.  This condition is evolving. Recommend skilled PT to address functional deficits. Thank you for this referral.   Please refer to paper survey for additional self-reported information Yes   Rehab Potential Good   Patient/caregiver participated in establishment of treatment plan and  goals Yes   Patient would benefit from skilled therapy intervention Yes   PT Plan   PT Frequency 1x/week;2x/week   Predicted Duration of Therapy Intervention (Therapy Eval) 4 weeks   Planned CPT's? PT EVAL LOW COMPLEXITY: 14312;PT THER PROC EA 15 MIN: 11661;PT THER ACT EA 15 MIN: 28411;PT MANUAL THERAPY EA 15 MIN: 77604;PT NEUROMUSC RE-EDUCATION EA 15 MIN: 60422;PT SELF CARE/HOME MGMT/TRAIN EA 15: 64755;PT HOT OR COLD PACK TREAT MCARE;PT ELECTRICAL STIM UNATTEND: ;PT ULTRASOUND EA 15 MIN: 21575   PT Plan Comments review HEP, add manual work for scapular mobility, lift-tilt, PNF pro-retraction in SL, posture education, add chin tuck in standing and supine as able; may consider modalities          03/27/19 1500   Total Minutes   31334 - PT Therapeutic Exercise Minutes 20   Exercise 1   Exercise Name 1 doorway pec stretch   Reps 1 3   Time 1 20 sec   Additional Comments pain-free stretch, moderate intensity at most   Exercise 2   Exercise Name 2 supine serratus ant punch   Reps 2 10   Exercise 3   Exercise Name 3 supine D2   Reps 3 10   Exercise 4   Exercise Name 4 Scapular depression w/adduction   Cueing 4 Verbal;Tactile;Demo   Reps 4 10   Additional Comments gave tactile cues/resistance   Exercise 5   Exercise Name 5 maurilio scap ret   Reps 5 10   Time 5 3 sec               03/27/19 1500   Therapy Education   Education Details Initial HEP instruction.  Discussed his weakness in scapular stabilizers and poor posture.   Given HEP;Symptoms/condition management;Posture/body mechanics   Program New   How Provided Verbal;Demonstration;Written   Provided to Patient   Level of Understanding Teach back education performed                   Time Calculation:     Start Time: 1445  Stop Time: 1530  Time Calculation (min): 45 min     Therapy Charges for Today     Code Description Service Date Service Provider Modifiers Qty    53999721587 HC PT THER PROC EA 15 MIN 3/27/2019 Yu Garcia, PT GP 1    05946911144 HC PT EVAL  LOW COMPLEXITY 2 3/27/2019 Yu Garcia, PT GP 1                    Yu Garcia, PT  3/27/2019

## 2019-03-29 ENCOUNTER — APPOINTMENT (OUTPATIENT)
Dept: PHYSICAL THERAPY | Facility: HOSPITAL | Age: 84
End: 2019-03-29

## 2019-04-05 ENCOUNTER — HOSPITAL ENCOUNTER (OUTPATIENT)
Dept: PHYSICAL THERAPY | Facility: HOSPITAL | Age: 84
Setting detail: THERAPIES SERIES
Discharge: HOME | End: 2019-04-05

## 2019-04-05 DIAGNOSIS — M79.602 PAIN OF LEFT UPPER EXTREMITY: ICD-10-CM

## 2019-04-05 DIAGNOSIS — M54.12 LEFT CERVICAL RADICULOPATHY: ICD-10-CM

## 2019-04-05 DIAGNOSIS — M54.2 CERVICAL PAIN: Primary | ICD-10-CM

## 2019-04-05 PROCEDURE — 97110 THERAPEUTIC EXERCISES: CPT | Performed by: PHYSICAL THERAPIST

## 2019-04-05 PROCEDURE — 97140 MANUAL THERAPY 1/> REGIONS: CPT | Performed by: PHYSICAL THERAPIST

## 2019-04-05 NOTE — THERAPY TREATMENT NOTE
Outpatient Physical Therapy Ortho Treatment Note  [facility]     Patient Name: Radha Win  : 1932  MRN: [id number]  Today's Date: 2019      Visit Date: 2019    Visit Dx:    ICD-10-CM ICD-9-CM   1. Cervical pain M54.2 723.1   2. Pain of left upper extremity M79.602 729.5   3. Left cervical radiculopathy M54.12 723.4       Patient Active Problem List   Diagnosis   • Bilateral carotid artery plaque, 2018--16-49% bilateral carotid artery stenosis   • Benign prostatic hypertrophy   • Chronic renal insufficiency, stage IV (severe), 2016--creatinine 1.85   • Diverticulosis of colon   • Hyperlipidemia   • Osteopenia, 2017--lumbar spine -0.3.  Left femoral neck -1.6.  Right femoral neck -2.0.   • Vitamin B12 deficiency   • Vitamin D deficiency   • Allergic rhinitis   • Anemia due to chronic kidney disease   • Generalized anxiety disorder   • Benign essential hypertension   • Gastroesophageal reflux disease without esophagitis   • Folic acid deficiency   • Multiple environmental allergies   • Therapeutic drug monitoring   • Bilateral renal cysts   • Primary osteoarthritis of knees, bilateral   • Impaired fasting glucose   • Muscle cramps, statin related, Vytorin and pravastatin.  Tolerates Livalo.   • Hypogonadism in male, on TRT, testosterone pellets, per    • Chronic neck pain   • Left cervical radiculopathy   • Statin intolerance, Vytorin and pravastatin        Past Medical History:   Diagnosis Date   • Allergic rhinitis 2016   • Anemia due to chronic kidney disease 2016   • Benign essential hypertension 2016   • Benign prostatic hypertrophy 2016--prostate biopsy reportedly negative.  I will try to obtain the report.  Patient sees urologist.  PSAs run from the 3-8 range   • Bilateral carotid artery plaque, 2016--16-49% bilateral carotid artery stenosis 2016--vascular screen reveals 16-49% bilateral carotid artery  stenosis, negative for AAA, negative for PAD.  10/10/2008--vascular screening study revealed mild bilateral carotid plaque, no aortic aneurysm, no evidence of PAD   • Bilateral renal cysts 2/14/2016 04/07/2016--ultrasound of the kidneys reveals the previously described renal cysts are not well seen.  However, questionable incidental bladder tumor noted.  05/26/2010--ultrasound the kidneys was negative bilaterally except for small renal cysts.   • Chronic renal insufficiency, stage IV (severe), 02/09/2016--creatinine 1.85 2/11/2016 02/09/2016--serum creatinine 1.85.  BUN 29.  07/20/2015--patient was evaluated by the nephrologist.  Ultrasound of the kidneys ordered but this was never done.  05/22/2015--BUN 35, creatinine 2.3.  Patient referred to nephrology, Dr. Devyn Muniz.  04/16/2014--BUN 25, creatinine 1.77.  01/03/2013--BUN 37, creatinine 2.14.    05/26/2010---ultrasound of the kidneys reveal a small cyst x 2 right kidney.  Otherwise normal.    Baseline creatinine approximately 1.9-2.0.   • Diverticulosis of colon 2/11/2016    10/03/2013--colonoscopy revealed markedly redundant colon, pancolonic diverticulosis with several impacted fecalith.  No evidence of diverticulitis or stricture.  Was only able to reach the ascending colon.  Follow up barium enema revealed extensive diverticulosis.  No polyp or other mucosal mass seen.    06/11/2002--incomplete colonoscopy due to redundant colon.  Not able to get past the hepatic flexure.  Patient had followup barium contrast enema which showed extensive diverticulosis.   • Folic acid deficiency 2/11/2016   • Gastroesophageal reflux disease without esophagitis 2/11/2016   • Generalized anxiety disorder 2/11/2016   • History of diverticulitis of colon 2009 and 2003 05/08/2009-acute diverticulitis without complication. 09/05/2003-acute diverticulitis without complication.    • Hyperlipidemia 2/11/2016 01/29/2005--treatment for hyperlipidemia begun.   • Hypogonadism  in male, on TRT, testosterone pellets, per  6/16/2017 January 2017--patient reports his urologist initiated testosterone replacement therapy consisting of testosterone pellets every 6 months.   • Multiple environmental allergies 2/12/2016    Patient sees the allergist regularly and has been on immunotherapy.   • Osteopenia, 03/14/2017--lumbar spine -0.3.  Left femoral neck -1.6.  Right femoral neck -2.0. 2/11/2016 06/16/2017--patient seen in follow-up and reports he doesn't think he ever started Fosamax.  Osteopenia and needs to be reassessed with a DEXA scan.  03/14/2017--DEXA scan reveals lumbar spine T score -0.3.  Unchanged.  Left femoral neck T score -1.6, 6% decrease.  Right femoral neck T score -2.0.  Unchanged.  Assessment is osteopenia with a statistically significant interval decrease in the left hip.  07/18/2014--Fosamax 70 mg per day  initiated.  Calcium 1200 mg per day.    07/07/2014--DEXA scan revealed average lumbar spine T score -0.5.  Left proximal femoral T score is zero.  Left femoral neck T score -1.3.  Right proximal femoral T score 0.1.  Right femoral neck T score -2.0.  Osteopenia.   • Primary osteoarthritis of knees, bilateral 9/12/2016 09/12/2016--patient called in the office requesting a steriod injection in his knees.  I explained to him that I do no longer perform these injections and have referred him to Dr. Manuel.   • Statin intolerance, Vytorin and pravastatin 12/21/2018 12/21/2018--patient seems to be tolerating Livalo well.  10/02/2018--Livalo 2 mg per day initiated.  Recommend CoQ10 400 mg per day with food for better absorption.  Reassess with lab in about 8 weeks.  08/30/2018--patient presents with complaints of muscle cramps.  He discontinue pravastatin and the cramps went away.  Cramps returned even with a half dose.  This is despite the fact he was taking    • Vitamin B12 deficiency 2/11/2016 06/25/2009--B12 injections begun.   • Vitamin D deficiency 2/11/2016         Past Surgical History:   Procedure Laterality Date   • CATARACT EXTRACTION Left 12/12/2011 12/12/2011--cataract extirpation with intraocular lens implantation left eye.   • CATARACT EXTRACTION Right 12/2011 December 2011--right cataract extirpation with intraocular lens implantation.   • COLONOSCOPY  06/11/2002 06/11/2002--incomplete colonoscopy due to redundant colon. Not able to get past the hepatic flexure. Patient had followup barium contrast enema which showed extensive diverticulosis   • COLONOSCOPY  10/03/2013    10/03/2013--colonoscopy revealed markedly redundant colon, pancolonic diverticulosis with several impacted fecalith. No evidence of diverticulitis or stricture. Was only able to reach the ascending colon. Follow up barium enema revealed extensive diverticulosis. No polyp or other mucosal mass seen.   • CYSTOSCOPY  05/18/2016 05/18/2016--urology consultation.  Cystoscopy performed for possible bladder mass is negative.   • PROSTATE BIOPSY  12/21/2016 12/21/2016--prostate biopsy reportedly negative.  I will try to obtain the report.                       PT Assessment/Plan     Row Name 04/05/19 1611          PT Assessment    Assessment Comments  Patient seen for first f/u since eval on 3/27/19.  He reports no pain at the moment and is very specific as to what causes pain - sitting (in stiff chair @ meetings / dinner) and driving for > 20 minutes.  He denies any pain with walking, mowing lawn (using self propelled mower or riding mower).  He needs copious cuing for posture and proper form/texchnique with ther ex, has tendency to shrug shoulders with all ex and need tactile cuing less intensity and improved recruitment of target tissues.  Notably taut/tender L UT/levator and interscapular tissues with papable knots.  -RA        PT Plan    PT Plan Comments  Continue skilled therapy working on posture, scapular mobility, postural/cervical/scapular/shoulder girdle strengthening.   Assess response to manual, consider modalities, possible DDN for taut tissues/trigger points  -RA       User Key  (r) = Recorded By, (t) = Taken By, (c) = Cosigned By    Initials Name Provider Type    RA Kristie Copeland, PT Physical Therapist            Exercises     Row Name 04/05/19 1500             Subjective Comments    Subjective Comments  Still have the same issue, it bothers me with sitting in straight chair (during meetings / dinner) / driving for extended period of time.  I'm okay sitting in my recliner.  Doesn't bother me with mowing grass (self propelled or riding mower)  -RA         Subjective Pain    Able to rate subjective pain?  yes  -RA      Pre-Treatment Pain Level  0  -RA         Total Minutes    50953 - PT Therapeutic Exercise Minutes  30  -RA      83192 - PT Manual Therapy Minutes  15  -RA         Exercise 1    Exercise Name 1  doorway pec stretch  -RA      Cueing 1  Verbal;Demo cues to hold for full duration   -RA      Reps 1  4  -RA      Time 1  20 sec  -RA      Additional Comments  </= moderate intensity  -RA         Exercise 2    Exercise Name 2  supine serratus ant punch, performed after manual scap mobs/PNF  -RA      Reps 2  15  -RA      Additional Comments  1#  -RA         Exercise 3    Exercise Name 3  supine D2  -RA      Reps 3  15  -RA         Exercise 4    Exercise Name 4  Scapular depression w/adduction  -RA      Cueing 4  Verbal;Tactile;Demo  -RA      Reps 4  12  -RA      Additional Comments  tacitile cues/resistance  -RA         Exercise 5    Exercise Name 5  maurilio scap ret standing against noodle at wall - emphasis on depression   -RA      Cueing 5  Verbal;Tactile;Demo  -RA      Reps 5  12  -RA      Time 5  3 sec  -RA      Additional Comments  avoid shoulder shrug  -RA         Exercise 6    Exercise Name 6  UBE   -RA      Time 6  4 min  -RA         Exercise 7    Exercise Name 7  standing chin tuck ball behind neck/head   -RA      Cueing 7  Verbal;Tactile;Demo  -RA      Reps 7  10   -RA      Time 7  3 sec  -RA      Additional Comments  avoid shoulder shrug  -RA         Exercise 8    Exercise Name 8  supine chin tuck / nod w/ towel roll under neck    -RA      Cueing 8  Verbal;Demo  -RA      Reps 8  10  -RA      Time 8  3 sec  -RA         Exercise 9    Exercise Name 9  attempted UT stretch but limited mobility, doesn't really get stretch  -RA        User Key  (r) = Recorded By, (t) = Taken By, (c) = Cosigned By    Initials Name Provider Type    Kristie Blake, PT Physical Therapist                      Manual Rx (last 36 hours)      Manual Treatments     Row Name 04/05/19 1500             Total Minutes    47408 - PT Manual Therapy Minutes  15  -RA         Manual Rx 1    Manual Rx 1 Location  L shoulder   -RA      Manual Rx 1 Type  Patient in R SL: STM/scapular framing, scapular lift/tilt, scapular mobilizations, scapular PNF  -RA        User Key  (r) = Recorded By, (t) = Taken By, (c) = Cosigned By    Initials Name Provider Type    Kristie Blake PT Physical Therapist              Therapy Education  Given: Symptoms/condition management, Posture/body mechanics, HEP  Program: Reinforced  How Provided: Verbal, Demonstration  Provided to: Patient  Level of Understanding: Teach back education performed, Verbalized              Time Calculation:   Start Time: 1530  Stop Time: 1615  Time Calculation (min): 45 min  Therapy Charges for Today     Code Description Service Date Service Provider Modifiers Qty    [id number]  PT THER PROC EA 15 MIN 4/5/2019 Kristie Copeland, PT GP 2    [id number]  PT MANUAL THERAPY EA 15 MIN 4/5/2019 Kristie Copeland, PT GP 1                    Kristie Copeland PT  4/5/2019

## 2019-04-11 ENCOUNTER — CLINICAL SUPPORT (OUTPATIENT)
Dept: INTERNAL MEDICINE | Facility: CLINIC | Age: 84
End: 2019-04-11

## 2019-04-11 ENCOUNTER — HOSPITAL ENCOUNTER (OUTPATIENT)
Dept: PHYSICAL THERAPY | Facility: HOSPITAL | Age: 84
Setting detail: THERAPIES SERIES
Discharge: HOME | End: 2019-04-11

## 2019-04-11 DIAGNOSIS — M79.602 PAIN OF LEFT UPPER EXTREMITY: ICD-10-CM

## 2019-04-11 DIAGNOSIS — M54.12 LEFT CERVICAL RADICULOPATHY: ICD-10-CM

## 2019-04-11 DIAGNOSIS — E53.8 VITAMIN B12 DEFICIENCY: Primary | Chronic | ICD-10-CM

## 2019-04-11 DIAGNOSIS — M54.2 CERVICAL PAIN: Primary | ICD-10-CM

## 2019-04-11 PROCEDURE — 97110 THERAPEUTIC EXERCISES: CPT

## 2019-04-11 PROCEDURE — 97140 MANUAL THERAPY 1/> REGIONS: CPT

## 2019-04-11 PROCEDURE — 96372 THER/PROPH/DIAG INJ SC/IM: CPT | Performed by: INTERNAL MEDICINE

## 2019-04-11 RX ADMIN — CYANOCOBALAMIN 1000 MCG: 1000 INJECTION, SOLUTION INTRAMUSCULAR; SUBCUTANEOUS at 13:31

## 2019-04-11 NOTE — THERAPY TREATMENT NOTE
Outpatient Physical Therapy Ortho Treatment Note  [facility]     Patient Name: Radha Win  : 1932  MRN: [id number]  Today's Date: 2019      Visit Date: 2019    Visit Dx:    ICD-10-CM ICD-9-CM   1. Cervical pain M54.2 723.1   2. Pain of left upper extremity M79.602 729.5   3. Left cervical radiculopathy M54.12 723.4       Patient Active Problem List   Diagnosis   • Bilateral carotid artery plaque, 2018--16-49% bilateral carotid artery stenosis   • Benign prostatic hypertrophy   • Chronic renal insufficiency, stage IV (severe), 2016--creatinine 1.85   • Diverticulosis of colon   • Hyperlipidemia   • Osteopenia, 2017--lumbar spine -0.3.  Left femoral neck -1.6.  Right femoral neck -2.0.   • Vitamin B12 deficiency   • Vitamin D deficiency   • Allergic rhinitis   • Anemia due to chronic kidney disease   • Generalized anxiety disorder   • Benign essential hypertension   • Gastroesophageal reflux disease without esophagitis   • Folic acid deficiency   • Multiple environmental allergies   • Therapeutic drug monitoring   • Bilateral renal cysts   • Primary osteoarthritis of knees, bilateral   • Impaired fasting glucose   • Muscle cramps, statin related, Vytorin and pravastatin.  Tolerates Livalo.   • Hypogonadism in male, on TRT, testosterone pellets, per    • Chronic neck pain   • Left cervical radiculopathy   • Statin intolerance, Vytorin and pravastatin        Past Medical History:   Diagnosis Date   • Allergic rhinitis 2016   • Anemia due to chronic kidney disease 2016   • Benign essential hypertension 2016   • Benign prostatic hypertrophy 2016--prostate biopsy reportedly negative.  I will try to obtain the report.  Patient sees urologist.  PSAs run from the 3-8 range   • Bilateral carotid artery plaque, 2016--16-49% bilateral carotid artery stenosis 2016--vascular screen reveals 16-49% bilateral carotid  artery stenosis, negative for AAA, negative for PAD.  10/10/2008--vascular screening study revealed mild bilateral carotid plaque, no aortic aneurysm, no evidence of PAD   • Bilateral renal cysts 2/14/2016 04/07/2016--ultrasound of the kidneys reveals the previously described renal cysts are not well seen.  However, questionable incidental bladder tumor noted.  05/26/2010--ultrasound the kidneys was negative bilaterally except for small renal cysts.   • Chronic renal insufficiency, stage IV (severe), 02/09/2016--creatinine 1.85 2/11/2016 02/09/2016--serum creatinine 1.85.  BUN 29.  07/20/2015--patient was evaluated by the nephrologist.  Ultrasound of the kidneys ordered but this was never done.  05/22/2015--BUN 35, creatinine 2.3.  Patient referred to nephrology, Dr. Devyn Muniz.  04/16/2014--BUN 25, creatinine 1.77.  01/03/2013--BUN 37, creatinine 2.14.    05/26/2010---ultrasound of the kidneys reveal a small cyst x 2 right kidney.  Otherwise normal.    Baseline creatinine approximately 1.9-2.0.   • Diverticulosis of colon 2/11/2016    10/03/2013--colonoscopy revealed markedly redundant colon, pancolonic diverticulosis with several impacted fecalith.  No evidence of diverticulitis or stricture.  Was only able to reach the ascending colon.  Follow up barium enema revealed extensive diverticulosis.  No polyp or other mucosal mass seen.    06/11/2002--incomplete colonoscopy due to redundant colon.  Not able to get past the hepatic flexure.  Patient had followup barium contrast enema which showed extensive diverticulosis.   • Folic acid deficiency 2/11/2016   • Gastroesophageal reflux disease without esophagitis 2/11/2016   • Generalized anxiety disorder 2/11/2016   • History of diverticulitis of colon 2009 and 2003 05/08/2009-acute diverticulitis without complication. 09/05/2003-acute diverticulitis without complication.    • Hyperlipidemia 2/11/2016 01/29/2005--treatment for hyperlipidemia begun.   •  Hypogonadism in male, on TRT, testosterone pellets, per  6/16/2017 January 2017--patient reports his urologist initiated testosterone replacement therapy consisting of testosterone pellets every 6 months.   • Multiple environmental allergies 2/12/2016    Patient sees the allergist regularly and has been on immunotherapy.   • Osteopenia, 03/14/2017--lumbar spine -0.3.  Left femoral neck -1.6.  Right femoral neck -2.0. 2/11/2016 06/16/2017--patient seen in follow-up and reports he doesn't think he ever started Fosamax.  Osteopenia and needs to be reassessed with a DEXA scan.  03/14/2017--DEXA scan reveals lumbar spine T score -0.3.  Unchanged.  Left femoral neck T score -1.6, 6% decrease.  Right femoral neck T score -2.0.  Unchanged.  Assessment is osteopenia with a statistically significant interval decrease in the left hip.  07/18/2014--Fosamax 70 mg per day  initiated.  Calcium 1200 mg per day.    07/07/2014--DEXA scan revealed average lumbar spine T score -0.5.  Left proximal femoral T score is zero.  Left femoral neck T score -1.3.  Right proximal femoral T score 0.1.  Right femoral neck T score -2.0.  Osteopenia.   • Primary osteoarthritis of knees, bilateral 9/12/2016 09/12/2016--patient called in the office requesting a steriod injection in his knees.  I explained to him that I do no longer perform these injections and have referred him to Dr. Manuel.   • Statin intolerance, Vytorin and pravastatin 12/21/2018 12/21/2018--patient seems to be tolerating Livalo well.  10/02/2018--Livalo 2 mg per day initiated.  Recommend CoQ10 400 mg per day with food for better absorption.  Reassess with lab in about 8 weeks.  08/30/2018--patient presents with complaints of muscle cramps.  He discontinue pravastatin and the cramps went away.  Cramps returned even with a half dose.  This is despite the fact he was taking    • Vitamin B12 deficiency 2/11/2016 06/25/2009--B12 injections begun.   • Vitamin D  deficiency 2/11/2016        Past Surgical History:   Procedure Laterality Date   • CATARACT EXTRACTION Left 12/12/2011 12/12/2011--cataract extirpation with intraocular lens implantation left eye.   • CATARACT EXTRACTION Right 12/2011 December 2011--right cataract extirpation with intraocular lens implantation.   • COLONOSCOPY  06/11/2002 06/11/2002--incomplete colonoscopy due to redundant colon. Not able to get past the hepatic flexure. Patient had followup barium contrast enema which showed extensive diverticulosis   • COLONOSCOPY  10/03/2013    10/03/2013--colonoscopy revealed markedly redundant colon, pancolonic diverticulosis with several impacted fecalith. No evidence of diverticulitis or stricture. Was only able to reach the ascending colon. Follow up barium enema revealed extensive diverticulosis. No polyp or other mucosal mass seen.   • CYSTOSCOPY  05/18/2016 05/18/2016--urology consultation.  Cystoscopy performed for possible bladder mass is negative.   • PROSTATE BIOPSY  12/21/2016 12/21/2016--prostate biopsy reportedly negative.  I will try to obtain the report.                       PT Assessment/Plan     Row Name 04/11/19 1600          PT Assessment    Assessment Comments  Mr. Win reports continuing to have pain in L levator scap and UT muscles.  He can provoke sc with cervical flexion.  He has poor posture through neck and shoulders/upper back but is not aware of this.  His scapula are protracted and lower c-spine flexed putting tension on those involved muscles.  He has great difficulty recruiting and isolating parascapular muscles when we worked on posture and required verbal and near continuous tactile cuing.  He did respond to manual techniques.  He will likely require reinforcement for carryover.  -JA        PT Plan    PT Plan Comments  assess for carryover of scap protraction/retraction to improve posture and positioning; observe posture with ADL's, cont with ther ex focusing  "on scapular recruitment and isolation; cont manual, ask whether he got a theracane  -JA       User Key  (r) = Recorded By, (t) = Taken By, (c) = Cosigned By    Initials Name Provider Type    Yu Ceron, PT Physical Therapist            Exercises     Row Name 04/11/19 1500 04/11/19 1400          Subjective Comments    Subjective Comments  I was sore after the last treatment but then it decreased to the \"normal soreness\".  It still hurts when I drive and it really hurts whenever I look down.  -JA  --        Subjective Pain    Able to rate subjective pain?  yes  -JA  --     Pre-Treatment Pain Level  6  -JA  --        Total Minutes    07192 - PT Therapeutic Exercise Minutes  32  -JA  --     25520 - OT Manual Therapy Minutes  --  15  -JA        Exercise 1    Exercise Name 1  reverse shoulder rolls  -JA  --     Cueing 1  Verbal;Tactile;Demo;Auditory  -JA  --     Time 1  6 min  -JA  --     Additional Comments  required copious cuing and continuous monitoring to achieve a reverse roll of just fair quality--pt has significant difficulty isolating or coordinating any movement involving his shoulder blades which highly impacts his issue negatively, creating more pain.  -JA  --        Exercise 2    Exercise Name 2  scap retraction with arms at side  -JA  --     Cueing 2  Verbal;Tactile;Demo;Auditory  -JA  --     Time 2  6 min  -JA  --     Additional Comments  copious cuing and constant monitoring, same as previous ex  -JA  --        Exercise 3    Exercise Name 3  scap ret with bent elbows  -JA  --     Cueing 3  Verbal;Tactile;Auditory  -JA  --     Reps 3  10  -JA  --     Additional Comments  pt was able to perform with fair quality, noted his scapular excursion was slight but more consistent than with previous ex's  -JA  --        Exercise 4    Exercise Name 4  chin tuck isometric, changed to sitting and used manual resistance instead of ball at wall  -JA  --     Cueing 4  Verbal;Tactile;Demo;Auditory  -JA  --     " Reps 4  10  -JA  --     Time 4  (required 4 min work to achieve correct form/movement)  -JA  --     Additional Comments  pt uses jaw instead of cervical extensors   -JA  --        Exercise 5    Exercise Name 5  posture work in standing, cued to lower chin  -JA  --     Time 5  2 min  -JA  --     Additional Comments  at mirror  -JA  --        Exercise 6    Exercise Name 6  posture in sitting  -JA  --     Time 6  2 min  -JA  --        Exercise 7    Exercise Name 7  Maintain correct sitting posture during biceps curls in sitting  -JA  --     Reps 7  15  -JA  --     Additional Comments  alternate R/L 2#  -JA  --        Exercise 8    Exercise Name 8  Educated for body mechanics and posture with ADL's with suggestions/strategies to eliminate period of looking down.  -JA  --     Time 8  5 min  -JA  --        Exercise 9    Exercise Name 9  Theracane for L levator and UT TP release.  -JA  --     Time 9  3 min  -JA  --     Additional Comments  pt instructed in use and he performed  -JA  --       User Key  (r) = Recorded By, (t) = Taken By, (c) = Cosigned By    Initials Name Provider Type    Yu Ceron, PT Physical Therapist                      Manual Rx (last 36 hours)      Manual Treatments     Row Name 04/11/19 1400             Total Minutes    15427 - OT Manual Therapy Minutes  15  -JA         Manual Rx 1    Manual Rx 1 Location  L shoulder   -JA      Manual Rx 1 Type  Patient in R SL: STM/scapular framing, scapular lift/tilt, scapular mobilizations, scapular PNF, AAROM scap ret/protract then AROM for same to promote improved scapular control  -REILLY         Manual Rx 2    Manual Rx 2 Location  supine L lev scap, UT  -JA      Manual Rx 2 Type  STM, TP release  -REILLY        User Key  (r) = Recorded By, (t) = Taken By, (c) = Cosigned By    Initials Name Provider Type    Yu Ceron, PT Physical Therapist              Therapy Education  Education Details: Stressed importance of increased awareness/vigilance  of posture and positioning duirng ALL ADL's to idenitfy and modify those where he flexes c-spine or rounds shoulders with forward head such as reading iphone or driving.  Stressed that treatment in PT is only 50% (or less) of what he need because the other part is posture and body mechanics that only he controls.  Highly recommended he consider buying a Theracane for self-management of trigger points in levator and UT.  he tried the one in the clinic and noted good response.  Discussed his concerns of lung CA symptoms--he read an ad that showed a person indicating one symptom is pain in the same area he has pain but reports he had none of the other 6-7 symptoms; we discussed that his symptoms are likely caused by poor posture and muscle tightness.  Given: Symptoms/condition management, Pain management, Posture/body mechanics  Program: Reinforced  How Provided: Verbal, Demonstration  Provided to: Patient  Level of Understanding: Verbalized              Time Calculation:   Start Time: 1445  Stop Time: 1530  Time Calculation (min): 45 min  Therapy Charges for Today     Code Description Service Date Service Provider Modifiers Qty    87910167363 HC PT THER PROC EA 15 MIN 4/11/2019 Yu Garcia, PT GP 2    [id number] HC PT MANUAL THERAPY EA 15 MIN 4/11/2019 Yu Garcia, PT GP 1                    Yu Garcia PT  4/11/2019

## 2019-04-15 ENCOUNTER — APPOINTMENT (OUTPATIENT)
Dept: PHYSICAL THERAPY | Facility: HOSPITAL | Age: 84
End: 2019-04-15

## 2019-04-16 ENCOUNTER — OFFICE VISIT (OUTPATIENT)
Dept: INTERNAL MEDICINE | Facility: CLINIC | Age: 84
End: 2019-04-16

## 2019-04-16 VITALS
BODY MASS INDEX: 30.13 KG/M2 | OXYGEN SATURATION: 98 % | HEIGHT: 67 IN | SYSTOLIC BLOOD PRESSURE: 160 MMHG | WEIGHT: 192 LBS | DIASTOLIC BLOOD PRESSURE: 72 MMHG | HEART RATE: 101 BPM

## 2019-04-16 DIAGNOSIS — H81.11 BENIGN PAROXYSMAL POSITIONAL VERTIGO OF RIGHT EAR: Primary | ICD-10-CM

## 2019-04-16 PROBLEM — R25.2 MUSCLE CRAMPS: Chronic | Status: ACTIVE | Noted: 2017-06-16

## 2019-04-16 PROCEDURE — 99213 OFFICE O/P EST LOW 20 MIN: CPT | Performed by: INTERNAL MEDICINE

## 2019-04-16 NOTE — PROGRESS NOTES
04/16/2019    Patient Information  Radha Win                                                                                          [address]  [facility] [address]    Patient has been erroneously marked as diabetic. Based on the available clinical information, he does not have diabetes and should therefore be excluded from diabetic health maintenance and quality measures for the remainder of the reporting period.    7/26/1932  [unfilled]  There is no work phone number on file.    Chief Complaint:     Complaining of dizziness.    History of Present Illness:    Patient with history of carotid artery plaque, BPH, chronic renal insufficiency, hyperlipidemia, osteopenia, vitamin B12 and vitamin D deficiency, anemia chronic kidney disease, hypertension, esophageal reflux, environmental allergies, osteoarthritis, impaired fasting glucose.  He also had a previous history of suspected position vertigo.  He presents today with complaints of return of dizziness which was much more severe.  History regarding this will be described below.    The history regarding intermittent spells of dizziness:    April 16, 2019--patient reports 2 separate episodes of dizziness over the past couple of months.  This is described as an off-balance sensation and definitely precipitated by movement of his head, particularly towards the right.  The last episode was quite severe to the point where he thought he might have to go to the emergency room.  This occurred this Monday morning.  Symptoms lasted a couple of hours and then slowly subsided.  No symptoms since.  No other associated symptoms including unilateral weakness, numbness, paresthesias, slurred speech.  No heart palpitations.  No shortness of breath.  No nausea or vomiting.  Examination today is unremarkable.  There is no nystagmus present.  No neurologic defects.  Patient referred for vestibular testing and possible treatment as soon as  possible.    08/09/2016--patient seen in follow-up and reports his symptoms have resolved.    05/13/2016--patient was evaluated by ENT and Izzy-Hallpike testing was negative at this time.  However, patient was asymptomatic.    03/21/2016--patient presents with a 2 day history of dizziness that he describes as an off-balance or spinning sensation.  This seems to be precipitated by movement such as when he stands up or bends over.  Rolling over in bed does not seem to precipitate it.  Patient has no other symptoms associated with this.  The episodes last 5-10 minutes and he seems to get relief by not moving.  He was evaluated earlier at the urgent care and the physician there wanted start him on diabetes medication because his blood sugar was 157 nonfasting.  His matter fact she gave me a phone call to explain the situation to me and I had her just go ahead and send the patient here.  Patient was a little concerned because his blood sugars have been running a little high of late.  Examination today unremarkable.  We were able to reproduce the symptoms slightly by leaning his head over towards the left.  I suspect this is benign position vertigo and have referred him for vestibular testing.    Review of Systems   Constitution: Negative.   HENT: Negative.    Eyes: Negative.    Cardiovascular: Negative.    Respiratory: Negative.    Endocrine: Negative.    Hematologic/Lymphatic: Negative.    Skin: Negative.    Musculoskeletal: Negative.    Gastrointestinal: Negative.    Genitourinary: Negative.    Neurological: Positive for disturbances in coordination, dizziness, loss of balance and vertigo. Negative for brief paralysis, difficulty with concentration, focal weakness, headaches, light-headedness, numbness, paresthesias, seizures, sensory change and tremors.   Psychiatric/Behavioral: Negative.    Allergic/Immunologic: Negative.        Active Problems:    Patient Active Problem List   Diagnosis   • Bilateral carotid artery  plaque, 05/24/2018--16-49% bilateral carotid artery stenosis   • Benign prostatic hypertrophy   • Chronic renal insufficiency, stage IV (severe), 02/09/2016--creatinine 1.85   • Diverticulosis of colon   • Hyperlipidemia   • Osteopenia, 03/14/2017--lumbar spine -0.3.  Left femoral neck -1.6.  Right femoral neck -2.0.   • Vitamin B12 deficiency   • Vitamin D deficiency   • Allergic rhinitis   • Anemia due to chronic kidney disease   • Generalized anxiety disorder   • Benign essential hypertension   • Gastroesophageal reflux disease without esophagitis   • Folic acid deficiency   • Multiple environmental allergies   • Therapeutic drug monitoring   • Bilateral renal cysts   • Benign paroxysmal positional vertigo of right ear   • Primary osteoarthritis of knees, bilateral   • Impaired fasting glucose   • Muscle cramps, statin related, Vytorin and pravastatin.  Tolerates Livalo.   • Hypogonadism in male, on TRT, testosterone pellets, per    • Chronic neck pain   • Left cervical radiculopathy   • Statin intolerance, Vytorin and pravastatin         Past Medical History:   Diagnosis Date   • Allergic rhinitis 2/11/2016   • Anemia due to chronic kidney disease 2/11/2016   • Benign essential hypertension 2/11/2016   • Benign prostatic hypertrophy 2/11/2016 12/21/2016--prostate biopsy reportedly negative.  I will try to obtain the report.  Patient sees urologist.  PSAs run from the 3-8 range   • Bilateral carotid artery plaque, 09/21/2016--16-49% bilateral carotid artery stenosis 2/11/2016 09/21/2016--vascular screen reveals 16-49% bilateral carotid artery stenosis, negative for AAA, negative for PAD.  10/10/2008--vascular screening study revealed mild bilateral carotid plaque, no aortic aneurysm, no evidence of PAD   • Bilateral renal cysts 2/14/2016 04/07/2016--ultrasound of the kidneys reveals the previously described renal cysts are not well seen.  However, questionable incidental bladder tumor noted.   05/26/2010--ultrasound the kidneys was negative bilaterally except for small renal cysts.   • Chronic renal insufficiency, stage IV (severe), 02/09/2016--creatinine 1.85 2/11/2016 02/09/2016--serum creatinine 1.85.  BUN 29.  07/20/2015--patient was evaluated by the nephrologist.  Ultrasound of the kidneys ordered but this was never done.  05/22/2015--BUN 35, creatinine 2.3.  Patient referred to nephrology, Dr. Devyn Muniz.  04/16/2014--BUN 25, creatinine 1.77.  01/03/2013--BUN 37, creatinine 2.14.    05/26/2010---ultrasound of the kidneys reveal a small cyst x 2 right kidney.  Otherwise normal.    Baseline creatinine approximately 1.9-2.0.   • Diverticulosis of colon 2/11/2016    10/03/2013--colonoscopy revealed markedly redundant colon, pancolonic diverticulosis with several impacted fecalith.  No evidence of diverticulitis or stricture.  Was only able to reach the ascending colon.  Follow up barium enema revealed extensive diverticulosis.  No polyp or other mucosal mass seen.    06/11/2002--incomplete colonoscopy due to redundant colon.  Not able to get past the hepatic flexure.  Patient had followup barium contrast enema which showed extensive diverticulosis.   • Folic acid deficiency 2/11/2016   • Gastroesophageal reflux disease without esophagitis 2/11/2016   • Generalized anxiety disorder 2/11/2016   • History of diverticulitis of colon 2009 and 2003 05/08/2009-acute diverticulitis without complication. 09/05/2003-acute diverticulitis without complication.    • Hyperlipidemia 2/11/2016 01/29/2005--treatment for hyperlipidemia begun.   • Hypogonadism in male, on TRT, testosterone pellets, per  6/16/2017 January 2017--patient reports his urologist initiated testosterone replacement therapy consisting of testosterone pellets every 6 months.   • Multiple environmental allergies 2/12/2016    Patient sees the allergist regularly and has been on immunotherapy.   • Muscle cramps, statin related, Vytorin  and pravastatin.  Tolerates Livalo. 6/16/2017 12/21/2018--patient seems to be tolerating Livalo well.  10/02/2018--Livalo 2 mg per day initiated.  Recommend CoQ10 400 mg per day with food for better absorption.  Reassess with lab in about 8 weeks.  08/30/2018--patient presents with complaints of muscle cramps.  He discontinue pravastatin and the cramps went away.  Cramps returned even with a half dose.  This is despite the fact he was taking    • Osteopenia, 03/14/2017--lumbar spine -0.3.  Left femoral neck -1.6.  Right femoral neck -2.0. 2/11/2016 06/16/2017--patient seen in follow-up and reports he doesn't think he ever started Fosamax.  Osteopenia and needs to be reassessed with a DEXA scan.  03/14/2017--DEXA scan reveals lumbar spine T score -0.3.  Unchanged.  Left femoral neck T score -1.6, 6% decrease.  Right femoral neck T score -2.0.  Unchanged.  Assessment is osteopenia with a statistically significant interval decrease in the left hip.  07/18/2014--Fosamax 70 mg per day  initiated.  Calcium 1200 mg per day.    07/07/2014--DEXA scan revealed average lumbar spine T score -0.5.  Left proximal femoral T score is zero.  Left femoral neck T score -1.3.  Right proximal femoral T score 0.1.  Right femoral neck T score -2.0.  Osteopenia.   • Primary osteoarthritis of knees, bilateral 9/12/2016 09/12/2016--patient called in the office requesting a steriod injection in his knees.  I explained to him that I do no longer perform these injections and have referred him to Dr. Manuel.   • Statin intolerance, Vytorin and pravastatin 12/21/2018 12/21/2018--patient seems to be tolerating Livalo well.  10/02/2018--Livalo 2 mg per day initiated.  Recommend CoQ10 400 mg per day with food for better absorption.  Reassess with lab in about 8 weeks.  08/30/2018--patient presents with complaints of muscle cramps.  He discontinue pravastatin and the cramps went away.  Cramps returned even with a half dose.  This is  despite the fact he was taking    • Vitamin B12 deficiency 2/11/2016 06/25/2009--B12 injections begun.   • Vitamin D deficiency 2/11/2016         Past Surgical History:   Procedure Laterality Date   • CATARACT EXTRACTION Left 12/12/2011 12/12/2011--cataract extirpation with intraocular lens implantation left eye.   • CATARACT EXTRACTION Right 12/2011 December 2011--right cataract extirpation with intraocular lens implantation.   • COLONOSCOPY  06/11/2002 06/11/2002--incomplete colonoscopy due to redundant colon. Not able to get past the hepatic flexure. Patient had followup barium contrast enema which showed extensive diverticulosis   • COLONOSCOPY  10/03/2013    10/03/2013--colonoscopy revealed markedly redundant colon, pancolonic diverticulosis with several impacted fecalith. No evidence of diverticulitis or stricture. Was only able to reach the ascending colon. Follow up barium enema revealed extensive diverticulosis. No polyp or other mucosal mass seen.   • CYSTOSCOPY  05/18/2016 05/18/2016--urology consultation.  Cystoscopy performed for possible bladder mass is negative.   • PROSTATE BIOPSY  12/21/2016 12/21/2016--prostate biopsy reportedly negative.  I will try to obtain the report.         No Known Allergies        Current Outpatient Medications:   •  ALPRAZolam (XANAX) 0.5 MG tablet, Take 1 tablet by mouth 2 (Two) Times a Day As Needed for Anxiety., Disp: 60 tablet, Rfl: 4  •  amLODIPine (NORVASC) 5 MG tablet, Take 1 tablet by mouth every morning., Disp: , Rfl:   •  aspirin (ASPIRIN LOW DOSE) 81 MG tablet, Take 1 tablet by mouth daily., Disp: , Rfl:   •  calcitriol (ROCALTROL) 0.25 MCG capsule, 1 capsule 3 (Three) Times a Week., Disp: , Rfl:   •  Cholecalciferol (VITAMIN D3) 5000 UNITS capsule capsule, Take 1 capsule by mouth daily., Disp: , Rfl:   •  Coenzyme Q10 (COQ10) 400 MG capsule, Take  by mouth., Disp: , Rfl:   •  folic acid (FOLVITE) 1 MG tablet, TAKE ONE TABLET BY MOUTH TWICE A  DAY, Disp: 180 tablet, Rfl: 1  •  lisinopril-hydrochlorothiazide (PRINZIDE,ZESTORETIC) 20-12.5 MG per tablet, Take 2 tablets by mouth Daily., Disp: 180 tablet, Rfl: 1  •  omeprazole (priLOSEC) 40 MG capsule, Take 1 capsule by mouth Daily., Disp: 30 capsule, Rfl: 5  •  pitavastatin calcium (LIVALO) 2 MG tablet tablet, 1 by mouth daily for high cholesterol, Disp: 30 tablet, Rfl: 3    Current Facility-Administered Medications:   •  cyanocobalamin injection 1,000 mcg, 1,000 mcg, Intramuscular, Q28 Days, Delgado Patricia MD, 1,000 mcg at 04/11/19 1331      Family History   Problem Relation Age of Onset   • Diabetes Mother    • Heart disease Mother    • Stroke Father          Social History     Socioeconomic History   • Marital status:      Spouse name: Not on file   • Number of children: 2   • Years of education: 14   • Highest education level: Some college, no degree   Occupational History   • Occupation: Retired      Employer: [employer]   Social Needs   • Financial resource strain: Not hard at all   • Food insecurity:     Worry: Never true     Inability: Never true   • Transportation needs:     Medical: No     Non-medical: No   Tobacco Use   • Smoking status: Never Smoker   • Smokeless tobacco: Never Used   Substance and Sexual Activity   • Alcohol use: Yes     Frequency: 2-3 times a week     Drinks per session: 1 or 2     Comment: Moderate alcohol consumption   • Drug use: No   • Sexual activity: Yes     Partners: Female   Lifestyle   • Physical activity:     Days per week: 0 days     Minutes per session: 0 min   • Stress: Not at all   Relationships   • Social connections:     Talks on phone: More than three times a week     Gets together: Once a week     Attends Congregation service: More than 4 times per year     Active member of club or organization: Yes     Attends meetings of clubs or organizations: More than 4 times per year     Relationship status:          Vitals:    04/16/19 1538   BP: 160/72  "  Pulse: 101   SpO2: 98%   Weight: 87.1 kg (192 lb)   Height: 170.2 cm (67.01\")          Physical Exam:    General: Alert and oriented x 3.  No acute distress.  Normal affect.  HEENT: Pupils equal, round, reactive to light; extraocular movements intact; sclerae nonicteric; pharynx, ear canals and TMs normal.  Neck: Without JVD, thyromegaly, bruit, or adenopathy.  Lungs: Clear to auscultation in all fields.  Heart: Regular rate and rhythm without murmur, rub, gallop, or click.  Abdomen: Soft, nontender, without hepatosplenomegaly or hernia.  Bowel sounds normal.  : Deferred.  Rectal: Deferred.  Extremities: Without clubbing, cyanosis, edema, or pulse deficit.  Neurologic: Intact without focal deficit.  Normal station and gait observed during ingress and egress from the examination room.  Skin: Without significant lesion.  Musculoskeletal: Unremarkable.    Lab/other results:      Assessment/Plan:     Diagnosis Plan   1. Benign paroxysmal positional vertigo of right ear         Patient again presents with a history consistent with benign paroxysmal positional vertigo.  His symptoms definitely came on with change in position, particularly towards the right.  He had a similar episode a couple years ago but it was not near as severe as this last episode.  He is currently asymptomatic but I do think we need to get patient evaluated as soon as possible to try to confirm this diagnosis.  He has given me no history to make me suspect that he has had a stroke or any other neurologic issue.    Plan is as follows: Patient referred for vestibular testing as soon as possible and if positive will undergo therapy.  Patient instructed to contact me for any significant change in his symptoms.      Procedures        "

## 2019-04-17 ENCOUNTER — HOSPITAL ENCOUNTER (OUTPATIENT)
Dept: PHYSICAL THERAPY | Facility: HOSPITAL | Age: 84
Setting detail: THERAPIES SERIES
Discharge: HOME | End: 2019-04-17

## 2019-04-17 DIAGNOSIS — M54.2 CERVICAL PAIN: Primary | ICD-10-CM

## 2019-04-17 DIAGNOSIS — R20.2 TINGLING OF LEFT UPPER EXTREMITY: ICD-10-CM

## 2019-04-17 DIAGNOSIS — M54.12 LEFT CERVICAL RADICULOPATHY: ICD-10-CM

## 2019-04-17 DIAGNOSIS — M79.602 PAIN OF LEFT UPPER EXTREMITY: ICD-10-CM

## 2019-04-17 PROCEDURE — 97110 THERAPEUTIC EXERCISES: CPT

## 2019-04-17 PROCEDURE — 97140 MANUAL THERAPY 1/> REGIONS: CPT

## 2019-04-17 NOTE — THERAPY TREATMENT NOTE
Outpatient Physical Therapy Ortho Treatment Note  [facility]     Patient Name: Radha Win  : 1932  MRN: [id number]  Today's Date: 2019      Visit Date: 2019    Visit Dx:    ICD-10-CM ICD-9-CM   1. Cervical pain M54.2 723.1   2. Pain of left upper extremity M79.602 729.5   3. Left cervical radiculopathy M54.12 723.4   4. Tingling of left upper extremity R20.2 782.0       Patient Active Problem List   Diagnosis   • Bilateral carotid artery plaque, 2018--16-49% bilateral carotid artery stenosis   • Benign prostatic hypertrophy   • Chronic renal insufficiency, stage IV (severe), 2016--creatinine 1.85   • Diverticulosis of colon   • Hyperlipidemia   • Osteopenia, 2017--lumbar spine -0.3.  Left femoral neck -1.6.  Right femoral neck -2.0.   • Vitamin B12 deficiency   • Vitamin D deficiency   • Allergic rhinitis   • Anemia due to chronic kidney disease   • Generalized anxiety disorder   • Benign essential hypertension   • Gastroesophageal reflux disease without esophagitis   • Folic acid deficiency   • Multiple environmental allergies   • Therapeutic drug monitoring   • Bilateral renal cysts   • Benign paroxysmal positional vertigo of right ear   • Primary osteoarthritis of knees, bilateral   • Impaired fasting glucose   • Muscle cramps, statin related, Vytorin and pravastatin.  Tolerates Livalo.   • Hypogonadism in male, on TRT, testosterone pellets, per    • Chronic neck pain   • Left cervical radiculopathy   • Statin intolerance, Vytorin and pravastatin        Past Medical History:   Diagnosis Date   • Allergic rhinitis 2016   • Anemia due to chronic kidney disease 2016   • Benign essential hypertension 2016   • Benign prostatic hypertrophy 2016--prostate biopsy reportedly negative.  I will try to obtain the report.  Patient sees urologist.  PSAs run from the 3-8 range   • Bilateral carotid artery plaque, 2016--16-49% bilateral  carotid artery stenosis 2/11/2016 09/21/2016--vascular screen reveals 16-49% bilateral carotid artery stenosis, negative for AAA, negative for PAD.  10/10/2008--vascular screening study revealed mild bilateral carotid plaque, no aortic aneurysm, no evidence of PAD   • Bilateral renal cysts 2/14/2016 04/07/2016--ultrasound of the kidneys reveals the previously described renal cysts are not well seen.  However, questionable incidental bladder tumor noted.  05/26/2010--ultrasound the kidneys was negative bilaterally except for small renal cysts.   • Chronic renal insufficiency, stage IV (severe), 02/09/2016--creatinine 1.85 2/11/2016 02/09/2016--serum creatinine 1.85.  BUN 29.  07/20/2015--patient was evaluated by the nephrologist.  Ultrasound of the kidneys ordered but this was never done.  05/22/2015--BUN 35, creatinine 2.3.  Patient referred to nephrology, Dr. Devyn Muniz.  04/16/2014--BUN 25, creatinine 1.77.  01/03/2013--BUN 37, creatinine 2.14.    05/26/2010---ultrasound of the kidneys reveal a small cyst x 2 right kidney.  Otherwise normal.    Baseline creatinine approximately 1.9-2.0.   • Diverticulosis of colon 2/11/2016    10/03/2013--colonoscopy revealed markedly redundant colon, pancolonic diverticulosis with several impacted fecalith.  No evidence of diverticulitis or stricture.  Was only able to reach the ascending colon.  Follow up barium enema revealed extensive diverticulosis.  No polyp or other mucosal mass seen.    06/11/2002--incomplete colonoscopy due to redundant colon.  Not able to get past the hepatic flexure.  Patient had followup barium contrast enema which showed extensive diverticulosis.   • Folic acid deficiency 2/11/2016   • Gastroesophageal reflux disease without esophagitis 2/11/2016   • Generalized anxiety disorder 2/11/2016   • History of diverticulitis of colon 2009 and 2003 05/08/2009-acute diverticulitis without complication. 09/05/2003-acute diverticulitis without  complication.    • Hyperlipidemia 2/11/2016 01/29/2005--treatment for hyperlipidemia begun.   • Hypogonadism in male, on TRT, testosterone pellets, per  6/16/2017 January 2017--patient reports his urologist initiated testosterone replacement therapy consisting of testosterone pellets every 6 months.   • Multiple environmental allergies 2/12/2016    Patient sees the allergist regularly and has been on immunotherapy.   • Muscle cramps, statin related, Vytorin and pravastatin.  Tolerates Livalo. 6/16/2017 12/21/2018--patient seems to be tolerating Livalo well.  10/02/2018--Livalo 2 mg per day initiated.  Recommend CoQ10 400 mg per day with food for better absorption.  Reassess with lab in about 8 weeks.  08/30/2018--patient presents with complaints of muscle cramps.  He discontinue pravastatin and the cramps went away.  Cramps returned even with a half dose.  This is despite the fact he was taking    • Osteopenia, 03/14/2017--lumbar spine -0.3.  Left femoral neck -1.6.  Right femoral neck -2.0. 2/11/2016 06/16/2017--patient seen in follow-up and reports he doesn't think he ever started Fosamax.  Osteopenia and needs to be reassessed with a DEXA scan.  03/14/2017--DEXA scan reveals lumbar spine T score -0.3.  Unchanged.  Left femoral neck T score -1.6, 6% decrease.  Right femoral neck T score -2.0.  Unchanged.  Assessment is osteopenia with a statistically significant interval decrease in the left hip.  07/18/2014--Fosamax 70 mg per day  initiated.  Calcium 1200 mg per day.    07/07/2014--DEXA scan revealed average lumbar spine T score -0.5.  Left proximal femoral T score is zero.  Left femoral neck T score -1.3.  Right proximal femoral T score 0.1.  Right femoral neck T score -2.0.  Osteopenia.   • Primary osteoarthritis of knees, bilateral 9/12/2016 09/12/2016--patient called in the office requesting a steriod injection in his knees.  I explained to him that I do no longer perform these injections and  have referred him to Dr. Manuel.   • Statin intolerance, Vytorin and pravastatin 12/21/2018 12/21/2018--patient seems to be tolerating Livalo well.  10/02/2018--Livalo 2 mg per day initiated.  Recommend CoQ10 400 mg per day with food for better absorption.  Reassess with lab in about 8 weeks.  08/30/2018--patient presents with complaints of muscle cramps.  He discontinue pravastatin and the cramps went away.  Cramps returned even with a half dose.  This is despite the fact he was taking    • Vitamin B12 deficiency 2/11/2016 06/25/2009--B12 injections begun.   • Vitamin D deficiency 2/11/2016        Past Surgical History:   Procedure Laterality Date   • CATARACT EXTRACTION Left 12/12/2011 12/12/2011--cataract extirpation with intraocular lens implantation left eye.   • CATARACT EXTRACTION Right 12/2011 December 2011--right cataract extirpation with intraocular lens implantation.   • COLONOSCOPY  06/11/2002 06/11/2002--incomplete colonoscopy due to redundant colon. Not able to get past the hepatic flexure. Patient had followup barium contrast enema which showed extensive diverticulosis   • COLONOSCOPY  10/03/2013    10/03/2013--colonoscopy revealed markedly redundant colon, pancolonic diverticulosis with several impacted fecalith. No evidence of diverticulitis or stricture. Was only able to reach the ascending colon. Follow up barium enema revealed extensive diverticulosis. No polyp or other mucosal mass seen.   • CYSTOSCOPY  05/18/2016 05/18/2016--urology consultation.  Cystoscopy performed for possible bladder mass is negative.   • PROSTATE BIOPSY  12/21/2016 12/21/2016--prostate biopsy reportedly negative.  I will try to obtain the report.                       PT Assessment/Plan     Row Name 04/17/19 5682          PT Assessment    Assessment Comments  Mr. Win presents with report of 3-4 days without increased pain since previous visit.  He did have increased pain after an episode of  vertigo on Monday.  His previously significant and prominent trigger point was notably decreased today.  -JA        PT Plan    PT Plan Comments  cont to work on control/recruitment for scapular retraction and reverse rolls; assess pain and TP  -REILLY       User Key  (r) = Recorded By, (t) = Taken By, (c) = Cosigned By    Initials Name Provider Type    Yu Ceron, PT Physical Therapist            Exercises     Row Name 04/17/19 1400             Subjective Comments    Subjective Comments  Sorry I had to miss last session.  I had 3 or 4 days without pain but then Monday I woke up and couldn't get out of bed, couldn't stand upright.  -REILLY         Subjective Pain    Able to rate subjective pain?  yes  -REILLY      Pre-Treatment Pain Level  2  -REILLY         Total Minutes    01175 - PT Therapeutic Exercise Minutes  30  -JA      17633 - PT Manual Therapy Minutes  15  -JA         Exercise 1    Exercise Name 1  UBE  -JA      Time 1  4 min  -JA         Exercise 2    Exercise Name 2  began with work on scap ret at mirror for visual feedback  -REILLY      Cueing 2  Verbal;Tactile;Demo;Auditory  -      Time 2  15 min  -JA      Additional Comments  copious verbal and tactile cuing to guide scapular retration; He initially protracted scap then he adducted shoulders; was able to guide scapulae into retraction 1-2 times then pt would inadvertently recruit UT or pecs; tried having pt place hands behind his back and retract scap--better recruitment noted; continuously monitored for correct muscle recruitment and he did eventually retract the scap   -JA         Exercise 3    Exercise Name 3  reverse shoulder rolls  -JA      Time 3  10 min   -JA      Additional Comments  began in standing then moved to sitting to change positions to avoid fatigue; pt was able to demonstrate 10 in a row with fairlly good consistency  -REILLY         Exercise 4    Exercise Name 4  chin tuck isometric, changed to sitting and used manual resistance instead of  ball at wall  -REILLY      Cueing 4  Verbal;Tactile;Demo;Auditory  -REILLY      Reps 4  10  -JA      Time 4  (required 4 min work to achieve correct form/movement)  -REILLY      Additional Comments  cont to use jaw with extension  -REILLY        User Key  (r) = Recorded By, (t) = Taken By, (c) = Cosigned By    Initials Name Provider Type    Yu Ceron, PT Physical Therapist                      Manual Rx (last 36 hours)      Manual Treatments     Row Name 04/17/19 1400             Total Minutes    80259 - PT Manual Therapy Minutes  15  -JA         Manual Rx 1    Manual Rx 1 Location  L shoulder/scap in supine  -REILLY      Manual Rx 1 Type  STM, TP release, UT and lev stretching, cerv distraction, axial extension  -REILLY      Manual Rx 1 Grade  mod  -REILLY        User Key  (r) = Recorded By, (t) = Taken By, (c) = Cosigned By    Initials Name Provider Type    Yu Ceron, PT Physical Therapist          PT OP Goals     Row Name 04/17/19 1700          PT Short Term Goals    STG Date to Achieve  04/11/19  -REILLY     STG 1  Pt will be independent with initial HEP.  -REILLY     STG 1 Progress  Ongoing;Progressing  -REILLY     STG 1 Progress Comments  pt requires continuous cuing and monitoring for correct ex form/execution  -REILLY     STG 2  Pt will demonstrate correct postural alignment in sitting and standing ( mg head/neck/shoulder/scap) to reduce strain.  -REILLY     STG 2 Progress  Progressing;Partially Met  -REILLY        Long Term Goals    LTG Date to Achieve  04/27/19  -REILLY     LTG 1  Pt will be independent with advanced HEP for shoulder and scapular strengthening.  -REILLY     LTG 1 Progress  Ongoing  -REILLY     LTG 2  Pt will be able to drive for 30 minutes with no more than 2/10 pain/discomfort.  -REILLY     LTG 2 Progress  Progressing  -REILLY     LTG 2 Progress Comments  he reports 3-4 days without increased pain  -REILLY     LTG 3  Patient will report >/= 50% reduction in frequency/intensity of episodes of pain/UE symptoms.   -REILLY     LTG 3 Progress   Ongoing;Progressing  -REILLY     LTG 3 Progress Comments  decreasing frequency of pain  -JA       User Key  (r) = Recorded By, (t) = Taken By, (c) = Cosigned By    Initials Name Provider Type    Yu Ceron, PT Physical Therapist                         Time Calculation:   Start Time: 1445  Stop Time: 1530  Time Calculation (min): 45 min  Therapy Charges for Today     Code Description Service Date Service Provider Modifiers Qty    [id number]  PT THER PROC EA 15 MIN 4/17/2019 Yu Garcia, PT GP 2    [id number]  PT MANUAL THERAPY EA 15 MIN 4/17/2019 Yu Garcia, PT GP 1                    Yu Garcia, PT  4/17/2019

## 2019-04-23 ENCOUNTER — HOSPITAL ENCOUNTER (OUTPATIENT)
Dept: PHYSICAL THERAPY | Facility: HOSPITAL | Age: 84
Setting detail: THERAPIES SERIES
Discharge: HOME | End: 2019-04-23

## 2019-04-23 DIAGNOSIS — M79.602 PAIN OF LEFT UPPER EXTREMITY: ICD-10-CM

## 2019-04-23 DIAGNOSIS — M54.12 LEFT CERVICAL RADICULOPATHY: ICD-10-CM

## 2019-04-23 DIAGNOSIS — M54.2 CERVICAL PAIN: Primary | ICD-10-CM

## 2019-04-23 DIAGNOSIS — R20.2 TINGLING OF LEFT UPPER EXTREMITY: ICD-10-CM

## 2019-04-23 PROCEDURE — 97140 MANUAL THERAPY 1/> REGIONS: CPT | Performed by: PHYSICAL THERAPIST

## 2019-04-23 PROCEDURE — 97110 THERAPEUTIC EXERCISES: CPT | Performed by: PHYSICAL THERAPIST

## 2019-04-23 NOTE — THERAPY TREATMENT NOTE
Outpatient Physical Therapy Ortho Treatment Note  [facility]     Patient Name: Radha Win  : 1932  MRN: [id number]  Today's Date: 2019      Visit Date: 2019    Visit Dx:    ICD-10-CM ICD-9-CM   1. Cervical pain M54.2 723.1   2. Pain of left upper extremity M79.602 729.5   3. Left cervical radiculopathy M54.12 723.4   4. Tingling of left upper extremity R20.2 782.0       Patient Active Problem List   Diagnosis   • Bilateral carotid artery plaque, 2018--16-49% bilateral carotid artery stenosis   • Benign prostatic hypertrophy   • Chronic renal insufficiency, stage IV (severe), 2016--creatinine 1.85   • Diverticulosis of colon   • Hyperlipidemia   • Osteopenia, 2017--lumbar spine -0.3.  Left femoral neck -1.6.  Right femoral neck -2.0.   • Vitamin B12 deficiency   • Vitamin D deficiency   • Allergic rhinitis   • Anemia due to chronic kidney disease   • Generalized anxiety disorder   • Benign essential hypertension   • Gastroesophageal reflux disease without esophagitis   • Folic acid deficiency   • Multiple environmental allergies   • Therapeutic drug monitoring   • Bilateral renal cysts   • Benign paroxysmal positional vertigo of right ear   • Primary osteoarthritis of knees, bilateral   • Impaired fasting glucose   • Muscle cramps, statin related, Vytorin and pravastatin.  Tolerates Livalo.   • Hypogonadism in male, on TRT, testosterone pellets, per    • Chronic neck pain   • Left cervical radiculopathy   • Statin intolerance, Vytorin and pravastatin        Past Medical History:   Diagnosis Date   • Allergic rhinitis 2016   • Anemia due to chronic kidney disease 2016   • Benign essential hypertension 2016   • Benign prostatic hypertrophy 2016--prostate biopsy reportedly negative.  I will try to obtain the report.  Patient sees urologist.  PSAs run from the 3-8 range   • Bilateral carotid artery plaque, 2016--16-49% bilateral  carotid artery stenosis 2/11/2016 09/21/2016--vascular screen reveals 16-49% bilateral carotid artery stenosis, negative for AAA, negative for PAD.  10/10/2008--vascular screening study revealed mild bilateral carotid plaque, no aortic aneurysm, no evidence of PAD   • Bilateral renal cysts 2/14/2016 04/07/2016--ultrasound of the kidneys reveals the previously described renal cysts are not well seen.  However, questionable incidental bladder tumor noted.  05/26/2010--ultrasound the kidneys was negative bilaterally except for small renal cysts.   • Chronic renal insufficiency, stage IV (severe), 02/09/2016--creatinine 1.85 2/11/2016 02/09/2016--serum creatinine 1.85.  BUN 29.  07/20/2015--patient was evaluated by the nephrologist.  Ultrasound of the kidneys ordered but this was never done.  05/22/2015--BUN 35, creatinine 2.3.  Patient referred to nephrology, Dr. Devyn Muniz.  04/16/2014--BUN 25, creatinine 1.77.  01/03/2013--BUN 37, creatinine 2.14.    05/26/2010---ultrasound of the kidneys reveal a small cyst x 2 right kidney.  Otherwise normal.    Baseline creatinine approximately 1.9-2.0.   • Diverticulosis of colon 2/11/2016    10/03/2013--colonoscopy revealed markedly redundant colon, pancolonic diverticulosis with several impacted fecalith.  No evidence of diverticulitis or stricture.  Was only able to reach the ascending colon.  Follow up barium enema revealed extensive diverticulosis.  No polyp or other mucosal mass seen.    06/11/2002--incomplete colonoscopy due to redundant colon.  Not able to get past the hepatic flexure.  Patient had followup barium contrast enema which showed extensive diverticulosis.   • Folic acid deficiency 2/11/2016   • Gastroesophageal reflux disease without esophagitis 2/11/2016   • Generalized anxiety disorder 2/11/2016   • History of diverticulitis of colon 2009 and 2003 05/08/2009-acute diverticulitis without complication. 09/05/2003-acute diverticulitis without  complication.    • Hyperlipidemia 2/11/2016 01/29/2005--treatment for hyperlipidemia begun.   • Hypogonadism in male, on TRT, testosterone pellets, per  6/16/2017 January 2017--patient reports his urologist initiated testosterone replacement therapy consisting of testosterone pellets every 6 months.   • Multiple environmental allergies 2/12/2016    Patient sees the allergist regularly and has been on immunotherapy.   • Muscle cramps, statin related, Vytorin and pravastatin.  Tolerates Livalo. 6/16/2017 12/21/2018--patient seems to be tolerating Livalo well.  10/02/2018--Livalo 2 mg per day initiated.  Recommend CoQ10 400 mg per day with food for better absorption.  Reassess with lab in about 8 weeks.  08/30/2018--patient presents with complaints of muscle cramps.  He discontinue pravastatin and the cramps went away.  Cramps returned even with a half dose.  This is despite the fact he was taking    • Osteopenia, 03/14/2017--lumbar spine -0.3.  Left femoral neck -1.6.  Right femoral neck -2.0. 2/11/2016 06/16/2017--patient seen in follow-up and reports he doesn't think he ever started Fosamax.  Osteopenia and needs to be reassessed with a DEXA scan.  03/14/2017--DEXA scan reveals lumbar spine T score -0.3.  Unchanged.  Left femoral neck T score -1.6, 6% decrease.  Right femoral neck T score -2.0.  Unchanged.  Assessment is osteopenia with a statistically significant interval decrease in the left hip.  07/18/2014--Fosamax 70 mg per day  initiated.  Calcium 1200 mg per day.    07/07/2014--DEXA scan revealed average lumbar spine T score -0.5.  Left proximal femoral T score is zero.  Left femoral neck T score -1.3.  Right proximal femoral T score 0.1.  Right femoral neck T score -2.0.  Osteopenia.   • Primary osteoarthritis of knees, bilateral 9/12/2016 09/12/2016--patient called in the office requesting a steriod injection in his knees.  I explained to him that I do no longer perform these injections and  have referred him to Dr. Manuel.   • Statin intolerance, Vytorin and pravastatin 12/21/2018 12/21/2018--patient seems to be tolerating Livalo well.  10/02/2018--Livalo 2 mg per day initiated.  Recommend CoQ10 400 mg per day with food for better absorption.  Reassess with lab in about 8 weeks.  08/30/2018--patient presents with complaints of muscle cramps.  He discontinue pravastatin and the cramps went away.  Cramps returned even with a half dose.  This is despite the fact he was taking    • Vitamin B12 deficiency 2/11/2016 06/25/2009--B12 injections begun.   • Vitamin D deficiency 2/11/2016        Past Surgical History:   Procedure Laterality Date   • CATARACT EXTRACTION Left 12/12/2011 12/12/2011--cataract extirpation with intraocular lens implantation left eye.   • CATARACT EXTRACTION Right 12/2011 December 2011--right cataract extirpation with intraocular lens implantation.   • COLONOSCOPY  06/11/2002 06/11/2002--incomplete colonoscopy due to redundant colon. Not able to get past the hepatic flexure. Patient had followup barium contrast enema which showed extensive diverticulosis   • COLONOSCOPY  10/03/2013    10/03/2013--colonoscopy revealed markedly redundant colon, pancolonic diverticulosis with several impacted fecalith. No evidence of diverticulitis or stricture. Was only able to reach the ascending colon. Follow up barium enema revealed extensive diverticulosis. No polyp or other mucosal mass seen.   • CYSTOSCOPY  05/18/2016 05/18/2016--urology consultation.  Cystoscopy performed for possible bladder mass is negative.   • PROSTATE BIOPSY  12/21/2016 12/21/2016--prostate biopsy reportedly negative.  I will try to obtain the report.                       PT Assessment/Plan     Row Name 04/23/19 2829          PT Assessment    Assessment Comments  Mr Win returns today reporting 5-6 days of near pain free.  He was able to ride tractor today without exacerbation.  Mr. Win continues  to require mod to max verbal/tactile cuing with all exercises, may consider on holding on HEP all together secondary to poor performance.  He responded well to manual work of L levator and manual distraction of C spine.  He may be a good candidate for mechanical traction if necessary.    -GJ        PT Plan    PT Plan Comments  assess response to manual, may consider holding exercises, work on posture, manual, and possible c spine traction (mechanical)  -GJ       User Key  (r) = Recorded By, (t) = Taken By, (c) = Cosigned By    Initials Name Provider Type     Cj Rehman, PT Physical Therapist            Exercises     Row Name 04/23/19 1554 04/23/19 1400          Subjective Comments    Subjective Comments  --  no pain today, I've had the best 5-6 days I've had in awhile. I even got on the tractor today.   -        Subjective Pain    Pre-Treatment Pain Level  --  0  -GJ        Total Minutes    04311 - PT Therapeutic Exercise Minutes  16  -GJ  --     05372 - PT Manual Therapy Minutes  27  -GJ  --        Exercise 1    Exercise Name 1  --  UBE  -GJ     Time 1  --  4 min  -GJ        Exercise 2    Exercise Name 2  --  scap retraction at mirror  -GJ     Cueing 2  --  Verbal;Tactile;Demo;Auditory  -GJ     Reps 2  --  15  -GJ     Time 2  --  3 seconds  -GJ     Additional Comments  --  decreased verbal/tactile cuing required  -GJ        Exercise 3    Exercise Name 3  --  reverse shoulder rolls  -GJ     Cueing 3  --  Verbal;Tactile;Demo;Auditory  -GJ     Reps 3  --  15  -GJ     Additional Comments  --  required manual/verbal cuing  -GJ        Exercise 4    Exercise Name 4  --  chin tucks, seated, manual, visual, auditory feedback  -GJ     Cueing 4  --  Verbal;Tactile;Demo;Auditory  -GJ     Reps 4  --  10  -GJ     Additional Comments  --  relax jaw, not scap motion  -GJ        Exercise 6    Exercise Name 6  --  UBE  -GJ     Time 6  --  4 min  -GJ       User Key  (r) = Recorded By, (t) = Taken By, (c) = Cosigned By     Initials Name Provider Type    Cj Dobson, PT Physical Therapist                      Manual Rx (last 36 hours)      Manual Treatments     Row Name 04/23/19 1554 04/23/19 1500          Total Minutes    71120 - PT Manual Therapy Minutes  27  -GJ  --        Manual Rx 1    Manual Rx 1 Location  --  PA mobs c spine, pt. supine  -GJ     Manual Rx 1 Grade  --  grade II/III  -GJ        Manual Rx 2    Manual Rx 2 Location  --  cervical spine distraction, pt. supine  -GJ        Manual Rx 3    Manual Rx 3 Location  --  trigger point release to L levator tissue with R roation/lateral flexion of C spine (active release technique)  -GJ        Manual Rx 4    Manual Rx 4 Location  --  manual stretch of L UT/levator stretch  -GJ       User Key  (r) = Recorded By, (t) = Taken By, (c) = Cosigned By    Initials Name Provider Type    Cj Dobson, PT Physical Therapist          PT OP Goals     Row Name 04/23/19 1400          PT Short Term Goals    STG Date to Achieve  04/11/19  -GJ     STG 1  Pt will be independent with initial HEP.  -GJ     STG 1 Progress  Ongoing;Progressing  -GJ     STG 1 Progress Comments  continues to require mod to max cuing for exercises  -GJ     STG 2  Pt will demonstrate correct postural alignment in sitting and standing ( mg head/neck/shoulder/scap) to reduce strain.  -GJ     STG 2 Progress  Progressing;Partially Met  -GJ        Long Term Goals    LTG Date to Achieve  04/27/19  -GJ     LTG 1  Pt will be independent with advanced HEP for shoulder and scapular strengthening.  -GJ     LTG 1 Progress  Ongoing  -GJ     LTG 2  Pt will be able to drive for 30 minutes with no more than 2/10 pain/discomfort.  -GJ     LTG 2 Progress  Progressing  -GJ     LTG 2 Progress Comments  pt report ability to drive15 min without increase in pain  -GJ     LTG 3  Patient will report >/= 50% reduction in frequency/intensity of episodes of pain/UE symptoms.   -GJ     LTG 3 Progress  Ongoing;Progressing  -GJ       User  Key  (r) = Recorded By, (t) = Taken By, (c) = Cosigned By    Initials Name Provider Type     Cj Rehman, PT Physical Therapist          Therapy Education  Education Details: hold on chin tucks at home. Discussed activity modifications  Given: HEP, Symptoms/condition management, Pain management, Mobility training, Posture/body mechanics  Program: Reinforced, Progressed  How Provided: Verbal  Provided to: Patient  Level of Understanding: Teach back education performed, Verbalized, Demonstrated              Time Calculation:   Start Time: 1445  Stop Time: 1535  Time Calculation (min): 50 min  Therapy Charges for Today     Code Description Service Date Service Provider Modifiers Qty    [id number]  PT THER PROC EA 15 MIN 4/23/2019 Cj Rehman, PT GP 1    [id number] HC PT MANUAL THERAPY EA 15 MIN 4/23/2019 Cj Rehman, PT GP 2                    Cj Rehman, PT  4/23/2019

## 2019-04-25 ENCOUNTER — HOSPITAL ENCOUNTER (OUTPATIENT)
Dept: PHYSICAL THERAPY | Facility: HOSPITAL | Age: 84
Setting detail: THERAPIES SERIES
Discharge: HOME | End: 2019-04-25

## 2019-04-25 DIAGNOSIS — R20.2 TINGLING OF LEFT UPPER EXTREMITY: ICD-10-CM

## 2019-04-25 DIAGNOSIS — M79.602 PAIN OF LEFT UPPER EXTREMITY: ICD-10-CM

## 2019-04-25 DIAGNOSIS — M54.2 CERVICAL PAIN: Primary | ICD-10-CM

## 2019-04-25 DIAGNOSIS — M54.12 LEFT CERVICAL RADICULOPATHY: ICD-10-CM

## 2019-04-25 PROCEDURE — 97110 THERAPEUTIC EXERCISES: CPT

## 2019-04-25 PROCEDURE — 97140 MANUAL THERAPY 1/> REGIONS: CPT

## 2019-04-25 NOTE — THERAPY TREATMENT NOTE
Outpatient Physical Therapy Ortho Treatment Note  [facility]     Patient Name: Radha Win  : 1932  MRN: [id number]  Today's Date: 2019      Visit Date: 2019    Visit Dx:    ICD-10-CM ICD-9-CM   1. Cervical pain M54.2 723.1   2. Pain of left upper extremity M79.602 729.5   3. Left cervical radiculopathy M54.12 723.4   4. Tingling of left upper extremity R20.2 782.0       Patient Active Problem List   Diagnosis   • Bilateral carotid artery plaque, 2018--16-49% bilateral carotid artery stenosis   • Benign prostatic hypertrophy   • Chronic renal insufficiency, stage IV (severe), 2016--creatinine 1.85   • Diverticulosis of colon   • Hyperlipidemia   • Osteopenia, 2017--lumbar spine -0.3.  Left femoral neck -1.6.  Right femoral neck -2.0.   • Vitamin B12 deficiency   • Vitamin D deficiency   • Allergic rhinitis   • Anemia due to chronic kidney disease   • Generalized anxiety disorder   • Benign essential hypertension   • Gastroesophageal reflux disease without esophagitis   • Folic acid deficiency   • Multiple environmental allergies   • Therapeutic drug monitoring   • Bilateral renal cysts   • Benign paroxysmal positional vertigo of right ear   • Primary osteoarthritis of knees, bilateral   • Impaired fasting glucose   • Muscle cramps, statin related, Vytorin and pravastatin.  Tolerates Livalo.   • Hypogonadism in male, on TRT, testosterone pellets, per    • Chronic neck pain   • Left cervical radiculopathy   • Statin intolerance, Vytorin and pravastatin        Past Medical History:   Diagnosis Date   • Allergic rhinitis 2016   • Anemia due to chronic kidney disease 2016   • Benign essential hypertension 2016   • Benign prostatic hypertrophy 2016--prostate biopsy reportedly negative.  I will try to obtain the report.  Patient sees urologist.  PSAs run from the 3-8 range   • Bilateral carotid artery plaque, 2016--16-49% bilateral  carotid artery stenosis 2/11/2016 09/21/2016--vascular screen reveals 16-49% bilateral carotid artery stenosis, negative for AAA, negative for PAD.  10/10/2008--vascular screening study revealed mild bilateral carotid plaque, no aortic aneurysm, no evidence of PAD   • Bilateral renal cysts 2/14/2016 04/07/2016--ultrasound of the kidneys reveals the previously described renal cysts are not well seen.  However, questionable incidental bladder tumor noted.  05/26/2010--ultrasound the kidneys was negative bilaterally except for small renal cysts.   • Chronic renal insufficiency, stage IV (severe), 02/09/2016--creatinine 1.85 2/11/2016 02/09/2016--serum creatinine 1.85.  BUN 29.  07/20/2015--patient was evaluated by the nephrologist.  Ultrasound of the kidneys ordered but this was never done.  05/22/2015--BUN 35, creatinine 2.3.  Patient referred to nephrology, Dr. Devyn Muniz.  04/16/2014--BUN 25, creatinine 1.77.  01/03/2013--BUN 37, creatinine 2.14.    05/26/2010---ultrasound of the kidneys reveal a small cyst x 2 right kidney.  Otherwise normal.    Baseline creatinine approximately 1.9-2.0.   • Diverticulosis of colon 2/11/2016    10/03/2013--colonoscopy revealed markedly redundant colon, pancolonic diverticulosis with several impacted fecalith.  No evidence of diverticulitis or stricture.  Was only able to reach the ascending colon.  Follow up barium enema revealed extensive diverticulosis.  No polyp or other mucosal mass seen.    06/11/2002--incomplete colonoscopy due to redundant colon.  Not able to get past the hepatic flexure.  Patient had followup barium contrast enema which showed extensive diverticulosis.   • Folic acid deficiency 2/11/2016   • Gastroesophageal reflux disease without esophagitis 2/11/2016   • Generalized anxiety disorder 2/11/2016   • History of diverticulitis of colon 2009 and 2003 05/08/2009-acute diverticulitis without complication. 09/05/2003-acute diverticulitis without  complication.    • Hyperlipidemia 2/11/2016 01/29/2005--treatment for hyperlipidemia begun.   • Hypogonadism in male, on TRT, testosterone pellets, per  6/16/2017 January 2017--patient reports his urologist initiated testosterone replacement therapy consisting of testosterone pellets every 6 months.   • Multiple environmental allergies 2/12/2016    Patient sees the allergist regularly and has been on immunotherapy.   • Muscle cramps, statin related, Vytorin and pravastatin.  Tolerates Livalo. 6/16/2017 12/21/2018--patient seems to be tolerating Livalo well.  10/02/2018--Livalo 2 mg per day initiated.  Recommend CoQ10 400 mg per day with food for better absorption.  Reassess with lab in about 8 weeks.  08/30/2018--patient presents with complaints of muscle cramps.  He discontinue pravastatin and the cramps went away.  Cramps returned even with a half dose.  This is despite the fact he was taking    • Osteopenia, 03/14/2017--lumbar spine -0.3.  Left femoral neck -1.6.  Right femoral neck -2.0. 2/11/2016 06/16/2017--patient seen in follow-up and reports he doesn't think he ever started Fosamax.  Osteopenia and needs to be reassessed with a DEXA scan.  03/14/2017--DEXA scan reveals lumbar spine T score -0.3.  Unchanged.  Left femoral neck T score -1.6, 6% decrease.  Right femoral neck T score -2.0.  Unchanged.  Assessment is osteopenia with a statistically significant interval decrease in the left hip.  07/18/2014--Fosamax 70 mg per day  initiated.  Calcium 1200 mg per day.    07/07/2014--DEXA scan revealed average lumbar spine T score -0.5.  Left proximal femoral T score is zero.  Left femoral neck T score -1.3.  Right proximal femoral T score 0.1.  Right femoral neck T score -2.0.  Osteopenia.   • Primary osteoarthritis of knees, bilateral 9/12/2016 09/12/2016--patient called in the office requesting a steriod injection in his knees.  I explained to him that I do no longer perform these injections and  "have referred him to Dr. Manuel.   • Statin intolerance, Vytorin and pravastatin 12/21/2018 12/21/2018--patient seems to be tolerating Livalo well.  10/02/2018--Livalo 2 mg per day initiated.  Recommend CoQ10 400 mg per day with food for better absorption.  Reassess with lab in about 8 weeks.  08/30/2018--patient presents with complaints of muscle cramps.  He discontinue pravastatin and the cramps went away.  Cramps returned even with a half dose.  This is despite the fact he was taking    • Vitamin B12 deficiency 2/11/2016 06/25/2009--B12 injections begun.   • Vitamin D deficiency 2/11/2016        Past Surgical History:   Procedure Laterality Date   • CATARACT EXTRACTION Left 12/12/2011 12/12/2011--cataract extirpation with intraocular lens implantation left eye.   • CATARACT EXTRACTION Right 12/2011 December 2011--right cataract extirpation with intraocular lens implantation.   • COLONOSCOPY  06/11/2002 06/11/2002--incomplete colonoscopy due to redundant colon. Not able to get past the hepatic flexure. Patient had followup barium contrast enema which showed extensive diverticulosis   • COLONOSCOPY  10/03/2013    10/03/2013--colonoscopy revealed markedly redundant colon, pancolonic diverticulosis with several impacted fecalith. No evidence of diverticulitis or stricture. Was only able to reach the ascending colon. Follow up barium enema revealed extensive diverticulosis. No polyp or other mucosal mass seen.   • CYSTOSCOPY  05/18/2016 05/18/2016--urology consultation.  Cystoscopy performed for possible bladder mass is negative.   • PROSTATE BIOPSY  12/21/2016 12/21/2016--prostate biopsy reportedly negative.  I will try to obtain the report.                       PT Assessment/Plan     Row Name 04/25/19 1500          PT Assessment    Assessment Comments  Mr. Win reports he is now able to drive for longer period of time without the severe pain.  He states he \"caught\" himself gardening because " he had forgotten he had pain.  He was able to demonstrate several correct reverse shoulder rolls today and performed scap retraction correctly 5 times in row, he does still tend to recruit pecs instead of scapular muscles.  He was able to perform 3 chin tuck/axial extensions correctly today during the 3 minutes we worked on that particular movement.  He has difficulty maintaining correct posture during activity without cuing however, in general, he has improved posture with decrease in forward head and decrease in his exaggerated kyphotic posture.  He is responding to therapy and would benefit from continuing.  -REILLY        PT Plan    PT Plan Comments  cont ther ex reinforcement with continuous monitoring of form and posture.  -REILLY       User Key  (r) = Recorded By, (t) = Taken By, (c) = Cosigned By    Initials Name Provider Type    Yu Ceron, PT Physical Therapist          Modalities     Row Name 04/25/19 1300             Ice    Ice Applied  Yes  -REILLY      Location  upper back/neck  -REILLY      Rx Minutes  10 mins  -REILLY      Ice S/P Rx  Yes  -REILLY        User Key  (r) = Recorded By, (t) = Taken By, (c) = Cosigned By    Initials Name Provider Type    Yu Ceron, PT Physical Therapist        Exercises     Row Name 04/25/19 1300             Subjective Comments    Subjective Comments  It's getting better, I'm not having the pain like I was.  It is gradually going away.  -REILLY         Subjective Pain    Able to rate subjective pain?  yes  -REILLY      Pre-Treatment Pain Level  0  -REILLY      Subjective Pain Comment  reports pain/soreness 8-10 hrs following therapy but after that was good  -REILLY         Total Minutes    19844 - PT Therapeutic Exercise Minutes  23  -REILLY      37804 - PT Manual Therapy Minutes  15  -JA         Exercise 1    Exercise Name 1  UBE  -REILLY      Time 1  4 min  -REILLY         Exercise 2    Exercise Name 2  scap retraction at mirror  -REILLY      Cueing 2  Verbal;Tactile;Demo;Auditory  -REILLY      Reps 2   --  -JA      Time 2  5 min  -JA      Additional Comments  performed at mirror, requires copious cuing and continuous monitoring  -JA         Exercise 3    Exercise Name 3  reverse shoulder rolls  -JA      Cueing 3  Verbal;Tactile;Demo;Auditory  -JA      Reps 3  5 min  -JA      Additional Comments  performed at mirror, requires copious cuing and continuous monitoring  -JA         Exercise 4    Exercise Name 4  chin tucks, seated, manual, visual, auditory feedback  -JA      Cueing 4  Verbal;Tactile;Demo;Auditory  -JA      Reps 4  --  -JA      Time 4  5 imn  -JA      Additional Comments  performed at mirror, requires copious cuing and continuous monitoring  -JA         Exercise 5    Exercise Name 5  biceps curls seated   -JA      Cueing 5  Verbal;Tactile cued for posture in neutral spine with cue to lumbar   -JA      Sets 5  2  -JA      Reps 5  10  -JA      Additional Comments  3#; performed at mirror, requires copious cuing and continuous monitoring  -JA         Exercise 6    Exercise Name 6  standing posture  -JA      Cueing 6  Verbal;Demo  -JA      Time 6  3 min  -JA      Additional Comments  performed at mirror, requires copious cuing and continuous monitoring  -JA        User Key  (r) = Recorded By, (t) = Taken By, (c) = Cosigned By    Initials Name Provider Type    Yu Ceron, PT Physical Therapist                      Manual Rx (last 36 hours)      Manual Treatments     Row Name 04/25/19 1300             Total Minutes    68535 - PT Manual Therapy Minutes  15  -JA         Manual Rx 1    Manual Rx 1 Location  c-spine and upper thor/scap region  -JA      Manual Rx 1 Type  STM, TP release, UT and lev stretching, cerv distraction, axial extension  -JA      Manual Rx 1 Grade  moderate  -JA        User Key  (r) = Recorded By, (t) = Taken By, (c) = Cosigned By    Initials Name Provider Type    Yu Ceron, PT Physical Therapist              Therapy Education  Education Details: REinforced correct  posture and body mechanics.  Given: HEP, Symptoms/condition management, Pain management, Mobility training, Posture/body mechanics  Program: Reinforced, Progressed  How Provided: Verbal, Demonstration  Provided to: Patient  Level of Understanding: Teach back education performed, Verbalized, Demonstrated              Time Calculation:   Start Time: 1322  Stop Time: 1410  Time Calculation (min): 48 min  Therapy Charges for Today     Code Description Service Date Service Provider Modifiers Qty    67343575169 HC PT THER PROC EA 15 MIN 4/25/2019 Yu Garcia, PT GP 2    34457016556 HC PT MANUAL THERAPY EA 15 MIN 4/25/2019 Yu Garcia, PT GP 1    [id number]  PT HOT OR COLD PACK TREAT [facility] 4/25/2019 Yu Garcia, PT GP 1                    Yu Garcia, PT  4/25/2019

## 2019-04-29 ENCOUNTER — HOSPITAL ENCOUNTER (OUTPATIENT)
Dept: PHYSICAL THERAPY | Facility: HOSPITAL | Age: 84
Setting detail: THERAPIES SERIES
Discharge: HOME | End: 2019-04-29

## 2019-04-29 DIAGNOSIS — R20.2 TINGLING OF LEFT UPPER EXTREMITY: ICD-10-CM

## 2019-04-29 DIAGNOSIS — M54.2 CERVICAL PAIN: Primary | ICD-10-CM

## 2019-04-29 DIAGNOSIS — M79.602 PAIN OF LEFT UPPER EXTREMITY: ICD-10-CM

## 2019-04-29 DIAGNOSIS — M54.12 LEFT CERVICAL RADICULOPATHY: ICD-10-CM

## 2019-04-29 PROCEDURE — 97110 THERAPEUTIC EXERCISES: CPT

## 2019-04-29 PROCEDURE — 97140 MANUAL THERAPY 1/> REGIONS: CPT

## 2019-04-29 NOTE — THERAPY TREATMENT NOTE
Outpatient Physical Therapy Ortho Treatment Note  [facility]     Patient Name: Radha Win  : 1932  MRN: [id number]  Today's Date: 2019      Visit Date: 2019    Visit Dx:    ICD-10-CM ICD-9-CM   1. Cervical pain M54.2 723.1   2. Pain of left upper extremity M79.602 729.5   3. Left cervical radiculopathy M54.12 723.4   4. Tingling of left upper extremity R20.2 782.0       Patient Active Problem List   Diagnosis   • Bilateral carotid artery plaque, 2018--16-49% bilateral carotid artery stenosis   • Benign prostatic hypertrophy   • Chronic renal insufficiency, stage IV (severe), 2016--creatinine 1.85   • Diverticulosis of colon   • Hyperlipidemia   • Osteopenia, 2017--lumbar spine -0.3.  Left femoral neck -1.6.  Right femoral neck -2.0.   • Vitamin B12 deficiency   • Vitamin D deficiency   • Allergic rhinitis   • Anemia due to chronic kidney disease   • Generalized anxiety disorder   • Benign essential hypertension   • Gastroesophageal reflux disease without esophagitis   • Folic acid deficiency   • Multiple environmental allergies   • Therapeutic drug monitoring   • Bilateral renal cysts   • Benign paroxysmal positional vertigo of right ear   • Primary osteoarthritis of knees, bilateral   • Impaired fasting glucose   • Muscle cramps, statin related, Vytorin and pravastatin.  Tolerates Livalo.   • Hypogonadism in male, on TRT, testosterone pellets, per    • Chronic neck pain   • Left cervical radiculopathy   • Statin intolerance, Vytorin and pravastatin        Past Medical History:   Diagnosis Date   • Allergic rhinitis 2016   • Anemia due to chronic kidney disease 2016   • Benign essential hypertension 2016   • Benign prostatic hypertrophy 2016--prostate biopsy reportedly negative.  I will try to obtain the report.  Patient sees urologist.  PSAs run from the 3-8 range   • Bilateral carotid artery plaque, 2016--16-49% bilateral  carotid artery stenosis 2/11/2016 09/21/2016--vascular screen reveals 16-49% bilateral carotid artery stenosis, negative for AAA, negative for PAD.  10/10/2008--vascular screening study revealed mild bilateral carotid plaque, no aortic aneurysm, no evidence of PAD   • Bilateral renal cysts 2/14/2016 04/07/2016--ultrasound of the kidneys reveals the previously described renal cysts are not well seen.  However, questionable incidental bladder tumor noted.  05/26/2010--ultrasound the kidneys was negative bilaterally except for small renal cysts.   • Chronic renal insufficiency, stage IV (severe), 02/09/2016--creatinine 1.85 2/11/2016 02/09/2016--serum creatinine 1.85.  BUN 29.  07/20/2015--patient was evaluated by the nephrologist.  Ultrasound of the kidneys ordered but this was never done.  05/22/2015--BUN 35, creatinine 2.3.  Patient referred to nephrology, Dr. Devyn Muniz.  04/16/2014--BUN 25, creatinine 1.77.  01/03/2013--BUN 37, creatinine 2.14.    05/26/2010---ultrasound of the kidneys reveal a small cyst x 2 right kidney.  Otherwise normal.    Baseline creatinine approximately 1.9-2.0.   • Diverticulosis of colon 2/11/2016    10/03/2013--colonoscopy revealed markedly redundant colon, pancolonic diverticulosis with several impacted fecalith.  No evidence of diverticulitis or stricture.  Was only able to reach the ascending colon.  Follow up barium enema revealed extensive diverticulosis.  No polyp or other mucosal mass seen.    06/11/2002--incomplete colonoscopy due to redundant colon.  Not able to get past the hepatic flexure.  Patient had followup barium contrast enema which showed extensive diverticulosis.   • Folic acid deficiency 2/11/2016   • Gastroesophageal reflux disease without esophagitis 2/11/2016   • Generalized anxiety disorder 2/11/2016   • History of diverticulitis of colon 2009 and 2003 05/08/2009-acute diverticulitis without complication. 09/05/2003-acute diverticulitis without  complication.    • Hyperlipidemia 2/11/2016 01/29/2005--treatment for hyperlipidemia begun.   • Hypogonadism in male, on TRT, testosterone pellets, per  6/16/2017 January 2017--patient reports his urologist initiated testosterone replacement therapy consisting of testosterone pellets every 6 months.   • Multiple environmental allergies 2/12/2016    Patient sees the allergist regularly and has been on immunotherapy.   • Muscle cramps, statin related, Vytorin and pravastatin.  Tolerates Livalo. 6/16/2017 12/21/2018--patient seems to be tolerating Livalo well.  10/02/2018--Livalo 2 mg per day initiated.  Recommend CoQ10 400 mg per day with food for better absorption.  Reassess with lab in about 8 weeks.  08/30/2018--patient presents with complaints of muscle cramps.  He discontinue pravastatin and the cramps went away.  Cramps returned even with a half dose.  This is despite the fact he was taking    • Osteopenia, 03/14/2017--lumbar spine -0.3.  Left femoral neck -1.6.  Right femoral neck -2.0. 2/11/2016 06/16/2017--patient seen in follow-up and reports he doesn't think he ever started Fosamax.  Osteopenia and needs to be reassessed with a DEXA scan.  03/14/2017--DEXA scan reveals lumbar spine T score -0.3.  Unchanged.  Left femoral neck T score -1.6, 6% decrease.  Right femoral neck T score -2.0.  Unchanged.  Assessment is osteopenia with a statistically significant interval decrease in the left hip.  07/18/2014--Fosamax 70 mg per day  initiated.  Calcium 1200 mg per day.    07/07/2014--DEXA scan revealed average lumbar spine T score -0.5.  Left proximal femoral T score is zero.  Left femoral neck T score -1.3.  Right proximal femoral T score 0.1.  Right femoral neck T score -2.0.  Osteopenia.   • Primary osteoarthritis of knees, bilateral 9/12/2016 09/12/2016--patient called in the office requesting a steriod injection in his knees.  I explained to him that I do no longer perform these injections and  have referred him to Dr. Manuel.   • Statin intolerance, Vytorin and pravastatin 12/21/2018 12/21/2018--patient seems to be tolerating Livalo well.  10/02/2018--Livalo 2 mg per day initiated.  Recommend CoQ10 400 mg per day with food for better absorption.  Reassess with lab in about 8 weeks.  08/30/2018--patient presents with complaints of muscle cramps.  He discontinue pravastatin and the cramps went away.  Cramps returned even with a half dose.  This is despite the fact he was taking    • Vitamin B12 deficiency 2/11/2016 06/25/2009--B12 injections begun.   • Vitamin D deficiency 2/11/2016        Past Surgical History:   Procedure Laterality Date   • CATARACT EXTRACTION Left 12/12/2011 12/12/2011--cataract extirpation with intraocular lens implantation left eye.   • CATARACT EXTRACTION Right 12/2011 December 2011--right cataract extirpation with intraocular lens implantation.   • COLONOSCOPY  06/11/2002 06/11/2002--incomplete colonoscopy due to redundant colon. Not able to get past the hepatic flexure. Patient had followup barium contrast enema which showed extensive diverticulosis   • COLONOSCOPY  10/03/2013    10/03/2013--colonoscopy revealed markedly redundant colon, pancolonic diverticulosis with several impacted fecalith. No evidence of diverticulitis or stricture. Was only able to reach the ascending colon. Follow up barium enema revealed extensive diverticulosis. No polyp or other mucosal mass seen.   • CYSTOSCOPY  05/18/2016 05/18/2016--urology consultation.  Cystoscopy performed for possible bladder mass is negative.   • PROSTATE BIOPSY  12/21/2016 12/21/2016--prostate biopsy reportedly negative.  I will try to obtain the report.                       PT Assessment/Plan     Row Name 04/29/19 1600          PT Assessment    Assessment Comments  Pt reports continued improvement with L neck/shoulder pain, including no provocation of pain now when he coughs.  Some bands of taut muscle noted  "in levator scap however pt reported no tenderness with palpation.  He was able to perform reverse shoulder rolls and scap retraction more readily today with much less substitution.  In supine, after manual techniques, he was able to perform correct \"chin tuck\" (axial extension) without cervical flexion or movement of jaw.  Will reassess next visit and likely D/C to HEP.  -JA        PT Plan    PT Plan Comments  assess response to scap ret with tband, pain with ADL's?, D/C  -REILLY       User Key  (r) = Recorded By, (t) = Taken By, (c) = Cosigned By    Initials Name Provider Type    Yu Ceron, PT Physical Therapist          Modalities     Row Name 04/29/19 1500             Ice    Ice Applied  Yes  -REILLY      Location  upper back/neck  -REILLY      Rx Minutes  10 mins  -REILLY      Ice S/P Rx  Yes  -REILLY        User Key  (r) = Recorded By, (t) = Taken By, (c) = Cosigned By    Initials Name Provider Type    Yu Ceron, PT Physical Therapist        Exercises     Row Name 04/29/19 1500 04/29/19 1400          Subjective Comments    Subjective Comments  --  Still doing better.  It used to hurt when I coughed and it hasn't done that recently.  -REILLY        Subjective Pain    Able to rate subjective pain?  --  yes  -REILLY     Pre-Treatment Pain Level  --  0  -JA        Total Minutes    81036 - PT Therapeutic Exercise Minutes  --  23  -JA     69988 - PT Manual Therapy Minutes  20  -JA  --        Exercise 1    Exercise Name 1  --  UBE  -JA     Time 1  --  4 min  -JA        Exercise 2    Exercise Name 2  --  scap retraction at mirror  -REILLY     Cueing 2  --  Verbal;Tactile;Demo;Auditory  -JA     Time 2  --  5 min  -JA     Additional Comments  --  performed at mirror, requires copious cuing and continuous monitoring  -JA        Exercise 3    Exercise Name 3  --  reverse shoulder rolls  -REILLY     Cueing 3  --  Verbal;Tactile;Demo;Auditory  -JA     Reps 3  --  5 min  -JA     Additional Comments  --  performed at mirror, requires " copious cuing and continuous monitoring  -JA        Exercise 4    Exercise Name 4  --  chin tucks, seated, manual, visual, auditory feedback  -JA     Cueing 4  --  Verbal;Tactile;Demo;Auditory  -JA     Time 4  --  5 imn  -JA     Additional Comments  --  performed at mirror, requires copious cuing and continuous monitoring  -JA        Exercise 5    Exercise Name 5  --  biceps curls seated   -JA     Cueing 5  --  Verbal;Tactile cued for posture in neutral spine with cue to lumbar   -REILLY     Sets 5  --  2  -JA     Reps 5  --  10  -JA     Additional Comments  --  3#; performed at mirror, requires copious cuing and continuous monitoring  -JA        Exercise 6    Exercise Name 6  --  standing posture  -JA     Cueing 6  --  Verbal;Demo  -JA     Time 6  --  3 min  -JA     Additional Comments  --  performed at mirror, requires copious cuing and continuous monitoring  -JA        Exercise 7    Exercise Name 7  --  progressed with scap ret w/tband  -JA     Reps 7  --  15  -JA     Additional Comments  --  yellow  -REILLY       User Key  (r) = Recorded By, (t) = Taken By, (c) = Cosigned By    Initials Name Provider Type    Yu Ceron, PT Physical Therapist                      Manual Rx (last 36 hours)      Manual Treatments     Row Name 04/29/19 1500             Total Minutes    59092 - PT Manual Therapy Minutes  20  -         Manual Rx 1    Manual Rx 1 Location  c-spine and upper thor/scap region  -REILLY      Manual Rx 1 Type  STM, TP release, UT and lev stretching, cerv distraction, axial extension  -REILLY      Manual Rx 1 Grade  moderate  -REILLY        User Key  (r) = Recorded By, (t) = Taken By, (c) = Cosigned By    Initials Name Provider Type    Yu Ceron, PT Physical Therapist              Therapy Education  Education Details: Fewer cues needed for ex's today.  added scap ret w/YTB and if pt can perform correctly next visit may give with final HEP  Given: HEP, Symptoms/condition management, Pain management,  Mobility training, Posture/body mechanics  Program: Reinforced, Progressed  How Provided: Verbal, Demonstration  Provided to: Patient  Level of Understanding: Teach back education performed, Verbalized, Demonstrated              Time Calculation:   Start Time: 1445  Stop Time: 1540  Time Calculation (min): 55 min  Therapy Charges for Today     Code Description Service Date Service Provider Modifiers Qty    84957900495  PT THER PROC EA 15 MIN 4/29/2019 Yu Garcia, PT GP 2    [id number]  PT MANUAL THERAPY EA 15 MIN 4/29/2019 Yu Garcia, PT GP 1    [id number]  PT HOT OR COLD PACK TREAT MCARE 4/29/2019 Yu Garcia, PT GP 1                    Yu Garcia, PT  4/29/2019

## 2019-05-01 ENCOUNTER — HOSPITAL ENCOUNTER (OUTPATIENT)
Dept: PHYSICAL THERAPY | Facility: HOSPITAL | Age: 84
Setting detail: THERAPIES SERIES
Discharge: HOME | End: 2019-05-01

## 2019-05-01 PROCEDURE — 97110 THERAPEUTIC EXERCISES: CPT

## 2019-05-01 PROCEDURE — 97140 MANUAL THERAPY 1/> REGIONS: CPT

## 2019-05-01 NOTE — THERAPY DISCHARGE NOTE
Outpatient Physical Therapy Ortho Treatment Note/Discharge Summary  [facility]     Patient Name: Radha Win  : 1932  MRN: [id number]  Today's Date: 2019      Visit Date: 2019    Visit Dx:  No diagnosis found.    Patient Active Problem List   Diagnosis   • Bilateral carotid artery plaque, 2018--16-49% bilateral carotid artery stenosis   • Benign prostatic hypertrophy   • Chronic renal insufficiency, stage IV (severe), 2016--creatinine 1.85   • Diverticulosis of colon   • Hyperlipidemia   • Osteopenia, 2017--lumbar spine -0.3.  Left femoral neck -1.6.  Right femoral neck -2.0.   • Vitamin B12 deficiency   • Vitamin D deficiency   • Allergic rhinitis   • Anemia due to chronic kidney disease   • Generalized anxiety disorder   • Benign essential hypertension   • Gastroesophageal reflux disease without esophagitis   • Folic acid deficiency   • Multiple environmental allergies   • Therapeutic drug monitoring   • Bilateral renal cysts   • Benign paroxysmal positional vertigo of right ear   • Primary osteoarthritis of knees, bilateral   • Impaired fasting glucose   • Muscle cramps, statin related, Vytorin and pravastatin.  Tolerates Livalo.   • Hypogonadism in male, on TRT, testosterone pellets, per    • Chronic neck pain   • Left cervical radiculopathy   • Statin intolerance, Vytorin and pravastatin        Past Medical History:   Diagnosis Date   • Allergic rhinitis 2016   • Anemia due to chronic kidney disease 2016   • Benign essential hypertension 2016   • Benign prostatic hypertrophy 2016--prostate biopsy reportedly negative.  I will try to obtain the report.  Patient sees urologist.  PSAs run from the 3-8 range   • Bilateral carotid artery plaque, 2016--16-49% bilateral carotid artery stenosis 2016--vascular screen reveals 16-49% bilateral carotid artery stenosis, negative for AAA, negative for PAD.   10/10/2008--vascular screening study revealed mild bilateral carotid plaque, no aortic aneurysm, no evidence of PAD   • Bilateral renal cysts 2/14/2016 04/07/2016--ultrasound of the kidneys reveals the previously described renal cysts are not well seen.  However, questionable incidental bladder tumor noted.  05/26/2010--ultrasound the kidneys was negative bilaterally except for small renal cysts.   • Chronic renal insufficiency, stage IV (severe), 02/09/2016--creatinine 1.85 2/11/2016 02/09/2016--serum creatinine 1.85.  BUN 29.  07/20/2015--patient was evaluated by the nephrologist.  Ultrasound of the kidneys ordered but this was never done.  05/22/2015--BUN 35, creatinine 2.3.  Patient referred to nephrology, Dr. Devyn Muniz.  04/16/2014--BUN 25, creatinine 1.77.  01/03/2013--BUN 37, creatinine 2.14.    05/26/2010---ultrasound of the kidneys reveal a small cyst x 2 right kidney.  Otherwise normal.    Baseline creatinine approximately 1.9-2.0.   • Diverticulosis of colon 2/11/2016    10/03/2013--colonoscopy revealed markedly redundant colon, pancolonic diverticulosis with several impacted fecalith.  No evidence of diverticulitis or stricture.  Was only able to reach the ascending colon.  Follow up barium enema revealed extensive diverticulosis.  No polyp or other mucosal mass seen.    06/11/2002--incomplete colonoscopy due to redundant colon.  Not able to get past the hepatic flexure.  Patient had followup barium contrast enema which showed extensive diverticulosis.   • Folic acid deficiency 2/11/2016   • Gastroesophageal reflux disease without esophagitis 2/11/2016   • Generalized anxiety disorder 2/11/2016   • History of diverticulitis of colon 2009 and 2003 05/08/2009-acute diverticulitis without complication. 09/05/2003-acute diverticulitis without complication.    • Hyperlipidemia 2/11/2016 01/29/2005--treatment for hyperlipidemia begun.   • Hypogonadism in male, on TRT, testosterone pellets, per   6/16/2017 January 2017--patient reports his urologist initiated testosterone replacement therapy consisting of testosterone pellets every 6 months.   • Multiple environmental allergies 2/12/2016    Patient sees the allergist regularly and has been on immunotherapy.   • Muscle cramps, statin related, Vytorin and pravastatin.  Tolerates Livalo. 6/16/2017 12/21/2018--patient seems to be tolerating Livalo well.  10/02/2018--Livalo 2 mg per day initiated.  Recommend CoQ10 400 mg per day with food for better absorption.  Reassess with lab in about 8 weeks.  08/30/2018--patient presents with complaints of muscle cramps.  He discontinue pravastatin and the cramps went away.  Cramps returned even with a half dose.  This is despite the fact he was taking    • Osteopenia, 03/14/2017--lumbar spine -0.3.  Left femoral neck -1.6.  Right femoral neck -2.0. 2/11/2016 06/16/2017--patient seen in follow-up and reports he doesn't think he ever started Fosamax.  Osteopenia and needs to be reassessed with a DEXA scan.  03/14/2017--DEXA scan reveals lumbar spine T score -0.3.  Unchanged.  Left femoral neck T score -1.6, 6% decrease.  Right femoral neck T score -2.0.  Unchanged.  Assessment is osteopenia with a statistically significant interval decrease in the left hip.  07/18/2014--Fosamax 70 mg per day  initiated.  Calcium 1200 mg per day.    07/07/2014--DEXA scan revealed average lumbar spine T score -0.5.  Left proximal femoral T score is zero.  Left femoral neck T score -1.3.  Right proximal femoral T score 0.1.  Right femoral neck T score -2.0.  Osteopenia.   • Primary osteoarthritis of knees, bilateral 9/12/2016 09/12/2016--patient called in the office requesting a steriod injection in his knees.  I explained to him that I do no longer perform these injections and have referred him to Dr. Manuel.   • Statin intolerance, Vytorin and pravastatin 12/21/2018 12/21/2018--patient seems to be tolerating Livalo well.   10/02/2018--Livalo 2 mg per day initiated.  Recommend CoQ10 400 mg per day with food for better absorption.  Reassess with lab in about 8 weeks.  08/30/2018--patient presents with complaints of muscle cramps.  He discontinue pravastatin and the cramps went away.  Cramps returned even with a half dose.  This is despite the fact he was taking    • Vitamin B12 deficiency 2/11/2016 06/25/2009--B12 injections begun.   • Vitamin D deficiency 2/11/2016        Past Surgical History:   Procedure Laterality Date   • CATARACT EXTRACTION Left 12/12/2011 12/12/2011--cataract extirpation with intraocular lens implantation left eye.   • CATARACT EXTRACTION Right 12/2011 December 2011--right cataract extirpation with intraocular lens implantation.   • COLONOSCOPY  06/11/2002 06/11/2002--incomplete colonoscopy due to redundant colon. Not able to get past the hepatic flexure. Patient had followup barium contrast enema which showed extensive diverticulosis   • COLONOSCOPY  10/03/2013    10/03/2013--colonoscopy revealed markedly redundant colon, pancolonic diverticulosis with several impacted fecalith. No evidence of diverticulitis or stricture. Was only able to reach the ascending colon. Follow up barium enema revealed extensive diverticulosis. No polyp or other mucosal mass seen.   • CYSTOSCOPY  05/18/2016 05/18/2016--urology consultation.  Cystoscopy performed for possible bladder mass is negative.   • PROSTATE BIOPSY  12/21/2016 12/21/2016--prostate biopsy reportedly negative.  I will try to obtain the report.                       PT Assessment/Plan     Row Name 05/01/19 1500          PT Assessment    Assessment Comments  Mr. Win has been seen for 8 visits for 7 visits since returning to therapy due to an exacerbation of L-sided neck/shoulder/L UE pain and intermittent tingling.  Therapy consisted of manual techniques to reduce abnormal facilitation in L cervical pvm, but more particularly levator scapulae  and upper trapezius muscles and therapeutic exercise to address poor posture and postural muscle weakness.  He had significantly poor strength and ability to recruit parascapular muscles initially. Currently, he demonstrates improving posture and improving attention to his posture and body mechanics with ADL's.  He reports he is now able to drive without pain and has met all goals for therapy.  He is ready to continue on his own with HEP.  -JA        PT Plan    PT Plan Comments  D/C to HEP  -REILLY       User Key  (r) = Recorded By, (t) = Taken By, (c) = Cosigned By    Initials Name Provider Type    Yu Ceron, PT Physical Therapist          Modalities     Row Name 05/01/19 1500             Ice    Ice Applied  Yes  -REILLY      Location  upper back/neck  -JA      Rx Minutes  10 mins  -JA      Ice S/P Rx  Yes  -REILLY        User Key  (r) = Recorded By, (t) = Taken By, (c) = Cosigned By    Initials Name Provider Type    Yu Ceron, PT Physical Therapist          Exercises     Row Name 05/01/19 1500             Subjective Comments    Subjective Comments  I have a little soreness today, I mowed yesterday.  -REILLY         Subjective Pain    Able to rate subjective pain?  yes  -REILLY      Pre-Treatment Pain Level  1  -JA         Total Minutes    79154 - PT Therapeutic Exercise Minutes  20  -JA      36252 - PT Manual Therapy Minutes  23  -JA         Exercise 1    Exercise Name 1  UBE  -JA      Time 1  4 min  -JA         Exercise 2    Exercise Name 2  scap retraction at mirror  -JA      Cueing 2  Verbal;Tactile;Demo;Auditory  -JA      Time 2  5 min  -JA         Exercise 3    Exercise Name 3  reverse shoulder rolls  -JA      Cueing 3  Verbal;Tactile;Demo;Auditory  -JA      Reps 3  5 min  -JA         Exercise 4    Exercise Name 4  chin tucks, seated, manual, visual, auditory feedback  -JA      Cueing 4  Verbal;Tactile;Demo;Auditory  -JA      Time 4  5 imn  -JA         Exercise 5    Exercise Name 5  biceps curls seated    -REILLY      Cueing 5  Verbal;Tactile cued for posture in neutral spine with cue to lumbar   -      Sets 5  2  -JA      Reps 5  10  -JA         Exercise 6    Exercise Name 6  standing posture  -REILLY      Cueing 6  Verbal;Demo  -JA      Time 6  3 min  -         Exercise 7    Exercise Name 7  progressed with scap ret w/tband  -      Reps 7  15  -JA      Additional Comments  yellow; issued for home  -REILLY        User Key  (r) = Recorded By, (t) = Taken By, (c) = Cosigned By    Initials Name Provider Type    Yu Ceron, PT Physical Therapist                        Manual Rx (last 36 hours)      Manual Treatments     Row Name 05/01/19 1500             Total Minutes    59980 - PT Manual Therapy Minutes  23  -         Manual Rx 1    Manual Rx 1 Location  c-spine and upper thor/scap region  -      Manual Rx 1 Type  STM, TP release, UT and lev stretching, cerv distraction, axial extension  -      Manual Rx 1 Grade  moderate  -REILLY        User Key  (r) = Recorded By, (t) = Taken By, (c) = Cosigned By    Initials Name Provider Type    Yu Ceron, PT Physical Therapist          PT OP Goals     Row Name 05/01/19 1500          PT Short Term Goals    STG Date to Achieve  04/11/19  -REILLY     STG 1  Pt will be independent with initial HEP.  -REILLY     STG 1 Progress  Met  -REILLY     STG 2  Pt will demonstrate correct postural alignment in sitting and standing ( mg head/neck/shoulder/scap) to reduce strain.  -REILLY     STG 2 Progress  Met  -        Long Term Goals    LTG Date to Achieve  04/27/19  -REILLY     LTG 1  Pt will be independent with advanced HEP for shoulder and scapular strengthening.  -REILLY     LTG 1 Progress  Met  -     LTG 1 Progress Comments  pt able to demonstrate advanced HEP correctly  -     LTG 2  Pt will be able to drive for 30 minutes with no more than 2/10 pain/discomfort.  -     LTG 2 Progress  Met  -     LTG 3  Patient will report >/= 50% reduction in frequency/intensity of episodes of  pain/UE symptoms.   -REILLY     LTG 3 Progress  Met  -REILLY     LTG 3 Progress Comments  reports no problem with driving now,  just mild soreness with mowing his lawn on riding mower--we determined that he needs to take breaks every 30 minutes when mowing because it takes 1 1/2 hours to complete  -REILLY       User Key  (r) = Recorded By, (t) = Taken By, (c) = Cosigned By    Initials Name Provider Type    Yu Ceron, PT Physical Therapist          Therapy Education  Education Details: pt able to reverse demonstrate resisted scap ret with yellow tband and one was issued for home.  Advised him to take breaks with mowing because it takes 1.5 hours to mow and he was mowing straight through.  Yesterday he mowed and had to go over some parts more and the extra time along with the steering likely caused more stress to neck/shoulder causing the soreness.  Given: HEP, Symptoms/condition management, Pain management, Mobility training, Posture/body mechanics  Program: Reinforced, Progressed  How Provided: Verbal, Demonstration  Provided to: Patient  Level of Understanding: Teach back education performed, Verbalized, Demonstrated              Time Calculation:   Start Time: 1445  Stop Time: 1540  Time Calculation (min): 55 min  Therapy Charges for Today     Code Description Service Date Service Provider Modifiers Qty    97572974084 HC PT THER PROC EA 15 MIN 5/1/2019 Yu Garcia, PT GP 1    21543802787 HC PT MANUAL THERAPY EA 15 MIN 5/1/2019 Yu Garcia, PT GP 2    [id number]  PT HOT OR COLD PACK TREAT MCARE 5/1/2019 Yu Garcia, PT GP 1                OP PT Discharge Summary  Date of Discharge: 05/01/19  Reason for Discharge: All goals achieved  Outcomes Achieved: Able to achieve all goals within established timeline  Discharge Destination: Home with home program  Discharge Instructions/Additional Comments: Be mindful of posture ALL the time.      Yu Garcia PT  5/1/2019

## 2019-05-02 ENCOUNTER — OFFICE VISIT (OUTPATIENT)
Dept: NEUROSURGERY | Facility: CLINIC | Age: 84
End: 2019-05-02

## 2019-05-02 VITALS
HEIGHT: 67 IN | SYSTOLIC BLOOD PRESSURE: 135 MMHG | WEIGHT: 192 LBS | HEART RATE: 104 BPM | BODY MASS INDEX: 30.13 KG/M2 | DIASTOLIC BLOOD PRESSURE: 79 MMHG

## 2019-05-02 DIAGNOSIS — M50.30 DEGENERATION OF CERVICAL INTERVERTEBRAL DISC: Primary | ICD-10-CM

## 2019-05-02 PROBLEM — M54.12 LEFT CERVICAL RADICULOPATHY: Status: RESOLVED | Noted: 2018-06-28 | Resolved: 2019-05-02

## 2019-05-02 PROCEDURE — 99213 OFFICE O/P EST LOW 20 MIN: CPT | Performed by: PHYSICIAN ASSISTANT

## 2019-05-20 ENCOUNTER — OFFICE VISIT (OUTPATIENT)
Dept: INTERNAL MEDICINE | Facility: CLINIC | Age: 84
End: 2019-05-20

## 2019-05-20 VITALS
OXYGEN SATURATION: 98 % | HEART RATE: 96 BPM | WEIGHT: 194.5 LBS | DIASTOLIC BLOOD PRESSURE: 68 MMHG | SYSTOLIC BLOOD PRESSURE: 146 MMHG | HEIGHT: 67 IN | BODY MASS INDEX: 30.53 KG/M2

## 2019-05-20 DIAGNOSIS — K21.9 GASTROESOPHAGEAL REFLUX DISEASE WITHOUT ESOPHAGITIS: Chronic | ICD-10-CM

## 2019-05-20 DIAGNOSIS — R73.01 IMPAIRED FASTING GLUCOSE: Chronic | ICD-10-CM

## 2019-05-20 DIAGNOSIS — M50.30 DEGENERATION OF CERVICAL INTERVERTEBRAL DISC: ICD-10-CM

## 2019-05-20 DIAGNOSIS — M54.2 CHRONIC NECK PAIN: Chronic | ICD-10-CM

## 2019-05-20 DIAGNOSIS — R07.9 EXERTIONAL CHEST PAIN: Primary | ICD-10-CM

## 2019-05-20 DIAGNOSIS — E55.9 VITAMIN D DEFICIENCY: Chronic | ICD-10-CM

## 2019-05-20 DIAGNOSIS — I65.23 ATHEROSCLEROSIS OF BOTH CAROTID ARTERIES: Chronic | ICD-10-CM

## 2019-05-20 DIAGNOSIS — E29.1 HYPOGONADISM IN MALE: Chronic | ICD-10-CM

## 2019-05-20 DIAGNOSIS — N18.4 CHRONIC RENAL INSUFFICIENCY, STAGE IV (SEVERE) (HCC): Chronic | ICD-10-CM

## 2019-05-20 DIAGNOSIS — I10 BENIGN ESSENTIAL HYPERTENSION: Chronic | ICD-10-CM

## 2019-05-20 DIAGNOSIS — G89.29 CHRONIC NECK PAIN: Chronic | ICD-10-CM

## 2019-05-20 DIAGNOSIS — E78.5 HYPERLIPIDEMIA, UNSPECIFIED HYPERLIPIDEMIA TYPE: Chronic | ICD-10-CM

## 2019-05-20 PROCEDURE — 99214 OFFICE O/P EST MOD 30 MIN: CPT | Performed by: INTERNAL MEDICINE

## 2019-05-20 NOTE — PROGRESS NOTES
05/20/2019    Patient Information  Radha Win                                                                                          [address]  [facility] [address]      7/26/1932  [unfilled]  There is no work phone number on file.    Chief Complaint:     Complaining of chest pain and shortness of breath with exertion.  Continued problems with chronic neck pain and pain in the left scapular area.    History of Present Illness:    Patient with a history of multiple cardiovascular risk factors including known carotid artery plaque, chronic renal insufficiency, hyperlipidemia, hypertension, impaired fasting glucose, hypogonadism and testosterone replacement therapy.  He presents today with complaints of exertional chest discomfort as described below.  Patient also continues to have problems of left scapular pain/chronic neck pain despite being evaluated by the neurosurgeon assistant and also undergoing physical therapy.  I explained to patient we would have to defer evaluation of this because the complaints of chest discomfort is far more potentially serious.  Past medical history reviewed and updated were necessary including health maintenance parameters.  This reveals he is up-to-date or else accounted for.    The history regarding exertional chest pain:    May 20, 2019--patient presents with approximately 1 month history of intermittent upper chest discomfort that he describes as a burning sensation and comes on with exertion such as climbing stairs.  No radiation into the neck or down the arms.  There is some associated shortness of breath.  The symptoms resolved very quickly with rest.  He reports he has had 10 or 12 episodes since the onset a month ago.  They always come on with exertion and do not occur at rest.  Examination reveals heart to be regular rate and rhythm without murmur rub or gallop.  Lungs are clear.  Given the risk factors, we need to proceed with  cardiovascular evaluation and we will start with a nuclear stress test.    Review of Systems   Constitution: Negative.   HENT: Negative.    Eyes: Negative.    Cardiovascular: Positive for chest pain and dyspnea on exertion. Negative for claudication, cyanosis, irregular heartbeat, leg swelling, near-syncope, orthopnea, palpitations, paroxysmal nocturnal dyspnea and syncope.   Respiratory: Negative.    Endocrine: Negative.    Hematologic/Lymphatic: Negative.    Skin: Negative.    Musculoskeletal: Negative.    Gastrointestinal: Negative.    Genitourinary: Negative.    Neurological: Negative.    Psychiatric/Behavioral: Negative.    Allergic/Immunologic: Negative.        Active Problems:    Patient Active Problem List   Diagnosis   • Bilateral carotid artery plaque, 05/24/2018--16-49% bilateral carotid artery stenosis   • Benign prostatic hypertrophy   • Chronic renal insufficiency, stage IV (severe), 02/09/2016--creatinine 1.85   • Diverticulosis of colon   • Hyperlipidemia   • Osteopenia, 03/14/2017--lumbar spine -0.3.  Left femoral neck -1.6.  Right femoral neck -2.0.   • Vitamin B12 deficiency   • Vitamin D deficiency   • Allergic rhinitis   • Anemia due to chronic kidney disease   • Generalized anxiety disorder   • Benign essential hypertension   • Gastroesophageal reflux disease without esophagitis   • Folic acid deficiency   • Multiple environmental allergies   • Therapeutic drug monitoring   • Bilateral renal cysts   • Primary osteoarthritis of knees, bilateral   • Impaired fasting glucose   • Muscle cramps, statin related, Vytorin and pravastatin.  Tolerates Livalo.   • Hypogonadism in male, on TRT, testosterone pellets, per    • Chronic neck pain   • Statin intolerance, Vytorin and pravastatin   • Degeneration of cervical intervertebral disc   • Exertional chest pain         Past Medical History:   Diagnosis Date   • Allergic rhinitis 2/11/2016   • Anemia due to chronic kidney disease 2/11/2016   • Benign  essential hypertension 2/11/2016   • Benign prostatic hypertrophy 2/11/2016 12/21/2016--prostate biopsy reportedly negative.  I will try to obtain the report.  Patient sees urologist.  PSAs run from the 3-8 range   • Bilateral carotid artery plaque, 09/21/2016--16-49% bilateral carotid artery stenosis 2/11/2016 09/21/2016--vascular screen reveals 16-49% bilateral carotid artery stenosis, negative for AAA, negative for PAD.  10/10/2008--vascular screening study revealed mild bilateral carotid plaque, no aortic aneurysm, no evidence of PAD   • Bilateral renal cysts 2/14/2016 04/07/2016--ultrasound of the kidneys reveals the previously described renal cysts are not well seen.  However, questionable incidental bladder tumor noted.  05/26/2010--ultrasound the kidneys was negative bilaterally except for small renal cysts.   • Chronic renal insufficiency, stage IV (severe), 02/09/2016--creatinine 1.85 2/11/2016 02/09/2016--serum creatinine 1.85.  BUN 29.  07/20/2015--patient was evaluated by the nephrologist.  Ultrasound of the kidneys ordered but this was never done.  05/22/2015--BUN 35, creatinine 2.3.  Patient referred to nephrology, Dr. Devyn Muniz.  04/16/2014--BUN 25, creatinine 1.77.  01/03/2013--BUN 37, creatinine 2.14.    05/26/2010---ultrasound of the kidneys reveal a small cyst x 2 right kidney.  Otherwise normal.    Baseline creatinine approximately 1.9-2.0.   • Diverticulosis of colon 2/11/2016    10/03/2013--colonoscopy revealed markedly redundant colon, pancolonic diverticulosis with several impacted fecalith.  No evidence of diverticulitis or stricture.  Was only able to reach the ascending colon.  Follow up barium enema revealed extensive diverticulosis.  No polyp or other mucosal mass seen.    06/11/2002--incomplete colonoscopy due to redundant colon.  Not able to get past the hepatic flexure.  Patient had followup barium contrast enema which showed extensive diverticulosis.   • Folic acid  deficiency 2/11/2016   • Gastroesophageal reflux disease without esophagitis 2/11/2016   • Generalized anxiety disorder 2/11/2016   • History of diverticulitis of colon 2009 and 2003 05/08/2009-acute diverticulitis without complication. 09/05/2003-acute diverticulitis without complication.    • Hyperlipidemia 2/11/2016 01/29/2005--treatment for hyperlipidemia begun.   • Hypogonadism in male, on TRT, testosterone pellets, per  6/16/2017 January 2017--patient reports his urologist initiated testosterone replacement therapy consisting of testosterone pellets every 6 months.   • Multiple environmental allergies 2/12/2016    Patient sees the allergist regularly and has been on immunotherapy.   • Muscle cramps, statin related, Vytorin and pravastatin.  Tolerates Livalo. 6/16/2017 12/21/2018--patient seems to be tolerating Livalo well.  10/02/2018--Livalo 2 mg per day initiated.  Recommend CoQ10 400 mg per day with food for better absorption.  Reassess with lab in about 8 weeks.  08/30/2018--patient presents with complaints of muscle cramps.  He discontinue pravastatin and the cramps went away.  Cramps returned even with a half dose.  This is despite the fact he was taking    • Osteopenia, 03/14/2017--lumbar spine -0.3.  Left femoral neck -1.6.  Right femoral neck -2.0. 2/11/2016 06/16/2017--patient seen in follow-up and reports he doesn't think he ever started Fosamax.  Osteopenia and needs to be reassessed with a DEXA scan.  03/14/2017--DEXA scan reveals lumbar spine T score -0.3.  Unchanged.  Left femoral neck T score -1.6, 6% decrease.  Right femoral neck T score -2.0.  Unchanged.  Assessment is osteopenia with a statistically significant interval decrease in the left hip.  07/18/2014--Fosamax 70 mg per day  initiated.  Calcium 1200 mg per day.    07/07/2014--DEXA scan revealed average lumbar spine T score -0.5.  Left proximal femoral T score is zero.  Left femoral neck T score -1.3.  Right proximal  femoral T score 0.1.  Right femoral neck T score -2.0.  Osteopenia.   • Primary osteoarthritis of knees, bilateral 9/12/2016 09/12/2016--patient called in the office requesting a steriod injection in his knees.  I explained to him that I do no longer perform these injections and have referred him to Dr. Manuel.   • Statin intolerance, Vytorin and pravastatin 12/21/2018 12/21/2018--patient seems to be tolerating Livalo well.  10/02/2018--Livalo 2 mg per day initiated.  Recommend CoQ10 400 mg per day with food for better absorption.  Reassess with lab in about 8 weeks.  08/30/2018--patient presents with complaints of muscle cramps.  He discontinue pravastatin and the cramps went away.  Cramps returned even with a half dose.  This is despite the fact he was taking    • Vitamin B12 deficiency 2/11/2016 06/25/2009--B12 injections begun.   • Vitamin D deficiency 2/11/2016         Past Surgical History:   Procedure Laterality Date   • CATARACT EXTRACTION Left 12/12/2011 12/12/2011--cataract extirpation with intraocular lens implantation left eye.   • CATARACT EXTRACTION Right 12/2011 December 2011--right cataract extirpation with intraocular lens implantation.   • COLONOSCOPY  06/11/2002 06/11/2002--incomplete colonoscopy due to redundant colon. Not able to get past the hepatic flexure. Patient had followup barium contrast enema which showed extensive diverticulosis   • COLONOSCOPY  10/03/2013    10/03/2013--colonoscopy revealed markedly redundant colon, pancolonic diverticulosis with several impacted fecalith. No evidence of diverticulitis or stricture. Was only able to reach the ascending colon. Follow up barium enema revealed extensive diverticulosis. No polyp or other mucosal mass seen.   • CYSTOSCOPY  05/18/2016 05/18/2016--urology consultation.  Cystoscopy performed for possible bladder mass is negative.   • PROSTATE BIOPSY  12/21/2016 12/21/2016--prostate biopsy reportedly negative.  I will  try to obtain the report.         No Known Allergies        Current Outpatient Medications:   •  ALPRAZolam (XANAX) 0.5 MG tablet, Take 1 tablet by mouth 2 (Two) Times a Day As Needed for Anxiety., Disp: 60 tablet, Rfl: 4  •  amLODIPine (NORVASC) 5 MG tablet, Take 1 tablet by mouth every morning., Disp: , Rfl:   •  aspirin (ASPIRIN LOW DOSE) 81 MG tablet, Take 1 tablet by mouth daily., Disp: , Rfl:   •  calcitriol (ROCALTROL) 0.25 MCG capsule, 1 capsule 3 (Three) Times a Week., Disp: , Rfl:   •  Cholecalciferol (VITAMIN D3) 5000 UNITS capsule capsule, Take 1 capsule by mouth daily., Disp: , Rfl:   •  Coenzyme Q10 (COQ10) 400 MG capsule, Take  by mouth., Disp: , Rfl:   •  folic acid (FOLVITE) 1 MG tablet, TAKE ONE TABLET BY MOUTH TWICE A DAY, Disp: 180 tablet, Rfl: 1  •  lisinopril-hydrochlorothiazide (PRINZIDE,ZESTORETIC) 20-12.5 MG per tablet, Take 2 tablets by mouth Daily., Disp: 180 tablet, Rfl: 1  •  omeprazole (priLOSEC) 40 MG capsule, Take 1 capsule by mouth Daily., Disp: 30 capsule, Rfl: 5  •  pitavastatin calcium (LIVALO) 2 MG tablet tablet, 1 by mouth daily for high cholesterol, Disp: 30 tablet, Rfl: 3    Current Facility-Administered Medications:   •  cyanocobalamin injection 1,000 mcg, 1,000 mcg, Intramuscular, Q28 Days, Delgado Patricia MD, 1,000 mcg at 04/11/19 1331      Family History   Problem Relation Age of Onset   • Diabetes Mother    • Heart disease Mother    • Stroke Father          Social History     Socioeconomic History   • Marital status:      Spouse name: Not on file   • Number of children: 2   • Years of education: 14   • Highest education level: Some college, no degree   Occupational History   • Occupation: Retired      Employer: [employer]   Social Needs   • Financial resource strain: Not hard at all   • Food insecurity:     Worry: Never true     Inability: Never true   • Transportation needs:     Medical: No     Non-medical: No   Tobacco Use   • Smoking status: Never Smoker  "  • Smokeless tobacco: Never Used   Substance and Sexual Activity   • Alcohol use: Yes     Frequency: 2-3 times a week     Drinks per session: 1 or 2     Comment: Moderate alcohol consumption   • Drug use: No   • Sexual activity: Yes     Partners: Female   Lifestyle   • Physical activity:     Days per week: 0 days     Minutes per session: 0 min   • Stress: Only a little   Relationships   • Social connections:     Talks on phone: More than three times a week     Gets together: Once a week     Attends S[employer] service: More than 4 times per year     Active member of club or organization: Yes     Attends meetings of clubs or organizations: More than 4 times per year     Relationship status:          Vitals:    05/20/19 1455   BP: 146/68   BP Location: Right arm   Pulse: 96   SpO2: 98%   Weight: 88.2 kg (194 lb 8 oz)   Height: 170 cm (66.93\")          Physical Exam:    General: Alert and oriented x 3.  No acute distress.  Normal affect.  HEENT: Pupils equal, round, reactive to light; extraocular movements intact; sclerae nonicteric; pharynx, ear canals and TMs normal.  Neck: Without JVD, thyromegaly, bruit, or adenopathy.  Lungs: Clear to auscultation in all fields.  Heart: Regular rate and rhythm without murmur, rub, gallop, or click.  Abdomen: Soft, nontender, without hepatosplenomegaly or hernia.  Bowel sounds normal.  : Deferred.  Rectal: Deferred.  Extremities: Without clubbing, cyanosis, edema, or pulse deficit.  Neurologic: Intact without focal deficit.  Normal station and gait observed during ingress and egress from the examination room.  Skin: Without significant lesion.  Musculoskeletal: Unremarkable.    Lab/other results:    I reviewed the MRI report of the cervical spine.    Assessment/Plan:     Diagnosis Plan   1. Exertional chest pain  Stress Test With Myocardial Perfusion - One Day   2. Bilateral carotid artery plaque, 05/24/2018--16-49% bilateral carotid artery stenosis     3. Chronic renal " insufficiency, stage IV (severe), 02/09/2016--creatinine 1.85     4. Hyperlipidemia, unspecified hyperlipidemia type     5. Benign essential hypertension     6. Impaired fasting glucose     7. Hypogonadism in male, on TRT, testosterone pellets, per      8. Chronic neck pain     9. Degeneration of cervical intervertebral disc     10. Gastroesophageal reflux disease without esophagitis       Patient with multiple cardiovascular risk factors presents with complaints of exertional chest pain.  The pain is always with exertion such as climbing stairs and quickly goes away with rest.  There is some mild shortness of breath as well.  Patient has no chest discomfort at rest.  He has had multiple episodes in the past month.  Patient does have degenerative disc disease of the cervical spine and has chronic neck pain but is complaining of left scapular pain that is in the distribution of the dorsal scapular nerve.  Patient has never been to pain management for consideration of epidural or steroid injections but this will have to be deferred to do more pressing issue of chest pain.  Another consideration would be his symptoms related to esophageal reflux with laryngopharyngeal symptoms.  Patient was on PPI therapy for quite some time but I instructed him to discontinue the omeprazole about 6 months ago.    Plan is as follows: Nuclear stress test ordered as soon as possible.  Patient will follow up on the phone for the results and possible further instructions.  He does have a follow-up appointment July 3, 2019 which I will plan on him keeping but we may need to meet sooner than that to try to clear up these other issues.  As mentioned, we will defer any further evaluation and treatment of his neck/left scapular pain at this time.         Procedures

## 2019-05-23 RX ORDER — ALPRAZOLAM 0.5 MG/1
TABLET ORAL
Qty: 60 TABLET | Refills: 3 | Status: SHIPPED | OUTPATIENT
Start: 2019-05-23 | End: 2020-01-28

## 2019-05-23 NOTE — TELEPHONE ENCOUNTER
Prescription refill authorization left on voicemail per Dr. Patricia.  Original printed prescription destroyed

## 2019-05-24 ENCOUNTER — HOSPITAL ENCOUNTER (OUTPATIENT)
Dept: CARDIOLOGY | Facility: HOSPITAL | Age: 84
Discharge: HOME | End: 2019-05-24
Admitting: INTERNAL MEDICINE

## 2019-05-24 VITALS — HEIGHT: 67 IN | WEIGHT: 194.45 LBS | BODY MASS INDEX: 30.52 KG/M2

## 2019-05-24 DIAGNOSIS — R07.9 EXERTIONAL CHEST PAIN: ICD-10-CM

## 2019-05-24 LAB
BH CV NUCLEAR PRIOR STUDY: 2
BH CV STRESS BP STAGE 1: NORMAL
BH CV STRESS BP STAGE 2: NORMAL
BH CV STRESS DURATION MIN STAGE 1: 4
BH CV STRESS DURATION SEC STAGE 1: 30
BH CV STRESS GRADE STAGE 1: 10
BH CV STRESS HR STAGE 1: 135
BH CV STRESS METS STAGE 1: 5
BH CV STRESS PROTOCOL 1: NORMAL
BH CV STRESS RECOVERY BP: NORMAL MMHG
BH CV STRESS RECOVERY HR: 98 BPM
BH CV STRESS SPEED STAGE 1: 1.7
BH CV STRESS STAGE 1: 1
LV EF NUC BP: 64 %
MAXIMAL PREDICTED HEART RATE: 134 BPM
PERCENT MAX PREDICTED HR: 100.75 %
STRESS BASELINE BP: NORMAL MMHG
STRESS BASELINE HR: 93 BPM
STRESS PERCENT HR: 119 %
STRESS POST ESTIMATED WORKLOAD: 5 METS
STRESS POST EXERCISE DUR MIN: 4 MIN
STRESS POST EXERCISE DUR SEC: 30 SEC
STRESS POST PEAK BP: NORMAL MMHG
STRESS POST PEAK HR: 135 BPM
STRESS TARGET HR: 114 BPM

## 2019-05-24 PROCEDURE — 93017 CV STRESS TEST TRACING ONLY: CPT

## 2019-05-24 PROCEDURE — 78452 HT MUSCLE IMAGE SPECT MULT: CPT | Performed by: INTERNAL MEDICINE

## 2019-05-24 PROCEDURE — 78452 HT MUSCLE IMAGE SPECT MULT: CPT

## 2019-05-24 PROCEDURE — 93016 CV STRESS TEST SUPVJ ONLY: CPT | Performed by: INTERNAL MEDICINE

## 2019-05-24 PROCEDURE — 0 TECHNETIUM TETROFOSMIN KIT: Performed by: INTERNAL MEDICINE

## 2019-05-24 PROCEDURE — A9502 TC99M TETROFOSMIN: HCPCS | Performed by: INTERNAL MEDICINE

## 2019-05-24 PROCEDURE — 25010000002 REGADENOSON 0.4 MG/5ML SOLUTION: Performed by: INTERNAL MEDICINE

## 2019-05-24 PROCEDURE — 93018 CV STRESS TEST I&R ONLY: CPT | Performed by: INTERNAL MEDICINE

## 2019-05-24 RX ADMIN — TETROFOSMIN 1 DOSE: 1.38 INJECTION, POWDER, LYOPHILIZED, FOR SOLUTION INTRAVENOUS at 07:41

## 2019-05-24 RX ADMIN — REGADENOSON 0.4 MG: 0.08 INJECTION, SOLUTION INTRAVENOUS at 08:35

## 2019-05-24 RX ADMIN — TETROFOSMIN 1 DOSE: 1.38 INJECTION, POWDER, LYOPHILIZED, FOR SOLUTION INTRAVENOUS at 08:35

## 2019-05-30 ENCOUNTER — TELEPHONE (OUTPATIENT)
Dept: INTERNAL MEDICINE | Facility: CLINIC | Age: 84
End: 2019-05-30

## 2019-06-29 LAB
25(OH)D3+25(OH)D2 SERPL-MCNC: 52 NG/ML (ref 30–100)
ALBUMIN SERPL-MCNC: 4.3 G/DL (ref 3.5–5.2)
ALBUMIN/GLOB SERPL: 2 G/DL
ALP SERPL-CCNC: 59 U/L (ref 39–117)
ALT SERPL-CCNC: 13 U/L (ref 1–41)
AST SERPL-CCNC: 13 U/L (ref 1–40)
BILIRUB SERPL-MCNC: 0.3 MG/DL (ref 0.2–1.2)
BUN SERPL-MCNC: 32 MG/DL (ref 8–23)
BUN/CREAT SERPL: 15.5 (ref 7–25)
CALCIUM SERPL-MCNC: 9.1 MG/DL (ref 8.6–10.5)
CHLORIDE SERPL-SCNC: 106 MMOL/L (ref 98–107)
CHOLEST SERPL-MCNC: 256 MG/DL (ref 100–199)
CK SERPL-CCNC: 80 U/L (ref 20–200)
CO2 SERPL-SCNC: 27.3 MMOL/L (ref 22–29)
CREAT SERPL-MCNC: 2.07 MG/DL (ref 0.76–1.27)
ERYTHROCYTE [DISTWIDTH] IN BLOOD BY AUTOMATED COUNT: 12.3 % (ref 12.3–15.4)
GLOBULIN SER CALC-MCNC: 2.1 GM/DL
GLUCOSE SERPL-MCNC: 123 MG/DL (ref 65–99)
HBA1C MFR BLD: 6.6 % (ref 4.8–5.6)
HCT VFR BLD AUTO: 36.5 % (ref 37.5–51)
HDL SERPL-SCNC: 26.2 UMOL/L
HDLC SERPL-MCNC: 33 MG/DL
HGB BLD-MCNC: 11.4 G/DL (ref 13–17.7)
LDL SERPL QN: 20.3 NM
LDL SERPL-SCNC: 2259 NMOL/L
LDL SMALL SERPL-SCNC: 1368 NMOL/L
LDLC SERPL CALC-MCNC: 183 MG/DL (ref 0–99)
MCH RBC QN AUTO: 29.5 PG (ref 26.6–33)
MCHC RBC AUTO-ENTMCNC: 31.2 G/DL (ref 31.5–35.7)
MCV RBC AUTO: 94.6 FL (ref 79–97)
PLATELET # BLD AUTO: 214 10*3/MM3 (ref 140–450)
POTASSIUM SERPL-SCNC: 5.3 MMOL/L (ref 3.5–5.2)
PROT SERPL-MCNC: 6.4 G/DL (ref 6–8.5)
RBC # BLD AUTO: 3.86 10*6/MM3 (ref 4.14–5.8)
SODIUM SERPL-SCNC: 144 MMOL/L (ref 136–145)
T3FREE SERPL-MCNC: 2.8 PG/ML (ref 2–4.4)
T4 FREE SERPL-MCNC: 1.11 NG/DL (ref 0.93–1.7)
TRIGL SERPL-MCNC: 201 MG/DL (ref 0–149)
TSH SERPL DL<=0.005 MIU/L-ACNC: 2.53 MIU/ML (ref 0.27–4.2)
WBC # BLD AUTO: 4.99 10*3/MM3 (ref 3.4–10.8)

## 2019-07-03 ENCOUNTER — OFFICE VISIT (OUTPATIENT)
Dept: INTERNAL MEDICINE | Facility: CLINIC | Age: 84
End: 2019-07-03

## 2019-07-03 VITALS
WEIGHT: 194.6 LBS | HEIGHT: 67 IN | DIASTOLIC BLOOD PRESSURE: 68 MMHG | SYSTOLIC BLOOD PRESSURE: 144 MMHG | OXYGEN SATURATION: 98 % | BODY MASS INDEX: 30.54 KG/M2 | HEART RATE: 89 BPM

## 2019-07-03 DIAGNOSIS — R07.9 EXERTIONAL CHEST PAIN: ICD-10-CM

## 2019-07-03 DIAGNOSIS — G89.29 CHRONIC NECK PAIN: Chronic | ICD-10-CM

## 2019-07-03 DIAGNOSIS — Z78.9 STATIN INTOLERANCE: Chronic | ICD-10-CM

## 2019-07-03 DIAGNOSIS — I10 BENIGN ESSENTIAL HYPERTENSION: Chronic | ICD-10-CM

## 2019-07-03 DIAGNOSIS — E55.9 VITAMIN D DEFICIENCY: Chronic | ICD-10-CM

## 2019-07-03 DIAGNOSIS — Z51.81 THERAPEUTIC DRUG MONITORING: ICD-10-CM

## 2019-07-03 DIAGNOSIS — M17.0 PRIMARY OSTEOARTHRITIS OF KNEES, BILATERAL: Chronic | ICD-10-CM

## 2019-07-03 DIAGNOSIS — N40.0 BENIGN NON-NODULAR PROSTATIC HYPERPLASIA WITHOUT LOWER URINARY TRACT SYMPTOMS: Chronic | ICD-10-CM

## 2019-07-03 DIAGNOSIS — I65.23 ATHEROSCLEROSIS OF BOTH CAROTID ARTERIES: Chronic | ICD-10-CM

## 2019-07-03 DIAGNOSIS — E53.8 VITAMIN B12 DEFICIENCY: Chronic | ICD-10-CM

## 2019-07-03 DIAGNOSIS — E78.5 HYPERLIPIDEMIA, UNSPECIFIED HYPERLIPIDEMIA TYPE: Chronic | ICD-10-CM

## 2019-07-03 DIAGNOSIS — E29.1 HYPOGONADISM IN MALE: Chronic | ICD-10-CM

## 2019-07-03 DIAGNOSIS — Z91.09 MULTIPLE ENVIRONMENTAL ALLERGIES: Chronic | ICD-10-CM

## 2019-07-03 DIAGNOSIS — M54.2 CHRONIC NECK PAIN: Chronic | ICD-10-CM

## 2019-07-03 DIAGNOSIS — K21.9 GASTROESOPHAGEAL REFLUX DISEASE WITHOUT ESOPHAGITIS: Chronic | ICD-10-CM

## 2019-07-03 DIAGNOSIS — M50.30 DEGENERATION OF CERVICAL INTERVERTEBRAL DISC: Chronic | ICD-10-CM

## 2019-07-03 DIAGNOSIS — N18.30 ANEMIA DUE TO STAGE 3 CHRONIC KIDNEY DISEASE (HCC): Chronic | ICD-10-CM

## 2019-07-03 DIAGNOSIS — N18.4 CHRONIC RENAL INSUFFICIENCY, STAGE IV (SEVERE) (HCC): Chronic | ICD-10-CM

## 2019-07-03 DIAGNOSIS — M85.89 OSTEOPENIA OF MULTIPLE SITES: Chronic | ICD-10-CM

## 2019-07-03 DIAGNOSIS — Z00.00 MEDICARE ANNUAL WELLNESS VISIT, SUBSEQUENT: Primary | ICD-10-CM

## 2019-07-03 DIAGNOSIS — D63.1 ANEMIA DUE TO STAGE 3 CHRONIC KIDNEY DISEASE (HCC): Chronic | ICD-10-CM

## 2019-07-03 DIAGNOSIS — R25.2 MUSCLE CRAMPS: Chronic | ICD-10-CM

## 2019-07-03 DIAGNOSIS — R73.01 IMPAIRED FASTING GLUCOSE: Chronic | ICD-10-CM

## 2019-07-03 PROCEDURE — 99214 OFFICE O/P EST MOD 30 MIN: CPT | Performed by: INTERNAL MEDICINE

## 2019-07-03 PROCEDURE — 96372 THER/PROPH/DIAG INJ SC/IM: CPT | Performed by: INTERNAL MEDICINE

## 2019-07-03 PROCEDURE — G0439 PPPS, SUBSEQ VISIT: HCPCS | Performed by: INTERNAL MEDICINE

## 2019-07-03 RX ORDER — OMEPRAZOLE 40 MG/1
40 CAPSULE, DELAYED RELEASE ORAL DAILY
Qty: 30 CAPSULE | Refills: 5
Start: 2019-07-03 | End: 2020-07-01

## 2019-07-03 RX ORDER — CYANOCOBALAMIN 1000 UG/ML
1000 INJECTION, SOLUTION INTRAMUSCULAR; SUBCUTANEOUS ONCE
Status: COMPLETED | OUTPATIENT
Start: 2019-07-03 | End: 2019-07-03

## 2019-07-03 RX ADMIN — CYANOCOBALAMIN 1000 MCG: 1000 INJECTION, SOLUTION INTRAMUSCULAR; SUBCUTANEOUS at 10:56

## 2019-07-03 NOTE — PROGRESS NOTES
QUICK REFERENCE INFORMATION:  The ABCs of the Annual Wellness Visit    Subsequent Medicare Wellness Visit     HEALTH RISK ASSESSMENT    : 1932    Recent Hospitalizations:  No hospitalization(s) within the last year..  ccc      Current Medical Providers:  Patient Care Team:  Delgado Patricia MD as PCP - General (Internal Medicine)  Kiran Boogie MD as PCP - Claims Attributed        Smoking Status:  Social History     Tobacco Use   Smoking Status Never Smoker   Smokeless Tobacco Never Used       Alcohol Consumption:  Social History     Substance and Sexual Activity   Alcohol Use Yes   • Frequency: 2-3 times a week   • Drinks per session: 1 or 2    Comment: Moderate alcohol consumption       Depression Screen:   PHQ-2/PHQ-9 Depression Screening 7/3/2019   Little interest or pleasure in doing things 0   Feeling down, depressed, or hopeless 1   Trouble falling or staying asleep, or sleeping too much 0   Feeling tired or having little energy 1   Poor appetite or overeating 1   Feeling bad about yourself - or that you are a failure or have let yourself or your family down 0   Trouble concentrating on things, such as reading the newspaper or watching television 0   Moving or speaking so slowly that other people could have noticed. Or the opposite - being so fidgety or restless that you have been moving around a lot more than usual 0   Thoughts that you would be better off dead, or of hurting yourself in some way 0   Total Score 3       Health Habits and Functional and Cognitive Screening:  Functional & Cognitive Status 7/3/2019   Do you have difficulty preparing food and eating? No   Do you have difficulty bathing yourself, getting dressed or grooming yourself? No   Do you have difficulty using the toilet? No   Do you have difficulty moving around from place to place? No   Do you have trouble with steps or getting out of a bed or a chair? No   In the past year have you fallen or experienced a  near fall? Yes   Current Diet Well Balanced Diet   Dental Exam Up to date   Eye Exam Up to date   Exercise (times per week) 1 times per week   Current Exercise Activities Include Yard Work   Do you need help using the phone?  No   Are you deaf or do you have serious difficulty hearing?  No   Do you need help with transportation? No   Do you need help shopping? No   Do you need help preparing meals?  No   Do you need help with housework?  No   Do you need help with laundry? No   Do you need help taking your medications? No   Do you need help managing money? No   Do you ever drive or ride in a car without wearing a seat belt? No   Have you felt unusual stress, anger or loneliness in the last month? No   Who do you live with? Spouse   If you need help, do you have trouble finding someone available to you? No   Have you been bothered in the last four weeks by sexual problems? Yes   Do you have difficulty concentrating, remembering or making decisions? No           Does the patient have evidence of cognitive impairment? No    Asiprin use counseling: Taking ASA appropriately as indicated      Recent Lab Results:    Lab Results   Component Value Date     (H) 06/27/2019     Lab Results   Component Value Date    HGBA1C 6.60 (H) 06/27/2019     Lab Results   Component Value Date    TRIG 201 (H) 06/27/2019           Age-appropriate Screening Schedule:  Refer to the list below for future screening recommendations based on patient's age, sex and/or medical conditions. Orders for these recommended tests are listed in the plan section. The patient has been provided with a written plan.    Health Maintenance   Topic Date Due   • DXA SCAN  03/14/2019   • INFLUENZA VACCINE  08/01/2019   • LIPID PANEL  06/27/2020   • TDAP/TD VACCINES (2 - Td) 03/09/2027   • PNEUMOCOCCAL VACCINES (65+ LOW/MEDIUM RISK)  Completed   • ZOSTER VACCINE  Discontinued        Subjective   History of Present Illness    Radha Win is a 86 y.o. male  who presents for an Annual Wellness Visit.    The following portions of the patient's history were reviewed and updated as appropriate: allergies, current medications, past family history, past medical history, past social history, past surgical history and problem list.    Outpatient Medications Prior to Visit   Medication Sig Dispense Refill   • aspirin (ASPIRIN LOW DOSE) 81 MG tablet Take 1 tablet by mouth daily.     • calcitriol (ROCALTROL) 0.25 MCG capsule 1 capsule 3 (Three) Times a Week.     • Cholecalciferol (VITAMIN D3) 5000 UNITS capsule capsule Take 1 capsule by mouth daily.     • Coenzyme Q10 (COQ10) 400 MG capsule Take  by mouth.     • lisinopril-hydrochlorothiazide (PRINZIDE,ZESTORETIC) 20-12.5 MG per tablet Take 2 tablets by mouth Daily. 180 tablet 1   • ALPRAZolam (XANAX) 0.5 MG tablet TAKE ONE TABLET BY MOUTH TWICE A DAY AS NEEDED 60 tablet 3   • amLODIPine (NORVASC) 5 MG tablet Take 1 tablet by mouth every morning.     • folic acid (FOLVITE) 1 MG tablet TAKE ONE TABLET BY MOUTH TWICE A  tablet 1   • pitavastatin calcium (LIVALO) 2 MG tablet tablet 1 by mouth daily for high cholesterol 30 tablet 3     Facility-Administered Medications Prior to Visit   Medication Dose Route Frequency Provider Last Rate Last Dose   • cyanocobalamin injection 1,000 mcg  1,000 mcg Intramuscular Q28 Days Delgado Patricia MD   1,000 mcg at 04/11/19 1331       Patient Active Problem List   Diagnosis   • Bilateral carotid artery plaque, 05/24/2018--16-49% bilateral carotid artery stenosis   • Benign prostatic hypertrophy   • Chronic renal insufficiency, stage IV (severe), 02/09/2016--creatinine 1.85   • Diverticulosis of colon   • Hyperlipidemia   • Osteopenia, 03/14/2017--lumbar spine -0.3.  Left femoral neck -1.6.  Right femoral neck -2.0.   • Vitamin B12 deficiency   • Vitamin D deficiency   • Allergic rhinitis   • Anemia due to chronic kidney disease   • Generalized anxiety disorder   • Benign essential  hypertension   • Gastroesophageal reflux disease without esophagitis   • Folic acid deficiency   • Multiple environmental allergies   • Therapeutic drug monitoring   • Bilateral renal cysts   • Primary osteoarthritis of knees, bilateral   • Impaired fasting glucose   • Muscle cramps, statin related, Vytorin and pravastatin.  Tolerates Livalo.   • Hypogonadism in male, on TRT, testosterone pellets, per    • Chronic neck pain   • Statin intolerance, Vytorin and pravastatin   • Degeneration of cervical intervertebral disc   • Exertional chest pain       Advance Care Planning:  Patient has an advance directive - a copy has been provided and is visible in patient header    Identification of Risk Factors:  Risk factors include: weight , cardiovascular risk, increased fall risk and chronic pain.    Review of Systems   Cardiovascular: Positive for chest pain.   Musculoskeletal: Positive for arthralgias, neck pain and neck stiffness.   All other systems reviewed and are negative.      Compared to one year ago, the patient feels his physical health is worse.  Compared to one year ago, the patient feels his mental health is the same.    Objective       Physical Exam    General: Alert and oriented x 3.  No acute distress.  Normal affect.  HEENT: Pupils equal, round, reactive to light; extraocular movements intact; sclerae nonicteric; pharynx, ear canals and TMs normal.  Neck: Without JVD, thyromegaly, bruit, or adenopathy.  Lungs: Clear to auscultation in all fields.  Heart: Regular rate and rhythm without murmur, rub, gallop, or click.  Abdomen: Soft, nontender, without hepatosplenomegaly or hernia.  Bowel sounds normal.  : Deferred.  Rectal: Deferred.  Extremities: Without clubbing, cyanosis, edema, or pulse deficit.  Neurologic: Intact without focal deficit.  Normal station and gait observed during ingress and egress from the examination room.  Skin: Without significant lesion.  Musculoskeletal: Unremarkable.    Vitals:  "   07/03/19 1007   BP: 144/68   BP Location: Right arm   Pulse: 89   SpO2: 98%   Weight: 88.3 kg (194 lb 9.6 oz)   Height: 170 cm (66.93\")       Patient's Body mass index is 30.54 kg/m². BMI is above normal parameters. Recommendations include: none (medical contraindication).      Assessment/Plan   Patient Self-Management and Personalized Health Advice  The patient has been provided with information about: diet, prevention of cardiac or vascular disease and fall prevention and preventive services including:   · Counseling for cardiovascular disease risk reduction, Diabetes screening, see lab orders, Fall Risk assessment done, Glaucoma screening recommended, Nutrition counseling provided, Prostate cancer screening discussed.    Visit Diagnoses:    ICD-10-CM ICD-9-CM   1. Medicare annual wellness visit, subsequent Z00.00 V70.0   2. Impaired fasting glucose R73.01 790.21   3. Benign essential hypertension I10 401.1   4. Hyperlipidemia, unspecified hyperlipidemia type E78.5 272.4   5. Chronic renal insufficiency, stage IV (severe), 02/09/2016--creatinine 1.85 N18.4 585.4   6. Benign prostatic hypertrophy N40.0 600.90   7. Bilateral carotid artery plaque, 05/24/2018--16-49% bilateral carotid artery stenosis I65.23 433.10     433.30   8. Osteopenia of multiple sites M85.89 733.90   9. Vitamin B12 deficiency E53.8 266.2   10. Vitamin D deficiency E55.9 268.9   11. Anemia due to stage 3 chronic kidney disease (CMS/[facility]) N18.3 285.21    D63.1 585.3   12. Gastroesophageal reflux disease without esophagitis K21.9 530.81   13. Multiple environmental allergies Z91.09 V15.09   14. Primary osteoarthritis of knees, bilateral M17.0 715.16   15. Muscle cramps, statin related, Vytorin and pravastatin.  Tolerates Livalo. R25.2 729.82   16. Hypogonadism in male, on TRT, testosterone pellets, per  E29.1 257.2   17. Chronic neck pain M54.2 723.1    G89.29 338.29   18. Degeneration of cervical intervertebral disc M50.30 722.4   19. Statin " intolerance, Vytorin and pravastatin Z78.9 995.27   20. Exertional chest pain R07.9 786.50   21. Therapeutic drug monitoring Z51.81 V58.83       Orders Placed This Encounter   Procedures   • Ambulatory Referral to Cardiology     Referral Priority:   Routine     Referral Type:   Consultation     Referral Reason:   Specialty Services Required     Referred to Provider:   Theron Alejo MD     Requested Specialty:   Cardiology     Number of Visits Requested:   1       Outpatient Encounter Medications as of 7/3/2019   Medication Sig Dispense Refill   • aspirin (ASPIRIN LOW DOSE) 81 MG tablet Take 1 tablet by mouth daily.     • calcitriol (ROCALTROL) 0.25 MCG capsule 1 capsule 3 (Three) Times a Week.     • Cholecalciferol (VITAMIN D3) 5000 UNITS capsule capsule Take 1 capsule by mouth daily.     • Coenzyme Q10 (COQ10) 400 MG capsule Take  by mouth.     • lisinopril-hydrochlorothiazide (PRINZIDE,ZESTORETIC) 20-12.5 MG per tablet Take 2 tablets by mouth Daily. 180 tablet 1   • ALPRAZolam (XANAX) 0.5 MG tablet TAKE ONE TABLET BY MOUTH TWICE A DAY AS NEEDED 60 tablet 3   • amLODIPine (NORVASC) 5 MG tablet Take 1 tablet by mouth every morning.     • folic acid (FOLVITE) 1 MG tablet TAKE ONE TABLET BY MOUTH TWICE A  tablet 1   • omeprazole (priLOSEC) 40 MG capsule Take 1 capsule by mouth Daily. 30 capsule 5   • pitavastatin calcium (LIVALO) 2 MG tablet tablet 1 by mouth daily for high cholesterol 30 tablet 3     Facility-Administered Encounter Medications as of 7/3/2019   Medication Dose Route Frequency Provider Last Rate Last Dose   • cyanocobalamin injection 1,000 mcg  1,000 mcg Intramuscular Q28 Days Delgado Patricia MD   1,000 mcg at 04/11/19 1331       Reviewed use of high risk medication in the elderly: yes  Reviewed for potential of harmful drug interactions in the elderly: yes    Follow Up:  No Follow-up on file.     An After Visit Summary and PPPS with all of these plans were given to the patient.

## 2019-07-03 NOTE — PROGRESS NOTES
07/03/2019    Patient Information  Radha Win                                                                                          [address]  [facility] [address]      7/26/1932  [unfilled]  There is no work phone number on file.    Chief Complaint:     Subsequent Medicare wellness visit.  Follow-up impaired fasting glucose, hypertension, hyperlipidemia, chronic renal insufficiency, BPH, carotid artery plaque, osteopenia, vitamin B12 and vitamin D deficiency, anemia of chronic kidney disease, gastroesophageal reflux, statin intolerance, chronic neck pain due to cervical disc disease, history of recent exertional chest pain and cardiovascular stress test.  Complaining of continued exertional chest pain.  Complaining of continued neck and shoulder pain.    History of Present Illness:    Patient with a history of multiple medical problems as outlined in chief complaint presents today for his subsequent Medicare wellness visit.  Patient also had lab work in order to monitor his chronic medical issues.  His past medical history reviewed and updated were necessary including health maintenance parameters.  This reveals he is up-to-date or else accounted for with exception of a DEXA scan which we will order today.    The history regarding exertional chest pain:    July 3, 2019--patient seen in follow-up and the results of the stress test discussed.  The stress test returned negative/low risk study.  Patient continues to have exertional chest discomfort described as a burning sensation that comes on with fairly minimal exertion and resolves with rest.  Therefore, I am referring him to the cardiologist as soon as possible.    May 20, 2019--patient presents with approximately 1 month history of intermittent upper chest discomfort that he describes as a burning sensation and comes on with exertion such as climbing stairs.  No radiation into the neck or down the arms.  There is some  associated shortness of breath.  The symptoms resolved very quickly with rest.  He reports he has had 10 or 12 episodes since the onset a month ago.  They always come on with exertion and do not occur at rest.  Examination reveals heart to be regular rate and rhythm without murmur rub or gallop.  Lungs are clear.  Given the risk factors, we need to proceed with cardiovascular evaluation and we will start with a nuclear stress test.  This returned negative, low risk study.    History regarding chronic neck pain and cervical disc degeneration:    July 3, 2019--patient continues to have chronic neck pain with cervical radicular symptoms.  I have recommended that he contact the neurosurgery office and get a follow-up appointment.    10/02/2018--patient seen in follow-up and the results of the MRI discussed.  He continues to have intermittent radicular symptoms which is currently only on the left.  Discussed options with patient and I have recommended neurosurgery consultation.  Patient may benefit from epidural injections.    09/06/2018--MRI of the cervical spine reveals moderate neural foraminal compromise on the right at C3-C4.  There is mild canal stenosis at C4-C5 with moderate to severe neural foraminal compromise bilaterally.  At C5-C6 there is moderate neural foraminal compromise on the left and moderate to severe neural foraminal compromise on the right.  There is mild canal stenosis centrally.  At C6-C7 there is facet degenerative disease and a mild central disc osteophyte complex but no significant neural foraminal compromise.  At C7-T1 there is no significant pathology.    08/30/2018--patient continues to have left-sided neck and shoulder discomfort that radiates into his arm down to about the level of the elbow.  MRI of the cervical spine ordered.    06/28/2018--x-ray of the cervical spine reveals posteriorly and indenting osteophytic changes at C4-C5 and C5-C6. Bilateral foraminal stenotic changes are  identified at C4-C5 and C5-C6.  Recommend MRI of the cervical spine.    06/28/2018--patient presents with at least a one-month history of left sided neck and proximal shoulder pain which is actually in the supraspinatus area.  No known trauma.  The pain is present only at nighttime when he lies down to go to sleep.  It is interfering with his sleep.  When he is up and about and active he does not notice the discomfort.  No numbness or paresthesias.  No weakness in his upper extremities.  Examination is not particularly revealing.  Patient does have fairly good passive range of motion.  Patient initially felt that he was having a shoulder problem but after evaluation I really think that the symptoms may be coming from his neck.  X-ray of the cervical spine ordered.  Patient will follow-up on the phone for the results and possible further instructions.  Given the prolonged duration and severity of the symptoms, I will treat him empirically with prednisone 50 mg by mouth daily ×7 days, taper and discontinue.  If symptoms persist, he may need further evaluation such as MRI.    Review of Systems   Constitution: Negative.   HENT: Negative.    Eyes: Negative.    Cardiovascular: Negative.    Respiratory: Negative.    Endocrine: Negative.    Hematologic/Lymphatic: Negative.    Skin: Negative.    Musculoskeletal: Positive for arthritis, back pain and joint pain.   Gastrointestinal: Negative.    Genitourinary: Negative.    Neurological: Negative.    Psychiatric/Behavioral: Negative.    Allergic/Immunologic: Negative.        Active Problems:    Patient Active Problem List   Diagnosis   • Bilateral carotid artery plaque, 05/24/2018--16-49% bilateral carotid artery stenosis   • Benign prostatic hypertrophy   • Chronic renal insufficiency, stage IV (severe), 02/09/2016--creatinine 1.85   • Diverticulosis of colon   • Hyperlipidemia   • Osteopenia, 03/14/2017--lumbar spine -0.3.  Left femoral neck -1.6.  Right femoral neck -2.0.    • Vitamin B12 deficiency   • Vitamin D deficiency   • Allergic rhinitis   • Anemia due to chronic kidney disease   • Generalized anxiety disorder   • Benign essential hypertension   • Gastroesophageal reflux disease without esophagitis   • Folic acid deficiency   • Multiple environmental allergies   • Therapeutic drug monitoring   • Bilateral renal cysts   • Primary osteoarthritis of knees, bilateral   • Impaired fasting glucose   • Muscle cramps, statin related, Vytorin and pravastatin.  Tolerates Livalo.   • Hypogonadism in male, on TRT, testosterone pellets, per    • Chronic neck pain   • Statin intolerance, Vytorin and pravastatin   • Degeneration of cervical intervertebral disc   • Exertional chest pain         Past Medical History:   Diagnosis Date   • Allergic rhinitis 2/11/2016   • Anemia due to chronic kidney disease 2/11/2016   • Benign essential hypertension 2/11/2016   • Benign prostatic hypertrophy 2/11/2016 12/21/2016--prostate biopsy reportedly negative.  I will try to obtain the report.  Patient sees urologist.  PSAs run from the 3-8 range   • Bilateral carotid artery plaque, 09/21/2016--16-49% bilateral carotid artery stenosis 2/11/2016 09/21/2016--vascular screen reveals 16-49% bilateral carotid artery stenosis, negative for AAA, negative for PAD.  10/10/2008--vascular screening study revealed mild bilateral carotid plaque, no aortic aneurysm, no evidence of PAD   • Bilateral renal cysts 2/14/2016 04/07/2016--ultrasound of the kidneys reveals the previously described renal cysts are not well seen.  However, questionable incidental bladder tumor noted.  05/26/2010--ultrasound the kidneys was negative bilaterally except for small renal cysts.   • Chronic renal insufficiency, stage IV (severe), 02/09/2016--creatinine 1.85 2/11/2016 02/09/2016--serum creatinine 1.85.  BUN 29.  07/20/2015--patient was evaluated by the nephrologist.  Ultrasound of the kidneys ordered but this was never  done.  05/22/2015--BUN 35, creatinine 2.3.  Patient referred to nephrology, Dr. Devyn Muniz.  04/16/2014--BUN 25, creatinine 1.77.  01/03/2013--BUN 37, creatinine 2.14.    05/26/2010---ultrasound of the kidneys reveal a small cyst x 2 right kidney.  Otherwise normal.    Baseline creatinine approximately 1.9-2.0.   • Degeneration of cervical intervertebral disc 5/2/2019   • Diverticulosis of colon 2/11/2016    10/03/2013--colonoscopy revealed markedly redundant colon, pancolonic diverticulosis with several impacted fecalith.  No evidence of diverticulitis or stricture.  Was only able to reach the ascending colon.  Follow up barium enema revealed extensive diverticulosis.  No polyp or other mucosal mass seen.    06/11/2002--incomplete colonoscopy due to redundant colon.  Not able to get past the hepatic flexure.  Patient had followup barium contrast enema which showed extensive diverticulosis.   • Folic acid deficiency 2/11/2016   • Gastroesophageal reflux disease without esophagitis 2/11/2016   • Generalized anxiety disorder 2/11/2016   • History of diverticulitis of colon 2009 and 2003 05/08/2009-acute diverticulitis without complication. 09/05/2003-acute diverticulitis without complication.    • Hyperlipidemia 2/11/2016 01/29/2005--treatment for hyperlipidemia begun.   • Hypogonadism in male, on TRT, testosterone pellets, per  6/16/2017 January 2017--patient reports his urologist initiated testosterone replacement therapy consisting of testosterone pellets every 6 months.   • Multiple environmental allergies 2/12/2016    Patient sees the allergist regularly and has been on immunotherapy.   • Muscle cramps, statin related, Vytorin and pravastatin.  Tolerates Livalo. 6/16/2017 12/21/2018--patient seems to be tolerating Livalo well.  10/02/2018--Livalo 2 mg per day initiated.  Recommend CoQ10 400 mg per day with food for better absorption.  Reassess with lab in about 8 weeks.  08/30/2018--patient presents  with complaints of muscle cramps.  He discontinue pravastatin and the cramps went away.  Cramps returned even with a half dose.  This is despite the fact he was taking    • Osteopenia, 03/14/2017--lumbar spine -0.3.  Left femoral neck -1.6.  Right femoral neck -2.0. 2/11/2016 06/16/2017--patient seen in follow-up and reports he doesn't think he ever started Fosamax.  Osteopenia and needs to be reassessed with a DEXA scan.  03/14/2017--DEXA scan reveals lumbar spine T score -0.3.  Unchanged.  Left femoral neck T score -1.6, 6% decrease.  Right femoral neck T score -2.0.  Unchanged.  Assessment is osteopenia with a statistically significant interval decrease in the left hip.  07/18/2014--Fosamax 70 mg per day  initiated.  Calcium 1200 mg per day.    07/07/2014--DEXA scan revealed average lumbar spine T score -0.5.  Left proximal femoral T score is zero.  Left femoral neck T score -1.3.  Right proximal femoral T score 0.1.  Right femoral neck T score -2.0.  Osteopenia.   • Primary osteoarthritis of knees, bilateral 9/12/2016 09/12/2016--patient called in the office requesting a steriod injection in his knees.  I explained to him that I do no longer perform these injections and have referred him to Dr. Manuel.   • Statin intolerance, Vytorin and pravastatin 12/21/2018 12/21/2018--patient seems to be tolerating Livalo well.  10/02/2018--Livalo 2 mg per day initiated.  Recommend CoQ10 400 mg per day with food for better absorption.  Reassess with lab in about 8 weeks.  08/30/2018--patient presents with complaints of muscle cramps.  He discontinue pravastatin and the cramps went away.  Cramps returned even with a half dose.  This is despite the fact he was taking    • Vitamin B12 deficiency 2/11/2016 06/25/2009--B12 injections begun.   • Vitamin D deficiency 2/11/2016         Past Surgical History:   Procedure Laterality Date   • CATARACT EXTRACTION Left 12/12/2011 12/12/2011--cataract extirpation with  intraocular lens implantation left eye.   • CATARACT EXTRACTION Right 12/2011 December 2011--right cataract extirpation with intraocular lens implantation.   • COLONOSCOPY  06/11/2002 06/11/2002--incomplete colonoscopy due to redundant colon. Not able to get past the hepatic flexure. Patient had followup barium contrast enema which showed extensive diverticulosis   • COLONOSCOPY  10/03/2013    10/03/2013--colonoscopy revealed markedly redundant colon, pancolonic diverticulosis with several impacted fecalith. No evidence of diverticulitis or stricture. Was only able to reach the ascending colon. Follow up barium enema revealed extensive diverticulosis. No polyp or other mucosal mass seen.   • CYSTOSCOPY  05/18/2016 05/18/2016--urology consultation.  Cystoscopy performed for possible bladder mass is negative.   • PROSTATE BIOPSY  12/21/2016 12/21/2016--prostate biopsy reportedly negative.  I will try to obtain the report.         No Known Allergies        Current Outpatient Medications:   •  aspirin (ASPIRIN LOW DOSE) 81 MG tablet, Take 1 tablet by mouth daily., Disp: , Rfl:   •  calcitriol (ROCALTROL) 0.25 MCG capsule, 1 capsule 3 (Three) Times a Week., Disp: , Rfl:   •  Cholecalciferol (VITAMIN D3) 5000 UNITS capsule capsule, Take 1 capsule by mouth daily., Disp: , Rfl:   •  Coenzyme Q10 (COQ10) 400 MG capsule, Take  by mouth., Disp: , Rfl:   •  lisinopril-hydrochlorothiazide (PRINZIDE,ZESTORETIC) 20-12.5 MG per tablet, Take 2 tablets by mouth Daily., Disp: 180 tablet, Rfl: 1  •  ALPRAZolam (XANAX) 0.5 MG tablet, TAKE ONE TABLET BY MOUTH TWICE A DAY AS NEEDED, Disp: 60 tablet, Rfl: 3  •  amLODIPine (NORVASC) 5 MG tablet, Take 1 tablet by mouth every morning., Disp: , Rfl:   •  folic acid (FOLVITE) 1 MG tablet, TAKE ONE TABLET BY MOUTH TWICE A DAY, Disp: 180 tablet, Rfl: 1  •  pitavastatin calcium (LIVALO) 2 MG tablet tablet, 1 by mouth daily for high cholesterol, Disp: 30 tablet, Rfl: 3    Current  "Facility-Administered Medications:   •  cyanocobalamin injection 1,000 mcg, 1,000 mcg, Intramuscular, Q28 Days, Delgado Patricia MD, 1,000 mcg at 04/11/19 1331      Family History   Problem Relation Age of Onset   • Diabetes Mother    • Heart disease Mother    • Stroke Father          Social History     Socioeconomic History   • Marital status:      Spouse name: Not on file   • Number of children: 2   • Years of education: 14   • Highest education level: Some college, no degree   Occupational History   • Occupation: Retired      Employer: [employer]   Social Needs   • Financial resource strain: Not hard at all   • Food insecurity:     Worry: Never true     Inability: Never true   • Transportation needs:     Medical: No     Non-medical: No   Tobacco Use   • Smoking status: Never Smoker   • Smokeless tobacco: Never Used   Substance and Sexual Activity   • Alcohol use: Yes     Frequency: 2-3 times a week     Drinks per session: 1 or 2     Comment: Moderate alcohol consumption   • Drug use: No   • Sexual activity: Yes     Partners: Female   Lifestyle   • Physical activity:     Days per week: 0 days     Minutes per session: 0 min   • Stress: Only a little   Relationships   • Social connections:     Talks on phone: More than three times a week     Gets together: Once a week     Attends Anab[employer]ist service: More than 4 times per year     Active member of club or organization: Yes     Attends meetings of clubs or organizations: More than 4 times per year     Relationship status:          Vitals:    07/03/19 1007   BP: 144/68   BP Location: Right arm   Pulse: 89   SpO2: 98%   Weight: 88.3 kg (194 lb 9.6 oz)   Height: 170 cm (66.93\")          Physical Exam:    General: Alert and oriented x 3.  No acute distress.  Normal affect.  HEENT: Pupils equal, round, reactive to light; extraocular movements intact; sclerae nonicteric; pharynx, ear canals and TMs normal.  Neck: Without JVD, thyromegaly, bruit, or " adenopathy.  Lungs: Clear to auscultation in all fields.  Heart: Regular rate and rhythm without murmur, rub, gallop, or click.  Abdomen: Soft, nontender, without hepatosplenomegaly or hernia.  Bowel sounds normal.  : Deferred.  Rectal: Deferred.  Extremities: Without clubbing, cyanosis, edema, or pulse deficit.  Neurologic: Intact without focal deficit.  Normal station and gait observed during ingress and egress from the examination room.  Skin: Without significant lesion.  Musculoskeletal: Unremarkable.    Lab/other results:    NMR reveals a total cholesterol 256.  Triglycerides elevated at 201.  LDL particle number markedly elevated at 2259.  Small LDL particle number elevated at 1368.  HDL particle number is low at 26.2.  CMP normal except glucose 123, BUN 32, creatinine 2.07.  Hemoglobin low at 11.4, hematocrit low at 36.5.  Hemoglobin A1c excellent at 6.6.  Thyroid function tests are normal.  Vitamin D normal.  CPK normal.    Assessment/Plan:     Diagnosis Plan   1. Medicare annual wellness visit, subsequent     2. Impaired fasting glucose     3. Benign essential hypertension     4. Hyperlipidemia, unspecified hyperlipidemia type     5. Chronic renal insufficiency, stage IV (severe), 02/09/2016--creatinine 1.85     6. Benign prostatic hypertrophy     7. Bilateral carotid artery plaque, 05/24/2018--16-49% bilateral carotid artery stenosis     8. Osteopenia of multiple sites     9. Vitamin B12 deficiency     10. Vitamin D deficiency     11. Anemia due to stage 3 chronic kidney disease (CMS/HCC)     12. Gastroesophageal reflux disease without esophagitis     13. Multiple environmental allergies     14. Primary osteoarthritis of knees, bilateral     15. Muscle cramps, statin related, Vytorin and pravastatin.  Tolerates Livalo.     16. Hypogonadism in male, on TRT, testosterone pellets, per      17. Chronic neck pain     18. Degeneration of cervical intervertebral disc     19. Statin intolerance, Vytorin and  pravastatin     20. Exertional chest pain     21. Therapeutic drug monitoring       The subsequent Medicare wellness visit is documented on separate note.  Patient has impaired fasting glucose that appears to have evolved into overt mild diabetes.  However, I am reluctant to formally give him that diagnosis at this time based on one hemoglobin A1c reading.  BPH symptoms are tolerable.  Patient's blood pressure is controlled.  Patient has severe hyperlipidemia but cannot tolerate statin medications.  We have tried every 1 and patient always has muscular symptoms.  He has severe chronic renal insufficiency and is followed by the nephrology service.  Patient has osteopenia and needs a DEXA scan.  He has a vitamin B12 deficiency and needs a B12 injection today.  He also has a vitamin D deficiency which is now therapeutic.  His anemia of chronic disease related to his kidneys has been mild and stable.  Patient reports a sore throat in the evening and I think this is related to esophageal reflux.  He went off his PPI therapy about 3 months ago when I suggested he go back on it.  Patient has hypogonadism and has been on testosterone pellets per the urologist.  He continues to have significant chronic neck pain with cervical radicular symptoms and needs to be reevaluated by the neurosurgery service.  The most worrisome thing is that regarding the exertional chest pain even though he had a negative nuclear stress test.  This certainly could be a false positive.    Plan is as follows: Referral for cardiology placed as soon as possible.  I have recommended that patient make an appointment to see the neurosurgeon regarding his continued neck problems.  In regards to his hyperlipidemia, we have exhausted all options at the present time given the fact the patient is statin intolerant.  Should it prove to be the patient has coronary artery disease, then he may be a candidate for 1 of the newer injectable cholesterol medications.   DEXA scan ordered.  Of also recommended that patient start back on his esomeprazole for worsening symptoms of reflux.  Further follow-up to be scheduled after cardiac evaluation.  Cyanocobalamin injection given today.    Procedures

## 2019-07-05 NOTE — PROGRESS NOTES
Subjective   Patient ID: Radha Win is a 86 y.o. male is here today for follow-up for neck and arm pain after PT. He states PT helped for a while, however he still has moderate left shoulder pain.  Mr. Win is not taking any pain medications.      Neck Pain    This is a recurrent problem. The current episode started more than 1 month ago. The problem occurs constantly. The pain is present in the left side. The pain is at a severity of 4/10. The pain is moderate. Exacerbated by: sitting  Pertinent negatives include no headaches, visual change or weakness. Treatments tried: PT    Arm Pain    The pain is present in the left shoulder. The quality of the pain is described as aching. The pain is at a severity of 5/10. The pain is moderate. The pain has been intermittent since the incident. Treatments tried: PT        The following portions of the patient's history were reviewed and updated as appropriate: allergies, current medications, past family history, past medical history, past social history, past surgical history and problem list.    Review of Systems   Genitourinary: Negative for difficulty urinating.   Musculoskeletal: Positive for neck pain (left shoulder pain).   Neurological: Negative for weakness and headaches.   All other systems reviewed and are negative.      Objective   Physical Exam   Constitutional: He is oriented to person, place, and time. He appears well-developed and well-nourished.   HENT:   Head: Normocephalic and atraumatic.   Right Ear: External ear normal.   Left Ear: External ear normal.   Eyes: Conjunctivae and EOM are normal. Pupils are equal, round, and reactive to light. Right eye exhibits no discharge. Left eye exhibits no discharge.   Neck: Normal range of motion. Neck supple. No tracheal deviation present.   Cardiovascular: Regular rhythm.   Pulmonary/Chest: Effort normal. No stridor. No respiratory distress.   Musculoskeletal: Normal range of motion. He exhibits no edema,  tenderness or deformity.   Neurological: He is alert and oriented to person, place, and time. He has normal strength and normal reflexes. He displays no atrophy, no tremor and normal reflexes. No cranial nerve deficit or sensory deficit. He exhibits normal muscle tone. He displays a negative Romberg sign. He displays no seizure activity. Coordination and gait normal.   No long tract signs   Skin: Skin is warm and dry.   Psychiatric: He has a normal mood and affect. His behavior is normal. Judgment and thought content normal.   Nursing note and vitals reviewed.    Neurologic Exam     Mental Status   Oriented to person, place, and time.     Cranial Nerves     CN III, IV, VI   Pupils are equal, round, and reactive to light.  Extraocular motions are normal.     Motor Exam     Strength   Strength 5/5 throughout.       Assessment/Plan   Independent Review of Radiographic Studies:      Medical Decision Making:    Mr. Win was last evaluated for chronic neck and left shoulder pain.  His previous MRI did show multilevel degeneration with fairly significant foraminal narrowing at C5-C6 and to a lesser degree C4-C5. He was referred to physical therapy which did help.  At his last appointment we discussed the possibility of trigger point injections for some of his residual neck pain and an area of pain localized along the medial left scapula.  He had been very hesitant to consider epidurals.  He now returns having continued with therapy.  The pain is about the same.  It is still an intermittent nerve pain that radiates from the neck into the left scapula.  No arm pain or weakness.  He is now willing to consider injections and we did discuss epidurals as well as the associated risks of bleeding, infection and headache.  He does wish to proceed.  He will follow-up in 2 months or call sooner with questions or concerns.  If the pain has not improved then we could consider updated imaging.        Radha was seen today for neck  pain and arm pain.    Diagnoses and all orders for this visit:    Degeneration of cervical intervertebral disc  -     Epidural Block      Return in about 2 months (around 9/11/2019).

## 2019-07-11 ENCOUNTER — OFFICE VISIT (OUTPATIENT)
Dept: NEUROSURGERY | Facility: CLINIC | Age: 84
End: 2019-07-11

## 2019-07-11 VITALS
DIASTOLIC BLOOD PRESSURE: 86 MMHG | WEIGHT: 194 LBS | HEART RATE: 96 BPM | SYSTOLIC BLOOD PRESSURE: 141 MMHG | BODY MASS INDEX: 30.45 KG/M2 | HEIGHT: 67 IN

## 2019-07-11 DIAGNOSIS — M50.30 DEGENERATION OF CERVICAL INTERVERTEBRAL DISC: Primary | Chronic | ICD-10-CM

## 2019-07-11 PROCEDURE — 99213 OFFICE O/P EST LOW 20 MIN: CPT | Performed by: PHYSICIAN ASSISTANT

## 2019-07-12 ENCOUNTER — HOSPITAL ENCOUNTER (OUTPATIENT)
Dept: BONE DENSITY | Facility: HOSPITAL | Age: 84
Discharge: HOME | End: 2019-07-12
Admitting: INTERNAL MEDICINE

## 2019-07-12 PROCEDURE — 77080 DXA BONE DENSITY AXIAL: CPT

## 2019-07-31 ENCOUNTER — ANESTHESIA EVENT (OUTPATIENT)
Dept: PAIN MEDICINE | Facility: HOSPITAL | Age: 84
End: 2019-07-31

## 2019-07-31 ENCOUNTER — HOSPITAL ENCOUNTER (OUTPATIENT)
Dept: GENERAL RADIOLOGY | Facility: HOSPITAL | Age: 84
Discharge: HOME | End: 2019-07-31

## 2019-07-31 ENCOUNTER — HOSPITAL ENCOUNTER (OUTPATIENT)
Dept: PAIN MEDICINE | Facility: HOSPITAL | Age: 84
Discharge: HOME | End: 2019-07-31
Admitting: ANESTHESIOLOGY

## 2019-07-31 ENCOUNTER — OFFICE VISIT (OUTPATIENT)
Dept: CARDIOLOGY | Facility: CLINIC | Age: 84
End: 2019-07-31

## 2019-07-31 ENCOUNTER — ANESTHESIA (OUTPATIENT)
Dept: PAIN MEDICINE | Facility: HOSPITAL | Age: 84
End: 2019-07-31

## 2019-07-31 VITALS
TEMPERATURE: 97.8 F | HEART RATE: 100 BPM | OXYGEN SATURATION: 99 % | WEIGHT: 190 LBS | HEIGHT: 68 IN | RESPIRATION RATE: 16 BRPM | BODY MASS INDEX: 28.79 KG/M2 | SYSTOLIC BLOOD PRESSURE: 167 MMHG | DIASTOLIC BLOOD PRESSURE: 86 MMHG

## 2019-07-31 VITALS
DIASTOLIC BLOOD PRESSURE: 92 MMHG | HEART RATE: 90 BPM | WEIGHT: 194.2 LBS | SYSTOLIC BLOOD PRESSURE: 170 MMHG | BODY MASS INDEX: 29.43 KG/M2 | OXYGEN SATURATION: 98 % | HEIGHT: 68 IN

## 2019-07-31 DIAGNOSIS — R52 PAIN: ICD-10-CM

## 2019-07-31 DIAGNOSIS — I10 ESSENTIAL HYPERTENSION: ICD-10-CM

## 2019-07-31 DIAGNOSIS — M50.30 DEGENERATION OF CERVICAL INTERVERTEBRAL DISC: Primary | Chronic | ICD-10-CM

## 2019-07-31 DIAGNOSIS — I45.10 RBBB: ICD-10-CM

## 2019-07-31 DIAGNOSIS — R07.2 PRECORDIAL PAIN: Primary | ICD-10-CM

## 2019-07-31 PROCEDURE — 25010000002 DEXAMETHASONE SODIUM PHOSPHATE 10 MG/ML SOLUTION: Performed by: ANESTHESIOLOGY

## 2019-07-31 PROCEDURE — 77003 FLUOROGUIDE FOR SPINE INJECT: CPT

## 2019-07-31 PROCEDURE — C1755 CATHETER, INTRASPINAL: HCPCS

## 2019-07-31 PROCEDURE — 93000 ELECTROCARDIOGRAM COMPLETE: CPT | Performed by: INTERNAL MEDICINE

## 2019-07-31 PROCEDURE — 99204 OFFICE O/P NEW MOD 45 MIN: CPT | Performed by: INTERNAL MEDICINE

## 2019-07-31 RX ORDER — LIDOCAINE HYDROCHLORIDE 10 MG/ML
1 INJECTION, SOLUTION INFILTRATION; PERINEURAL ONCE
Status: DISCONTINUED | OUTPATIENT
Start: 2019-07-31 | End: 2019-08-01 | Stop reason: HOSPADM

## 2019-07-31 RX ORDER — FENTANYL CITRATE 50 UG/ML
50 INJECTION, SOLUTION INTRAMUSCULAR; INTRAVENOUS AS NEEDED
Status: DISCONTINUED | OUTPATIENT
Start: 2019-07-31 | End: 2019-08-01 | Stop reason: HOSPADM

## 2019-07-31 RX ORDER — MIDAZOLAM HYDROCHLORIDE 1 MG/ML
1 INJECTION INTRAMUSCULAR; INTRAVENOUS AS NEEDED
Status: DISCONTINUED | OUTPATIENT
Start: 2019-07-31 | End: 2019-08-01 | Stop reason: HOSPADM

## 2019-07-31 RX ORDER — SODIUM CHLORIDE 0.9 % (FLUSH) 0.9 %
3-10 SYRINGE (ML) INJECTION AS NEEDED
Status: DISCONTINUED | OUTPATIENT
Start: 2019-07-31 | End: 2019-08-01 | Stop reason: HOSPADM

## 2019-07-31 RX ORDER — DEXAMETHASONE SODIUM PHOSPHATE 10 MG/ML
10 INJECTION, SOLUTION INTRAMUSCULAR; INTRAVENOUS ONCE
Status: COMPLETED | OUTPATIENT
Start: 2019-07-31 | End: 2019-07-31

## 2019-07-31 RX ORDER — AMLODIPINE BESYLATE 10 MG/1
10 TABLET ORAL DAILY
Qty: 90 TABLET | Refills: 1 | Status: SHIPPED | OUTPATIENT
Start: 2019-07-31 | End: 2019-09-17

## 2019-07-31 RX ORDER — SODIUM CHLORIDE 0.9 % (FLUSH) 0.9 %
3 SYRINGE (ML) INJECTION EVERY 12 HOURS SCHEDULED
Status: DISCONTINUED | OUTPATIENT
Start: 2019-07-31 | End: 2019-08-01 | Stop reason: HOSPADM

## 2019-07-31 RX ADMIN — DEXAMETHASONE SODIUM PHOSPHATE 10 MG: 10 INJECTION INTRAMUSCULAR; INTRAVENOUS at 12:06

## 2019-07-31 NOTE — PROGRESS NOTES
"Date of Office Visit: 2019  Encounter Provider: Theron Alejo MD  Place of Service: [facility] CARDIOLOGY  Patient Name: Radha Win  :1932  Delgado Patricia MD    Chief Complaint   Patient presents with   • Chest Pain     History of Present Illness    The patient is an 87-year-old white male with a history of hypertension as well as hyperlipidemia who is referred by Dr. Delgado Patricia for evaluation of substernal chest discomfort.    The patient reports the development of substernal chest discomfort about a year ago.  The patient describes what was previously noted as \"walk-through angina\".  The patient started to walk he developed substernal chest discomfort.  The longer he walks the symptoms subsided.  The total duration was generally about 5 minutes.  The symptoms did not radiate to his neck jaw or arms.  There is no stated shortness of breath or diaphoresis.  The last time he experienced this was 3 months ago.  He states he has moderated his activity and that may be the reason.    He had a stress Cardiolite study in May of this year that was normal.    The patient's risk factors for coronary disease include hypertension as well as hyperlipidemia.  He has never been a smoker.  He does not have diabetes mellitus and his family history is very minimal.  He does have known carotid disease but not critical.    The patient has been treated for hypertension for a long time.  He states his systolic pressures generally run around 160 but his diastolics are less than 90.  In addition he has stage IV kidney disease and is being managed by Dr. Muniz.    Past Medical History:   Diagnosis Date   • Allergic rhinitis 2016   • Anemia due to chronic kidney disease 2016   • Benign essential hypertension 2016   • Benign prostatic hypertrophy 2016--prostate biopsy reportedly negative.  I will try to obtain the report.  Patient sees urologist.  " PSAs run from the 3-8 range   • Bilateral carotid artery plaque, 09/21/2016--16-49% bilateral carotid artery stenosis 2/11/2016 09/21/2016--vascular screen reveals 16-49% bilateral carotid artery stenosis, negative for AAA, negative for PAD.  10/10/2008--vascular screening study revealed mild bilateral carotid plaque, no aortic aneurysm, no evidence of PAD   • Bilateral renal cysts 2/14/2016 04/07/2016--ultrasound of the kidneys reveals the previously described renal cysts are not well seen.  However, questionable incidental bladder tumor noted.  05/26/2010--ultrasound the kidneys was negative bilaterally except for small renal cysts.   • Chronic renal insufficiency, stage IV (severe), 02/09/2016--creatinine 1.85 2/11/2016 02/09/2016--serum creatinine 1.85.  BUN 29.  07/20/2015--patient was evaluated by the nephrologist.  Ultrasound of the kidneys ordered but this was never done.  05/22/2015--BUN 35, creatinine 2.3.  Patient referred to nephrology, Dr. Devyn Muniz.  04/16/2014--BUN 25, creatinine 1.77.  01/03/2013--BUN 37, creatinine 2.14.    05/26/2010---ultrasound of the kidneys reveal a small cyst x 2 right kidney.  Otherwise normal.    Baseline creatinine approximately 1.9-2.0.   • Degeneration of cervical intervertebral disc 5/2/2019   • Diverticulosis of colon 2/11/2016    10/03/2013--colonoscopy revealed markedly redundant colon, pancolonic diverticulosis with several impacted fecalith.  No evidence of diverticulitis or stricture.  Was only able to reach the ascending colon.  Follow up barium enema revealed extensive diverticulosis.  No polyp or other mucosal mass seen.    06/11/2002--incomplete colonoscopy due to redundant colon.  Not able to get past the hepatic flexure.  Patient had followup barium contrast enema which showed extensive diverticulosis.   • Folic acid deficiency 2/11/2016   • Gastroesophageal reflux disease without esophagitis 2/11/2016   • Generalized anxiety disorder 2/11/2016   •  History of diverticulitis of colon 2009 and 2003 05/08/2009-acute diverticulitis without complication. 09/05/2003-acute diverticulitis without complication.    • Hyperlipidemia 2/11/2016 01/29/2005--treatment for hyperlipidemia begun.   • Hypogonadism in male, on TRT, testosterone pellets, per  6/16/2017 January 2017--patient reports his urologist initiated testosterone replacement therapy consisting of testosterone pellets every 6 months.   • Multiple environmental allergies 2/12/2016    Patient sees the allergist regularly and has been on immunotherapy.   • Muscle cramps, statin related, Vytorin and pravastatin.  Tolerates Livalo. 6/16/2017 12/21/2018--patient seems to be tolerating Livalo well.  10/02/2018--Livalo 2 mg per day initiated.  Recommend CoQ10 400 mg per day with food for better absorption.  Reassess with lab in about 8 weeks.  08/30/2018--patient presents with complaints of muscle cramps.  He discontinue pravastatin and the cramps went away.  Cramps returned even with a half dose.  This is despite the fact he was taking    • Osteopenia, 03/14/2017--lumbar spine -0.3.  Left femoral neck -1.6.  Right femoral neck -2.0. 2/11/2016 06/16/2017--patient seen in follow-up and reports he doesn't think he ever started Fosamax.  Osteopenia and needs to be reassessed with a DEXA scan.  03/14/2017--DEXA scan reveals lumbar spine T score -0.3.  Unchanged.  Left femoral neck T score -1.6, 6% decrease.  Right femoral neck T score -2.0.  Unchanged.  Assessment is osteopenia with a statistically significant interval decrease in the left hip.  07/18/2014--Fosamax 70 mg per day  initiated.  Calcium 1200 mg per day.    07/07/2014--DEXA scan revealed average lumbar spine T score -0.5.  Left proximal femoral T score is zero.  Left femoral neck T score -1.3.  Right proximal femoral T score 0.1.  Right femoral neck T score -2.0.  Osteopenia.   • Primary osteoarthritis of knees, bilateral 9/12/2016     09/12/2016--patient called in the office requesting a steriod injection in his knees.  I explained to him that I do no longer perform these injections and have referred him to Dr. Manuel.   • Statin intolerance, Vytorin and pravastatin 12/21/2018 12/21/2018--patient seems to be tolerating Livalo well.  10/02/2018--Livalo 2 mg per day initiated.  Recommend CoQ10 400 mg per day with food for better absorption.  Reassess with lab in about 8 weeks.  08/30/2018--patient presents with complaints of muscle cramps.  He discontinue pravastatin and the cramps went away.  Cramps returned even with a half dose.  This is despite the fact he was taking    • Vitamin B12 deficiency 2/11/2016 06/25/2009--B12 injections begun.   • Vitamin D deficiency 2/11/2016         Past Surgical History:   Procedure Laterality Date   • CATARACT EXTRACTION Left 12/12/2011 12/12/2011--cataract extirpation with intraocular lens implantation left eye.   • CATARACT EXTRACTION Right 12/2011 December 2011--right cataract extirpation with intraocular lens implantation.   • COLONOSCOPY  06/11/2002 06/11/2002--incomplete colonoscopy due to redundant colon. Not able to get past the hepatic flexure. Patient had followup barium contrast enema which showed extensive diverticulosis   • COLONOSCOPY  10/03/2013    10/03/2013--colonoscopy revealed markedly redundant colon, pancolonic diverticulosis with several impacted fecalith. No evidence of diverticulitis or stricture. Was only able to reach the ascending colon. Follow up barium enema revealed extensive diverticulosis. No polyp or other mucosal mass seen.   • CYSTOSCOPY  05/18/2016 05/18/2016--urology consultation.  Cystoscopy performed for possible bladder mass is negative.   • PROSTATE BIOPSY  12/21/2016 12/21/2016--prostate biopsy reportedly negative.  I will try to obtain the report.           Current Outpatient Medications:   •  ALPRAZolam (XANAX) 0.5 MG tablet, TAKE ONE TABLET BY  MOUTH TWICE A DAY AS NEEDED, Disp: 60 tablet, Rfl: 3  •  amLODIPine (NORVASC) 5 MG tablet, Take 1 tablet by mouth every morning., Disp: , Rfl:   •  aspirin (ASPIRIN LOW DOSE) 81 MG tablet, Take 1 tablet by mouth daily., Disp: , Rfl:   •  calcitriol (ROCALTROL) 0.25 MCG capsule, 1 capsule 3 (Three) Times a Week., Disp: , Rfl:   •  Cholecalciferol (VITAMIN D3) 5000 UNITS capsule capsule, Take 1 capsule by mouth daily., Disp: , Rfl:   •  Coenzyme Q10 (COQ10) 400 MG capsule, Take 400 mg by mouth Daily., Disp: , Rfl:   •  folic acid (FOLVITE) 1 MG tablet, TAKE ONE TABLET BY MOUTH TWICE A DAY, Disp: 180 tablet, Rfl: 1  •  lisinopril-hydrochlorothiazide (PRINZIDE,ZESTORETIC) 20-12.5 MG per tablet, Take 2 tablets by mouth Daily., Disp: 180 tablet, Rfl: 1  •  omeprazole (priLOSEC) 40 MG capsule, Take 1 capsule by mouth Daily., Disp: 30 capsule, Rfl: 5    Current Facility-Administered Medications:   •  cyanocobalamin injection 1,000 mcg, 1,000 mcg, Intramuscular, Q28 Days, Delgado Patricia MD, 1,000 mcg at 04/11/19 1331      Social History     Socioeconomic History   • Marital status:      Spouse name: Not on file   • Number of children: 2   • Years of education: 14   • Highest education level: Some college, no degree   Occupational History   • Occupation: Retired      Employer: [employer]   Social Needs   • Financial resource strain: Not hard at all   • Food insecurity:     Worry: Never true     Inability: Never true   • Transportation needs:     Medical: No     Non-medical: No   Tobacco Use   • Smoking status: Never Smoker   • Smokeless tobacco: Never Used   Substance and Sexual Activity   • Alcohol use: Yes     Frequency: 2-3 times a week     Drinks per session: 1 or 2     Comment: Moderate alcohol consumption   • Drug use: No   • Sexual activity: Yes     Partners: Female   Lifestyle   • Physical activity:     Days per week: 1 day     Minutes per session: 30 min   • Stress: Only a little   Relationships   •  "Social connections:     Talks on phone: More than three times a week     Gets together: Once a week     Attends [employer]ian[employer] service: More than 4 times per year     Active member of club or organization: Yes     Attends meetings of clubs or organizations: More than 4 times per year     Relationship status:          Review of Systems   Constitution: Negative.   HENT: Negative.    Eyes: Negative.    Cardiovascular: Positive for chest pain.   Respiratory: Negative.    Endocrine: Negative.    Skin: Negative.    Musculoskeletal: Negative.    Gastrointestinal: Negative.    Neurological: Negative.    Psychiatric/Behavioral: Negative.        Procedures      ECG 12 Lead  Date/Time: 7/31/2019 9:50 AM  Performed by: Theron Alejo MD  Authorized by: Theron Alejo MD   Comparison: compared with previous ECG from 5/24/2019  Similar to previous ECG  Rhythm: sinus rhythm  Rate: normal  Conduction: right bundle branch block  QRS axis: normal                  Objective:    /92   Pulse 90   Ht 172.7 cm (68\")   Wt 88.1 kg (194 lb 3.2 oz)   SpO2 98%   BMI 29.53 kg/m²         Physical Exam   Constitutional: He is oriented to person, place, and time. He appears well-developed and well-nourished.   HENT:   Head: Normocephalic.   Eyes: Pupils are equal, round, and reactive to light.   Neck: Normal range of motion. No JVD present. Carotid bruit is not present. No thyromegaly present.   Cardiovascular: Normal rate, regular rhythm, S1 normal, S2 normal, normal heart sounds and intact distal pulses. Exam reveals no gallop and no friction rub.   No murmur heard.  Pulmonary/Chest: Effort normal and breath sounds normal.   Abdominal: Soft. Bowel sounds are normal.   Musculoskeletal: He exhibits no edema.   Neurological: He is alert and oriented to person, place, and time.   Skin: Skin is warm, dry and intact. No erythema.   Psychiatric: He has a normal mood and affect.   Vitals reviewed.          Assessment:       " Diagnosis Plan   1. Precordial pain     2. Essential hypertension     3. RBBB       1.  Precordial chest pain: The patient's symptoms appear to be typical anginal in nature.  He has not had any symptoms in 3 months and his Cardiolite study was negative.  At this point I do not feel compelled to pursue a cardiac catheterization.  What I would like to do is increase his amlodipine to 10 mg daily.    2.  Hypertension: The patient's blood pressure appears to be slightly uncontrolled.  His systolics were over 160 here in the office today.  His diastolics are around 90.  I am going to increase his amlodipine to 10 mg daily    3.  Right bundle branch block: I found an electrocardiogram from May of this year indicating the same.  No previous electrocardiogram was available.  I explained to him the significance of the right bundle branch block which at this point does not appear to be very significant.       Plan:       1..  Increase amlodipine to 10 mg daily  2.  Follow-up in 4 months  3.  Report any change in anginal pattern    I appreciate the opportunity to evaluate this patient in consultation.

## 2019-07-31 NOTE — ANESTHESIA PROCEDURE NOTES
PAIN Epidural block    Pre-sedation assessment completed: 7/31/2019 11:49 AM    Patient reassessed immediately prior to procedure    Patient location during procedure: pain clinic  Start Time: 7/31/2019 12:00 PM  Stop Time: 7/31/2019 12:07 PM  Indication:procedure for pain  Performed By  Anesthesiologist: Aaron Triana MD  Preanesthetic Checklist  Completed: patient identified, site marked, surgical consent, pre-op evaluation, timeout performed, IV checked, risks and benefits discussed and monitors and equipment checked  Additional Notes  Post-Op Diagnosis Codes:     * DDD (degenerative disc disease), cervical (M50.30)     * Cervical spondylosis without myelopathy (M47.812)    The patient was observed in recovery with no evidence of neurological deficits or other problems. All questions were answered. The patient was discharged with appropriate instructions.  Prep:  Pt Position:prone  Sterile Tech:cap, gloves, mask and sterile barrier  Prep:chlorhexidine gluconate and isopropyl alcohol  Monitoring:blood pressure monitoring, continuous pulse oximetry and EKG  Procedure:Sedation: no     Approach:left paramedian  Guidance: fluoroscopy  Location:cervical  Level:6-7 (With attempted spread to the left side C5-6 and C4-5)  Needle Type:Tuohy  Needle Gauge:20 G  Aspiration:negative  Medications:  Analgesia: Preservative Free Dexamethasone 10mg.  Preservative Free Saline:2mL  Comments:Patient preferred to avoid contrast because of renal issues.  Post Assessment:  Post-procedure: Dressing per nursing.  Pt Tolerance:patient tolerated the procedure well with no apparent complications  Complications:no

## 2019-08-05 ENCOUNTER — CLINICAL SUPPORT (OUTPATIENT)
Dept: INTERNAL MEDICINE | Facility: CLINIC | Age: 84
End: 2019-08-05

## 2019-08-05 DIAGNOSIS — E53.8 VITAMIN B12 DEFICIENCY: Primary | Chronic | ICD-10-CM

## 2019-08-05 PROCEDURE — 96372 THER/PROPH/DIAG INJ SC/IM: CPT | Performed by: INTERNAL MEDICINE

## 2019-08-05 RX ADMIN — CYANOCOBALAMIN 1000 MCG: 1000 INJECTION, SOLUTION INTRAMUSCULAR; SUBCUTANEOUS at 14:38

## 2019-09-10 ENCOUNTER — ANESTHESIA (OUTPATIENT)
Dept: PAIN MEDICINE | Facility: HOSPITAL | Age: 84
End: 2019-09-10

## 2019-09-10 ENCOUNTER — HOSPITAL ENCOUNTER (OUTPATIENT)
Dept: PAIN MEDICINE | Facility: HOSPITAL | Age: 84
Discharge: HOME | End: 2019-09-10
Admitting: ANESTHESIOLOGY

## 2019-09-10 ENCOUNTER — HOSPITAL ENCOUNTER (OUTPATIENT)
Dept: GENERAL RADIOLOGY | Facility: HOSPITAL | Age: 84
Discharge: HOME | End: 2019-09-10

## 2019-09-10 ENCOUNTER — ANESTHESIA EVENT (OUTPATIENT)
Dept: PAIN MEDICINE | Facility: HOSPITAL | Age: 84
End: 2019-09-10

## 2019-09-10 VITALS
RESPIRATION RATE: 16 BRPM | SYSTOLIC BLOOD PRESSURE: 168 MMHG | OXYGEN SATURATION: 98 % | HEART RATE: 104 BPM | DIASTOLIC BLOOD PRESSURE: 87 MMHG | TEMPERATURE: 98.2 F

## 2019-09-10 DIAGNOSIS — R52 PAIN: ICD-10-CM

## 2019-09-10 DIAGNOSIS — M50.30 DEGENERATION OF CERVICAL INTERVERTEBRAL DISC: Chronic | ICD-10-CM

## 2019-09-10 PROCEDURE — 25010000002 DEXAMETHASONE SODIUM PHOSPHATE 10 MG/ML SOLUTION: Performed by: ANESTHESIOLOGY

## 2019-09-10 PROCEDURE — C1755 CATHETER, INTRASPINAL: HCPCS

## 2019-09-10 PROCEDURE — 77003 FLUOROGUIDE FOR SPINE INJECT: CPT

## 2019-09-10 PROCEDURE — 0 IOPAMIDOL 41 % SOLUTION: Performed by: ANESTHESIOLOGY

## 2019-09-10 RX ORDER — SODIUM CHLORIDE 0.9 % (FLUSH) 0.9 %
3 SYRINGE (ML) INJECTION EVERY 12 HOURS SCHEDULED
Status: DISCONTINUED | OUTPATIENT
Start: 2019-09-10 | End: 2019-09-11 | Stop reason: HOSPADM

## 2019-09-10 RX ORDER — LIDOCAINE HYDROCHLORIDE 10 MG/ML
1 INJECTION, SOLUTION INFILTRATION; PERINEURAL ONCE
Status: DISCONTINUED | OUTPATIENT
Start: 2019-09-10 | End: 2019-09-11 | Stop reason: HOSPADM

## 2019-09-10 RX ORDER — DEXAMETHASONE SODIUM PHOSPHATE 10 MG/ML
10 INJECTION, SOLUTION INTRAMUSCULAR; INTRAVENOUS ONCE
Status: COMPLETED | OUTPATIENT
Start: 2019-09-10 | End: 2019-09-10

## 2019-09-10 RX ORDER — MIDAZOLAM HYDROCHLORIDE 1 MG/ML
1 INJECTION INTRAMUSCULAR; INTRAVENOUS AS NEEDED
Status: DISCONTINUED | OUTPATIENT
Start: 2019-09-10 | End: 2019-09-11 | Stop reason: HOSPADM

## 2019-09-10 RX ORDER — FENTANYL CITRATE 50 UG/ML
50 INJECTION, SOLUTION INTRAMUSCULAR; INTRAVENOUS AS NEEDED
Status: DISCONTINUED | OUTPATIENT
Start: 2019-09-10 | End: 2019-09-11 | Stop reason: HOSPADM

## 2019-09-10 RX ORDER — SODIUM CHLORIDE 0.9 % (FLUSH) 0.9 %
3-10 SYRINGE (ML) INJECTION AS NEEDED
Status: DISCONTINUED | OUTPATIENT
Start: 2019-09-10 | End: 2019-09-11 | Stop reason: HOSPADM

## 2019-09-10 RX ADMIN — IOPAMIDOL 10 ML: 408 INJECTION, SOLUTION INTRATHECAL at 13:20

## 2019-09-10 RX ADMIN — DEXAMETHASONE SODIUM PHOSPHATE 10 MG: 10 INJECTION INTRAMUSCULAR; INTRAVENOUS at 13:20

## 2019-09-10 NOTE — H&P
CHIEF COMPLAINT: neck pain        HISTORY OF PRESENT ILLNESS:  This patient is complaining of neck pain which radiates to his left scapula.  It ranges between a  6 and 8 out of 10 on the pain scale.  The pain is described as deep, sharp, and stabbing in nature. The pain is exacerbated by sitting, twisting, walking, and returning or loading his neck to the left side, and relieved by relaxation and lying down.  The patient has tried conservative therapy for greater than 6 weeks including: Physical therapy.  The pain is significantly decreasing the patient's Activities of Daily Living.  Diagnostic imaging including a cervical MRI is available on the chart which shows multilevel degenerative disc disease that seems to be the worst at the C5-C6 level.     This patient was referred to our clinic by Shobha Love PA-C for consideration of interventional pain management options including possible cervical epidural steroid injection.    He has had one prior cervical epidural steroid injection was provided greater than 75% relief of his pain and significantly increase his activities of daily living.  Unfortunately, this is fading somewhat, and he is requesting another injection here today.       PAST MEDICAL HISTORY:  Current Outpatient Medications on File Prior to Encounter   Medication Sig Dispense Refill   • ALPRAZolam (XANAX) 0.5 MG tablet TAKE ONE TABLET BY MOUTH TWICE A DAY AS NEEDED 60 tablet 3   • amLODIPine (NORVASC) 10 MG tablet Take 1 tablet by mouth Daily. 90 tablet 1   • aspirin (ASPIRIN LOW DOSE) 81 MG tablet Take 1 tablet by mouth daily.     • calcitriol (ROCALTROL) 0.25 MCG capsule 1 capsule 3 (Three) Times a Week.     • Cholecalciferol (VITAMIN D3) 5000 UNITS capsule capsule Take 1 capsule by mouth daily.     • Coenzyme Q10 (COQ10) 400 MG capsule Take 400 mg by mouth Daily.     • folic acid (FOLVITE) 1 MG tablet TAKE ONE TABLET BY MOUTH TWICE A  tablet 1   • lisinopril-hydrochlorothiazide  (PRINZIDE,ZESTORETIC) 20-12.5 MG per tablet Take 2 tablets by mouth Daily. 180 tablet 1   • omeprazole (priLOSEC) 40 MG capsule Take 1 capsule by mouth Daily. 30 capsule 5     Current Facility-Administered Medications on File Prior to Encounter   Medication Dose Route Frequency Provider Last Rate Last Dose   • cyanocobalamin injection 1,000 mcg  1,000 mcg Intramuscular Q28 Days Delgado Patricia MD   1,000 mcg at 08/05/19 1438       Past Medical History:   Diagnosis Date   • Allergic rhinitis 2/11/2016   • Anemia due to chronic kidney disease 2/11/2016   • Benign essential hypertension 2/11/2016   • Benign prostatic hypertrophy 2/11/2016 12/21/2016--prostate biopsy reportedly negative.  I will try to obtain the report.  Patient sees urologist.  PSAs run from the 3-8 range   • Bilateral carotid artery plaque, 09/21/2016--16-49% bilateral carotid artery stenosis 2/11/2016 09/21/2016--vascular screen reveals 16-49% bilateral carotid artery stenosis, negative for AAA, negative for PAD.  10/10/2008--vascular screening study revealed mild bilateral carotid plaque, no aortic aneurysm, no evidence of PAD   • Bilateral renal cysts 2/14/2016 04/07/2016--ultrasound of the kidneys reveals the previously described renal cysts are not well seen.  However, questionable incidental bladder tumor noted.  05/26/2010--ultrasound the kidneys was negative bilaterally except for small renal cysts.   • Chronic renal insufficiency, stage IV (severe), 02/09/2016--creatinine 1.85 2/11/2016 02/09/2016--serum creatinine 1.85.  BUN 29.  07/20/2015--patient was evaluated by the nephrologist.  Ultrasound of the kidneys ordered but this was never done.  05/22/2015--BUN 35, creatinine 2.3.  Patient referred to nephrology, Dr. Devyn Muniz.  04/16/2014--BUN 25, creatinine 1.77.  01/03/2013--BUN 37, creatinine 2.14.    05/26/2010---ultrasound of the kidneys reveal a small cyst x 2 right kidney.  Otherwise normal.    Baseline creatinine  approximately 1.9-2.0.   • Degeneration of cervical intervertebral disc 5/2/2019   • Diverticulosis of colon 2/11/2016    10/03/2013--colonoscopy revealed markedly redundant colon, pancolonic diverticulosis with several impacted fecalith.  No evidence of diverticulitis or stricture.  Was only able to reach the ascending colon.  Follow up barium enema revealed extensive diverticulosis.  No polyp or other mucosal mass seen.    06/11/2002--incomplete colonoscopy due to redundant colon.  Not able to get past the hepatic flexure.  Patient had followup barium contrast enema which showed extensive diverticulosis.   • Folic acid deficiency 2/11/2016   • Gastroesophageal reflux disease without esophagitis 2/11/2016   • Generalized anxiety disorder 2/11/2016   • History of diverticulitis of colon 2009 and 2003 05/08/2009-acute diverticulitis without complication. 09/05/2003-acute diverticulitis without complication.    • Hyperlipidemia 2/11/2016 01/29/2005--treatment for hyperlipidemia begun.   • Hypogonadism in male, on TRT, testosterone pellets, per  6/16/2017 January 2017--patient reports his urologist initiated testosterone replacement therapy consisting of testosterone pellets every 6 months.   • Multiple environmental allergies 2/12/2016    Patient sees the allergist regularly and has been on immunotherapy.   • Muscle cramps, statin related, Vytorin and pravastatin.  Tolerates Livalo. 6/16/2017 12/21/2018--patient seems to be tolerating Livalo well.  10/02/2018--Livalo 2 mg per day initiated.  Recommend CoQ10 400 mg per day with food for better absorption.  Reassess with lab in about 8 weeks.  08/30/2018--patient presents with complaints of muscle cramps.  He discontinue pravastatin and the cramps went away.  Cramps returned even with a half dose.  This is despite the fact he was taking    • Osteopenia, 03/14/2017--lumbar spine -0.3.  Left femoral neck -1.6.  Right femoral neck -2.0. 2/11/2016     06/16/2017--patient seen in follow-up and reports he doesn't think he ever started Fosamax.  Osteopenia and needs to be reassessed with a DEXA scan.  03/14/2017--DEXA scan reveals lumbar spine T score -0.3.  Unchanged.  Left femoral neck T score -1.6, 6% decrease.  Right femoral neck T score -2.0.  Unchanged.  Assessment is osteopenia with a statistically significant interval decrease in the left hip.  07/18/2014--Fosamax 70 mg per day  initiated.  Calcium 1200 mg per day.    07/07/2014--DEXA scan revealed average lumbar spine T score -0.5.  Left proximal femoral T score is zero.  Left femoral neck T score -1.3.  Right proximal femoral T score 0.1.  Right femoral neck T score -2.0.  Osteopenia.   • Primary osteoarthritis of knees, bilateral 9/12/2016 09/12/2016--patient called in the office requesting a steriod injection in his knees.  I explained to him that I do no longer perform these injections and have referred him to Dr. Manuel.   • Statin intolerance, Vytorin and pravastatin 12/21/2018 12/21/2018--patient seems to be tolerating Livalo well.  10/02/2018--Livalo 2 mg per day initiated.  Recommend CoQ10 400 mg per day with food for better absorption.  Reassess with lab in about 8 weeks.  08/30/2018--patient presents with complaints of muscle cramps.  He discontinue pravastatin and the cramps went away.  Cramps returned even with a half dose.  This is despite the fact he was taking    • Vitamin B12 deficiency 2/11/2016 06/25/2009--B12 injections begun.   • Vitamin D deficiency 2/11/2016       SOCIAL HISTORY:  Counseled to avoid tobacco     REVIEW OF SYSTEMS:  No pertinent hematologic, infectious, or constitutional symptoms.  Other review of systems non-contributory     PHYSICAL EXAM:  There were no vitals taken for this visit.  Constitutional: Well-developed well-nourished no acute distress  General: Alert and oriented  Ophtho: EOMI  Airway: Mallampati 2  Cardiac:  Regular rate  Lungs:  Clear to  auscultation bilaterally   GI: soft, nontender  Lumbar Spine: Noncontributory  Sacroiliac Joints: Noncontributory  Musc: Decreased range of motion of his neck and pain with facet loading left greater than right  Neuro: Spurling's test positive on the left once again reproduce the pain to his left scapula but it seems to be better than last time.     DIAGNOSIS:  Post-Op Diagnosis Codes:  Post-Op Diagnosis Codes:     * DDD (degenerative disc disease), cervical [M50.30]     * Cervical spondylosis without myelopathy [M47.812]     PLAN:  -  Cervical epidural steroid injections with a planned entry level of C6-C7 and attempted medication spread C5-C6 and hopefully also C4-C5 on the left. These will likely be spaced approximately 2-4 weeks apart.  If pain control is acceptable after this injection, it was discussed with the patient that they may return for the subsequent injections if and when their pain returns.     - Risks were discussed with the patient, including but not limited to: failure of relief, worsening pain, tissue, nerve, blood vessel or spinal cord damage, post-dural-puncture headache, bleeding, epidural hematoma, infection, and epidural abscess. Benefits and alternatives were also discussed. No promises were made. The patient voiced understanding.   -  Physical therapy exercises at home should be considered, as tolerated, as prescribed by physical therapy or from the pain clinic handout (given to the patient).  Continuation of these exercises every day, or multiple times per week, even when the patient has good pain relief, was stressed to the patient as a preventative measure to decrease the frequency and severity of future pain episodes.  -  It is recommended that the patient use the least amount of opioid medication possible.     -  Heat and ice to the affected area as tolerated for pain control.  It was discussed that heating pads can cause burns.  -  Daily low impact exercise such as walking or water  exercise was recommended to maintain overall health and aid in weight control.   -  Patient was counseled to abstain from tobacco products.  -  Follow up as needed for subsequent injections.  -I will possibly consider future cervical facet medial branch blocks if necessary    Aaron Doyle, [facility], [facility]  Board Certified Pain Medicine Specialist by the American Board of Anesthesiology  Board Certified Anesthesiologist by the American Board of Anesthesiology     Much of this encounter note is an electronic transcription/translation of spoken language to printed text. The electronic translation of spoken language may permit erroneous, or at times, nonsensical words or phrases to be inadvertently transcribed. Although I have reviewed the note for such errors, some may still exist.

## 2019-09-17 ENCOUNTER — OFFICE VISIT (OUTPATIENT)
Dept: INTERNAL MEDICINE | Facility: CLINIC | Age: 84
End: 2019-09-17

## 2019-09-17 VITALS
HEART RATE: 114 BPM | SYSTOLIC BLOOD PRESSURE: 174 MMHG | WEIGHT: 193.2 LBS | DIASTOLIC BLOOD PRESSURE: 76 MMHG | HEIGHT: 68 IN | OXYGEN SATURATION: 98 % | BODY MASS INDEX: 29.28 KG/M2

## 2019-09-17 DIAGNOSIS — D63.1 ANEMIA DUE TO STAGE 3 CHRONIC KIDNEY DISEASE (HCC): Chronic | ICD-10-CM

## 2019-09-17 DIAGNOSIS — Z51.81 THERAPEUTIC DRUG MONITORING: ICD-10-CM

## 2019-09-17 DIAGNOSIS — M85.89 OSTEOPENIA OF MULTIPLE SITES: Chronic | ICD-10-CM

## 2019-09-17 DIAGNOSIS — R73.01 IMPAIRED FASTING GLUCOSE: Chronic | ICD-10-CM

## 2019-09-17 DIAGNOSIS — E78.5 HYPERLIPIDEMIA, UNSPECIFIED HYPERLIPIDEMIA TYPE: Chronic | ICD-10-CM

## 2019-09-17 DIAGNOSIS — E29.1 HYPOGONADISM IN MALE: Chronic | ICD-10-CM

## 2019-09-17 DIAGNOSIS — N40.0 BENIGN NON-NODULAR PROSTATIC HYPERPLASIA WITHOUT LOWER URINARY TRACT SYMPTOMS: Chronic | ICD-10-CM

## 2019-09-17 DIAGNOSIS — R25.2 MUSCLE CRAMPS: Chronic | ICD-10-CM

## 2019-09-17 DIAGNOSIS — J30.1 SEASONAL ALLERGIC RHINITIS DUE TO POLLEN: Chronic | ICD-10-CM

## 2019-09-17 DIAGNOSIS — I20.8 EXERTIONAL ANGINA (HCC): ICD-10-CM

## 2019-09-17 DIAGNOSIS — Z78.9 STATIN INTOLERANCE: Chronic | ICD-10-CM

## 2019-09-17 DIAGNOSIS — E55.9 VITAMIN D DEFICIENCY: Chronic | ICD-10-CM

## 2019-09-17 DIAGNOSIS — I65.23 ATHEROSCLEROSIS OF BOTH CAROTID ARTERIES: Chronic | ICD-10-CM

## 2019-09-17 DIAGNOSIS — G89.29 CHRONIC NECK PAIN: Chronic | ICD-10-CM

## 2019-09-17 DIAGNOSIS — M50.30 DEGENERATION OF CERVICAL INTERVERTEBRAL DISC: ICD-10-CM

## 2019-09-17 DIAGNOSIS — M54.2 CHRONIC NECK PAIN: Chronic | ICD-10-CM

## 2019-09-17 DIAGNOSIS — E53.8 VITAMIN B12 DEFICIENCY: Chronic | ICD-10-CM

## 2019-09-17 DIAGNOSIS — R07.9 EXERTIONAL CHEST PAIN: Primary | ICD-10-CM

## 2019-09-17 DIAGNOSIS — I10 BENIGN ESSENTIAL HYPERTENSION: Chronic | ICD-10-CM

## 2019-09-17 DIAGNOSIS — N18.30 ANEMIA DUE TO STAGE 3 CHRONIC KIDNEY DISEASE (HCC): Chronic | ICD-10-CM

## 2019-09-17 DIAGNOSIS — M54.12 CERVICAL RADICULOPATHY: ICD-10-CM

## 2019-09-17 DIAGNOSIS — K21.9 GASTROESOPHAGEAL REFLUX DISEASE WITHOUT ESOPHAGITIS: Chronic | ICD-10-CM

## 2019-09-17 DIAGNOSIS — N18.4 CHRONIC RENAL INSUFFICIENCY, STAGE IV (SEVERE) (HCC): Chronic | ICD-10-CM

## 2019-09-17 PROBLEM — I20.89 EXERTIONAL ANGINA: Status: ACTIVE | Noted: 2019-09-17

## 2019-09-17 PROCEDURE — 96372 THER/PROPH/DIAG INJ SC/IM: CPT | Performed by: INTERNAL MEDICINE

## 2019-09-17 PROCEDURE — 99215 OFFICE O/P EST HI 40 MIN: CPT | Performed by: INTERNAL MEDICINE

## 2019-09-17 RX ORDER — ISOSORBIDE MONONITRATE 30 MG/1
TABLET, EXTENDED RELEASE ORAL
Qty: 30 TABLET | Refills: 3 | Status: SHIPPED | OUTPATIENT
Start: 2019-09-17 | End: 2020-01-13

## 2019-09-17 RX ORDER — FLUTICASONE PROPIONATE 50 MCG
SPRAY, SUSPENSION (ML) NASAL
Qty: 16 G | Refills: 6 | Status: SHIPPED | OUTPATIENT
Start: 2019-09-17 | End: 2020-11-20

## 2019-09-17 RX ORDER — CYANOCOBALAMIN 1000 UG/ML
1000 INJECTION, SOLUTION INTRAMUSCULAR; SUBCUTANEOUS ONCE
Status: COMPLETED | OUTPATIENT
Start: 2019-09-17 | End: 2019-09-17

## 2019-09-17 RX ORDER — AMLODIPINE BESYLATE 5 MG/1
TABLET ORAL
COMMUNITY
Start: 2019-09-16 | End: 2020-08-19 | Stop reason: SDUPTHER

## 2019-09-17 RX ADMIN — CYANOCOBALAMIN 1000 MCG: 1000 INJECTION, SOLUTION INTRAMUSCULAR; SUBCUTANEOUS at 16:38

## 2019-09-17 NOTE — PROGRESS NOTES
09/17/2019    Patient Information  Radha Win                                                                                          [address]  [facility] [address]      7/26/1932  [unfilled]  There is no work phone number on file.    Chief Complaint:     Follow-up exertional chest pain and recent cardiology consultation, hypertension and recent medication change.  Follow-up chronic neck pain.  No new acute complaints.    History of Present Illness:    Patient with a history of impaired fasting glucose, hypertension, hyperlipidemia, stage IV chronic renal insufficiency, carotid artery plaque, BPH, osteopenia, vitamin B12 deficiency and vitamin D deficiency, anemia of chronic renal disease, esophageal reflux, statin intolerance, hypogonadism, chronic neck pain and cervical radiculopathy.  He presents today to follow-up on recent cardiology evaluation for exertional chest pain.  The history regarding this will be described below.  Also the cardiologist made some changes to the patient's blood pressure medication and this needs to be assessed as well.  Past medical history reviewed and updated were necessary including health maintenance parameters.  This reveals he is up-to-date or else accounted for with the exception of the influenza vaccination which we are currently out of.    Review of Systems   Constitution: Negative.   HENT: Negative.    Eyes: Negative.    Cardiovascular: Negative.    Respiratory: Negative.    Endocrine: Negative.    Hematologic/Lymphatic: Negative.    Skin: Negative.    Musculoskeletal: Positive for arthritis and neck pain.   Gastrointestinal: Negative.    Genitourinary: Negative.    Neurological: Negative.    Psychiatric/Behavioral: Negative.    Allergic/Immunologic: Negative.        Active Problems:    Patient Active Problem List   Diagnosis   • Bilateral carotid artery plaque, 05/24/2018--16-49% bilateral carotid artery stenosis   • Benign prostatic  hypertrophy   • Chronic renal insufficiency, stage IV (severe), 02/09/2016--creatinine 1.85   • Diverticulosis of colon   • Hyperlipidemia   • Osteopenia, 03/14/2017--lumbar spine -0.3.  Left femoral neck -1.6.  Right femoral neck -2.0.   • Vitamin B12 deficiency   • Vitamin D deficiency   • Allergic rhinitis   • Anemia due to chronic kidney disease   • Generalized anxiety disorder   • Benign essential hypertension   • Gastroesophageal reflux disease without esophagitis   • Folic acid deficiency   • Multiple environmental allergies   • Therapeutic drug monitoring   • Bilateral renal cysts   • Primary osteoarthritis of knees, bilateral   • Impaired fasting glucose   • Muscle cramps, statin related, Vytorin and pravastatin.  Tolerates Livalo.   • Hypogonadism in male, on TRT, testosterone pellets, per    • Chronic neck pain   • Cervical radiculopathy   • Statin intolerance, Vytorin and pravastatin   • Degeneration of cervical intervertebral disc   • Exertional chest pain   • Exertional angina (CMS/HCC)         Past Medical History:   Diagnosis Date   • Allergic rhinitis 2/11/2016   • Anemia due to chronic kidney disease 2/11/2016   • Benign essential hypertension 2/11/2016   • Benign prostatic hypertrophy 2/11/2016 12/21/2016--prostate biopsy reportedly negative.  I will try to obtain the report.  Patient sees urologist.  PSAs run from the 3-8 range   • Bilateral carotid artery plaque, 09/21/2016--16-49% bilateral carotid artery stenosis 2/11/2016 09/21/2016--vascular screen reveals 16-49% bilateral carotid artery stenosis, negative for AAA, negative for PAD.  10/10/2008--vascular screening study revealed mild bilateral carotid plaque, no aortic aneurysm, no evidence of PAD   • Bilateral renal cysts 2/14/2016 04/07/2016--ultrasound of the kidneys reveals the previously described renal cysts are not well seen.  However, questionable incidental bladder tumor noted.  05/26/2010--ultrasound the kidneys was  negative bilaterally except for small renal cysts.   • Chronic renal insufficiency, stage IV (severe), 02/09/2016--creatinine 1.85 2/11/2016 02/09/2016--serum creatinine 1.85.  BUN 29.  07/20/2015--patient was evaluated by the nephrologist.  Ultrasound of the kidneys ordered but this was never done.  05/22/2015--BUN 35, creatinine 2.3.  Patient referred to nephrology, Dr. Devyn Muniz.  04/16/2014--BUN 25, creatinine 1.77.  01/03/2013--BUN 37, creatinine 2.14.    05/26/2010---ultrasound of the kidneys reveal a small cyst x 2 right kidney.  Otherwise normal.    Baseline creatinine approximately 1.9-2.0.   • Degeneration of cervical intervertebral disc 5/2/2019   • Diverticulosis of colon 2/11/2016    10/03/2013--colonoscopy revealed markedly redundant colon, pancolonic diverticulosis with several impacted fecalith.  No evidence of diverticulitis or stricture.  Was only able to reach the ascending colon.  Follow up barium enema revealed extensive diverticulosis.  No polyp or other mucosal mass seen.    06/11/2002--incomplete colonoscopy due to redundant colon.  Not able to get past the hepatic flexure.  Patient had followup barium contrast enema which showed extensive diverticulosis.   • Folic acid deficiency 2/11/2016   • Gastroesophageal reflux disease without esophagitis 2/11/2016   • Generalized anxiety disorder 2/11/2016   • History of diverticulitis of colon 2009 and 2003 05/08/2009-acute diverticulitis without complication. 09/05/2003-acute diverticulitis without complication.    • Hyperlipidemia 2/11/2016 01/29/2005--treatment for hyperlipidemia begun.   • Hypogonadism in male, on TRT, testosterone pellets, per  6/16/2017 January 2017--patient reports his urologist initiated testosterone replacement therapy consisting of testosterone pellets every 6 months.   • Multiple environmental allergies 2/12/2016    Patient sees the allergist regularly and has been on immunotherapy.   • Muscle cramps, statin  related, Vytorin and pravastatin.  Tolerates Livalo. 6/16/2017 12/21/2018--patient seems to be tolerating Livalo well.  10/02/2018--Livalo 2 mg per day initiated.  Recommend CoQ10 400 mg per day with food for better absorption.  Reassess with lab in about 8 weeks.  08/30/2018--patient presents with complaints of muscle cramps.  He discontinue pravastatin and the cramps went away.  Cramps returned even with a half dose.  This is despite the fact he was taking    • Osteopenia, 03/14/2017--lumbar spine -0.3.  Left femoral neck -1.6.  Right femoral neck -2.0. 2/11/2016 06/16/2017--patient seen in follow-up and reports he doesn't think he ever started Fosamax.  Osteopenia and needs to be reassessed with a DEXA scan.  03/14/2017--DEXA scan reveals lumbar spine T score -0.3.  Unchanged.  Left femoral neck T score -1.6, 6% decrease.  Right femoral neck T score -2.0.  Unchanged.  Assessment is osteopenia with a statistically significant interval decrease in the left hip.  07/18/2014--Fosamax 70 mg per day  initiated.  Calcium 1200 mg per day.    07/07/2014--DEXA scan revealed average lumbar spine T score -0.5.  Left proximal femoral T score is zero.  Left femoral neck T score -1.3.  Right proximal femoral T score 0.1.  Right femoral neck T score -2.0.  Osteopenia.   • Primary osteoarthritis of knees, bilateral 9/12/2016 09/12/2016--patient called in the office requesting a steriod injection in his knees.  I explained to him that I do no longer perform these injections and have referred him to Dr. Manuel.   • Statin intolerance, Vytorin and pravastatin 12/21/2018 12/21/2018--patient seems to be tolerating Livalo well.  10/02/2018--Livalo 2 mg per day initiated.  Recommend CoQ10 400 mg per day with food for better absorption.  Reassess with lab in about 8 weeks.  08/30/2018--patient presents with complaints of muscle cramps.  He discontinue pravastatin and the cramps went away.  Cramps returned even with a half  dose.  This is despite the fact he was taking    • Vitamin B12 deficiency 2/11/2016 06/25/2009--B12 injections begun.   • Vitamin D deficiency 2/11/2016         Past Surgical History:   Procedure Laterality Date   • CATARACT EXTRACTION Left 12/12/2011 12/12/2011--cataract extirpation with intraocular lens implantation left eye.   • CATARACT EXTRACTION Right 12/2011 December 2011--right cataract extirpation with intraocular lens implantation.   • COLONOSCOPY  06/11/2002 06/11/2002--incomplete colonoscopy due to redundant colon. Not able to get past the hepatic flexure. Patient had followup barium contrast enema which showed extensive diverticulosis   • COLONOSCOPY  10/03/2013    10/03/2013--colonoscopy revealed markedly redundant colon, pancolonic diverticulosis with several impacted fecalith. No evidence of diverticulitis or stricture. Was only able to reach the ascending colon. Follow up barium enema revealed extensive diverticulosis. No polyp or other mucosal mass seen.   • CYSTOSCOPY  05/18/2016 05/18/2016--urology consultation.  Cystoscopy performed for possible bladder mass is negative.   • PROSTATE BIOPSY  12/21/2016 12/21/2016--prostate biopsy reportedly negative.  I will try to obtain the report.         No Known Allergies        Current Outpatient Medications:   •  ALPRAZolam (XANAX) 0.5 MG tablet, TAKE ONE TABLET BY MOUTH TWICE A DAY AS NEEDED, Disp: 60 tablet, Rfl: 3  •  amLODIPine (NORVASC) 5 MG tablet, , Disp: , Rfl:   •  aspirin (ASPIRIN LOW DOSE) 81 MG tablet, Take 1 tablet by mouth daily., Disp: , Rfl:   •  calcitriol (ROCALTROL) 0.25 MCG capsule, 1 capsule 3 (Three) Times a Week., Disp: , Rfl:   •  Cholecalciferol (VITAMIN D3) 5000 UNITS capsule capsule, Take 1 capsule by mouth daily., Disp: , Rfl:   •  Coenzyme Q10 (COQ10) 400 MG capsule, Take 400 mg by mouth Daily., Disp: , Rfl:   •  folic acid (FOLVITE) 1 MG tablet, TAKE ONE TABLET BY MOUTH TWICE A DAY, Disp: 180 tablet, Rfl:  1  •  lisinopril-hydrochlorothiazide (PRINZIDE,ZESTORETIC) 20-12.5 MG per tablet, Take 2 tablets by mouth Daily., Disp: 180 tablet, Rfl: 1  •  omeprazole (priLOSEC) 40 MG capsule, Take 1 capsule by mouth Daily., Disp: 30 capsule, Rfl: 5    Current Facility-Administered Medications:   •  cyanocobalamin injection 1,000 mcg, 1,000 mcg, Intramuscular, Q28 Days, Delgado Patricia MD, 1,000 mcg at 08/05/19 1438  •  cyanocobalamin injection 1,000 mcg, 1,000 mcg, Subcutaneous, Once, Delgado Patricia MD      Family History   Problem Relation Age of Onset   • Diabetes Mother    • Heart disease Mother    • Stroke Father          Social History     Socioeconomic History   • Marital status:      Spouse name: Not on file   • Number of children: 2   • Years of education: 14   • Highest education level: Some college, no degree   Occupational History   • Occupation: Retired      Employer: [employer]   Social Needs   • Financial resource strain: Not hard at all   • Food insecurity:     Worry: Never true     Inability: Never true   • Transportation needs:     Medical: No     Non-medical: No   Tobacco Use   • Smoking status: Never Smoker   • Smokeless tobacco: Never Used   Substance and Sexual Activity   • Alcohol use: Yes     Frequency: Monthly or less     Drinks per session: 1 or 2     Binge frequency: Never     Comment: Moderate alcohol consumption   • Drug use: No   • Sexual activity: Yes     Partners: Female   Lifestyle   • Physical activity:     Days per week: 3 days     Minutes per session: 30 min   • Stress: Not at all   Relationships   • Social connections:     Talks on phone: More than three times a week     Gets together: Once a week     Attends Hindu service: More than 4 times per year     Active member of club or organization: Yes     Attends meetings of clubs or organizations: More than 4 times per year     Relationship status:          Vitals:    09/17/19 1451   BP: 174/76   BP Location: Right leg  "  Patient Position: Sitting   Cuff Size: Adult   Pulse: 114   SpO2: 98%   Weight: 87.6 kg (193 lb 3.2 oz)   Height: 172.7 cm (67.99\")          Physical Exam:    General: Alert and oriented x 3.  No acute distress.  Normal affect.  HEENT: Pupils equal, round, reactive to light; extraocular movements intact; sclerae nonicteric; pharynx, ear canals and TMs normal.  Neck: Without JVD, thyromegaly, bruit, or adenopathy.  Lungs: Clear to auscultation in all fields.  Heart: Regular rate and rhythm without murmur, rub, gallop, or click.  Abdomen: Soft, nontender, without hepatosplenomegaly or hernia.  Bowel sounds normal.  : Deferred.  Rectal: Deferred.  Extremities: Without clubbing, cyanosis, edema, or pulse deficit.  Neurologic: Intact without focal deficit.  Normal station and gait observed during ingress and egress from the examination room.  Skin: Without significant lesion.  Musculoskeletal: Unremarkable.    Lab/other results:    I reviewed the consultation from the cardiologist dated July 31, 2019.  Also reviewed the previous stress test from earlier in the year.    Assessment/Plan:     Diagnosis Plan   1. Exertional chest pain     2. Exertional angina (CMS/HCC)     3. Benign essential hypertension     4. Impaired fasting glucose     5. Hyperlipidemia, unspecified hyperlipidemia type     6. Chronic renal insufficiency, stage IV (severe), 02/09/2016--creatinine 1.85     7. Bilateral carotid artery plaque, 05/24/2018--16-49% bilateral carotid artery stenosis     8. Benign prostatic hypertrophy     9. Osteopenia of multiple sites     10. Vitamin B12 deficiency  cyanocobalamin injection 1,000 mcg   11. Vitamin D deficiency     12. Anemia due to stage 3 chronic kidney disease (CMS/HCC)     13. Gastroesophageal reflux disease without esophagitis     14. Statin intolerance, Vytorin and pravastatin     15. Muscle cramps, statin related, Vytorin and pravastatin.  Tolerates Livalo.     16. Hypogonadism in male, on TRT, " testosterone pellets, per      17. Chronic neck pain     18. Cervical radiculopathy     19. Degeneration of cervical intervertebral disc     20. Therapeutic drug monitoring       Patient continues to have exertional chest pain that is fairly reproducible although he reports that it is not nearly as severe as it was when I first evaluated him.  I explained to patient that his blood pressure is quite elevated and just that alone could precipitate angina if he does indeed have coronary artery blockages.  Also explained to patient that amlodipine is a vasodilator and his chest pain may have it resolved if he were on the 10 mg of amlodipine as recommended by the cardiologist.  Patient does have several cardiovascular risk factors including impaired fasting glucose, hyperlipidemia, known carotid artery plaque, and has symptomatic hypogonadism and is on testosterone replacement therapy at the direction of the urologist.  Unfortunately, patient is statin intolerant and his NMR profile was quite bad the last time we checked it.  Our options are limited.  We could place him on Zetia alone which would undoubtedly improve his lipid profile blood would not get it to goal.  1 of the newer novel injectable cholesterol medications would be ideal but the problem is as the patient does not have documented known coronary artery disease.  Therefore our hands are somewhat tied and limited to the edition of Zetia to his medical regimen.  I am also concerned about the development of lower extremity edema related to increased amlodipine but we will just have to deal with that if and when it happens.  I think it would be reasonable to add a long-acting nitrate to his regimen and see what effect that has on his angina as well.  Some patients blood pressure improves with the addition of a long-acting nitrate but I seriously doubt if this patient's blood pressure would improve enough with that alone.  However, it is worth a try.  Patient  does have a follow-up with the cardiologist in the near future and I encouraged him to keep that.    Plan is as follows: Add isosorbide mononitrate 30 mg/day.  We will keep the amlodipine at 5 mg/day at the present time.  I will have patient follow-up in 3 weeks to reassess his blood pressure as well as his anginal symptoms.  In the meantime, I will also contact Dr. Alejo the cardiologist and discussed the case with him.  He may very well opt for heart catheterization if patient is willing.  In regards to his cholesterol, I really do not want to add more than one medication at a time for fear of complicating this situation if patient should have some side effects.  I would rather take this 1 step at a time.  Once again, if patient did have known coronary artery disease 1 of the newer injectable medications work excellent and control of lipid profile.  That certainly may come to pass.  Cyanocobalamin injection given.  We can give patient another injection when he follows up in 3 weeks    Addendum: At the end of the visit patient reports he had worsening allergy symptoms associated with a right-sided nasal congestion, sinus pressure, and some chest congestion.  He thinks this is related to allergies and I certainly agree.  His symptoms are much better.  I explained to patient we need to avoid antihistamines/decongestants due to his angina and blood pressure problems.  He can take plain Mucinex with plenty of water to help break up the phlegm in the future and also I have recommended Flonase 2 sprays to each nostril daily.  I explained to patient that this medication does not work immediately and it needs to be used for at least a week before he would see any effect.    Addendum: Greater than 40 minutes was spent evaluating this patient today with multiple medical problems that are potentially serious.  Greater than 50% of this time was spent counseling patient regarding his medical conditions as well as the need  for being compliant with the recommendations by me and the specialist.  I am not sure the patient totally understands this.  At any rate, [id number] level service justified.    Procedures

## 2019-10-08 ENCOUNTER — OFFICE VISIT (OUTPATIENT)
Dept: INTERNAL MEDICINE | Facility: CLINIC | Age: 84
End: 2019-10-08

## 2019-10-08 VITALS
DIASTOLIC BLOOD PRESSURE: 62 MMHG | HEIGHT: 68 IN | WEIGHT: 191.8 LBS | BODY MASS INDEX: 29.07 KG/M2 | HEART RATE: 108 BPM | OXYGEN SATURATION: 99 % | SYSTOLIC BLOOD PRESSURE: 140 MMHG

## 2019-10-08 DIAGNOSIS — Z78.9 STATIN INTOLERANCE: Chronic | ICD-10-CM

## 2019-10-08 DIAGNOSIS — I20.8 EXERTIONAL ANGINA (HCC): Primary | ICD-10-CM

## 2019-10-08 DIAGNOSIS — E53.8 VITAMIN B12 DEFICIENCY: Chronic | ICD-10-CM

## 2019-10-08 DIAGNOSIS — Z23 NEED FOR INFLUENZA VACCINATION: ICD-10-CM

## 2019-10-08 DIAGNOSIS — R07.9 EXERTIONAL CHEST PAIN: ICD-10-CM

## 2019-10-08 DIAGNOSIS — I10 BENIGN ESSENTIAL HYPERTENSION: Chronic | ICD-10-CM

## 2019-10-08 DIAGNOSIS — N18.4 CHRONIC RENAL INSUFFICIENCY, STAGE IV (SEVERE) (HCC): Chronic | ICD-10-CM

## 2019-10-08 PROCEDURE — G0008 ADMIN INFLUENZA VIRUS VAC: HCPCS | Performed by: INTERNAL MEDICINE

## 2019-10-08 PROCEDURE — 99214 OFFICE O/P EST MOD 30 MIN: CPT | Performed by: INTERNAL MEDICINE

## 2019-10-08 PROCEDURE — 96372 THER/PROPH/DIAG INJ SC/IM: CPT | Performed by: INTERNAL MEDICINE

## 2019-10-08 PROCEDURE — 90662 IIV NO PRSV INCREASED AG IM: CPT | Performed by: INTERNAL MEDICINE

## 2019-10-08 RX ORDER — NITROGLYCERIN 0.6 MG/1
0.6 TABLET SUBLINGUAL
Qty: 30 TABLET | Refills: 12 | Status: SHIPPED | OUTPATIENT
Start: 2019-10-08 | End: 2021-02-01

## 2019-10-08 RX ORDER — CYANOCOBALAMIN 1000 UG/ML
1000 INJECTION, SOLUTION INTRAMUSCULAR; SUBCUTANEOUS ONCE
Status: DISCONTINUED | OUTPATIENT
Start: 2019-10-08 | End: 2019-12-10

## 2019-10-08 RX ADMIN — CYANOCOBALAMIN 1000 MCG: 1000 INJECTION, SOLUTION INTRAMUSCULAR; SUBCUTANEOUS at 11:01

## 2019-10-08 NOTE — PROGRESS NOTES
10/08/2019    Patient Information  Radha Win                                                                                          [address]  [facility] [address]      7/26/1932  [unfilled]  There is no work phone number on file.    Chief Complaint:     Follow-up exertional chest pain and suspected angina, hypertension, statin intolerance, chronic renal insufficiency, hypertension.  No new acute complaints.  Patient reports he is feeling better.    History of Present Illness:    Patient with a history of multiple medical problems presents to follow-up after initiation of medication for suspected angina as described below:    October 8, 2019--patient seen in follow-up and he reports the long-acting oral nitrate has definitely helped his exertional anginal symptoms.  However, he has not stressed himself completely such as using a push mower.  His blood pressure seems improved as well.  Patient has an appoint with the cardiologist in the next 2 weeks.  I have contacted his cardiologist about the results of the empiric nitrates.  Also explained to patient that if we can prove he has coronary artery disease then 1 of the newer injectable cholesterol medications could very well control his cholesterol.  I will have him follow-up in the first part of December to reassess the situation.    September 17, 2019--patient seen in follow-up and reports he still has exertional chest pain such as climbing stairs or going on a long walk and mowing grass.  Symptoms are relieved rather quickly with rest.  Patient did not  the prescription for the 10 mg of amlodipine that the cardiologist recommended for reasons that are not clear to me.  He is currently taking 5 mg of amlodipine per day and his blood pressure remains elevated 174/76.  I have recommended that he increase the amlodipine to 10 mg/day as recommended by the cardiologist and I will have him follow-up in the near future to  reassess the situation.  I will also contact Dr. Alejo and give him an update, particularly given the fact patient continues to have exertional chest pain.    July 31, 2019--patient was evaluated by the cardiologist and his assessment was typical angina.  He noted the patient had not had any symptoms in 3 months and his Cardiolite study was negative.  At this point he did not feel compelled to proceed with cardiac catheterization.  He increased his amlodipine to 10 mg/day to help cover the angina and also patient's blood pressure was elevated with a systolic of 160.  He was noted to have a right bundle branch block which the cardiologist deemed to be not significant.  He recommended a follow-up in 4 months and for patient to report any change in anginal pattern.    July 3, 2019--patient seen in follow-up and the results of the stress test discussed.  The stress test returned negative/low risk study.  Patient continues to have exertional chest discomfort described as a burning sensation that comes on with fairly minimal exertion and resolves with rest.  Therefore, I am referring him to the cardiologist as soon as possible.    May 20, 2019--patient presents with approximately 1 month history of intermittent upper chest discomfort that he describes as a burning sensation and comes on with exertion such as climbing stairs.  No radiation into the neck or down the arms.  There is some associated shortness of breath.  The symptoms resolved very quickly with rest.  He reports he has had 10 or 12 episodes since the onset a month ago.  They always come on with exertion and do not occur at rest.  Examination reveals heart to be regular rate and rhythm without murmur rub or gallop.  Lungs are clear.  Given the risk factors, we need to proceed with cardiovascular evaluation and we will start with a nuclear stress test.  This returned negative, low risk study.    Review of Systems   Constitution: Negative.   HENT: Negative.     Eyes: Negative.    Cardiovascular: Positive for chest pain and dyspnea on exertion.   Respiratory: Negative.    Endocrine: Negative.    Hematologic/Lymphatic: Negative.    Skin: Negative.    Musculoskeletal: Negative.    Gastrointestinal: Negative.    Genitourinary: Negative.    Neurological: Negative.    Psychiatric/Behavioral: Negative.    Allergic/Immunologic: Negative.        Active Problems:    Patient Active Problem List   Diagnosis   • Bilateral carotid artery plaque, 05/24/2018--16-49% bilateral carotid artery stenosis   • Benign prostatic hypertrophy   • Chronic renal insufficiency, stage IV (severe), 02/09/2016--creatinine 1.85   • Diverticulosis of colon   • Hyperlipidemia   • Osteopenia, 03/14/2017--lumbar spine -0.3.  Left femoral neck -1.6.  Right femoral neck -2.0.   • Vitamin B12 deficiency   • Vitamin D deficiency   • Allergic rhinitis   • Anemia due to chronic kidney disease   • Generalized anxiety disorder   • Benign essential hypertension   • Gastroesophageal reflux disease without esophagitis   • Folic acid deficiency   • Multiple environmental allergies   • Therapeutic drug monitoring   • Bilateral renal cysts   • Primary osteoarthritis of knees, bilateral   • Impaired fasting glucose   • Muscle cramps, statin related, Vytorin and pravastatin.  Tolerates Livalo.   • Hypogonadism in male, on TRT, testosterone pellets, per    • Chronic neck pain   • Cervical radiculopathy   • Statin intolerance, Vytorin and pravastatin   • Degeneration of cervical intervertebral disc   • Exertional chest pain   • Exertional angina (CMS/HCC)         Past Medical History:   Diagnosis Date   • Allergic rhinitis 2/11/2016   • Anemia due to chronic kidney disease 2/11/2016   • Benign essential hypertension 2/11/2016   • Benign prostatic hypertrophy 2/11/2016 12/21/2016--prostate biopsy reportedly negative.  I will try to obtain the report.  Patient sees urologist.  PSAs run from the 3-8 range   • Bilateral  carotid artery plaque, 09/21/2016--16-49% bilateral carotid artery stenosis 2/11/2016 09/21/2016--vascular screen reveals 16-49% bilateral carotid artery stenosis, negative for AAA, negative for PAD.  10/10/2008--vascular screening study revealed mild bilateral carotid plaque, no aortic aneurysm, no evidence of PAD   • Bilateral renal cysts 2/14/2016 04/07/2016--ultrasound of the kidneys reveals the previously described renal cysts are not well seen.  However, questionable incidental bladder tumor noted.  05/26/2010--ultrasound the kidneys was negative bilaterally except for small renal cysts.   • Chronic renal insufficiency, stage IV (severe), 02/09/2016--creatinine 1.85 2/11/2016 02/09/2016--serum creatinine 1.85.  BUN 29.  07/20/2015--patient was evaluated by the nephrologist.  Ultrasound of the kidneys ordered but this was never done.  05/22/2015--BUN 35, creatinine 2.3.  Patient referred to nephrology, Dr. Devyn Muniz.  04/16/2014--BUN 25, creatinine 1.77.  01/03/2013--BUN 37, creatinine 2.14.    05/26/2010---ultrasound of the kidneys reveal a small cyst x 2 right kidney.  Otherwise normal.    Baseline creatinine approximately 1.9-2.0.   • Degeneration of cervical intervertebral disc 5/2/2019   • Diverticulosis of colon 2/11/2016    10/03/2013--colonoscopy revealed markedly redundant colon, pancolonic diverticulosis with several impacted fecalith.  No evidence of diverticulitis or stricture.  Was only able to reach the ascending colon.  Follow up barium enema revealed extensive diverticulosis.  No polyp or other mucosal mass seen.    06/11/2002--incomplete colonoscopy due to redundant colon.  Not able to get past the hepatic flexure.  Patient had followup barium contrast enema which showed extensive diverticulosis.   • Folic acid deficiency 2/11/2016   • Gastroesophageal reflux disease without esophagitis 2/11/2016   • Generalized anxiety disorder 2/11/2016   • History of diverticulitis of colon 2009 and  2003 05/08/2009-acute diverticulitis without complication. 09/05/2003-acute diverticulitis without complication.    • Hyperlipidemia 2/11/2016 01/29/2005--treatment for hyperlipidemia begun.   • Hypogonadism in male, on TRT, testosterone pellets, per  6/16/2017 January 2017--patient reports his urologist initiated testosterone replacement therapy consisting of testosterone pellets every 6 months.   • Multiple environmental allergies 2/12/2016    Patient sees the allergist regularly and has been on immunotherapy.   • Muscle cramps, statin related, Vytorin and pravastatin.  Tolerates Livalo. 6/16/2017 12/21/2018--patient seems to be tolerating Livalo well.  10/02/2018--Livalo 2 mg per day initiated.  Recommend CoQ10 400 mg per day with food for better absorption.  Reassess with lab in about 8 weeks.  08/30/2018--patient presents with complaints of muscle cramps.  He discontinue pravastatin and the cramps went away.  Cramps returned even with a half dose.  This is despite the fact he was taking    • Osteopenia, 03/14/2017--lumbar spine -0.3.  Left femoral neck -1.6.  Right femoral neck -2.0. 2/11/2016 06/16/2017--patient seen in follow-up and reports he doesn't think he ever started Fosamax.  Osteopenia and needs to be reassessed with a DEXA scan.  03/14/2017--DEXA scan reveals lumbar spine T score -0.3.  Unchanged.  Left femoral neck T score -1.6, 6% decrease.  Right femoral neck T score -2.0.  Unchanged.  Assessment is osteopenia with a statistically significant interval decrease in the left hip.  07/18/2014--Fosamax 70 mg per day  initiated.  Calcium 1200 mg per day.    07/07/2014--DEXA scan revealed average lumbar spine T score -0.5.  Left proximal femoral T score is zero.  Left femoral neck T score -1.3.  Right proximal femoral T score 0.1.  Right femoral neck T score -2.0.  Osteopenia.   • Primary osteoarthritis of knees, bilateral 9/12/2016 09/12/2016--patient called in the office requesting  a steriod injection in his knees.  I explained to him that I do no longer perform these injections and have referred him to Dr. Manuel.   • Statin intolerance, Vytorin and pravastatin 12/21/2018 12/21/2018--patient seems to be tolerating Livalo well.  10/02/2018--Livalo 2 mg per day initiated.  Recommend CoQ10 400 mg per day with food for better absorption.  Reassess with lab in about 8 weeks.  08/30/2018--patient presents with complaints of muscle cramps.  He discontinue pravastatin and the cramps went away.  Cramps returned even with a half dose.  This is despite the fact he was taking    • Vitamin B12 deficiency 2/11/2016 06/25/2009--B12 injections begun.   • Vitamin D deficiency 2/11/2016         Past Surgical History:   Procedure Laterality Date   • CATARACT EXTRACTION Left 12/12/2011 12/12/2011--cataract extirpation with intraocular lens implantation left eye.   • CATARACT EXTRACTION Right 12/2011 December 2011--right cataract extirpation with intraocular lens implantation.   • COLONOSCOPY  06/11/2002 06/11/2002--incomplete colonoscopy due to redundant colon. Not able to get past the hepatic flexure. Patient had followup barium contrast enema which showed extensive diverticulosis   • COLONOSCOPY  10/03/2013    10/03/2013--colonoscopy revealed markedly redundant colon, pancolonic diverticulosis with several impacted fecalith. No evidence of diverticulitis or stricture. Was only able to reach the ascending colon. Follow up barium enema revealed extensive diverticulosis. No polyp or other mucosal mass seen.   • CYSTOSCOPY  05/18/2016 05/18/2016--urology consultation.  Cystoscopy performed for possible bladder mass is negative.   • PROSTATE BIOPSY  12/21/2016 12/21/2016--prostate biopsy reportedly negative.  I will try to obtain the report.         No Known Allergies        Current Outpatient Medications:   •  ALPRAZolam (XANAX) 0.5 MG tablet, TAKE ONE TABLET BY MOUTH TWICE A DAY AS NEEDED,  Disp: 60 tablet, Rfl: 3  •  amLODIPine (NORVASC) 5 MG tablet, , Disp: , Rfl:   •  aspirin (ASPIRIN LOW DOSE) 81 MG tablet, Take 1 tablet by mouth daily., Disp: , Rfl:   •  calcitriol (ROCALTROL) 0.25 MCG capsule, 1 capsule 3 (Three) Times a Week., Disp: , Rfl:   •  Cholecalciferol (VITAMIN D3) 5000 UNITS capsule capsule, Take 1 capsule by mouth daily., Disp: , Rfl:   •  Coenzyme Q10 (COQ10) 400 MG capsule, Take 400 mg by mouth Daily., Disp: , Rfl:   •  fluticasone (FLONASE) 50 MCG/ACT nasal spray, Administer 2 sprays in each nostril once daily for environmental allergies and congestion., Disp: 16 g, Rfl: 6  •  folic acid (FOLVITE) 1 MG tablet, TAKE ONE TABLET BY MOUTH TWICE A DAY, Disp: 180 tablet, Rfl: 1  •  isosorbide mononitrate (IMDUR) 30 MG 24 hr tablet, Take 1 p.o. every morning for heart, Disp: 30 tablet, Rfl: 3  •  lisinopril-hydrochlorothiazide (PRINZIDE,ZESTORETIC) 20-12.5 MG per tablet, Take 2 tablets by mouth Daily., Disp: 180 tablet, Rfl: 1  •  omeprazole (priLOSEC) 40 MG capsule, Take 1 capsule by mouth Daily., Disp: 30 capsule, Rfl: 5    Current Facility-Administered Medications:   •  cyanocobalamin injection 1,000 mcg, 1,000 mcg, Intramuscular, Q28 Days, Delgado Patricia MD, 1,000 mcg at 10/08/19 1101      Family History   Problem Relation Age of Onset   • Diabetes Mother    • Heart disease Mother    • Stroke Father          Social History     Socioeconomic History   • Marital status:      Spouse name: Not on file   • Number of children: 2   • Years of education: 14   • Highest education level: Some college, no degree   Occupational History   • Occupation: Retired      Employer: [employer]   Social Needs   • Financial resource strain: Not hard at all   • Food insecurity:     Worry: Never true     Inability: Never true   • Transportation needs:     Medical: No     Non-medical: No   Tobacco Use   • Smoking status: Never Smoker   • Smokeless tobacco: Never Used   Substance and Sexual  "Activity   • Alcohol use: Yes     Frequency: Monthly or less     Drinks per session: 1 or 2     Binge frequency: Never     Comment: Moderate alcohol consumption   • Drug use: No   • Sexual activity: Yes     Partners: Female   Lifestyle   • Physical activity:     Days per week: 3 days     Minutes per session: 30 min   • Stress: Not at all   Relationships   • Social connections:     Talks on phone: More than three times a week     Gets together: Once a week     Attends [employer] service: More than 4 times per year     Active member of club or organization: Yes     Attends meetings of clubs or organizations: More than 4 times per year     Relationship status:          Vitals:    10/08/19 1046   BP: 140/62   BP Location: Left arm   Pulse: 108   SpO2: 99%   Weight: 87 kg (191 lb 12.8 oz)   Height: 172.7 cm (67.99\")          Physical Exam:    General: Alert and oriented x 3.  No acute distress.  Normal affect.  HEENT: Pupils equal, round, reactive to light; extraocular movements intact; sclerae nonicteric; pharynx, ear canals and TMs normal.  Neck: Without JVD, thyromegaly, bruit, or adenopathy.  Lungs: Clear to auscultation in all fields.  Heart: Regular rate and rhythm without murmur, rub, gallop, or click.  Abdomen: Soft, nontender, without hepatosplenomegaly or hernia.  Bowel sounds normal.  : Deferred.  Rectal: Deferred.  Extremities: Without clubbing, cyanosis, edema, or pulse deficit.  Neurologic: Intact without focal deficit.  Normal station and gait observed during ingress and egress from the examination room.  Skin: Without significant lesion.  Musculoskeletal: Unremarkable.    Lab/other results:      Assessment/Plan:     Diagnosis Plan   1. Exertional angina (CMS/HCC)     2. Exertional chest pain     3. Statin intolerance, Vytorin and pravastatin     4. Chronic renal insufficiency, stage IV (severe), 02/09/2016--creatinine 1.85     5. Benign essential hypertension     6. Need for influenza vaccination  " Flu[employer] High Dose 65YR+ ([id number])     Assessment/plan:    Patient presents with exertional chest pain that is classic for exertional angina.  He responded positively to the long-acting oral nitrates and this strengthens the diagnosis of coronary artery disease.  He has an appointment with the cardiologist in 2 weeks.  I will contact the cardiologist and discussed the case with him.  I will have him continue the current medication and have him follow-up the first week of December with lab prior.  I also gave him a prescription for nitroglycerin sublingual to have should he develop chest pain at rest.  He is aware to go the emergency room immediately if chest pain persist after nitroglycerin.  Cyanocobalamin injection today and high-dose influenza vaccine given.            Procedures

## 2019-10-15 RX ORDER — FOLIC ACID 1 MG/1
1000 TABLET ORAL 2 TIMES DAILY
Qty: 180 TABLET | Refills: 1 | Status: SHIPPED | OUTPATIENT
Start: 2019-10-15 | End: 2020-05-14

## 2019-10-29 ENCOUNTER — OFFICE VISIT (OUTPATIENT)
Dept: CARDIOLOGY | Facility: CLINIC | Age: 84
End: 2019-10-29

## 2019-10-29 VITALS
OXYGEN SATURATION: 100 % | SYSTOLIC BLOOD PRESSURE: 172 MMHG | BODY MASS INDEX: 30.07 KG/M2 | DIASTOLIC BLOOD PRESSURE: 86 MMHG | WEIGHT: 198.4 LBS | HEART RATE: 105 BPM | HEIGHT: 68 IN

## 2019-10-29 DIAGNOSIS — I20.9 ANGINA PECTORIS (HCC): Primary | ICD-10-CM

## 2019-10-29 DIAGNOSIS — I10 ESSENTIAL HYPERTENSION: ICD-10-CM

## 2019-10-29 PROCEDURE — 93000 ELECTROCARDIOGRAM COMPLETE: CPT | Performed by: INTERNAL MEDICINE

## 2019-10-29 PROCEDURE — 99214 OFFICE O/P EST MOD 30 MIN: CPT | Performed by: INTERNAL MEDICINE

## 2019-10-29 RX ORDER — METOPROLOL SUCCINATE 25 MG/1
25 TABLET, EXTENDED RELEASE ORAL DAILY
Qty: 90 TABLET | Refills: 3 | Status: SHIPPED | OUTPATIENT
Start: 2019-10-29 | End: 2020-09-09

## 2019-10-29 NOTE — PROGRESS NOTES
Date of Office Visit: 10/29/2019    Patient Name: Radha Win  : 1932    Encounter Provider: Theron Alejo MD  Referring Provider: No ref. provider found  Place of Service: [facility] CARDIOLOGY  Patient Care Team:  Delgado Patricia MD as PCP - General (Internal Medicine)  Delgado Patricia MD as PCP - Claims Attributed      Angina pectoris, hypertension  History of Present Illness    The patient is an 87-year-old white male with suspected coronary artery disease.  He has essentially class II angina pectoris.  His symptoms are generally precipitated by moderate exertion.  During the course of the day during usual or sedentary activities he never experiences angina.  Isosorbide mononitrate was added to the patient's medical program but he still continues to have episodes of angina with overexertion.  His blood pressure remains elevated and this may be a significant contributing factor.      Past Medical History:   Diagnosis Date   • Allergic rhinitis 2016   • Anemia due to chronic kidney disease 2016   • Benign essential hypertension 2016   • Benign prostatic hypertrophy 2016--prostate biopsy reportedly negative.  I will try to obtain the report.  Patient sees urologist.  PSAs run from the 3-8 range   • Bilateral carotid artery plaque, 2016--16-49% bilateral carotid artery stenosis 2016--vascular screen reveals 16-49% bilateral carotid artery stenosis, negative for AAA, negative for PAD.  10/10/2008--vascular screening study revealed mild bilateral carotid plaque, no aortic aneurysm, no evidence of PAD   • Bilateral renal cysts 2016--ultrasound of the kidneys reveals the previously described renal cysts are not well seen.  However, questionable incidental bladder tumor noted.  2010--ultrasound the kidneys was negative bilaterally except for small renal cysts.   • Chronic  renal insufficiency, stage IV (severe), 02/09/2016--creatinine 1.85 2/11/2016 02/09/2016--serum creatinine 1.85.  BUN 29.  07/20/2015--patient was evaluated by the nephrologist.  Ultrasound of the kidneys ordered but this was never done.  05/22/2015--BUN 35, creatinine 2.3.  Patient referred to nephrology, Dr. Devyn Muniz.  04/16/2014--BUN 25, creatinine 1.77.  01/03/2013--BUN 37, creatinine 2.14.    05/26/2010---ultrasound of the kidneys reveal a small cyst x 2 right kidney.  Otherwise normal.    Baseline creatinine approximately 1.9-2.0.   • Degeneration of cervical intervertebral disc 5/2/2019   • Diverticulosis of colon 2/11/2016    10/03/2013--colonoscopy revealed markedly redundant colon, pancolonic diverticulosis with several impacted fecalith.  No evidence of diverticulitis or stricture.  Was only able to reach the ascending colon.  Follow up barium enema revealed extensive diverticulosis.  No polyp or other mucosal mass seen.    06/11/2002--incomplete colonoscopy due to redundant colon.  Not able to get past the hepatic flexure.  Patient had followup barium contrast enema which showed extensive diverticulosis.   • Folic acid deficiency 2/11/2016   • Gastroesophageal reflux disease without esophagitis 2/11/2016   • Generalized anxiety disorder 2/11/2016   • History of diverticulitis of colon 2009 and 2003 05/08/2009-acute diverticulitis without complication. 09/05/2003-acute diverticulitis without complication.    • Hyperlipidemia 2/11/2016 01/29/2005--treatment for hyperlipidemia begun.   • Hypogonadism in male, on TRT, testosterone pellets, per  6/16/2017 January 2017--patient reports his urologist initiated testosterone replacement therapy consisting of testosterone pellets every 6 months.   • Multiple environmental allergies 2/12/2016    Patient sees the allergist regularly and has been on immunotherapy.   • Muscle cramps, statin related, Vytorin and pravastatin.  Tolerates Livalo. 6/16/2017     12/21/2018--patient seems to be tolerating Livalo well.  10/02/2018--Livalo 2 mg per day initiated.  Recommend CoQ10 400 mg per day with food for better absorption.  Reassess with lab in about 8 weeks.  08/30/2018--patient presents with complaints of muscle cramps.  He discontinue pravastatin and the cramps went away.  Cramps returned even with a half dose.  This is despite the fact he was taking    • Osteopenia, 03/14/2017--lumbar spine -0.3.  Left femoral neck -1.6.  Right femoral neck -2.0. 2/11/2016 06/16/2017--patient seen in follow-up and reports he doesn't think he ever started Fosamax.  Osteopenia and needs to be reassessed with a DEXA scan.  03/14/2017--DEXA scan reveals lumbar spine T score -0.3.  Unchanged.  Left femoral neck T score -1.6, 6% decrease.  Right femoral neck T score -2.0.  Unchanged.  Assessment is osteopenia with a statistically significant interval decrease in the left hip.  07/18/2014--Fosamax 70 mg per day  initiated.  Calcium 1200 mg per day.    07/07/2014--DEXA scan revealed average lumbar spine T score -0.5.  Left proximal femoral T score is zero.  Left femoral neck T score -1.3.  Right proximal femoral T score 0.1.  Right femoral neck T score -2.0.  Osteopenia.   • Primary osteoarthritis of knees, bilateral 9/12/2016 09/12/2016--patient called in the office requesting a steriod injection in his knees.  I explained to him that I do no longer perform these injections and have referred him to Dr. Manuel.   • Statin intolerance, Vytorin and pravastatin 12/21/2018 12/21/2018--patient seems to be tolerating Livalo well.  10/02/2018--Livalo 2 mg per day initiated.  Recommend CoQ10 400 mg per day with food for better absorption.  Reassess with lab in about 8 weeks.  08/30/2018--patient presents with complaints of muscle cramps.  He discontinue pravastatin and the cramps went away.  Cramps returned even with a half dose.  This is despite the fact he was taking    • Vitamin B12  deficiency 2/11/2016 06/25/2009--B12 injections begun.   • Vitamin D deficiency 2/11/2016         Past Surgical History:   Procedure Laterality Date   • CATARACT EXTRACTION Left 12/12/2011 12/12/2011--cataract extirpation with intraocular lens implantation left eye.   • CATARACT EXTRACTION Right 12/2011 December 2011--right cataract extirpation with intraocular lens implantation.   • COLONOSCOPY  06/11/2002 06/11/2002--incomplete colonoscopy due to redundant colon. Not able to get past the hepatic flexure. Patient had followup barium contrast enema which showed extensive diverticulosis   • COLONOSCOPY  10/03/2013    10/03/2013--colonoscopy revealed markedly redundant colon, pancolonic diverticulosis with several impacted fecalith. No evidence of diverticulitis or stricture. Was only able to reach the ascending colon. Follow up barium enema revealed extensive diverticulosis. No polyp or other mucosal mass seen.   • CYSTOSCOPY  05/18/2016 05/18/2016--urology consultation.  Cystoscopy performed for possible bladder mass is negative.   • PROSTATE BIOPSY  12/21/2016 12/21/2016--prostate biopsy reportedly negative.  I will try to obtain the report.           Current Outpatient Medications:   •  ALPRAZolam (XANAX) 0.5 MG tablet, TAKE ONE TABLET BY MOUTH TWICE A DAY AS NEEDED, Disp: 60 tablet, Rfl: 3  •  amLODIPine (NORVASC) 5 MG tablet, , Disp: , Rfl:   •  aspirin (ASPIRIN LOW DOSE) 81 MG tablet, Take 1 tablet by mouth daily., Disp: , Rfl:   •  calcitriol (ROCALTROL) 0.25 MCG capsule, 1 capsule 3 (Three) Times a Week., Disp: , Rfl:   •  Cholecalciferol (VITAMIN D3) 5000 UNITS capsule capsule, Take 1 capsule by mouth daily., Disp: , Rfl:   •  Coenzyme Q10 (COQ10) 400 MG capsule, Take 400 mg by mouth Daily., Disp: , Rfl:   •  fluticasone (FLONASE) 50 MCG/ACT nasal spray, Administer 2 sprays in each nostril once daily for environmental allergies and congestion., Disp: 16 g, Rfl: 6  •  folic acid (FOLVITE) 1  MG tablet, Take 1 tablet by mouth 2 (Two) Times a Day., Disp: 180 tablet, Rfl: 1  •  isosorbide mononitrate (IMDUR) 30 MG 24 hr tablet, Take 1 p.o. every morning for heart, Disp: 30 tablet, Rfl: 3  •  lisinopril-hydrochlorothiazide (PRINZIDE,ZESTORETIC) 20-12.5 MG per tablet, Take 2 tablets by mouth Daily., Disp: 180 tablet, Rfl: 1  •  nitroglycerin (NITROSTAT) 0.6 MG SL tablet, Place 1 tablet under the tongue Every 5 (Five) Minutes As Needed for Chest Pain. Maximum of 3.Go to ER after third dose., Disp: 30 tablet, Rfl: 12  •  omeprazole (priLOSEC) 40 MG capsule, Take 1 capsule by mouth Daily., Disp: 30 capsule, Rfl: 5  •  metoprolol succinate XL (TOPROL-XL) 25 MG 24 hr tablet, Take 1 tablet by mouth Daily., Disp: 90 tablet, Rfl: 3    Current Facility-Administered Medications:   •  cyanocobalamin injection 1,000 mcg, 1,000 mcg, Intramuscular, Q28 Days, Delgado Patricia MD, 1,000 mcg at 10/08/19 1101  •  cyanocobalamin injection 1,000 mcg, 1,000 mcg, Subcutaneous, Once, Delgado Patricia MD      Social History     Socioeconomic History   • Marital status:      Spouse name: Not on file   • Number of children: 2   • Years of education: 14   • Highest education level: Some college, no degree   Occupational History   • Occupation: Retired      Employer: [employer]   Social Needs   • Financial resource strain: Not hard at all   • Food insecurity:     Worry: Never true     Inability: Never true   • Transportation needs:     Medical: No     Non-medical: No   Tobacco Use   • Smoking status: Never Smoker   • Smokeless tobacco: Never Used   • Tobacco comment: caffeine use   Substance and Sexual Activity   • Alcohol use: Yes     Frequency: Monthly or less     Drinks per session: 1 or 2     Binge frequency: Never     Comment: Moderate alcohol consumption   • Drug use: No   • Sexual activity: Yes     Partners: Female   Lifestyle   • Physical activity:     Days per week: 3 days     Minutes per session: 30 min   •  "Stress: Not at all   Relationships   • Social connections:     Talks on phone: More than three times a week     Gets together: Once a week     Attends [employer] service: More than 4 times per year     Active member of club or organization: Yes     Attends meetings of clubs or organizations: More than 4 times per year     Relationship status:          Review of Systems   Constitution: Negative.   HENT: Negative.    Eyes: Negative.    Cardiovascular: Positive for chest pain and dyspnea on exertion.   Respiratory: Negative.    Endocrine: Negative.    Skin: Negative.    Musculoskeletal: Negative.    Gastrointestinal: Negative.    Neurological: Positive for light-headedness.   Psychiatric/Behavioral: Negative.        Procedures      ECG 12 Lead  Date/Time: 10/29/2019 9:55 AM  Performed by: Theron Alejo MD  Authorized by: Theron Alejo MD   Comparison: compared with previous ECG from 7/31/2019  Similar to previous ECG  Rhythm: sinus rhythm  Rate: normal  Conduction: right bundle branch block  Other findings: low voltage                Objective:    /86 (BP Location: Right arm, Patient Position: Sitting, Cuff Size: Adult)   Pulse 105   Ht 172.7 cm (68\")   Wt 90 kg (198 lb 6.4 oz)   SpO2 100%   BMI 30.17 kg/m²         Physical Exam   Constitutional: He is oriented to person, place, and time. He appears well-developed and well-nourished.   HENT:   Head: Normocephalic.   Eyes: Pupils are equal, round, and reactive to light.   Neck: Normal range of motion. No JVD present. Carotid bruit is not present. No thyromegaly present.   Cardiovascular: Normal rate, regular rhythm, S1 normal, S2 normal, normal heart sounds and intact distal pulses. Exam reveals no gallop and no friction rub.   No murmur heard.  Pulmonary/Chest: Effort normal and breath sounds normal.   Abdominal: Soft. Bowel sounds are normal.   Musculoskeletal: He exhibits no edema.   Neurological: He is alert and oriented to person, " place, and time.   Skin: Skin is warm, dry and intact. No erythema.   Psychiatric: He has a normal mood and affect.   Vitals reviewed.          Assessment:       Diagnosis Plan   1. Angina pectoris (CMS/HCC)     2. Essential hypertension         1.  Angina pectoris: Despite the fact that the patient has had a negative stress test his symptoms are classic angina.  He still having class II symptoms.  I am going to add metoprolol succinate to his medication in the hopes of improving on his symptoms and also for blood pressure control    2.  Hypertension: The patient still remains tachycardic with elevated blood pressure.  I think he can easily tolerate a beta-blocker.  We will start with metoprolol succinate 25 mg daily.     Plan:       The patient will continue to monitor his symptoms.  If things are worse she will call me back otherwise I will plan on seeing him back in about 4 months.  We will continue to adjust medications as necessary

## 2019-11-17 ENCOUNTER — APPOINTMENT (OUTPATIENT)
Dept: CT IMAGING | Facility: HOSPITAL | Age: 84
End: 2019-11-17

## 2019-11-17 ENCOUNTER — APPOINTMENT (OUTPATIENT)
Dept: MRI IMAGING | Facility: HOSPITAL | Age: 84
End: 2019-11-17

## 2019-11-17 ENCOUNTER — APPOINTMENT (OUTPATIENT)
Dept: GENERAL RADIOLOGY | Facility: HOSPITAL | Age: 84
End: 2019-11-17

## 2019-11-17 ENCOUNTER — HOSPITAL ENCOUNTER (EMERGENCY)
Facility: HOSPITAL | Age: 84
Discharge: HOME | End: 2019-11-17
Attending: EMERGENCY MEDICINE | Admitting: EMERGENCY MEDICINE

## 2019-11-17 VITALS
DIASTOLIC BLOOD PRESSURE: 81 MMHG | WEIGHT: 195 LBS | HEART RATE: 84 BPM | HEIGHT: 68 IN | RESPIRATION RATE: 20 BRPM | OXYGEN SATURATION: 94 % | BODY MASS INDEX: 29.55 KG/M2 | TEMPERATURE: 98.1 F | SYSTOLIC BLOOD PRESSURE: 134 MMHG

## 2019-11-17 DIAGNOSIS — R50.9 FEVER AND CHILLS: Primary | ICD-10-CM

## 2019-11-17 LAB
ALBUMIN SERPL-MCNC: 4 G/DL (ref 3.5–5.2)
ALBUMIN/GLOB SERPL: 1.8 G/DL
ALP SERPL-CCNC: 74 U/L (ref 39–117)
ALT SERPL W P-5'-P-CCNC: 79 U/L (ref 1–41)
ANION GAP SERPL CALCULATED.3IONS-SCNC: 13.8 MMOL/L (ref 5–15)
AST SERPL-CCNC: 37 U/L (ref 1–40)
BASOPHILS # BLD AUTO: 0.02 10*3/MM3 (ref 0–0.2)
BASOPHILS NFR BLD AUTO: 0.3 % (ref 0–1.5)
BILIRUB SERPL-MCNC: 0.6 MG/DL (ref 0.2–1.2)
BILIRUB UR QL STRIP: NEGATIVE
BUN BLD-MCNC: 25 MG/DL (ref 8–23)
BUN/CREAT SERPL: 12.2 (ref 7–25)
CALCIUM SPEC-SCNC: 8.7 MG/DL (ref 8.6–10.5)
CHLORIDE SERPL-SCNC: 99 MMOL/L (ref 98–107)
CLARITY UR: CLEAR
CO2 SERPL-SCNC: 23.2 MMOL/L (ref 22–29)
COLOR UR: YELLOW
CREAT BLD-MCNC: 2.05 MG/DL (ref 0.76–1.27)
D-LACTATE SERPL-SCNC: 2 MMOL/L (ref 0.5–2)
DEPRECATED RDW RBC AUTO: 42.8 FL (ref 37–54)
EOSINOPHIL # BLD AUTO: 0.01 10*3/MM3 (ref 0–0.4)
EOSINOPHIL NFR BLD AUTO: 0.1 % (ref 0.3–6.2)
ERYTHROCYTE [DISTWIDTH] IN BLOOD BY AUTOMATED COUNT: 13.2 % (ref 12.3–15.4)
GFR SERPL CREATININE-BSD FRML MDRD: 31 ML/MIN/1.73
GLOBULIN UR ELPH-MCNC: 2.2 GM/DL
GLUCOSE BLD-MCNC: 138 MG/DL (ref 65–99)
GLUCOSE UR STRIP-MCNC: NEGATIVE MG/DL
HCT VFR BLD AUTO: 30.8 % (ref 37.5–51)
HGB BLD-MCNC: 10.1 G/DL (ref 13–17.7)
HGB UR QL STRIP.AUTO: NEGATIVE
IMM GRANULOCYTES # BLD AUTO: 0.02 10*3/MM3 (ref 0–0.05)
IMM GRANULOCYTES NFR BLD AUTO: 0.3 % (ref 0–0.5)
KETONES UR QL STRIP: NEGATIVE
LEUKOCYTE ESTERASE UR QL STRIP.AUTO: NEGATIVE
LYMPHOCYTES # BLD AUTO: 0.37 10*3/MM3 (ref 0.7–3.1)
LYMPHOCYTES NFR BLD AUTO: 5.4 % (ref 19.6–45.3)
MCH RBC QN AUTO: 29 PG (ref 26.6–33)
MCHC RBC AUTO-ENTMCNC: 32.8 G/DL (ref 31.5–35.7)
MCV RBC AUTO: 88.5 FL (ref 79–97)
MONOCYTES # BLD AUTO: 0.32 10*3/MM3 (ref 0.1–0.9)
MONOCYTES NFR BLD AUTO: 4.7 % (ref 5–12)
NEUTROPHILS # BLD AUTO: 6.11 10*3/MM3 (ref 1.7–7)
NEUTROPHILS NFR BLD AUTO: 89.2 % (ref 42.7–76)
NITRITE UR QL STRIP: NEGATIVE
NRBC BLD AUTO-RTO: 0 /100 WBC (ref 0–0.2)
PH UR STRIP.AUTO: 5.5 [PH] (ref 5–8)
PLATELET # BLD AUTO: 148 10*3/MM3 (ref 140–450)
PMV BLD AUTO: 10 FL (ref 6–12)
POTASSIUM BLD-SCNC: 4.1 MMOL/L (ref 3.5–5.2)
PROT SERPL-MCNC: 6.2 G/DL (ref 6–8.5)
PROT UR QL STRIP: NEGATIVE
RBC # BLD AUTO: 3.48 10*6/MM3 (ref 4.14–5.8)
SODIUM BLD-SCNC: 136 MMOL/L (ref 136–145)
SP GR UR STRIP: 1.02 (ref 1–1.03)
UROBILINOGEN UR QL STRIP: NORMAL
WBC NRBC COR # BLD: 6.85 10*3/MM3 (ref 3.4–10.8)

## 2019-11-17 PROCEDURE — 71046 X-RAY EXAM CHEST 2 VIEWS: CPT

## 2019-11-17 PROCEDURE — A9577 INJ MULTIHANCE: HCPCS | Performed by: EMERGENCY MEDICINE

## 2019-11-17 PROCEDURE — 83605 ASSAY OF LACTIC ACID: CPT | Performed by: EMERGENCY MEDICINE

## 2019-11-17 PROCEDURE — 87040 BLOOD CULTURE FOR BACTERIA: CPT | Performed by: EMERGENCY MEDICINE

## 2019-11-17 PROCEDURE — 80053 COMPREHEN METABOLIC PANEL: CPT | Performed by: EMERGENCY MEDICINE

## 2019-11-17 PROCEDURE — 81003 URINALYSIS AUTO W/O SCOPE: CPT | Performed by: EMERGENCY MEDICINE

## 2019-11-17 PROCEDURE — 72158 MRI LUMBAR SPINE W/O & W/DYE: CPT

## 2019-11-17 PROCEDURE — 72157 MRI CHEST SPINE W/O & W/DYE: CPT

## 2019-11-17 PROCEDURE — 99284 EMERGENCY DEPT VISIT MOD MDM: CPT

## 2019-11-17 PROCEDURE — 74176 CT ABD & PELVIS W/O CONTRAST: CPT

## 2019-11-17 PROCEDURE — 0 GADOBENATE DIMEGLUMINE 529 MG/ML SOLUTION: Performed by: EMERGENCY MEDICINE

## 2019-11-17 PROCEDURE — 96374 THER/PROPH/DIAG INJ IV PUSH: CPT

## 2019-11-17 PROCEDURE — 85025 COMPLETE CBC W/AUTO DIFF WBC: CPT | Performed by: EMERGENCY MEDICINE

## 2019-11-17 RX ORDER — ACETAMINOPHEN 500 MG
1000 TABLET ORAL ONCE
Status: COMPLETED | OUTPATIENT
Start: 2019-11-17 | End: 2019-11-17

## 2019-11-17 RX ADMIN — SODIUM CHLORIDE, POTASSIUM CHLORIDE, SODIUM LACTATE AND CALCIUM CHLORIDE 500 ML: 600; 310; 30; 20 INJECTION, SOLUTION INTRAVENOUS at 14:03

## 2019-11-17 RX ADMIN — ACETAMINOPHEN 1000 MG: 500 TABLET, FILM COATED ORAL at 14:03

## 2019-11-17 RX ADMIN — GADOBENATE DIMEGLUMINE 18 ML: 529 INJECTION, SOLUTION INTRAVENOUS at 20:14

## 2019-11-17 NOTE — ED NOTES
Pt aware urine sample needed, pt unable to void at this time. Will try after scan.      Cristel Patricia, RN  11/17/19 0965

## 2019-11-17 NOTE — ED PROVIDER NOTES
" EMERGENCY DEPARTMENT ENCOUNTER    CHIEF COMPLAINT  Chief Complaint: Fever  History given by: self  History limited by: nothing  Room Number: 11/11  PMD: Delgado Patricia MD      HPI:  Pt is a 87 y.o. male who presents complaining of fever that started around 0100 this morning. Pt also c/o lower back pain, tremors, chills, mild cough, sore throat, mild mid lower abd pain and urinary frequency. Pt denies HA, neck pain, incontinence, rash, CP, SOA, weakness, numbness, fever, nausea, vomiting, diarrhea, dysuria and hematuria. Over the past month, the pt states he has had a minor cough and sore throat but not escalated recently. He woke up this morning around 0100, tremulous with chills and diffuse lower back pain, He states he \"had the shakes\" for roughly one hour and sx's seemed to improve. Around 1030, he had another episode of \"shaking\", worsening back pain and chills. He checked oral temperature which was 103. Pt reports currently, his back pain has improved. Due to sx's, he decided to come to the ED for further evaluation.     Duration:  0100 this morning  Onset: gradual  Timing: constant  Quality: recurrent  Intensity/Severity: moderate  Progression: unchanged  Associated Symptoms:  lower back pain, tremors, chills, mild cough, sore throat, mild mid lower abd pain and urinary frequency  Aggravating Factors: none stated  Alleviating Factors: none stated  Previous Episodes: none stated  Treatment before arrival: none stated    PAST MEDICAL HISTORY  Active Ambulatory Problems     Diagnosis Date Noted   • Bilateral carotid artery plaque, 05/24/2018--16-49% bilateral carotid artery stenosis 02/11/2016   • Benign prostatic hypertrophy 02/11/2016   • Chronic renal insufficiency, stage IV (severe), 02/09/2016--creatinine 1.85 02/11/2016   • Diverticulosis of colon 02/11/2016   • Hyperlipidemia 02/11/2016   • Osteopenia, 03/14/2017--lumbar spine -0.3.  Left femoral neck -1.6.  Right femoral neck -2.0. 02/11/2016   • " Vitamin B12 deficiency 02/11/2016   • Vitamin D deficiency 02/11/2016   • Allergic rhinitis 02/11/2016   • Anemia due to chronic kidney disease 02/11/2016   • Generalized anxiety disorder 02/11/2016   • Benign essential hypertension 02/11/2016   • Gastroesophageal reflux disease without esophagitis 02/11/2016   • Folic acid deficiency 02/11/2016   • Multiple environmental allergies 02/12/2016   • Therapeutic drug monitoring 02/12/2016   • Bilateral renal cysts 02/14/2016   • Primary osteoarthritis of knees, bilateral 09/12/2016   • Impaired fasting glucose 03/09/2017   • Muscle cramps, statin related, Vytorin and pravastatin.  Tolerates Livalo. 06/16/2017   • Hypogonadism in male, on TRT, testosterone pellets, per  06/16/2017   • Chronic neck pain 06/28/2018   • Cervical radiculopathy 06/28/2018   • Statin intolerance, Vytorin and pravastatin 12/21/2018   • Degeneration of cervical intervertebral disc 05/02/2019   • Exertional chest pain 05/20/2019   • Exertional angina (CMS/HCC) 09/17/2019     Resolved Ambulatory Problems     Diagnosis Date Noted   • History of trigger finger 06/01/2015   • Type 2 diabetes mellitus (CMS/[facility]) 02/11/2016   • History of Plantar fasciitis of right foot 02/12/2016   • History of chronic right knee pain 02/12/2016   • Benign paroxysmal positional vertigo of right ear 03/21/2016   • Questionable Bladder mass 04/11/2016   • History of Biceps tendinitis on right 07/25/2016   • Pain of right upper arm 07/25/2016   • History of Right rotator cuff tendinitis 08/09/2016   • Right shoulder pain 08/09/2016   • Other specified disorders of bone density and structure, right thigh 08/26/2016   • Need for prophylactic vaccination and inoculation against influenza 09/29/2016   • Diabetic eye exam (CMS/[facility]) 03/08/2017   • Diabetic foot exam 03/08/2017   • History of carotid Doppler/vascular screen 09/21/2016     Past Medical History:   Diagnosis Date   • Allergic rhinitis 2/11/2016   • Anemia due to  chronic kidney disease 2/11/2016   • Benign essential hypertension 2/11/2016   • Benign prostatic hypertrophy 2/11/2016   • Bilateral carotid artery plaque, 09/21/2016--16-49% bilateral carotid artery stenosis 2/11/2016   • Bilateral renal cysts 2/14/2016   • Chronic renal insufficiency, stage IV (severe), 02/09/2016--creatinine 1.85 2/11/2016   • Degeneration of cervical intervertebral disc 5/2/2019   • Diverticulosis of colon 2/11/2016   • Folic acid deficiency 2/11/2016   • Gastroesophageal reflux disease without esophagitis 2/11/2016   • Generalized anxiety disorder 2/11/2016   • History of diverticulitis of colon 2009 and 2003   • Hyperlipidemia 2/11/2016   • Hypogonadism in male, on TRT, testosterone pellets, per  6/16/2017   • Multiple environmental allergies 2/12/2016   • Muscle cramps, statin related, Vytorin and pravastatin.  Tolerates Livalo. 6/16/2017   • Osteopenia, 03/14/2017--lumbar spine -0.3.  Left femoral neck -1.6.  Right femoral neck -2.0. 2/11/2016   • Primary osteoarthritis of knees, bilateral 9/12/2016   • Statin intolerance, Vytorin and pravastatin 12/21/2018   • Vitamin B12 deficiency 2/11/2016   • Vitamin D deficiency 2/11/2016       PAST SURGICAL HISTORY  Past Surgical History:   Procedure Laterality Date   • CATARACT EXTRACTION Left 12/12/2011 12/12/2011--cataract extirpation with intraocular lens implantation left eye.   • CATARACT EXTRACTION Right 12/2011 December 2011--right cataract extirpation with intraocular lens implantation.   • COLONOSCOPY  06/11/2002 06/11/2002--incomplete colonoscopy due to redundant colon. Not able to get past the hepatic flexure. Patient had followup barium contrast enema which showed extensive diverticulosis   • COLONOSCOPY  10/03/2013    10/03/2013--colonoscopy revealed markedly redundant colon, pancolonic diverticulosis with several impacted fecalith. No evidence of diverticulitis or stricture. Was only able to reach the ascending colon. Follow  up barium enema revealed extensive diverticulosis. No polyp or other mucosal mass seen.   • CYSTOSCOPY  05/18/2016 05/18/2016--urology consultation.  Cystoscopy performed for possible bladder mass is negative.   • PROSTATE BIOPSY  12/21/2016 12/21/2016--prostate biopsy reportedly negative.  I will try to obtain the report.       FAMILY HISTORY  Family History   Problem Relation Age of Onset   • Diabetes Mother    • Heart disease Mother    • Stroke Father        SOCIAL HISTORY  Social History     Socioeconomic History   • Marital status:      Spouse name: Not on file   • Number of children: 2   • Years of education: 14   • Highest education level: Some college, no degree   Occupational History   • Occupation: Retired      Employer: [employer]   Social Needs   • Financial resource strain: Not hard at all   • Food insecurity:     Worry: Never true     Inability: Never true   • Transportation needs:     Medical: No     Non-medical: No   Tobacco Use   • Smoking status: Never Smoker   • Smokeless tobacco: Never Used   • Tobacco comment: caffeine use   Substance and Sexual Activity   • Alcohol use: Yes     Frequency: Monthly or less     Drinks per session: 1 or 2     Binge frequency: Never     Comment: Moderate alcohol consumption   • Drug use: No   • Sexual activity: Yes     Partners: Female   Lifestyle   • Physical activity:     Days per week: 3 days     Minutes per session: 30 min   • Stress: Not at all   Relationships   • Social connections:     Talks on phone: More than three times a week     Gets together: Once a week     Attends [employer] service: More than 4 times per year     Active member of club or organization: Yes     Attends meetings of clubs or organizations: More than 4 times per year     Relationship status:        ALLERGIES  Patient has no known allergies.    REVIEW OF SYSTEMS  Review of Systems   Constitutional: Positive for chills and fever (Tmax 103).   HENT: Negative for trouble  swallowing.    Eyes: Negative for visual disturbance.   Respiratory: Negative for shortness of breath.    Cardiovascular: Negative for chest pain and leg swelling.   Gastrointestinal: Positive for abdominal pain (lower). Negative for diarrhea and vomiting.   Endocrine: Negative.    Genitourinary: Negative for decreased urine volume and frequency.   Musculoskeletal: Positive for back pain (lower). Negative for neck pain.   Skin: Negative for rash.   Allergic/Immunologic: Negative.    Neurological: Positive for tremors. Negative for weakness and numbness.   Hematological: Negative.    Psychiatric/Behavioral: Negative.    All other systems reviewed and are negative.      PHYSICAL EXAM  ED Triage Vitals [11/17/19 1329]   Temp Heart Rate Resp BP SpO2   (!) 101.8 °F (38.8 °C) 114 18 152/74 93 %      Temp src Heart Rate Source Patient Position BP Location FiO2 (%)   Tympanic Monitor Sitting Right arm --       Physical Exam   Constitutional: He is oriented to person, place, and time.  Non-toxic appearance. He appears distressed (minor).   HENT:   Head: Normocephalic and atraumatic.   Mouth/Throat: Oropharynx is clear and moist.   Eyes: EOM are normal. Pupils are equal, round, and reactive to light.   Neck: Normal range of motion and full passive range of motion without pain. No neck rigidity.   Cardiovascular: Normal rate, regular rhythm and normal heart sounds.   No murmur heard.  Pulses:       Posterior tibial pulses are 2+ on the right side, and 2+ on the left side.   Pulmonary/Chest: Effort normal and breath sounds normal. No respiratory distress. He has no wheezes.   Abdominal: Soft. Bowel sounds are normal. There is no tenderness. There is no rebound and no guarding.   Musculoskeletal: Normal range of motion. He exhibits no edema.   Neurological: He is alert and oriented to person, place, and time. He has normal motor skills, normal sensation, normal strength and normal reflexes. He displays no weakness and normal  reflexes. No cranial nerve deficit or sensory deficit. He has a normal Straight Leg Raise Test.   Reflex Scores:       Patellar reflexes are 2+ on the right side and 2+ on the left side.       Achilles reflexes are 2+ on the right side and 2+ on the left side.  Skin: Skin is warm and dry.   Psychiatric: Affect normal.   Nursing note and vitals reviewed.      LAB RESULTS  UA negative, creatinine 2.05, hgb 10.1, WBC 6.8    I ordered the above labs and reviewed the results    RADIOLOGY                                                        XR Chest 2 View   Final Result   No evidence for active disease in the chest.       This report was finalized on 11/17/2019 3:47 PM by Dr. Shoabi Samano M.D.          CT Abdomen Pelvis Without Contrast   Final Result   1. No evidence for acute intraabdominal process.   2. Colonic diverticulosis is extensive in the sigmoid colon without   evidence for diverticulitis.   3. Cholelithiasis.   4. Prostate gland enlargement.       Discussed with Dr. Schofield in the emergency department on 11/17/2019 at   3:00 PM.       Radiation dose reduction techniques were utilized, including automated   exposure control and exposure modulation based on body size.       This report was finalized on 11/17/2019 3:47 PM by Dr. Shoaib Samnao M.D.               I ordered the above noted radiological studies. Interpreted by radiologist. Discussed with radiologist (Dr Samano). Reviewed by me in PACS.       PROCEDURES  Procedures      PROGRESS AND CONSULTS  ED Course as of Nov 20 1254   Sun Nov 17, 2019   1511 Discussed with Dr. Patel Samano, radiology, he reports CT abdomen pelvis significant for diffuse diverticulosis, large prostate, no obvious signs of diverticulitis/prostatitis, no signs of obstruction or emergent surgical issues.  [TO]   1525 Care of patient turned over to Dr. Dae Vuong pending MRI thoracic and lumbar spine to rule out epidural abscess as source for patient's fevers.   Discussed with pharmacy regarding gadolinium and patient with GFR greater than 30.  [TO]      ED Course User Index  [TO] Nahomi Schofield MD     8963 CXR ordered. CT Abd Pelvis ordered. Lactic Acid ordered. CMP ordered. UA ordered. Tylenol ordered.     MEDICAL DECISION MAKING  Results were reviewed/discussed with the patient and they were also made aware of online access. Pt also made aware that some labs, such as cultures, will not be resulted during ER visit and follow up with PMD is necessary.     MDM  Number of Diagnoses or Management Options     Amount and/or Complexity of Data Reviewed  Clinical lab tests: ordered and reviewed  Tests in the radiology section of CPT®: ordered and reviewed (CT Abd Pelvis  CXR)  Decide to obtain previous medical records or to obtain history from someone other than the patient: yes  Review and summarize past medical records: yes (The pt is under the care of Dr. Burns (Cardiology) for recurrent CP where he was dx with angina, chronic neck pain and anemia.   Blood work done on 07/2019 show creatinine of 2.0)  Independent visualization of images, tracings, or specimens: yes           DIAGNOSIS  Final diagnoses:   Fever and chills       DISPOSITION  Pending MRI results, if negative, anticipate outpatient follow with Dr Patricia.    Latest Documented Vital Signs:  As of 2:08 PM  BP- 152/74 HR- 98 Temp- (!) 101.8 °F (38.8 °C) (Tympanic) O2 sat- 93%    --  Documentation assistance provided by jon Cartwright for Dr. Nahomi Schofield.  Information recorded by the scribe was done at my direction and has been verified and validated by me.         Chay Calvo  11/17/19 2698       Nahomi Schofield MD  11/20/19 1898

## 2019-11-18 NOTE — ED PROVIDER NOTES
EMERGENCY DEPARTMENT ENCOUNTER    Room number:  11/11  Date Seen:  11/17/2019  Time of transfer:1525  PCP:  Delgado Patricia MD    HPI  Pt presents with hx of fever (tmax-103), rigors, and back pain for one day. Pt denied SOA, cp, abd pain and urinary sx. Family at bedside.     PHYSICAL EXAM  Pt is resting comfortably in NAD. Vitals stable.     RADIOLOGY:  MRI Thoracic Spine With & Without Contrast   Preliminary Result   Essentially unremarkable exam. There is no significant disc   abnormality, acute osseous finding, abnormal cord signal, or abnormal   enhancement identified          MRI Lumbar Spine With & Without Contrast   Preliminary Result   1. Minor degenerative and arthritic changes as discussed. A focal disc   herniation/extrusion, critical stenosis, acute osseous finding, or   abnormal enhancement not appreciated              XR Chest 2 View   Final Result   No evidence for active disease in the chest.       This report was finalized on 11/17/2019 3:47 PM by Dr. Shoaib Samano M.D.          CT Abdomen Pelvis Without Contrast   Final Result   1. No evidence for acute intraabdominal process.   2. Colonic diverticulosis is extensive in the sigmoid colon without   evidence for diverticulitis.   3. Cholelithiasis.   4. Prostate gland enlargement.       Discussed with Dr. Schofield in the emergency department on 11/17/2019 at   3:00 PM.       Radiation dose reduction techniques were utilized, including automated   exposure control and exposure modulation based on body size.       This report was finalized on 11/17/2019 3:47 PM by Dr. Shoaib Samano M.D.            I reviewed the above results      PROGRESS AND CONSULT NOTES:  1525:  Patient care transferred from MD Tim pending MRI T/L spine.    2059-Rechecked pt. Pt is resting comfortably. Notified pt of MRI T/L spine which are negative acute. Discussed with pt that due to unremarkable work up in the ED, his fever is likely viral in source. Discussed  the plan to discharge the pt home with symptomatic treatment. I instructed the pt to f/u with his PCP and RTER for new or worsening sx. Pt understands and agrees with the plan, all questions answered.      DIAGNOSIS:  Final diagnoses:   Fever and chills       DISPOSITION:  DISCHARGE    Patient discharged in stable condition.    Reviewed implications of results, diagnosis, meds, responsibility to follow up, warning signs and symptoms of possible worsening, potential complications and reasons to return to ER.    Patient/Family voiced understanding of above instructions.    Discussed plan for discharge, as there is no emergent indication for admission. Patient referred to primary care provider for BP management due to today's BP. Pt/family is agreeable and understands need for follow up and repeat testing.  Pt is aware that discharge does not mean that nothing is wrong but it indicates no emergency is present that requires admission and they must continue care with follow-up as given below or physician of their choice.     FOLLOW-UP  Delgado Patricia MD  [address] [address]  [phone]    Schedule an appointment as soon as possible for a visit in 2 days           Medication List      No changes were made to your prescriptions during this visit.           Provider attestation:  I personally reviewed the past medical history, past surgical history, social history, family history, current medications, and allergies as they appear in the chart.    Documentation assistance provided by jon Mcgill for MD Yecenia.  Information recorded by the scribe was done at my direction and has been verified and validated by me.       Elda Mcgill  11/17/19 2120       Cornelio Vuong MD  11/17/19 1624

## 2019-11-22 LAB
BACTERIA SPEC AEROBE CULT: NORMAL
BACTERIA SPEC AEROBE CULT: NORMAL

## 2019-11-26 ENCOUNTER — OFFICE VISIT (OUTPATIENT)
Dept: INTERNAL MEDICINE | Facility: CLINIC | Age: 84
End: 2019-11-26

## 2019-11-26 VITALS
BODY MASS INDEX: 29.4 KG/M2 | TEMPERATURE: 97.9 F | HEIGHT: 68 IN | HEART RATE: 70 BPM | WEIGHT: 194 LBS | OXYGEN SATURATION: 96 % | DIASTOLIC BLOOD PRESSURE: 70 MMHG | SYSTOLIC BLOOD PRESSURE: 140 MMHG

## 2019-11-26 DIAGNOSIS — R50.9 ACUTE FEBRILE ILLNESS: Primary | ICD-10-CM

## 2019-11-26 PROBLEM — I20.8 EXERTIONAL ANGINA: Chronic | Status: ACTIVE | Noted: 2019-09-17

## 2019-11-26 PROBLEM — I20.89 EXERTIONAL ANGINA: Chronic | Status: ACTIVE | Noted: 2019-09-17

## 2019-11-26 PROBLEM — R07.9 EXERTIONAL CHEST PAIN: Status: RESOLVED | Noted: 2019-05-20 | Resolved: 2019-11-26

## 2019-11-26 PROBLEM — M54.12 CERVICAL RADICULOPATHY: Chronic | Status: ACTIVE | Noted: 2018-06-28

## 2019-11-26 PROCEDURE — 99213 OFFICE O/P EST LOW 20 MIN: CPT | Performed by: INTERNAL MEDICINE

## 2019-11-26 PROCEDURE — 96372 THER/PROPH/DIAG INJ SC/IM: CPT | Performed by: INTERNAL MEDICINE

## 2019-11-26 RX ADMIN — CYANOCOBALAMIN 1000 MCG: 1000 INJECTION, SOLUTION INTRAMUSCULAR; SUBCUTANEOUS at 09:39

## 2019-11-26 NOTE — PROGRESS NOTES
11/26/2019    Patient Information  Radha Win                                                                                          [address]  [facility] [address]      7/26/1932  [unfilled]  There is no work phone number on file.    Chief Complaint:     Follow-up for recent emergency room visit for fever and chills.    History of Present Illness:    Patient with a history of several medical problems including carotid artery plaque, BPH, chronic renal insufficiency, diverticulosis, hyperlipidemia, osteopenia, vitamin B12 and vitamin D deficiency, anemia of chronic kidney disease, esophageal reflux, hypertension, environmental allergies, exertional angina, primary osteoarthritis particularly the knees.  He presents today to follow-up on recent visit to the emergency room where he presented with fever and chills as described below:    November 26, 2019--patient seen in follow-up by Dr. Patricia and reports his fever and chills have resolved.  He currently feels well.  I reviewed the extensive work-up in the emergency room as noted below.  They did a very thorough work-up which was negative.  I suspect the patient had some sort of virus although he reports he did not have flulike symptoms.  At any rate, his symptoms have totally resolved and I instructed him to contact me immediately if he redevelops fever or chills.    November 17, 2019--emergency room visit:Pt is a 87 y.o. male who presents complaining of fever that started around 0100 this morning. Pt also c/o lower back pain, tremors, chills, mild cough, sore throat, mild mid lower abd pain and urinary frequency. Pt denies HA, neck pain, incontinence, rash, CP, SOA, weakness, numbness, fever, nausea, vomiting, diarrhea, dysuria and hematuria. Over the past month, the pt states he has had a minor cough and sore throat but not escalated recently. He woke up this morning around 0100, tremulous with chills and diffuse lower back  "pain, He states he \"had the shakes\" for roughly one hour and sx's seemed to improve. Around 1030, he had another episode of \"shaking\", worsening back pain and chills. He checked oral temperature which was 103. Pt reports currently, his back pain has improved. Due to sx's, he decided to come to the ED for further evaluation.   Patient was initially afebrile to 102.  He was tachycardic as well at 114.  Chest x-ray revealed no active disease.  CT scan of the abdomen and pelvis revealed no active disease.  MRI of the lumbar and thoracic spine revealed no source of infection.  CBC revealed a chronic anemia and his white count was not elevated.  CMP was not particularly revealing.  Lactic acid normal.  Urinalysis negative.  Blood cultures x 2 were negative.    Review of Systems   Constitution: Negative.   HENT: Negative.    Eyes: Negative.    Cardiovascular: Negative.    Respiratory: Negative.    Endocrine: Negative.    Hematologic/Lymphatic: Negative.    Skin: Negative.    Musculoskeletal: Negative.    Gastrointestinal: Negative.    Genitourinary: Negative.    Neurological: Negative.    Psychiatric/Behavioral: Negative.    Allergic/Immunologic: Negative.        Active Problems:    Patient Active Problem List   Diagnosis   • Bilateral carotid artery plaque, 05/24/2018--16-49% bilateral carotid artery stenosis   • Benign prostatic hypertrophy   • Chronic renal insufficiency, stage IV (severe), 02/09/2016--creatinine 1.85   • Diverticulosis of colon   • Hyperlipidemia   • Osteopenia, 03/14/2017--lumbar spine -0.3.  Left femoral neck -1.6.  Right femoral neck -2.0.   • Vitamin B12 deficiency   • Vitamin D deficiency   • Allergic rhinitis   • Anemia due to chronic kidney disease   • Generalized anxiety disorder   • Benign essential hypertension   • Gastroesophageal reflux disease without esophagitis   • Folic acid deficiency   • Multiple environmental allergies   • Therapeutic drug monitoring   • Bilateral renal cysts   • " Primary osteoarthritis of knees, bilateral   • Impaired fasting glucose   • Muscle cramps, statin related, Vytorin and pravastatin.  Tolerates Livalo.   • Hypogonadism in male, on TRT, testosterone pellets, per    • Chronic neck pain   • Cervical radiculopathy   • Statin intolerance, Vytorin and pravastatin   • Degeneration of cervical intervertebral disc   • Exertional chest pain   • Exertional angina (CMS/HCC)   • Acute febrile illness         Past Medical History:   Diagnosis Date   • Allergic rhinitis 2/11/2016   • Anemia due to chronic kidney disease 2/11/2016   • Benign essential hypertension 2/11/2016   • Benign prostatic hypertrophy 2/11/2016 12/21/2016--prostate biopsy reportedly negative.  I will try to obtain the report.  Patient sees urologist.  PSAs run from the 3-8 range   • Bilateral carotid artery plaque, 09/21/2016--16-49% bilateral carotid artery stenosis 2/11/2016 09/21/2016--vascular screen reveals 16-49% bilateral carotid artery stenosis, negative for AAA, negative for PAD.  10/10/2008--vascular screening study revealed mild bilateral carotid plaque, no aortic aneurysm, no evidence of PAD   • Bilateral renal cysts 2/14/2016 04/07/2016--ultrasound of the kidneys reveals the previously described renal cysts are not well seen.  However, questionable incidental bladder tumor noted.  05/26/2010--ultrasound the kidneys was negative bilaterally except for small renal cysts.   • Chronic renal insufficiency, stage IV (severe), 02/09/2016--creatinine 1.85 2/11/2016 02/09/2016--serum creatinine 1.85.  BUN 29.  07/20/2015--patient was evaluated by the nephrologist.  Ultrasound of the kidneys ordered but this was never done.  05/22/2015--BUN 35, creatinine 2.3.  Patient referred to nephrology, Dr. Devyn Muniz.  04/16/2014--BUN 25, creatinine 1.77.  01/03/2013--BUN 37, creatinine 2.14.    05/26/2010---ultrasound of the kidneys reveal a small cyst x 2 right kidney.  Otherwise normal.    Baseline  creatinine approximately 1.9-2.0.   • Degeneration of cervical intervertebral disc 5/2/2019   • Diverticulosis of colon 2/11/2016    10/03/2013--colonoscopy revealed markedly redundant colon, pancolonic diverticulosis with several impacted fecalith.  No evidence of diverticulitis or stricture.  Was only able to reach the ascending colon.  Follow up barium enema revealed extensive diverticulosis.  No polyp or other mucosal mass seen.    06/11/2002--incomplete colonoscopy due to redundant colon.  Not able to get past the hepatic flexure.  Patient had followup barium contrast enema which showed extensive diverticulosis.   • Folic acid deficiency 2/11/2016   • Gastroesophageal reflux disease without esophagitis 2/11/2016   • Generalized anxiety disorder 2/11/2016   • History of diverticulitis of colon 2009 and 2003 05/08/2009-acute diverticulitis without complication. 09/05/2003-acute diverticulitis without complication.    • Hyperlipidemia 2/11/2016 01/29/2005--treatment for hyperlipidemia begun.   • Hypogonadism in male, on TRT, testosterone pellets, per  6/16/2017 January 2017--patient reports his urologist initiated testosterone replacement therapy consisting of testosterone pellets every 6 months.   • Multiple environmental allergies 2/12/2016    Patient sees the allergist regularly and has been on immunotherapy.   • Muscle cramps, statin related, Vytorin and pravastatin.  Tolerates Livalo. 6/16/2017 12/21/2018--patient seems to be tolerating Livalo well.  10/02/2018--Livalo 2 mg per day initiated.  Recommend CoQ10 400 mg per day with food for better absorption.  Reassess with lab in about 8 weeks.  08/30/2018--patient presents with complaints of muscle cramps.  He discontinue pravastatin and the cramps went away.  Cramps returned even with a half dose.  This is despite the fact he was taking    • Osteopenia, 03/14/2017--lumbar spine -0.3.  Left femoral neck -1.6.  Right femoral neck -2.0. 2/11/2016     06/16/2017--patient seen in follow-up and reports he doesn't think he ever started Fosamax.  Osteopenia and needs to be reassessed with a DEXA scan.  03/14/2017--DEXA scan reveals lumbar spine T score -0.3.  Unchanged.  Left femoral neck T score -1.6, 6% decrease.  Right femoral neck T score -2.0.  Unchanged.  Assessment is osteopenia with a statistically significant interval decrease in the left hip.  07/18/2014--Fosamax 70 mg per day  initiated.  Calcium 1200 mg per day.    07/07/2014--DEXA scan revealed average lumbar spine T score -0.5.  Left proximal femoral T score is zero.  Left femoral neck T score -1.3.  Right proximal femoral T score 0.1.  Right femoral neck T score -2.0.  Osteopenia.   • Primary osteoarthritis of knees, bilateral 9/12/2016 09/12/2016--patient called in the office requesting a steriod injection in his knees.  I explained to him that I do no longer perform these injections and have referred him to Dr. Manuel.   • Statin intolerance, Vytorin and pravastatin 12/21/2018 12/21/2018--patient seems to be tolerating Livalo well.  10/02/2018--Livalo 2 mg per day initiated.  Recommend CoQ10 400 mg per day with food for better absorption.  Reassess with lab in about 8 weeks.  08/30/2018--patient presents with complaints of muscle cramps.  He discontinue pravastatin and the cramps went away.  Cramps returned even with a half dose.  This is despite the fact he was taking    • Vitamin B12 deficiency 2/11/2016 06/25/2009--B12 injections begun.   • Vitamin D deficiency 2/11/2016         Past Surgical History:   Procedure Laterality Date   • CATARACT EXTRACTION Left 12/12/2011 12/12/2011--cataract extirpation with intraocular lens implantation left eye.   • CATARACT EXTRACTION Right 12/2011 December 2011--right cataract extirpation with intraocular lens implantation.   • COLONOSCOPY  06/11/2002 06/11/2002--incomplete colonoscopy due to redundant colon. Not able to get past the hepatic  flexure. Patient had followup barium contrast enema which showed extensive diverticulosis   • COLONOSCOPY  10/03/2013    10/03/2013--colonoscopy revealed markedly redundant colon, pancolonic diverticulosis with several impacted fecalith. No evidence of diverticulitis or stricture. Was only able to reach the ascending colon. Follow up barium enema revealed extensive diverticulosis. No polyp or other mucosal mass seen.   • CYSTOSCOPY  05/18/2016 05/18/2016--urology consultation.  Cystoscopy performed for possible bladder mass is negative.   • PROSTATE BIOPSY  12/21/2016 12/21/2016--prostate biopsy reportedly negative.  I will try to obtain the report.         No Known Allergies        Current Outpatient Medications:   •  ALPRAZolam (XANAX) 0.5 MG tablet, TAKE ONE TABLET BY MOUTH TWICE A DAY AS NEEDED, Disp: 60 tablet, Rfl: 3  •  amLODIPine (NORVASC) 5 MG tablet, , Disp: , Rfl:   •  aspirin (ASPIRIN LOW DOSE) 81 MG tablet, Take 1 tablet by mouth daily., Disp: , Rfl:   •  calcitriol (ROCALTROL) 0.25 MCG capsule, 1 capsule 3 (Three) Times a Week., Disp: , Rfl:   •  Cholecalciferol (VITAMIN D3) 5000 UNITS capsule capsule, Take 1 capsule by mouth daily., Disp: , Rfl:   •  Coenzyme Q10 (COQ10) 400 MG capsule, Take 400 mg by mouth Daily., Disp: , Rfl:   •  fluticasone (FLONASE) 50 MCG/ACT nasal spray, Administer 2 sprays in each nostril once daily for environmental allergies and congestion., Disp: 16 g, Rfl: 6  •  folic acid (FOLVITE) 1 MG tablet, Take 1 tablet by mouth 2 (Two) Times a Day., Disp: 180 tablet, Rfl: 1  •  isosorbide mononitrate (IMDUR) 30 MG 24 hr tablet, Take 1 p.o. every morning for heart, Disp: 30 tablet, Rfl: 3  •  lisinopril-hydrochlorothiazide (PRINZIDE,ZESTORETIC) 20-12.5 MG per tablet, Take 2 tablets by mouth Daily., Disp: 180 tablet, Rfl: 1  •  metoprolol succinate XL (TOPROL-XL) 25 MG 24 hr tablet, Take 1 tablet by mouth Daily., Disp: 90 tablet, Rfl: 3  •  nitroglycerin (NITROSTAT) 0.6 MG SL  tablet, Place 1 tablet under the tongue Every 5 (Five) Minutes As Needed for Chest Pain. Maximum of 3.Go to ER after third dose., Disp: 30 tablet, Rfl: 12  •  omeprazole (priLOSEC) 40 MG capsule, Take 1 capsule by mouth Daily., Disp: 30 capsule, Rfl: 5    Current Facility-Administered Medications:   •  cyanocobalamin injection 1,000 mcg, 1,000 mcg, Intramuscular, Q28 Days, Delgado Patricia MD, 1,000 mcg at 11/26/19 0939  •  cyanocobalamin injection 1,000 mcg, 1,000 mcg, Subcutaneous, Once, Delgado Patricia MD      Family History   Problem Relation Age of Onset   • Diabetes Mother    • Heart disease Mother    • Stroke Father          Social History     Socioeconomic History   • Marital status:      Spouse name: Not on file   • Number of children: 2   • Years of education: 14   • Highest education level: Some college, no degree   Occupational History   • Occupation: Retired      Employer: [employer]   Social Needs   • Financial resource strain: Not hard at all   • Food insecurity:     Worry: Never true     Inability: Never true   • Transportation needs:     Medical: No     Non-medical: No   Tobacco Use   • Smoking status: Never Smoker   • Smokeless tobacco: Never Used   • Tobacco comment: caffeine use   Substance and Sexual Activity   • Alcohol use: Yes     Frequency: Monthly or less     Drinks per session: 1 or 2     Binge frequency: Never     Comment: Moderate alcohol consumption   • Drug use: No   • Sexual activity: Yes     Partners: Female   Lifestyle   • Physical activity:     Days per week: 3 days     Minutes per session: 30 min   • Stress: Not at all   Relationships   • Social connections:     Talks on phone: More than three times a week     Gets together: Once a week     Attends [employer] service: More than 4 times per year     Active member of club or organization: Yes     Attends meetings of clubs or organizations: More than 4 times per year     Relationship status:          Vitals:     "11/26/19 0931   BP: 140/70   BP Location: Left arm   Pulse: 70   Temp: 97.9 °F (36.6 °C)   SpO2: 96%   Weight: 88 kg (194 lb)   Height: 172.7 cm (67.99\")        Body mass index is 29.5 kg/m².      Physical Exam:    General: Alert and oriented x 3.  No acute distress.  Normal affect.  HEENT: Pupils equal, round, reactive to light; extraocular movements intact; sclerae nonicteric; pharynx, ear canals and TMs normal.  Neck: Without JVD, thyromegaly, bruit, or adenopathy.  Lungs: Clear to auscultation in all fields.  Heart: Regular rate and rhythm without murmur, rub, gallop, or click.  Abdomen: Soft, nontender, without hepatosplenomegaly or hernia.  Bowel sounds normal.  : Deferred.  Rectal: Deferred.  Extremities: Without clubbing, cyanosis, edema, or pulse deficit.  Neurologic: Intact without focal deficit.  Normal station and gait observed during ingress and egress from the examination room.  Skin: Without significant lesion.  Musculoskeletal: Unremarkable.    Lab/other results:    I reviewed the documentation from the emergency room including history and physical, vital signs, laboratory studies, radiographic studies including chest x-ray, CT scan of the abdomen and pelvis, MRI of the lumbar and thoracic spine.  None of this work-up was revealing.    Assessment/Plan:     Diagnosis Plan   1. Acute febrile illness       Patient presents in follow-up after being evaluated extensively for an acute febrile illness with chills in the emergency room.  The work-up was negative but I suspected patient suffered from a viral illness that apparently was not influenza.  The exact etiology of fever is not clear but the important thing is is that patient has been afebrile and asymptomatic since discharge from the hospital over a week ago.  I will not proceed with further work-up at this time.  Patient instructed to contact me immediately if his symptoms recur.  He has a follow-up appointment with me in about 2 more weeks which " I will have him keep.            Procedures

## 2019-12-03 ENCOUNTER — LAB (OUTPATIENT)
Dept: INTERNAL MEDICINE | Facility: CLINIC | Age: 84
End: 2019-12-03

## 2019-12-03 DIAGNOSIS — N18.4 CHRONIC RENAL INSUFFICIENCY, STAGE IV (SEVERE) (HCC): Chronic | ICD-10-CM

## 2019-12-03 DIAGNOSIS — E78.5 HYPERLIPIDEMIA, UNSPECIFIED HYPERLIPIDEMIA TYPE: Chronic | ICD-10-CM

## 2019-12-03 DIAGNOSIS — E55.9 VITAMIN D DEFICIENCY: Chronic | ICD-10-CM

## 2019-12-03 DIAGNOSIS — R73.01 IMPAIRED FASTING GLUCOSE: Chronic | ICD-10-CM

## 2019-12-04 LAB
25(OH)D3+25(OH)D2 SERPL-MCNC: 48.4 NG/ML (ref 30–100)
ALBUMIN SERPL-MCNC: 4.3 G/DL (ref 3.5–4.7)
ALBUMIN/GLOB SERPL: 2 {RATIO} (ref 1.2–2.2)
ALP SERPL-CCNC: 65 IU/L (ref 39–117)
ALT SERPL-CCNC: 20 IU/L (ref 0–44)
AST SERPL-CCNC: 20 IU/L (ref 0–40)
BILIRUB SERPL-MCNC: 0.4 MG/DL (ref 0–1.2)
BUN SERPL-MCNC: 39 MG/DL (ref 8–27)
BUN/CREAT SERPL: 20 (ref 10–24)
CALCIUM SERPL-MCNC: 9.2 MG/DL (ref 8.6–10.2)
CHLORIDE SERPL-SCNC: 108 MMOL/L (ref 96–106)
CHOLEST SERPL-MCNC: 264 MG/DL (ref 100–199)
CK SERPL-CCNC: 70 U/L (ref 24–204)
CO2 SERPL-SCNC: 22 MMOL/L (ref 20–29)
CREAT SERPL-MCNC: 2 MG/DL (ref 0.76–1.27)
ERYTHROCYTE [DISTWIDTH] IN BLOOD BY AUTOMATED COUNT: 13.4 % (ref 12.3–15.4)
GLOBULIN SER CALC-MCNC: 2.2 G/DL (ref 1.5–4.5)
GLUCOSE SERPL-MCNC: 120 MG/DL (ref 65–99)
HBA1C MFR BLD: 6.2 % (ref 4.8–5.6)
HCT VFR BLD AUTO: 33.4 % (ref 37.5–51)
HDL SERPL-SCNC: 25.4 UMOL/L
HDLC SERPL-MCNC: 38 MG/DL
HGB BLD-MCNC: 11 G/DL (ref 13–17.7)
LDL SERPL QN: 20.2 NM
LDL SERPL-SCNC: 2106 NMOL/L
LDL SMALL SERPL-SCNC: 1275 NMOL/L
LDLC SERPL CALC-MCNC: 182 MG/DL (ref 0–99)
MCH RBC QN AUTO: 29.3 PG (ref 26.6–33)
MCHC RBC AUTO-ENTMCNC: 32.9 G/DL (ref 31.5–35.7)
MCV RBC AUTO: 89 FL (ref 79–97)
PLATELET # BLD AUTO: 245 X10E3/UL (ref 150–450)
POTASSIUM SERPL-SCNC: 4.9 MMOL/L (ref 3.5–5.2)
PROT SERPL-MCNC: 6.5 G/DL (ref 6–8.5)
RBC # BLD AUTO: 3.76 X10E6/UL (ref 4.14–5.8)
SODIUM SERPL-SCNC: 143 MMOL/L (ref 134–144)
T3FREE SERPL-MCNC: 2.4 PG/ML (ref 2–4.4)
T4 FREE SERPL-MCNC: 1.03 NG/DL (ref 0.82–1.77)
TRIGL SERPL-MCNC: 222 MG/DL (ref 0–149)
TSH SERPL DL<=0.005 MIU/L-ACNC: 2.66 UIU/ML (ref 0.45–4.5)
WBC # BLD AUTO: 5.1 X10E3/UL (ref 3.4–10.8)

## 2019-12-10 ENCOUNTER — OFFICE VISIT (OUTPATIENT)
Dept: INTERNAL MEDICINE | Facility: CLINIC | Age: 84
End: 2019-12-10

## 2019-12-10 VITALS
HEART RATE: 77 BPM | HEIGHT: 68 IN | BODY MASS INDEX: 29.58 KG/M2 | OXYGEN SATURATION: 97 % | WEIGHT: 195.2 LBS | SYSTOLIC BLOOD PRESSURE: 128 MMHG | DIASTOLIC BLOOD PRESSURE: 70 MMHG

## 2019-12-10 DIAGNOSIS — I20.8 EXERTIONAL ANGINA (HCC): Chronic | ICD-10-CM

## 2019-12-10 DIAGNOSIS — G89.29 CHRONIC NECK PAIN: Chronic | ICD-10-CM

## 2019-12-10 DIAGNOSIS — E55.9 VITAMIN D DEFICIENCY: Chronic | ICD-10-CM

## 2019-12-10 DIAGNOSIS — F41.1 GENERALIZED ANXIETY DISORDER: Chronic | ICD-10-CM

## 2019-12-10 DIAGNOSIS — I25.10 OCCLUSIVE CORONARY ARTERY DISEASE: Chronic | ICD-10-CM

## 2019-12-10 DIAGNOSIS — N18.30 ANEMIA DUE TO STAGE 3 CHRONIC KIDNEY DISEASE (HCC): Chronic | ICD-10-CM

## 2019-12-10 DIAGNOSIS — Z91.09 MULTIPLE ENVIRONMENTAL ALLERGIES: Chronic | ICD-10-CM

## 2019-12-10 DIAGNOSIS — M85.89 OSTEOPENIA OF MULTIPLE SITES: Chronic | ICD-10-CM

## 2019-12-10 DIAGNOSIS — K21.9 GASTROESOPHAGEAL REFLUX DISEASE WITHOUT ESOPHAGITIS: Chronic | ICD-10-CM

## 2019-12-10 DIAGNOSIS — D63.1 ANEMIA DUE TO STAGE 3 CHRONIC KIDNEY DISEASE (HCC): Chronic | ICD-10-CM

## 2019-12-10 DIAGNOSIS — M54.12 CERVICAL RADICULOPATHY: Chronic | ICD-10-CM

## 2019-12-10 DIAGNOSIS — M54.2 CHRONIC NECK PAIN: Chronic | ICD-10-CM

## 2019-12-10 DIAGNOSIS — R73.01 IMPAIRED FASTING GLUCOSE: Primary | Chronic | ICD-10-CM

## 2019-12-10 DIAGNOSIS — N40.0 BENIGN NON-NODULAR PROSTATIC HYPERPLASIA WITHOUT LOWER URINARY TRACT SYMPTOMS: Chronic | ICD-10-CM

## 2019-12-10 DIAGNOSIS — N18.4 CHRONIC RENAL INSUFFICIENCY, STAGE IV (SEVERE) (HCC): Chronic | ICD-10-CM

## 2019-12-10 DIAGNOSIS — N28.1 BILATERAL RENAL CYSTS: Chronic | ICD-10-CM

## 2019-12-10 DIAGNOSIS — Z51.81 THERAPEUTIC DRUG MONITORING: ICD-10-CM

## 2019-12-10 DIAGNOSIS — Z78.9 STATIN INTOLERANCE: Chronic | ICD-10-CM

## 2019-12-10 DIAGNOSIS — R25.2 MUSCLE CRAMPS: Chronic | ICD-10-CM

## 2019-12-10 DIAGNOSIS — E78.5 HYPERLIPIDEMIA, UNSPECIFIED HYPERLIPIDEMIA TYPE: Chronic | ICD-10-CM

## 2019-12-10 DIAGNOSIS — E29.1 HYPOGONADISM IN MALE: Chronic | ICD-10-CM

## 2019-12-10 DIAGNOSIS — I65.23 ATHEROSCLEROSIS OF BOTH CAROTID ARTERIES: Chronic | ICD-10-CM

## 2019-12-10 DIAGNOSIS — I10 BENIGN ESSENTIAL HYPERTENSION: Chronic | ICD-10-CM

## 2019-12-10 DIAGNOSIS — E53.8 VITAMIN B12 DEFICIENCY: Chronic | ICD-10-CM

## 2019-12-10 PROBLEM — R50.9 ACUTE FEBRILE ILLNESS: Status: RESOLVED | Noted: 2019-11-26 | Resolved: 2019-12-10

## 2019-12-10 PROCEDURE — 99214 OFFICE O/P EST MOD 30 MIN: CPT | Performed by: INTERNAL MEDICINE

## 2019-12-10 NOTE — PROGRESS NOTES
12/10/2019    Patient Information  Radha Win                                                                                          [address]  [facility] [address]      7/26/1932  [unfilled]  There is no work phone number on file.    Chief Complaint:     Follow-up lab work and multiple medical concerns.  No new acute complaints.    History of Present Illness:    Patient with a history of multiple medical problems including impaired fasting glucose, hypertension, hyperlipidemia, chronic renal insufficiency, carotid artery plaque, vitamin B12 and vitamin D deficiency, anemia of chronic renal disease, esophageal reflux, environmental allergies, BPH, osteopenia, generalized anxiety disorder, bilateral renal cysts, muscle cramps secondary to statins/statin intolerance, hypogonadism, cervical radiculopathy and chronic neck pain, exertional angina.  He presents today for follow-up with lab prior in order to monitor his chronic medical issues.  He has no new acute complaints.  His past medical history reviewed and updated were necessary including health maintenance parameters.  This reveals he is up-to-date or else accounted for after today's visit.    Review of Systems   Constitution: Negative.   HENT: Negative.    Eyes: Negative.    Cardiovascular: Negative.    Respiratory: Negative.    Endocrine: Negative.    Hematologic/Lymphatic: Negative.    Skin: Negative.    Musculoskeletal: Positive for arthritis and joint pain.   Gastrointestinal: Negative.    Genitourinary: Negative.    Neurological: Negative.    Psychiatric/Behavioral: Negative.    Allergic/Immunologic: Negative.        Active Problems:    Patient Active Problem List   Diagnosis   • Bilateral carotid artery plaque, 05/24/2018--16-49% bilateral carotid artery stenosis   • Benign prostatic hypertrophy   • Chronic renal insufficiency, stage IV (severe), 02/09/2016--creatinine 1.85   • Diverticulosis of colon   • Hyperlipidemia    • Osteopenia, 7/12/2019--lumbar spine -0.4.  Left femoral neck -1.7.  Right femoral neck -1.9.   • Vitamin B12 deficiency   • Vitamin D deficiency   • Allergic rhinitis   • Anemia due to chronic kidney disease   • Generalized anxiety disorder   • Benign essential hypertension   • Gastroesophageal reflux disease without esophagitis   • Folic acid deficiency   • Multiple environmental allergies   • Therapeutic drug monitoring   • Bilateral renal cysts   • Primary osteoarthritis of knees, bilateral   • Impaired fasting glucose   • Muscle cramps, statin related, Vytorin and pravastatin.  Tolerates Livalo.   • Hypogonadism in male, on TRT, testosterone pellets, per    • Chronic neck pain   • Cervical radiculopathy   • Statin intolerance, Vytorin and pravastatin   • Degeneration of cervical intervertebral disc   • Exertional angina (CMS/HCC)   • Occlusive coronary artery disease, clinical diagnosis only.  Patient not a candidate for heart catheterization/intervention.         Past Medical History:   Diagnosis Date   • Allergic rhinitis 2/11/2016   • Anemia due to chronic kidney disease 2/11/2016   • Benign essential hypertension 2/11/2016   • Benign prostatic hypertrophy 2/11/2016 12/21/2016--prostate biopsy reportedly negative.  I will try to obtain the report.  Patient sees urologist.  PSAs run from the 3-8 range   • Bilateral carotid artery plaque, 09/21/2016--16-49% bilateral carotid artery stenosis 2/11/2016 09/21/2016--vascular screen reveals 16-49% bilateral carotid artery stenosis, negative for AAA, negative for PAD.  10/10/2008--vascular screening study revealed mild bilateral carotid plaque, no aortic aneurysm, no evidence of PAD   • Bilateral renal cysts 2/14/2016 04/07/2016--ultrasound of the kidneys reveals the previously described renal cysts are not well seen.  However, questionable incidental bladder tumor noted.  05/26/2010--ultrasound the kidneys was negative bilaterally except for small  renal cysts.   • Cervical radiculopathy 6/28/2018    10/02/2018--patient seen in follow-up and the results of the MRI discussed.  He continues to have intermittent radicular symptoms which is currently only on the left.  Discussed options with patient and I have recommended neurosurgery consultation.  Patient may benefit from epidural injections.  09/06/2018--MRI of the cervical spine reveals moderate neural foraminal compromise on the right at C3-C   • Chronic renal insufficiency, stage IV (severe), 02/09/2016--creatinine 1.85 2/11/2016 02/09/2016--serum creatinine 1.85.  BUN 29.  07/20/2015--patient was evaluated by the nephrologist.  Ultrasound of the kidneys ordered but this was never done.  05/22/2015--BUN 35, creatinine 2.3.  Patient referred to nephrology, Dr. Devyn Muniz.  04/16/2014--BUN 25, creatinine 1.77.  01/03/2013--BUN 37, creatinine 2.14.    05/26/2010---ultrasound of the kidneys reveal a small cyst x 2 right kidney.  Otherwise normal.    Baseline creatinine approximately 1.9-2.0.   • Degeneration of cervical intervertebral disc 5/2/2019   • Diverticulosis of colon 2/11/2016    10/03/2013--colonoscopy revealed markedly redundant colon, pancolonic diverticulosis with several impacted fecalith.  No evidence of diverticulitis or stricture.  Was only able to reach the ascending colon.  Follow up barium enema revealed extensive diverticulosis.  No polyp or other mucosal mass seen.    06/11/2002--incomplete colonoscopy due to redundant colon.  Not able to get past the hepatic flexure.  Patient had followup barium contrast enema which showed extensive diverticulosis.   • Exertional angina (CMS/HCC) 9/17/2019 October 8, 2019--patient seen in follow-up and he reports the long-acting oral nitrate has definitely helped his exertional anginal symptoms.  However, he has not stressed himself completely such as using a push mower.  His blood pressure seems improved as well.  Patient has an appoint with the  cardiologist in the next 2 weeks.  I have contacted his cardiologist about the results of the empiric ni   • Folic acid deficiency 2/11/2016   • Gastroesophageal reflux disease without esophagitis 2/11/2016   • Generalized anxiety disorder 2/11/2016   • History of diverticulitis of colon 2009 and 2003 05/08/2009-acute diverticulitis without complication. 09/05/2003-acute diverticulitis without complication.    • Hyperlipidemia 2/11/2016 01/29/2005--treatment for hyperlipidemia begun.   • Hypogonadism in male, on TRT, testosterone pellets, per  6/16/2017 January 2017--patient reports his urologist initiated testosterone replacement therapy consisting of testosterone pellets every 6 months.   • Multiple environmental allergies 2/12/2016    Patient sees the allergist regularly and has been on immunotherapy.   • Muscle cramps, statin related, Vytorin and pravastatin.  Tolerates Livalo. 6/16/2017 12/21/2018--patient seems to be tolerating Livalo well.  10/02/2018--Livalo 2 mg per day initiated.  Recommend CoQ10 400 mg per day with food for better absorption.  Reassess with lab in about 8 weeks.  08/30/2018--patient presents with complaints of muscle cramps.  He discontinue pravastatin and the cramps went away.  Cramps returned even with a half dose.  This is despite the fact he was taking    • Occlusive coronary artery disease, clinical diagnosis only.  Patient not a candidate for heart catheterization/intervention. 12/10/2019    October 8, 2019--patient seen in follow-up and he reports the long-acting oral nitrate has definitely helped his exertional anginal symptoms.  However, he has not stressed himself completely such as using a push mower.  His blood pressure seems improved as well.  Patient has an appoint with the cardiologist in the next 2 weeks.  I have contacted his cardiologist about the results of the empiric ni   • Osteopenia, 7/12/2019--lumbar spine -0.4.  Left femoral neck -1.7.  Right femoral  neck -1.9. 2/11/2016 July 12, 2019--DEXA scan reveals lumbar spine T score -0.4.  Unchanged.  Left femoral neck T score -1.7.  Unchanged.  Right femoral neck T score -1.9.  Unchanged.  Impression is osteopenia of the hips with no significant interval change.  06/16/2017--patient seen in follow-up and reports he doesn't think he ever started Fosamax.  Osteopenia and needs to be reassessed with a DEXA scan.  03/14/2017-   • Primary osteoarthritis of knees, bilateral 9/12/2016 09/12/2016--patient called in the office requesting a steriod injection in his knees.  I explained to him that I do no longer perform these injections and have referred him to Dr. Manuel.   • Statin intolerance, Vytorin and pravastatin 12/21/2018 12/21/2018--patient seems to be tolerating Livalo well.  10/02/2018--Livalo 2 mg per day initiated.  Recommend CoQ10 400 mg per day with food for better absorption.  Reassess with lab in about 8 weeks.  08/30/2018--patient presents with complaints of muscle cramps.  He discontinue pravastatin and the cramps went away.  Cramps returned even with a half dose.  This is despite the fact he was taking    • Vitamin B12 deficiency 2/11/2016 06/25/2009--B12 injections begun.   • Vitamin D deficiency 2/11/2016         Past Surgical History:   Procedure Laterality Date   • CATARACT EXTRACTION Left 12/12/2011 12/12/2011--cataract extirpation with intraocular lens implantation left eye.   • CATARACT EXTRACTION Right 12/2011 December 2011--right cataract extirpation with intraocular lens implantation.   • COLONOSCOPY  06/11/2002 06/11/2002--incomplete colonoscopy due to redundant colon. Not able to get past the hepatic flexure. Patient had followup barium contrast enema which showed extensive diverticulosis   • COLONOSCOPY  10/03/2013    10/03/2013--colonoscopy revealed markedly redundant colon, pancolonic diverticulosis with several impacted fecalith. No evidence of diverticulitis or stricture.  Was only able to reach the ascending colon. Follow up barium enema revealed extensive diverticulosis. No polyp or other mucosal mass seen.   • CYSTOSCOPY  05/18/2016 05/18/2016--urology consultation.  Cystoscopy performed for possible bladder mass is negative.   • PROSTATE BIOPSY  12/21/2016 12/21/2016--prostate biopsy reportedly negative.  I will try to obtain the report.         No Known Allergies        Current Outpatient Medications:   •  ALPRAZolam (XANAX) 0.5 MG tablet, TAKE ONE TABLET BY MOUTH TWICE A DAY AS NEEDED, Disp: 60 tablet, Rfl: 3  •  amLODIPine (NORVASC) 5 MG tablet, , Disp: , Rfl:   •  aspirin (ASPIRIN LOW DOSE) 81 MG tablet, Take 1 tablet by mouth daily., Disp: , Rfl:   •  calcitriol (ROCALTROL) 0.25 MCG capsule, 1 capsule 3 (Three) Times a Week., Disp: , Rfl:   •  Cholecalciferol (VITAMIN D3) 5000 UNITS capsule capsule, Take 1 capsule by mouth daily., Disp: , Rfl:   •  Coenzyme Q10 (COQ10) 400 MG capsule, Take 400 mg by mouth Daily., Disp: , Rfl:   •  fluticasone (FLONASE) 50 MCG/ACT nasal spray, Administer 2 sprays in each nostril once daily for environmental allergies and congestion., Disp: 16 g, Rfl: 6  •  folic acid (FOLVITE) 1 MG tablet, Take 1 tablet by mouth 2 (Two) Times a Day., Disp: 180 tablet, Rfl: 1  •  isosorbide mononitrate (IMDUR) 30 MG 24 hr tablet, Take 1 p.o. every morning for heart, Disp: 30 tablet, Rfl: 3  •  lisinopril-hydrochlorothiazide (PRINZIDE,ZESTORETIC) 20-12.5 MG per tablet, Take 2 tablets by mouth Daily., Disp: 180 tablet, Rfl: 1  •  metoprolol succinate XL (TOPROL-XL) 25 MG 24 hr tablet, Take 1 tablet by mouth Daily., Disp: 90 tablet, Rfl: 3  •  nitroglycerin (NITROSTAT) 0.6 MG SL tablet, Place 1 tablet under the tongue Every 5 (Five) Minutes As Needed for Chest Pain. Maximum of 3.Go to ER after third dose., Disp: 30 tablet, Rfl: 12  •  omeprazole (priLOSEC) 40 MG capsule, Take 1 capsule by mouth Daily., Disp: 30 capsule, Rfl: 5    Current  "Facility-Administered Medications:   •  cyanocobalamin injection 1,000 mcg, 1,000 mcg, Intramuscular, Q28 Days, Delgado Patricia MD, 1,000 mcg at 11/26/19 0939      Family History   Problem Relation Age of Onset   • Diabetes Mother    • Heart disease Mother    • Stroke Father          Social History     Socioeconomic History   • Marital status:      Spouse name: Not on file   • Number of children: 2   • Years of education: 14   • Highest education level: Some college, no degree   Occupational History   • Occupation: Retired      Employer: [employer]   Social Needs   • Financial resource strain: Not hard at all   • Food insecurity:     Worry: Never true     Inability: Never true   • Transportation needs:     Medical: No     Non-medical: No   Tobacco Use   • Smoking status: Never Smoker   • Smokeless tobacco: Never Used   • Tobacco comment: caffeine use   Substance and Sexual Activity   • Alcohol use: Yes     Frequency: Monthly or less     Drinks per session: 1 or 2     Binge frequency: Never     Comment: Moderate alcohol consumption   • Drug use: No   • Sexual activity: Yes     Partners: Female   Lifestyle   • Physical activity:     Days per week: 3 days     Minutes per session: 30 min   • Stress: Not at all   Relationships   • Social connections:     Talks on phone: More than three times a week     Gets together: Once a week     Attends Yars[employer] service: More than 4 times per year     Active member of club or organization: Yes     Attends meetings of clubs or organizations: More than 4 times per year     Relationship status:          Vitals:    12/10/19 0959   BP: 128/70   BP Location: Left arm   Pulse: 77   SpO2: 97%   Weight: 88.5 kg (195 lb 3.2 oz)   Height: 172.7 cm (67.99\")        Body mass index is 29.69 kg/m².      Physical Exam:    General: Alert and oriented x 3.  No acute distress.  Normal affect.  Overweight.  HEENT: Pupils equal, round, reactive to light; extraocular movements intact; " sclerae nonicteric; pharynx, ear canals and TMs normal.  Neck: Without JVD, thyromegaly, bruit, or adenopathy.  Lungs: Clear to auscultation in all fields.  Heart: Regular rate and rhythm without murmur, rub, gallop, or click.  Abdomen: Soft, nontender, without hepatosplenomegaly or hernia.  Bowel sounds normal.  : Deferred.  Rectal: Deferred.  Extremities: Without clubbing, cyanosis, edema, or pulse deficit.  Neurologic: Intact without focal deficit.  Normal station and gait observed during ingress and egress from the examination room.  Skin: Without significant lesion.  Musculoskeletal: Unremarkable.    Lab/other results:    I reviewed the results of patient's most recent DEXA scan from July of this year.    NMR reveals a total cholesterol of 264.  Triglycerides elevated at 222.  LDL particle number elevated at 2106.  Small LDL particle number elevated at 1000 her and 75.  HDL particle number is low at 25.4.  CMP normal except glucose 120, BUN 39, creatinine 2.0.  Hemoglobin low at 11.0 and hematocrit low at 33.4.  Hemoglobin A1c 6.2.  Thyroid function test normal.  Vitamin D normal.  CPK normal.    Assessment/Plan:     Diagnosis Plan   1. Impaired fasting glucose     2. Benign essential hypertension     3. Hyperlipidemia, unspecified hyperlipidemia type     4. Chronic renal insufficiency, stage IV (severe), 02/09/2016--creatinine 1.85     5. Bilateral carotid artery plaque, 05/24/2018--16-49% bilateral carotid artery stenosis     6. Vitamin B12 deficiency     7. Vitamin D deficiency     8. Anemia due to stage 3 chronic kidney disease (CMS/HCC)     9. Gastroesophageal reflux disease without esophagitis     10. Multiple environmental allergies     11. Benign prostatic hypertrophy     12. Osteopenia of multiple sites     13. Generalized anxiety disorder     14. Bilateral renal cysts     15. Muscle cramps, statin related, Vytorin and pravastatin.  Tolerates Livalo.     16. Hypogonadism in male, on TRT, testosterone  pellets, per      17. Cervical radiculopathy     18. Chronic neck pain     19. Statin intolerance, Vytorin and pravastatin     20. Exertional angina (CMS/[facility])     21. Therapeutic drug monitoring     22. Occlusive coronary artery disease, clinical diagnosis only.  Patient not a candidate for heart catheterization/intervention.       Patient has mild impaired fasting glucose that does not require medication.  His hypertension is a little borderline today but this is being managed by the nephrologist.  Patient has significant hyperlipidemia and suspected coronary artery disease with angina but unfortunately he is statin intolerant and did not tolerate Zetia.  The only option would be 1 of the new injectable medications but I am not sure if this will be approved by insurance or not.  I will discuss this with the cardiologist to get his thoughts on this.  Patient has chronic renal insufficiency and is being monitored by the nephrologist.  He has carotid artery plaque and is up-to-date on his carotid Doppler study.  He has vitamin B12 deficiency and receives B12 injections every month.  His vitamin D is in the normal range.  His anemia of chronic renal disease is stable.  Reflux seems to be a nonissue at the present time.  BPH symptoms are controlled.  Patient has osteopenia which is moderate in severity and seems stable.  As mentioned, patient is statin intolerant and also did not do well on Zetia.  He has symptomatic hypogonadism and is on testosterone replacement therapy by his urologist by using injectable pellets.  His exertional angina seems stable on the empiric oral long-acting nitrates.  The diagnosis of occlusive coronary artery disease is a clinical diagnosis and patient is not a candidate for heart catheterization due to his myriad other problems including chronic renal insufficiency unless a serious emergent situation developed.    Plan is as follows: I will attempt to contact the patient's cardiologist  regarding treatment for his cholesterol and consideration of 1 of the newer injectable medications for this.  I would not make any other changes in his medical regimen except possibly the new cholesterol injectable medication. I will have patient follow-up after July 3, 2020 with lab prior and this will also be his subsequent Medicare wellness visit.  May need earlier lab and follow-up depending upon the decision regarding the cholesterol treatment.    Addendum: Given the overall clinical situation, I have made the decision to start patient on Repatha 140 mg subcutaneously every 2 weeks.  6-week samples given.  I will not send in the prescription to the pharmacy as of yet until we assess the effectiveness and tolerance of this medication.  Will likely need prior authorization.  I will have the patient obtain fasting lab work and follow-up in 2 months.  I have recommended that he take his injection around the first and the 15th of each month.  No matter how many days and not month.  We will go ahead and give the first dose today and patient can take his second dose towards the end of the month.    Note: Greater than 40 minutes was spent today evaluating patient with better than 50% of this time spent counseling patient regarding his numerous medical issues.  Complicated patient.  [id number] level service justified.    Procedures

## 2019-12-16 RX ORDER — OMEPRAZOLE 40 MG/1
CAPSULE, DELAYED RELEASE ORAL
Qty: 30 CAPSULE | Refills: 4 | Status: SHIPPED | OUTPATIENT
Start: 2019-12-16 | End: 2020-02-13

## 2020-01-13 DIAGNOSIS — I20.8 EXERTIONAL ANGINA (HCC): ICD-10-CM

## 2020-01-13 RX ORDER — ISOSORBIDE MONONITRATE 30 MG/1
TABLET, EXTENDED RELEASE ORAL
Qty: 30 TABLET | Refills: 2 | Status: SHIPPED | OUTPATIENT
Start: 2020-01-13 | End: 2020-04-15

## 2020-01-22 ENCOUNTER — CLINICAL SUPPORT (OUTPATIENT)
Dept: INTERNAL MEDICINE | Facility: CLINIC | Age: 85
End: 2020-01-22

## 2020-01-22 DIAGNOSIS — E53.8 VITAMIN B12 DEFICIENCY: Primary | Chronic | ICD-10-CM

## 2020-01-22 PROCEDURE — 96372 THER/PROPH/DIAG INJ SC/IM: CPT | Performed by: INTERNAL MEDICINE

## 2020-01-22 RX ADMIN — CYANOCOBALAMIN 1000 MCG: 1000 INJECTION, SOLUTION INTRAMUSCULAR; SUBCUTANEOUS at 09:57

## 2020-01-28 RX ORDER — ALPRAZOLAM 0.5 MG/1
TABLET ORAL
Qty: 60 TABLET | Refills: 2 | OUTPATIENT
Start: 2020-01-28 | End: 2020-09-09

## 2020-02-05 DIAGNOSIS — Z78.9 STATIN INTOLERANCE: Chronic | ICD-10-CM

## 2020-02-05 DIAGNOSIS — I25.10 OCCLUSIVE CORONARY ARTERY DISEASE: Chronic | ICD-10-CM

## 2020-02-05 DIAGNOSIS — E78.5 HYPERLIPIDEMIA, UNSPECIFIED HYPERLIPIDEMIA TYPE: Chronic | ICD-10-CM

## 2020-02-06 ENCOUNTER — CLINICAL SUPPORT (OUTPATIENT)
Dept: INTERNAL MEDICINE | Facility: CLINIC | Age: 85
End: 2020-02-06

## 2020-02-06 DIAGNOSIS — E53.8 VITAMIN B12 DEFICIENCY: Primary | Chronic | ICD-10-CM

## 2020-02-06 PROCEDURE — 96372 THER/PROPH/DIAG INJ SC/IM: CPT | Performed by: INTERNAL MEDICINE

## 2020-02-06 RX ADMIN — CYANOCOBALAMIN 1000 MCG: 1000 INJECTION, SOLUTION INTRAMUSCULAR; SUBCUTANEOUS at 09:51

## 2020-02-07 LAB
ALBUMIN SERPL-MCNC: 4.3 G/DL (ref 3.5–5.2)
ALBUMIN/GLOB SERPL: 2.7 G/DL
ALP SERPL-CCNC: 75 U/L (ref 39–117)
ALT SERPL-CCNC: 23 U/L (ref 1–41)
AST SERPL-CCNC: 15 U/L (ref 1–40)
BILIRUB SERPL-MCNC: 0.2 MG/DL (ref 0.2–1.2)
BUN SERPL-MCNC: 34 MG/DL (ref 8–23)
BUN/CREAT SERPL: 18.2 (ref 7–25)
CALCIUM SERPL-MCNC: 8.9 MG/DL (ref 8.6–10.5)
CHLORIDE SERPL-SCNC: 107 MMOL/L (ref 98–107)
CHOLEST SERPL-MCNC: 121 MG/DL (ref 100–199)
CO2 SERPL-SCNC: 25.4 MMOL/L (ref 22–29)
CREAT SERPL-MCNC: 1.87 MG/DL (ref 0.76–1.27)
GLOBULIN SER CALC-MCNC: 1.6 GM/DL
GLUCOSE SERPL-MCNC: 123 MG/DL (ref 65–99)
HDL SERPL-SCNC: 32.8 UMOL/L
HDLC SERPL-MCNC: 38 MG/DL
LDL SERPL QN: 20.1 NM
LDL SERPL-SCNC: 593 NMOL/L
LDL SMALL SERPL-SCNC: 378 NMOL/L
LDLC SERPL CALC-MCNC: 53 MG/DL (ref 0–99)
POTASSIUM SERPL-SCNC: 5.3 MMOL/L (ref 3.5–5.2)
PROT SERPL-MCNC: 5.9 G/DL (ref 6–8.5)
SODIUM SERPL-SCNC: 142 MMOL/L (ref 136–145)
TRIGL SERPL-MCNC: 149 MG/DL (ref 0–149)

## 2020-02-13 ENCOUNTER — OFFICE VISIT (OUTPATIENT)
Dept: INTERNAL MEDICINE | Facility: CLINIC | Age: 85
End: 2020-02-13

## 2020-02-13 VITALS
HEART RATE: 104 BPM | WEIGHT: 188.2 LBS | OXYGEN SATURATION: 97 % | SYSTOLIC BLOOD PRESSURE: 122 MMHG | BODY MASS INDEX: 28.52 KG/M2 | HEIGHT: 68 IN | DIASTOLIC BLOOD PRESSURE: 68 MMHG

## 2020-02-13 DIAGNOSIS — Z23 NEED FOR PNEUMOCOCCAL VACCINATION: ICD-10-CM

## 2020-02-13 DIAGNOSIS — E55.9 VITAMIN D DEFICIENCY: Chronic | ICD-10-CM

## 2020-02-13 DIAGNOSIS — R73.01 IMPAIRED FASTING GLUCOSE: Chronic | ICD-10-CM

## 2020-02-13 DIAGNOSIS — I25.10 OCCLUSIVE CORONARY ARTERY DISEASE: Chronic | ICD-10-CM

## 2020-02-13 DIAGNOSIS — N18.4 ANEMIA DUE TO STAGE 4 CHRONIC KIDNEY DISEASE (HCC): Chronic | ICD-10-CM

## 2020-02-13 DIAGNOSIS — I20.8 EXERTIONAL ANGINA (HCC): Chronic | ICD-10-CM

## 2020-02-13 DIAGNOSIS — E78.5 HYPERLIPIDEMIA, UNSPECIFIED HYPERLIPIDEMIA TYPE: Chronic | ICD-10-CM

## 2020-02-13 DIAGNOSIS — N18.4 CHRONIC RENAL INSUFFICIENCY, STAGE IV (SEVERE) (HCC): Chronic | ICD-10-CM

## 2020-02-13 DIAGNOSIS — D63.1 ANEMIA DUE TO STAGE 4 CHRONIC KIDNEY DISEASE (HCC): Chronic | ICD-10-CM

## 2020-02-13 DIAGNOSIS — I10 BENIGN ESSENTIAL HYPERTENSION: Chronic | ICD-10-CM

## 2020-02-13 DIAGNOSIS — E78.2 MIXED HYPERLIPIDEMIA: Primary | Chronic | ICD-10-CM

## 2020-02-13 DIAGNOSIS — Z78.9 STATIN INTOLERANCE: Chronic | ICD-10-CM

## 2020-02-13 PROCEDURE — 99214 OFFICE O/P EST MOD 30 MIN: CPT | Performed by: INTERNAL MEDICINE

## 2020-02-13 PROCEDURE — 90732 PPSV23 VACC 2 YRS+ SUBQ/IM: CPT | Performed by: INTERNAL MEDICINE

## 2020-02-13 PROCEDURE — G0009 ADMIN PNEUMOCOCCAL VACCINE: HCPCS | Performed by: INTERNAL MEDICINE

## 2020-02-13 NOTE — PROGRESS NOTES
02/13/2020    Patient Information  Radha Win                                                                                          [address]  [facility] [address]      7/26/1932  [unfilled]  There is no work phone number on file.    Chief Complaint:     Follow-up recent blood work in order to monitor chronic medical problems listed in history of present illness.    History of Present Illness:    Patient with a history of known coronary artery disease and exertional angina who is statin intolerant presents today for a follow-up after we initiated Repatha in order to control his hyperlipidemia.  Patient is at high risk for recurrence of coronary artery occlusion and therefore the decision was made to start the Repatha because he could not tolerate any statin medications in the past.  He seems to be tolerating the Repatha well.  Patient also has chronic renal insufficiency as well as anemia of chronic kidney disease and also has impaired fasting glucose but this is not overt diabetes despite what is listed in the computer chart.  Past medical history reviewed and updated were necessary including health maintenance parameters.  This reveals he is up-to-date or else accounted for except he needs PPSV23.    Review of Systems   Constitution: Negative.   HENT: Negative.    Eyes: Negative.    Cardiovascular: Negative.    Respiratory: Negative.    Endocrine: Negative.    Hematologic/Lymphatic: Negative.    Skin: Negative.    Musculoskeletal: Negative.    Gastrointestinal: Negative.    Genitourinary: Negative.    Neurological: Negative.    Psychiatric/Behavioral: Negative.    Allergic/Immunologic: Negative.        Active Problems:    Patient Active Problem List   Diagnosis   • Bilateral carotid artery plaque, 05/24/2018--16-49% bilateral carotid artery stenosis   • Benign prostatic hypertrophy   • Chronic renal insufficiency, stage IV (severe), 02/09/2016--creatinine 1.85   •  Diverticulosis of colon   • Hyperlipidemia   • Osteopenia, 7/12/2019--lumbar spine -0.4.  Left femoral neck -1.7.  Right femoral neck -1.9.   • Vitamin B12 deficiency   • Vitamin D deficiency   • Allergic rhinitis   • Anemia due to stage 4 chronic kidney disease (CMS/HCC)   • Generalized anxiety disorder   • Benign essential hypertension   • Gastroesophageal reflux disease without esophagitis   • Folic acid deficiency   • Multiple environmental allergies   • Therapeutic drug monitoring   • Bilateral renal cysts   • Primary osteoarthritis of knees, bilateral   • Impaired fasting glucose   • Muscle cramps, statin related, Vytorin and pravastatin.  Tolerates Livalo.   • Hypogonadism in male, on TRT, testosterone pellets, per    • Chronic neck pain   • Cervical radiculopathy   • Statin intolerance, Vytorin and pravastatin   • Degeneration of cervical intervertebral disc   • Exertional angina (CMS/HCC)   • Occlusive coronary artery disease, clinical diagnosis only.  Patient not a candidate for heart catheterization/intervention.         Past Medical History:   Diagnosis Date   • Allergic rhinitis 2/11/2016   • Anemia due to chronic kidney disease 2/11/2016   • Benign essential hypertension 2/11/2016   • Benign prostatic hypertrophy 2/11/2016 12/21/2016--prostate biopsy reportedly negative.  I will try to obtain the report.  Patient sees urologist.  PSAs run from the 3-8 range   • Bilateral carotid artery plaque, 09/21/2016--16-49% bilateral carotid artery stenosis 2/11/2016 09/21/2016--vascular screen reveals 16-49% bilateral carotid artery stenosis, negative for AAA, negative for PAD.  10/10/2008--vascular screening study revealed mild bilateral carotid plaque, no aortic aneurysm, no evidence of PAD   • Bilateral renal cysts 2/14/2016 04/07/2016--ultrasound of the kidneys reveals the previously described renal cysts are not well seen.  However, questionable incidental bladder tumor noted.   05/26/2010--ultrasound the kidneys was negative bilaterally except for small renal cysts.   • Cervical radiculopathy 6/28/2018    10/02/2018--patient seen in follow-up and the results of the MRI discussed.  He continues to have intermittent radicular symptoms which is currently only on the left.  Discussed options with patient and I have recommended neurosurgery consultation.  Patient may benefit from epidural injections.  09/06/2018--MRI of the cervical spine reveals moderate neural foraminal compromise on the right at C3-C   • Chronic renal insufficiency, stage IV (severe), 02/09/2016--creatinine 1.85 2/11/2016 02/09/2016--serum creatinine 1.85.  BUN 29.  07/20/2015--patient was evaluated by the nephrologist.  Ultrasound of the kidneys ordered but this was never done.  05/22/2015--BUN 35, creatinine 2.3.  Patient referred to nephrology, Dr. Devyn Muniz.  04/16/2014--BUN 25, creatinine 1.77.  01/03/2013--BUN 37, creatinine 2.14.    05/26/2010---ultrasound of the kidneys reveal a small cyst x 2 right kidney.  Otherwise normal.    Baseline creatinine approximately 1.9-2.0.   • Degeneration of cervical intervertebral disc 5/2/2019   • Diverticulosis of colon 2/11/2016    10/03/2013--colonoscopy revealed markedly redundant colon, pancolonic diverticulosis with several impacted fecalith.  No evidence of diverticulitis or stricture.  Was only able to reach the ascending colon.  Follow up barium enema revealed extensive diverticulosis.  No polyp or other mucosal mass seen.    06/11/2002--incomplete colonoscopy due to redundant colon.  Not able to get past the hepatic flexure.  Patient had followup barium contrast enema which showed extensive diverticulosis.   • Exertional angina (CMS/HCC) 9/17/2019 October 8, 2019--patient seen in follow-up and he reports the long-acting oral nitrate has definitely helped his exertional anginal symptoms.  However, he has not stressed himself completely such as using a push mower.  His  blood pressure seems improved as well.  Patient has an appoint with the cardiologist in the next 2 weeks.  I have contacted his cardiologist about the results of the empiric ni   • Folic acid deficiency 2/11/2016   • Gastroesophageal reflux disease without esophagitis 2/11/2016   • Generalized anxiety disorder 2/11/2016   • History of diverticulitis of colon 2009 and 2003 05/08/2009-acute diverticulitis without complication. 09/05/2003-acute diverticulitis without complication.    • Hyperlipidemia 2/11/2016 01/29/2005--treatment for hyperlipidemia begun.   • Hypogonadism in male, on TRT, testosterone pellets, per  6/16/2017 January 2017--patient reports his urologist initiated testosterone replacement therapy consisting of testosterone pellets every 6 months.   • Multiple environmental allergies 2/12/2016    Patient sees the allergist regularly and has been on immunotherapy.   • Muscle cramps, statin related, Vytorin and pravastatin.  Tolerates Livalo. 6/16/2017 12/21/2018--patient seems to be tolerating Livalo well.  10/02/2018--Livalo 2 mg per day initiated.  Recommend CoQ10 400 mg per day with food for better absorption.  Reassess with lab in about 8 weeks.  08/30/2018--patient presents with complaints of muscle cramps.  He discontinue pravastatin and the cramps went away.  Cramps returned even with a half dose.  This is despite the fact he was taking    • Occlusive coronary artery disease, clinical diagnosis only.  Patient not a candidate for heart catheterization/intervention. 12/10/2019    October 8, 2019--patient seen in follow-up and he reports the long-acting oral nitrate has definitely helped his exertional anginal symptoms.  However, he has not stressed himself completely such as using a push mower.  His blood pressure seems improved as well.  Patient has an appoint with the cardiologist in the next 2 weeks.  I have contacted his cardiologist about the results of the empiric ni   •  Osteopenia, 7/12/2019--lumbar spine -0.4.  Left femoral neck -1.7.  Right femoral neck -1.9. 2/11/2016 July 12, 2019--DEXA scan reveals lumbar spine T score -0.4.  Unchanged.  Left femoral neck T score -1.7.  Unchanged.  Right femoral neck T score -1.9.  Unchanged.  Impression is osteopenia of the hips with no significant interval change.  06/16/2017--patient seen in follow-up and reports he doesn't think he ever started Fosamax.  Osteopenia and needs to be reassessed with a DEXA scan.  03/14/2017-   • Primary osteoarthritis of knees, bilateral 9/12/2016 09/12/2016--patient called in the office requesting a steriod injection in his knees.  I explained to him that I do no longer perform these injections and have referred him to Dr. Manuel.   • Statin intolerance, Vytorin and pravastatin 12/21/2018 12/21/2018--patient seems to be tolerating Livalo well.  10/02/2018--Livalo 2 mg per day initiated.  Recommend CoQ10 400 mg per day with food for better absorption.  Reassess with lab in about 8 weeks.  08/30/2018--patient presents with complaints of muscle cramps.  He discontinue pravastatin and the cramps went away.  Cramps returned even with a half dose.  This is despite the fact he was taking    • Vitamin B12 deficiency 2/11/2016 06/25/2009--B12 injections begun.   • Vitamin D deficiency 2/11/2016         Past Surgical History:   Procedure Laterality Date   • CATARACT EXTRACTION Left 12/12/2011 12/12/2011--cataract extirpation with intraocular lens implantation left eye.   • CATARACT EXTRACTION Right 12/2011 December 2011--right cataract extirpation with intraocular lens implantation.   • COLONOSCOPY  06/11/2002 06/11/2002--incomplete colonoscopy due to redundant colon. Not able to get past the hepatic flexure. Patient had followup barium contrast enema which showed extensive diverticulosis   • COLONOSCOPY  10/03/2013    10/03/2013--colonoscopy revealed markedly redundant colon, pancolonic  diverticulosis with several impacted fecalith. No evidence of diverticulitis or stricture. Was only able to reach the ascending colon. Follow up barium enema revealed extensive diverticulosis. No polyp or other mucosal mass seen.   • CYSTOSCOPY  05/18/2016 05/18/2016--urology consultation.  Cystoscopy performed for possible bladder mass is negative.   • PROSTATE BIOPSY  12/21/2016 12/21/2016--prostate biopsy reportedly negative.  I will try to obtain the report.         No Known Allergies        Current Outpatient Medications:   •  ALPRAZolam (XANAX) 0.5 MG tablet, TAKE ONE TABLET BY MOUTH TWICE A DAY AS NEEDED, Disp: 60 tablet, Rfl: 2  •  amLODIPine (NORVASC) 5 MG tablet, , Disp: , Rfl:   •  aspirin (ASPIRIN LOW DOSE) 81 MG tablet, Take 1 tablet by mouth daily., Disp: , Rfl:   •  calcitriol (ROCALTROL) 0.25 MCG capsule, 1 capsule 3 (Three) Times a Week., Disp: , Rfl:   •  Cholecalciferol (VITAMIN D3) 5000 UNITS capsule capsule, Take 1 capsule by mouth daily., Disp: , Rfl:   •  Coenzyme Q10 (COQ10) 400 MG capsule, Take 400 mg by mouth Daily., Disp: , Rfl:   •  Evolocumab 140 MG/ML solution auto-injector, Inject 1 mL under the skin into the appropriate area as directed Every 14 (Fourteen) Days., Disp: 2 pen, Rfl: 6  •  fluticasone (FLONASE) 50 MCG/ACT nasal spray, Administer 2 sprays in each nostril once daily for environmental allergies and congestion., Disp: 16 g, Rfl: 6  •  folic acid (FOLVITE) 1 MG tablet, Take 1 tablet by mouth 2 (Two) Times a Day., Disp: 180 tablet, Rfl: 1  •  isosorbide mononitrate (IMDUR) 30 MG 24 hr tablet, TAKE ONE TABLET BY MOUTH EVERY MORNING FOR HEART, Disp: 30 tablet, Rfl: 2  •  lisinopril-hydrochlorothiazide (PRINZIDE,ZESTORETIC) 20-12.5 MG per tablet, Take 2 tablets by mouth Daily., Disp: 180 tablet, Rfl: 1  •  metoprolol succinate XL (TOPROL-XL) 25 MG 24 hr tablet, Take 1 tablet by mouth Daily., Disp: 90 tablet, Rfl: 3  •  nitroglycerin (NITROSTAT) 0.6 MG SL tablet, Place 1  tablet under the tongue Every 5 (Five) Minutes As Needed for Chest Pain. Maximum of 3.Go to ER after third dose., Disp: 30 tablet, Rfl: 12  •  omeprazole (priLOSEC) 40 MG capsule, Take 1 capsule by mouth Daily., Disp: 30 capsule, Rfl: 5    Current Facility-Administered Medications:   •  cyanocobalamin injection 1,000 mcg, 1,000 mcg, Intramuscular, Q28 Days, Delgado Patricia MD, 1,000 mcg at 02/06/20 0951      Family History   Problem Relation Age of Onset   • Diabetes Mother    • Heart disease Mother    • Stroke Father          Social History     Socioeconomic History   • Marital status:      Spouse name: Not on file   • Number of children: 2   • Years of education: 14   • Highest education level: Some college, no degree   Occupational History   • Occupation: Retired      Employer: [employer]   Social Needs   • Financial resource strain: Not hard at all   • Food insecurity:     Worry: Never true     Inability: Never true   • Transportation needs:     Medical: No     Non-medical: No   Tobacco Use   • Smoking status: Never Smoker   • Smokeless tobacco: Never Used   • Tobacco comment: caffeine use   Substance and Sexual Activity   • Alcohol use: Yes     Frequency: Monthly or less     Drinks per session: 1 or 2     Binge frequency: Never     Comment: Moderate alcohol consumption   • Drug use: No   • Sexual activity: Yes     Partners: Female   Lifestyle   • Physical activity:     Days per week: 3 days     Minutes per session: 30 min   • Stress: Not at all   Relationships   • Social connections:     Talks on phone: More than three times a week     Gets together: Once a week     Attends Church service: More than 4 times per year     Active member of club or organization: Yes     Attends meetings of clubs or organizations: More than 4 times per year     Relationship status:          Vitals:    02/13/20 0940   BP: 122/68   BP Location: Left arm   Pulse: 104   SpO2: 97%   Weight: 85.4 kg (188 lb 3.2 oz)  "  Height: 172.7 cm (67.99\")        Body mass index is 28.62 kg/m².      Physical Exam:    General: Alert and oriented x 3.  No acute distress.  Normal affect.  HEENT: Pupils equal, round, reactive to light; extraocular movements intact; sclerae nonicteric; pharynx, ear canals and TMs normal.  Neck: Without JVD, thyromegaly, bruit, or adenopathy.  Lungs: Clear to auscultation in all fields.  Heart: Regular rate and rhythm without murmur, rub, gallop, or click.  Abdomen: Soft, nontender, without hepatosplenomegaly or hernia.  Bowel sounds normal.  : Deferred.  Rectal: Deferred.  Extremities: Without clubbing, cyanosis, edema, or pulse deficit.  Neurologic: Intact without focal deficit.  Normal station and gait observed during ingress and egress from the examination room.  Skin: Without significant lesion.  Musculoskeletal: Unremarkable.    Lab/other results:    NMR reveals a total cholesterol 121.  Triglycerides 249.  LDL particle number excellent at 593.  Small LDL particle number excellent at 378.  HDL particle number is normal at 32.8.  CMP normal except glucose 123, BUN 34, creatinine 1.87.  Potassium slightly elevated at 5.3.    Assessment/Plan:     Diagnosis Plan   1. Hyperlipidemia  NMR LipoProfile    TSH    T4, Free    T3, Free   2. Occlusive coronary artery disease, clinical diagnosis only.  Patient not a candidate for heart catheterization/intervention.     3. Exertional angina (CMS/HCC)     4. Statin intolerance, Vytorin and pravastatin     5. Chronic renal insufficiency, stage IV (severe), 02/09/2016--creatinine 1.85  CBC (No Diff)    Comprehensive Metabolic Panel   6. Anemia due to stage 4 chronic kidney disease (CMS/HCC)  CBC (No Diff)   7. Vitamin D deficiency  Vitamin D 25 Hydroxy   8. Impaired fasting glucose  Hemoglobin A1c   9. Benign essential hypertension       Patient with significant hyperlipidemia and known coronary artery disease with stable exertional angina who is intolerant to numerous " statin medications and has failed all of the therapy.  He is now on Repatha and has an excellent NMR profile.  He has chronic renal insufficiency appears to be stable.  His blood pressure is under excellent control.  His anemia is multifactorial but primarily due to his chronic kidney disease and this is stable as well.  Patient has impaired fasting glucose and does not have diabetes.  His last hemoglobin A1c was 6.2.    Plan is as follows: PPSV23 given.  No changes in current medical regimen.  I will have patient follow-up after July 3, 2020 with lab prior and this will also be subsequent Medicare wellness visit.    Patient has been erroneously marked as diabetic. Based on the available clinical information, he does not have diabetes and should therefore be excluded from diabetic health maintenance and quality measures for the remainder of the reporting period.    Procedures

## 2020-02-27 RX ORDER — LISINOPRIL AND HYDROCHLOROTHIAZIDE 20; 12.5 MG/1; MG/1
TABLET ORAL
Qty: 180 TABLET | Refills: 2 | Status: SHIPPED | OUTPATIENT
Start: 2020-02-27 | End: 2020-08-19

## 2020-03-03 ENCOUNTER — OFFICE VISIT (OUTPATIENT)
Dept: CARDIOLOGY | Facility: CLINIC | Age: 85
End: 2020-03-03

## 2020-03-03 VITALS
HEART RATE: 78 BPM | BODY MASS INDEX: 28.01 KG/M2 | DIASTOLIC BLOOD PRESSURE: 84 MMHG | SYSTOLIC BLOOD PRESSURE: 150 MMHG | HEIGHT: 68 IN | OXYGEN SATURATION: 98 % | WEIGHT: 184.8 LBS | RESPIRATION RATE: 18 BRPM

## 2020-03-03 DIAGNOSIS — I10 BENIGN ESSENTIAL HYPERTENSION: Primary | Chronic | ICD-10-CM

## 2020-03-03 DIAGNOSIS — I25.10 OCCLUSIVE CORONARY ARTERY DISEASE: Chronic | ICD-10-CM

## 2020-03-03 PROCEDURE — 93000 ELECTROCARDIOGRAM COMPLETE: CPT | Performed by: INTERNAL MEDICINE

## 2020-03-03 PROCEDURE — 99214 OFFICE O/P EST MOD 30 MIN: CPT | Performed by: INTERNAL MEDICINE

## 2020-03-03 NOTE — PROGRESS NOTES
Date of Office Visit: 2020    Patient Name: Radha Win  : 1932    Encounter Provider: Theron Alejo MD  Referring Provider: No ref. provider found  Place of Service: [facility] CARDIOLOGY  Patient Care Team:  Delgado Patricia MD as PCP - General (Internal Medicine)  Delgado Patricia MD as PCP - Claims Attributed      Chief Complaint   Patient presents with   • Chest Pain     follow up     History of Present Illness    Patient is an 87-year-old white male with a history of coronary disease and hypertension who returns to the office today for follow-up.  Since his last visit his symptoms is actually improved.  He does not continue to complain of angina pectoris that he previously had.  It appears that the isosorbide at [place] in the beta-blocker has helped significantly.  He has been under tremendous stress recently with his wife's illness and throughout that whole ordeal he did not complain of any angina.  His blood pressure tends to run slightly higher than we would like to see.  My suggestion is that when he sees Dr. Patricia back of his pressure is still elevated an increase in his beta-blocker might be beneficial.  Past Medical History:   Diagnosis Date   • Allergic rhinitis 2016   • Anemia due to chronic kidney disease 2016   • Benign essential hypertension 2016   • Benign prostatic hypertrophy 2016--prostate biopsy reportedly negative.  I will try to obtain the report.  Patient sees urologist.  PSAs run from the 3-8 range   • Bilateral carotid artery plaque, 2016--16-49% bilateral carotid artery stenosis 2016--vascular screen reveals 16-49% bilateral carotid artery stenosis, negative for AAA, negative for PAD.  10/10/2008--vascular screening study revealed mild bilateral carotid plaque, no aortic aneurysm, no evidence of PAD   • Bilateral renal cysts 2016--ultrasound of  the kidneys reveals the previously described renal cysts are not well seen.  However, questionable incidental bladder tumor noted.  05/26/2010--ultrasound the kidneys was negative bilaterally except for small renal cysts.   • Cervical radiculopathy 6/28/2018    10/02/2018--patient seen in follow-up and the results of the MRI discussed.  He continues to have intermittent radicular symptoms which is currently only on the left.  Discussed options with patient and I have recommended neurosurgery consultation.  Patient may benefit from epidural injections.  09/06/2018--MRI of the cervical spine reveals moderate neural foraminal compromise on the right at C3-C   • Chronic renal insufficiency, stage IV (severe), 02/09/2016--creatinine 1.85 2/11/2016 02/09/2016--serum creatinine 1.85.  BUN 29.  07/20/2015--patient was evaluated by the nephrologist.  Ultrasound of the kidneys ordered but this was never done.  05/22/2015--BUN 35, creatinine 2.3.  Patient referred to nephrology, Dr. Devyn Muniz.  04/16/2014--BUN 25, creatinine 1.77.  01/03/2013--BUN 37, creatinine 2.14.    05/26/2010---ultrasound of the kidneys reveal a small cyst x 2 right kidney.  Otherwise normal.    Baseline creatinine approximately 1.9-2.0.   • Degeneration of cervical intervertebral disc 5/2/2019   • Diverticulosis of colon 2/11/2016    10/03/2013--colonoscopy revealed markedly redundant colon, pancolonic diverticulosis with several impacted fecalith.  No evidence of diverticulitis or stricture.  Was only able to reach the ascending colon.  Follow up barium enema revealed extensive diverticulosis.  No polyp or other mucosal mass seen.    06/11/2002--incomplete colonoscopy due to redundant colon.  Not able to get past the hepatic flexure.  Patient had followup barium contrast enema which showed extensive diverticulosis.   • Exertional angina (CMS/HCC) 9/17/2019 October 8, 2019--patient seen in follow-up and he reports the long-acting oral nitrate has  definitely helped his exertional anginal symptoms.  However, he has not stressed himself completely such as using a push mower.  His blood pressure seems improved as well.  Patient has an appoint with the cardiologist in the next 2 weeks.  I have contacted his cardiologist about the results of the empiric ni   • Folic acid deficiency 2/11/2016   • Gastroesophageal reflux disease without esophagitis 2/11/2016   • Generalized anxiety disorder 2/11/2016   • History of diverticulitis of colon 2009 and 2003 05/08/2009-acute diverticulitis without complication. 09/05/2003-acute diverticulitis without complication.    • Hyperlipidemia 2/11/2016 01/29/2005--treatment for hyperlipidemia begun.   • Hypogonadism in male, on TRT, testosterone pellets, per  6/16/2017 January 2017--patient reports his urologist initiated testosterone replacement therapy consisting of testosterone pellets every 6 months.   • Multiple environmental allergies 2/12/2016    Patient sees the allergist regularly and has been on immunotherapy.   • Muscle cramps, statin related, Vytorin and pravastatin.  Tolerates Livalo. 6/16/2017 12/21/2018--patient seems to be tolerating Livalo well.  10/02/2018--Livalo 2 mg per day initiated.  Recommend CoQ10 400 mg per day with food for better absorption.  Reassess with lab in about 8 weeks.  08/30/2018--patient presents with complaints of muscle cramps.  He discontinue pravastatin and the cramps went away.  Cramps returned even with a half dose.  This is despite the fact he was taking    • Occlusive coronary artery disease, clinical diagnosis only.  Patient not a candidate for heart catheterization/intervention. 12/10/2019    October 8, 2019--patient seen in follow-up and he reports the long-acting oral nitrate has definitely helped his exertional anginal symptoms.  However, he has not stressed himself completely such as using a push mower.  His blood pressure seems improved as well.  Patient has an  appoint with the cardiologist in the next 2 weeks.  I have contacted his cardiologist about the results of the empiric ni   • Osteopenia, 7/12/2019--lumbar spine -0.4.  Left femoral neck -1.7.  Right femoral neck -1.9. 2/11/2016 July 12, 2019--DEXA scan reveals lumbar spine T score -0.4.  Unchanged.  Left femoral neck T score -1.7.  Unchanged.  Right femoral neck T score -1.9.  Unchanged.  Impression is osteopenia of the hips with no significant interval change.  06/16/2017--patient seen in follow-up and reports he doesn't think he ever started Fosamax.  Osteopenia and needs to be reassessed with a DEXA scan.  03/14/2017-   • Primary osteoarthritis of knees, bilateral 9/12/2016 09/12/2016--patient called in the office requesting a steriod injection in his knees.  I explained to him that I do no longer perform these injections and have referred him to Dr. Manuel.   • Statin intolerance, Vytorin and pravastatin 12/21/2018 12/21/2018--patient seems to be tolerating Livalo well.  10/02/2018--Livalo 2 mg per day initiated.  Recommend CoQ10 400 mg per day with food for better absorption.  Reassess with lab in about 8 weeks.  08/30/2018--patient presents with complaints of muscle cramps.  He discontinue pravastatin and the cramps went away.  Cramps returned even with a half dose.  This is despite the fact he was taking    • Vitamin B12 deficiency 2/11/2016 06/25/2009--B12 injections begun.   • Vitamin D deficiency 2/11/2016         Past Surgical History:   Procedure Laterality Date   • CATARACT EXTRACTION Left 12/12/2011 12/12/2011--cataract extirpation with intraocular lens implantation left eye.   • CATARACT EXTRACTION Right 12/2011 December 2011--right cataract extirpation with intraocular lens implantation.   • COLONOSCOPY  06/11/2002 06/11/2002--incomplete colonoscopy due to redundant colon. Not able to get past the hepatic flexure. Patient had followup barium contrast enema which showed extensive  diverticulosis   • COLONOSCOPY  10/03/2013    10/03/2013--colonoscopy revealed markedly redundant colon, pancolonic diverticulosis with several impacted fecalith. No evidence of diverticulitis or stricture. Was only able to reach the ascending colon. Follow up barium enema revealed extensive diverticulosis. No polyp or other mucosal mass seen.   • CYSTOSCOPY  05/18/2016 05/18/2016--urology consultation.  Cystoscopy performed for possible bladder mass is negative.   • PROSTATE BIOPSY  12/21/2016 12/21/2016--prostate biopsy reportedly negative.  I will try to obtain the report.           Current Outpatient Medications:   •  amLODIPine (NORVASC) 5 MG tablet, , Disp: , Rfl:   •  aspirin (ASPIRIN LOW DOSE) 81 MG tablet, Take 1 tablet by mouth daily., Disp: , Rfl:   •  calcitriol (ROCALTROL) 0.25 MCG capsule, 1 capsule 3 (Three) Times a Week., Disp: , Rfl:   •  Cholecalciferol (VITAMIN D3) 5000 UNITS capsule capsule, Take 1 capsule by mouth daily., Disp: , Rfl:   •  Evolocumab 140 MG/ML solution auto-injector, Inject 1 mL under the skin into the appropriate area as directed Every 14 (Fourteen) Days., Disp: 2 pen, Rfl: 11  •  fluticasone (FLONASE) 50 MCG/ACT nasal spray, Administer 2 sprays in each nostril once daily for environmental allergies and congestion., Disp: 16 g, Rfl: 6  •  folic acid (FOLVITE) 1 MG tablet, Take 1 tablet by mouth 2 (Two) Times a Day., Disp: 180 tablet, Rfl: 1  •  isosorbide mononitrate (IMDUR) 30 MG 24 hr tablet, TAKE ONE TABLET BY MOUTH EVERY MORNING FOR HEART, Disp: 30 tablet, Rfl: 2  •  lisinopril-hydrochlorothiazide (PRINZIDE,ZESTORETIC) 20-12.5 MG per tablet, TAKE TWO TABLETS BY MOUTH DAILY, Disp: 180 tablet, Rfl: 2  •  metoprolol succinate XL (TOPROL-XL) 25 MG 24 hr tablet, Take 1 tablet by mouth Daily., Disp: 90 tablet, Rfl: 3  •  nitroglycerin (NITROSTAT) 0.6 MG SL tablet, Place 1 tablet under the tongue Every 5 (Five) Minutes As Needed for Chest Pain. Maximum of 3.Go to ER after  third dose., Disp: 30 tablet, Rfl: 12  •  omeprazole (priLOSEC) 40 MG capsule, Take 1 capsule by mouth Daily., Disp: 30 capsule, Rfl: 5  •  ALPRAZolam (XANAX) 0.5 MG tablet, TAKE ONE TABLET BY MOUTH TWICE A DAY AS NEEDED, Disp: 60 tablet, Rfl: 2  •  Coenzyme Q10 (COQ10) 400 MG capsule, Take 400 mg by mouth Daily., Disp: , Rfl:     Current Facility-Administered Medications:   •  cyanocobalamin injection 1,000 mcg, 1,000 mcg, Intramuscular, Q28 Days, Delgado Patricia MD, 1,000 mcg at 02/06/20 0951      Social History     Socioeconomic History   • Marital status:      Spouse name: Not on file   • Number of children: 2   • Years of education: 14   • Highest education level: Some college, no degree   Occupational History   • Occupation: Retired      Employer: [employer]   Social Needs   • Financial resource strain: Not hard at all   • Food insecurity:     Worry: Never true     Inability: Never true   • Transportation needs:     Medical: No     Non-medical: No   Tobacco Use   • Smoking status: Never Smoker   • Smokeless tobacco: Never Used   • Tobacco comment: caffeine use: 2 cups coffee daily   Substance and Sexual Activity   • Alcohol use: Yes     Alcohol/week: 3.0 standard drinks     Types: 3 Shots of liquor per week     Frequency: Monthly or less     Drinks per session: 1 or 2     Binge frequency: Never     Comment: Moderate alcohol consumption   • Drug use: No   • Sexual activity: Yes     Partners: Female   Lifestyle   • Physical activity:     Days per week: 3 days     Minutes per session: 30 min   • Stress: Not at all   Relationships   • Social connections:     Talks on phone: More than three times a week     Gets together: Once a week     Attends Yarsanism service: More than 4 times per year     Active member of club or organization: Yes     Attends meetings of clubs or organizations: More than 4 times per year     Relationship status:          Review of Systems   Constitution: Negative.   HENT:  "Negative.    Eyes: Negative.    Cardiovascular: Negative.    Respiratory: Negative.    Endocrine: Negative.    Skin: Negative.    Musculoskeletal: Negative.    Gastrointestinal: Negative.    Neurological: Negative.    Psychiatric/Behavioral: Negative.        Procedures      ECG 12 Lead  Date/Time: 3/3/2020 12:50 PM  Performed by: Theron Alejo MD  Authorized by: Theron Alejo MD   Comparison: compared with previous ECG from 10/29/2019  Similar to previous ECG  Rhythm: sinus rhythm  Rate: normal  Conduction: right bundle branch block  QRS axis: normal                  Objective:    /84 (BP Location: Left arm, Patient Position: Sitting, Cuff Size: Adult)   Pulse 78   Resp 18   Ht 172.7 cm (68\")   Wt 83.8 kg (184 lb 12.8 oz)   SpO2 98%   BMI 28.10 kg/m²         Physical Exam   Constitutional: He is oriented to person, place, and time. He appears well-developed and well-nourished.   HENT:   Head: Normocephalic.   Eyes: Pupils are equal, round, and reactive to light.   Neck: Normal range of motion. No JVD present. Carotid bruit is not present. No thyromegaly present.   Cardiovascular: Normal rate, regular rhythm, S1 normal, S2 normal, normal heart sounds and intact distal pulses. Exam reveals no gallop and no friction rub.   No murmur heard.  Pulmonary/Chest: Effort normal and breath sounds normal.   Abdominal: Soft. Bowel sounds are normal.   Musculoskeletal: He exhibits no edema.   Neurological: He is alert and oriented to person, place, and time.   Skin: Skin is warm, dry and intact. No erythema.   Psychiatric: He has a normal mood and affect.   Vitals reviewed.          Assessment:       Diagnosis Plan   1. Benign essential hypertension     2. Occlusive coronary artery disease, clinical diagnosis only.  Patient not a candidate for heart catheterization/intervention.         1.  Coronary artery disease: Improvement in symptoms.  Continue beta-blocker and isosorbide  2.  Hypertension: " Borderline elevated here today.  Would consider increasing his beta-blocker if his pressure remains elevated.     Plan:

## 2020-04-03 ENCOUNTER — TELEPHONE (OUTPATIENT)
Dept: INTERNAL MEDICINE | Facility: CLINIC | Age: 85
End: 2020-04-03

## 2020-04-15 ENCOUNTER — TELEPHONE (OUTPATIENT)
Dept: INTERNAL MEDICINE | Facility: CLINIC | Age: 85
End: 2020-04-15

## 2020-04-15 DIAGNOSIS — I20.8 EXERTIONAL ANGINA (HCC): ICD-10-CM

## 2020-04-15 RX ORDER — ISOSORBIDE MONONITRATE 30 MG/1
TABLET, EXTENDED RELEASE ORAL
Qty: 30 TABLET | Refills: 1 | Status: SHIPPED | OUTPATIENT
Start: 2020-04-15 | End: 2020-05-22

## 2020-05-14 RX ORDER — FOLIC ACID 1 MG/1
TABLET ORAL
Qty: 180 TABLET | Refills: 0 | Status: SHIPPED | OUTPATIENT
Start: 2020-05-14 | End: 2021-06-03

## 2020-05-22 DIAGNOSIS — I20.8 EXERTIONAL ANGINA (HCC): ICD-10-CM

## 2020-05-22 RX ORDER — ISOSORBIDE MONONITRATE 30 MG/1
TABLET, EXTENDED RELEASE ORAL
Qty: 30 TABLET | Refills: 0 | Status: SHIPPED | OUTPATIENT
Start: 2020-05-22 | End: 2020-07-27

## 2020-06-29 DIAGNOSIS — E55.9 VITAMIN D DEFICIENCY: Chronic | ICD-10-CM

## 2020-06-29 DIAGNOSIS — R73.01 IMPAIRED FASTING GLUCOSE: Chronic | ICD-10-CM

## 2020-06-29 DIAGNOSIS — N18.4 ANEMIA DUE TO STAGE 4 CHRONIC KIDNEY DISEASE (HCC): Chronic | ICD-10-CM

## 2020-06-29 DIAGNOSIS — N18.4 CHRONIC RENAL INSUFFICIENCY, STAGE IV (SEVERE) (HCC): Chronic | ICD-10-CM

## 2020-06-29 DIAGNOSIS — E78.2 MIXED HYPERLIPIDEMIA: Chronic | ICD-10-CM

## 2020-06-29 DIAGNOSIS — D63.1 ANEMIA DUE TO STAGE 4 CHRONIC KIDNEY DISEASE (HCC): Chronic | ICD-10-CM

## 2020-06-30 ENCOUNTER — LAB (OUTPATIENT)
Dept: LAB | Facility: HOSPITAL | Age: 85
End: 2020-06-30

## 2020-06-30 DIAGNOSIS — Z78.9 STATIN INTOLERANCE: Chronic | ICD-10-CM

## 2020-06-30 DIAGNOSIS — I25.10 OCCLUSIVE CORONARY ARTERY DISEASE: Chronic | ICD-10-CM

## 2020-06-30 DIAGNOSIS — E78.5 HYPERLIPIDEMIA, UNSPECIFIED HYPERLIPIDEMIA TYPE: Chronic | ICD-10-CM

## 2020-06-30 LAB
25(OH)D3 SERPL-MCNC: 94.4 NG/ML (ref 30–100)
ALBUMIN SERPL-MCNC: 3.9 G/DL (ref 3.5–5.2)
ALBUMIN/GLOB SERPL: 1.6 G/DL
ALP SERPL-CCNC: 245 U/L (ref 39–117)
ALT SERPL W P-5'-P-CCNC: 152 U/L (ref 1–41)
ANION GAP SERPL CALCULATED.3IONS-SCNC: 7.8 MMOL/L (ref 5–15)
AST SERPL-CCNC: 68 U/L (ref 1–40)
BILIRUB SERPL-MCNC: 0.6 MG/DL (ref 0.2–1.2)
BUN SERPL-MCNC: 25 MG/DL (ref 8–23)
BUN/CREAT SERPL: 13.8 (ref 7–25)
CALCIUM SPEC-SCNC: 9.9 MG/DL (ref 8.6–10.5)
CHLORIDE SERPL-SCNC: 101 MMOL/L (ref 98–107)
CO2 SERPL-SCNC: 27.2 MMOL/L (ref 22–29)
CREAT SERPL-MCNC: 1.81 MG/DL (ref 0.76–1.27)
DEPRECATED RDW RBC AUTO: 41.9 FL (ref 37–54)
ERYTHROCYTE [DISTWIDTH] IN BLOOD BY AUTOMATED COUNT: 12.9 % (ref 12.3–15.4)
GFR SERPL CREATININE-BSD FRML MDRD: 36 ML/MIN/1.73
GLOBULIN UR ELPH-MCNC: 2.5 GM/DL
GLUCOSE SERPL-MCNC: 106 MG/DL (ref 65–99)
HBA1C MFR BLD: 6.5 % (ref 4.8–5.6)
HCT VFR BLD AUTO: 34.9 % (ref 37.5–51)
HGB BLD-MCNC: 11.6 G/DL (ref 13–17.7)
MCH RBC QN AUTO: 29.8 PG (ref 26.6–33)
MCHC RBC AUTO-ENTMCNC: 33.2 G/DL (ref 31.5–35.7)
MCV RBC AUTO: 89.7 FL (ref 79–97)
PLATELET # BLD AUTO: 201 10*3/MM3 (ref 140–450)
PMV BLD AUTO: 10 FL (ref 6–12)
POTASSIUM SERPL-SCNC: 4.9 MMOL/L (ref 3.5–5.2)
PROT SERPL-MCNC: 6.4 G/DL (ref 6–8.5)
RBC # BLD AUTO: 3.89 10*6/MM3 (ref 4.14–5.8)
SODIUM SERPL-SCNC: 136 MMOL/L (ref 136–145)
T3FREE SERPL-MCNC: 2.76 PG/ML (ref 2–4.4)
T4 FREE SERPL-MCNC: 1.18 NG/DL (ref 0.93–1.7)
TSH SERPL DL<=0.05 MIU/L-ACNC: 2.36 UIU/ML (ref 0.27–4.2)
WBC # BLD AUTO: 6.21 10*3/MM3 (ref 3.4–10.8)

## 2020-06-30 PROCEDURE — 82306 VITAMIN D 25 HYDROXY: CPT | Performed by: INTERNAL MEDICINE

## 2020-06-30 PROCEDURE — 36415 COLL VENOUS BLD VENIPUNCTURE: CPT

## 2020-06-30 PROCEDURE — 84481 FREE ASSAY (FT-3): CPT | Performed by: INTERNAL MEDICINE

## 2020-06-30 PROCEDURE — 84443 ASSAY THYROID STIM HORMONE: CPT | Performed by: INTERNAL MEDICINE

## 2020-06-30 PROCEDURE — 84439 ASSAY OF FREE THYROXINE: CPT | Performed by: INTERNAL MEDICINE

## 2020-06-30 PROCEDURE — 85027 COMPLETE CBC AUTOMATED: CPT | Performed by: INTERNAL MEDICINE

## 2020-06-30 PROCEDURE — 83704 LIPOPROTEIN BLD QUAN PART: CPT | Performed by: INTERNAL MEDICINE

## 2020-06-30 PROCEDURE — 80061 LIPID PANEL: CPT | Performed by: INTERNAL MEDICINE

## 2020-06-30 PROCEDURE — 83036 HEMOGLOBIN GLYCOSYLATED A1C: CPT | Performed by: INTERNAL MEDICINE

## 2020-06-30 PROCEDURE — 80053 COMPREHEN METABOLIC PANEL: CPT | Performed by: INTERNAL MEDICINE

## 2020-07-01 DIAGNOSIS — K21.9 GASTROESOPHAGEAL REFLUX DISEASE WITHOUT ESOPHAGITIS: Chronic | ICD-10-CM

## 2020-07-01 RX ORDER — OMEPRAZOLE 40 MG/1
CAPSULE, DELAYED RELEASE ORAL
Qty: 30 CAPSULE | Refills: 3 | Status: SHIPPED | OUTPATIENT
Start: 2020-07-01 | End: 2020-12-10

## 2020-07-02 LAB
CHOLEST SERPL-MCNC: 142 MG/DL (ref 100–199)
HDL SERPL-SCNC: 28.6 UMOL/L
HDLC SERPL-MCNC: 43 MG/DL
LDL-P: 865 NMOL/L
LDLC REAL SIZE PAT SERPL: 20.9 NM
LDLC SERPL CALC-MCNC: 71 MG/DL (ref 0–99)
SMALL LDL-P: 360 NMOL/L
TRIGL SERPL-MCNC: 138 MG/DL (ref 0–149)

## 2020-07-07 ENCOUNTER — OFFICE VISIT (OUTPATIENT)
Dept: INTERNAL MEDICINE | Facility: CLINIC | Age: 85
End: 2020-07-07

## 2020-07-07 VITALS
BODY MASS INDEX: 27.13 KG/M2 | SYSTOLIC BLOOD PRESSURE: 100 MMHG | OXYGEN SATURATION: 100 % | HEART RATE: 82 BPM | WEIGHT: 179 LBS | DIASTOLIC BLOOD PRESSURE: 56 MMHG | HEIGHT: 68 IN

## 2020-07-07 DIAGNOSIS — I65.23 ATHEROSCLEROSIS OF BOTH CAROTID ARTERIES: Chronic | ICD-10-CM

## 2020-07-07 DIAGNOSIS — I65.23 ATHEROSCLEROSIS OF BOTH CAROTID ARTERIES: ICD-10-CM

## 2020-07-07 DIAGNOSIS — Z78.9 STATIN INTOLERANCE: Chronic | ICD-10-CM

## 2020-07-07 DIAGNOSIS — I10 BENIGN ESSENTIAL HYPERTENSION: Chronic | ICD-10-CM

## 2020-07-07 DIAGNOSIS — F43.21 SITUATIONAL DEPRESSION: ICD-10-CM

## 2020-07-07 DIAGNOSIS — Z51.81 THERAPEUTIC DRUG MONITORING: ICD-10-CM

## 2020-07-07 DIAGNOSIS — Z91.09 MULTIPLE ENVIRONMENTAL ALLERGIES: Chronic | ICD-10-CM

## 2020-07-07 DIAGNOSIS — Z00.00 MEDICARE ANNUAL WELLNESS VISIT, SUBSEQUENT: Primary | ICD-10-CM

## 2020-07-07 DIAGNOSIS — I20.8 EXERTIONAL ANGINA (HCC): Chronic | ICD-10-CM

## 2020-07-07 DIAGNOSIS — F41.1 GENERALIZED ANXIETY DISORDER: Chronic | ICD-10-CM

## 2020-07-07 DIAGNOSIS — M85.89 OSTEOPENIA OF MULTIPLE SITES: Chronic | ICD-10-CM

## 2020-07-07 DIAGNOSIS — R73.01 IMPAIRED FASTING GLUCOSE: Chronic | ICD-10-CM

## 2020-07-07 DIAGNOSIS — I25.10 OCCLUSIVE CORONARY ARTERY DISEASE: Chronic | ICD-10-CM

## 2020-07-07 DIAGNOSIS — N18.4 CHRONIC RENAL INSUFFICIENCY, STAGE IV (SEVERE) (HCC): Chronic | ICD-10-CM

## 2020-07-07 DIAGNOSIS — D63.1 ANEMIA DUE TO STAGE 4 CHRONIC KIDNEY DISEASE (HCC): Chronic | ICD-10-CM

## 2020-07-07 DIAGNOSIS — E78.2 MIXED HYPERLIPIDEMIA: Chronic | ICD-10-CM

## 2020-07-07 DIAGNOSIS — E55.9 VITAMIN D DEFICIENCY: Chronic | ICD-10-CM

## 2020-07-07 DIAGNOSIS — R25.2 MUSCLE CRAMPS: Chronic | ICD-10-CM

## 2020-07-07 DIAGNOSIS — E53.8 VITAMIN B12 DEFICIENCY: Chronic | ICD-10-CM

## 2020-07-07 DIAGNOSIS — N18.4 ANEMIA DUE TO STAGE 4 CHRONIC KIDNEY DISEASE (HCC): Chronic | ICD-10-CM

## 2020-07-07 DIAGNOSIS — E29.1 HYPOGONADISM IN MALE: Chronic | ICD-10-CM

## 2020-07-07 DIAGNOSIS — N40.0 BENIGN NON-NODULAR PROSTATIC HYPERPLASIA WITHOUT LOWER URINARY TRACT SYMPTOMS: Chronic | ICD-10-CM

## 2020-07-07 DIAGNOSIS — K21.9 GASTROESOPHAGEAL REFLUX DISEASE WITHOUT ESOPHAGITIS: Chronic | ICD-10-CM

## 2020-07-07 PROCEDURE — G0439 PPPS, SUBSEQ VISIT: HCPCS | Performed by: INTERNAL MEDICINE

## 2020-07-07 PROCEDURE — 96372 THER/PROPH/DIAG INJ SC/IM: CPT | Performed by: INTERNAL MEDICINE

## 2020-07-07 PROCEDURE — 99214 OFFICE O/P EST MOD 30 MIN: CPT | Performed by: INTERNAL MEDICINE

## 2020-07-07 RX ORDER — ALLOPURINOL 100 MG/1
TABLET ORAL
COMMUNITY
Start: 2020-06-15 | End: 2020-08-19 | Stop reason: SDUPTHER

## 2020-07-07 RX ORDER — CYANOCOBALAMIN 1000 UG/ML
1000 INJECTION, SOLUTION INTRAMUSCULAR; SUBCUTANEOUS ONCE
Status: DISCONTINUED | OUTPATIENT
Start: 2020-07-07 | End: 2020-08-19

## 2020-07-07 RX ADMIN — CYANOCOBALAMIN 1000 MCG: 1000 INJECTION, SOLUTION INTRAMUSCULAR; SUBCUTANEOUS at 10:52

## 2020-07-07 NOTE — PROGRESS NOTES
The ABCs of the Annual Wellness Visit  Subsequent Medicare Wellness Visit    No chief complaint on file.      Subjective   History of Present Illness:  Radha Win is a 87 y.o. male who presents for a Subsequent Medicare Wellness Visit.    HEALTH RISK ASSESSMENT    Recent Hospitalizations:  No hospitalization(s) within the last year.    Current Medical Providers:  Patient Care Team:  Delgado Patricia MD as PCP - General (Internal Medicine)  Delgado Patricia MD as PCP - Claims Attributed    Smoking Status:  Social History     Tobacco Use   Smoking Status Never Smoker   Smokeless Tobacco Never Used   Tobacco Comment    caffeine use: 2 cups coffee daily       Alcohol Consumption:  Social History     Substance and Sexual Activity   Alcohol Use Yes   • Alcohol/week: 3.0 standard drinks   • Types: 3 Shots of liquor per week   • Frequency: Monthly or less   • Drinks per session: 1 or 2   • Binge frequency: Never    Comment: Moderate alcohol consumption       Depression Screen:   PHQ-2/PHQ-9 Depression Screening 7/7/2020   Little interest or pleasure in doing things 0   Feeling down, depressed, or hopeless 1   Trouble falling or staying asleep, or sleeping too much 1   Feeling tired or having little energy 0   Poor appetite or overeating 0   Feeling bad about yourself - or that you are a failure or have let yourself or your family down 0   Trouble concentrating on things, such as reading the newspaper or watching television 0   Moving or speaking so slowly that other people could have noticed. Or the opposite - being so fidgety or restless that you have been moving around a lot more than usual 0   Thoughts that you would be better off dead, or of hurting yourself in some way 0   Total Score 2   If you checked off any problems, how difficult have these problems made it for you to do your work, take care of things at home, or get along with other people? Not difficult at all       Fall Risk Screen:  LOREN Fall  Risk Assessment was completed, and patient is at LOW risk for falls.Assessment completed on:7/7/2020    Health Habits and Functional and Cognitive Screening:  Functional & Cognitive Status 7/7/2020   Do you have difficulty preparing food and eating? No   Do you have difficulty bathing yourself, getting dressed or grooming yourself? No   Do you have difficulty using the toilet? No   Do you have difficulty moving around from place to place? No   Do you have trouble with steps or getting out of a bed or a chair? No   Current Diet Well Balanced Diet   Dental Exam Up to date   Eye Exam Up to date   Exercise (times per week) 0 times per week   Current Exercise Activities Include None   Do you need help using the phone?  No   Are you deaf or do you have serious difficulty hearing?  No   Do you need help with transportation? No   Do you need help shopping? No   Do you need help preparing meals?  No   Do you need help with housework?  No   Do you need help with laundry? No   Do you need help taking your medications? No   Do you need help managing money? No   Do you ever drive or ride in a car without wearing a seat belt? No   Have you felt unusual stress, anger or loneliness in the last month? Yes   Who do you live with? Spouse   If you need help, do you have trouble finding someone available to you? Yes   Have you been bothered in the last four weeks by sexual problems? -   Do you have difficulty concentrating, remembering or making decisions? No         Does the patient have evidence of cognitive impairment? No    Asprin use counseling:Taking ASA appropriately as indicated    Age-appropriate Screening Schedule:  Refer to the list below for future screening recommendations based on patient's age, sex and/or medical conditions. Orders for these recommended tests are listed in the plan section. The patient has been provided with a written plan.    Health Maintenance   Topic Date Due   • INFLUENZA VACCINE  08/01/2020   • LIPID  PANEL  06/30/2021   • DXA SCAN  07/12/2021   • TDAP/TD VACCINES (2 - Td) 03/09/2027   • ZOSTER VACCINE  Discontinued          The following portions of the patient's history were reviewed and updated as appropriate: allergies, current medications, past family history, past medical history, past social history, past surgical history and problem list.    Outpatient Medications Prior to Visit   Medication Sig Dispense Refill   • allopurinol (ZYLOPRIM) 100 MG tablet      • ALPRAZolam (XANAX) 0.5 MG tablet TAKE ONE TABLET BY MOUTH TWICE A DAY AS NEEDED 60 tablet 2   • amLODIPine (NORVASC) 5 MG tablet      • aspirin (ASPIRIN LOW DOSE) 81 MG tablet Take 1 tablet by mouth daily.     • calcitriol (ROCALTROL) 0.25 MCG capsule 1 capsule 3 (Three) Times a Week.     • Cholecalciferol (VITAMIN D3) 5000 UNITS capsule capsule Take 1 capsule by mouth daily.     • Coenzyme Q10 (COQ10) 400 MG capsule Take 400 mg by mouth Daily.     • Evolocumab 140 MG/ML solution auto-injector Inject 1 mL under the skin into the appropriate area as directed Every 14 (Fourteen) Days. 2 pen 11   • fluticasone (FLONASE) 50 MCG/ACT nasal spray Administer 2 sprays in each nostril once daily for environmental allergies and congestion. 16 g 6   • folic acid (FOLVITE) 1 MG tablet TAKE ONE TABLET BY MOUTH TWICE A  tablet 0   • isosorbide mononitrate (IMDUR) 30 MG 24 hr tablet TAKE ONE TABLET BY MOUTH EVERY MORNING FOR HEART 30 tablet 0   • lisinopril-hydrochlorothiazide (PRINZIDE,ZESTORETIC) 20-12.5 MG per tablet TAKE TWO TABLETS BY MOUTH DAILY 180 tablet 2   • metoprolol succinate XL (TOPROL-XL) 25 MG 24 hr tablet Take 1 tablet by mouth Daily. 90 tablet 3   • nitroglycerin (NITROSTAT) 0.6 MG SL tablet Place 1 tablet under the tongue Every 5 (Five) Minutes As Needed for Chest Pain. Maximum of 3.Go to ER after third dose. 30 tablet 12   • omeprazole (priLOSEC) 40 MG capsule TAKE ONE CAPSULE BY MOUTH DAILY 30 capsule 3     Facility-Administered  Medications Prior to Visit   Medication Dose Route Frequency Provider Last Rate Last Dose   • cyanocobalamin injection 1,000 mcg  1,000 mcg Intramuscular Q28 Days Delgado Patricia MD   1,000 mcg at 02/06/20 0951       Patient Active Problem List   Diagnosis   • Bilateral carotid artery plaque, 05/24/2018--16-49% bilateral carotid artery stenosis   • Benign prostatic hypertrophy   • Chronic renal insufficiency, stage IV (severe), 02/09/2016--creatinine 1.85   • Diverticulosis of colon   • Hyperlipidemia   • Osteopenia of multiple sites   • Vitamin B12 deficiency   • Vitamin D deficiency   • Allergic rhinitis   • Anemia due to stage 4 chronic kidney disease (CMS/HCC)   • Generalized anxiety disorder   • Benign essential hypertension   • Gastroesophageal reflux disease without esophagitis   • Folic acid deficiency   • Multiple environmental allergies   • Therapeutic drug monitoring   • Bilateral renal cysts   • Primary osteoarthritis of knees, bilateral   • Impaired fasting glucose   • Muscle cramps, statin related, Vytorin and pravastatin.  Tolerates Livalo.   • Hypogonadism in male, on TRT, testosterone pellets, per    • Chronic neck pain   • Cervical radiculopathy   • Statin intolerance, Vytorin and pravastatin   • Degeneration of cervical intervertebral disc   • Exertional angina (CMS/HCC)   • Occlusive coronary artery disease, clinical diagnosis only.  Patient not a candidate for heart catheterization/intervention.       Advanced Care Planning:  ACP discussion was held with the patient during this visit. Patient has an advance directive in EMR which is still valid.     Review of Systems   Constitutional: Negative.    HENT: Negative.    Eyes: Negative.    Respiratory: Negative.    Cardiovascular: Negative.    Gastrointestinal: Negative.    Endocrine: Negative.    Genitourinary: Negative.    Skin: Negative.    Allergic/Immunologic: Negative.    Neurological: Negative.    Hematological: Negative.   "  Psychiatric/Behavioral: Negative.        Compared to one year ago, the patient feels his physical health is the same.  Compared to one year ago, the patient feels his mental health is worse.    Reviewed chart for potential of high risk medication in the elderly: yes  Reviewed chart for potential of harmful drug interactions in the elderly:yes    Objective         Vitals:    07/07/20 0953   BP: 100/56   BP Location: Left arm   Patient Position: Sitting   Cuff Size: Adult   Pulse: 82   SpO2: 100%   Weight: 81.2 kg (179 lb)   Height: 172.7 cm (68\")   PainSc: 0-No pain       Body mass index is 27.22 kg/m².  Discussed the patient's BMI with him. The BMI is above average; BMI management plan is completed.    Physical Exam     General: Alert and oriented x 3.  No acute distress.  Normal affect.  HEENT: Pupils equal, round, reactive to light; extraocular movements intact; sclerae nonicteric; pharynx, ear canals and TMs normal.  Neck: Without JVD, thyromegaly, bruit, or adenopathy.  Lungs: Clear to auscultation in all fields.  Heart: Regular rate and rhythm without murmur, rub, gallop, or click.  Abdomen: Soft, nontender, without hepatosplenomegaly or hernia.  Bowel sounds normal.  : Deferred.  Rectal: Deferred.  Extremities: Without clubbing, cyanosis, edema, or pulse deficit.  Neurologic: Intact without focal deficit.  Normal station and gait observed during ingress and egress from the examination room.  Skin: Without significant lesion.  Musculoskeletal: Unremarkable.    Lab Results   Component Value Date    CHLPL 142 06/30/2020    TRIG 138 06/30/2020    HGBA1C 6.50 (H) 06/30/2020        Assessment/Plan   Medicare Risks and Personalized Health Plan  CMS Preventative Services Quick Reference  Advance Directive Discussion  Cardiovascular risk  Diabetic Lab Screening   Obesity/Overweight   Prostate Cancer Screening     The above risks/problems have been discussed with the patient.  Pertinent information has been shared " with the patient in the After Visit Summary.  Follow up plans and orders are seen below in the Assessment/Plan Section.    Diagnoses and all orders for this visit:    1. Medicare annual wellness visit, subsequent (Primary)    2. Impaired fasting glucose  -     Comprehensive Metabolic Panel; Future  -     Hemoglobin A1c; Future    3. Hyperlipidemia  -     CK; Future  -     Comprehensive Metabolic Panel; Future  -     NMR LipoProfile; Future  -     TSH; Future  -     T4, Free; Future  -     T3, Free; Future    4. Benign essential hypertension    5. Bilateral carotid artery plaque, 05/24/2018--16-49% bilateral carotid artery stenosis  -     Duplex Carotid Ultrasound CAR    6. Chronic renal insufficiency, stage IV (severe), 02/09/2016--creatinine 1.85  -     CBC (No Diff); Future    7. Occlusive coronary artery disease, clinical diagnosis only.  Patient not a candidate for heart catheterization/intervention.    8. Exertional angina (CMS/[facility])    9. Benign prostatic hypertrophy    10. Osteopenia of multiple sites    11. Vitamin B12 deficiency    12. Vitamin D deficiency  -     Vitamin D 25 Hydroxy; Future    13. Anemia due to stage 4 chronic kidney disease (CMS/HCC)  -     CBC (No Diff); Future    14. Generalized anxiety disorder    15. Gastroesophageal reflux disease without esophagitis    16. Multiple environmental allergies    17. Muscle cramps, statin related, Vytorin and pravastatin.  Tolerates Livalo.    18. Hypogonadism in male, on TRT, testosterone pellets, per     19. Statin intolerance, Vytorin and pravastatin    20. Therapeutic drug monitoring      Follow Up:  No follow-ups on file.     An After Visit Summary and PPPS were given to the patient.

## 2020-07-07 NOTE — PROGRESS NOTES
07/07/2020    Patient Information  Radha Win                                                                                          [address]  [place] [address]      7/26/1932  [unfilled]  There is no work phone number on file.    Chief Complaint:     Subsequent Medicare wellness visit.  Follow-up lab work in order to monitor multiple chronic medical issues listed in history of present illness.  No new acute complaints.    History of Present Illness:    Patient with a history of impaired fasting glucose, hyperlipidemia, hypertension, carotid artery plaque, chronic renal insufficiency stage IV, coronary artery disease and exertional angina, BPH, osteopenia, vitamin B12 and vitamin D deficiency, anemia due to stage IV chronic renal insufficiency, generalized anxiety, esophageal reflux, environmental allergies, statin intolerance due to muscle cramps, hypogonadism.  He presents today for subsequent Medicare wellness visit.  Patient also had lab work in order to monitor his chronic medical issues.  His past medical history reviewed and updated were necessary including health maintenance parameters.  This reveals he is up-to-date or else accounted for.    Review of Systems   Constitution: Negative.   HENT: Negative.    Eyes: Negative.    Cardiovascular: Negative.    Respiratory: Negative.    Endocrine: Negative.    Hematologic/Lymphatic: Negative.    Skin: Negative.    Musculoskeletal: Positive for arthritis, back pain, joint pain and neck pain.   Gastrointestinal: Negative.    Genitourinary: Negative.    Neurological: Negative.    Psychiatric/Behavioral: Positive for depression.   Allergic/Immunologic: Negative.        Active Problems:    Patient Active Problem List   Diagnosis   • Bilateral carotid artery plaque, 05/24/2018--16-49% bilateral carotid artery stenosis   • Benign prostatic hypertrophy   • Chronic renal insufficiency, stage IV (severe), 02/09/2016--creatinine 1.85    • Diverticulosis of colon   • Hyperlipidemia   • Osteopenia of multiple sites   • Vitamin B12 deficiency   • Vitamin D deficiency   • Allergic rhinitis   • Anemia due to stage 4 chronic kidney disease (CMS/HCC)   • Generalized anxiety disorder   • Benign essential hypertension   • Gastroesophageal reflux disease without esophagitis   • Folic acid deficiency   • Multiple environmental allergies   • Therapeutic drug monitoring   • Bilateral renal cysts   • Primary osteoarthritis of knees, bilateral   • Impaired fasting glucose   • Muscle cramps, statin related, Vytorin and pravastatin.  Tolerates Livalo.   • Hypogonadism in male, on TRT, testosterone pellets, per    • Chronic neck pain   • Cervical radiculopathy   • Statin intolerance, Vytorin and pravastatin   • Degeneration of cervical intervertebral disc   • Exertional angina (CMS/HCC)   • Occlusive coronary artery disease, clinical diagnosis only.  Patient not a candidate for heart catheterization/intervention.   • Situational depression         Past Medical History:   Diagnosis Date   • Allergic rhinitis 2/11/2016   • Anemia due to chronic kidney disease 2/11/2016   • Benign essential hypertension 2/11/2016   • Benign prostatic hypertrophy 2/11/2016 12/21/2016--prostate biopsy reportedly negative.  I will try to obtain the report.  Patient sees urologist.  PSAs run from the 3-8 range   • Bilateral carotid artery plaque, 09/21/2016--16-49% bilateral carotid artery stenosis 2/11/2016 09/21/2016--vascular screen reveals 16-49% bilateral carotid artery stenosis, negative for AAA, negative for PAD.  10/10/2008--vascular screening study revealed mild bilateral carotid plaque, no aortic aneurysm, no evidence of PAD   • Bilateral renal cysts 2/14/2016 04/07/2016--ultrasound of the kidneys reveals the previously described renal cysts are not well seen.  However, questionable incidental bladder tumor noted.  05/26/2010--ultrasound the kidneys was negative  bilaterally except for small renal cysts.   • Cervical radiculopathy 6/28/2018    10/02/2018--patient seen in follow-up and the results of the MRI discussed.  He continues to have intermittent radicular symptoms which is currently only on the left.  Discussed options with patient and I have recommended neurosurgery consultation.  Patient may benefit from epidural injections.  09/06/2018--MRI of the cervical spine reveals moderate neural foraminal compromise on the right at C3-C   • Chronic renal insufficiency, stage IV (severe), 02/09/2016--creatinine 1.85 2/11/2016 02/09/2016--serum creatinine 1.85.  BUN 29.  07/20/2015--patient was evaluated by the nephrologist.  Ultrasound of the kidneys ordered but this was never done.  05/22/2015--BUN 35, creatinine 2.3.  Patient referred to nephrology, Dr. Devyn Muniz.  04/16/2014--BUN 25, creatinine 1.77.  01/03/2013--BUN 37, creatinine 2.14.    05/26/2010---ultrasound of the kidneys reveal a small cyst x 2 right kidney.  Otherwise normal.    Baseline creatinine approximately 1.9-2.0.   • Degeneration of cervical intervertebral disc 5/2/2019   • Diverticulosis of colon 2/11/2016    10/03/2013--colonoscopy revealed markedly redundant colon, pancolonic diverticulosis with several impacted fecalith.  No evidence of diverticulitis or stricture.  Was only able to reach the ascending colon.  Follow up barium enema revealed extensive diverticulosis.  No polyp or other mucosal mass seen.    06/11/2002--incomplete colonoscopy due to redundant colon.  Not able to get past the hepatic flexure.  Patient had followup barium contrast enema which showed extensive diverticulosis.   • Exertional angina (CMS/HCC) 9/17/2019 October 8, 2019--patient seen in follow-up and he reports the long-acting oral nitrate has definitely helped his exertional anginal symptoms.  However, he has not stressed himself completely such as using a push mower.  His blood pressure seems improved as well.  Patient  has an appoint with the cardiologist in the next 2 weeks.  I have contacted his cardiologist about the results of the empiric ni   • Folic acid deficiency 2/11/2016   • Gastroesophageal reflux disease without esophagitis 2/11/2016   • Generalized anxiety disorder 2/11/2016   • History of diverticulitis of colon 2009 and 2003 05/08/2009-acute diverticulitis without complication. 09/05/2003-acute diverticulitis without complication.    • Hyperlipidemia 2/11/2016 01/29/2005--treatment for hyperlipidemia begun.   • Hypogonadism in male, on TRT, testosterone pellets, per  6/16/2017 January 2017--patient reports his urologist initiated testosterone replacement therapy consisting of testosterone pellets every 6 months.   • Multiple environmental allergies 2/12/2016    Patient sees the allergist regularly and has been on immunotherapy.   • Muscle cramps, statin related, Vytorin and pravastatin.  Tolerates Livalo. 6/16/2017 12/21/2018--patient seems to be tolerating Livalo well.  10/02/2018--Livalo 2 mg per day initiated.  Recommend CoQ10 400 mg per day with food for better absorption.  Reassess with lab in about 8 weeks.  08/30/2018--patient presents with complaints of muscle cramps.  He discontinue pravastatin and the cramps went away.  Cramps returned even with a half dose.  This is despite the fact he was taking    • Occlusive coronary artery disease, clinical diagnosis only.  Patient not a candidate for heart catheterization/intervention. 12/10/2019    October 8, 2019--patient seen in follow-up and he reports the long-acting oral nitrate has definitely helped his exertional anginal symptoms.  However, he has not stressed himself completely such as using a push mower.  His blood pressure seems improved as well.  Patient has an appoint with the cardiologist in the next 2 weeks.  I have contacted his cardiologist about the results of the empiric ni   • Osteopenia, 7/12/2019--lumbar spine -0.4.  Left femoral  neck -1.7.  Right femoral neck -1.9. 2/11/2016 July 12, 2019--DEXA scan reveals lumbar spine T score -0.4.  Unchanged.  Left femoral neck T score -1.7.  Unchanged.  Right femoral neck T score -1.9.  Unchanged.  Impression is osteopenia of the hips with no significant interval change.  06/16/2017--patient seen in follow-up and reports he doesn't think he ever started Fosamax.  Osteopenia and needs to be reassessed with a DEXA scan.  03/14/2017-   • Primary osteoarthritis of knees, bilateral 9/12/2016 09/12/2016--patient called in the office requesting a steriod injection in his knees.  I explained to him that I do no longer perform these injections and have referred him to Dr. Manuel.   • Statin intolerance, Vytorin and pravastatin 12/21/2018 12/21/2018--patient seems to be tolerating Livalo well.  10/02/2018--Livalo 2 mg per day initiated.  Recommend CoQ10 400 mg per day with food for better absorption.  Reassess with lab in about 8 weeks.  08/30/2018--patient presents with complaints of muscle cramps.  He discontinue pravastatin and the cramps went away.  Cramps returned even with a half dose.  This is despite the fact he was taking    • Vitamin B12 deficiency 2/11/2016 06/25/2009--B12 injections begun.   • Vitamin D deficiency 2/11/2016         Past Surgical History:   Procedure Laterality Date   • CATARACT EXTRACTION Left 12/12/2011 12/12/2011--cataract extirpation with intraocular lens implantation left eye.   • CATARACT EXTRACTION Right 12/2011 December 2011--right cataract extirpation with intraocular lens implantation.   • COLONOSCOPY  06/11/2002 06/11/2002--incomplete colonoscopy due to redundant colon. Not able to get past the hepatic flexure. Patient had followup barium contrast enema which showed extensive diverticulosis   • COLONOSCOPY  10/03/2013    10/03/2013--colonoscopy revealed markedly redundant colon, pancolonic diverticulosis with several impacted fecalith. No evidence of  diverticulitis or stricture. Was only able to reach the ascending colon. Follow up barium enema revealed extensive diverticulosis. No polyp or other mucosal mass seen.   • CYSTOSCOPY  05/18/2016 05/18/2016--urology consultation.  Cystoscopy performed for possible bladder mass is negative.   • PROSTATE BIOPSY  12/21/2016 12/21/2016--prostate biopsy reportedly negative.  I will try to obtain the report.         No Known Allergies        Current Outpatient Medications:   •  allopurinol (ZYLOPRIM) 100 MG tablet, , Disp: , Rfl:   •  ALPRAZolam (XANAX) 0.5 MG tablet, TAKE ONE TABLET BY MOUTH TWICE A DAY AS NEEDED, Disp: 60 tablet, Rfl: 2  •  amLODIPine (NORVASC) 5 MG tablet, , Disp: , Rfl:   •  aspirin (ASPIRIN LOW DOSE) 81 MG tablet, Take 1 tablet by mouth daily., Disp: , Rfl:   •  calcitriol (ROCALTROL) 0.25 MCG capsule, 1 capsule 3 (Three) Times a Week., Disp: , Rfl:   •  Cholecalciferol (VITAMIN D3) 5000 UNITS capsule capsule, Take 1 capsule by mouth daily., Disp: , Rfl:   •  Coenzyme Q10 (COQ10) 400 MG capsule, Take 400 mg by mouth Daily., Disp: , Rfl:   •  Evolocumab 140 MG/ML solution auto-injector, Inject 1 mL under the skin into the appropriate area as directed Every 14 (Fourteen) Days., Disp: 2 pen, Rfl: 11  •  fluticasone (FLONASE) 50 MCG/ACT nasal spray, Administer 2 sprays in each nostril once daily for environmental allergies and congestion., Disp: 16 g, Rfl: 6  •  folic acid (FOLVITE) 1 MG tablet, TAKE ONE TABLET BY MOUTH TWICE A DAY, Disp: 180 tablet, Rfl: 0  •  isosorbide mononitrate (IMDUR) 30 MG 24 hr tablet, TAKE ONE TABLET BY MOUTH EVERY MORNING FOR HEART, Disp: 30 tablet, Rfl: 0  •  lisinopril-hydrochlorothiazide (PRINZIDE,ZESTORETIC) 20-12.5 MG per tablet, TAKE TWO TABLETS BY MOUTH DAILY, Disp: 180 tablet, Rfl: 2  •  metoprolol succinate XL (TOPROL-XL) 25 MG 24 hr tablet, Take 1 tablet by mouth Daily., Disp: 90 tablet, Rfl: 3  •  nitroglycerin (NITROSTAT) 0.6 MG SL tablet, Place 1 tablet under  the tongue Every 5 (Five) Minutes As Needed for Chest Pain. Maximum of 3.Go to ER after third dose., Disp: 30 tablet, Rfl: 12  •  omeprazole (priLOSEC) 40 MG capsule, TAKE ONE CAPSULE BY MOUTH DAILY, Disp: 30 capsule, Rfl: 3    Current Facility-Administered Medications:   •  cyanocobalamin injection 1,000 mcg, 1,000 mcg, Intramuscular, Q28 Days, Delgado Patricia MD, 1,000 mcg at 02/06/20 0951      Family History   Problem Relation Age of Onset   • Diabetes Mother    • Heart disease Mother    • Stroke Father          Social History     Socioeconomic History   • Marital status:      Spouse name: Not on file   • Number of children: 2   • Years of education: 14   • Highest education level: Some college, no degree   Occupational History   • Occupation: Retired      Employer: [employer]   Social Needs   • Financial resource strain: Not hard at all   • Food insecurity:     Worry: Never true     Inability: Never true   • Transportation needs:     Medical: No     Non-medical: No   Tobacco Use   • Smoking status: Never Smoker   • Smokeless tobacco: Never Used   • Tobacco comment: caffeine use: 2 cups coffee daily   Substance and Sexual Activity   • Alcohol use: Yes     Alcohol/week: 3.0 standard drinks     Types: 3 Shots of liquor per week     Frequency: Monthly or less     Drinks per session: 1 or 2     Binge frequency: Never     Comment: Moderate alcohol consumption   • Drug use: No   • Sexual activity: Yes     Partners: Female   Lifestyle   • Physical activity:     Days per week: 3 days     Minutes per session: 30 min   • Stress: Not at all   Relationships   • Social connections:     Talks on phone: More than three times a week     Gets together: Once a week     Attends Scientologist service: More than 4 times per year     Active member of club or organization: Yes     Attends meetings of clubs or organizations: More than 4 times per year     Relationship status:          Vitals:    07/07/20 0953   BP:  "100/56   BP Location: Left arm   Patient Position: Sitting   Cuff Size: Adult   Pulse: 82   SpO2: 100%   Weight: 81.2 kg (179 lb)   Height: 172.7 cm (68\")        Body mass index is 27.22 kg/m².      Physical Exam:    General: Alert and oriented x 3.  No acute distress.  Normal affect.  HEENT: Pupils equal, round, reactive to light; extraocular movements intact; sclerae nonicteric; pharynx, ear canals and TMs normal.  Neck: Without JVD, thyromegaly, bruit, or adenopathy.  Lungs: Clear to auscultation in all fields.  Heart: Regular rate and rhythm without murmur, rub, gallop, or click.  Abdomen: Soft, nontender, without hepatosplenomegaly or hernia.  Bowel sounds normal.  : Deferred.  Rectal: Deferred.  Extremities: Without clubbing, cyanosis, edema, or pulse deficit.  Neurologic: Intact without focal deficit.  Normal station and gait observed during ingress and egress from the examination room.  Skin: Without significant lesion.  Musculoskeletal: Unremarkable.    Lab/other results:    NMR reveals total cholesterol 142.  Triglycerides 138.  LDL particle number excellent at 865.  Small LDL particle number excellent at 360.  HDL particle number low at 28.6.  CMP normal except glucose 106, BUN 25, creatinine 1.81.  Hemoglobin A1c is 6.5.  CBC reveals a low hemoglobin 11.6 and hematocrit low at 34.9.  Vitamin D normal.  Thyroid function test normal.    Assessment/Plan:     Diagnosis Plan   1. Medicare annual wellness visit, subsequent     2. Impaired fasting glucose  Comprehensive Metabolic Panel    Hemoglobin A1c   3. Hyperlipidemia  CK    Comprehensive Metabolic Panel    NMR LipoProfile    TSH    T4, Free    T3, Free   4. Benign essential hypertension     5. Bilateral carotid artery plaque, 05/24/2018--16-49% bilateral carotid artery stenosis  Duplex Carotid Ultrasound CAR   6. Chronic renal insufficiency, stage IV (severe), 02/09/2016--creatinine 1.85  CBC (No Diff)   7. Occlusive coronary artery disease, clinical " diagnosis only.  Patient not a candidate for heart catheterization/intervention.     8. Exertional angina (CMS/HCC)     9. Benign prostatic hypertrophy     10. Osteopenia of multiple sites     11. Vitamin B12 deficiency     12. Vitamin D deficiency  Vitamin D 25 Hydroxy   13. Anemia due to stage 4 chronic kidney disease (CMS/HCC)  CBC (No Diff)   14. Generalized anxiety disorder     15. Gastroesophageal reflux disease without esophagitis     16. Multiple environmental allergies     17. Muscle cramps, statin related, Vytorin and pravastatin.  Tolerates Livalo.     18. Hypogonadism in male, on TRT, testosterone pellets, per      19. Statin intolerance, Vytorin and pravastatin     20. Therapeutic drug monitoring     21. Situational depression       The subsequent Medicare wellness visit is documented on a separate note.    Patient has impaired fasting glucose which may have evolved into very mild overt diabetes but I am reluctant to give him this diagnosis based on one hemoglobin A1c reading.  We will monitor closely and amend the problem list if necessary.  Hyperlipidemia is under excellent control which is important given his carotid artery plaque and coronary artery disease.  Patient is due for carotid Doppler study.  His chronic renal insufficiency is stable and he is followed by the nephrology service.  He also has anemia of chronic renal disease which is also stable.  His exertional angina and coronary artery disease is stable and he is followed by the cardiologist.  Patient has BPH and also has hypogonadism which is being managed by the urologist.  He has vitamin B12 deficiency and receives B12 injections on a monthly basis and needs one today.  Vitamin D is in normal range.  Generalized anxiety seems to be controlled.  Esophageal reflux also controlled.  Environmental allergies currently not an issue.  Patient does have statin intolerance and is doing very well on evolocumab.    Plan is as follows: Strongly  recommend patient work on diet, weight loss, and exercise.  I will have him follow-up in 6 months with lab prior or follow-up as needed.  Carotid Doppler study ordered.  Also patient is going through some situational depression but is not ready to start medication.  Is currently very ill.  He would like to follow-up in about 3 months to discuss his situation.    Procedures

## 2020-07-16 ENCOUNTER — HOSPITAL ENCOUNTER (OUTPATIENT)
Dept: CARDIOLOGY | Facility: HOSPITAL | Age: 85
Discharge: HOME | End: 2020-07-16
Admitting: INTERNAL MEDICINE

## 2020-07-16 LAB
BH CV XLRA MEAS LEFT DIST CCA EDV: 13.4 CM/SEC
BH CV XLRA MEAS LEFT DIST CCA PSV: 90.4 CM/SEC
BH CV XLRA MEAS LEFT DIST ICA EDV: -20.5 CM/SEC
BH CV XLRA MEAS LEFT DIST ICA PSV: -99.1 CM/SEC
BH CV XLRA MEAS LEFT ICA/CCA RATIO: 1.1
BH CV XLRA MEAS LEFT MID ICA EDV: -15 CM/SEC
BH CV XLRA MEAS LEFT MID ICA PSV: -67.5 CM/SEC
BH CV XLRA MEAS LEFT PROX CCA EDV: 8.6 CM/SEC
BH CV XLRA MEAS LEFT PROX CCA PSV: 79.4 CM/SEC
BH CV XLRA MEAS LEFT PROX ECA PSV: -115 CM/SEC
BH CV XLRA MEAS LEFT PROX ICA EDV: -17 CM/SEC
BH CV XLRA MEAS LEFT PROX ICA PSV: -88.5 CM/SEC
BH CV XLRA MEAS LEFT PROX SCLA PSV: 185 CM/SEC
BH CV XLRA MEAS LEFT VERTEBRAL A EDV: 13.4 CM/SEC
BH CV XLRA MEAS LEFT VERTEBRAL A PSV: 64.4 CM/SEC
BH CV XLRA MEAS RIGHT CCA RATIO VEL: 85.6 CM/SEC
BH CV XLRA MEAS RIGHT DIST CCA EDV: 14.1 CM/SEC
BH CV XLRA MEAS RIGHT DIST CCA PSV: 85.6 CM/SEC
BH CV XLRA MEAS RIGHT DIST ICA EDV: -17.7 CM/SEC
BH CV XLRA MEAS RIGHT DIST ICA PSV: -74 CM/SEC
BH CV XLRA MEAS RIGHT ICA RATIO VEL: -123 CM/SEC
BH CV XLRA MEAS RIGHT ICA/CCA RATIO: 1.4
BH CV XLRA MEAS RIGHT MID ICA EDV: -24.4 CM/SEC
BH CV XLRA MEAS RIGHT MID ICA PSV: -97.4 CM/SEC
BH CV XLRA MEAS RIGHT PROX CCA EDV: 13.5 CM/SEC
BH CV XLRA MEAS RIGHT PROX CCA PSV: 103 CM/SEC
BH CV XLRA MEAS RIGHT PROX ECA EDV: -20.5 CM/SEC
BH CV XLRA MEAS RIGHT PROX ECA PSV: -118 CM/SEC
BH CV XLRA MEAS RIGHT PROX ICA EDV: 19.6 CM/SEC
BH CV XLRA MEAS RIGHT PROX ICA PSV: 123 CM/SEC
BH CV XLRA MEAS RIGHT PROX SCLA PSV: 139 CM/SEC
BH CV XLRA MEAS RIGHT VERTEBRAL A EDV: -11.1 CM/SEC
BH CV XLRA MEAS RIGHT VERTEBRAL A PSV: -60.4 CM/SEC
LEFT ARM BP: NORMAL MMHG
RIGHT ARM BP: NORMAL MMHG

## 2020-07-16 PROCEDURE — 93880 EXTRACRANIAL BILAT STUDY: CPT

## 2020-07-26 DIAGNOSIS — I20.8 EXERTIONAL ANGINA (HCC): ICD-10-CM

## 2020-07-27 RX ORDER — ISOSORBIDE MONONITRATE 30 MG/1
TABLET, EXTENDED RELEASE ORAL
Qty: 30 TABLET | Refills: 0 | Status: SHIPPED | OUTPATIENT
Start: 2020-07-27 | End: 2020-09-03

## 2020-08-19 ENCOUNTER — OFFICE VISIT (OUTPATIENT)
Dept: INTERNAL MEDICINE | Facility: CLINIC | Age: 85
End: 2020-08-19

## 2020-08-19 VITALS
OXYGEN SATURATION: 98 % | BODY MASS INDEX: 26.25 KG/M2 | WEIGHT: 173.2 LBS | HEART RATE: 82 BPM | SYSTOLIC BLOOD PRESSURE: 116 MMHG | HEIGHT: 68 IN | DIASTOLIC BLOOD PRESSURE: 60 MMHG

## 2020-08-19 DIAGNOSIS — E53.8 VITAMIN B12 DEFICIENCY: Chronic | ICD-10-CM

## 2020-08-19 DIAGNOSIS — F51.09 SITUATIONAL INSOMNIA: Primary | ICD-10-CM

## 2020-08-19 DIAGNOSIS — I10 BENIGN ESSENTIAL HYPERTENSION: Chronic | ICD-10-CM

## 2020-08-19 DIAGNOSIS — F43.21 SITUATIONAL DEPRESSION: ICD-10-CM

## 2020-08-19 DIAGNOSIS — E79.0 HYPERURICEMIA: ICD-10-CM

## 2020-08-19 PROCEDURE — 99214 OFFICE O/P EST MOD 30 MIN: CPT | Performed by: INTERNAL MEDICINE

## 2020-08-19 PROCEDURE — 96372 THER/PROPH/DIAG INJ SC/IM: CPT | Performed by: INTERNAL MEDICINE

## 2020-08-19 RX ORDER — AMLODIPINE BESYLATE 5 MG/1
TABLET ORAL
Qty: 90 TABLET | Refills: 3
Start: 2020-08-19

## 2020-08-19 RX ORDER — ALLOPURINOL 100 MG/1
TABLET ORAL
Qty: 90 TABLET | Refills: 3
Start: 2020-08-19 | End: 2022-09-22

## 2020-08-19 RX ORDER — CYANOCOBALAMIN 1000 UG/ML
1000 INJECTION, SOLUTION INTRAMUSCULAR; SUBCUTANEOUS ONCE
Status: COMPLETED | OUTPATIENT
Start: 2020-08-19 | End: 2020-08-19

## 2020-08-19 RX ORDER — ZOLPIDEM TARTRATE 5 MG/1
TABLET ORAL
Qty: 30 TABLET | Refills: 1 | Status: SHIPPED | OUTPATIENT
Start: 2020-08-19 | End: 2020-11-20

## 2020-08-19 RX ADMIN — CYANOCOBALAMIN 1000 MCG: 1000 INJECTION, SOLUTION INTRAMUSCULAR; SUBCUTANEOUS at 13:38

## 2020-08-19 NOTE — PROGRESS NOTES
08/19/2020    Patient Information  Radha Win                                                                                          [id number]  [facility] [address]      7/26/1932  [unfilled]  There is no work phone number on file.    Chief Complaint:     Complaining of problems with sleeping and stress/anxiety.    History of Present Illness:    Patient with a history of stage IV chronic renal insufficiency, carotid artery stenosis, BPH, hyperlipidemia, osteopenia, vitamin B12 deficiency, anemia due to chronic renal insufficiency, hypertension, esophageal reflux, impaired fasting glucose.  He presents today with some problems with sleeping which is situational as described below.  Also having some anxiety and perhaps some mild depressive symptoms.  This is all related to his wife being chronically ill.  Patient also has vitamin B12 deficiency and needs a B12 injection today.  His past medical history reviewed and updated were necessary including health maintenance parameters.  This reveals he is up-to-date or else accounted for after today's visit.    The history regarding situational insomnia and anxiety:    August 19, 2020--patient has had problems with situational depression as well as situational anxiety and situational insomnia due to his wife's chronic illness.  Wife is currently in [facility] care.  He has some Xanax but I would rather he not take that for sleeping.  That is primarily for anxiety and patient does not take it very often.  Instead, we will prescribe Ambien 5 mg p.o. nightly as needed.    Review of Systems   Constitution: Negative.   HENT: Negative.    Eyes: Negative.    Cardiovascular: Negative.    Respiratory: Negative.    Endocrine: Negative.    Hematologic/Lymphatic: Negative.    Skin: Negative.    Musculoskeletal: Negative.    Gastrointestinal: Negative.    Genitourinary: Negative.    Neurological: Negative.    Psychiatric/Behavioral: The patient has  insomnia and is nervous/anxious.    Allergic/Immunologic: Negative.        Active Problems:    Patient Active Problem List   Diagnosis   • Bilateral carotid artery plaque, 05/24/2018--16-49% bilateral carotid artery stenosis   • Benign prostatic hypertrophy   • Chronic renal insufficiency, stage IV (severe), 02/09/2016--creatinine 1.85   • Diverticulosis of colon   • Hyperlipidemia   • Osteopenia of multiple sites   • Vitamin B12 deficiency   • Vitamin D deficiency   • Allergic rhinitis   • Anemia due to stage 4 chronic kidney disease (CMS/HCC)   • Generalized anxiety disorder   • Benign essential hypertension   • Gastroesophageal reflux disease without esophagitis   • Folic acid deficiency   • Multiple environmental allergies   • Therapeutic drug monitoring   • Bilateral renal cysts   • Primary osteoarthritis of knees, bilateral   • Impaired fasting glucose   • Muscle cramps, statin related, Vytorin and pravastatin.  Tolerates Livalo.   • Hypogonadism in male, on TRT, testosterone pellets, per    • Chronic neck pain   • Cervical radiculopathy   • Statin intolerance, Vytorin and pravastatin   • Degeneration of cervical intervertebral disc   • Exertional angina (CMS/HCC)   • Occlusive coronary artery disease, clinical diagnosis only.  Patient not a candidate for heart catheterization/intervention.   • Situational depression   • Hyperuricemia   • Situational insomnia         Past Medical History:   Diagnosis Date   • Allergic rhinitis 2/11/2016   • Anemia due to chronic kidney disease 2/11/2016   • Benign essential hypertension 2/11/2016   • Benign prostatic hypertrophy 2/11/2016 12/21/2016--prostate biopsy reportedly negative.  I will try to obtain the report.  Patient sees urologist.  PSAs run from the 3-8 range   • Bilateral carotid artery plaque, 09/21/2016--16-49% bilateral carotid artery stenosis 2/11/2016 09/21/2016--vascular screen reveals 16-49% bilateral carotid artery stenosis, negative for AAA,  negative for PAD.  10/10/2008--vascular screening study revealed mild bilateral carotid plaque, no aortic aneurysm, no evidence of PAD   • Bilateral renal cysts 2/14/2016 04/07/2016--ultrasound of the kidneys reveals the previously described renal cysts are not well seen.  However, questionable incidental bladder tumor noted.  05/26/2010--ultrasound the kidneys was negative bilaterally except for small renal cysts.   • Cervical radiculopathy 6/28/2018    10/02/2018--patient seen in follow-up and the results of the MRI discussed.  He continues to have intermittent radicular symptoms which is currently only on the left.  Discussed options with patient and I have recommended neurosurgery consultation.  Patient may benefit from epidural injections.  09/06/2018--MRI of the cervical spine reveals moderate neural foraminal compromise on the right at C3-C   • Chronic renal insufficiency, stage IV (severe), 02/09/2016--creatinine 1.85 2/11/2016 02/09/2016--serum creatinine 1.85.  BUN 29.  07/20/2015--patient was evaluated by the nephrologist.  Ultrasound of the kidneys ordered but this was never done.  05/22/2015--BUN 35, creatinine 2.3.  Patient referred to nephrology, Dr. Devyn Muniz.  04/16/2014--BUN 25, creatinine 1.77.  01/03/2013--BUN 37, creatinine 2.14.    05/26/2010---ultrasound of the kidneys reveal a small cyst x 2 right kidney.  Otherwise normal.    Baseline creatinine approximately 1.9-2.0.   • Degeneration of cervical intervertebral disc 5/2/2019   • Diverticulosis of colon 2/11/2016    10/03/2013--colonoscopy revealed markedly redundant colon, pancolonic diverticulosis with several impacted fecalith.  No evidence of diverticulitis or stricture.  Was only able to reach the ascending colon.  Follow up barium enema revealed extensive diverticulosis.  No polyp or other mucosal mass seen.    06/11/2002--incomplete colonoscopy due to redundant colon.  Not able to get past the hepatic flexure.  Patient had  followup barium contrast enema which showed extensive diverticulosis.   • Exertional angina (CMS/HCC) 9/17/2019 October 8, 2019--patient seen in follow-up and he reports the long-acting oral nitrate has definitely helped his exertional anginal symptoms.  However, he has not stressed himself completely such as using a push mower.  His blood pressure seems improved as well.  Patient has an appoint with the cardiologist in the next 2 weeks.  I have contacted his cardiologist about the results of the empiric ni   • Folic acid deficiency 2/11/2016   • Gastroesophageal reflux disease without esophagitis 2/11/2016   • Generalized anxiety disorder 2/11/2016   • History of diverticulitis of colon 2009 and 2003 05/08/2009-acute diverticulitis without complication. 09/05/2003-acute diverticulitis without complication.    • Hyperlipidemia 2/11/2016 01/29/2005--treatment for hyperlipidemia begun.   • Hyperuricemia 8/19/2020   • Hypogonadism in male, on TRT, testosterone pellets, per  6/16/2017 January 2017--patient reports his urologist initiated testosterone replacement therapy consisting of testosterone pellets every 6 months.   • Multiple environmental allergies 2/12/2016    Patient sees the allergist regularly and has been on immunotherapy.   • Muscle cramps, statin related, Vytorin and pravastatin.  Tolerates Livalo. 6/16/2017 12/21/2018--patient seems to be tolerating Livalo well.  10/02/2018--Livalo 2 mg per day initiated.  Recommend CoQ10 400 mg per day with food for better absorption.  Reassess with lab in about 8 weeks.  08/30/2018--patient presents with complaints of muscle cramps.  He discontinue pravastatin and the cramps went away.  Cramps returned even with a half dose.  This is despite the fact he was taking    • Occlusive coronary artery disease, clinical diagnosis only.  Patient not a candidate for heart catheterization/intervention. 12/10/2019    October 8, 2019--patient seen in follow-up and  he reports the long-acting oral nitrate has definitely helped his exertional anginal symptoms.  However, he has not stressed himself completely such as using a push mower.  His blood pressure seems improved as well.  Patient has an appoint with the cardiologist in the next 2 weeks.  I have contacted his cardiologist about the results of the empiric ni   • Osteopenia, 7/12/2019--lumbar spine -0.4.  Left femoral neck -1.7.  Right femoral neck -1.9. 2/11/2016 July 12, 2019--DEXA scan reveals lumbar spine T score -0.4.  Unchanged.  Left femoral neck T score -1.7.  Unchanged.  Right femoral neck T score -1.9.  Unchanged.  Impression is osteopenia of the hips with no significant interval change.  06/16/2017--patient seen in follow-up and reports he doesn't think he ever started Fosamax.  Osteopenia and needs to be reassessed with a DEXA scan.  03/14/2017-   • Primary osteoarthritis of knees, bilateral 9/12/2016 09/12/2016--patient called in the office requesting a steriod injection in his knees.  I explained to him that I do no longer perform these injections and have referred him to Dr. Manuel.   • Statin intolerance, Vytorin and pravastatin 12/21/2018 12/21/2018--patient seems to be tolerating Livalo well.  10/02/2018--Livalo 2 mg per day initiated.  Recommend CoQ10 400 mg per day with food for better absorption.  Reassess with lab in about 8 weeks.  08/30/2018--patient presents with complaints of muscle cramps.  He discontinue pravastatin and the cramps went away.  Cramps returned even with a half dose.  This is despite the fact he was taking    • Vitamin B12 deficiency 2/11/2016 06/25/2009--B12 injections begun.   • Vitamin D deficiency 2/11/2016         Past Surgical History:   Procedure Laterality Date   • CATARACT EXTRACTION Left 12/12/2011 12/12/2011--cataract extirpation with intraocular lens implantation left eye.   • CATARACT EXTRACTION Right 12/2011 December 2011--right cataract extirpation  with intraocular lens implantation.   • COLONOSCOPY  06/11/2002 06/11/2002--incomplete colonoscopy due to redundant colon. Not able to get past the hepatic flexure. Patient had followup barium contrast enema which showed extensive diverticulosis   • COLONOSCOPY  10/03/2013    10/03/2013--colonoscopy revealed markedly redundant colon, pancolonic diverticulosis with several impacted fecalith. No evidence of diverticulitis or stricture. Was only able to reach the ascending colon. Follow up barium enema revealed extensive diverticulosis. No polyp or other mucosal mass seen.   • CYSTOSCOPY  05/18/2016 05/18/2016--urology consultation.  Cystoscopy performed for possible bladder mass is negative.   • PROSTATE BIOPSY  12/21/2016 12/21/2016--prostate biopsy reportedly negative.  I will try to obtain the report.         No Known Allergies        Current Outpatient Medications:   •  allopurinol (ZYLOPRIM) 100 MG tablet, Take 1 p.o. daily for gout/elevated uric acid, Disp: 90 tablet, Rfl: 3  •  ALPRAZolam (XANAX) 0.5 MG tablet, TAKE ONE TABLET BY MOUTH TWICE A DAY AS NEEDED, Disp: 60 tablet, Rfl: 2  •  amLODIPine (NORVASC) 5 MG tablet, Take 1 p.o. daily for high blood pressure, Disp: 90 tablet, Rfl: 3  •  aspirin (ASPIRIN LOW DOSE) 81 MG tablet, Take 1 tablet by mouth daily., Disp: , Rfl:   •  Evolocumab 140 MG/ML solution auto-injector, Inject 1 mL under the skin into the appropriate area as directed Every 14 (Fourteen) Days., Disp: 2 pen, Rfl: 11  •  fluticasone (FLONASE) 50 MCG/ACT nasal spray, Administer 2 sprays in each nostril once daily for environmental allergies and congestion., Disp: 16 g, Rfl: 6  •  folic acid (FOLVITE) 1 MG tablet, TAKE ONE TABLET BY MOUTH TWICE A DAY, Disp: 180 tablet, Rfl: 0  •  isosorbide mononitrate (IMDUR) 30 MG 24 hr tablet, TAKE ONE TABLET BY MOUTH EVERY MORNING FOR HEART, Disp: 30 tablet, Rfl: 0  •  metoprolol succinate XL (TOPROL-XL) 25 MG 24 hr tablet, Take 1 tablet by mouth  Daily., Disp: 90 tablet, Rfl: 3  •  nitroglycerin (NITROSTAT) 0.6 MG SL tablet, Place 1 tablet under the tongue Every 5 (Five) Minutes As Needed for Chest Pain. Maximum of 3.Go to ER after third dose., Disp: 30 tablet, Rfl: 12  •  omeprazole (priLOSEC) 40 MG capsule, TAKE ONE CAPSULE BY MOUTH DAILY, Disp: 30 capsule, Rfl: 3  •  zolpidem (AMBIEN) 5 MG tablet, Take 1 p.o. nightly as needed insomnia, Disp: 30 tablet, Rfl: 1    Current Facility-Administered Medications:   •  cyanocobalamin injection 1,000 mcg, 1,000 mcg, Intramuscular, Q28 Days, Delgado Patricia MD, 1,000 mcg at 07/07/20 1052  •  cyanocobalamin injection 1,000 mcg, 1,000 mcg, Subcutaneous, Once, Delgado Patricia MD      Family History   Problem Relation Age of Onset   • Diabetes Mother    • Heart disease Mother    • Stroke Father          Social History     Socioeconomic History   • Marital status:      Spouse name: Not on file   • Number of children: 2   • Years of education: 14   • Highest education level: Some college, no degree   Occupational History   • Occupation: Retired      Employer: [employer]   Social Needs   • Financial resource strain: Not hard at all   • Food insecurity:     Worry: Never true     Inability: Never true   • Transportation needs:     Medical: No     Non-medical: No   Tobacco Use   • Smoking status: Never Smoker   • Smokeless tobacco: Never Used   • Tobacco comment: caffeine use: 2 cups coffee daily   Substance and Sexual Activity   • Alcohol use: Yes     Alcohol/week: 3.0 standard drinks     Types: 3 Shots of liquor per week     Frequency: Monthly or less     Drinks per session: 1 or 2     Binge frequency: Never     Comment: Moderate alcohol consumption   • Drug use: No   • Sexual activity: Yes     Partners: Female   Lifestyle   • Physical activity:     Days per week: 3 days     Minutes per session: 30 min   • Stress: Not at all   Relationships   • Social connections:     Talks on phone: More than three times a  "week     Gets together: Once a week     Attends Mosque service: More than 4 times per year     Active member of club or organization: Yes     Attends meetings of clubs or organizations: More than 4 times per year     Relationship status:          Vitals:    08/19/20 1253   BP: 116/60   BP Location: Left arm   Patient Position: Sitting   Cuff Size: Adult   Pulse: 82   SpO2: 98%   Weight: 78.6 kg (173 lb 3.2 oz)   Height: 172.7 cm (68\")        Body mass index is 26.33 kg/m².      Physical Exam:    General: Alert and oriented x 3.  No acute distress.  Normal affect.  HEENT: Pupils equal, round, reactive to light; extraocular movements intact; sclerae nonicteric; pharynx, ear canals and TMs normal.  Neck: Without JVD, thyromegaly, bruit, or adenopathy.  Lungs: Clear to auscultation in all fields.  Heart: Regular rate and rhythm without murmur, rub, gallop, or click.  Abdomen: Soft, nontender, without hepatosplenomegaly or hernia.  Bowel sounds normal.  : Deferred.  Rectal: Deferred.  Extremities: Without clubbing, cyanosis, edema, or pulse deficit.  Neurologic: Intact without focal deficit.  Normal station and gait observed during ingress and egress from the examination room.  Skin: Without significant lesion.  Musculoskeletal: Unremarkable.    Lab/other results:      Assessment/Plan:     Diagnosis Plan   1. Situational insomnia  zolpidem (AMBIEN) 5 MG tablet   2. Situational depression     3. Vitamin B12 deficiency  cyanocobalamin injection 1,000 mcg   4. Benign essential hypertension  amLODIPine (NORVASC) 5 MG tablet   5. Hyperuricemia  allopurinol (ZYLOPRIM) 100 MG tablet     Patient is understandably having significant situational insomnia and situational anxiety and may be some mild depression.  Depressive symptoms do not seem to be bad enough to warrant medication.  He is occasionally taking some Xanax for anxiety but not very often.  He is aware that that could help him sleep but I think he would be " better off with low-dose Ambien.  His blood pressure medications have been changed by the nephrologist and updated his medication list.  He is also on allopurinol for hyperuricemia.  Patient has vitamin B12 deficiency and needs a B12 injection today.    Plan is as follows: Zolpidem 5 mg p.o. nightly as needed.  Risks, benefits, potential side effects discussed.  No other changes in medical regimen.  Cyanocobalamin injection given today.  Patient has a follow-up appointment first part of October which I will have him keep.  In the meantime, if he continues to have problems then he should contact me.        Procedures

## 2020-09-02 DIAGNOSIS — I20.8 EXERTIONAL ANGINA (HCC): ICD-10-CM

## 2020-09-03 RX ORDER — ISOSORBIDE MONONITRATE 30 MG/1
TABLET, EXTENDED RELEASE ORAL
Qty: 30 TABLET | Refills: 5 | Status: SHIPPED | OUTPATIENT
Start: 2020-09-03 | End: 2021-03-16 | Stop reason: SDUPTHER

## 2020-09-09 ENCOUNTER — OFFICE VISIT (OUTPATIENT)
Dept: CARDIOLOGY | Facility: CLINIC | Age: 85
End: 2020-09-09

## 2020-09-09 VITALS
HEART RATE: 103 BPM | HEIGHT: 69 IN | BODY MASS INDEX: 25.89 KG/M2 | OXYGEN SATURATION: 99 % | SYSTOLIC BLOOD PRESSURE: 194 MMHG | WEIGHT: 174.8 LBS | DIASTOLIC BLOOD PRESSURE: 92 MMHG

## 2020-09-09 DIAGNOSIS — I10 BENIGN ESSENTIAL HYPERTENSION: Chronic | ICD-10-CM

## 2020-09-09 DIAGNOSIS — I45.10 RBBB: ICD-10-CM

## 2020-09-09 DIAGNOSIS — I20.8 CHRONIC STABLE ANGINA (HCC): Primary | ICD-10-CM

## 2020-09-09 PROCEDURE — 99214 OFFICE O/P EST MOD 30 MIN: CPT | Performed by: INTERNAL MEDICINE

## 2020-09-09 PROCEDURE — 93000 ELECTROCARDIOGRAM COMPLETE: CPT | Performed by: INTERNAL MEDICINE

## 2020-09-09 RX ORDER — ALPRAZOLAM 0.5 MG/1
TABLET ORAL
Qty: 60 TABLET | Refills: 1 | Status: SHIPPED | OUTPATIENT
Start: 2020-09-09 | End: 2021-03-08

## 2020-09-09 RX ORDER — METOPROLOL SUCCINATE 50 MG/1
50 TABLET, EXTENDED RELEASE ORAL DAILY
Qty: 90 TABLET | Refills: 3 | Status: SHIPPED | OUTPATIENT
Start: 2020-09-09 | End: 2021-02-01

## 2020-09-09 NOTE — PROGRESS NOTES
Date of Office Visit: 2020    Patient Name: Radha Win  : 1932    Encounter Provider: Theron Alejo MD  Referring Provider: No ref. provider found  Place of Service: [facility] CARDIOLOGY  Patient Care Team:  Delgado Patricia MD as PCP - General (Internal Medicine)  Delgado Patricia MD as PCP - Claims Attributed      Chief Complaint   Patient presents with   • Hypertension     History of Present Illness    The patient is a 88-year-old white male with a longstanding history of hypertension and angina pectoris.  The patient returns to the office today feeling well.  Unfortunately he is fatigued and under some emotional stress due to the recent loss of his wife who has been ill for only a short period of time.    The patient does not complain of shortness of breath.  He denies any peripheral edema or palpitations.  The anginal episodes that he had last year appear to have dissipated with the addition of isosorbide.  We discussed his blood pressure.  He states his systolic pressures at home are generally in the 170 range.  Here in the office he is greater than 190 systolic.  He is also tachycardic at 103.    Past Medical History:   Diagnosis Date   • Allergic rhinitis 2016   • Anemia due to chronic kidney disease 2016   • Benign essential hypertension 2016   • Benign prostatic hypertrophy 2016--prostate biopsy reportedly negative.  I will try to obtain the report.  Patient sees urologist.  PSAs run from the 3-8 range   • Bilateral carotid artery plaque, 2016--16-49% bilateral carotid artery stenosis 2016--vascular screen reveals 16-49% bilateral carotid artery stenosis, negative for AAA, negative for PAD.  10/10/2008--vascular screening study revealed mild bilateral carotid plaque, no aortic aneurysm, no evidence of PAD   • Bilateral renal cysts 2016--ultrasound of the  kidneys reveals the previously described renal cysts are not well seen.  However, questionable incidental bladder tumor noted.  05/26/2010--ultrasound the kidneys was negative bilaterally except for small renal cysts.   • Cervical radiculopathy 6/28/2018    10/02/2018--patient seen in follow-up and the results of the MRI discussed.  He continues to have intermittent radicular symptoms which is currently only on the left.  Discussed options with patient and I have recommended neurosurgery consultation.  Patient may benefit from epidural injections.  09/06/2018--MRI of the cervical spine reveals moderate neural foraminal compromise on the right at C3-C   • Chronic renal insufficiency, stage IV (severe), 02/09/2016--creatinine 1.85 2/11/2016 02/09/2016--serum creatinine 1.85.  BUN 29.  07/20/2015--patient was evaluated by the nephrologist.  Ultrasound of the kidneys ordered but this was never done.  05/22/2015--BUN 35, creatinine 2.3.  Patient referred to nephrology, Dr. Devyn Muniz.  04/16/2014--BUN 25, creatinine 1.77.  01/03/2013--BUN 37, creatinine 2.14.    05/26/2010---ultrasound of the kidneys reveal a small cyst x 2 right kidney.  Otherwise normal.    Baseline creatinine approximately 1.9-2.0.   • Degeneration of cervical intervertebral disc 5/2/2019   • Diverticulosis of colon 2/11/2016    10/03/2013--colonoscopy revealed markedly redundant colon, pancolonic diverticulosis with several impacted fecalith.  No evidence of diverticulitis or stricture.  Was only able to reach the ascending colon.  Follow up barium enema revealed extensive diverticulosis.  No polyp or other mucosal mass seen.    06/11/2002--incomplete colonoscopy due to redundant colon.  Not able to get past the hepatic flexure.  Patient had followup barium contrast enema which showed extensive diverticulosis.   • Exertional angina (CMS/HCC) 9/17/2019 October 8, 2019--patient seen in follow-up and he reports the long-acting oral nitrate has  definitely helped his exertional anginal symptoms.  However, he has not stressed himself completely such as using a push mower.  His blood pressure seems improved as well.  Patient has an appoint with the cardiologist in the next 2 weeks.  I have contacted his cardiologist about the results of the empiric ni   • Folic acid deficiency 2/11/2016   • Gastroesophageal reflux disease without esophagitis 2/11/2016   • Generalized anxiety disorder 2/11/2016   • History of diverticulitis of colon 2009 and 2003 05/08/2009-acute diverticulitis without complication. 09/05/2003-acute diverticulitis without complication.    • Hyperlipidemia 2/11/2016 01/29/2005--treatment for hyperlipidemia begun.   • Hyperuricemia 8/19/2020   • Hypogonadism in male, on TRT, testosterone pellets, per  6/16/2017 January 2017--patient reports his urologist initiated testosterone replacement therapy consisting of testosterone pellets every 6 months.   • Multiple environmental allergies 2/12/2016    Patient sees the allergist regularly and has been on immunotherapy.   • Muscle cramps, statin related, Vytorin and pravastatin.  Tolerates Livalo. 6/16/2017 12/21/2018--patient seems to be tolerating Livalo well.  10/02/2018--Livalo 2 mg per day initiated.  Recommend CoQ10 400 mg per day with food for better absorption.  Reassess with lab in about 8 weeks.  08/30/2018--patient presents with complaints of muscle cramps.  He discontinue pravastatin and the cramps went away.  Cramps returned even with a half dose.  This is despite the fact he was taking    • Occlusive coronary artery disease, clinical diagnosis only.  Patient not a candidate for heart catheterization/intervention. 12/10/2019    October 8, 2019--patient seen in follow-up and he reports the long-acting oral nitrate has definitely helped his exertional anginal symptoms.  However, he has not stressed himself completely such as using a push mower.  His blood pressure seems improved  as well.  Patient has an appoint with the cardiologist in the next 2 weeks.  I have contacted his cardiologist about the results of the empiric ni   • Osteopenia, 7/12/2019--lumbar spine -0.4.  Left femoral neck -1.7.  Right femoral neck -1.9. 2/11/2016 July 12, 2019--DEXA scan reveals lumbar spine T score -0.4.  Unchanged.  Left femoral neck T score -1.7.  Unchanged.  Right femoral neck T score -1.9.  Unchanged.  Impression is osteopenia of the hips with no significant interval change.  06/16/2017--patient seen in follow-up and reports he doesn't think he ever started Fosamax.  Osteopenia and needs to be reassessed with a DEXA scan.  03/14/2017-   • Primary osteoarthritis of knees, bilateral 9/12/2016 09/12/2016--patient called in the office requesting a steriod injection in his knees.  I explained to him that I do no longer perform these injections and have referred him to Dr. Manuel.   • Statin intolerance, Vytorin and pravastatin 12/21/2018 12/21/2018--patient seems to be tolerating Livalo well.  10/02/2018--Livalo 2 mg per day initiated.  Recommend CoQ10 400 mg per day with food for better absorption.  Reassess with lab in about 8 weeks.  08/30/2018--patient presents with complaints of muscle cramps.  He discontinue pravastatin and the cramps went away.  Cramps returned even with a half dose.  This is despite the fact he was taking    • Vitamin B12 deficiency 2/11/2016 06/25/2009--B12 injections begun.   • Vitamin D deficiency 2/11/2016         Past Surgical History:   Procedure Laterality Date   • CATARACT EXTRACTION Left 12/12/2011 12/12/2011--cataract extirpation with intraocular lens implantation left eye.   • CATARACT EXTRACTION Right 12/2011 December 2011--right cataract extirpation with intraocular lens implantation.   • COLONOSCOPY  06/11/2002 06/11/2002--incomplete colonoscopy due to redundant colon. Not able to get past the hepatic flexure. Patient had followup barium contrast  enema which showed extensive diverticulosis   • COLONOSCOPY  10/03/2013    10/03/2013--colonoscopy revealed markedly redundant colon, pancolonic diverticulosis with several impacted fecalith. No evidence of diverticulitis or stricture. Was only able to reach the ascending colon. Follow up barium enema revealed extensive diverticulosis. No polyp or other mucosal mass seen.   • CYSTOSCOPY  05/18/2016 05/18/2016--urology consultation.  Cystoscopy performed for possible bladder mass is negative.   • PROSTATE BIOPSY  12/21/2016 12/21/2016--prostate biopsy reportedly negative.  I will try to obtain the report.           Current Outpatient Medications:   •  allopurinol (ZYLOPRIM) 100 MG tablet, Take 1 p.o. daily for gout/elevated uric acid, Disp: 90 tablet, Rfl: 3  •  ALPRAZolam (XANAX) 0.5 MG tablet, TAKE ONE TABLET BY MOUTH TWICE A DAY AS NEEDED, Disp: 60 tablet, Rfl: 2  •  amLODIPine (NORVASC) 5 MG tablet, Take 1 p.o. daily for high blood pressure, Disp: 90 tablet, Rfl: 3  •  aspirin (ASPIRIN LOW DOSE) 81 MG tablet, Take 1 tablet by mouth daily., Disp: , Rfl:   •  Evolocumab 140 MG/ML solution auto-injector, Inject 1 mL under the skin into the appropriate area as directed Every 14 (Fourteen) Days., Disp: 2 pen, Rfl: 11  •  fluticasone (FLONASE) 50 MCG/ACT nasal spray, Administer 2 sprays in each nostril once daily for environmental allergies and congestion., Disp: 16 g, Rfl: 6  •  folic acid (FOLVITE) 1 MG tablet, TAKE ONE TABLET BY MOUTH TWICE A DAY, Disp: 180 tablet, Rfl: 0  •  isosorbide mononitrate (IMDUR) 30 MG 24 hr tablet, TAKE ONE TABLET BY MOUTH EVERY MORNING FOR HEART, Disp: 30 tablet, Rfl: 5  •  metoprolol succinate XL (TOPROL-XL) 50 MG 24 hr tablet, Take 1 tablet by mouth Daily., Disp: 90 tablet, Rfl: 3  •  nitroglycerin (NITROSTAT) 0.6 MG SL tablet, Place 1 tablet under the tongue Every 5 (Five) Minutes As Needed for Chest Pain. Maximum of 3.Go to ER after third dose., Disp: 30 tablet, Rfl: 12  •   omeprazole (priLOSEC) 40 MG capsule, TAKE ONE CAPSULE BY MOUTH DAILY, Disp: 30 capsule, Rfl: 3  •  zolpidem (AMBIEN) 5 MG tablet, Take 1 p.o. nightly as needed insomnia, Disp: 30 tablet, Rfl: 1    Current Facility-Administered Medications:   •  cyanocobalamin injection 1,000 mcg, 1,000 mcg, Intramuscular, Q28 Days, Delgado Patricia MD, 1,000 mcg at 07/07/20 1052      Social History     Socioeconomic History   • Marital status:      Spouse name: Not on file   • Number of children: 2   • Years of education: 14   • Highest education level: Some college, no degree   Occupational History   • Occupation: Retired      Employer: [employer]   Social Needs   • Financial resource strain: Not hard at all   • Food insecurity:     Worry: Never true     Inability: Never true   • Transportation needs:     Medical: No     Non-medical: No   Tobacco Use   • Smoking status: Never Smoker   • Smokeless tobacco: Never Used   • Tobacco comment: caffeine use: 2 cups coffee daily   Substance and Sexual Activity   • Alcohol use: Yes     Alcohol/week: 3.0 standard drinks     Types: 3 Shots of liquor per week     Frequency: Monthly or less     Drinks per session: 1 or 2     Binge frequency: Never     Comment: Moderate alcohol consumption   • Drug use: No   • Sexual activity: Yes     Partners: Female   Lifestyle   • Physical activity:     Days per week: 3 days     Minutes per session: 30 min   • Stress: Not at all   Relationships   • Social connections:     Talks on phone: More than three times a week     Gets together: Once a week     Attends [employer] service: More than 4 times per year     Active member of club or organization: Yes     Attends meetings of clubs or organizations: More than 4 times per year     Relationship status:          Review of Systems   Constitution: Positive for malaise/fatigue.   HENT: Negative.    Eyes: Negative.    Cardiovascular: Negative.    Respiratory: Negative.    Endocrine: Negative.    Skin:  "Negative.    Musculoskeletal: Negative.    Gastrointestinal: Negative.    Neurological: Negative.    Psychiatric/Behavioral: Negative.        Procedures      ECG 12 Lead  Date/Time: 9/9/2020 11:50 AM  Performed by: Theron Alejo MD  Authorized by: Theron Alejo MD   Comparison: compared with previous ECG from 3/3/2020  Rhythm: sinus rhythm  Rate: normal  Conduction: right bundle branch block  QRS axis: normal                  Objective:    BP (!) 194/92 (BP Location: Right arm, Patient Position: Lying, Cuff Size: Adult)   Pulse 103   Ht 175.3 cm (69\")   Wt 79.3 kg (174 lb 12.8 oz)   SpO2 99%   BMI 25.81 kg/m²         Physical Exam   Constitutional: He is oriented to person, place, and time. He appears well-developed and well-nourished.   HENT:   Head: Normocephalic.   Eyes: Pupils are equal, round, and reactive to light.   Neck: Normal range of motion. No JVD present. Carotid bruit is not present. No thyromegaly present.   Cardiovascular: Normal rate, regular rhythm, S1 normal, S2 normal, normal heart sounds and intact distal pulses. Exam reveals no gallop and no friction rub.   No murmur heard.  Pulmonary/Chest: Effort normal and breath sounds normal.   Abdominal: Soft. Bowel sounds are normal.   Musculoskeletal: He exhibits no edema.   Neurological: He is alert and oriented to person, place, and time.   Skin: Skin is warm, dry and intact. No erythema.   Psychiatric: He has a normal mood and affect.   Vitals reviewed.          Assessment:       Diagnosis Plan   1. Chronic stable angina (CMS/HCC)     2. Benign essential hypertension     3. RBBB       1.  Angina pectoris: Class I.  Improved on isosorbide.  Continue the same  2.  Pretension: Uncontrolled.  Very concerned about his continued elevation in blood pressure.  We will increase his metoprolol succinate to 50 mg daily.  3.  Chronic right bundle branch block  4.  Hyperlipidemia: On evolocumab.       Plan:   1.  Increase metoprolol " succinate  2.  Follow-up 6 months.  He has a follow-up appointment with Dr. Patricia in the near future.

## 2020-09-14 ENCOUNTER — CLINICAL SUPPORT (OUTPATIENT)
Dept: INTERNAL MEDICINE | Facility: CLINIC | Age: 85
End: 2020-09-14

## 2020-09-14 DIAGNOSIS — Z23 NEED FOR INFLUENZA VACCINATION: ICD-10-CM

## 2020-09-14 DIAGNOSIS — E53.8 VITAMIN B12 DEFICIENCY: Primary | Chronic | ICD-10-CM

## 2020-09-14 PROCEDURE — G0008 ADMIN INFLUENZA VIRUS VAC: HCPCS | Performed by: INTERNAL MEDICINE

## 2020-09-14 PROCEDURE — 96372 THER/PROPH/DIAG INJ SC/IM: CPT | Performed by: INTERNAL MEDICINE

## 2020-09-14 PROCEDURE — 90694 VACC AIIV4 NO PRSRV 0.5ML IM: CPT | Performed by: INTERNAL MEDICINE

## 2020-09-14 RX ADMIN — CYANOCOBALAMIN 1000 MCG: 1000 INJECTION, SOLUTION INTRAMUSCULAR; SUBCUTANEOUS at 13:22

## 2020-10-07 ENCOUNTER — OFFICE VISIT (OUTPATIENT)
Dept: INTERNAL MEDICINE | Facility: CLINIC | Age: 85
End: 2020-10-07

## 2020-10-07 VITALS
DIASTOLIC BLOOD PRESSURE: 62 MMHG | WEIGHT: 173 LBS | OXYGEN SATURATION: 99 % | HEIGHT: 69 IN | BODY MASS INDEX: 25.62 KG/M2 | SYSTOLIC BLOOD PRESSURE: 136 MMHG | HEART RATE: 84 BPM

## 2020-10-07 DIAGNOSIS — L50.9 URTICARIA: Primary | ICD-10-CM

## 2020-10-07 DIAGNOSIS — F51.09 SITUATIONAL INSOMNIA: ICD-10-CM

## 2020-10-07 DIAGNOSIS — F43.21 SITUATIONAL DEPRESSION: ICD-10-CM

## 2020-10-07 PROCEDURE — 99213 OFFICE O/P EST LOW 20 MIN: CPT | Performed by: INTERNAL MEDICINE

## 2020-10-07 RX ORDER — HYDROXYZINE HYDROCHLORIDE 25 MG/1
TABLET, FILM COATED ORAL
Qty: 60 TABLET | Refills: 1 | Status: SHIPPED | OUTPATIENT
Start: 2020-10-07 | End: 2020-11-20

## 2020-10-07 RX ORDER — CALCITRIOL 0.25 UG/1
0.25 CAPSULE, LIQUID FILLED ORAL DAILY
COMMUNITY
End: 2022-11-08

## 2020-10-07 NOTE — PROGRESS NOTES
10/07/2020    Patient Information  Radha Win                                                                                          [address]  [facility] [address]      7/26/1932  [unfilled]  There is no work phone number on file.    Chief Complaint:     Complaining of diffuse itching spells, situational anxiety and depression and situational insomnia.    History of Present Illness:     Patient with recent problems with situational anxiety and depression as well as situational insomnia presents with complaints of periodic episodes of diffuse itching as described below:    October 7, 2020--patient has had recurrent episodes of diffuse pruritus/urticaria that has occurred about 3 times over the past 5 to 6 months.  This coincides with his wife's serious illness and ultimate passing recently.  He describes diffuse itching that interferes with his sleep and can last all night long.  And involves his entire body.  He has no shortness of breath associated with this or any other symptoms.  As mentioned, he is been under quite a bit of stress and he is going through the grieving process due to the loss of his wife.  I think that the problem is related to the stress and anxiety and I do not think that he has a physical issue causing this.  I reviewed his medications and there is not been any significant changes other than we did add Ambien to help with his sleep and Xanax for anxiety.  However, his itching episode started before that.  Plan is to give him hydroxyzine 25 to 50 mg p.o. as needed itching.  I think that this will eventually subside as time goes on.  Once again, I do think it is stress related.    Also patient continues to have problems with insomnia and I have previously prescribed him 5 mg of Ambien which he reports does not help.    Past medical history reviewed and updated were necessary including health maintenance parameters.  This reveals he is up-to-date or else  accounted for.    Review of Systems   Constitution: Negative.   HENT: Negative.    Eyes: Negative.    Cardiovascular: Negative.    Respiratory: Negative.    Endocrine: Negative.    Hematologic/Lymphatic: Negative.    Skin: Positive for itching and rash.   Musculoskeletal: Negative.    Gastrointestinal: Negative.    Genitourinary: Negative.    Neurological: Negative.    Psychiatric/Behavioral: Positive for depression. The patient has insomnia and is nervous/anxious.    Allergic/Immunologic: Negative.        Active Problems:    Patient Active Problem List   Diagnosis   • Bilateral carotid artery plaque, 05/24/2018--16-49% bilateral carotid artery stenosis   • Benign prostatic hypertrophy   • Chronic renal insufficiency, stage IV (severe), 02/09/2016--creatinine 1.85   • Diverticulosis of colon   • Hyperlipidemia   • Osteopenia of multiple sites   • Vitamin B12 deficiency   • Vitamin D deficiency   • Allergic rhinitis   • Anemia due to stage 4 chronic kidney disease (CMS/HCC)   • Generalized anxiety disorder   • Benign essential hypertension   • Gastroesophageal reflux disease without esophagitis   • Folic acid deficiency   • Multiple environmental allergies   • Therapeutic drug monitoring   • Bilateral renal cysts   • Primary osteoarthritis of knees, bilateral   • Need for influenza vaccination   • Impaired fasting glucose   • Muscle cramps, statin related, Vytorin and pravastatin.  Tolerates Livalo.   • Hypogonadism in male, on TRT, testosterone pellets, per    • Chronic neck pain   • Cervical radiculopathy   • Statin intolerance, Vytorin and pravastatin   • Degeneration of cervical intervertebral disc   • Exertional angina (CMS/HCC)   • Occlusive coronary artery disease, clinical diagnosis only.  Patient not a candidate for heart catheterization/intervention.   • Situational depression   • Hyperuricemia   • Situational insomnia   • Urticaria         Past Medical History:   Diagnosis Date   • Allergic rhinitis  2/11/2016   • Anemia due to chronic kidney disease 2/11/2016   • Benign essential hypertension 2/11/2016   • Benign prostatic hypertrophy 2/11/2016 12/21/2016--prostate biopsy reportedly negative.  I will try to obtain the report.  Patient sees urologist.  PSAs run from the 3-8 range   • Bilateral carotid artery plaque, 09/21/2016--16-49% bilateral carotid artery stenosis 2/11/2016 09/21/2016--vascular screen reveals 16-49% bilateral carotid artery stenosis, negative for AAA, negative for PAD.  10/10/2008--vascular screening study revealed mild bilateral carotid plaque, no aortic aneurysm, no evidence of PAD   • Bilateral renal cysts 2/14/2016 04/07/2016--ultrasound of the kidneys reveals the previously described renal cysts are not well seen.  However, questionable incidental bladder tumor noted.  05/26/2010--ultrasound the kidneys was negative bilaterally except for small renal cysts.   • Cervical radiculopathy 6/28/2018    10/02/2018--patient seen in follow-up and the results of the MRI discussed.  He continues to have intermittent radicular symptoms which is currently only on the left.  Discussed options with patient and I have recommended neurosurgery consultation.  Patient may benefit from epidural injections.  09/06/2018--MRI of the cervical spine reveals moderate neural foraminal compromise on the right at C3-C   • Chronic renal insufficiency, stage IV (severe), 02/09/2016--creatinine 1.85 2/11/2016 02/09/2016--serum creatinine 1.85.  BUN 29.  07/20/2015--patient was evaluated by the nephrologist.  Ultrasound of the kidneys ordered but this was never done.  05/22/2015--BUN 35, creatinine 2.3.  Patient referred to nephrology, Dr. Devyn Muniz.  04/16/2014--BUN 25, creatinine 1.77.  01/03/2013--BUN 37, creatinine 2.14.    05/26/2010---ultrasound of the kidneys reveal a small cyst x 2 right kidney.  Otherwise normal.    Baseline creatinine approximately 1.9-2.0.   • Degeneration of cervical  intervertebral disc 5/2/2019   • Diverticulosis of colon 2/11/2016    10/03/2013--colonoscopy revealed markedly redundant colon, pancolonic diverticulosis with several impacted fecalith.  No evidence of diverticulitis or stricture.  Was only able to reach the ascending colon.  Follow up barium enema revealed extensive diverticulosis.  No polyp or other mucosal mass seen.    06/11/2002--incomplete colonoscopy due to redundant colon.  Not able to get past the hepatic flexure.  Patient had followup barium contrast enema which showed extensive diverticulosis.   • Exertional angina (CMS/HCC) 9/17/2019 October 8, 2019--patient seen in follow-up and he reports the long-acting oral nitrate has definitely helped his exertional anginal symptoms.  However, he has not stressed himself completely such as using a push mower.  His blood pressure seems improved as well.  Patient has an appoint with the cardiologist in the next 2 weeks.  I have contacted his cardiologist about the results of the empiric ni   • Folic acid deficiency 2/11/2016   • Gastroesophageal reflux disease without esophagitis 2/11/2016   • Generalized anxiety disorder 2/11/2016   • History of diverticulitis of colon 2009 and 2003 05/08/2009-acute diverticulitis without complication. 09/05/2003-acute diverticulitis without complication.    • Hyperlipidemia 2/11/2016 01/29/2005--treatment for hyperlipidemia begun.   • Hyperuricemia 8/19/2020   • Hypogonadism in male, on TRT, testosterone pellets, per  6/16/2017 January 2017--patient reports his urologist initiated testosterone replacement therapy consisting of testosterone pellets every 6 months.   • Multiple environmental allergies 2/12/2016    Patient sees the allergist regularly and has been on immunotherapy.   • Muscle cramps, statin related, Vytorin and pravastatin.  Tolerates Livalo. 6/16/2017 12/21/2018--patient seems to be tolerating Livalo well.  10/02/2018--Livalo 2 mg per day initiated.   Recommend CoQ10 400 mg per day with food for better absorption.  Reassess with lab in about 8 weeks.  08/30/2018--patient presents with complaints of muscle cramps.  He discontinue pravastatin and the cramps went away.  Cramps returned even with a half dose.  This is despite the fact he was taking    • Occlusive coronary artery disease, clinical diagnosis only.  Patient not a candidate for heart catheterization/intervention. 12/10/2019    October 8, 2019--patient seen in follow-up and he reports the long-acting oral nitrate has definitely helped his exertional anginal symptoms.  However, he has not stressed himself completely such as using a push mower.  His blood pressure seems improved as well.  Patient has an appoint with the cardiologist in the next 2 weeks.  I have contacted his cardiologist about the results of the empiric ni   • Osteopenia, 7/12/2019--lumbar spine -0.4.  Left femoral neck -1.7.  Right femoral neck -1.9. 2/11/2016 July 12, 2019--DEXA scan reveals lumbar spine T score -0.4.  Unchanged.  Left femoral neck T score -1.7.  Unchanged.  Right femoral neck T score -1.9.  Unchanged.  Impression is osteopenia of the hips with no significant interval change.  06/16/2017--patient seen in follow-up and reports he doesn't think he ever started Fosamax.  Osteopenia and needs to be reassessed with a DEXA scan.  03/14/2017-   • Primary osteoarthritis of knees, bilateral 9/12/2016 09/12/2016--patient called in the office requesting a steriod injection in his knees.  I explained to him that I do no longer perform these injections and have referred him to Dr. Manuel.   • Statin intolerance, Vytorin and pravastatin 12/21/2018 12/21/2018--patient seems to be tolerating Livalo well.  10/02/2018--Livalo 2 mg per day initiated.  Recommend CoQ10 400 mg per day with food for better absorption.  Reassess with lab in about 8 weeks.  08/30/2018--patient presents with complaints of muscle cramps.  He discontinue  pravastatin and the cramps went away.  Cramps returned even with a half dose.  This is despite the fact he was taking    • Vitamin B12 deficiency 2/11/2016 06/25/2009--B12 injections begun.   • Vitamin D deficiency 2/11/2016         Past Surgical History:   Procedure Laterality Date   • CATARACT EXTRACTION Left 12/12/2011 12/12/2011--cataract extirpation with intraocular lens implantation left eye.   • CATARACT EXTRACTION Right 12/2011 December 2011--right cataract extirpation with intraocular lens implantation.   • COLONOSCOPY  06/11/2002 06/11/2002--incomplete colonoscopy due to redundant colon. Not able to get past the hepatic flexure. Patient had followup barium contrast enema which showed extensive diverticulosis   • COLONOSCOPY  10/03/2013    10/03/2013--colonoscopy revealed markedly redundant colon, pancolonic diverticulosis with several impacted fecalith. No evidence of diverticulitis or stricture. Was only able to reach the ascending colon. Follow up barium enema revealed extensive diverticulosis. No polyp or other mucosal mass seen.   • CYSTOSCOPY  05/18/2016 05/18/2016--urology consultation.  Cystoscopy performed for possible bladder mass is negative.   • PROSTATE BIOPSY  12/21/2016 12/21/2016--prostate biopsy reportedly negative.  I will try to obtain the report.         No Known Allergies        Current Outpatient Medications:   •  allopurinol (ZYLOPRIM) 100 MG tablet, Take 1 p.o. daily for gout/elevated uric acid, Disp: 90 tablet, Rfl: 3  •  ALPRAZolam (XANAX) 0.5 MG tablet, TAKE ONE TABLET BY MOUTH TWICE A DAY, Disp: 60 tablet, Rfl: 1  •  amLODIPine (NORVASC) 5 MG tablet, Take 1 p.o. daily for high blood pressure, Disp: 90 tablet, Rfl: 3  •  aspirin (ASPIRIN LOW DOSE) 81 MG tablet, Take 1 tablet by mouth daily., Disp: , Rfl:   •  calcitriol (ROCALTROL) 0.25 MCG capsule, Take 0.25 mcg by mouth Daily., Disp: , Rfl:   •  Evolocumab 140 MG/ML solution auto-injector, Inject 1 mL under the  skin into the appropriate area as directed Every 14 (Fourteen) Days., Disp: 2 pen, Rfl: 11  •  fluticasone (FLONASE) 50 MCG/ACT nasal spray, Administer 2 sprays in each nostril once daily for environmental allergies and congestion., Disp: 16 g, Rfl: 6  •  folic acid (FOLVITE) 1 MG tablet, TAKE ONE TABLET BY MOUTH TWICE A DAY, Disp: 180 tablet, Rfl: 0  •  isosorbide mononitrate (IMDUR) 30 MG 24 hr tablet, TAKE ONE TABLET BY MOUTH EVERY MORNING FOR HEART, Disp: 30 tablet, Rfl: 5  •  metoprolol succinate XL (TOPROL-XL) 50 MG 24 hr tablet, Take 1 tablet by mouth Daily., Disp: 90 tablet, Rfl: 3  •  nitroglycerin (NITROSTAT) 0.6 MG SL tablet, Place 1 tablet under the tongue Every 5 (Five) Minutes As Needed for Chest Pain. Maximum of 3.Go to ER after third dose., Disp: 30 tablet, Rfl: 12  •  omeprazole (priLOSEC) 40 MG capsule, TAKE ONE CAPSULE BY MOUTH DAILY, Disp: 30 capsule, Rfl: 3  •  hydrOXYzine (ATARAX) 25 MG tablet, Take 1-2 p.o. every 4 to 6 hours as needed for pruritus., Disp: 60 tablet, Rfl: 1  •  zolpidem (AMBIEN) 5 MG tablet, Take 1 p.o. nightly as needed insomnia, Disp: 30 tablet, Rfl: 1    Current Facility-Administered Medications:   •  cyanocobalamin injection 1,000 mcg, 1,000 mcg, Intramuscular, Q28 Days, Delgado Patricia MD, 1,000 mcg at 09/14/20 1322      Family History   Problem Relation Age of Onset   • Diabetes Mother    • Heart disease Mother    • Stroke Father          Social History     Socioeconomic History   • Marital status:      Spouse name: Not on file   • Number of children: 2   • Years of education: 14   • Highest education level: Some college, no degree   Occupational History   • Occupation: Retired      Employer: [employer]   Social Needs   • Financial resource strain: Not hard at all   • Food insecurity     Worry: Never true     Inability: Never true   • Transportation needs     Medical: No     Non-medical: No   Tobacco Use   • Smoking status: Never Smoker   • Smokeless  "tobacco: Never Used   • Tobacco comment: caffeine use: 2 cups coffee daily   Substance and Sexual Activity   • Alcohol use: Yes     Alcohol/week: 3.0 standard drinks     Types: 3 Shots of liquor per week     Frequency: Monthly or less     Drinks per session: 1 or 2     Binge frequency: Never     Comment: Moderate alcohol consumption   • Drug use: No   • Sexual activity: Yes     Partners: Female   Lifestyle   • Physical activity     Days per week: 3 days     Minutes per session: 30 min   • Stress: Not at all   Relationships   • Social connections     Talks on phone: More than three times a week     Gets together: Once a week     Attends Yarsanism service: More than 4 times per year     Active member of club or organization: Yes     Attends meetings of clubs or organizations: More than 4 times per year     Relationship status:          Vitals:    10/07/20 1027   BP: 136/62   BP Location: Left arm   Patient Position: Sitting   Cuff Size: Adult   Pulse: 84   SpO2: 99%   Weight: 78.5 kg (173 lb)   Height: 175.3 cm (69\")        Body mass index is 25.55 kg/m².      Physical Exam:    General: Alert and oriented x 3.  No acute distress.  Normal affect.  HEENT: Pupils equal, round, reactive to light; extraocular movements intact; sclerae nonicteric; pharynx, ear canals and TMs normal.  Neck: Without JVD, thyromegaly, bruit, or adenopathy.  Lungs: Clear to auscultation in all fields.  Heart: Regular rate and rhythm without murmur, rub, gallop, or click.  Abdomen: Soft, nontender, without hepatosplenomegaly or hernia.  Bowel sounds normal.  : Deferred.  Rectal: Deferred.  Extremities: Without clubbing, cyanosis, edema, or pulse deficit.  Neurologic: Intact without focal deficit.  Normal station and gait observed during ingress and egress from the examination room.  Skin: Without significant lesion.  I do not see any rash currently.  Musculoskeletal: Unremarkable.    Lab/other results:      Assessment/Plan:     " Diagnosis Plan   1. Urticaria  hydrOXYzine (ATARAX) 25 MG tablet   2. Situational depression     3. Situational insomnia         October 7, 2020--patient has had recurrent episodes of diffuse pruritus/urticaria that has occurred about 3 times over the past 5 to 6 months.  This coincides with his wife's serious illness and ultimate passing recently.  He describes diffuse itching that interferes with his sleep and can last all night long.  And involves his entire body.  He has no shortness of breath associated with this or any other symptoms.  As mentioned, he is been under quite a bit of stress and he is going through the grieving process due to the loss of his wife.  I think that the problem is related to the stress and anxiety and I do not think that he has a physical issue causing this.  I reviewed his medications and there is not been any significant changes other than we did add Ambien to help with his sleep and Xanax for anxiety.  However, his itching episode started before that.      Plan: is to give him hydroxyzine 25 to 50 mg p.o. as needed itching.  I think that this will eventually subside as time goes on.  Once again, I do think it is stress related.  Also I have instructed patient that he can double up on his 5 mg of Ambien to see if that helps with the sleep as well.  He has not taken any of the alprazolam for anxiety as of yet and I explained to him that I can help with sleep as well but I want him to try the increased Ambien first.  Also the hydroxyzine can help with sleep and he could try that in addition to the Ambien if necessary.  I will have patient keep his follow-up appointment with lab in January of this next year unless he needs to see me sooner.          Procedures

## 2020-10-22 ENCOUNTER — TRANSCRIBE ORDERS (OUTPATIENT)
Dept: ADMINISTRATIVE | Facility: HOSPITAL | Age: 85
End: 2020-10-22

## 2020-10-22 DIAGNOSIS — R97.20 ELEVATED PSA: Primary | ICD-10-CM

## 2020-10-26 RX ORDER — METOPROLOL SUCCINATE 25 MG/1
TABLET, EXTENDED RELEASE ORAL
Qty: 90 TABLET | Refills: 2 | OUTPATIENT
Start: 2020-10-26

## 2020-10-30 ENCOUNTER — LAB (OUTPATIENT)
Dept: LAB | Facility: HOSPITAL | Age: 85
End: 2020-10-30

## 2020-10-30 ENCOUNTER — OFFICE VISIT (OUTPATIENT)
Dept: INTERNAL MEDICINE | Facility: CLINIC | Age: 85
End: 2020-10-30

## 2020-10-30 ENCOUNTER — TRANSCRIBE ORDERS (OUTPATIENT)
Dept: ADMINISTRATIVE | Facility: HOSPITAL | Age: 85
End: 2020-10-30

## 2020-10-30 VITALS
DIASTOLIC BLOOD PRESSURE: 60 MMHG | HEIGHT: 69 IN | BODY MASS INDEX: 24.44 KG/M2 | OXYGEN SATURATION: 99 % | HEART RATE: 103 BPM | SYSTOLIC BLOOD PRESSURE: 126 MMHG | WEIGHT: 165 LBS

## 2020-10-30 DIAGNOSIS — R53.83 FATIGUE DUE TO DEPRESSION: ICD-10-CM

## 2020-10-30 DIAGNOSIS — I10 BENIGN ESSENTIAL HYPERTENSION: Chronic | ICD-10-CM

## 2020-10-30 DIAGNOSIS — R43.2 LOSS OF TASTE: ICD-10-CM

## 2020-10-30 DIAGNOSIS — F51.09 SITUATIONAL INSOMNIA: ICD-10-CM

## 2020-10-30 DIAGNOSIS — R73.01 IMPAIRED FASTING GLUCOSE: Chronic | ICD-10-CM

## 2020-10-30 DIAGNOSIS — N18.4 ANEMIA DUE TO STAGE 4 CHRONIC KIDNEY DISEASE (HCC): Chronic | ICD-10-CM

## 2020-10-30 DIAGNOSIS — E53.8 VITAMIN B12 DEFICIENCY: Chronic | ICD-10-CM

## 2020-10-30 DIAGNOSIS — F41.1 GENERALIZED ANXIETY DISORDER: Chronic | ICD-10-CM

## 2020-10-30 DIAGNOSIS — N18.4 CHRONIC RENAL INSUFFICIENCY, STAGE IV (SEVERE) (HCC): Chronic | ICD-10-CM

## 2020-10-30 DIAGNOSIS — Z51.81 THERAPEUTIC DRUG MONITORING: ICD-10-CM

## 2020-10-30 DIAGNOSIS — F32.A FATIGUE DUE TO DEPRESSION: ICD-10-CM

## 2020-10-30 DIAGNOSIS — F43.21 SITUATIONAL DEPRESSION: Primary | ICD-10-CM

## 2020-10-30 DIAGNOSIS — D63.1 ANEMIA DUE TO STAGE 4 CHRONIC KIDNEY DISEASE (HCC): Chronic | ICD-10-CM

## 2020-10-30 LAB
ALBUMIN SERPL-MCNC: 3.5 G/DL (ref 3.5–5.2)
ALBUMIN/GLOB SERPL: 1.3 G/DL
ALP SERPL-CCNC: 1633 U/L (ref 39–117)
ALT SERPL W P-5'-P-CCNC: 493 U/L (ref 1–41)
ANION GAP SERPL CALCULATED.3IONS-SCNC: 11.8 MMOL/L (ref 5–15)
AST SERPL-CCNC: 523 U/L (ref 1–40)
BILIRUB SERPL-MCNC: 15.2 MG/DL (ref 0–1.2)
BUN SERPL-MCNC: 31 MG/DL (ref 8–23)
BUN/CREAT SERPL: 17.6 (ref 7–25)
CALCIUM SPEC-SCNC: 9.3 MG/DL (ref 8.6–10.5)
CHLORIDE SERPL-SCNC: 97 MMOL/L (ref 98–107)
CO2 SERPL-SCNC: 22.2 MMOL/L (ref 22–29)
CREAT SERPL-MCNC: 1.76 MG/DL (ref 0.76–1.27)
DEPRECATED RDW RBC AUTO: 40.8 FL (ref 37–54)
ERYTHROCYTE [DISTWIDTH] IN BLOOD BY AUTOMATED COUNT: 12.4 % (ref 12.3–15.4)
GFR SERPL CREATININE-BSD FRML MDRD: 37 ML/MIN/1.73
GLOBULIN UR ELPH-MCNC: 2.6 GM/DL
GLUCOSE SERPL-MCNC: 123 MG/DL (ref 65–99)
HBA1C MFR BLD: 5.9 % (ref 4.8–5.6)
HCT VFR BLD AUTO: 29.7 % (ref 37.5–51)
HGB BLD-MCNC: 9.7 G/DL (ref 13–17.7)
MCH RBC QN AUTO: 29.8 PG (ref 26.6–33)
MCHC RBC AUTO-ENTMCNC: 32.7 G/DL (ref 31.5–35.7)
MCV RBC AUTO: 91.4 FL (ref 79–97)
PLATELET # BLD AUTO: 342 10*3/MM3 (ref 140–450)
PMV BLD AUTO: 10.8 FL (ref 6–12)
POTASSIUM SERPL-SCNC: 4.1 MMOL/L (ref 3.5–5.2)
PROT SERPL-MCNC: 6.1 G/DL (ref 6–8.5)
RBC # BLD AUTO: 3.25 10*6/MM3 (ref 4.14–5.8)
SODIUM SERPL-SCNC: 131 MMOL/L (ref 136–145)
T3FREE SERPL-MCNC: 1.44 PG/ML (ref 2–4.4)
T4 FREE SERPL-MCNC: 1.09 NG/DL (ref 0.93–1.7)
TSH SERPL DL<=0.05 MIU/L-ACNC: 1.34 UIU/ML (ref 0.27–4.2)
WBC # BLD AUTO: 9.26 10*3/MM3 (ref 3.4–10.8)

## 2020-10-30 PROCEDURE — 96372 THER/PROPH/DIAG INJ SC/IM: CPT | Performed by: INTERNAL MEDICINE

## 2020-10-30 PROCEDURE — 84481 FREE ASSAY (FT-3): CPT | Performed by: INTERNAL MEDICINE

## 2020-10-30 PROCEDURE — 99214 OFFICE O/P EST MOD 30 MIN: CPT | Performed by: INTERNAL MEDICINE

## 2020-10-30 PROCEDURE — 80053 COMPREHEN METABOLIC PANEL: CPT | Performed by: INTERNAL MEDICINE

## 2020-10-30 PROCEDURE — 83036 HEMOGLOBIN GLYCOSYLATED A1C: CPT | Performed by: INTERNAL MEDICINE

## 2020-10-30 PROCEDURE — 85027 COMPLETE CBC AUTOMATED: CPT | Performed by: INTERNAL MEDICINE

## 2020-10-30 PROCEDURE — 84443 ASSAY THYROID STIM HORMONE: CPT | Performed by: INTERNAL MEDICINE

## 2020-10-30 PROCEDURE — 36415 COLL VENOUS BLD VENIPUNCTURE: CPT | Performed by: INTERNAL MEDICINE

## 2020-10-30 PROCEDURE — 84439 ASSAY OF FREE THYROXINE: CPT | Performed by: INTERNAL MEDICINE

## 2020-10-30 RX ORDER — CYANOCOBALAMIN 1000 UG/ML
2000 INJECTION, SOLUTION INTRAMUSCULAR; SUBCUTANEOUS ONCE
Status: COMPLETED | OUTPATIENT
Start: 2020-10-30 | End: 2020-10-30

## 2020-10-30 RX ORDER — VORTIOXETINE 10 MG/1
TABLET, FILM COATED ORAL
Qty: 30 TABLET | Refills: 3
Start: 2020-10-30 | End: 2020-11-20

## 2020-10-30 RX ADMIN — CYANOCOBALAMIN 2000 MCG: 1000 INJECTION, SOLUTION INTRAMUSCULAR; SUBCUTANEOUS at 14:06

## 2020-10-30 NOTE — PROGRESS NOTES
10/30/2020    Patient Information  Radha Win                                                                                          [address]  [facility] [address]      7/26/1932  [unfilled]  There is no work phone number on file.    Chief Complaint:     Complaining of worsening depression, anxiety, insomnia.    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  He has recently had some problems with situational insomnia and depression as described below.  Past medical history reviewed and updated were necessary including health maintenance parameters.  This reveals he is up-to-date or else accounted for.    The history regarding situational depression, anxiety, and insomnia:    October 30, 2020--patient presents with classic symptoms of depression related to the death of his wife of many years.  Patient is tearful, has poor appetite, cannot sleep well, feels extremely sad and somewhat lost.  He has no homicidal or suicidal ideation.  Previously I had suggested the patient try an antidepressant but he did not think that he needed it at that time.  Patient is on a small dose of Ambien for his insomnia.  I think he would benefit from SSRI therapy and I would like him to start on Trintellix 5 mg/day at nighttime.  After 1 week he should increase this to 10 mg/day.  I explained to patient that it generally takes at least 2 to 3 weeks for this medication to start working.  I will have him follow-up in about 3 weeks to reassess the situation.    August 19, 2020--patient has had problems with situational depression as well as situational anxiety and situational insomnia due to his wife's  chronic illness.  Wife is currently in [facility] care.  He has some Xanax but I would rather he not take that for sleeping.  That is primarily for anxiety and patient does not take it very often.  Instead, we will prescribe Ambien 5 mg p.o. nightly as needed.    Review of Systems   Constitution: Negative.   HENT: Negative.    Eyes: Negative.    Cardiovascular: Negative.    Respiratory: Negative.    Endocrine: Negative.    Hematologic/Lymphatic: Negative.    Skin: Negative.    Musculoskeletal: Negative.    Gastrointestinal: Negative.    Genitourinary: Negative.    Neurological: Negative.    Psychiatric/Behavioral: Positive for depression and hallucinations. Negative for hypervigilance, memory loss, substance abuse, suicidal ideas and thoughts of violence. The patient has insomnia and is nervous/anxious.    Allergic/Immunologic: Negative.        Active Problems:    Patient Active Problem List   Diagnosis   • Bilateral carotid artery plaque, 05/24/2018--16-49% bilateral carotid artery stenosis   • Benign prostatic hypertrophy   • Chronic renal insufficiency, stage IV (severe), 02/09/2016--creatinine 1.85   • Diverticulosis of colon   • Hyperlipidemia   • Osteopenia of multiple sites   • Vitamin B12 deficiency   • Vitamin D deficiency   • Allergic rhinitis   • Anemia due to stage 4 chronic kidney disease (CMS/HCC)   • Generalized anxiety disorder   • Benign essential hypertension   • Gastroesophageal reflux disease without esophagitis   • Folic acid deficiency   • Multiple environmental allergies   • Therapeutic drug monitoring   • Bilateral renal cysts   • Primary osteoarthritis of knees, bilateral   • Need for influenza vaccination   • Impaired fasting glucose   • Muscle cramps, statin related, Vytorin and pravastatin.  Tolerates Livalo.   • Hypogonadism in male, on TRT, testosterone pellets, per    • Chronic neck pain   • Cervical radiculopathy   • Statin intolerance, Vytorin and pravastatin   • Degeneration of  cervical intervertebral disc   • Exertional angina (CMS/HCC)   • Occlusive coronary artery disease, clinical diagnosis only.  Patient not a candidate for heart catheterization/intervention.   • Situational depression   • Hyperuricemia   • Situational insomnia   • Urticaria         Past Medical History:   Diagnosis Date   • Allergic rhinitis 2/11/2016   • Anemia due to chronic kidney disease 2/11/2016   • Benign essential hypertension 2/11/2016   • Benign prostatic hypertrophy 2/11/2016 12/21/2016--prostate biopsy reportedly negative.  I will try to obtain the report.  Patient sees urologist.  PSAs run from the 3-8 range   • Bilateral carotid artery plaque, 09/21/2016--16-49% bilateral carotid artery stenosis 2/11/2016 09/21/2016--vascular screen reveals 16-49% bilateral carotid artery stenosis, negative for AAA, negative for PAD.  10/10/2008--vascular screening study revealed mild bilateral carotid plaque, no aortic aneurysm, no evidence of PAD   • Bilateral renal cysts 2/14/2016 04/07/2016--ultrasound of the kidneys reveals the previously described renal cysts are not well seen.  However, questionable incidental bladder tumor noted.  05/26/2010--ultrasound the kidneys was negative bilaterally except for small renal cysts.   • Cervical radiculopathy 6/28/2018    10/02/2018--patient seen in follow-up and the results of the MRI discussed.  He continues to have intermittent radicular symptoms which is currently only on the left.  Discussed options with patient and I have recommended neurosurgery consultation.  Patient may benefit from epidural injections.  09/06/2018--MRI of the cervical spine reveals moderate neural foraminal compromise on the right at C3-C   • Chronic renal insufficiency, stage IV (severe), 02/09/2016--creatinine 1.85 2/11/2016 02/09/2016--serum creatinine 1.85.  BUN 29.  07/20/2015--patient was evaluated by the nephrologist.  Ultrasound of the kidneys ordered but this was never done.   05/22/2015--BUN 35, creatinine 2.3.  Patient referred to nephrology, Dr. Devyn Muniz.  04/16/2014--BUN 25, creatinine 1.77.  01/03/2013--BUN 37, creatinine 2.14.    05/26/2010---ultrasound of the kidneys reveal a small cyst x 2 right kidney.  Otherwise normal.    Baseline creatinine approximately 1.9-2.0.   • Degeneration of cervical intervertebral disc 5/2/2019   • Diverticulosis of colon 2/11/2016    10/03/2013--colonoscopy revealed markedly redundant colon, pancolonic diverticulosis with several impacted fecalith.  No evidence of diverticulitis or stricture.  Was only able to reach the ascending colon.  Follow up barium enema revealed extensive diverticulosis.  No polyp or other mucosal mass seen.    06/11/2002--incomplete colonoscopy due to redundant colon.  Not able to get past the hepatic flexure.  Patient had followup barium contrast enema which showed extensive diverticulosis.   • Exertional angina (CMS/HCC) 9/17/2019 October 8, 2019--patient seen in follow-up and he reports the long-acting oral nitrate has definitely helped his exertional anginal symptoms.  However, he has not stressed himself completely such as using a push mower.  His blood pressure seems improved as well.  Patient has an appoint with the cardiologist in the next 2 weeks.  I have contacted his cardiologist about the results of the empiric ni   • Folic acid deficiency 2/11/2016   • Gastroesophageal reflux disease without esophagitis 2/11/2016   • Generalized anxiety disorder 2/11/2016   • History of diverticulitis of colon 2009 and 2003 05/08/2009-acute diverticulitis without complication. 09/05/2003-acute diverticulitis without complication.    • Hyperlipidemia 2/11/2016 01/29/2005--treatment for hyperlipidemia begun.   • Hyperuricemia 8/19/2020   • Hypogonadism in male, on TRT, testosterone pellets, per  6/16/2017 January 2017--patient reports his urologist initiated testosterone replacement therapy consisting of testosterone  pellets every 6 months.   • Multiple environmental allergies 2/12/2016    Patient sees the allergist regularly and has been on immunotherapy.   • Muscle cramps, statin related, Vytorin and pravastatin.  Tolerates Livalo. 6/16/2017 12/21/2018--patient seems to be tolerating Livalo well.  10/02/2018--Livalo 2 mg per day initiated.  Recommend CoQ10 400 mg per day with food for better absorption.  Reassess with lab in about 8 weeks.  08/30/2018--patient presents with complaints of muscle cramps.  He discontinue pravastatin and the cramps went away.  Cramps returned even with a half dose.  This is despite the fact he was taking    • Occlusive coronary artery disease, clinical diagnosis only.  Patient not a candidate for heart catheterization/intervention. 12/10/2019    October 8, 2019--patient seen in follow-up and he reports the long-acting oral nitrate has definitely helped his exertional anginal symptoms.  However, he has not stressed himself completely such as using a push mower.  His blood pressure seems improved as well.  Patient has an appoint with the cardiologist in the next 2 weeks.  I have contacted his cardiologist about the results of the empiric ni   • Osteopenia, 7/12/2019--lumbar spine -0.4.  Left femoral neck -1.7.  Right femoral neck -1.9. 2/11/2016 July 12, 2019--DEXA scan reveals lumbar spine T score -0.4.  Unchanged.  Left femoral neck T score -1.7.  Unchanged.  Right femoral neck T score -1.9.  Unchanged.  Impression is osteopenia of the hips with no significant interval change.  06/16/2017--patient seen in follow-up and reports he doesn't think he ever started Fosamax.  Osteopenia and needs to be reassessed with a DEXA scan.  03/14/2017-   • Primary osteoarthritis of knees, bilateral 9/12/2016 09/12/2016--patient called in the office requesting a steriod injection in his knees.  I explained to him that I do no longer perform these injections and have referred him to Dr. Manuel.   • Statin  intolerance, Vytorin and pravastatin 12/21/2018 12/21/2018--patient seems to be tolerating Livalo well.  10/02/2018--Livalo 2 mg per day initiated.  Recommend CoQ10 400 mg per day with food for better absorption.  Reassess with lab in about 8 weeks.  08/30/2018--patient presents with complaints of muscle cramps.  He discontinue pravastatin and the cramps went away.  Cramps returned even with a half dose.  This is despite the fact he was taking    • Vitamin B12 deficiency 2/11/2016 06/25/2009--B12 injections begun.   • Vitamin D deficiency 2/11/2016         Past Surgical History:   Procedure Laterality Date   • CATARACT EXTRACTION Left 12/12/2011 12/12/2011--cataract extirpation with intraocular lens implantation left eye.   • CATARACT EXTRACTION Right 12/2011 December 2011--right cataract extirpation with intraocular lens implantation.   • COLONOSCOPY  06/11/2002 06/11/2002--incomplete colonoscopy due to redundant colon. Not able to get past the hepatic flexure. Patient had followup barium contrast enema which showed extensive diverticulosis   • COLONOSCOPY  10/03/2013    10/03/2013--colonoscopy revealed markedly redundant colon, pancolonic diverticulosis with several impacted fecalith. No evidence of diverticulitis or stricture. Was only able to reach the ascending colon. Follow up barium enema revealed extensive diverticulosis. No polyp or other mucosal mass seen.   • CYSTOSCOPY  05/18/2016 05/18/2016--urology consultation.  Cystoscopy performed for possible bladder mass is negative.   • PROSTATE BIOPSY  12/21/2016 12/21/2016--prostate biopsy reportedly negative.  I will try to obtain the report.         No Known Allergies        Current Outpatient Medications:   •  allopurinol (ZYLOPRIM) 100 MG tablet, Take 1 p.o. daily for gout/elevated uric acid, Disp: 90 tablet, Rfl: 3  •  ALPRAZolam (XANAX) 0.5 MG tablet, TAKE ONE TABLET BY MOUTH TWICE A DAY, Disp: 60 tablet, Rfl: 1  •  amLODIPine  (NORVASC) 5 MG tablet, Take 1 p.o. daily for high blood pressure, Disp: 90 tablet, Rfl: 3  •  aspirin (ASPIRIN LOW DOSE) 81 MG tablet, Take 1 tablet by mouth daily., Disp: , Rfl:   •  calcitriol (ROCALTROL) 0.25 MCG capsule, Take 0.25 mcg by mouth Daily., Disp: , Rfl:   •  Evolocumab 140 MG/ML solution auto-injector, Inject 1 mL under the skin into the appropriate area as directed Every 14 (Fourteen) Days., Disp: 2 pen, Rfl: 11  •  fluticasone (FLONASE) 50 MCG/ACT nasal spray, Administer 2 sprays in each nostril once daily for environmental allergies and congestion., Disp: 16 g, Rfl: 6  •  folic acid (FOLVITE) 1 MG tablet, TAKE ONE TABLET BY MOUTH TWICE A DAY, Disp: 180 tablet, Rfl: 0  •  hydrOXYzine (ATARAX) 25 MG tablet, Take 1-2 p.o. every 4 to 6 hours as needed for pruritus., Disp: 60 tablet, Rfl: 1  •  isosorbide mononitrate (IMDUR) 30 MG 24 hr tablet, TAKE ONE TABLET BY MOUTH EVERY MORNING FOR HEART, Disp: 30 tablet, Rfl: 5  •  metoprolol succinate XL (TOPROL-XL) 50 MG 24 hr tablet, Take 1 tablet by mouth Daily., Disp: 90 tablet, Rfl: 3  •  nitroglycerin (NITROSTAT) 0.6 MG SL tablet, Place 1 tablet under the tongue Every 5 (Five) Minutes As Needed for Chest Pain. Maximum of 3.Go to ER after third dose., Disp: 30 tablet, Rfl: 12  •  omeprazole (priLOSEC) 40 MG capsule, TAKE ONE CAPSULE BY MOUTH DAILY, Disp: 30 capsule, Rfl: 3  •  permethrin (ELIMITE) 5 % cream, Apply topically to affected skin daily x 7 days, until cured, Disp: 60 g, Rfl: 1  •  Vortioxetine HBr (Trintellix) 10 MG tablet, Take 1 p.o. daily at nighttime for depression and anxiety, Disp: 30 tablet, Rfl: 3  •  B Complex-Folic Acid (Super B Complex Maxi) tablet, Take 1 p.o. twice daily, Disp:  , Rfl:   •  zolpidem (AMBIEN) 5 MG tablet, Take 1 p.o. nightly as needed insomnia, Disp: 30 tablet, Rfl: 1    Current Facility-Administered Medications:   •  cyanocobalamin injection 1,000 mcg, 1,000 mcg, Intramuscular, Q28 Days, Delgado Patricia MD, 1,000  "mcg at 09/14/20 1322  •  cyanocobalamin injection 2,000 mcg, 2,000 mcg, Subcutaneous, Once, Delgado Patricia MD      Family History   Problem Relation Age of Onset   • Diabetes Mother    • Heart disease Mother    • Stroke Father          Social History     Socioeconomic History   • Marital status:      Spouse name: Not on file   • Number of children: 2   • Years of education: 14   • Highest education level: Some college, no degree   Occupational History   • Occupation: Retired      Employer: [employer]   Social Needs   • Financial resource strain: Not hard at all   • Food insecurity     Worry: Never true     Inability: Never true   • Transportation needs     Medical: No     Non-medical: No   Tobacco Use   • Smoking status: Never Smoker   • Smokeless tobacco: Never Used   • Tobacco comment: caffeine use: 2 cups coffee daily   Substance and Sexual Activity   • Alcohol use: Yes     Alcohol/week: 3.0 standard drinks     Types: 3 Shots of liquor per week     Frequency: Monthly or less     Drinks per session: 1 or 2     Binge frequency: Never     Comment: Moderate alcohol consumption   • Drug use: No   • Sexual activity: Yes     Partners: Female   Lifestyle   • Physical activity     Days per week: 3 days     Minutes per session: 30 min   • Stress: Not at all   Relationships   • Social connections     Talks on phone: More than three times a week     Gets together: Once a week     Attends Yarsanism service: More than 4 times per year     Active member of club or organization: Yes     Attends meetings of clubs or organizations: More than 4 times per year     Relationship status:          Vitals:    10/30/20 1316   BP: 126/60   BP Location: Left arm   Pulse: 103   SpO2: 99%   Weight: 74.8 kg (165 lb)   Height: 175.3 cm (69.02\")        Body mass index is 24.35 kg/m².      Physical Exam:    General: Alert and oriented x 3.  No acute distress.  Normal affect.  HEENT: Pupils equal, round, reactive to light; " extraocular movements intact; sclerae nonicteric; pharynx, ear canals and TMs normal.  Neck: Without JVD, thyromegaly, bruit, or adenopathy.  Lungs: Clear to auscultation in all fields.  Heart: Regular rate and rhythm without murmur, rub, gallop, or click.  Abdomen: Soft, nontender, without hepatosplenomegaly or hernia.  Bowel sounds normal.  : Deferred.  Rectal: Deferred.  Extremities: Without clubbing, cyanosis, edema, or pulse deficit.  Neurologic: Intact without focal deficit.  Normal station and gait observed during ingress and egress from the examination room.  Skin: Without significant lesion.  Musculoskeletal: Unremarkable.    Lab/other results:      Assessment/Plan:     Diagnosis Plan   1. Situational depression  Vortioxetine HBr (Trintellix) 10 MG tablet   2. Situational insomnia     3. Generalized anxiety disorder  Vortioxetine HBr (Trintellix) 10 MG tablet   4. Impaired fasting glucose     5. Vitamin B12 deficiency  cyanocobalamin injection 2,000 mcg   6. Chronic renal insufficiency, stage IV (severe), 02/09/2016--creatinine 1.85  CBC (No Diff)    Comprehensive Metabolic Panel   7. Benign essential hypertension     8. Therapeutic drug monitoring  TSH    T4, Free    T3, Free   9. Anemia due to stage 4 chronic kidney disease (CMS/HCC)     10. Fatigue due to depression  CBC (No Diff)    TSH    T4, Free    T3, Free    B Complex-Folic Acid (Super B Complex Maxi) tablet    COVID-19,[facility],NP/OP Swab in Transport Media or ESwab 72 HR TAT - Swab, Nasopharynx   11. Loss of taste  COVID-19,[facility],NP/OP Swab in Transport Media or ESwab 72 HR TAT - Swab, Nasopharynx     Patient clearly presents with situational depression and situational insomnia.  Prior to the death of his wife he did suffer from generalized anxiety as well.  He was taking Xanax for this.  After his wife's death he is clearly slipped into significant depression and I feel that he needs to be treated with SSRI therapy.  Patient is agreeable.   Risk, benefits, potential side effects discussed.    Plan is as follows: Start Trintellix 5 mg/day.  After 1 week increase the Trintellix to 10 mg/day.  Patient will follow-up in 3 weeks to reassess.  I explained to patient that it will take at least 2 and perhaps as much as 4 to 6 weeks for this medication to start working.  He voiced understanding.    Addendum: Patient was questioning whether or not an IV would be helpful in his situation.  He is having marked fatigue and I do think that the fatigue is related to his poor sleep and the depression.  However, I think it would be reasonable to check some basic lab work including a CBC, CMP, hemoglobin A1c, and thyroid function test.  Patient will follow-up on the phone for the results on Monday and possible further instructions.  Also patient has vitamin B-12 deficient and it is well-known that the B vitamins play a big part in mood and fatigue.  Therefore, I will give him a double dose of 2000 mcg subcutaneously.  Also I have recommended that patient take super B complex over-the-counter 1 twice a day.  Also given the patient's fatigue associated with loss of taste of food we will go ahead and do a Covid test even though he does not have the classic symptoms consisting of cough, shortness of breath, fever.    Greater than 25 minutes was spent evaluating this patient today accompanied by his daughter and greater than 50% of this time was spent counseling patient.    Procedures

## 2020-11-02 ENCOUNTER — HOSPITAL ENCOUNTER (OUTPATIENT)
Dept: ULTRASOUND IMAGING | Facility: HOSPITAL | Age: 85
Discharge: HOME | End: 2020-11-02

## 2020-11-02 ENCOUNTER — LAB (OUTPATIENT)
Dept: LAB | Facility: HOSPITAL | Age: 85
End: 2020-11-02

## 2020-11-02 DIAGNOSIS — R17 JAUNDICE: ICD-10-CM

## 2020-11-02 DIAGNOSIS — E80.6 HYPERBILIRUBINEMIA: ICD-10-CM

## 2020-11-02 DIAGNOSIS — R17 JAUNDICE: Primary | ICD-10-CM

## 2020-11-02 DIAGNOSIS — D63.1 ANEMIA DUE TO STAGE 4 CHRONIC KIDNEY DISEASE (HCC): Chronic | ICD-10-CM

## 2020-11-02 DIAGNOSIS — R74.8 ELEVATED LIVER ENZYMES: ICD-10-CM

## 2020-11-02 DIAGNOSIS — E80.6 HYPERBILIRUBINEMIA: Primary | ICD-10-CM

## 2020-11-02 DIAGNOSIS — N18.6 END STAGE RENAL DISEASE (HCC): ICD-10-CM

## 2020-11-02 DIAGNOSIS — Z11.59 ENCOUNTER FOR SCREENING FOR OTHER VIRAL DISEASES: ICD-10-CM

## 2020-11-02 DIAGNOSIS — N18.4 ANEMIA DUE TO STAGE 4 CHRONIC KIDNEY DISEASE (HCC): Chronic | ICD-10-CM

## 2020-11-02 DIAGNOSIS — N18.4 CHRONIC RENAL INSUFFICIENCY, STAGE IV (SEVERE) (HCC): Primary | Chronic | ICD-10-CM

## 2020-11-02 LAB
ALBUMIN SERPL-MCNC: 3.3 G/DL (ref 3.5–5.2)
ALBUMIN/GLOB SERPL: 1.4 G/DL
ALP SERPL-CCNC: 1923 U/L (ref 39–117)
ALT SERPL W P-5'-P-CCNC: 439 U/L (ref 1–41)
ANION GAP SERPL CALCULATED.3IONS-SCNC: 12.5 MMOL/L (ref 5–15)
AST SERPL-CCNC: 424 U/L (ref 1–40)
BILIRUB SERPL-MCNC: 17.5 MG/DL (ref 0–1.2)
BUN SERPL-MCNC: 25 MG/DL (ref 8–23)
BUN/CREAT SERPL: 14.2 (ref 7–25)
CALCIUM SPEC-SCNC: 9.1 MG/DL (ref 8.6–10.5)
CHLORIDE SERPL-SCNC: 95 MMOL/L (ref 98–107)
CO2 SERPL-SCNC: 22.5 MMOL/L (ref 22–29)
CREAT SERPL-MCNC: 1.76 MG/DL (ref 0.76–1.27)
DEPRECATED RDW RBC AUTO: 40.9 FL (ref 37–54)
ERYTHROCYTE [DISTWIDTH] IN BLOOD BY AUTOMATED COUNT: 12.8 % (ref 12.3–15.4)
GFR SERPL CREATININE-BSD FRML MDRD: 37 ML/MIN/1.73
GLOBULIN UR ELPH-MCNC: 2.3 GM/DL
GLUCOSE SERPL-MCNC: 155 MG/DL (ref 65–99)
HCT VFR BLD AUTO: 27.5 % (ref 37.5–51)
HGB BLD-MCNC: 9.2 G/DL (ref 13–17.7)
MCH RBC QN AUTO: 29.7 PG (ref 26.6–33)
MCHC RBC AUTO-ENTMCNC: 33.5 G/DL (ref 31.5–35.7)
MCV RBC AUTO: 88.7 FL (ref 79–97)
PLATELET # BLD AUTO: 376 10*3/MM3 (ref 140–450)
PMV BLD AUTO: 10 FL (ref 6–12)
POTASSIUM SERPL-SCNC: 4 MMOL/L (ref 3.5–5.2)
PROT SERPL-MCNC: 5.6 G/DL (ref 6–8.5)
RBC # BLD AUTO: 3.1 10*6/MM3 (ref 4.14–5.8)
SODIUM SERPL-SCNC: 130 MMOL/L (ref 136–145)
WBC # BLD AUTO: 8.48 10*3/MM3 (ref 3.4–10.8)

## 2020-11-02 PROCEDURE — 80053 COMPREHEN METABOLIC PANEL: CPT | Performed by: INTERNAL MEDICINE

## 2020-11-02 PROCEDURE — 85027 COMPLETE CBC AUTOMATED: CPT | Performed by: INTERNAL MEDICINE

## 2020-11-02 PROCEDURE — 36415 COLL VENOUS BLD VENIPUNCTURE: CPT | Performed by: INTERNAL MEDICINE

## 2020-11-02 PROCEDURE — 76705 ECHO EXAM OF ABDOMEN: CPT

## 2020-11-03 ENCOUNTER — HOSPITAL ENCOUNTER (INPATIENT)
Facility: HOSPITAL | Age: 85
LOS: 4 days | Discharge: HOME | End: 2020-11-07
Attending: HOSPITALIST | Admitting: SURGERY

## 2020-11-03 ENCOUNTER — APPOINTMENT (OUTPATIENT)
Dept: MRI IMAGING | Facility: HOSPITAL | Age: 85
End: 2020-11-03

## 2020-11-03 DIAGNOSIS — K80.50 CHOLEDOCHOLITHIASIS: Primary | ICD-10-CM

## 2020-11-03 PROBLEM — R79.89 ELEVATED LFTS: Status: ACTIVE | Noted: 2020-11-03

## 2020-11-03 LAB
B PARAPERT DNA SPEC QL NAA+PROBE: NOT DETECTED
B PERT DNA SPEC QL NAA+PROBE: NOT DETECTED
C PNEUM DNA NPH QL NAA+NON-PROBE: NOT DETECTED
FLUAV SUBTYP SPEC NAA+PROBE: NOT DETECTED
FLUBV RNA ISLT QL NAA+PROBE: NOT DETECTED
HADV DNA SPEC NAA+PROBE: NOT DETECTED
HAV IGM SERPL QL IA: NORMAL
HBV CORE IGM SERPL QL IA: NORMAL
HBV SURFACE AG SERPL QL IA: NORMAL
HCOV 229E RNA SPEC QL NAA+PROBE: NOT DETECTED
HCOV HKU1 RNA SPEC QL NAA+PROBE: NOT DETECTED
HCOV NL63 RNA SPEC QL NAA+PROBE: NOT DETECTED
HCOV OC43 RNA SPEC QL NAA+PROBE: NOT DETECTED
HCV AB SER DONR QL: NORMAL
HMPV RNA NPH QL NAA+NON-PROBE: NOT DETECTED
HPIV1 RNA SPEC QL NAA+PROBE: NOT DETECTED
HPIV2 RNA SPEC QL NAA+PROBE: NOT DETECTED
HPIV3 RNA NPH QL NAA+PROBE: NOT DETECTED
HPIV4 P GENE NPH QL NAA+PROBE: NOT DETECTED
M PNEUMO IGG SER IA-ACNC: NOT DETECTED
RHINOVIRUS RNA SPEC NAA+PROBE: NOT DETECTED
RSV RNA NPH QL NAA+NON-PROBE: NOT DETECTED
SARS-COV-2 RNA NPH QL NAA+NON-PROBE: NOT DETECTED

## 2020-11-03 PROCEDURE — 80074 ACUTE HEPATITIS PANEL: CPT | Performed by: HOSPITALIST

## 2020-11-03 PROCEDURE — 0202U NFCT DS 22 TRGT SARS-COV-2: CPT | Performed by: HOSPITALIST

## 2020-11-03 PROCEDURE — 74183 MRI ABD W/O CNTR FLWD CNTR: CPT

## 2020-11-03 PROCEDURE — A9577 INJ MULTIHANCE: HCPCS | Performed by: HOSPITALIST

## 2020-11-03 PROCEDURE — 0 GADOBENATE DIMEGLUMINE 529 MG/ML SOLUTION: Performed by: HOSPITALIST

## 2020-11-03 RX ORDER — UREA 10 %
1 LOTION (ML) TOPICAL ONCE
Status: COMPLETED | OUTPATIENT
Start: 2020-11-03 | End: 2020-11-03

## 2020-11-03 RX ORDER — ACETAMINOPHEN 325 MG/1
650 TABLET ORAL EVERY 4 HOURS PRN
Status: DISCONTINUED | OUTPATIENT
Start: 2020-11-03 | End: 2020-11-07 | Stop reason: HOSPADM

## 2020-11-03 RX ORDER — SODIUM CHLORIDE 0.9 % (FLUSH) 0.9 %
10 SYRINGE (ML) INJECTION AS NEEDED
Status: DISCONTINUED | OUTPATIENT
Start: 2020-11-03 | End: 2020-11-07 | Stop reason: HOSPADM

## 2020-11-03 RX ORDER — ACETAMINOPHEN 650 MG/1
650 SUPPOSITORY RECTAL EVERY 4 HOURS PRN
Status: DISCONTINUED | OUTPATIENT
Start: 2020-11-03 | End: 2020-11-07 | Stop reason: HOSPADM

## 2020-11-03 RX ORDER — ONDANSETRON 4 MG/1
4 TABLET, FILM COATED ORAL EVERY 6 HOURS PRN
Status: DISCONTINUED | OUTPATIENT
Start: 2020-11-03 | End: 2020-11-07 | Stop reason: HOSPADM

## 2020-11-03 RX ORDER — SODIUM CHLORIDE 9 MG/ML
100 INJECTION, SOLUTION INTRAVENOUS CONTINUOUS
Status: DISCONTINUED | OUTPATIENT
Start: 2020-11-03 | End: 2020-11-07

## 2020-11-03 RX ORDER — ACETAMINOPHEN 160 MG/5ML
650 SOLUTION ORAL EVERY 4 HOURS PRN
Status: DISCONTINUED | OUTPATIENT
Start: 2020-11-03 | End: 2020-11-07 | Stop reason: HOSPADM

## 2020-11-03 RX ORDER — ONDANSETRON 2 MG/ML
4 INJECTION INTRAMUSCULAR; INTRAVENOUS EVERY 6 HOURS PRN
Status: DISCONTINUED | OUTPATIENT
Start: 2020-11-03 | End: 2020-11-07 | Stop reason: HOSPADM

## 2020-11-03 RX ORDER — SODIUM CHLORIDE 0.9 % (FLUSH) 0.9 %
10 SYRINGE (ML) INJECTION EVERY 12 HOURS SCHEDULED
Status: DISCONTINUED | OUTPATIENT
Start: 2020-11-03 | End: 2020-11-07 | Stop reason: HOSPADM

## 2020-11-03 RX ADMIN — Medication 1 MG: at 22:52

## 2020-11-03 RX ADMIN — SODIUM CHLORIDE 125 ML/HR: 9 INJECTION, SOLUTION INTRAVENOUS at 15:56

## 2020-11-03 RX ADMIN — SODIUM CHLORIDE, PRESERVATIVE FREE 10 ML: 5 INJECTION INTRAVENOUS at 22:52

## 2020-11-03 RX ADMIN — GADOBENATE DIMEGLUMINE 15 ML: 529 INJECTION, SOLUTION INTRAVENOUS at 22:15

## 2020-11-03 RX ADMIN — SODIUM CHLORIDE, PRESERVATIVE FREE 10 ML: 5 INJECTION INTRAVENOUS at 15:56

## 2020-11-03 NOTE — H&P
Patient Name:  Radha Win  YOB: 1932  MRN:  [id number]  Admit Date:  11/3/2020  Patient Care Team:  Delgado Patricia MD as PCP - General (Internal Medicine)      Subjective   History Present Illness   CC: abdominal pain, jaundice, elevated LFTs, fatigue    HPI  Mr. Win is a 88 y.o. with a history of CKD 4, statin intolerance, anemia, HTN and GERD who was sent over as a direct admit from Dr. Patricia's office secondary to abnormal lab values. Patient reports generalized pruritis that began approximately one month ago and was associated with yellowing of his skin. He also reports abdominal pain that began a few weeks ago and is located in the right quadrants, described as non radiating, dull in nature and is worse after eating. Abdominal pain reportedly better at present. No change in bowel habits. He does report fatigue, decreased appetite, chills and states food doesn't taste the same lately. He recently lost his wife of many years and has been depressed as well. His PCP added Trintellix and Ambien and advised the patient to stop taking Xanax for sleep. Workup prior to arrival showed elevated LFTs, creatinine 1.76, hgb 9.2 and US liver demonstrated intrahepatic biliary ductal dilatation, suggesting biliary obstructive process. No discrete stone or lesion is identified in the common biliary duct, but the distal common biliary duct is obscured and radiology suggests MRCP or ERCP. He denies chest pain, palpitations, SOA, edema, fever, nausea, vomiting, diarrhea, cough, sore throat and recent sick contacts or known exposure to COVID.      Review of Systems   Constitutional: Positive for activity change, appetite change, chills and fatigue. Negative for fever.   HENT: Negative for congestion and sore throat.    Eyes: Negative for discharge and itching.   Respiratory: Negative for cough and shortness of breath.    Cardiovascular: Negative for chest pain, palpitations and leg swelling.    Gastrointestinal: Positive for abdominal pain. Negative for nausea and vomiting.   Endocrine: Negative for cold intolerance and heat intolerance.   Genitourinary: Negative for difficulty urinating and dysuria.   Musculoskeletal: Negative for back pain and gait problem.   Skin: Positive for color change. Negative for pallor.   Allergic/Immunologic: Negative for environmental allergies and food allergies.   Neurological: Negative for dizziness and headaches.   Hematological: Negative for adenopathy. Does not bruise/bleed easily.   Psychiatric/Behavioral: Negative for agitation and behavioral problems.        Personal History     Past Medical History:   Diagnosis Date   • Allergic rhinitis 2/11/2016   • Anemia due to chronic kidney disease 2/11/2016   • Benign essential hypertension 2/11/2016   • Benign prostatic hypertrophy 2/11/2016 12/21/2016--prostate biopsy reportedly negative.  I will try to obtain the report.  Patient sees urologist.  PSAs run from the 3-8 range   • Bilateral carotid artery plaque, 09/21/2016--16-49% bilateral carotid artery stenosis 2/11/2016 09/21/2016--vascular screen reveals 16-49% bilateral carotid artery stenosis, negative for AAA, negative for PAD.  10/10/2008--vascular screening study revealed mild bilateral carotid plaque, no aortic aneurysm, no evidence of PAD   • Bilateral renal cysts 2/14/2016 04/07/2016--ultrasound of the kidneys reveals the previously described renal cysts are not well seen.  However, questionable incidental bladder tumor noted.  05/26/2010--ultrasound the kidneys was negative bilaterally except for small renal cysts.   • Cervical radiculopathy 6/28/2018    10/02/2018--patient seen in follow-up and the results of the MRI discussed.  He continues to have intermittent radicular symptoms which is currently only on the left.  Discussed options with patient and I have recommended neurosurgery consultation.  Patient may benefit from epidural injections.   09/06/2018--MRI of the cervical spine reveals moderate neural foraminal compromise on the right at C3-C   • Chronic renal insufficiency, stage IV (severe), 02/09/2016--creatinine 1.85 2/11/2016 02/09/2016--serum creatinine 1.85.  BUN 29.  07/20/2015--patient was evaluated by the nephrologist.  Ultrasound of the kidneys ordered but this was never done.  05/22/2015--BUN 35, creatinine 2.3.  Patient referred to nephrology, Dr. Devyn Muniz.  04/16/2014--BUN 25, creatinine 1.77.  01/03/2013--BUN 37, creatinine 2.14.    05/26/2010---ultrasound of the kidneys reveal a small cyst x 2 right kidney.  Otherwise normal.    Baseline creatinine approximately 1.9-2.0.   • Degeneration of cervical intervertebral disc 5/2/2019   • Diverticulosis of colon 2/11/2016    10/03/2013--colonoscopy revealed markedly redundant colon, pancolonic diverticulosis with several impacted fecalith.  No evidence of diverticulitis or stricture.  Was only able to reach the ascending colon.  Follow up barium enema revealed extensive diverticulosis.  No polyp or other mucosal mass seen.    06/11/2002--incomplete colonoscopy due to redundant colon.  Not able to get past the hepatic flexure.  Patient had followup barium contrast enema which showed extensive diverticulosis.   • Exertional angina (CMS/[facility]) 9/17/2019 October 8, 2019--patient seen in follow-up and he reports the long-acting oral nitrate has definitely helped his exertional anginal symptoms.  However, he has not stressed himself completely such as using a push mower.  His blood pressure seems improved as well.  Patient has an appoint with the cardiologist in the next 2 weeks.  I have contacted his cardiologist about the results of the empiric ni   • Folic acid deficiency 2/11/2016   • Gastroesophageal reflux disease without esophagitis 2/11/2016   • Generalized anxiety disorder 2/11/2016   • History of diverticulitis of colon 2009 and 2003 05/08/2009-acute diverticulitis without  complication. 09/05/2003-acute diverticulitis without complication.    • Hyperlipidemia 2/11/2016 01/29/2005--treatment for hyperlipidemia begun.   • Hyperuricemia 8/19/2020   • Hypogonadism in male, on TRT, testosterone pellets, per  6/16/2017 January 2017--patient reports his urologist initiated testosterone replacement therapy consisting of testosterone pellets every 6 months.   • Multiple environmental allergies 2/12/2016    Patient sees the allergist regularly and has been on immunotherapy.   • Muscle cramps, statin related, Vytorin and pravastatin.  Tolerates Livalo. 6/16/2017 12/21/2018--patient seems to be tolerating Livalo well.  10/02/2018--Livalo 2 mg per day initiated.  Recommend CoQ10 400 mg per day with food for better absorption.  Reassess with lab in about 8 weeks.  08/30/2018--patient presents with complaints of muscle cramps.  He discontinue pravastatin and the cramps went away.  Cramps returned even with a half dose.  This is despite the fact he was taking    • Occlusive coronary artery disease, clinical diagnosis only.  Patient not a candidate for heart catheterization/intervention. 12/10/2019    October 8, 2019--patient seen in follow-up and he reports the long-acting oral nitrate has definitely helped his exertional anginal symptoms.  However, he has not stressed himself completely such as using a push mower.  His blood pressure seems improved as well.  Patient has an appoint with the cardiologist in the next 2 weeks.  I have contacted his cardiologist about the results of the empiric ni   • Osteopenia, 7/12/2019--lumbar spine -0.4.  Left femoral neck -1.7.  Right femoral neck -1.9. 2/11/2016 July 12, 2019--DEXA scan reveals lumbar spine T score -0.4.  Unchanged.  Left femoral neck T score -1.7.  Unchanged.  Right femoral neck T score -1.9.  Unchanged.  Impression is osteopenia of the hips with no significant interval change.  06/16/2017--patient seen in follow-up and reports he  doesn't think he ever started Fosamax.  Osteopenia and needs to be reassessed with a DEXA scan.  03/14/2017-   • Primary osteoarthritis of knees, bilateral 9/12/2016 09/12/2016--patient called in the office requesting a steriod injection in his knees.  I explained to him that I do no longer perform these injections and have referred him to Dr. Manuel.   • Statin intolerance, Vytorin and pravastatin 12/21/2018 12/21/2018--patient seems to be tolerating Livalo well.  10/02/2018--Livalo 2 mg per day initiated.  Recommend CoQ10 400 mg per day with food for better absorption.  Reassess with lab in about 8 weeks.  08/30/2018--patient presents with complaints of muscle cramps.  He discontinue pravastatin and the cramps went away.  Cramps returned even with a half dose.  This is despite the fact he was taking    • Vitamin B12 deficiency 2/11/2016 06/25/2009--B12 injections begun.   • Vitamin D deficiency 2/11/2016     Past Surgical History:   Procedure Laterality Date   • CATARACT EXTRACTION Left 12/12/2011 12/12/2011--cataract extirpation with intraocular lens implantation left eye.   • CATARACT EXTRACTION Right 12/2011 December 2011--right cataract extirpation with intraocular lens implantation.   • COLONOSCOPY  06/11/2002 06/11/2002--incomplete colonoscopy due to redundant colon. Not able to get past the hepatic flexure. Patient had followup barium contrast enema which showed extensive diverticulosis   • COLONOSCOPY  10/03/2013    10/03/2013--colonoscopy revealed markedly redundant colon, pancolonic diverticulosis with several impacted fecalith. No evidence of diverticulitis or stricture. Was only able to reach the ascending colon. Follow up barium enema revealed extensive diverticulosis. No polyp or other mucosal mass seen.   • CYSTOSCOPY  05/18/2016 05/18/2016--urology consultation.  Cystoscopy performed for possible bladder mass is negative.   • PROSTATE BIOPSY  12/21/2016 12/21/2016--prostate  biopsy reportedly negative.  I will try to obtain the report.     Family History   Problem Relation Age of Onset   • Diabetes Mother    • Heart disease Mother    • Stroke Father      Social History     Tobacco Use   • Smoking status: Never Smoker   • Smokeless tobacco: Never Used   • Tobacco comment: caffeine use: 2 cups coffee daily   Substance Use Topics   • Alcohol use: Yes     Alcohol/week: 3.0 standard drinks     Types: 3 Shots of liquor per week     Frequency: Monthly or less     Drinks per session: 1 or 2     Binge frequency: Never     Comment: Moderate alcohol consumption   • Drug use: No     No current facility-administered medications on file prior to encounter.      Current Outpatient Medications on File Prior to Encounter   Medication Sig Dispense Refill   • allopurinol (ZYLOPRIM) 100 MG tablet Take 1 p.o. daily for gout/elevated uric acid 90 tablet 3   • ALPRAZolam (XANAX) 0.5 MG tablet TAKE ONE TABLET BY MOUTH TWICE A DAY 60 tablet 1   • amLODIPine (NORVASC) 5 MG tablet Take 1 p.o. daily for high blood pressure 90 tablet 3   • aspirin (ASPIRIN LOW DOSE) 81 MG tablet Take 1 tablet by mouth daily.     • B Complex-Folic Acid (Super B Complex Maxi) tablet Take 1 p.o. twice daily     • calcitriol (ROCALTROL) 0.25 MCG capsule Take 0.25 mcg by mouth Daily.     • Evolocumab 140 MG/ML solution auto-injector Inject 1 mL under the skin into the appropriate area as directed Every 14 (Fourteen) Days. 2 pen 11   • fluticasone (FLONASE) 50 MCG/ACT nasal spray Administer 2 sprays in each nostril once daily for environmental allergies and congestion. 16 g 6   • folic acid (FOLVITE) 1 MG tablet TAKE ONE TABLET BY MOUTH TWICE A  tablet 0   • hydrOXYzine (ATARAX) 25 MG tablet Take 1-2 p.o. every 4 to 6 hours as needed for pruritus. 60 tablet 1   • isosorbide mononitrate (IMDUR) 30 MG 24 hr tablet TAKE ONE TABLET BY MOUTH EVERY MORNING FOR HEART 30 tablet 5   • metoprolol succinate XL (TOPROL-XL) 50 MG 24 hr tablet  Take 1 tablet by mouth Daily. 90 tablet 3   • nitroglycerin (NITROSTAT) 0.6 MG SL tablet Place 1 tablet under the tongue Every 5 (Five) Minutes As Needed for Chest Pain. Maximum of 3.Go to ER after third dose. 30 tablet 12   • omeprazole (priLOSEC) 40 MG capsule TAKE ONE CAPSULE BY MOUTH DAILY 30 capsule 3   • permethrin (ELIMITE) 5 % cream Apply topically to affected skin daily x 7 days, until cured 60 g 1   • Vortioxetine HBr (Trintellix) 10 MG tablet Take 1 p.o. daily at nighttime for depression and anxiety 30 tablet 3   • zolpidem (AMBIEN) 5 MG tablet Take 1 p.o. nightly as needed insomnia 30 tablet 1     No Known Allergies    Objective    Objective     Vital Signs        on   ;      There is no height or weight on file to calculate BMI.    Physical Exam  Vitals signs and nursing note reviewed.   Constitutional:       Appearance: Normal appearance. He is well-developed.   HENT:      Head: Normocephalic and atraumatic.   Eyes:      General: Scleral icterus present.      Extraocular Movements: Extraocular movements intact.      Conjunctiva/sclera: Conjunctivae normal.   Neck:      Musculoskeletal: Neck supple.      Vascular: No JVD.   Cardiovascular:      Rate and Rhythm: Normal rate and regular rhythm.   Pulmonary:      Effort: Pulmonary effort is normal.      Breath sounds: Normal breath sounds.   Abdominal:      General: Bowel sounds are normal. There is no distension.      Palpations: Abdomen is soft.      Tenderness: There is no abdominal tenderness.   Musculoskeletal: Normal range of motion.   Skin:     General: Skin is warm and dry.      Coloration: Skin is jaundiced.   Neurological:      Mental Status: He is alert and oriented to person, place, and time.   Psychiatric:         Mood and Affect: Mood normal.         Behavior: Behavior normal.         Results Review:  I reviewed the patient's new clinical results.  I reviewed the patient's new imaging results and agree with the interpretation.  I reviewed the  patient's other test results and agree with the interpretation  I personally viewed and interpreted the patient's EKG/Telemetry data  Discussed with ED provider.    Lab Results (last 24 hours)     ** No results found for the last 24 hours. **          Imaging Results (Last 24 Hours)     ** No results found for the last 24 hours. **               No orders to display        Assessment/Plan     Active Hospital Problems    Diagnosis  POA   • **Elevated LFTs [R79.89]  Yes   • Jaundice [R17]  Yes   • Hyperbilirubinemia [E80.6]  Yes   • Situational depression [F43.21]  Yes   • Statin intolerance, Vytorin and pravastatin [Z78.9]  Yes   • Vitamin B12 deficiency [E53.8]  Yes   • Gastroesophageal reflux disease without esophagitis [K21.9]  Yes   • Chronic renal insufficiency, stage IV (severe), 02/09/2016--creatinine 1.85 [N18.4]  Yes   • Benign essential hypertension [I10]  Yes   • Anemia due to stage 4 chronic kidney disease (CMS/HCC) [N18.4, D63.1]  Yes      Resolved Hospital Problems   No resolved problems to display.     · Elevated LFTs: obtain MRCP for possible biliary obstruction, depending on results will either ask GI to see or Dr. Mejia should he require ERCP. Avoid hepatotoxicity medications and monitor LFTs. Check CA-19 in AM.  · anemia: chronic, monitor. no overt signs or symptoms of bleeding.  · CKD 4: stable, monitor   · HTN: stable  · Dr. Downing or myself to address home med rec once complete.   · I discussed the patient's findings and my recommendations with patient and nursing staff.    VTE Prophylaxis - SCDs.  Code Status - Full code.       VIRIDIANA Waller  [facility]  11/03/20  13:49 EST

## 2020-11-04 ENCOUNTER — APPOINTMENT (OUTPATIENT)
Dept: CT IMAGING | Facility: HOSPITAL | Age: 85
End: 2020-11-04

## 2020-11-04 PROBLEM — K81.0 ACUTE CHOLECYSTITIS: Status: ACTIVE | Noted: 2020-11-04

## 2020-11-04 PROBLEM — K80.50 CHOLEDOCHOLITHIASIS: Status: ACTIVE | Noted: 2020-11-04

## 2020-11-04 LAB
ALBUMIN SERPL-MCNC: 2.7 G/DL (ref 3.5–5.2)
ALBUMIN/GLOB SERPL: 1.2 G/DL
ALP SERPL-CCNC: 1517 U/L (ref 39–117)
ALT SERPL W P-5'-P-CCNC: 307 U/L (ref 1–41)
ANION GAP SERPL CALCULATED.3IONS-SCNC: 7.7 MMOL/L (ref 5–15)
AST SERPL-CCNC: 226 U/L (ref 1–40)
BASOPHILS # BLD AUTO: 0.05 10*3/MM3 (ref 0–0.2)
BASOPHILS NFR BLD AUTO: 0.4 % (ref 0–1.5)
BILIRUB SERPL-MCNC: 17.8 MG/DL (ref 0–1.2)
BUN SERPL-MCNC: 21 MG/DL (ref 8–23)
BUN/CREAT SERPL: 14.9 (ref 7–25)
CALCIUM SPEC-SCNC: 8.6 MG/DL (ref 8.6–10.5)
CANCER AG19-9 SERPL-ACNC: 6423 U/ML
CHLORIDE SERPL-SCNC: 98 MMOL/L (ref 98–107)
CO2 SERPL-SCNC: 23.3 MMOL/L (ref 22–29)
CREAT SERPL-MCNC: 1.41 MG/DL (ref 0.76–1.27)
DEPRECATED RDW RBC AUTO: 43.3 FL (ref 37–54)
EOSINOPHIL # BLD AUTO: 0.08 10*3/MM3 (ref 0–0.4)
EOSINOPHIL NFR BLD AUTO: 0.7 % (ref 0.3–6.2)
ERYTHROCYTE [DISTWIDTH] IN BLOOD BY AUTOMATED COUNT: 13.2 % (ref 12.3–15.4)
GFR SERPL CREATININE-BSD FRML MDRD: 47 ML/MIN/1.73
GLOBULIN UR ELPH-MCNC: 2.3 GM/DL
GLUCOSE SERPL-MCNC: 98 MG/DL (ref 65–99)
HCT VFR BLD AUTO: 24.4 % (ref 37.5–51)
HGB BLD-MCNC: 8 G/DL (ref 13–17.7)
IMM GRANULOCYTES # BLD AUTO: 0.24 10*3/MM3 (ref 0–0.05)
IMM GRANULOCYTES NFR BLD AUTO: 2.1 % (ref 0–0.5)
LYMPHOCYTES # BLD AUTO: 1.2 10*3/MM3 (ref 0.7–3.1)
LYMPHOCYTES NFR BLD AUTO: 10.4 % (ref 19.6–45.3)
MCH RBC QN AUTO: 29.6 PG (ref 26.6–33)
MCHC RBC AUTO-ENTMCNC: 32.8 G/DL (ref 31.5–35.7)
MCV RBC AUTO: 90.4 FL (ref 79–97)
MONOCYTES # BLD AUTO: 0.84 10*3/MM3 (ref 0.1–0.9)
MONOCYTES NFR BLD AUTO: 7.3 % (ref 5–12)
NEUTROPHILS NFR BLD AUTO: 79.1 % (ref 42.7–76)
NEUTROPHILS NFR BLD AUTO: 9.16 10*3/MM3 (ref 1.7–7)
NRBC BLD AUTO-RTO: 0 /100 WBC (ref 0–0.2)
PLATELET # BLD AUTO: 347 10*3/MM3 (ref 140–450)
PMV BLD AUTO: 9.8 FL (ref 6–12)
POTASSIUM SERPL-SCNC: 3.5 MMOL/L (ref 3.5–5.2)
PROT SERPL-MCNC: 5 G/DL (ref 6–8.5)
RBC # BLD AUTO: 2.7 10*6/MM3 (ref 4.14–5.8)
SODIUM SERPL-SCNC: 129 MMOL/L (ref 136–145)
WBC # BLD AUTO: 11.57 10*3/MM3 (ref 3.4–10.8)

## 2020-11-04 PROCEDURE — 71250 CT THORAX DX C-: CPT

## 2020-11-04 PROCEDURE — 86301 IMMUNOASSAY TUMOR CA 19-9: CPT | Performed by: NURSE PRACTITIONER

## 2020-11-04 PROCEDURE — 85025 COMPLETE CBC W/AUTO DIFF WBC: CPT | Performed by: NURSE PRACTITIONER

## 2020-11-04 PROCEDURE — 74176 CT ABD & PELVIS W/O CONTRAST: CPT

## 2020-11-04 PROCEDURE — 99222 1ST HOSP IP/OBS MODERATE 55: CPT | Performed by: SURGERY

## 2020-11-04 PROCEDURE — 99222 1ST HOSP IP/OBS MODERATE 55: CPT | Performed by: INTERNAL MEDICINE

## 2020-11-04 PROCEDURE — 0 DIATRIZOATE MEGLUMINE & SODIUM PER 1 ML: Performed by: HOSPITALIST

## 2020-11-04 PROCEDURE — 25010000002 PIPERACILLIN SOD-TAZOBACTAM PER 1 G: Performed by: HOSPITALIST

## 2020-11-04 PROCEDURE — 80053 COMPREHEN METABOLIC PANEL: CPT | Performed by: NURSE PRACTITIONER

## 2020-11-04 RX ORDER — FAMOTIDINE 10 MG/ML
20 INJECTION, SOLUTION INTRAVENOUS EVERY 12 HOURS SCHEDULED
Status: DISCONTINUED | OUTPATIENT
Start: 2020-11-04 | End: 2020-11-05

## 2020-11-04 RX ORDER — ALPRAZOLAM 0.5 MG/1
0.5 TABLET ORAL 2 TIMES DAILY PRN
Status: DISCONTINUED | OUTPATIENT
Start: 2020-11-04 | End: 2020-11-07 | Stop reason: HOSPADM

## 2020-11-04 RX ORDER — FOLIC ACID 1 MG/1
1 TABLET ORAL DAILY
Status: DISCONTINUED | OUTPATIENT
Start: 2020-11-04 | End: 2020-11-07 | Stop reason: HOSPADM

## 2020-11-04 RX ORDER — METOPROLOL SUCCINATE 50 MG/1
50 TABLET, EXTENDED RELEASE ORAL
Status: DISCONTINUED | OUTPATIENT
Start: 2020-11-04 | End: 2020-11-07 | Stop reason: HOSPADM

## 2020-11-04 RX ORDER — ISOSORBIDE MONONITRATE 30 MG/1
30 TABLET, EXTENDED RELEASE ORAL
Status: DISCONTINUED | OUTPATIENT
Start: 2020-11-04 | End: 2020-11-07 | Stop reason: HOSPADM

## 2020-11-04 RX ORDER — AMLODIPINE BESYLATE 5 MG/1
5 TABLET ORAL
Status: DISCONTINUED | OUTPATIENT
Start: 2020-11-04 | End: 2020-11-07 | Stop reason: HOSPADM

## 2020-11-04 RX ORDER — ALLOPURINOL 100 MG/1
100 TABLET ORAL DAILY
Status: DISCONTINUED | OUTPATIENT
Start: 2020-11-04 | End: 2020-11-07 | Stop reason: HOSPADM

## 2020-11-04 RX ORDER — CALCITRIOL 0.25 UG/1
1 CAPSULE, LIQUID FILLED ORAL DAILY
Status: DISCONTINUED | OUTPATIENT
Start: 2020-11-04 | End: 2020-11-07 | Stop reason: HOSPADM

## 2020-11-04 RX ADMIN — TAZOBACTAM SODIUM AND PIPERACILLIN SODIUM 3.38 G: 375; 3 INJECTION, SOLUTION INTRAVENOUS at 10:29

## 2020-11-04 RX ADMIN — CALCITRIOL 1 MCG: 0.25 CAPSULE ORAL at 10:30

## 2020-11-04 RX ADMIN — FAMOTIDINE 20 MG: 10 INJECTION INTRAVENOUS at 10:29

## 2020-11-04 RX ADMIN — SODIUM CHLORIDE, PRESERVATIVE FREE 10 ML: 5 INJECTION INTRAVENOUS at 20:19

## 2020-11-04 RX ADMIN — METOPROLOL SUCCINATE 50 MG: 50 TABLET, EXTENDED RELEASE ORAL at 10:30

## 2020-11-04 RX ADMIN — FAMOTIDINE 20 MG: 10 INJECTION INTRAVENOUS at 20:18

## 2020-11-04 RX ADMIN — FOLIC ACID 1 MG: 1 TABLET ORAL at 10:30

## 2020-11-04 RX ADMIN — SODIUM CHLORIDE 125 ML/HR: 9 INJECTION, SOLUTION INTRAVENOUS at 01:58

## 2020-11-04 RX ADMIN — DIATRIZOATE MEGLUMINE AND DIATRIZOATE SODIUM 30 ML: 600; 100 SOLUTION ORAL; RECTAL at 07:51

## 2020-11-04 RX ADMIN — TAZOBACTAM SODIUM AND PIPERACILLIN SODIUM 3.38 G: 375; 3 INJECTION, SOLUTION INTRAVENOUS at 17:31

## 2020-11-04 RX ADMIN — SODIUM CHLORIDE, PRESERVATIVE FREE 10 ML: 5 INJECTION INTRAVENOUS at 09:36

## 2020-11-04 RX ADMIN — SODIUM CHLORIDE 125 ML/HR: 9 INJECTION, SOLUTION INTRAVENOUS at 09:35

## 2020-11-04 RX ADMIN — ISOSORBIDE MONONITRATE 30 MG: 30 TABLET ORAL at 10:30

## 2020-11-04 RX ADMIN — AMLODIPINE BESYLATE 5 MG: 5 TABLET ORAL at 10:30

## 2020-11-04 RX ADMIN — ALLOPURINOL 100 MG: 100 TABLET ORAL at 10:30

## 2020-11-04 NOTE — PLAN OF CARE
Problem: Adult Inpatient Plan of Care  Goal: Plan of Care Review  11/3/2020 1938 by Arun Rosenberg RN  Outcome: Ongoing, Progressing  Flowsheets (Taken 11/3/2020 1821)  Progress: no change  Plan of Care Reviewed With:   patient   son  Outcome Summary: Patient a direct admit to [facility] presenting with elevated LFTs. He was oriented to unit with any questions addressed. Patient's COVID test came back negative. He denies pain/discomfort. He is currently NPO for possible MRI later this evening; otherwise, patient to be NPO at midnight for MRI tomorrow morning. IV fluids started. Will continue to monitor.

## 2020-11-04 NOTE — PROGRESS NOTES
"    DAILY PROGRESS NOTE  [facility]    Patient Identification:  Name: Radha Win  Age: 88 y.o.  Sex: male  :  1932  MRN: [id number]         Primary Care Physician: Delgado Patricia MD    Subjective:  Interval History: though bloody urine but likely bile - still w/ RUQ AP/nausea/itching - denies ams/cp/sob    Objective:no fm though transport at bedside - d/w rn    Scheduled Meds:iopamidol, 100 mL, Intravenous, Once in imaging  piperacillin-tazobactam, 3.375 g, Intravenous, Once  piperacillin-tazobactam, 3.375 g, Intravenous, Q8H  sodium chloride, 10 mL, Intravenous, Q12H      Continuous Infusions:sodium chloride, 125 mL/hr, Last Rate: 125 mL/hr (20 0158)        Vital signs in last 24 hours:  Temp:  [98.2 °F (36.8 °C)-99.9 °F (37.7 °C)] 98.2 °F (36.8 °C)  Heart Rate:  [83-88] 86  Resp:  [16-18] 18  BP: (118-152)/(68-76) 118/69    Intake/Output:    Intake/Output Summary (Last 24 hours) at 2020 0930  Last data filed at 11/3/2020 1821  Gross per 24 hour   Intake 285.82 ml   Output --   Net 285.82 ml       Exam:  /69 (BP Location: Left arm, Patient Position: Lying)   Pulse 86   Temp 98.2 °F (36.8 °C) (Oral)   Resp 18   Ht 172.7 cm (68\")   Wt 74.8 kg (165 lb)   SpO2 98%   BMI 25.09 kg/m²     General Appearance:    Alert, cooperative, jaundiced, AAOx3                          Head:    Normocephalic, without obvious abnormality, atraumatic                           Eyes:    PERRL, +icterus, EOM's intact, both eyes                         Throat:   Oral mucosa pink and moist                           Neck:   Supple, symmetrical, trachea midline, no JVD                         Lungs:    Clear to auscultation bilaterally, respirations unlabored                 Chest Wall:    No tenderness or deformity                          Heart:    Regular rate and rhythm, S1 and S2 normal                  Abdomen:     Soft, RUQ ttp +[place], bs +                 Extremities:   Moving " all, no cyanosis or edema                        Pulses:   Pulses palpable in all extremities                            Skin:   Skin is warm and dry,  Jaundiced                   Neurologic:   CNII-XII intact, no focal deficits noted     Data Review:  Labs in chart were reviewed.    Assessment:  Active Hospital Problems    Diagnosis  POA   • **Choledocholithiasis [K80.50]  Unknown   • Acute cholecystitis [K81.0]  Unknown   • Elevated LFTs [R79.89]  Yes   • Jaundice [R17]  Yes   • Hyperbilirubinemia [E80.6]  Yes   • Situational depression [F43.21]  Yes   • Statin intolerance, Vytorin and pravastatin [Z78.9]  Yes   • Vitamin B12 deficiency [E53.8]  Yes   • Gastroesophageal reflux disease without esophagitis [K21.9]  Yes   • Chronic renal insufficiency, stage IV (severe), 02/09/2016--creatinine 1.85 [N18.4]  Yes   • Benign essential hypertension [I10]  Yes   • Anemia due to stage 4 chronic kidney disease (CMS/HCC) [N18.4, D63.1]  Yes      Resolved Hospital Problems   No resolved problems to display.       Plan:    Clinical picture much more clear post MRCP w/ choledocholelithiasis w/ early cholecystitis w/ obstructive jaundice   -start Zosyn    -NPO except ice/sips   -GI pending   -GS consulted   -ID consult cancelled    -add IV Pepcid    -trends LFTs and monitor anemia    -attempted to catch prior to CT C/A/P to cancel but was already scanned    -d/w RN    -SCDs for DVT ppx     HTN - off meds - reinstate w/ parameters - Toprol stat despite NPO     Jasmeet Tamayo MD  11/4/2020  09:30 EST

## 2020-11-04 NOTE — CONSULTS
[facility], [facility]  Initial Inpatient Consult Note    Referring Provider: Dr. Anjum Downing    Reason for Consultation: Common bile duct stones    Subjective     History of present illness:    88 y.o. male with history of hypertension, hyperlipidemia, coronary artery disease and renal insufficiency presenting with recurrent abdominal pain weight loss and of appetite.  Patient found icteric with elevated bilirubin and was referred for admission.  On ultrasound gallstones were identified but on MRCP common duct stones were identified distally with a dilated biliary tree.  Patient currently in no acute distress here for further recommendations.    Past Medical History:  Past Medical History:   Diagnosis Date   • Allergic rhinitis 2/11/2016   • Anemia due to chronic kidney disease 2/11/2016   • Benign essential hypertension 2/11/2016   • Benign prostatic hypertrophy 2/11/2016 12/21/2016--prostate biopsy reportedly negative.  I will try to obtain the report.  Patient sees urologist.  PSAs run from the 3-8 range   • Bilateral carotid artery plaque, 09/21/2016--16-49% bilateral carotid artery stenosis 2/11/2016 09/21/2016--vascular screen reveals 16-49% bilateral carotid artery stenosis, negative for AAA, negative for PAD.  10/10/2008--vascular screening study revealed mild bilateral carotid plaque, no aortic aneurysm, no evidence of PAD   • Bilateral renal cysts 2/14/2016 04/07/2016--ultrasound of the kidneys reveals the previously described renal cysts are not well seen.  However, questionable incidental bladder tumor noted.  05/26/2010--ultrasound the kidneys was negative bilaterally except for small renal cysts.   • Cervical radiculopathy 6/28/2018    10/02/2018--patient seen in follow-up and the results of the MRI discussed.  He continues to have intermittent radicular symptoms which is currently only on the left.  Discussed options with patient and I have recommended neurosurgery  consultation.  Patient may benefit from epidural injections.  09/06/2018--MRI of the cervical spine reveals moderate neural foraminal compromise on the right at C3-C   • Chronic renal insufficiency, stage IV (severe), 02/09/2016--creatinine 1.85 2/11/2016 02/09/2016--serum creatinine 1.85.  BUN 29.  07/20/2015--patient was evaluated by the nephrologist.  Ultrasound of the kidneys ordered but this was never done.  05/22/2015--BUN 35, creatinine 2.3.  Patient referred to nephrology, Dr. Devyn Muniz.  04/16/2014--BUN 25, creatinine 1.77.  01/03/2013--BUN 37, creatinine 2.14.    05/26/2010---ultrasound of the kidneys reveal a small cyst x 2 right kidney.  Otherwise normal.    Baseline creatinine approximately 1.9-2.0.   • Degeneration of cervical intervertebral disc 5/2/2019   • Diverticulosis of colon 2/11/2016    10/03/2013--colonoscopy revealed markedly redundant colon, pancolonic diverticulosis with several impacted fecalith.  No evidence of diverticulitis or stricture.  Was only able to reach the ascending colon.  Follow up barium enema revealed extensive diverticulosis.  No polyp or other mucosal mass seen.    06/11/2002--incomplete colonoscopy due to redundant colon.  Not able to get past the hepatic flexure.  Patient had followup barium contrast enema which showed extensive diverticulosis.   • Exertional angina (CMS/[facility]) 9/17/2019 October 8, 2019--patient seen in follow-up and he reports the long-acting oral nitrate has definitely helped his exertional anginal symptoms.  However, he has not stressed himself completely such as using a push mower.  His blood pressure seems improved as well.  Patient has an appoint with the cardiologist in the next 2 weeks.  I have contacted his cardiologist about the results of the empiric ni   • Folic acid deficiency 2/11/2016   • Gastroesophageal reflux disease without esophagitis 2/11/2016   • Generalized anxiety disorder 2/11/2016   • History of diverticulitis of colon  2009 and 2003 05/08/2009-acute diverticulitis without complication. 09/05/2003-acute diverticulitis without complication.    • Hyperlipidemia 2/11/2016 01/29/2005--treatment for hyperlipidemia begun.   • Hyperuricemia 8/19/2020   • Hypogonadism in male, on TRT, testosterone pellets, per  6/16/2017 January 2017--patient reports his urologist initiated testosterone replacement therapy consisting of testosterone pellets every 6 months.   • Multiple environmental allergies 2/12/2016    Patient sees the allergist regularly and has been on immunotherapy.   • Muscle cramps, statin related, Vytorin and pravastatin.  Tolerates Livalo. 6/16/2017 12/21/2018--patient seems to be tolerating Livalo well.  10/02/2018--Livalo 2 mg per day initiated.  Recommend CoQ10 400 mg per day with food for better absorption.  Reassess with lab in about 8 weeks.  08/30/2018--patient presents with complaints of muscle cramps.  He discontinue pravastatin and the cramps went away.  Cramps returned even with a half dose.  This is despite the fact he was taking    • Occlusive coronary artery disease, clinical diagnosis only.  Patient not a candidate for heart catheterization/intervention. 12/10/2019    October 8, 2019--patient seen in follow-up and he reports the long-acting oral nitrate has definitely helped his exertional anginal symptoms.  However, he has not stressed himself completely such as using a push mower.  His blood pressure seems improved as well.  Patient has an appoint with the cardiologist in the next 2 weeks.  I have contacted his cardiologist about the results of the empiric ni   • Osteopenia, 7/12/2019--lumbar spine -0.4.  Left femoral neck -1.7.  Right femoral neck -1.9. 2/11/2016 July 12, 2019--DEXA scan reveals lumbar spine T score -0.4.  Unchanged.  Left femoral neck T score -1.7.  Unchanged.  Right femoral neck T score -1.9.  Unchanged.  Impression is osteopenia of the hips with no significant interval  change.  06/16/2017--patient seen in follow-up and reports he doesn't think he ever started Fosamax.  Osteopenia and needs to be reassessed with a DEXA scan.  03/14/2017-   • Primary osteoarthritis of knees, bilateral 9/12/2016 09/12/2016--patient called in the office requesting a steriod injection in his knees.  I explained to him that I do no longer perform these injections and have referred him to Dr. Manuel.   • Statin intolerance, Vytorin and pravastatin 12/21/2018 12/21/2018--patient seems to be tolerating Livalo well.  10/02/2018--Livalo 2 mg per day initiated.  Recommend CoQ10 400 mg per day with food for better absorption.  Reassess with lab in about 8 weeks.  08/30/2018--patient presents with complaints of muscle cramps.  He discontinue pravastatin and the cramps went away.  Cramps returned even with a half dose.  This is despite the fact he was taking    • Vitamin B12 deficiency 2/11/2016 06/25/2009--B12 injections begun.   • Vitamin D deficiency 2/11/2016     Past Surgical History:  Past Surgical History:   Procedure Laterality Date   • CATARACT EXTRACTION Left 12/12/2011 12/12/2011--cataract extirpation with intraocular lens implantation left eye.   • CATARACT EXTRACTION Right 12/2011 December 2011--right cataract extirpation with intraocular lens implantation.   • COLONOSCOPY  06/11/2002 06/11/2002--incomplete colonoscopy due to redundant colon. Not able to get past the hepatic flexure. Patient had followup barium contrast enema which showed extensive diverticulosis   • COLONOSCOPY  10/03/2013    10/03/2013--colonoscopy revealed markedly redundant colon, pancolonic diverticulosis with several impacted fecalith. No evidence of diverticulitis or stricture. Was only able to reach the ascending colon. Follow up barium enema revealed extensive diverticulosis. No polyp or other mucosal mass seen.   • CYSTOSCOPY  05/18/2016 05/18/2016--urology consultation.  Cystoscopy performed for  possible bladder mass is negative.   • PROSTATE BIOPSY  12/21/2016 12/21/2016--prostate biopsy reportedly negative.  I will try to obtain the report.      Social History:   Social History     Tobacco Use   • Smoking status: Never Smoker   • Smokeless tobacco: Never Used   • Tobacco comment: caffeine use: 2 cups coffee daily   Substance Use Topics   • Alcohol use: Yes     Alcohol/week: 3.0 standard drinks     Types: 3 Shots of liquor per week     Frequency: Monthly or less     Drinks per session: 1 or 2     Binge frequency: Never     Comment: Moderate alcohol consumption      Family History:  Family History   Problem Relation Age of Onset   • Diabetes Mother    • Heart disease Mother    • Stroke Father        Home Meds:  Facility-Administered Medications Prior to Admission   Medication Dose Route Frequency Provider Last Rate Last Dose   • cyanocobalamin injection 1,000 mcg  1,000 mcg Intramuscular Q28 Days Delgado Patricia MD   1,000 mcg at 09/14/20 1322     Medications Prior to Admission   Medication Sig Dispense Refill Last Dose   • allopurinol (ZYLOPRIM) 100 MG tablet Take 1 p.o. daily for gout/elevated uric acid 90 tablet 3 Past Week at Unknown time   • amLODIPine (NORVASC) 5 MG tablet Take 1 p.o. daily for high blood pressure 90 tablet 3 Past Week at Unknown time   • aspirin (ASPIRIN LOW DOSE) 81 MG tablet Take 1 tablet by mouth daily.   Past Week at Unknown time   • calcitriol (ROCALTROL) 0.25 MCG capsule Take 0.25 mcg by mouth Daily.   Past Week at Unknown time   • Evolocumab 140 MG/ML solution auto-injector Inject 1 mL under the skin into the appropriate area as directed Every 14 (Fourteen) Days. 2 pen 11 Past Month at Unknown time   • fluticasone (FLONASE) 50 MCG/ACT nasal spray Administer 2 sprays in each nostril once daily for environmental allergies and congestion. (Patient taking differently: Daily As Needed. Administer 2 sprays in each nostril once daily for environmental allergies and congestion.)  16 g 6 Past Week at Unknown time   • folic acid (FOLVITE) 1 MG tablet TAKE ONE TABLET BY MOUTH TWICE A  tablet 0 Past Week at Unknown time   • hydrOXYzine (ATARAX) 25 MG tablet Take 1-2 p.o. every 4 to 6 hours as needed for pruritus. 60 tablet 1 Past Week at Unknown time   • isosorbide mononitrate (IMDUR) 30 MG 24 hr tablet TAKE ONE TABLET BY MOUTH EVERY MORNING FOR HEART 30 tablet 5 Past Week at Unknown time   • metoprolol succinate XL (TOPROL-XL) 50 MG 24 hr tablet Take 1 tablet by mouth Daily. 90 tablet 3 Past Week at Unknown time   • omeprazole (priLOSEC) 40 MG capsule TAKE ONE CAPSULE BY MOUTH DAILY 30 capsule 3 Past Week at Unknown time   • permethrin (ELIMITE) 5 % cream Apply topically to affected skin daily x 7 days, until cured 60 g 1 Past Week at Unknown time   • Vortioxetine HBr (Trintellix) 10 MG tablet Take 1 p.o. daily at nighttime for depression and anxiety 30 tablet 3 Past Week at Unknown time   • zolpidem (AMBIEN) 5 MG tablet Take 1 p.o. nightly as needed insomnia 30 tablet 1 Past Week at Unknown time   • ALPRAZolam (XANAX) 0.5 MG tablet TAKE ONE TABLET BY MOUTH TWICE A DAY 60 tablet 1 Unknown at Unknown time   • B Complex-Folic Acid (Super B Complex Maxi) tablet Take 1 p.o. twice daily   Unknown at Unknown time   • nitroglycerin (NITROSTAT) 0.6 MG SL tablet Place 1 tablet under the tongue Every 5 (Five) Minutes As Needed for Chest Pain. Maximum of 3.Go to ER after third dose. 30 tablet 12 Unknown at Unknown time     Current Meds:   allopurinol, 100 mg, Oral, Daily  amLODIPine, 5 mg, Oral, Q24H  calcitriol, 1 mcg, Oral, Daily  famotidine, 20 mg, Intravenous, Q12H  folic acid, 1 mg, Oral, Daily  iopamidol, 100 mL, Intravenous, Once in imaging  isosorbide mononitrate, 30 mg, Oral, Q24H  metoprolol succinate XL, 50 mg, Oral, Q24H  piperacillin-tazobactam, 3.375 g, Intravenous, Q8H  sodium chloride, 10 mL, Intravenous, Q12H      Allergies:  No Known Allergies  Review of Systems  All systems were  reviewed and negative except for:  Constitution:  positive for anorexia and weight loss  Gastrointestinal: positive for  jaundice and pain  Integument: positive for  itching     Objective     Vital Signs  Temp:  [97.6 °F (36.4 °C)-99.2 °F (37.3 °C)] 97.8 °F (36.6 °C)  Heart Rate:  [72-86] 72  Resp:  [16-18] 16  BP: (112-135)/(59-70) 112/59  Physical Exam:  General Appearance:    Alert, cooperative, in no acute distress   Head:    Normocephalic, without obvious abnormality, atraumatic   Eyes:          conjunctivae and sclerae normal, no   icterus   Throat:   no thrush, oral mucosa moist   Neck:   Supple, no adenopathy   Lungs:     Clear to auscultation bilaterally    Heart:    Regular rhythm and normal rate    Chest Wall:    No abnormalities observed   Abdomen:     Soft, nondistended, nontender; normal bowel sounds   Extremities:   no edema, no redness   Skin:   No bruising or rash   Psychiatric:  normal mood and insight     Results Review:   I reviewed the patient's new clinical results.  I reviewed the patient's new imaging results and agree with the interpretation.    Results from last 7 days   Lab Units 11/04/20  0742 11/02/20  1001 10/30/20  1407   WBC 10*3/mm3 11.57* 8.48 9.26   HEMOGLOBIN g/dL 8.0* 9.2* 9.7*   HEMATOCRIT % 24.4* 27.5* 29.7*   PLATELETS 10*3/mm3 347 376 342     Results from last 7 days   Lab Units 11/04/20  0742 11/02/20  1001 10/30/20  1404   SODIUM mmol/L 129* 130* 131*   POTASSIUM mmol/L 3.5 4.0 4.1   CHLORIDE mmol/L 98 95* 97*   CO2 mmol/L 23.3 22.5 22.2   BUN mg/dL 21 25* 31*   CREATININE mg/dL 1.41* 1.76* 1.76*   CALCIUM mg/dL 8.6 9.1 9.3   BILIRUBIN mg/dL 17.8* 17.5* 15.2*   ALK PHOS U/L 1,517* 1,923* 1,633*   ALT (SGPT) U/L 307* 439* 493*   AST (SGOT) U/L 226* 424* 523*   GLUCOSE mg/dL 98 155* 123*         No results found for: LIPASE    Radiology:  CT Chest Without Contrast   Final Result   1.  Choledocholithiasis with moderate-to-severe upstream intra and   extrahepatic biliary duct  dilation, best seen on recent MRCP.   2.  The gallbladder has decompressed since prior ultrasound and MRI, but   now demonstrates wall thickening with pericholecystic edema most   suggestive of cholecystitis.   3.  Findings concerning for developing pancreatitis and correlation with   lipase level is recommended.   4.  Other findings as above.       The above findings were discussed with Dr. Tamayo by telephone by Shakir Solares at 10:15 AM on  11/04/2020  .       This report was finalized on 11/4/2020 6:35 PM by Dr. Shakir Solares M.D.          CT Abdomen Pelvis Without Contrast   Final Result   1.  Choledocholithiasis with moderate-to-severe upstream intra and   extrahepatic biliary duct dilation, best seen on recent MRCP.   2.  The gallbladder has decompressed since prior ultrasound and MRI, but   now demonstrates wall thickening with pericholecystic edema most   suggestive of cholecystitis.   3.  Findings concerning for developing pancreatitis and correlation with   lipase level is recommended.   4.  Other findings as above.       The above findings were discussed with Dr. Tamayo by telephone by Shakir Solares at 10:15 AM on  11/04/2020  .       This report was finalized on 11/4/2020 6:35 PM by Dr. Shakir Solares M.D.          MRI abdomen w wo contrast mrcp   Final Result          Assessment/Plan   Patient Active Problem List   Diagnosis   • Bilateral carotid artery plaque, 05/24/2018--16-49% bilateral carotid artery stenosis   • Benign prostatic hypertrophy   • Chronic renal insufficiency, stage IV (severe), 02/09/2016--creatinine 1.85   • Diverticulosis of colon   • Hyperlipidemia   • Osteopenia of multiple sites   • Vitamin B12 deficiency   • Vitamin D deficiency   • Allergic rhinitis   • Anemia due to stage 4 chronic kidney disease (CMS/HCC)   • Generalized anxiety disorder   • Benign essential hypertension   • Gastroesophageal reflux disease without esophagitis   • Folic acid deficiency   • Multiple  environmental allergies   • Therapeutic drug monitoring   • Bilateral renal cysts   • Primary osteoarthritis of knees, bilateral   • Need for influenza vaccination   • Impaired fasting glucose   • Muscle cramps, statin related, Vytorin and pravastatin.  Tolerates Livalo.   • Hypogonadism in male, on TRT, testosterone pellets, per    • Chronic neck pain   • Cervical radiculopathy   • Statin intolerance, Vytorin and pravastatin   • Degeneration of cervical intervertebral disc   • Exertional angina (CMS/HCC)   • Occlusive coronary artery disease, clinical diagnosis only.  Patient not a candidate for heart catheterization/intervention.   • Situational depression   • Hyperuricemia   • Situational insomnia   • Urticaria   • Jaundice   • Elevated liver enzymes   • Hyperbilirubinemia   • Elevated LFTs   • Choledocholithiasis   • Acute cholecystitis       Assessment:  1. Common bile duct stones  2. Jaundice  3. Elevated LFTs    Plan:  · Agree with findings on MRCP, will recommend proceeding with ERCP tomorrow and lap komal to follow.  Risks and benefits were discussed with patient and questions were answered.  Patient agreed to proceed.      I discussed the patients findings and my recommendations with patient and family.    Joseluis Mejia MD

## 2020-11-04 NOTE — CONSULTS
REASON FOR CONSULT:    Cholelithiasis    REQUESTING PHYSICIAN:    Jasmeet Tamayo MD    HISTORY OF PRESENT ILLNESS:    Radha Win is a 88 y.o. male who was seen recently by Dr. Patricia in the office for jaundice.  Bilirubin was 17.5 on 11/2/2020.  He was admitted to the hospital for further assessment.  There is no pain or nausea, but he does have loss of appetite.  On ultrasonography there are gallstones in the gallbladder, but not specifically seen in the bile duct.  An MRCP was performed that shows choledocholithiasis.  There is no dysuria or hematuria, but urine is dark    Past Medical History:   Diagnosis Date   • Allergic rhinitis 2/11/2016   • Anemia due to chronic kidney disease 2/11/2016   • Benign essential hypertension 2/11/2016   • Benign prostatic hypertrophy 2/11/2016 12/21/2016--prostate biopsy reportedly negative.  I will try to obtain the report.  Patient sees urologist.  PSAs run from the 3-8 range   • Bilateral carotid artery plaque, 09/21/2016--16-49% bilateral carotid artery stenosis 2/11/2016 09/21/2016--vascular screen reveals 16-49% bilateral carotid artery stenosis, negative for AAA, negative for PAD.  10/10/2008--vascular screening study revealed mild bilateral carotid plaque, no aortic aneurysm, no evidence of PAD   • Bilateral renal cysts 2/14/2016 04/07/2016--ultrasound of the kidneys reveals the previously described renal cysts are not well seen.  However, questionable incidental bladder tumor noted.  05/26/2010--ultrasound the kidneys was negative bilaterally except for small renal cysts.   • Cervical radiculopathy 6/28/2018    10/02/2018--patient seen in follow-up and the results of the MRI discussed.  He continues to have intermittent radicular symptoms which is currently only on the left.  Discussed options with patient and I have recommended neurosurgery consultation.  Patient may benefit from epidural injections.  09/06/2018--MRI of the cervical spine reveals  moderate neural foraminal compromise on the right at C3-C   • Chronic renal insufficiency, stage IV (severe), 02/09/2016--creatinine 1.85 2/11/2016 02/09/2016--serum creatinine 1.85.  BUN 29.  07/20/2015--patient was evaluated by the nephrologist.  Ultrasound of the kidneys ordered but this was never done.  05/22/2015--BUN 35, creatinine 2.3.  Patient referred to nephrology, Dr. Devyn Muniz.  04/16/2014--BUN 25, creatinine 1.77.  01/03/2013--BUN 37, creatinine 2.14.    05/26/2010---ultrasound of the kidneys reveal a small cyst x 2 right kidney.  Otherwise normal.    Baseline creatinine approximately 1.9-2.0.   • Degeneration of cervical intervertebral disc 5/2/2019   • Diverticulosis of colon 2/11/2016    10/03/2013--colonoscopy revealed markedly redundant colon, pancolonic diverticulosis with several impacted fecalith.  No evidence of diverticulitis or stricture.  Was only able to reach the ascending colon.  Follow up barium enema revealed extensive diverticulosis.  No polyp or other mucosal mass seen.    06/11/2002--incomplete colonoscopy due to redundant colon.  Not able to get past the hepatic flexure.  Patient had followup barium contrast enema which showed extensive diverticulosis.   • Exertional angina (CMS/HCC) 9/17/2019 October 8, 2019--patient seen in follow-up and he reports the long-acting oral nitrate has definitely helped his exertional anginal symptoms.  However, he has not stressed himself completely such as using a push mower.  His blood pressure seems improved as well.  Patient has an appoint with the cardiologist in the next 2 weeks.  I have contacted his cardiologist about the results of the empiric ni   • Folic acid deficiency 2/11/2016   • Gastroesophageal reflux disease without esophagitis 2/11/2016   • Generalized anxiety disorder 2/11/2016   • History of diverticulitis of colon 2009 and 2003 05/08/2009-acute diverticulitis without complication. 09/05/2003-acute diverticulitis without  complication.    • Hyperlipidemia 2/11/2016 01/29/2005--treatment for hyperlipidemia begun.   • Hyperuricemia 8/19/2020   • Hypogonadism in male, on TRT, testosterone pellets, per  6/16/2017 January 2017--patient reports his urologist initiated testosterone replacement therapy consisting of testosterone pellets every 6 months.   • Multiple environmental allergies 2/12/2016    Patient sees the allergist regularly and has been on immunotherapy.   • Muscle cramps, statin related, Vytorin and pravastatin.  Tolerates Livalo. 6/16/2017 12/21/2018--patient seems to be tolerating Livalo well.  10/02/2018--Livalo 2 mg per day initiated.  Recommend CoQ10 400 mg per day with food for better absorption.  Reassess with lab in about 8 weeks.  08/30/2018--patient presents with complaints of muscle cramps.  He discontinue pravastatin and the cramps went away.  Cramps returned even with a half dose.  This is despite the fact he was taking    • Occlusive coronary artery disease, clinical diagnosis only.  Patient not a candidate for heart catheterization/intervention. 12/10/2019    October 8, 2019--patient seen in follow-up and he reports the long-acting oral nitrate has definitely helped his exertional anginal symptoms.  However, he has not stressed himself completely such as using a push mower.  His blood pressure seems improved as well.  Patient has an appoint with the cardiologist in the next 2 weeks.  I have contacted his cardiologist about the results of the empiric ni   • Osteopenia, 7/12/2019--lumbar spine -0.4.  Left femoral neck -1.7.  Right femoral neck -1.9. 2/11/2016 July 12, 2019--DEXA scan reveals lumbar spine T score -0.4.  Unchanged.  Left femoral neck T score -1.7.  Unchanged.  Right femoral neck T score -1.9.  Unchanged.  Impression is osteopenia of the hips with no significant interval change.  06/16/2017--patient seen in follow-up and reports he doesn't think he ever started Fosamax.  Osteopenia and  needs to be reassessed with a DEXA scan.  03/14/2017-   • Primary osteoarthritis of knees, bilateral 9/12/2016 09/12/2016--patient called in the office requesting a steriod injection in his knees.  I explained to him that I do no longer perform these injections and have referred him to Dr. Manuel.   • Statin intolerance, Vytorin and pravastatin 12/21/2018 12/21/2018--patient seems to be tolerating Livalo well.  10/02/2018--Livalo 2 mg per day initiated.  Recommend CoQ10 400 mg per day with food for better absorption.  Reassess with lab in about 8 weeks.  08/30/2018--patient presents with complaints of muscle cramps.  He discontinue pravastatin and the cramps went away.  Cramps returned even with a half dose.  This is despite the fact he was taking    • Vitamin B12 deficiency 2/11/2016 06/25/2009--B12 injections begun.   • Vitamin D deficiency 2/11/2016       Past Surgical History:   Procedure Laterality Date   • CATARACT EXTRACTION Left 12/12/2011 12/12/2011--cataract extirpation with intraocular lens implantation left eye.   • CATARACT EXTRACTION Right 12/2011 December 2011--right cataract extirpation with intraocular lens implantation.   • COLONOSCOPY  06/11/2002 06/11/2002--incomplete colonoscopy due to redundant colon. Not able to get past the hepatic flexure. Patient had followup barium contrast enema which showed extensive diverticulosis   • COLONOSCOPY  10/03/2013    10/03/2013--colonoscopy revealed markedly redundant colon, pancolonic diverticulosis with several impacted fecalith. No evidence of diverticulitis or stricture. Was only able to reach the ascending colon. Follow up barium enema revealed extensive diverticulosis. No polyp or other mucosal mass seen.   • CYSTOSCOPY  05/18/2016 05/18/2016--urology consultation.  Cystoscopy performed for possible bladder mass is negative.   • PROSTATE BIOPSY  12/21/2016 12/21/2016--prostate biopsy reportedly negative.  I will try to obtain the  report.         Current Facility-Administered Medications:   •  acetaminophen (TYLENOL) tablet 650 mg, 650 mg, Oral, Q4H PRN **OR** acetaminophen (TYLENOL) 160 MG/5ML solution 650 mg, 650 mg, Oral, Q4H PRN **OR** acetaminophen (TYLENOL) suppository 650 mg, 650 mg, Rectal, Q4H PRN, Vicki Wood APRN  •  allopurinol (ZYLOPRIM) tablet 100 mg, 100 mg, Oral, Daily, Jasmeet Tamayo MD, 100 mg at 11/04/20 1030  •  ALPRAZolam (XANAX) tablet 0.5 mg, 0.5 mg, Oral, BID PRN, Jasmeet Tamayo MD  •  amLODIPine (NORVASC) tablet 5 mg, 5 mg, Oral, Q24H, Jasmeet Tamayo MD, 5 mg at 11/04/20 1030  •  calcitriol (ROCALTROL) capsule 1 mcg, 1 mcg, Oral, Daily, Jasmeet Tamayo MD, 1 mcg at 11/04/20 1030  •  famotidine (PEPCID) injection 20 mg, 20 mg, Intravenous, Q12H, Jasmeet Tamayo MD, 20 mg at 11/04/20 1029  •  folic acid (FOLVITE) tablet 1 mg, 1 mg, Oral, Daily, Jasmeet Tamayo MD, 1 mg at 11/04/20 1030  •  iopamidol (ISOVUE-300) 61 % injection 100 mL, 100 mL, Intravenous, Once in imaging, Jasmeet Tamayo MD  •  isosorbide mononitrate (IMDUR) 24 hr tablet 30 mg, 30 mg, Oral, Q24H, Jasmeet Tamayo MD, 30 mg at 11/04/20 1030  •  metoprolol succinate XL (TOPROL-XL) 24 hr tablet 50 mg, 50 mg, Oral, Q24H, Jasmeet Tamayo MD, 50 mg at 11/04/20 1030  •  ondansetron (ZOFRAN) tablet 4 mg, 4 mg, Oral, Q6H PRN **OR** ondansetron (ZOFRAN) injection 4 mg, 4 mg, Intravenous, Q6H PRN, Vicki Wood, APRN  •  piperacillin-tazobactam (ZOSYN) 3.375 g in iso-osmotic dextrose 50 ml (premix), 3.375 g, Intravenous, Q8H, Jasmeet Tamayo MD  •  sodium chloride 0.9 % flush 10 mL, 10 mL, Intravenous, Q12H, Vicki Wood, APRN, 10 mL at 11/04/20 0936  •  sodium chloride 0.9 % flush 10 mL, 10 mL, Intravenous, PRN, Vicki Wood, APRN  •  sodium chloride 0.9 % infusion, 125 mL/hr, Intravenous, Continuous, Vicki Wood, APRN, Last Rate: 125 mL/hr at 11/04/20 0935, 125 mL/hr at  11/04/20 0935    No Known Allergies    Family History   Problem Relation Age of Onset   • Diabetes Mother    • Heart disease Mother    • Stroke Father        Social History     Socioeconomic History   • Marital status:      Spouse name: Not on file   • Number of children: 2   • Years of education: 14   • Highest education level: Some college, no degree   Occupational History   • Occupation: Retired      Employer: [employer]   Social Needs   • Financial resource strain: Not hard at all   • Food insecurity     Worry: Never true     Inability: Never true   • Transportation needs     Medical: No     Non-medical: No   Tobacco Use   • Smoking status: Never Smoker   • Smokeless tobacco: Never Used   • Tobacco comment: caffeine use: 2 cups coffee daily   Substance and Sexual Activity   • Alcohol use: Yes     Alcohol/week: 3.0 standard drinks     Types: 3 Shots of liquor per week     Frequency: Monthly or less     Drinks per session: 1 or 2     Binge frequency: Never     Comment: Moderate alcohol consumption   • Drug use: No   • Sexual activity: Yes     Partners: Female   Lifestyle   • Physical activity     Days per week: 3 days     Minutes per session: 30 min   • Stress: Not at all   Relationships   • Social connections     Talks on phone: More than three times a week     Gets together: Once a week     Attends [employer] service: More than 4 times per year     Active member of club or organization: Yes     Attends meetings of clubs or organizations: More than 4 times per year     Relationship status:        Review of Systems   Constitutional: Positive for appetite change. Negative for fever.   HENT: Negative for trouble swallowing.    Respiratory: Negative for shortness of breath.    Cardiovascular: Negative for chest pain.   Gastrointestinal: Positive for abdominal pain.   Genitourinary: Negative for dysuria.        Urine is dark.   Skin: Positive for color change.   Neurological: Negative for syncope.  "  Hematological: Does not bruise/bleed easily.   Psychiatric/Behavioral: Negative for confusion.       Objective     /60 (BP Location: Right arm, Patient Position: Lying)   Pulse 79   Temp 97.6 °F (36.4 °C) (Oral)   Resp 16   Ht 172.7 cm (68\")   Wt 74.8 kg (165 lb)   SpO2 97%   BMI 25.09 kg/m²     Physical Exam  Vitals signs and nursing note reviewed.   Constitutional:       General: He is not in acute distress.     Appearance: He is well-developed. He is not diaphoretic.   HENT:      Head: Normocephalic and atraumatic.   Eyes:      General: Scleral icterus present.   Neck:      Musculoskeletal: Neck supple.      Trachea: No tracheal deviation.   Cardiovascular:      Rate and Rhythm: Normal rate and regular rhythm.      Heart sounds: Normal heart sounds. No murmur.   Pulmonary:      Effort: Pulmonary effort is normal. No respiratory distress.      Breath sounds: Normal breath sounds. No wheezing or rales.   Abdominal:      General: Bowel sounds are normal. There is no distension.      Palpations: Abdomen is soft.      Tenderness: There is no abdominal tenderness.      Hernia: No hernia is present.   Musculoskeletal:         General: No deformity.   Skin:     General: Skin is warm and dry.      Coloration: Skin is jaundiced.   Neurological:      Mental Status: He is alert and oriented to person, place, and time.   Psychiatric:         Behavior: Behavior normal.         DIAGNOSTIC DATA:    I reviewed the images and the reports of the CT scan of the abdomen and pelvis, MRCP, and ultrasound performed since admission.  There is cholelithiasis, gallbladder wall thickening, and choledocholithiasis.    ASSESSMENT:    Choledocholithiasis  Probable chronic cholecystitis    PLAN:    I agree with gastroenterology consultation to consider preoperative ERCP.  I recommend laparoscopic cholecystectomy after clearance of the duct.  Discussed with patient and his son who is with him in the room today.  "

## 2020-11-04 NOTE — PROGRESS NOTES
"Adult Nutrition  Assessment/PES    Patient Name:  Radha Win  YOB: 1932  MRN: [id number]  Admit Date:  11/3/2020    Assessment Date:  11/4/2020    Comments:  Assessed due to MST 3: wt loss, decreased appetite and dysphagia indicated on nurse admission screen. Pt currently NPO, IV fluids @ 125 cc/hr. GI consulted for biliary obstruction. EMR reviewed - weight down 30 lb over past year (15%). Will follow clinical course and monitor intake once diet advanced.     RD working remotely, may be reached by secure chat.     Reason for Assessment     Row Name 11/04/20 1042          Reason for Assessment    Reason For Assessment  identified at risk by screening criteria     Diagnosis  gastrointestinal disease;renal disease;cardiac disease;liver disease     Identified At Risk by Screening Criteria  MST SCORE 2+;unintentional loss of 10 lbs or more in the past 2 mos;reduced oral intake over the last month;difficulty chewing/swallowing         Nutrition/Diet History     Row Name 11/04/20 1045          Nutrition/Diet History    Typical Food/Fluid Intake  Currently NPO; decreasing weight trend over past year. APRN notes pt has lost taste for food, has been covid tested twice (negative) and results are pending for testing now. GI consult for biliary obstruction, poss MRCP     Factors Affecting Nutritional Intake  abdominal pain;altered gastrointestinal function         Anthropometrics     Row Name 11/04/20 1048          Anthropometrics    Height  172.7 cm (68\")        Admit Weight    Admit Weight Method  stated     Admit Weight  74.8 kg (165 lb)        Ideal Body Weight (IBW)    Ideal Body Weight (IBW) (kg)  70.89     % of Ideal Body Weight Assessment  other (see comments) 105%        Usual Body Weight (UBW)    Usual Body Weight  88.5 kg (195 lb) 11/2019 195#, 3/3/20 184 lb, 8/2020 173 lb     Weight Loss  unintentional 30# (15%) in one year        Body Mass Index (BMI)    BMI Assessment  -- 25.1     " "    Labs/Tests/Procedures/Meds     Row Name 11/04/20 1050          Labs/Procedures/Meds    Lab Results Reviewed  reviewed     Lab Results Comments  tbili, cr, Aic 5.9%, Na        Diagnostic Tests/Procedures    Diagnostic Test/Procedure Reviewed  reviewed     Diagnostic Test/Procedures Comments  MRI, CT        Medications    Pertinent Medications Reviewed  reviewed     Pertinent Medications Comments  folic acid, pepcid, allopurinol, abx,  cc/hr         Physical Findings     Row Name 11/04/20 1055          Physical Findings    Skin  jaundice;other (see comments) fannie 21         Estimated/Assessed Needs     Row Name 11/04/20 1056 11/04/20 1048       Calculation Measurements    Weight Used For Calculations  74.8 kg (165 lb)  --    Height  --  172.7 cm (68\")       Estimated/Assessed Needs    Additional Documentation  [place]-St[place] Jeor Equation (Group);Fluid Requirements (Group);KCAL/KG (Group);Protein Requirements (Group)  --       KCAL/KG    KCAL/KG  30 Kcal/Kg (kcal);25 Kcal/Kg (kcal)  --    25 Kcal/Kg (kcal)  1871.1  --    30 Kcal/Kg (kcal)  2245.32  --       [place]-St. Jeor Equation    RMR ([place]-St. Jeor Equation)  1392.94  --    [place] Jeor Activity Factors  1.2  --    Activity Factors ([place]-St. Jeor)  1671.528  --       Protein Requirements    Weight Used For Protein Calculations  74.8 kg (165 lb)  --    Est Protein Requirement Amount (gms/kg)  1.0 gm protein  --    Estimated Protein Requirements (gms/day)  74.84  --        Nutrition Prescription Ordered     Row Name 11/04/20 1056          Nutrition Prescription PO    Current PO Diet  NPO         Evaluation of Received Nutrient/Fluid Intake     Row Name 11/04/20 1057          Fluid Intake Evaluation    IV Fluid (mL)  3000               Problem/Interventions:  Problem 1     Row Name 11/04/20 1058          Nutrition Diagnoses Problem 1    Problem 1  Altered GI Function     Etiology (related to)  Medical Diagnosis     Gastrointestinal  Other " (comment) gallstone of bile duct     Metabolic/ICU  Elevated LFTs     Signs/Symptoms (evidenced by)  NPO         Problem 2     Row Name 11/04/20 1100          Nutrition Diagnoses Problem 2    Problem 2  Unintended Weight Loss     Signs/Symptoms (evidenced by)  Unintended Weight Change     Unintended Weight Change  Loss     Number of Pounds Lost  30     Weight loss time period  1 yr             Intervention Goal     Row Name 11/04/20 1059          Intervention Goal    General  Reduce/improve symptoms;Disease management/therapy     PO  Advance diet     Weight  Maintain weight         Nutrition Intervention     Row Name 11/04/20 1100          Nutrition Intervention    RD/Tech Action  Await begin PO;Follow Tx progress;Care plan reviewd           Education/Evaluation     Row Name 11/04/20 1101          Education    Education  Will Instruct as appropriate        Monitor/Evaluation    Monitor  Per protocol           Electronically signed by:  Luz Sampson RD  11/04/20 11:01 EST

## 2020-11-04 NOTE — PLAN OF CARE
Goal Outcome Evaluation:  Plan of Care Reviewed With: patient  Progress: no change  Outcome Summary: Pt alert and oriented x 4, IVF as ordered. Pt resting between care, IV antibiotics continued. Up ad abiodun. Able to voice needs and concerns in own words. VSS.

## 2020-11-05 ENCOUNTER — ANESTHESIA EVENT (OUTPATIENT)
Dept: GASTROENTEROLOGY | Facility: HOSPITAL | Age: 85
End: 2020-11-05

## 2020-11-05 ENCOUNTER — APPOINTMENT (OUTPATIENT)
Dept: GENERAL RADIOLOGY | Facility: HOSPITAL | Age: 85
End: 2020-11-05

## 2020-11-05 ENCOUNTER — ANESTHESIA (OUTPATIENT)
Dept: GASTROENTEROLOGY | Facility: HOSPITAL | Age: 85
End: 2020-11-05

## 2020-11-05 PROBLEM — K85.10 GALLSTONE PANCREATITIS: Status: ACTIVE | Noted: 2020-11-05

## 2020-11-05 LAB
ABO GROUP BLD: NORMAL
ALBUMIN SERPL-MCNC: 2.8 G/DL (ref 3.5–5.2)
ALBUMIN/GLOB SERPL: 1.3 G/DL
ALP SERPL-CCNC: 1370 U/L (ref 39–117)
ALT SERPL W P-5'-P-CCNC: 239 U/L (ref 1–41)
ANION GAP SERPL CALCULATED.3IONS-SCNC: 10.6 MMOL/L (ref 5–15)
AST SERPL-CCNC: 178 U/L (ref 1–40)
BILIRUB SERPL-MCNC: 14.4 MG/DL (ref 0–1.2)
BLD GP AB SCN SERPL QL: NEGATIVE
BUN SERPL-MCNC: 21 MG/DL (ref 8–23)
BUN/CREAT SERPL: 14 (ref 7–25)
CALCIUM SPEC-SCNC: 8.4 MG/DL (ref 8.6–10.5)
CHLORIDE SERPL-SCNC: 99 MMOL/L (ref 98–107)
CO2 SERPL-SCNC: 20.4 MMOL/L (ref 22–29)
CREAT SERPL-MCNC: 1.5 MG/DL (ref 0.76–1.27)
DEPRECATED RDW RBC AUTO: 42.9 FL (ref 37–54)
ERYTHROCYTE [DISTWIDTH] IN BLOOD BY AUTOMATED COUNT: 13.1 % (ref 12.3–15.4)
GFR SERPL CREATININE-BSD FRML MDRD: 44 ML/MIN/1.73
GLOBULIN UR ELPH-MCNC: 2.1 GM/DL
GLUCOSE SERPL-MCNC: 93 MG/DL (ref 65–99)
HCT VFR BLD AUTO: 23.2 % (ref 37.5–51)
HGB BLD-MCNC: 7.8 G/DL (ref 13–17.7)
MCH RBC QN AUTO: 30.1 PG (ref 26.6–33)
MCHC RBC AUTO-ENTMCNC: 33.6 G/DL (ref 31.5–35.7)
MCV RBC AUTO: 89.6 FL (ref 79–97)
PLATELET # BLD AUTO: 352 10*3/MM3 (ref 140–450)
PMV BLD AUTO: 9.9 FL (ref 6–12)
POTASSIUM SERPL-SCNC: 3.5 MMOL/L (ref 3.5–5.2)
PROT SERPL-MCNC: 4.9 G/DL (ref 6–8.5)
RBC # BLD AUTO: 2.59 10*6/MM3 (ref 4.14–5.8)
RH BLD: POSITIVE
SODIUM SERPL-SCNC: 130 MMOL/L (ref 136–145)
T&S EXPIRATION DATE: NORMAL
WBC # BLD AUTO: 10.9 10*3/MM3 (ref 3.4–10.8)

## 2020-11-05 PROCEDURE — 86900 BLOOD TYPING SEROLOGIC ABO: CPT | Performed by: HOSPITALIST

## 2020-11-05 PROCEDURE — 43264 ERCP REMOVE DUCT CALCULI: CPT | Performed by: INTERNAL MEDICINE

## 2020-11-05 PROCEDURE — BF111ZZ FLUOROSCOPY OF BILIARY AND PANCREATIC DUCTS USING LOW OSMOLAR CONTRAST: ICD-10-PCS | Performed by: INTERNAL MEDICINE

## 2020-11-05 PROCEDURE — 86923 COMPATIBILITY TEST ELECTRIC: CPT

## 2020-11-05 PROCEDURE — 80053 COMPREHEN METABOLIC PANEL: CPT | Performed by: HOSPITALIST

## 2020-11-05 PROCEDURE — 74328 X-RAY BILE DUCT ENDOSCOPY: CPT

## 2020-11-05 PROCEDURE — 0FC98ZZ EXTIRPATION OF MATTER FROM COMMON BILE DUCT, VIA NATURAL OR ARTIFICIAL OPENING ENDOSCOPIC: ICD-10-PCS | Performed by: INTERNAL MEDICINE

## 2020-11-05 PROCEDURE — 86901 BLOOD TYPING SEROLOGIC RH(D): CPT | Performed by: HOSPITALIST

## 2020-11-05 PROCEDURE — 86901 BLOOD TYPING SEROLOGIC RH(D): CPT

## 2020-11-05 PROCEDURE — 25010000002 IOPAMIDOL 61 % SOLUTION: Performed by: INTERNAL MEDICINE

## 2020-11-05 PROCEDURE — 86900 BLOOD TYPING SEROLOGIC ABO: CPT

## 2020-11-05 PROCEDURE — C1769 GUIDE WIRE: HCPCS | Performed by: INTERNAL MEDICINE

## 2020-11-05 PROCEDURE — 86850 RBC ANTIBODY SCREEN: CPT | Performed by: HOSPITALIST

## 2020-11-05 PROCEDURE — 85027 COMPLETE CBC AUTOMATED: CPT | Performed by: HOSPITALIST

## 2020-11-05 PROCEDURE — 25010000002 PIPERACILLIN SOD-TAZOBACTAM PER 1 G: Performed by: HOSPITALIST

## 2020-11-05 PROCEDURE — 99232 SBSQ HOSP IP/OBS MODERATE 35: CPT | Performed by: SURGERY

## 2020-11-05 PROCEDURE — 43262 ENDO CHOLANGIOPANCREATOGRAPH: CPT | Performed by: INTERNAL MEDICINE

## 2020-11-05 PROCEDURE — 25010000002 PROPOFOL 10 MG/ML EMULSION: Performed by: ANESTHESIOLOGY

## 2020-11-05 RX ORDER — PROMETHAZINE HYDROCHLORIDE 25 MG/1
25 SUPPOSITORY RECTAL ONCE AS NEEDED
Status: DISCONTINUED | OUTPATIENT
Start: 2020-11-05 | End: 2020-11-05 | Stop reason: HOSPADM

## 2020-11-05 RX ORDER — PROPOFOL 10 MG/ML
VIAL (ML) INTRAVENOUS AS NEEDED
Status: DISCONTINUED | OUTPATIENT
Start: 2020-11-05 | End: 2020-11-05 | Stop reason: SURG

## 2020-11-05 RX ORDER — HYDROCODONE BITARTRATE AND ACETAMINOPHEN 5; 325 MG/1; MG/1
1 TABLET ORAL EVERY 6 HOURS PRN
Status: DISCONTINUED | OUTPATIENT
Start: 2020-11-05 | End: 2020-11-07

## 2020-11-05 RX ORDER — SODIUM CHLORIDE 9 MG/ML
30 INJECTION, SOLUTION INTRAVENOUS CONTINUOUS PRN
Status: DISCONTINUED | OUTPATIENT
Start: 2020-11-05 | End: 2020-11-07 | Stop reason: HOSPADM

## 2020-11-05 RX ORDER — LIDOCAINE HYDROCHLORIDE 20 MG/ML
INJECTION, SOLUTION INFILTRATION; PERINEURAL AS NEEDED
Status: DISCONTINUED | OUTPATIENT
Start: 2020-11-05 | End: 2020-11-05 | Stop reason: SURG

## 2020-11-05 RX ORDER — PROMETHAZINE HYDROCHLORIDE 25 MG/1
25 TABLET ORAL ONCE AS NEEDED
Status: DISCONTINUED | OUTPATIENT
Start: 2020-11-05 | End: 2020-11-05 | Stop reason: HOSPADM

## 2020-11-05 RX ORDER — FAMOTIDINE 10 MG/ML
20 INJECTION, SOLUTION INTRAVENOUS DAILY
Status: DISCONTINUED | OUTPATIENT
Start: 2020-11-06 | End: 2020-11-07 | Stop reason: HOSPADM

## 2020-11-05 RX ORDER — PROPOFOL 10 MG/ML
VIAL (ML) INTRAVENOUS CONTINUOUS PRN
Status: DISCONTINUED | OUTPATIENT
Start: 2020-11-05 | End: 2020-11-05 | Stop reason: SURG

## 2020-11-05 RX ORDER — MORPHINE SULFATE 2 MG/ML
2 INJECTION, SOLUTION INTRAMUSCULAR; INTRAVENOUS EVERY 4 HOURS PRN
Status: DISCONTINUED | OUTPATIENT
Start: 2020-11-05 | End: 2020-11-07

## 2020-11-05 RX ADMIN — PROPOFOL 40 MG: 10 INJECTION, EMULSION INTRAVENOUS at 17:56

## 2020-11-05 RX ADMIN — AMLODIPINE BESYLATE 5 MG: 5 TABLET ORAL at 08:15

## 2020-11-05 RX ADMIN — SODIUM CHLORIDE 30 ML/HR: 9 INJECTION, SOLUTION INTRAVENOUS at 16:25

## 2020-11-05 RX ADMIN — ISOSORBIDE MONONITRATE 30 MG: 30 TABLET ORAL at 08:16

## 2020-11-05 RX ADMIN — PROPOFOL 100 MG: 10 INJECTION, EMULSION INTRAVENOUS at 17:50

## 2020-11-05 RX ADMIN — SODIUM CHLORIDE, PRESERVATIVE FREE 10 ML: 5 INJECTION INTRAVENOUS at 08:17

## 2020-11-05 RX ADMIN — TAZOBACTAM SODIUM AND PIPERACILLIN SODIUM 3.38 G: 375; 3 INJECTION, SOLUTION INTRAVENOUS at 08:15

## 2020-11-05 RX ADMIN — SODIUM CHLORIDE, PRESERVATIVE FREE 10 ML: 5 INJECTION INTRAVENOUS at 20:15

## 2020-11-05 RX ADMIN — METOPROLOL SUCCINATE 50 MG: 50 TABLET, EXTENDED RELEASE ORAL at 08:16

## 2020-11-05 RX ADMIN — CALCITRIOL 1 MCG: 0.25 CAPSULE ORAL at 08:15

## 2020-11-05 RX ADMIN — LIDOCAINE HYDROCHLORIDE 50 MG: 20 INJECTION, SOLUTION INFILTRATION; PERINEURAL at 17:50

## 2020-11-05 RX ADMIN — FOLIC ACID 1 MG: 1 TABLET ORAL at 08:16

## 2020-11-05 RX ADMIN — TAZOBACTAM SODIUM AND PIPERACILLIN SODIUM 3.38 G: 375; 3 INJECTION, SOLUTION INTRAVENOUS at 20:14

## 2020-11-05 RX ADMIN — FAMOTIDINE 20 MG: 10 INJECTION INTRAVENOUS at 08:16

## 2020-11-05 RX ADMIN — SODIUM CHLORIDE 125 ML/HR: 9 INJECTION, SOLUTION INTRAVENOUS at 07:08

## 2020-11-05 RX ADMIN — ALLOPURINOL 100 MG: 100 TABLET ORAL at 08:16

## 2020-11-05 RX ADMIN — PROPOFOL 180 MCG/KG/MIN: 10 INJECTION, EMULSION INTRAVENOUS at 17:50

## 2020-11-05 RX ADMIN — TAZOBACTAM SODIUM AND PIPERACILLIN SODIUM 3.38 G: 375; 3 INJECTION, SOLUTION INTRAVENOUS at 00:35

## 2020-11-05 NOTE — SIGNIFICANT NOTE
11/05/20 5740   OTHER   Discipline occupational therapist   Rehab Time/Intention   Session Not Performed other (see comments)  (pt states independent w. ADLs. per pt note is is up independent too. pt confirms he has no OT needs. Thanks OT for checking. [id number])

## 2020-11-05 NOTE — ANESTHESIA PREPROCEDURE EVALUATION
Anesthesia Evaluation     Patient summary reviewed and Nursing notes reviewed   NPO Solid Status: > 8 hours  NPO Liquid Status: > 2 hours           Airway   Mallampati: II  Dental - normal exam   (+) upper dentures    Pulmonary - normal exam   Cardiovascular - normal exam    ECG reviewed    (+) hypertension, CAD, angina, hyperlipidemia,  carotid artery disease      Neuro/Psych  (+) psychiatric history Anxiety,     GI/Hepatic/Renal/Endo    (+)  GERD,  renal disease CRI,     Musculoskeletal     (+) neck pain,   Abdominal    Substance History      OB/GYN          Other   arthritis,                      Anesthesia Plan    ASA 3     MAC

## 2020-11-05 NOTE — PLAN OF CARE
Problem: Adult Inpatient Plan of Care  Goal: Plan of Care Review  Outcome: Ongoing, Progressing  Flowsheets (Taken 11/5/2020 0410)  Progress: no change  Plan of Care Reviewed With: patient  Outcome Summary: Patient is on IV antibiotics. Patient is alert, oriented and up ad abiodun. No PRN meds needed this shift. VSS. Patient is NPO after 3PM for procedure. Will continue to monitor vital signs and comfort.   Goal Outcome Evaluation:  Plan of Care Reviewed With: patient  Progress: no change  Outcome Summary: Patient is on IV antibiotics. Patient is alert, oriented and up ad abiodun. No PRN meds needed this shift. VSS. Patient is NPO after 3PM for procedure. Will continue to monitor vital signs and comfort.

## 2020-11-05 NOTE — BRIEF OP NOTE
ENDOSCOPIC RETROGRADE CHOLANGIOPANCREATOGRAPHY  Progress Note    Radha Win  11/5/2020    Pre-op Diagnosis:   Choledocholithiasis [K80.50]       Post-Op Diagnosis Codes:     * Choledocholithiasis [K80.50]    Procedure/CPT® Codes:        Procedure(s):  ENDOSCOPIC RETROGRADE CHOLANGIOPANCREATOGRAPHY with sphincterotomy, basket retrieval and balloon sweep    Surgeon(s):  Joseluis Mejia MD    Anesthesia: Monitored Anesthesia Care    Staff:   Radiology Technologist: Delgado Lemus  Endo Technician: Zabrina Andrews RN  Endo Nurse: Meghan López RN         Estimated Blood Loss: minimal    Urine Voided: * No values recorded between 11/5/2020  5:42 PM and 11/5/2020  6:20 PM *    Specimens:                None          Drains: * No LDAs found *    Findings: ERCP with sphincterotomy, basket and balloon stone extraction revealed approximately 6 stones, 1-1.2cm each with sludge extracted, cystic duct patent.    Complications: None          Joseluis Mejia MD     Date: 11/5/2020  Time: 18:29 EST

## 2020-11-05 NOTE — PROGRESS NOTES
"    DAILY PROGRESS NOTE  [facility]    Patient Identification:  Name: Radha Win  Age: 88 y.o.  Sex: male  :  1932  MRN: [id number]         Primary Care Physician: Delgado Patricia MD    Subjective:  Interval History: Urine output improving though color still little bit dark but lightening up.  He states he had abrupt resolution of some of his symptoms yesterday in regards to nausea vomiting and abdominal pain.  He currently denies any pain.  Denies any confusion fever chills night sweats.  Denies any chest pain troubles breathing    Objective: Resting comfortably in bed.  Interactive conversational and certainly nontoxic-appearing.  No family at bedside.    Scheduled Meds:allopurinol, 100 mg, Oral, Daily  amLODIPine, 5 mg, Oral, Q24H  calcitriol, 1 mcg, Oral, Daily  famotidine, 20 mg, Intravenous, Q12H  folic acid, 1 mg, Oral, Daily  iopamidol, 100 mL, Intravenous, Once in imaging  isosorbide mononitrate, 30 mg, Oral, Q24H  metoprolol succinate XL, 50 mg, Oral, Q24H  piperacillin-tazobactam, 3.375 g, Intravenous, Q8H  sodium chloride, 10 mL, Intravenous, Q12H      Continuous Infusions:sodium chloride, 125 mL/hr, Last Rate: 125 mL/hr (20 0708)        Vital signs in last 24 hours:  Temp:  [97.1 °F (36.2 °C)-98 °F (36.7 °C)] 97.1 °F (36.2 °C)  Heart Rate:  [72-79] 76  Resp:  [16] 16  BP: (112-118)/(59-62) 118/62    Intake/Output:    Intake/Output Summary (Last 24 hours) at 2020 0812  Last data filed at 2020 0708  Gross per 24 hour   Intake 3182 ml   Output --   Net 3182 ml       Exam:  /62 (BP Location: Right arm, Patient Position: Lying)   Pulse 76   Temp 97.1 °F (36.2 °C) (Oral)   Resp 16   Ht 172.7 cm (68\")   Wt 74.8 kg (165 lb)   SpO2 91%   BMI 25.09 kg/m²     General Appearance:    Alert, cooperative, no distress, AAOx3                          Head:    Normocephalic, without obvious abnormality, atraumatic                           Eyes:    PERRL, " positive icterus, EOM's intact, both eyes                         Throat:   Oral mucosa pink and moist                           Neck:   Supple, symmetrical, trachea midline, no JVD                         Lungs:    Clear to auscultation bilaterally, respirations unlabored                 Chest Wall:    No tenderness or deformity                          Heart:    Regular rate and rhythm, S1 and S2 normal                  Abdomen:     Soft, mild tenderness palpation the right upper quadrant today.  Positive bowel sounds no rebound no guarding                 extremities: Moving all, no cyanosis or edema, SCDs                        Pulses:   Pulses palpable in lower extremities                            Skin:   Skin is warm and dry, remains jaundiced                  Neurologic:   CNII-XII intact, no focal deficits noted     Data Review:  Labs in chart were reviewed.    Assessment:  Active Hospital Problems    Diagnosis  POA   • **Choledocholithiasis [K80.50]  Unknown   • Gallstone pancreatitis [K85.10]  Unknown   • Acute cholecystitis [K81.0]  Unknown   • Elevated LFTs [R79.89]  Yes   • Jaundice [R17]  Yes   • Hyperbilirubinemia [E80.6]  Yes   • Situational depression [F43.21]  Yes   • Statin intolerance, Vytorin and pravastatin [Z78.9]  Yes   • Vitamin B12 deficiency [E53.8]  Yes   • Gastroesophageal reflux disease without esophagitis [K21.9]  Yes   • Chronic renal insufficiency, stage IV (severe), 02/09/2016--creatinine 1.85 [N18.4]  Yes   • Benign essential hypertension [I10]  Yes   • Anemia due to stage 4 chronic kidney disease (CMS/HCC) [N18.4, D63.1]  Yes      Resolved Hospital Problems   No resolved problems to display.       Plan:    Gallstone pancreatitis with choledocholithiasis/acute cholecystitis with obstruction   -Appreciate and agree with both GI and GS input with plans for ERCP today and then cholecystectomy   -Continue to bridge with IV Zosyn and IVF though would decrease rate   -LFTs improving  but clinically also feeling better we will continue to trend LFTs and monitor anemia which is dropped down to 7.8 made worse by dilutional effect though vital signs remained quite stable.  Would normally prefer to hold transfusion unless hemoglobin drops further but with plans for further laparoscopic intervention I am also not opposed to giving a unit of blood precholecystectomy if surgeon would feel more comfortable with that and I went ahead and ordered 1 unit but will hold until surgery can see the patient    HTN stable -give Toprol despite n.p.o. status    SCDs for DVT prophylaxis    IV Pepcid for GI prophylaxis    Jasmeet Tamayo MD  11/5/2020  08:12 EST

## 2020-11-05 NOTE — PLAN OF CARE
Goal Outcome Evaluation:  Plan of Care Reviewed With: patient  Progress: no change  Outcome Summary: IV antibiotics given as ordered. Pt AO x 4, up ad abiodun. VSS. ERCP today. Plan for 1 unit of blood to be given over night prior to surgery tomorrow.

## 2020-11-06 ENCOUNTER — APPOINTMENT (OUTPATIENT)
Dept: GENERAL RADIOLOGY | Facility: HOSPITAL | Age: 85
End: 2020-11-06

## 2020-11-06 ENCOUNTER — ANESTHESIA (OUTPATIENT)
Dept: PERIOP | Facility: HOSPITAL | Age: 85
End: 2020-11-06

## 2020-11-06 ENCOUNTER — ANESTHESIA EVENT (OUTPATIENT)
Dept: PERIOP | Facility: HOSPITAL | Age: 85
End: 2020-11-06

## 2020-11-06 LAB
ALBUMIN SERPL-MCNC: 2.5 G/DL (ref 3.5–5.2)
ALBUMIN/GLOB SERPL: 1.1 G/DL
ALP SERPL-CCNC: 1161 U/L (ref 39–117)
ALT SERPL W P-5'-P-CCNC: 203 U/L (ref 1–41)
ANION GAP SERPL CALCULATED.3IONS-SCNC: 9 MMOL/L (ref 5–15)
AST SERPL-CCNC: 159 U/L (ref 1–40)
BILIRUB SERPL-MCNC: 12.8 MG/DL (ref 0–1.2)
BUN SERPL-MCNC: 19 MG/DL (ref 8–23)
BUN/CREAT SERPL: 12.2 (ref 7–25)
CALCIUM SPEC-SCNC: 8.3 MG/DL (ref 8.6–10.5)
CHLORIDE SERPL-SCNC: 102 MMOL/L (ref 98–107)
CO2 SERPL-SCNC: 19 MMOL/L (ref 22–29)
CREAT SERPL-MCNC: 1.56 MG/DL (ref 0.76–1.27)
DEPRECATED RDW RBC AUTO: 42.9 FL (ref 37–54)
ERYTHROCYTE [DISTWIDTH] IN BLOOD BY AUTOMATED COUNT: 13.2 % (ref 12.3–15.4)
GFR SERPL CREATININE-BSD FRML MDRD: 42 ML/MIN/1.73
GLOBULIN UR ELPH-MCNC: 2.2 GM/DL
GLUCOSE SERPL-MCNC: 87 MG/DL (ref 65–99)
HCT VFR BLD AUTO: 22.3 % (ref 37.5–51)
HGB BLD-MCNC: 7.6 G/DL (ref 13–17.7)
MCH RBC QN AUTO: 30.2 PG (ref 26.6–33)
MCHC RBC AUTO-ENTMCNC: 34.1 G/DL (ref 31.5–35.7)
MCV RBC AUTO: 88.5 FL (ref 79–97)
PLATELET # BLD AUTO: 377 10*3/MM3 (ref 140–450)
PMV BLD AUTO: 9.9 FL (ref 6–12)
POTASSIUM SERPL-SCNC: 3.4 MMOL/L (ref 3.5–5.2)
PROT SERPL-MCNC: 4.7 G/DL (ref 6–8.5)
RBC # BLD AUTO: 2.52 10*6/MM3 (ref 4.14–5.8)
SODIUM SERPL-SCNC: 130 MMOL/L (ref 136–145)
WBC # BLD AUTO: 9.65 10*3/MM3 (ref 3.4–10.8)

## 2020-11-06 PROCEDURE — 85027 COMPLETE CBC AUTOMATED: CPT | Performed by: HOSPITALIST

## 2020-11-06 PROCEDURE — 86900 BLOOD TYPING SEROLOGIC ABO: CPT

## 2020-11-06 PROCEDURE — 47563 LAPARO CHOLECYSTECTOMY/GRAPH: CPT | Performed by: PHYSICIAN ASSISTANT

## 2020-11-06 PROCEDURE — 74300 X-RAY BILE DUCTS/PANCREAS: CPT

## 2020-11-06 PROCEDURE — 80053 COMPREHEN METABOLIC PANEL: CPT | Performed by: HOSPITALIST

## 2020-11-06 PROCEDURE — BF131ZZ FLUOROSCOPY OF GALLBLADDER AND BILE DUCTS USING LOW OSMOLAR CONTRAST: ICD-10-PCS | Performed by: SURGERY

## 2020-11-06 PROCEDURE — 0FT44ZZ RESECTION OF GALLBLADDER, PERCUTANEOUS ENDOSCOPIC APPROACH: ICD-10-PCS | Performed by: SURGERY

## 2020-11-06 PROCEDURE — P9016 RBC LEUKOCYTES REDUCED: HCPCS

## 2020-11-06 PROCEDURE — 25010000002 FENTANYL CITRATE (PF) 100 MCG/2ML SOLUTION: Performed by: ANESTHESIOLOGY

## 2020-11-06 PROCEDURE — 36430 TRANSFUSION BLD/BLD COMPNT: CPT

## 2020-11-06 PROCEDURE — 25010000002 PIPERACILLIN SOD-TAZOBACTAM PER 1 G: Performed by: HOSPITALIST

## 2020-11-06 PROCEDURE — 25010000002 NEOSTIGMINE PER 0.5 MG: Performed by: ANESTHESIOLOGY

## 2020-11-06 PROCEDURE — 25010000002 ONDANSETRON PER 1 MG: Performed by: ANESTHESIOLOGY

## 2020-11-06 PROCEDURE — 0 IOTHALAMATE 60 % SOLUTION: Performed by: SURGERY

## 2020-11-06 PROCEDURE — 25010000002 PROPOFOL 10 MG/ML EMULSION: Performed by: ANESTHESIOLOGY

## 2020-11-06 PROCEDURE — 47563 LAPARO CHOLECYSTECTOMY/GRAPH: CPT | Performed by: SURGERY

## 2020-11-06 PROCEDURE — 88304 TISSUE EXAM BY PATHOLOGIST: CPT | Performed by: SURGERY

## 2020-11-06 DEVICE — SEAL HEMO SURG ARISTA/AH ABS/PWDR 3GM: Type: IMPLANTABLE DEVICE | Site: ABDOMEN | Status: FUNCTIONAL

## 2020-11-06 RX ORDER — PROMETHAZINE HYDROCHLORIDE 25 MG/1
25 SUPPOSITORY RECTAL ONCE AS NEEDED
Status: DISCONTINUED | OUTPATIENT
Start: 2020-11-06 | End: 2020-11-07 | Stop reason: HOSPADM

## 2020-11-06 RX ORDER — GLYCOPYRROLATE 0.2 MG/ML
INJECTION INTRAMUSCULAR; INTRAVENOUS AS NEEDED
Status: DISCONTINUED | OUTPATIENT
Start: 2020-11-06 | End: 2020-11-06 | Stop reason: SURG

## 2020-11-06 RX ORDER — PROMETHAZINE HYDROCHLORIDE 25 MG/1
25 TABLET ORAL ONCE AS NEEDED
Status: DISCONTINUED | OUTPATIENT
Start: 2020-11-06 | End: 2020-11-07 | Stop reason: HOSPADM

## 2020-11-06 RX ORDER — HYDROCODONE BITARTRATE AND ACETAMINOPHEN 7.5; 325 MG/1; MG/1
1 TABLET ORAL ONCE AS NEEDED
Status: DISCONTINUED | OUTPATIENT
Start: 2020-11-06 | End: 2020-11-07 | Stop reason: HOSPADM

## 2020-11-06 RX ORDER — OXYCODONE AND ACETAMINOPHEN 7.5; 325 MG/1; MG/1
1 TABLET ORAL ONCE AS NEEDED
Status: DISCONTINUED | OUTPATIENT
Start: 2020-11-06 | End: 2020-11-07 | Stop reason: HOSPADM

## 2020-11-06 RX ORDER — FENTANYL CITRATE 50 UG/ML
INJECTION, SOLUTION INTRAMUSCULAR; INTRAVENOUS AS NEEDED
Status: DISCONTINUED | OUTPATIENT
Start: 2020-11-06 | End: 2020-11-06 | Stop reason: SURG

## 2020-11-06 RX ORDER — SODIUM CHLORIDE 0.9 % (FLUSH) 0.9 %
3-10 SYRINGE (ML) INJECTION AS NEEDED
Status: DISCONTINUED | OUTPATIENT
Start: 2020-11-06 | End: 2020-11-06 | Stop reason: HOSPADM

## 2020-11-06 RX ORDER — FENTANYL CITRATE 50 UG/ML
50 INJECTION, SOLUTION INTRAMUSCULAR; INTRAVENOUS
Status: DISCONTINUED | OUTPATIENT
Start: 2020-11-06 | End: 2020-11-07 | Stop reason: HOSPADM

## 2020-11-06 RX ORDER — FLUMAZENIL 0.1 MG/ML
0.2 INJECTION INTRAVENOUS AS NEEDED
Status: DISCONTINUED | OUTPATIENT
Start: 2020-11-06 | End: 2020-11-07 | Stop reason: HOSPADM

## 2020-11-06 RX ORDER — DIPHENHYDRAMINE HCL 25 MG
25 CAPSULE ORAL
Status: DISCONTINUED | OUTPATIENT
Start: 2020-11-06 | End: 2020-11-07 | Stop reason: HOSPADM

## 2020-11-06 RX ORDER — LIDOCAINE HYDROCHLORIDE 20 MG/ML
INJECTION, SOLUTION INFILTRATION; PERINEURAL AS NEEDED
Status: DISCONTINUED | OUTPATIENT
Start: 2020-11-06 | End: 2020-11-06 | Stop reason: SURG

## 2020-11-06 RX ORDER — ROCURONIUM BROMIDE 10 MG/ML
INJECTION, SOLUTION INTRAVENOUS AS NEEDED
Status: DISCONTINUED | OUTPATIENT
Start: 2020-11-06 | End: 2020-11-06 | Stop reason: SURG

## 2020-11-06 RX ORDER — MAGNESIUM HYDROXIDE 1200 MG/15ML
LIQUID ORAL AS NEEDED
Status: DISCONTINUED | OUTPATIENT
Start: 2020-11-06 | End: 2020-11-06 | Stop reason: HOSPADM

## 2020-11-06 RX ORDER — DIPHENHYDRAMINE HYDROCHLORIDE 50 MG/ML
12.5 INJECTION INTRAMUSCULAR; INTRAVENOUS
Status: DISCONTINUED | OUTPATIENT
Start: 2020-11-06 | End: 2020-11-07 | Stop reason: HOSPADM

## 2020-11-06 RX ORDER — HYDROMORPHONE HYDROCHLORIDE 1 MG/ML
0.5 INJECTION, SOLUTION INTRAMUSCULAR; INTRAVENOUS; SUBCUTANEOUS
Status: DISCONTINUED | OUTPATIENT
Start: 2020-11-06 | End: 2020-11-07 | Stop reason: HOSPADM

## 2020-11-06 RX ORDER — BUPIVACAINE HYDROCHLORIDE AND EPINEPHRINE 5; 5 MG/ML; UG/ML
INJECTION, SOLUTION PERINEURAL AS NEEDED
Status: DISCONTINUED | OUTPATIENT
Start: 2020-11-06 | End: 2020-11-06 | Stop reason: HOSPADM

## 2020-11-06 RX ORDER — EPHEDRINE SULFATE 50 MG/ML
5 INJECTION, SOLUTION INTRAVENOUS ONCE AS NEEDED
Status: DISCONTINUED | OUTPATIENT
Start: 2020-11-06 | End: 2020-11-07 | Stop reason: HOSPADM

## 2020-11-06 RX ORDER — LABETALOL HYDROCHLORIDE 5 MG/ML
5 INJECTION, SOLUTION INTRAVENOUS
Status: DISCONTINUED | OUTPATIENT
Start: 2020-11-06 | End: 2020-11-07 | Stop reason: HOSPADM

## 2020-11-06 RX ORDER — SODIUM CHLORIDE, SODIUM LACTATE, POTASSIUM CHLORIDE, CALCIUM CHLORIDE 600; 310; 30; 20 MG/100ML; MG/100ML; MG/100ML; MG/100ML
9 INJECTION, SOLUTION INTRAVENOUS CONTINUOUS
Status: DISCONTINUED | OUTPATIENT
Start: 2020-11-06 | End: 2020-11-07

## 2020-11-06 RX ORDER — LIDOCAINE HYDROCHLORIDE 10 MG/ML
0.5 INJECTION, SOLUTION EPIDURAL; INFILTRATION; INTRACAUDAL; PERINEURAL ONCE AS NEEDED
Status: DISCONTINUED | OUTPATIENT
Start: 2020-11-06 | End: 2020-11-06 | Stop reason: HOSPADM

## 2020-11-06 RX ORDER — POTASSIUM CHLORIDE 750 MG/1
40 TABLET, FILM COATED, EXTENDED RELEASE ORAL ONCE
Status: DISCONTINUED | OUTPATIENT
Start: 2020-11-06 | End: 2020-11-07 | Stop reason: HOSPADM

## 2020-11-06 RX ORDER — PROPOFOL 10 MG/ML
VIAL (ML) INTRAVENOUS AS NEEDED
Status: DISCONTINUED | OUTPATIENT
Start: 2020-11-06 | End: 2020-11-06 | Stop reason: SURG

## 2020-11-06 RX ORDER — ONDANSETRON 2 MG/ML
4 INJECTION INTRAMUSCULAR; INTRAVENOUS ONCE AS NEEDED
Status: DISCONTINUED | OUTPATIENT
Start: 2020-11-06 | End: 2020-11-07 | Stop reason: HOSPADM

## 2020-11-06 RX ORDER — NALOXONE HCL 0.4 MG/ML
0.2 VIAL (ML) INJECTION AS NEEDED
Status: DISCONTINUED | OUTPATIENT
Start: 2020-11-06 | End: 2020-11-07 | Stop reason: HOSPADM

## 2020-11-06 RX ORDER — SODIUM CHLORIDE 0.9 % (FLUSH) 0.9 %
3 SYRINGE (ML) INJECTION EVERY 12 HOURS SCHEDULED
Status: DISCONTINUED | OUTPATIENT
Start: 2020-11-06 | End: 2020-11-06 | Stop reason: HOSPADM

## 2020-11-06 RX ADMIN — ROCURONIUM BROMIDE 30 MG: 10 INJECTION INTRAVENOUS at 21:58

## 2020-11-06 RX ADMIN — GLYCOPYRROLATE 0.5 MG: 0.2 INJECTION INTRAMUSCULAR; INTRAVENOUS at 23:10

## 2020-11-06 RX ADMIN — SODIUM CHLORIDE, PRESERVATIVE FREE 10 ML: 5 INJECTION INTRAVENOUS at 08:44

## 2020-11-06 RX ADMIN — LIDOCAINE HYDROCHLORIDE 60 MG: 20 INJECTION, SOLUTION INFILTRATION; PERINEURAL at 21:56

## 2020-11-06 RX ADMIN — PROPOFOL 100 MG: 10 INJECTION, EMULSION INTRAVENOUS at 21:58

## 2020-11-06 RX ADMIN — NEOSTIGMINE METHYLSULFATE 2.5 MG: 1 INJECTION INTRAMUSCULAR; INTRAVENOUS; SUBCUTANEOUS at 23:10

## 2020-11-06 RX ADMIN — TAZOBACTAM SODIUM AND PIPERACILLIN SODIUM 3.38 G: 375; 3 INJECTION, SOLUTION INTRAVENOUS at 12:45

## 2020-11-06 RX ADMIN — FENTANYL CITRATE 50 MCG: 50 INJECTION INTRAMUSCULAR; INTRAVENOUS at 23:21

## 2020-11-06 RX ADMIN — SODIUM CHLORIDE 100 ML/HR: 9 INJECTION, SOLUTION INTRAVENOUS at 17:28

## 2020-11-06 RX ADMIN — FENTANYL CITRATE 25 MCG: 50 INJECTION INTRAMUSCULAR; INTRAVENOUS at 23:12

## 2020-11-06 RX ADMIN — ONDANSETRON HYDROCHLORIDE 4 MG: 2 SOLUTION INTRAMUSCULAR; INTRAVENOUS at 23:10

## 2020-11-06 RX ADMIN — METOPROLOL SUCCINATE 50 MG: 50 TABLET, EXTENDED RELEASE ORAL at 08:26

## 2020-11-06 RX ADMIN — TAZOBACTAM SODIUM AND PIPERACILLIN SODIUM 3.38 G: 375; 3 INJECTION, SOLUTION INTRAVENOUS at 04:02

## 2020-11-06 RX ADMIN — FENTANYL CITRATE 25 MCG: 50 INJECTION INTRAMUSCULAR; INTRAVENOUS at 21:57

## 2020-11-06 RX ADMIN — FAMOTIDINE 20 MG: 10 INJECTION INTRAVENOUS at 08:44

## 2020-11-06 RX ADMIN — SODIUM CHLORIDE, POTASSIUM CHLORIDE, SODIUM LACTATE AND CALCIUM CHLORIDE: 600; 310; 30; 20 INJECTION, SOLUTION INTRAVENOUS at 21:49

## 2020-11-06 NOTE — PROGRESS NOTES
Chief Complaint:    Choledocholithiasis    Subjective:    Underwent ERCP today.  Feels fine now.    Objective:    Vitals:    11/05/20 1838 11/05/20 1848 11/05/20 1932 11/05/20 2141   BP: 116/63 115/60 120/65 135/65   BP Location: Left arm Right arm Left arm Right arm   Patient Position: Lying Lying Lying Lying   Pulse: 66 67 66 70   Resp: 16 16 16 16   Temp:   97 °F (36.1 °C) 97.3 °F (36.3 °C)   TempSrc:   Oral Oral   SpO2: 98% 98% 97% 97%   Weight:       Height:           Lungs: Clear  Heart: Regular  Abdomen: Soft.  Nondistended.  Extremities: Warm    Labs reviewed.  WBC 10.90, Hgb 7.8, platelets 352.  Creatinine 1.50.  AlkP 1370, , , T bili 14.4.    Assessment:    Choledocholithiasis    Plan:    I recommended proceeding tomorrow with laparoscopic cholecystectomy.  We discussed details of the surgery.  We discussed the performance of cholangiogram.  We discussed potential postoperative GI disturbances-particularly diarrhea.  He is interested in proceeding with surgery according to my recommendation.

## 2020-11-06 NOTE — PLAN OF CARE
Goal Outcome Evaluation:  Plan of Care Reviewed With: patient, son  Progress: improving  Outcome Summary: AO x 4, VSS. RA. IVF and IV abx per order. 1 unit PRBC given with no complications. Pt NPO for surgery today. Pre surgery shower performed independently after education on how to do so.

## 2020-11-06 NOTE — NURSING NOTE
Spoke with Dr Ryan with general surgery regarding order to transfuse 1 unit PRBC. Stated from her standpoint HGB of 7.8 is okay and no need to transfuse PRBC at this time. Will continue to monitor.

## 2020-11-06 NOTE — PROGRESS NOTES
"    DAILY PROGRESS NOTE  [facility]    Patient Identification:  Name: Radha Win  Age: 88 y.o.  Sex: male  :  1932  MRN: [id number]         Primary Care Physician: Delgado Patricia MD    Subjective:  Interval History: Overall clinically feeling better.  He has had resolution of nausea vomiting abdominal pain.  He has no new issues with chest pain shortness of breath altered mentation fever chills or night sweats.  Still admits to yellowing of the skin and denies any new rash or lesion.  Good urine output    Objective: No family at bedside.  Patient very pleasant and appreciative.  Nontoxic-appearing and conversational.  Discussed case with RN    Scheduled Meds:allopurinol, 100 mg, Oral, Daily  amLODIPine, 5 mg, Oral, Q24H  calcitriol, 1 mcg, Oral, Daily  famotidine, 20 mg, Intravenous, Daily  folic acid, 1 mg, Oral, Daily  iopamidol, 100 mL, Intravenous, Once in imaging  isosorbide mononitrate, 30 mg, Oral, Q24H  metoprolol succinate XL, 50 mg, Oral, Q24H  piperacillin-tazobactam, 3.375 g, Intravenous, Q8H  sodium chloride, 10 mL, Intravenous, Q12H      Continuous Infusions:sodium chloride, 100 mL/hr, Last Rate: 100 mL/hr (20 1001)  sodium chloride, 30 mL/hr, Last Rate: Stopped (20 1847)        Vital signs in last 24 hours:  Temp:  [97 °F (36.1 °C)-98 °F (36.7 °C)] 98 °F (36.7 °C)  Heart Rate:  [66-76] 76  Resp:  [16-18] 18  BP: (110-135)/(60-70) 126/69    Intake/Output:    Intake/Output Summary (Last 24 hours) at 2020 0801  Last data filed at 2020 0634  Gross per 24 hour   Intake 923 ml   Output --   Net 923 ml       Exam:  /69   Pulse 76   Temp 98 °F (36.7 °C) (Oral)   Resp 18   Ht 172.7 cm (68\")   Wt 74.8 kg (165 lb)   SpO2 99%   BMI 25.09 kg/m²     General Appearance:    Alert, cooperative, AAOx3                          Head:    Normocephalic, without obvious abnormality, atraumatic                           Eyes:  Remaining icterus             "             throat:   Oral mucosa pink and moist                           Neck:   No JVD                         Lungs:    Clear to auscultation bilaterally, respirations unlabored                 Chest Wall:    No tenderness or deformity                          Heart:    Regular rate and rhythm, S1 and S2 normal                  Abdomen:     Soft, pretty much resolved right upper quadrant tenderness to palpation with no rebound guarding and positive bowel sounds without distention                 extremities: Moving all with appropriate strength, no cyanosis or edema                        Pulses:   Pulses palpable in lower extremities                            Skin:   Skin is warm and dry, remaining jaundice                  Neurologic:   CNII-XII intact, no focal deficits noted     Data Review:  Labs in chart were reviewed.    Assessment:  Active Hospital Problems    Diagnosis  POA   • **Choledocholithiasis [K80.50]  Unknown   • Gallstone pancreatitis [K85.10]  Unknown   • Acute cholecystitis [K81.0]  Unknown   • Elevated LFTs [R79.89]  Yes   • Jaundice [R17]  Yes   • Hyperbilirubinemia [E80.6]  Yes   • Situational depression [F43.21]  Yes   • Statin intolerance, Vytorin and pravastatin [Z78.9]  Yes   • Vitamin B12 deficiency [E53.8]  Yes   • Gastroesophageal reflux disease without esophagitis [K21.9]  Yes   • Chronic renal insufficiency, stage IV (severe), 02/09/2016--creatinine 1.85 [N18.4]  Yes   • Benign essential hypertension [I10]  Yes   • Anemia due to stage 4 chronic kidney disease (CMS/HCC) [N18.4, D63.1]  Yes      Resolved Hospital Problems   No resolved problems to display.       Plan:    Gallstone pancreatitis with choledocholithiasis/acute cholecystitis with obstruction   -Empiric Zosyn D3   -Status post successful ERCP   -Cholecystectomy today   -Proactive transfusion of 1 unit packed red blood cells as hemoglobin had trended down to 7.8 with upcoming surgical intervention and I do not want to  try to provoke anything cardiac wise and I think it would be medically in his best interest to proceed with the transfusion especially as anemia is also compounded by CKD stage IV.  No objection per surgeon  per discussion with RN   -Advised RN to go ahead and give p.o. Toprol this a.m. despite n.p.o. status   -IV Pepcid   -A.m. labs still pending as phlebotomy was actually at bedside during my exam this a.m.    HTN stable    SCDs for DVT prophylaxis      Addendum -labs reviewed and will replace K.  LFTs otherwise improving.  Hemoglobin also trended down to 7.6 further supporting previously ordered transfusion    Jasmeet Tamayo MD  11/6/2020  08:01 EST

## 2020-11-06 NOTE — PLAN OF CARE
Goal Outcome Evaluation:  Plan of Care Reviewed With: patient  Progress: no change  Outcome Summary: A&O. VSS. RA. IVF per order. IV abx per order. No complaints of pain. NPO for Lap Jose Daniely today. Consents signed and in chart. PRBC not given per Dr Cabrera. Up with standby. Will continue to monitor.

## 2020-11-06 NOTE — ANESTHESIA POSTPROCEDURE EVALUATION
"Patient: Radha Win    Procedure Summary     Date: 11/05/20 Room / Location:  [facility]    Anesthesia Start: 1749 Anesthesia Stop: 1839    Procedure: ENDOSCOPIC RETROGRADE CHOLANGIOPANCREATOGRAPHY with sphincterotomy, basket retrieval and balloon sweep (N/A ) Diagnosis:       Choledocholithiasis      (Choledocholithiasis [K80.50])    Surgeon: Joseluis Mejia MD Provider: Sondra Sainz MD    Anesthesia Type: MAC ASA Status: 3          Anesthesia Type: MAC    Vitals  Vitals Value Taken Time   /60 11/05/20 1848   Temp     Pulse 67 11/05/20 1848   Resp 16 11/05/20 1848   SpO2 98 % 11/05/20 1848           Post Anesthesia Care and Evaluation    Patient participation: complete - patient cannot participate  Anesthetic complications: No anesthetic complications    Cardiovascular status: acceptable  Respiratory status: acceptable    Comments: Patient record reviewed. Patient stable and discharged prior to being evaluated. No apparent anesthetic complications.  THIS CASE IS NOT MEDICALLY DIRECTED.  /65 (BP Location: Left arm, Patient Position: Lying)   Pulse 66   Temp 36.1 °C (97 °F) (Oral)   Resp 16   Ht 172.7 cm (68\")   Wt 74.8 kg (165 lb)   SpO2 97%   BMI 25.09 kg/m²       "

## 2020-11-07 ENCOUNTER — READMISSION MANAGEMENT (OUTPATIENT)
Dept: CALL CENTER | Facility: HOSPITAL | Age: 85
End: 2020-11-07

## 2020-11-07 VITALS
OXYGEN SATURATION: 96 % | HEART RATE: 68 BPM | DIASTOLIC BLOOD PRESSURE: 61 MMHG | WEIGHT: 165 LBS | BODY MASS INDEX: 25.01 KG/M2 | SYSTOLIC BLOOD PRESSURE: 121 MMHG | TEMPERATURE: 98 F | HEIGHT: 68 IN | RESPIRATION RATE: 16 BRPM

## 2020-11-07 LAB
ALBUMIN SERPL-MCNC: 2.8 G/DL (ref 3.5–5.2)
ALBUMIN/GLOB SERPL: 1 G/DL
ALP SERPL-CCNC: 1324 U/L (ref 39–117)
ALT SERPL W P-5'-P-CCNC: 227 U/L (ref 1–41)
ANION GAP SERPL CALCULATED.3IONS-SCNC: 11.9 MMOL/L (ref 5–15)
AST SERPL-CCNC: 173 U/L (ref 1–40)
BH BB BLOOD EXPIRATION DATE: NORMAL
BH BB BLOOD TYPE BARCODE: 5100
BH BB DISPENSE STATUS: NORMAL
BH BB PRODUCT CODE: NORMAL
BH BB UNIT NUMBER: NORMAL
BILIRUB SERPL-MCNC: 13.3 MG/DL (ref 0–1.2)
BUN SERPL-MCNC: 19 MG/DL (ref 8–23)
BUN/CREAT SERPL: 12.9 (ref 7–25)
CALCIUM SPEC-SCNC: 9.2 MG/DL (ref 8.6–10.5)
CHLORIDE SERPL-SCNC: 98 MMOL/L (ref 98–107)
CO2 SERPL-SCNC: 23.1 MMOL/L (ref 22–29)
CREAT SERPL-MCNC: 1.47 MG/DL (ref 0.76–1.27)
CROSSMATCH INTERPRETATION: NORMAL
GFR SERPL CREATININE-BSD FRML MDRD: 45 ML/MIN/1.73
GLOBULIN UR ELPH-MCNC: 2.7 GM/DL
GLUCOSE SERPL-MCNC: 108 MG/DL (ref 65–99)
HCT VFR BLD AUTO: 28.1 % (ref 37.5–51)
HGB BLD-MCNC: 9.7 G/DL (ref 13–17.7)
MAGNESIUM SERPL-MCNC: 1.7 MG/DL (ref 1.6–2.4)
POTASSIUM SERPL-SCNC: 3.5 MMOL/L (ref 3.5–5.2)
PROT SERPL-MCNC: 5.5 G/DL (ref 6–8.5)
SODIUM SERPL-SCNC: 133 MMOL/L (ref 136–145)
UNIT  ABO: NORMAL
UNIT  RH: NORMAL

## 2020-11-07 PROCEDURE — 80053 COMPREHEN METABOLIC PANEL: CPT | Performed by: SURGERY

## 2020-11-07 PROCEDURE — 83735 ASSAY OF MAGNESIUM: CPT | Performed by: SURGERY

## 2020-11-07 PROCEDURE — 85014 HEMATOCRIT: CPT | Performed by: SURGERY

## 2020-11-07 PROCEDURE — 99024 POSTOP FOLLOW-UP VISIT: CPT | Performed by: STUDENT IN AN ORGANIZED HEALTH CARE EDUCATION/TRAINING PROGRAM

## 2020-11-07 PROCEDURE — 25010000002 PIPERACILLIN SOD-TAZOBACTAM PER 1 G: Performed by: INTERNAL MEDICINE

## 2020-11-07 PROCEDURE — 85018 HEMOGLOBIN: CPT | Performed by: SURGERY

## 2020-11-07 RX ORDER — ONDANSETRON 2 MG/ML
INJECTION INTRAMUSCULAR; INTRAVENOUS AS NEEDED
Status: DISCONTINUED | OUTPATIENT
Start: 2020-11-06 | End: 2020-11-07 | Stop reason: SURG

## 2020-11-07 RX ORDER — HYDROCODONE BITARTRATE AND ACETAMINOPHEN 5; 325 MG/1; MG/1
1 TABLET ORAL EVERY 4 HOURS PRN
Status: DISCONTINUED | OUTPATIENT
Start: 2020-11-07 | End: 2020-11-07 | Stop reason: HOSPADM

## 2020-11-07 RX ORDER — HYDROCODONE BITARTRATE AND ACETAMINOPHEN 10; 325 MG/1; MG/1
1 TABLET ORAL EVERY 4 HOURS PRN
Status: DISCONTINUED | OUTPATIENT
Start: 2020-11-07 | End: 2020-11-07 | Stop reason: HOSPADM

## 2020-11-07 RX ORDER — ACETAMINOPHEN 325 MG/1
650 TABLET ORAL EVERY 4 HOURS PRN
Start: 2020-11-07 | End: 2020-11-20

## 2020-11-07 RX ORDER — MORPHINE SULFATE 2 MG/ML
2 INJECTION, SOLUTION INTRAMUSCULAR; INTRAVENOUS EVERY 4 HOURS PRN
Status: DISCONTINUED | OUTPATIENT
Start: 2020-11-07 | End: 2020-11-07 | Stop reason: HOSPADM

## 2020-11-07 RX ADMIN — ISOSORBIDE MONONITRATE 30 MG: 30 TABLET ORAL at 08:20

## 2020-11-07 RX ADMIN — FOLIC ACID 1 MG: 1 TABLET ORAL at 08:20

## 2020-11-07 RX ADMIN — CALCITRIOL 1 MCG: 0.25 CAPSULE ORAL at 08:20

## 2020-11-07 RX ADMIN — ALLOPURINOL 100 MG: 100 TABLET ORAL at 08:20

## 2020-11-07 RX ADMIN — METOPROLOL SUCCINATE 50 MG: 50 TABLET, EXTENDED RELEASE ORAL at 08:20

## 2020-11-07 RX ADMIN — SODIUM CHLORIDE, PRESERVATIVE FREE 10 ML: 5 INJECTION INTRAVENOUS at 08:20

## 2020-11-07 RX ADMIN — TAZOBACTAM SODIUM AND PIPERACILLIN SODIUM 3.38 G: 375; 3 INJECTION, SOLUTION INTRAVENOUS at 03:45

## 2020-11-07 RX ADMIN — AMLODIPINE BESYLATE 5 MG: 5 TABLET ORAL at 08:20

## 2020-11-07 RX ADMIN — FAMOTIDINE 20 MG: 10 INJECTION INTRAVENOUS at 08:20

## 2020-11-07 NOTE — OUTREACH NOTE
Prep Survey      Responses   [facility] patient discharged from?  [place]   Is LACE score < 7 ?  No   Eligibility  [facility]   Date of Admission  11/03/20   Date of Discharge  11/07/20   Discharge Disposition  Home or Self Care   Discharge diagnosis  Lap komal this visit   Does the patient have one of the following disease processes/diagnoses(primary or secondary)?  General Surgery   Does the patient have Home health ordered?  No   Is there a DME ordered?  No   Prep survey completed?  Yes          Madelaine Cabrera RN

## 2020-11-07 NOTE — PLAN OF CARE
"Goal Outcome Evaluation:  Plan of Care Reviewed With: patient  Progress: improving   Pt has done well since returning from sx, 4 lap sites look good with no drainage, VSS, pt has no c/o pain, not requiring PRN pain med, did have upset stomach when first returned to floor but refused nausea med. This morning states he feels \"much better\", ABT given as ordered, due to void  "

## 2020-11-07 NOTE — PROGRESS NOTES
"GENERAL SURGERY PROGRESS NOTE    SUMMARY: Mr. Radha Win is a 88 y.o. year old gentleman POD#1 s/p laparoscopic cholecystectomy.  Okay to resume anticoagulation. Ok for d/c.     Interval Events: No acute events. Pain controlled, tolerating his diet.     O:/68 (BP Location: Right arm, Patient Position: Lying)   Pulse 72   Temp 97.2 °F (36.2 °C) (Oral)   Resp 16   Ht 172.7 cm (68\")   Wt 74.8 kg (165 lb)   SpO2 100%   BMI 25.09 kg/m²     GENERAL: alert, well appearing, and in no distress  HEENT: normocephalic, atraumatic, moist mucous membranes, clear sclerae   CHEST: no increased work of breathing, symmetric air entry  CARDIAC: regular rate and rhythm    ABDOMEN: incisions clean and dry, no distention  EXTREMITIES: no cyanosis, clubbing, or edema   SKIN: Warm and moist, no rashes    LABS  Results from last 7 days   Lab Units 11/06/20  0741 11/05/20  0549 11/04/20  0742   WBC 10*3/mm3 9.65 10.90* 11.57*   HEMOGLOBIN g/dL 7.6* 7.8* 8.0*   HEMATOCRIT % 22.3* 23.2* 24.4*   PLATELETS 10*3/mm3 377 352 347     Results from last 7 days   Lab Units 11/06/20  0741 11/05/20  0549 11/04/20  0742   SODIUM mmol/L 130* 130* 129*   POTASSIUM mmol/L 3.4* 3.5 3.5   CHLORIDE mmol/L 102 99 98   CO2 mmol/L 19.0* 20.4* 23.3   BUN mg/dL 19 21 21   CREATININE mg/dL 1.56* 1.50* 1.41*   CALCIUM mg/dL 8.3* 8.4* 8.6   BILIRUBIN mg/dL 12.8* 14.4* 17.8*   ALK PHOS U/L 1,161* 1,370* 1,517*   ALT (SGPT) U/L 203* 239* 307*   AST (SGOT) U/L 159* 178* 226*   GLUCOSE mg/dL 87 93 98           BOO NAVAS MD  General and Endoscopic Surgery  [facility]    [address], [address]  [place], [place], [address]  P: [phone]  F: [phone]     "

## 2020-11-07 NOTE — ANESTHESIA PREPROCEDURE EVALUATION
Anesthesia Evaluation     Patient summary reviewed and Nursing notes reviewed   NPO Solid Status: > 8 hours  NPO Liquid Status: > 2 hours           Airway   Mallampati: II  Dental - normal exam   (+) upper dentures    Pulmonary - normal exam   Cardiovascular - normal exam    ECG reviewed    (+) hypertension, CAD, angina, hyperlipidemia,  carotid artery disease      Neuro/Psych  (+) psychiatric history Anxiety,     GI/Hepatic/Renal/Endo    (+)  GERD,  renal disease CRI,     Musculoskeletal     (+) neck pain,   Abdominal    Substance History      OB/GYN          Other   arthritis,                        Anesthesia Plan    ASA 3     general     intravenous induction     Anesthetic plan, all risks, benefits, and alternatives have been provided, discussed and informed consent has been obtained with: patient.

## 2020-11-07 NOTE — ANESTHESIA PROCEDURE NOTES
Airway  Urgency: elective    Date/Time: 11/6/2020 10:00 PM  Airway not difficult    General Information and Staff    Patient location during procedure: OR  Anesthesiologist: Harjinder Powell MD    Indications and Patient Condition  Indications for airway management: airway protection    Preoxygenated: yes  MILS maintained throughout  Mask difficulty assessment: 1 - vent by mask    Final Airway Details  Final airway type: endotracheal airway      Successful airway: ETT  Cuffed: yes   Successful intubation technique: direct laryngoscopy  Endotracheal tube insertion site: oral  Blade: Ramírez  Blade size: 3  ETT size (mm): 7.5  Cormack-Lehane Classification: grade I - full view of glottis  Placement verified by: capnometry   Measured from: teeth  ETT/EBT  to teeth (cm): 21  Number of attempts at approach: 1  Assessment: lips, teeth, and gum same as pre-op and atraumatic intubation

## 2020-11-07 NOTE — DISCHARGE SUMMARY
[facility]    Date of Discharge:  11/7/2020    PCP: Delgado Patricia MD    Discharge Diagnosis:   Active Hospital Problems    Diagnosis  POA   • **Choledocholithiasis [K80.50]  Unknown   • Gallstone pancreatitis [K85.10]  Unknown   • Acute cholecystitis [K81.0]  Unknown   • Elevated LFTs [R79.89]  Yes   • Jaundice [R17]  Yes   • Hyperbilirubinemia [E80.6]  Yes   • Situational depression [F43.21]  Yes   • Statin intolerance, Vytorin and pravastatin [Z78.9]  Yes   • Vitamin B12 deficiency [E53.8]  Yes   • Gastroesophageal reflux disease without esophagitis [K21.9]  Yes   • Chronic renal insufficiency, stage IV (severe), 02/09/2016--creatinine 1.85 [N18.4]  Yes   • Benign essential hypertension [I10]  Yes   • Anemia due to stage 4 chronic kidney disease (CMS/HCC) [N18.4, D63.1]  Yes      Resolved Hospital Problems   No resolved problems to display.     Procedure(s):  CHOLECYSTECTOMY LAPAROSCOPIC INTRAOPERATIVE CHOLANGIOGRAM  11/05 1648 ERCP  Consults     Date and Time Order Name Status Description    11/4/2020 0907 Inpatient General Surgery Consult      11/3/2020 1446 Inpatient Gastroenterology Consult Completed         Hospital Course  Please see history and physical for details. Patient is a 88 y.o. male initially admitted for abdominal pain jaundice fatigue and elevated LFTs.  Etiology of this was made clear after MRCP which demonstrated gallstone pancreatitis with choledocholithiasis/acute cholecystitis with obstruction.  GI and general surgery saw in consultation and appreciate input and assistance from both.  Patient underwent ERCP per Dr. Mejia on 11/4/2020 and subsequently underwent laparoscopic cholecystectomy per  on 11/6/2020.  Patient tolerated both procedures well without any acute issues.  At this juncture he is now tolerating dietary advancement.  As long as he can tolerate a regular diet with lunch he is free to go home later on  this afternoon.  His pain control requirements have been minimal at best postoperatively as he has not required any opiates.  He can use Tylenol as needed post discharge for any discomfort.  Case was discussed with RN on day of discharge.  Son was also present at bedside and all questions answered to the best my ability to son as well as patient.  He was bridged with IV Zosyn while inpatient but no additional antibiotics will be prescribed post discharge unless warranted per surgery.  We did give a unit of blood preoperatively due to dilutional effect from IVF as well as to prevent any cardiac issues once objective anesthesia.  I feel this patient will continue to clinically improve and should do well in an outpatient setting with good follow-up.  All involved are amenable to the above plan.       Condition on Discharge: Improved.     Temp:  [96.9 °F (36.1 °C)-97.9 °F (36.6 °C)] 97.2 °F (36.2 °C)  Heart Rate:  [62-72] 72  Resp:  [14-16] 16  BP: (123-155)/(59-81) 155/68  Body mass index is 25.09 kg/m².    Physical Exam  Constitutional:       General: He is not in acute distress.     Appearance: He is normal weight. He is not toxic-appearing.   HENT:      Head: Normocephalic.      Mouth/Throat:      Mouth: Mucous membranes are moist.      Pharynx: Oropharynx is clear.   Eyes:      General: Scleral icterus present.      Extraocular Movements: Extraocular movements intact.      Pupils: Pupils are equal, round, and reactive to light.   Cardiovascular:      Rate and Rhythm: Normal rate and regular rhythm.   Pulmonary:      Effort: Pulmonary effort is normal.      Breath sounds: Normal breath sounds.   Abdominal:      General: Bowel sounds are normal. There is no distension.      Palpations: Abdomen is soft. There is no mass.      Tenderness: There is no guarding.      Comments: Expected postoperative tenderness to palpation mild   Musculoskeletal:         General: No swelling.   Skin:     General: Skin is warm and dry.       Coloration: Skin is jaundiced.   Neurological:      General: No focal deficit present.      Mental Status: He is alert and oriented to person, place, and time.   Psychiatric:         Mood and Affect: Mood normal.         Behavior: Behavior normal.         Thought Content: Thought content normal.         Judgment: Judgment normal.     [unfilled]    Disposition: Home or Self Care       Discharge Medications      New Medications      Instructions Start Date   acetaminophen 325 MG tablet  Commonly known as: TYLENOL   650 mg, Oral, Every 4 Hours PRN         Changes to Medications      Instructions Start Date   fluticasone 50 MCG/ACT nasal spray  Commonly known as: Flonase  What changed:   · when to take this  · reasons to take this   Administer 2 sprays in each nostril once daily for environmental allergies and congestion.         Continue These Medications      Instructions Start Date   allopurinol 100 MG tablet  Commonly known as: ZYLOPRIM   Take 1 p.o. daily for gout/elevated uric acid      ALPRAZolam 0.5 MG tablet  Commonly known as: XANAX   TAKE ONE TABLET BY MOUTH TWICE A DAY      amLODIPine 5 MG tablet  Commonly known as: NORVASC   Take 1 p.o. daily for high blood pressure      Aspirin Low Dose 81 MG tablet  Generic drug: aspirin   1 tablet, Oral, Daily      calcitriol 0.25 MCG capsule  Commonly known as: ROCALTROL   0.25 mcg, Oral, Daily      Evolocumab 140 MG/ML solution auto-injector   140 mg, Subcutaneous, Every 14 Days      folic acid 1 MG tablet  Commonly known as: FOLVITE   TAKE ONE TABLET BY MOUTH TWICE A DAY      hydrOXYzine 25 MG tablet  Commonly known as: ATARAX   Take 1-2 p.o. every 4 to 6 hours as needed for pruritus.      isosorbide mononitrate 30 MG 24 hr tablet  Commonly known as: IMDUR   TAKE ONE TABLET BY MOUTH EVERY MORNING FOR HEART      metoprolol succinate XL 50 MG 24 hr tablet  Commonly known as: TOPROL-XL   50 mg, Oral, Daily      nitroglycerin 0.6 MG SL tablet  Commonly known as: NITROSTAT    0.6 mg, Sublingual, Every 5 Minutes PRN, Maximum of 3.Go to ER after third dose.      omeprazole 40 MG capsule  Commonly known as: priLOSEC   TAKE ONE CAPSULE BY MOUTH DAILY      permethrin 5 % cream  Commonly known as: ELIMITE   Apply topically to affected skin daily x 7 days, until cured      Super B Complex Maxi tablet   Take 1 p.o. twice daily      Trintellix 10 MG tablet  Generic drug: Vortioxetine HBr   Take 1 p.o. daily at nighttime for depression and anxiety      zolpidem 5 MG tablet  Commonly known as: AMBIEN   Take 1 p.o. nightly as needed insomnia              Additional Instructions for the Follow-ups that You Need to Schedule     Discharge Follow-up with PCP   As directed       Currently Documented PCP:    Delgado Patricia MD    PCP Phone Number:    [phone]     Follow Up Details: PCP in 1 to 2 weeks.  General surgery and GI per their recommendations           Follow-up Information     Delgado Patricia MD .    Specialty: Internal Medicine  Why: PCP in 1 to 2 weeks.  General surgery and GI per their recommendations  Contact information:  [address] [address]  [phone]                  Pending Labs     Order Current Status    Comprehensive Metabolic Panel Collected (11/07/20 0830)    Hemoglobin & Hematocrit, Blood Collected (11/07/20 0830)    Magnesium Collected (11/07/20 0830)    Tissue Pathology Exam Collected (11/06/20 2250)         Jasmeet Tamayo MD  11/07/20  08:39 EST    Discharge time spent greater than 30 minutes.

## 2020-11-07 NOTE — ANESTHESIA POSTPROCEDURE EVALUATION
"Patient: Radha Win    Procedure Summary     Date: 11/06/20 Room / Location: [facility] OR  / [facility] MAIN OR    Anesthesia Start: 2149 Anesthesia Stop: 2329    Procedure: CHOLECYSTECTOMY LAPAROSCOPIC INTRAOPERATIVE CHOLANGIOGRAM (N/A Abdomen) Diagnosis:       Choledocholithiasis      (Choledocholithiasis [K80.50])    Surgeon: Sanjay Burgess MD Provider: Harjinder Powell MD    Anesthesia Type: general ASA Status: 3          Anesthesia Type: general    Vitals  Vitals Value Taken Time   /64 11/06/20 2345   Temp 36.5 °C (97.7 °F) 11/06/20 2326   Pulse 66 11/06/20 2353   Resp 16 11/06/20 2345   SpO2 98 % 11/06/20 2353   Vitals shown include unvalidated device data.        Post Anesthesia Care and Evaluation    Patient location during evaluation: bedside  Patient participation: complete - patient participated  Level of consciousness: awake and alert  Pain management: adequate  Airway patency: patent  Anesthetic complications: No anesthetic complications    Cardiovascular status: acceptable  Respiratory status: acceptable  Hydration status: acceptable    Comments: /64 (BP Location: Right arm, Patient Position: Lying)   Pulse 67   Temp 36.5 °C (97.7 °F) (Oral)   Resp 16   Ht 172.7 cm (68\")   Wt 74.8 kg (165 lb)   SpO2 97%   BMI 25.09 kg/m²       "

## 2020-11-07 NOTE — OP NOTE
PREOPERATIVE DIAGNOSIS:   Cholelithiasis.    POSTOPERATIVE DIAGNOSIS:   Cholelithiasis.  Chronic cholecystitis.    PROCEDURE:   Laparoscopic cholecystectomy with intraoperative fluoroscopic cholangiogram.    SURGEON: Sanjay Burgess M.D.    ASSISTANT: Cody Pritchett PA-C.   SPECIMENS: Gallbladder.  INTRAOPERATIVE COMPLICATIONS: None.  ANESTHESIA: General.  BLOOD LOSS:  10 mL.  COUNTS: Needle and sponge counts correct.     INDICATIONS: This is a pleasant patient who presented to the hospital with biliary symptoms and imaging studies showing choledocholithiasis.  He underwent ERCP yesterday.  He is taken to the OR today for laparoscopic cholecystectomy.    DESCRIPTION OF PROCEDURE: The patient was brought to the operating room in stable condition. Perioperative antibiotics were given and sequential compression devices were in place. He was then positioned supine on the operating room table. General anesthesia was induced without difficulty.    Access to the peritoneum was achieved in the supraumbilical position by a cutdown technique. A blunt 12-mm trocar was secured to the fascia with 0 Vicryl stay sutures. The abdomen was then insufflated to 15 mmHg pressure with carbon dioxide. A brief survey of the abdominal cavity revealed no evidence of injury from insertion of the trocar. The patient was placed in a reverse Trendelenburg position with the left-side tilted slightly down. Three additional 5-mm trocars were placed- in the subxyphoid, right subcostal, and right flank positions. This was under laparoscopic vision.       There were adhesions between the gallbladder and omentum, and the gallbladder wall was slightly thickened, consistent with chronic cholecystitis. The peritoneum at the base of the gallbladder was scored from laterally to medially, terminating at the level of the cystic artery. The peritoneum was gently stripped down, exposing the cystic duct. After completely exposing the cystic duct, a  retro-cystic window was created. A firm critical view was obtained, visualizing the cystic duct and cystic artery. There was no evidence of tenting of the common bile duct.     The cystic duct was a large caliber structure.  In order to perform a cholangiogram I felt it would be necessary to secure the catheter with an Endoloop.  The gallbladder was taken down from the liver bed in a dome down fashion.  An Endoloop was passed around the gallbladder.  A small incision was made in the proximal cystic duct.  A cholangiogram catheter was introduced into the abdominal cavity.  It was directed into the cystic duct.  It was held in place with the Endoloop.  A fluoroscopic cholangiogram was obtained.  I was able to see flow of contrast into the duodenum.  I was able to see the entire common bile duct.  I was able to see the branches of the hepatic ducts.  There were no intraluminal filling defects.  The cystic duct cholangiogram catheter was removed.    The cystic artery was clipped and divided.  The cystic duct was controlled with an Endoloop and divided.  The gallbladder was placed in an endoscopic retrieval bag. It was removed through the umbilical trocar site without difficulty.    A final survey of the operative site was undertaken, and there was no evidence of bleeding or intrabdominal injury.  I applied Neetu powder to the gallbladder fossa.  The insufflation was evacuated as the trocars were removed under laparoscopic vision. The umbilical fascia was closed with interrupted 0 Vicryl suture. The skin incisions were closed with 4-0 Monocryl and adhesive strips.    At the end of the case, all needle and sponge counts were correct, and he was extubated.

## 2020-11-09 ENCOUNTER — TRANSITIONAL CARE MANAGEMENT TELEPHONE ENCOUNTER (OUTPATIENT)
Dept: CALL CENTER | Facility: HOSPITAL | Age: 85
End: 2020-11-09

## 2020-11-09 NOTE — OUTREACH NOTE
Call Center TCM Note      Responses   [facility] patient discharged from?  [place]   Does the patient have one of the following disease processes/diagnoses(primary or secondary)?  General Surgery   TCM attempt successful?  Yes   Call start time  1230   Call end time  1233   Discharge diagnosis  Lap komal this visit   Meds reviewed with patient/caregiver?  Yes   Is the patient having any side effects they believe may be caused by any medication additions or changes?  No   Does the patient have all medications related to this admission filled (includes all antibiotics, pain medications, etc.)  Yes   Is the patient taking all medications as directed (includes completed medication regime)?  Yes   Does the patient have a follow up appointment scheduled with their surgeon?  Yes   Has the patient kept scheduled appointments due by today?  N/A   Comments  Patient has a followup with Dr Patricia on 11/20/2020 and surgeon on 11/24/2020   Has home health visited the patient within 72 hours of discharge?  N/A   Psychosocial issues?  No   Comments  Patient reports he is doing very well.    Did the patient receive a copy of their discharge instructions?  Yes   Nursing interventions  Reviewed instructions with patient   What is the patient's perception of their health status since discharge?  Improving   Nursing interventions  Nurse provided patient education   Is the patient /caregiver able to teach back basic post-op care?  Take showers only when approved by MD-sponge bathe until then, No tub bath, swimming, or hot tub until instructed by MD, Keep incision areas clean,dry and protected, Lifting as instructed by MD in discharge instructions   Is the patient/caregiver able to teach back signs and symptoms of incisional infection?  Increased redness, swelling or pain at the incisonal site, Increased drainage or bleeding, Incisional warmth, Pus or odor from incision, Fever   Is the patient/caregiver able to teach back steps to  recovery at home?  Set small, achievable goals for return to baseline health, Rest and rebuild strength, gradually increase activity, Make a list of questions for surgeon's appointment   If the patient is a current smoker, are they able to teach back resources for cessation?  Not a smoker   Is the patient/caregiver able to teach back the hierarchy of who to call/visit for symptoms/problems? PCP, Specialist, Home health nurse, Urgent Care, ED, 911  Yes   TCM call completed?  Yes          Yannick Morris RN    11/9/2020, 12:33 EST

## 2020-11-10 LAB
LAB AP CASE REPORT: NORMAL
PATH REPORT.FINAL DX SPEC: NORMAL
PATH REPORT.GROSS SPEC: NORMAL

## 2020-11-17 ENCOUNTER — READMISSION MANAGEMENT (OUTPATIENT)
Dept: CALL CENTER | Facility: HOSPITAL | Age: 85
End: 2020-11-17

## 2020-11-17 NOTE — OUTREACH NOTE
General Surgery Week 2 Survey      Responses   [facility], [place] by [facility] patient discharged from?  [place]   Does the patient have one of the following disease processes/diagnoses(primary or secondary)?  General Surgery   Week 2 attempt successful?  Yes   Call start time  0711   Call end time  0713   Discharge diagnosis  Lap komal this visit   Meds reviewed with patient/caregiver?  Yes   Is the patient taking all medications as directed (includes completed medication regime)?  Yes   Has the patient kept scheduled appointments due by today?  N/A   What is the patient's perception of their health status since discharge?  Improving   Week 2 call completed?  Yes          Bia Estes RN

## 2020-11-20 ENCOUNTER — OFFICE VISIT (OUTPATIENT)
Dept: INTERNAL MEDICINE | Facility: CLINIC | Age: 85
End: 2020-11-20

## 2020-11-20 ENCOUNTER — LAB (OUTPATIENT)
Dept: LAB | Facility: HOSPITAL | Age: 85
End: 2020-11-20

## 2020-11-20 VITALS
BODY MASS INDEX: 23.46 KG/M2 | HEIGHT: 68 IN | SYSTOLIC BLOOD PRESSURE: 118 MMHG | HEART RATE: 79 BPM | DIASTOLIC BLOOD PRESSURE: 52 MMHG | WEIGHT: 154.8 LBS | OXYGEN SATURATION: 99 %

## 2020-11-20 DIAGNOSIS — F51.09 SITUATIONAL INSOMNIA: ICD-10-CM

## 2020-11-20 DIAGNOSIS — R17 JAUNDICE: ICD-10-CM

## 2020-11-20 DIAGNOSIS — D50.0 IRON DEFICIENCY ANEMIA DUE TO CHRONIC BLOOD LOSS: ICD-10-CM

## 2020-11-20 DIAGNOSIS — D64.9 CHRONIC ANEMIA: ICD-10-CM

## 2020-11-20 DIAGNOSIS — K81.0 ACUTE CHOLECYSTITIS: ICD-10-CM

## 2020-11-20 DIAGNOSIS — E80.6 HYPERBILIRUBINEMIA: ICD-10-CM

## 2020-11-20 DIAGNOSIS — J30.1 SEASONAL ALLERGIC RHINITIS DUE TO POLLEN: Chronic | ICD-10-CM

## 2020-11-20 DIAGNOSIS — K85.10 GALLSTONE PANCREATITIS: ICD-10-CM

## 2020-11-20 DIAGNOSIS — K80.50 CHOLEDOCHOLITHIASIS: ICD-10-CM

## 2020-11-20 DIAGNOSIS — R74.8 ELEVATED LIVER ENZYMES: ICD-10-CM

## 2020-11-20 DIAGNOSIS — F43.21 SITUATIONAL DEPRESSION: ICD-10-CM

## 2020-11-20 DIAGNOSIS — Z09 HOSPITAL DISCHARGE FOLLOW-UP: Primary | ICD-10-CM

## 2020-11-20 LAB
ALBUMIN SERPL-MCNC: 3.7 G/DL (ref 3.5–5.2)
ALBUMIN/GLOB SERPL: 1.3 G/DL
ALP SERPL-CCNC: 1239 U/L (ref 39–117)
ALT SERPL W P-5'-P-CCNC: 147 U/L (ref 1–41)
ANION GAP SERPL CALCULATED.3IONS-SCNC: 8.3 MMOL/L (ref 5–15)
AST SERPL-CCNC: 128 U/L (ref 1–40)
BILIRUB SERPL-MCNC: 5.3 MG/DL (ref 0–1.2)
BUN SERPL-MCNC: 24 MG/DL (ref 8–23)
BUN/CREAT SERPL: 15.7 (ref 7–25)
CALCIUM SPEC-SCNC: 9.2 MG/DL (ref 8.6–10.5)
CHLORIDE SERPL-SCNC: 98 MMOL/L (ref 98–107)
CO2 SERPL-SCNC: 27.7 MMOL/L (ref 22–29)
CREAT SERPL-MCNC: 1.53 MG/DL (ref 0.76–1.27)
DEPRECATED RDW RBC AUTO: 53.6 FL (ref 37–54)
ERYTHROCYTE [DISTWIDTH] IN BLOOD BY AUTOMATED COUNT: 16.1 % (ref 12.3–15.4)
GFR SERPL CREATININE-BSD FRML MDRD: 43 ML/MIN/1.73
GLOBULIN UR ELPH-MCNC: 2.8 GM/DL
GLUCOSE SERPL-MCNC: 92 MG/DL (ref 65–99)
HCT VFR BLD AUTO: 25.7 % (ref 37.5–51)
HGB BLD-MCNC: 8.7 G/DL (ref 13–17.7)
IRON 24H UR-MRATE: 55 MCG/DL (ref 59–158)
IRON SATN MFR SERPL: 21 % (ref 20–50)
LIPASE SERPL-CCNC: 50 U/L (ref 13–60)
MCH RBC QN AUTO: 31.3 PG (ref 26.6–33)
MCHC RBC AUTO-ENTMCNC: 33.9 G/DL (ref 31.5–35.7)
MCV RBC AUTO: 92.4 FL (ref 79–97)
PLATELET # BLD AUTO: 313 10*3/MM3 (ref 140–450)
PMV BLD AUTO: 10 FL (ref 6–12)
POTASSIUM SERPL-SCNC: 4.9 MMOL/L (ref 3.5–5.2)
PROT SERPL-MCNC: 6.5 G/DL (ref 6–8.5)
RBC # BLD AUTO: 2.78 10*6/MM3 (ref 4.14–5.8)
SODIUM SERPL-SCNC: 134 MMOL/L (ref 136–145)
TIBC SERPL-MCNC: 268 MCG/DL (ref 298–536)
TRANSFERRIN SERPL-MCNC: 180 MG/DL (ref 200–360)
WBC # BLD AUTO: 6.56 10*3/MM3 (ref 3.4–10.8)

## 2020-11-20 PROCEDURE — 84466 ASSAY OF TRANSFERRIN: CPT | Performed by: INTERNAL MEDICINE

## 2020-11-20 PROCEDURE — 85027 COMPLETE CBC AUTOMATED: CPT | Performed by: INTERNAL MEDICINE

## 2020-11-20 PROCEDURE — 83540 ASSAY OF IRON: CPT | Performed by: INTERNAL MEDICINE

## 2020-11-20 PROCEDURE — 36415 COLL VENOUS BLD VENIPUNCTURE: CPT | Performed by: INTERNAL MEDICINE

## 2020-11-20 PROCEDURE — 99214 OFFICE O/P EST MOD 30 MIN: CPT | Performed by: INTERNAL MEDICINE

## 2020-11-20 PROCEDURE — 80053 COMPREHEN METABOLIC PANEL: CPT | Performed by: INTERNAL MEDICINE

## 2020-11-20 PROCEDURE — 83690 ASSAY OF LIPASE: CPT | Performed by: INTERNAL MEDICINE

## 2020-11-20 RX ORDER — FLUTICASONE PROPIONATE 50 MCG
SPRAY, SUSPENSION (ML) NASAL
Qty: 16 G | Refills: 6
Start: 2020-11-20 | End: 2021-02-15

## 2020-11-20 NOTE — PROGRESS NOTES
11/20/2020    Patient Information  Radha Win                                                                                          [address]  [facility] [address]      7/26/1932  [unfilled]  There is no work phone number on file.    Chief Complaint:     Hospital discharge follow-up for evaluation of elevated liver enzymes and discovery of obstructive jaundice/cholecystitis and choledocholithiasis.  No new acute complaints.  Patient reports he is feeling better.    History of Present Illness:    Patient with history of multiple medical problems including carotid artery plaque, BPH, stage IV chronic renal insufficiency, diverticulosis, hyperlipidemia, osteopenia, vitamin B12 and vitamin D deficiency, anemia of stage IV chronic kidney disease and iron deficiency anemia, generalized anxiety, hypertension, esophageal reflux, environmental allergies, osteoarthritis of the knees, impaired fasting glucose, statin intolerance, hypogonadism, chronic neck pain with cervical radiculopathy, exertional angina and occlusive coronary artery disease, hyperuricemia, situational depression and insomnia, recent obstructive jaundice and hospitalization as described below.  He presents today for hospital follow-up.  He reports he is feeling better but still has fatigue.  He is eating well and has no pain.  Past medical history reviewed and updated were necessary including health maintenance parameters.  This reveals he is up-to-date or else accounted for.    The history regarding recent hospitalization for obstructive jaundice/choledocholithiasis and gallstone pancreatitis:    November 20, 2020--patient seen in follow-up by Dr. Patricia.  Patient reports he is doing well.  He is eating well and has no discomfort.  Jaundice is still present but definitely better.  He is lost some weight, approximately 11 pounds over the past month or so.  He is lost more than that over the past year and this is related  to the stress from his wife's illness and passing.  Blood pressure today is 140/70 by my reading.  Medical assistant obtained 118/52 which I feel is incorrect.  He is currently on amlodipine 5 mg/day along with metoprolol succinate 50 mg/day and were not going to make any changes.  The other medications were reviewed and he is taking 100 mg of allopurinol as well as isosorbide 30 mg/day.  Supplements and allergy medicines also noted.  He remains on low-dose aspirin.  Plan is to check lab work including CMP and CBC.  Patient will follow-up on the phone Monday for the results.  I am not going to make any medication changes and instead I will have patient follow-up as planned January 15, 2021 unless I think I need to see him sooner due to the results of the lab work.    November 3, 2020 to November 7, 2020--hospital admission for jaundice:  Hospital Course  Please see history and physical for details. Patient is a 88 y.o. male initially admitted for abdominal pain jaundice fatigue and elevated LFTs.  Etiology of this was made clear after MRCP which demonstrated gallstone pancreatitis with choledocholithiasis/acute cholecystitis with obstruction.  GI and general surgery saw in consultation and appreciate input and assistance from both.  Patient underwent ERCP per Dr. Mejia on 11/4/2020 and subsequently underwent laparoscopic cholecystectomy per  on 11/6/2020.  Patient tolerated both procedures well without any acute issues.  At this juncture he is now tolerating dietary advancement.  As long as he can tolerate a regular diet with lunch he is free to go home later on this afternoon.  His pain control requirements have been minimal at best postoperatively as he has not required any opiates.  He can use Tylenol as needed post discharge for any discomfort.  Case was discussed with RN on day of discharge.  Son was also present at bedside and all questions answered to the best my ability to son as well as  patient.  He was bridged with IV Zosyn while inpatient but no additional antibiotics will be prescribed post discharge unless warranted per surgery.  We did give a unit of blood preoperatively due to dilutional effect from IVF as well as to prevent any cardiac issues once objective anesthesia.  I feel this patient will continue to clinically improve and should do well in an outpatient setting with good follow-up.  All involved are amenable to the above plan.    Review of Systems   Constitution: Positive for weight loss.   HENT: Negative.    Eyes: Negative.    Cardiovascular: Negative.    Respiratory: Negative.    Endocrine: Negative.    Hematologic/Lymphatic: Negative.    Skin: Negative.    Musculoskeletal: Negative.    Gastrointestinal: Negative.  Negative for abdominal pain.   Genitourinary: Negative.    Neurological: Negative.    Psychiatric/Behavioral: The patient has insomnia and is nervous/anxious.    Allergic/Immunologic: Negative.        Active Problems:    Patient Active Problem List   Diagnosis   • Bilateral carotid artery plaque, 05/24/2018--16-49% bilateral carotid artery stenosis   • Benign prostatic hypertrophy   • Chronic renal insufficiency, stage IV (severe), 02/09/2016--creatinine 1.85   • Diverticulosis of colon   • Hyperlipidemia   • Osteopenia of multiple sites   • Vitamin B12 deficiency   • Vitamin D deficiency   • Allergic rhinitis   • Anemia due to stage 4 chronic kidney disease (CMS/HCC)   • Generalized anxiety disorder   • Benign essential hypertension   • Gastroesophageal reflux disease without esophagitis   • Folic acid deficiency   • Multiple environmental allergies   • Therapeutic drug monitoring   • Bilateral renal cysts   • Primary osteoarthritis of knees, bilateral   • Impaired fasting glucose   • Muscle cramps, statin related, Vytorin and pravastatin.  Tolerates Livalo.   • Hypogonadism in male, on TRT, testosterone pellets, per    • Chronic neck pain   • Cervical radiculopathy    • Statin intolerance, Vytorin and pravastatin   • Degeneration of cervical intervertebral disc   • Exertional angina (CMS/HCC)   • Occlusive coronary artery disease, clinical diagnosis only.  Patient not a candidate for heart catheterization/intervention.   • Situational depression   • Hyperuricemia   • Situational insomnia   • Urticaria   • Jaundice   • Elevated liver enzymes   • Acute cholecystitis   • Gallstone pancreatitis   • Hospital discharge follow-up   • Iron deficiency anemia due to chronic blood loss         Past Medical History:   Diagnosis Date   • Allergic rhinitis 2/11/2016   • Anemia due to chronic kidney disease 2/11/2016   • Benign essential hypertension 2/11/2016   • Benign prostatic hypertrophy 2/11/2016 12/21/2016--prostate biopsy reportedly negative.  I will try to obtain the report.  Patient sees urologist.  PSAs run from the 3-8 range   • Bilateral carotid artery plaque, 09/21/2016--16-49% bilateral carotid artery stenosis 2/11/2016 09/21/2016--vascular screen reveals 16-49% bilateral carotid artery stenosis, negative for AAA, negative for PAD.  10/10/2008--vascular screening study revealed mild bilateral carotid plaque, no aortic aneurysm, no evidence of PAD   • Bilateral renal cysts 2/14/2016 04/07/2016--ultrasound of the kidneys reveals the previously described renal cysts are not well seen.  However, questionable incidental bladder tumor noted.  05/26/2010--ultrasound the kidneys was negative bilaterally except for small renal cysts.   • Cervical radiculopathy 6/28/2018    10/02/2018--patient seen in follow-up and the results of the MRI discussed.  He continues to have intermittent radicular symptoms which is currently only on the left.  Discussed options with patient and I have recommended neurosurgery consultation.  Patient may benefit from epidural injections.  09/06/2018--MRI of the cervical spine reveals moderate neural foraminal compromise on the right at C3-C   • Chronic  renal insufficiency, stage IV (severe), 02/09/2016--creatinine 1.85 2/11/2016 02/09/2016--serum creatinine 1.85.  BUN 29.  07/20/2015--patient was evaluated by the nephrologist.  Ultrasound of the kidneys ordered but this was never done.  05/22/2015--BUN 35, creatinine 2.3.  Patient referred to nephrology, Dr. Devyn Muniz.  04/16/2014--BUN 25, creatinine 1.77.  01/03/2013--BUN 37, creatinine 2.14.    05/26/2010---ultrasound of the kidneys reveal a small cyst x 2 right kidney.  Otherwise normal.    Baseline creatinine approximately 1.9-2.0.   • Degeneration of cervical intervertebral disc 5/2/2019   • Diverticulosis of colon 2/11/2016    10/03/2013--colonoscopy revealed markedly redundant colon, pancolonic diverticulosis with several impacted fecalith.  No evidence of diverticulitis or stricture.  Was only able to reach the ascending colon.  Follow up barium enema revealed extensive diverticulosis.  No polyp or other mucosal mass seen.    06/11/2002--incomplete colonoscopy due to redundant colon.  Not able to get past the hepatic flexure.  Patient had followup barium contrast enema which showed extensive diverticulosis.   • Exertional angina (CMS/HCC) 9/17/2019 October 8, 2019--patient seen in follow-up and he reports the long-acting oral nitrate has definitely helped his exertional anginal symptoms.  However, he has not stressed himself completely such as using a push mower.  His blood pressure seems improved as well.  Patient has an appoint with the cardiologist in the next 2 weeks.  I have contacted his cardiologist about the results of the empiric ni   • Folic acid deficiency 2/11/2016   • Gastroesophageal reflux disease without esophagitis 2/11/2016   • Generalized anxiety disorder 2/11/2016   • History of diverticulitis of colon 2009 and 2003 05/08/2009-acute diverticulitis without complication. 09/05/2003-acute diverticulitis without complication.    • Hyperlipidemia 2/11/2016 01/29/2005--treatment  for hyperlipidemia begun.   • Hyperuricemia 8/19/2020   • Hypogonadism in male, on TRT, testosterone pellets, per  6/16/2017 January 2017--patient reports his urologist initiated testosterone replacement therapy consisting of testosterone pellets every 6 months.   • Multiple environmental allergies 2/12/2016    Patient sees the allergist regularly and has been on immunotherapy.   • Muscle cramps, statin related, Vytorin and pravastatin.  Tolerates Livalo. 6/16/2017 12/21/2018--patient seems to be tolerating Livalo well.  10/02/2018--Livalo 2 mg per day initiated.  Recommend CoQ10 400 mg per day with food for better absorption.  Reassess with lab in about 8 weeks.  08/30/2018--patient presents with complaints of muscle cramps.  He discontinue pravastatin and the cramps went away.  Cramps returned even with a half dose.  This is despite the fact he was taking    • Occlusive coronary artery disease, clinical diagnosis only.  Patient not a candidate for heart catheterization/intervention. 12/10/2019    October 8, 2019--patient seen in follow-up and he reports the long-acting oral nitrate has definitely helped his exertional anginal symptoms.  However, he has not stressed himself completely such as using a push mower.  His blood pressure seems improved as well.  Patient has an appoint with the cardiologist in the next 2 weeks.  I have contacted his cardiologist about the results of the empiric ni   • Osteopenia, 7/12/2019--lumbar spine -0.4.  Left femoral neck -1.7.  Right femoral neck -1.9. 2/11/2016 July 12, 2019--DEXA scan reveals lumbar spine T score -0.4.  Unchanged.  Left femoral neck T score -1.7.  Unchanged.  Right femoral neck T score -1.9.  Unchanged.  Impression is osteopenia of the hips with no significant interval change.  06/16/2017--patient seen in follow-up and reports he doesn't think he ever started Fosamax.  Osteopenia and needs to be reassessed with a DEXA scan.  03/14/2017-   • Primary  osteoarthritis of knees, bilateral 9/12/2016 09/12/2016--patient called in the office requesting a steriod injection in his knees.  I explained to him that I do no longer perform these injections and have referred him to Dr. Manuel.   • Statin intolerance, Vytorin and pravastatin 12/21/2018 12/21/2018--patient seems to be tolerating Livalo well.  10/02/2018--Livalo 2 mg per day initiated.  Recommend CoQ10 400 mg per day with food for better absorption.  Reassess with lab in about 8 weeks.  08/30/2018--patient presents with complaints of muscle cramps.  He discontinue pravastatin and the cramps went away.  Cramps returned even with a half dose.  This is despite the fact he was taking    • Vitamin B12 deficiency 2/11/2016 06/25/2009--B12 injections begun.   • Vitamin D deficiency 2/11/2016         Past Surgical History:   Procedure Laterality Date   • CATARACT EXTRACTION Left 12/12/2011 12/12/2011--cataract extirpation with intraocular lens implantation left eye.   • CATARACT EXTRACTION Right 12/2011 December 2011--right cataract extirpation with intraocular lens implantation.   • CHOLECYSTECTOMY WITH INTRAOPERATIVE CHOLANGIOGRAM N/A 11/6/2020    Procedure: CHOLECYSTECTOMY LAPAROSCOPIC INTRAOPERATIVE CHOLANGIOGRAM;  Surgeon: Sanjay Burgess MD;  Location: [facility];  Service: General;  Laterality: N/A;   • COLONOSCOPY  06/11/2002 06/11/2002--incomplete colonoscopy due to redundant colon. Not able to get past the hepatic flexure. Patient had followup barium contrast enema which showed extensive diverticulosis   • COLONOSCOPY  10/03/2013    10/03/2013--colonoscopy revealed markedly redundant colon, pancolonic diverticulosis with several impacted fecalith. No evidence of diverticulitis or stricture. Was only able to reach the ascending colon. Follow up barium enema revealed extensive diverticulosis. No polyp or other mucosal mass seen.   • CYSTOSCOPY  05/18/2016 05/18/2016--urology  consultation.  Cystoscopy performed for possible bladder mass is negative.   • ERCP N/A 11/5/2020    Procedure: ENDOSCOPIC RETROGRADE CHOLANGIOPANCREATOGRAPHY with sphincterotomy, basket retrieval and balloon sweep;  Surgeon: Joseluis Mejia MD;  Location: [facility];  Service: Gastroenterology;  Laterality: N/A;  pre/post - CBD stones   • PROSTATE BIOPSY  12/21/2016 12/21/2016--prostate biopsy reportedly negative.  I will try to obtain the report.         No Known Allergies        Current Outpatient Medications:   •  allopurinol (ZYLOPRIM) 100 MG tablet, Take 1 p.o. daily for gout/elevated uric acid, Disp: 90 tablet, Rfl: 3  •  ALPRAZolam (XANAX) 0.5 MG tablet, TAKE ONE TABLET BY MOUTH TWICE A DAY, Disp: 60 tablet, Rfl: 1  •  amLODIPine (NORVASC) 5 MG tablet, Take 1 p.o. daily for high blood pressure, Disp: 90 tablet, Rfl: 3  •  aspirin (ASPIRIN LOW DOSE) 81 MG tablet, Take 1 tablet by mouth daily., Disp: , Rfl:   •  B Complex-Folic Acid (Super B Complex Maxi) tablet, Take 1 p.o. twice daily, Disp:  , Rfl:   •  calcitriol (ROCALTROL) 0.25 MCG capsule, Take 0.25 mcg by mouth Daily., Disp: , Rfl:   •  Evolocumab 140 MG/ML solution auto-injector, Inject 1 mL under the skin into the appropriate area as directed Every 14 (Fourteen) Days., Disp: 2 pen, Rfl: 11  •  fluticasone (Flonase) 50 MCG/ACT nasal spray, Administer 2 sprays in each nostril once daily for environmental allergies and congestion., Disp: 16 g, Rfl: 6  •  folic acid (FOLVITE) 1 MG tablet, TAKE ONE TABLET BY MOUTH TWICE A DAY, Disp: 180 tablet, Rfl: 0  •  isosorbide mononitrate (IMDUR) 30 MG 24 hr tablet, TAKE ONE TABLET BY MOUTH EVERY MORNING FOR HEART, Disp: 30 tablet, Rfl: 5  •  metoprolol succinate XL (TOPROL-XL) 50 MG 24 hr tablet, Take 1 tablet by mouth Daily., Disp: 90 tablet, Rfl: 3  •  nitroglycerin (NITROSTAT) 0.6 MG SL tablet, Place 1 tablet under the tongue Every 5 (Five) Minutes As Needed for Chest Pain. Maximum of 3.Go to ER  after third dose., Disp: 30 tablet, Rfl: 12  •  omeprazole (priLOSEC) 40 MG capsule, TAKE ONE CAPSULE BY MOUTH DAILY, Disp: 30 capsule, Rfl: 3  •  Iron, Ferrous Sulfate, 325 (65 Fe) MG tablet, Take 325 mg by mouth 2 (Two) Times a Day With Meals., Disp: 60 tablet, Rfl: 1    Current Facility-Administered Medications:   •  cyanocobalamin injection 1,000 mcg, 1,000 mcg, Intramuscular, Q28 Days, Delgado Patricia MD, 1,000 mcg at 09/14/20 1322      Family History   Problem Relation Age of Onset   • Diabetes Mother    • Heart disease Mother    • Stroke Father          Social History     Socioeconomic History   • Marital status:      Spouse name: Not on file   • Number of children: 2   • Years of education: 14   • Highest education level: Some college, no degree   Occupational History   • Occupation: Retired      Employer: [employer]   Social Needs   • Financial resource strain: Not hard at all   • Food insecurity     Worry: Never true     Inability: Never true   • Transportation needs     Medical: No     Non-medical: No   Tobacco Use   • Smoking status: Never Smoker   • Smokeless tobacco: Never Used   • Tobacco comment: caffeine use: 2 cups coffee daily   Substance and Sexual Activity   • Alcohol use: Yes     Alcohol/week: 5.0 standard drinks     Types: 3 Shots of liquor, 2 Glasses of wine per week     Frequency: Monthly or less     Drinks per session: 1 or 2     Binge frequency: Never     Comment: Moderate alcohol consumption   • Drug use: No   • Sexual activity: Defer   Lifestyle   • Physical activity     Days per week: 3 days     Minutes per session: 30 min   • Stress: Not at all   Relationships   • Social connections     Talks on phone: More than three times a week     Gets together: Once a week     Attends Nondenominational service: More than 4 times per year     Active member of club or organization: Yes     Attends meetings of clubs or organizations: More than 4 times per year     Relationship status:   "        Vitals:    11/20/20 1405   BP: 118/52   BP Location: Left arm   Pulse: 79   SpO2: 99%   Weight: 70.2 kg (154 lb 12.8 oz)   Height: 172.7 cm (67.99\")        Body mass index is 23.54 kg/m².      Physical Exam:    General: Alert and oriented x 3.  No acute distress.  Normal affect.  HEENT: Pupils equal, round, reactive to light; extraocular movements intact; sclerae nonicteric; pharynx, ear canals and TMs normal.  Neck: Without JVD, thyromegaly, bruit, or adenopathy.  Lungs: Clear to auscultation in all fields.  Heart: Regular rate and rhythm without murmur, rub, gallop, or click.  Abdomen: Soft, nontender, without hepatosplenomegaly or hernia.  Bowel sounds normal.  The surgical wounds from the trochars are healing well without signs of infection.  : Deferred.  Rectal: Deferred.  Extremities: Without clubbing, cyanosis, edema, or pulse deficit.  Neurologic: Intact without focal deficit.  Normal station and gait observed during ingress and egress from the examination room.  Skin: Without significant lesion.  Musculoskeletal: Unremarkable.    Lab/other results:    I reviewed the documentation from the hospital including admission history and physical, hospital course, laboratory and radiographic studies, operative report including EGD/ERCP and laparoscopic cholecystectomy, discharge summary and discharge medications.    Assessment/Plan:     Diagnosis Plan   1. Hospital discharge follow-up     2. Choledocholithiasis     3. Acute cholecystitis     4. Gallstone pancreatitis  Lipase   5. Hyperbilirubinemia  Comprehensive Metabolic Panel   6. Elevated liver enzymes  Comprehensive Metabolic Panel   7. Jaundice     8. Situational depression     9. Situational insomnia     10. Allergic rhinitis  fluticasone (Flonase) 50 MCG/ACT nasal spray   11. Chronic anemia  CBC (No Diff)    Iron Profile   12. Iron deficiency anemia due to chronic blood loss       Current outpatient and discharge medications have been reconciled " for the patient.  Reviewed by: Delgado Patricia MD    Patient seen in follow-up after being admitted to the hospital for obstructive jaundice which revealed choledocholithiasis.  He is doing well although he is lost some weight as expected.  He has no pain and is beginning to eat well.  Overall seems to be making progress.  I explained to patient that it will take some time now for the jaundice to resolve.  We will need to monitor closely.    Plan is as follows: Were not going to make any changes to his medication at the present time.  We will send him to lab for CBC, CMP, and iron studies.  I will follow-up on the phone on Monday with the results and any further instructions.  He has a follow-up appointment with lab January 15, 2021.  We will plan on him keeping that appointment unless I need to see him earlier based upon how he is feeling and based upon the results of the lab work.    Addendum: Patient is lab work revealed persistent hyperbilirubinemia and elevated liver enzymes.  Therefore, I will have patient follow-up in about 2 weeks to reassess his symptoms and check some lab work.  I think it would be too long to wait until January.     Procedures

## 2020-11-23 ENCOUNTER — TELEPHONE (OUTPATIENT)
Dept: INTERNAL MEDICINE | Facility: CLINIC | Age: 85
End: 2020-11-23

## 2020-11-23 DIAGNOSIS — D50.0 IRON DEFICIENCY ANEMIA DUE TO CHRONIC BLOOD LOSS: Primary | ICD-10-CM

## 2020-11-23 PROBLEM — K80.50 CHOLEDOCHOLITHIASIS: Status: RESOLVED | Noted: 2020-11-04 | Resolved: 2020-11-23

## 2020-11-23 PROBLEM — E80.6 HYPERBILIRUBINEMIA: Status: RESOLVED | Noted: 2020-11-02 | Resolved: 2020-11-23

## 2020-11-23 NOTE — TELEPHONE ENCOUNTER
----- Message from Delgado Patricia MD sent at 11/23/2020  7:49 AM EST -----  Tell patient he is still anemic and I sent a prescription into his local pharmacy for iron sulfate, 1 pill twice daily at breakfast and supper.  We will plan on keeping him on this at least for a month.  If he has problems tolerating the iron twice daily then he can cut it back to once a day but we will have to keep him on it longer.  His kidney function is looking pretty good and actually a little better than it had been previously.  His sodium is just slightly low and that is nothing to worry about.  His liver enzymes are still elevated but they are improving.  I really think he needs to see me in about 2 weeks so I can see how he is doing and repeat some labs.  I do not wait till January to do this.  Schedule him an appointment.    I called and spoke with pt and his son niall

## 2020-11-24 ENCOUNTER — READMISSION MANAGEMENT (OUTPATIENT)
Dept: CALL CENTER | Facility: HOSPITAL | Age: 85
End: 2020-11-24

## 2020-11-24 ENCOUNTER — OFFICE VISIT (OUTPATIENT)
Dept: SURGERY | Facility: CLINIC | Age: 85
End: 2020-11-24

## 2020-11-24 DIAGNOSIS — K80.45 CALCULUS OF BILE DUCT WITH CHRONIC CHOLECYSTITIS WITH OBSTRUCTION: Primary | ICD-10-CM

## 2020-11-24 PROCEDURE — 99024 POSTOP FOLLOW-UP VISIT: CPT | Performed by: SURGERY

## 2020-11-24 NOTE — OUTREACH NOTE
General Surgery Week 3 Survey      Responses   [facility] patient discharged from?  [place]   Does the patient have one of the following disease processes/diagnoses(primary or secondary)?  General Surgery   Week 3 attempt successful?  No   Unsuccessful attempts  Attempt 1          Bebe Whatley RN

## 2020-12-05 DIAGNOSIS — K21.9 GASTROESOPHAGEAL REFLUX DISEASE WITHOUT ESOPHAGITIS: Chronic | ICD-10-CM

## 2020-12-10 RX ORDER — OMEPRAZOLE 40 MG/1
CAPSULE, DELAYED RELEASE ORAL
Qty: 30 CAPSULE | Refills: 2 | Status: SHIPPED | OUTPATIENT
Start: 2020-12-10 | End: 2021-03-08

## 2020-12-16 ENCOUNTER — OFFICE VISIT (OUTPATIENT)
Dept: INTERNAL MEDICINE | Facility: CLINIC | Age: 85
End: 2020-12-16

## 2020-12-16 ENCOUNTER — LAB (OUTPATIENT)
Dept: LAB | Facility: HOSPITAL | Age: 85
End: 2020-12-16

## 2020-12-16 VITALS
SYSTOLIC BLOOD PRESSURE: 124 MMHG | DIASTOLIC BLOOD PRESSURE: 60 MMHG | BODY MASS INDEX: 24.41 KG/M2 | HEIGHT: 68 IN | HEART RATE: 79 BPM | WEIGHT: 161.08 LBS | OXYGEN SATURATION: 99 %

## 2020-12-16 DIAGNOSIS — R17 JAUNDICE: ICD-10-CM

## 2020-12-16 DIAGNOSIS — D50.0 IRON DEFICIENCY ANEMIA DUE TO CHRONIC BLOOD LOSS: ICD-10-CM

## 2020-12-16 DIAGNOSIS — N18.4 ANEMIA DUE TO STAGE 4 CHRONIC KIDNEY DISEASE (HCC): Chronic | ICD-10-CM

## 2020-12-16 DIAGNOSIS — R74.8 ELEVATED LIVER ENZYMES: ICD-10-CM

## 2020-12-16 DIAGNOSIS — K85.10 GALLSTONE PANCREATITIS: ICD-10-CM

## 2020-12-16 DIAGNOSIS — K81.0 ACUTE CHOLECYSTITIS: Primary | ICD-10-CM

## 2020-12-16 DIAGNOSIS — N18.4 CHRONIC RENAL INSUFFICIENCY, STAGE IV (SEVERE) (HCC): Chronic | ICD-10-CM

## 2020-12-16 DIAGNOSIS — I10 BENIGN ESSENTIAL HYPERTENSION: Chronic | ICD-10-CM

## 2020-12-16 DIAGNOSIS — R73.01 IMPAIRED FASTING GLUCOSE: Chronic | ICD-10-CM

## 2020-12-16 DIAGNOSIS — E53.8 VITAMIN B12 DEFICIENCY: Chronic | ICD-10-CM

## 2020-12-16 DIAGNOSIS — D63.1 ANEMIA DUE TO STAGE 4 CHRONIC KIDNEY DISEASE (HCC): Chronic | ICD-10-CM

## 2020-12-16 PROBLEM — L50.9 URTICARIA: Status: RESOLVED | Noted: 2020-10-07 | Resolved: 2020-12-16

## 2020-12-16 PROBLEM — Z09 HOSPITAL DISCHARGE FOLLOW-UP: Status: RESOLVED | Noted: 2020-11-20 | Resolved: 2020-12-16

## 2020-12-16 LAB
ALBUMIN SERPL-MCNC: 4.1 G/DL (ref 3.5–5.2)
ALBUMIN/GLOB SERPL: 2 G/DL
ALP SERPL-CCNC: 561 U/L (ref 39–117)
ALT SERPL W P-5'-P-CCNC: 62 U/L (ref 1–41)
ANION GAP SERPL CALCULATED.3IONS-SCNC: 9.7 MMOL/L (ref 5–15)
AST SERPL-CCNC: 54 U/L (ref 1–40)
BILIRUB SERPL-MCNC: 2 MG/DL (ref 0–1.2)
BUN SERPL-MCNC: 24 MG/DL (ref 8–23)
BUN/CREAT SERPL: 15 (ref 7–25)
CALCIUM SPEC-SCNC: 9.3 MG/DL (ref 8.6–10.5)
CHLORIDE SERPL-SCNC: 104 MMOL/L (ref 98–107)
CO2 SERPL-SCNC: 28.3 MMOL/L (ref 22–29)
CREAT SERPL-MCNC: 1.6 MG/DL (ref 0.76–1.27)
DEPRECATED RDW RBC AUTO: 53.4 FL (ref 37–54)
ERYTHROCYTE [DISTWIDTH] IN BLOOD BY AUTOMATED COUNT: 14.9 % (ref 12.3–15.4)
GFR SERPL CREATININE-BSD FRML MDRD: 41 ML/MIN/1.73
GLOBULIN UR ELPH-MCNC: 2.1 GM/DL
GLUCOSE SERPL-MCNC: 86 MG/DL (ref 65–99)
HCT VFR BLD AUTO: 32.3 % (ref 37.5–51)
HGB BLD-MCNC: 10.6 G/DL (ref 13–17.7)
IRON 24H UR-MRATE: 93 MCG/DL (ref 59–158)
IRON SATN MFR SERPL: 29 % (ref 20–50)
LIPASE SERPL-CCNC: 44 U/L (ref 13–60)
MCH RBC QN AUTO: 32.3 PG (ref 26.6–33)
MCHC RBC AUTO-ENTMCNC: 32.8 G/DL (ref 31.5–35.7)
MCV RBC AUTO: 98.5 FL (ref 79–97)
PLATELET # BLD AUTO: 208 10*3/MM3 (ref 140–450)
PMV BLD AUTO: 10.1 FL (ref 6–12)
POTASSIUM SERPL-SCNC: 5.2 MMOL/L (ref 3.5–5.2)
PROT SERPL-MCNC: 6.2 G/DL (ref 6–8.5)
RBC # BLD AUTO: 3.28 10*6/MM3 (ref 4.14–5.8)
SODIUM SERPL-SCNC: 142 MMOL/L (ref 136–145)
TIBC SERPL-MCNC: 317 MCG/DL (ref 298–536)
TRANSFERRIN SERPL-MCNC: 213 MG/DL (ref 200–360)
WBC # BLD AUTO: 7.11 10*3/MM3 (ref 3.4–10.8)

## 2020-12-16 PROCEDURE — 36415 COLL VENOUS BLD VENIPUNCTURE: CPT | Performed by: INTERNAL MEDICINE

## 2020-12-16 PROCEDURE — 80053 COMPREHEN METABOLIC PANEL: CPT | Performed by: INTERNAL MEDICINE

## 2020-12-16 PROCEDURE — 83690 ASSAY OF LIPASE: CPT | Performed by: INTERNAL MEDICINE

## 2020-12-16 PROCEDURE — 83540 ASSAY OF IRON: CPT | Performed by: INTERNAL MEDICINE

## 2020-12-16 PROCEDURE — 99214 OFFICE O/P EST MOD 30 MIN: CPT | Performed by: INTERNAL MEDICINE

## 2020-12-16 PROCEDURE — 85027 COMPLETE CBC AUTOMATED: CPT | Performed by: INTERNAL MEDICINE

## 2020-12-16 PROCEDURE — 84466 ASSAY OF TRANSFERRIN: CPT | Performed by: INTERNAL MEDICINE

## 2020-12-16 PROCEDURE — 96372 THER/PROPH/DIAG INJ SC/IM: CPT | Performed by: INTERNAL MEDICINE

## 2020-12-16 RX ORDER — CYANOCOBALAMIN 1000 UG/ML
1000 INJECTION, SOLUTION INTRAMUSCULAR; SUBCUTANEOUS ONCE
Status: COMPLETED | OUTPATIENT
Start: 2020-12-16 | End: 2020-12-16

## 2020-12-16 RX ADMIN — CYANOCOBALAMIN 1000 MCG: 1000 INJECTION, SOLUTION INTRAMUSCULAR; SUBCUTANEOUS at 13:19

## 2020-12-16 NOTE — PROGRESS NOTES
Cyanocobalamin injection given today.             12/16/2020    Patient Information  Radha Win                                                                                          [address]  [place] [address]      7/26/1932  [unfilled]  There is no work phone number on file.    Chief Complaint:     Follow-up acute cholecystitis with jaundice, elevated liver enzymes, gallstone pancreatitis, iron deficiency anemia.  No new acute complaints.    History of Present Illness:    Patient with a history of medical problems as outlined in the chief complaint presents today for follow-up.  A more detailed history of this will be given below.  Patient reports he is feeling fairly well.  He is eating well now and has gained 7 pounds.  Past medical history reviewed and updated were necessary including health maintenance parameters.  This reveals he is up-to-date or else accounted for.    The history regarding acute cholecystitis with obstructive jaundice and gallstone pancreatitis:    December 16, 2020--patient seen in follow-up.  He is doing much better.  Eating well and has gained 7 pounds.  Jaundice appears to have resolved.  Plan is to obtain lab work including CMP to assess his liver enzymes and jaundice, CBC to assess the anemia, iron studies and amylase/lipase.  Patient will follow-up on the phone for the results and possible further instructions.  I will have him keep his previously scheduled lab and follow-up in January of this year.    November 20, 2020--patient seen in follow-up by Dr. Patricia.  Patient reports he is doing well.  He is eating well and has no discomfort.  Jaundice is still present but definitely better.  He is lost some weight, approximately 11 pounds over the past month or so.  He is lost more than that over the past year and this is related to the stress from his wife's illness and passing.  Blood pressure today is 140/70 by my reading.  Medical assistant obtained 118/52  which I feel is incorrect.  He is currently on amlodipine 5 mg/day along with metoprolol succinate 50 mg/day and were not going to make any changes.  The other medications were reviewed and he is taking 100 mg of allopurinol as well as isosorbide 30 mg/day.  Supplements and allergy medicines also noted.  He remains on low-dose aspirin.  Plan is to check lab work including CMP and CBC.  Patient will follow-up on the phone Monday for the results.  I am not going to make any medication changes and instead I will have patient follow-up as planned January 15, 2021 unless I think I need to see him sooner due to the results of the lab work.    November 3, 2020 to November 7, 2020--hospital admission for jaundice:  Hospital Course  Please see history and physical for details. Patient is a 88 y.o. male initially admitted for abdominal pain jaundice fatigue and elevated LFTs.  Etiology of this was made clear after MRCP which demonstrated gallstone pancreatitis with choledocholithiasis/acute cholecystitis with obstruction.  GI and general surgery saw in consultation and appreciate input and assistance from both.  Patient underwent ERCP per Dr. Mejia on 11/4/2020 and subsequently underwent laparoscopic cholecystectomy per  on 11/6/2020.  Patient tolerated both procedures well without any acute issues.  At this juncture he is now tolerating dietary advancement.  As long as he can tolerate a regular diet with lunch he is free to go home later on this afternoon.  His pain control requirements have been minimal at best postoperatively as he has not required any opiates.  He can use Tylenol as needed post discharge for any discomfort.  Case was discussed with RN on day of discharge.  Son was also present at bedside and all questions answered to the best my ability to son as well as patient.  He was bridged with IV Zosyn while inpatient but no additional antibiotics will be prescribed post discharge unless warranted per  surgery.  We did give a unit of blood preoperatively due to dilutional effect from IVF as well as to prevent any cardiac issues once objective anesthesia.  I feel this patient will continue to clinically improve and should do well in an outpatient setting with good follow-up.  All involved are amenable to the above plan.    Review of Systems   Constitution: Negative.   HENT: Negative.    Eyes: Negative.    Cardiovascular: Negative.    Respiratory: Negative.    Endocrine: Negative.    Hematologic/Lymphatic: Negative.    Skin: Negative.    Musculoskeletal: Negative.    Gastrointestinal: Negative.    Genitourinary: Negative.    Neurological: Negative.    Psychiatric/Behavioral: Negative.    Allergic/Immunologic: Negative.        Active Problems:    Patient Active Problem List   Diagnosis   • Bilateral carotid artery plaque, 05/24/2018--16-49% bilateral carotid artery stenosis   • Benign prostatic hypertrophy   • Chronic renal insufficiency, stage IV (severe), 02/09/2016--creatinine 1.85   • Diverticulosis of colon   • Hyperlipidemia   • Osteopenia of multiple sites   • Vitamin B12 deficiency   • Vitamin D deficiency   • Allergic rhinitis   • Anemia due to stage 4 chronic kidney disease (CMS/HCC)   • Generalized anxiety disorder   • Benign essential hypertension   • Gastroesophageal reflux disease without esophagitis   • Folic acid deficiency   • Multiple environmental allergies   • Therapeutic drug monitoring   • Bilateral renal cysts   • Primary osteoarthritis of knees, bilateral   • Impaired fasting glucose   • Muscle cramps, statin related, Vytorin and pravastatin.  Tolerates Livalo.   • Hypogonadism in male, on TRT, testosterone pellets, per    • Chronic neck pain   • Cervical radiculopathy   • Statin intolerance, Vytorin and pravastatin   • Degeneration of cervical intervertebral disc   • Exertional angina (CMS/HCC)   • Occlusive coronary artery disease, clinical diagnosis only.  Patient not a candidate for  heart catheterization/intervention.   • Situational depression   • Hyperuricemia   • Situational insomnia   • Acute cholecystitis   • Gallstone pancreatitis   • Iron deficiency anemia due to chronic blood loss         Past Medical History:   Diagnosis Date   • Allergic rhinitis 2/11/2016   • Anemia due to chronic kidney disease 2/11/2016   • Benign essential hypertension 2/11/2016   • Benign prostatic hypertrophy 2/11/2016 12/21/2016--prostate biopsy reportedly negative.  I will try to obtain the report.  Patient sees urologist.  PSAs run from the 3-8 range   • Bilateral carotid artery plaque, 09/21/2016--16-49% bilateral carotid artery stenosis 2/11/2016 09/21/2016--vascular screen reveals 16-49% bilateral carotid artery stenosis, negative for AAA, negative for PAD.  10/10/2008--vascular screening study revealed mild bilateral carotid plaque, no aortic aneurysm, no evidence of PAD   • Bilateral renal cysts 2/14/2016 04/07/2016--ultrasound of the kidneys reveals the previously described renal cysts are not well seen.  However, questionable incidental bladder tumor noted.  05/26/2010--ultrasound the kidneys was negative bilaterally except for small renal cysts.   • Cervical radiculopathy 6/28/2018    10/02/2018--patient seen in follow-up and the results of the MRI discussed.  He continues to have intermittent radicular symptoms which is currently only on the left.  Discussed options with patient and I have recommended neurosurgery consultation.  Patient may benefit from epidural injections.  09/06/2018--MRI of the cervical spine reveals moderate neural foraminal compromise on the right at C3-C   • Chronic renal insufficiency, stage IV (severe), 02/09/2016--creatinine 1.85 2/11/2016 02/09/2016--serum creatinine 1.85.  BUN 29.  07/20/2015--patient was evaluated by the nephrologist.  Ultrasound of the kidneys ordered but this was never done.  05/22/2015--BUN 35, creatinine 2.3.  Patient referred to  nephrology, Dr. Devyn Muniz.  04/16/2014--BUN 25, creatinine 1.77.  01/03/2013--BUN 37, creatinine 2.14.    05/26/2010---ultrasound of the kidneys reveal a small cyst x 2 right kidney.  Otherwise normal.    Baseline creatinine approximately 1.9-2.0.   • Degeneration of cervical intervertebral disc 5/2/2019   • Diverticulosis of colon 2/11/2016    10/03/2013--colonoscopy revealed markedly redundant colon, pancolonic diverticulosis with several impacted fecalith.  No evidence of diverticulitis or stricture.  Was only able to reach the ascending colon.  Follow up barium enema revealed extensive diverticulosis.  No polyp or other mucosal mass seen.    06/11/2002--incomplete colonoscopy due to redundant colon.  Not able to get past the hepatic flexure.  Patient had followup barium contrast enema which showed extensive diverticulosis.   • Exertional angina (CMS/HCC) 9/17/2019 October 8, 2019--patient seen in follow-up and he reports the long-acting oral nitrate has definitely helped his exertional anginal symptoms.  However, he has not stressed himself completely such as using a push mower.  His blood pressure seems improved as well.  Patient has an appoint with the cardiologist in the next 2 weeks.  I have contacted his cardiologist about the results of the empiric ni   • Folic acid deficiency 2/11/2016   • Gastroesophageal reflux disease without esophagitis 2/11/2016   • Generalized anxiety disorder 2/11/2016   • History of diverticulitis of colon 2009 and 2003 05/08/2009-acute diverticulitis without complication. 09/05/2003-acute diverticulitis without complication.    • Hyperlipidemia 2/11/2016 01/29/2005--treatment for hyperlipidemia begun.   • Hyperuricemia 8/19/2020   • Hypogonadism in male, on TRT, testosterone pellets, per  6/16/2017 January 2017--patient reports his urologist initiated testosterone replacement therapy consisting of testosterone pellets every 6 months.   • Multiple environmental  allergies 2/12/2016    Patient sees the allergist regularly and has been on immunotherapy.   • Muscle cramps, statin related, Vytorin and pravastatin.  Tolerates Livalo. 6/16/2017 12/21/2018--patient seems to be tolerating Livalo well.  10/02/2018--Livalo 2 mg per day initiated.  Recommend CoQ10 400 mg per day with food for better absorption.  Reassess with lab in about 8 weeks.  08/30/2018--patient presents with complaints of muscle cramps.  He discontinue pravastatin and the cramps went away.  Cramps returned even with a half dose.  This is despite the fact he was taking    • Occlusive coronary artery disease, clinical diagnosis only.  Patient not a candidate for heart catheterization/intervention. 12/10/2019    October 8, 2019--patient seen in follow-up and he reports the long-acting oral nitrate has definitely helped his exertional anginal symptoms.  However, he has not stressed himself completely such as using a push mower.  His blood pressure seems improved as well.  Patient has an appoint with the cardiologist in the next 2 weeks.  I have contacted his cardiologist about the results of the empiric ni   • Osteopenia, 7/12/2019--lumbar spine -0.4.  Left femoral neck -1.7.  Right femoral neck -1.9. 2/11/2016 July 12, 2019--DEXA scan reveals lumbar spine T score -0.4.  Unchanged.  Left femoral neck T score -1.7.  Unchanged.  Right femoral neck T score -1.9.  Unchanged.  Impression is osteopenia of the hips with no significant interval change.  06/16/2017--patient seen in follow-up and reports he doesn't think he ever started Fosamax.  Osteopenia and needs to be reassessed with a DEXA scan.  03/14/2017-   • Primary osteoarthritis of knees, bilateral 9/12/2016 09/12/2016--patient called in the office requesting a steriod injection in his knees.  I explained to him that I do no longer perform these injections and have referred him to Dr. Manuel.   • Statin intolerance, Vytorin and pravastatin 12/21/2018     12/21/2018--patient seems to be tolerating Livalo well.  10/02/2018--Livalo 2 mg per day initiated.  Recommend CoQ10 400 mg per day with food for better absorption.  Reassess with lab in about 8 weeks.  08/30/2018--patient presents with complaints of muscle cramps.  He discontinue pravastatin and the cramps went away.  Cramps returned even with a half dose.  This is despite the fact he was taking    • Vitamin B12 deficiency 2/11/2016 06/25/2009--B12 injections begun.   • Vitamin D deficiency 2/11/2016         Past Surgical History:   Procedure Laterality Date   • CATARACT EXTRACTION Left 12/12/2011 12/12/2011--cataract extirpation with intraocular lens implantation left eye.   • CATARACT EXTRACTION Right 12/2011 December 2011--right cataract extirpation with intraocular lens implantation.   • CHOLECYSTECTOMY WITH INTRAOPERATIVE CHOLANGIOGRAM N/A 11/6/2020    Procedure: CHOLECYSTECTOMY LAPAROSCOPIC INTRAOPERATIVE CHOLANGIOGRAM;  Surgeon: aSnjay Burgess MD;  Location: [facility];  Service: General;  Laterality: N/A;   • COLONOSCOPY  06/11/2002 06/11/2002--incomplete colonoscopy due to redundant colon. Not able to get past the hepatic flexure. Patient had followup barium contrast enema which showed extensive diverticulosis   • COLONOSCOPY  10/03/2013    10/03/2013--colonoscopy revealed markedly redundant colon, pancolonic diverticulosis with several impacted fecalith. No evidence of diverticulitis or stricture. Was only able to reach the ascending colon. Follow up barium enema revealed extensive diverticulosis. No polyp or other mucosal mass seen.   • CYSTOSCOPY  05/18/2016 05/18/2016--urology consultation.  Cystoscopy performed for possible bladder mass is negative.   • ERCP N/A 11/5/2020    Procedure: ENDOSCOPIC RETROGRADE CHOLANGIOPANCREATOGRAPHY with sphincterotomy, basket retrieval and balloon sweep;  Surgeon: Joseluis Mejia MD;  Location: [facility] ENDOSCOPY;  Service:  Gastroenterology;  Laterality: N/A;  pre/post - CBD stones   • PROSTATE BIOPSY  12/21/2016 12/21/2016--prostate biopsy reportedly negative.  I will try to obtain the report.         No Known Allergies        Current Outpatient Medications:   •  allopurinol (ZYLOPRIM) 100 MG tablet, Take 1 p.o. daily for gout/elevated uric acid, Disp: 90 tablet, Rfl: 3  •  ALPRAZolam (XANAX) 0.5 MG tablet, TAKE ONE TABLET BY MOUTH TWICE A DAY, Disp: 60 tablet, Rfl: 1  •  amLODIPine (NORVASC) 5 MG tablet, Take 1 p.o. daily for high blood pressure, Disp: 90 tablet, Rfl: 3  •  aspirin (ASPIRIN LOW DOSE) 81 MG tablet, Take 1 tablet by mouth daily., Disp: , Rfl:   •  B Complex-Folic Acid (Super B Complex Maxi) tablet, Take 1 p.o. twice daily, Disp:  , Rfl:   •  calcitriol (ROCALTROL) 0.25 MCG capsule, Take 0.25 mcg by mouth Daily., Disp: , Rfl:   •  Evolocumab 140 MG/ML solution auto-injector, Inject 1 mL under the skin into the appropriate area as directed Every 14 (Fourteen) Days., Disp: 2 pen, Rfl: 11  •  fluticasone (Flonase) 50 MCG/ACT nasal spray, Administer 2 sprays in each nostril once daily for environmental allergies and congestion., Disp: 16 g, Rfl: 6  •  folic acid (FOLVITE) 1 MG tablet, TAKE ONE TABLET BY MOUTH TWICE A DAY, Disp: 180 tablet, Rfl: 0  •  Iron, Ferrous Sulfate, 325 (65 Fe) MG tablet, Take 325 mg by mouth 2 (Two) Times a Day With Meals., Disp: 60 tablet, Rfl: 1  •  isosorbide mononitrate (IMDUR) 30 MG 24 hr tablet, TAKE ONE TABLET BY MOUTH EVERY MORNING FOR HEART, Disp: 30 tablet, Rfl: 5  •  metoprolol succinate XL (TOPROL-XL) 50 MG 24 hr tablet, Take 1 tablet by mouth Daily., Disp: 90 tablet, Rfl: 3  •  nitroglycerin (NITROSTAT) 0.6 MG SL tablet, Place 1 tablet under the tongue Every 5 (Five) Minutes As Needed for Chest Pain. Maximum of 3.Go to ER after third dose., Disp: 30 tablet, Rfl: 12  •  omeprazole (priLOSEC) 40 MG capsule, TAKE ONE CAPSULE BY MOUTH DAILY, Disp: 30 capsule, Rfl: 2    Current  "Facility-Administered Medications:   •  cyanocobalamin injection 1,000 mcg, 1,000 mcg, Intramuscular, Q28 Days, Delgado Patricia MD, 1,000 mcg at 09/14/20 1322      Family History   Problem Relation Age of Onset   • Diabetes Mother    • Heart disease Mother    • Stroke Father          Social History     Socioeconomic History   • Marital status:      Spouse name: Not on file   • Number of children: 2   • Years of education: 14   • Highest education level: Some college, no degree   Occupational History   • Occupation: Retired      Employer: [employer]   Social Needs   • Financial resource strain: Not hard at all   • Food insecurity     Worry: Never true     Inability: Never true   • Transportation needs     Medical: No     Non-medical: No   Tobacco Use   • Smoking status: Never Smoker   • Smokeless tobacco: Never Used   • Tobacco comment: caffeine use: 2 cups coffee daily   Substance and Sexual Activity   • Alcohol use: Yes     Alcohol/week: 5.0 standard drinks     Types: 3 Shots of liquor, 2 Glasses of wine per week     Frequency: Monthly or less     Drinks per session: 1 or 2     Binge frequency: Never     Comment: Moderate alcohol consumption   • Drug use: No   • Sexual activity: Defer   Lifestyle   • Physical activity     Days per week: 3 days     Minutes per session: 30 min   • Stress: Not at all   Relationships   • Social connections     Talks on phone: More than three times a week     Gets together: Once a week     Attends Muslim service: More than 4 times per year     Active member of club or organization: Yes     Attends meetings of clubs or organizations: More than 4 times per year     Relationship status:          Vitals:    12/16/20 1239   BP: 124/60   BP Location: Left arm   Pulse: 79   SpO2: 99%   Weight: 73.1 kg (161 lb 1.3 oz)   Height: 172.4 cm (67.87\")        Body mass index is 24.58 kg/m².      Physical Exam:    General: Alert and oriented x 3.  No acute distress.  Normal " affect.  HEENT: Pupils equal, round, reactive to light; extraocular movements intact; sclerae nonicteric; pharynx, ear canals and TMs normal.  Neck: Without JVD, thyromegaly, bruit, or adenopathy.  Lungs: Clear to auscultation in all fields.  Heart: Regular rate and rhythm without murmur, rub, gallop, or click.  Abdomen: Soft, nontender, without hepatosplenomegaly or hernia.  Bowel sounds normal.  : Deferred.  Rectal: Deferred.  Extremities: Without clubbing, cyanosis, edema, or pulse deficit.  Neurologic: Intact without focal deficit.  Normal station and gait observed during ingress and egress from the examination room.  Skin: Without significant lesion.  Musculoskeletal: Unremarkable.    Lab/other results:    Comprehensive Metabolic Panel [LAB17] (Order [id number])  Order  Date: 11/20/2020 Department: [facility] Ordering/Authorizing: Delgado Patricia MD   Reprint Order Requisition    Comprehensive Metabolic Panel (Order #[id number]) on 11/20/20       Contains abnormal data Comprehensive Metabolic Panel  Order: [id number]  Status:  Final result   Visible to patient:  Yes (MyChart)   Next appt:  01/15/2021 at 10:00 AM in Internal Medicine (Delgado Patricia MD)   Dx:  Hyperbilirubinemia; Elevated liver en...  Specimen Information: Blood        Component   Ref Range & Units 3wk ago   Glucose   65 - 99 mg/dL 92    BUN   8 - 23 mg/dL 24High     Creatinine   0.76 - 1.27 mg/dL 1.53High     Sodium   136 - 145 mmol/L 134Low     Potassium   3.5 - 5.2 mmol/L 4.9    Chloride   98 - 107 mmol/L 98    CO2   22.0 - 29.0 mmol/L 27.7    Calcium   8.6 - 10.5 mg/dL 9.2    Total Protein   6.0 - 8.5 g/dL 6.5    Albumin   3.50 - 5.20 g/dL 3.70    ALT (SGPT)   1 - 41 U/L 147High     AST (SGOT)   1 - 40 U/L 128High     Alkaline Phosphatase   39 - 117 U/L 1,239High     Total Bilirubin   0.0 - 1.2 mg/dL 5.3High     eGFR Non African Amer   >60 mL/min/1.73 43Low     Globulin   gm/dL 2.8    A/G Ratio    g/dL 1.3    BUN/Creatinine Ratio   7.0 - 25.0 15.7    Anion Gap   5.0 - 15.0 mmol/L 8.3    Resulting Agency [facility]      Narrative  Performed by: [facility]  GFR Normal >60   Chronic Kidney Disease <60   Kidney Failure <15       Specimen Collected: 11/20/20 15:03 Last Resulted: 11/20/20 21:33 Lab Flowsheet Order Details View Encounter Lab and Collection Details Routing Result History         Related Result Highlights           CBC (No Diff)  Final result 11/20/2020             Iron Profile  Final result 11/20/2020             Lipase  Final result 11/20/2020               Result Communications    Result Notes   Delgado Patricia MD   11/23/2020  7:49 AM EST      Tell patient he is still anemic and I sent a prescription into his local pharmacy for iron sulfate, 1 pill twice daily at breakfast and supper.  We will plan on keeping him on this at least for a month.  If he has problems tolerating the iron twice daily then he can cut it back to once a day but we will have to keep him on it longer.  His kidney function is looking pretty good and actually a little better than it had been previously.  His sodium is just slightly low and that is nothing to worry about.  His liver enzymes are still elevated but they are improving.  I really think he needs to see me in about 2 weeks so I can see how he is doing and repeat some labs.  I do not wait till January to do this.  Schedule him an appointment.          Follow-ups     11/23/2020 Telephone         Other Results from 11/20/2020    Contains abnormal data CBC (No Diff)  Order: [id number]    Status:  Final result   Visible to patient:  Yes (MyChart)   Next appt:  01/15/2021 at 10:00 AM in Internal Medicine (Delgado Patricia MD)   Dx:  Chronic anemia  Specimen Information: Blood        Component   Ref Range & Units 3wk ago   WBC   3.40 - 10.80 10*3/mm3 6.56    RBC   4.14 - 5.80 10*6/mm3 2.78Low     Hemoglobin   13.0 - 17.7 g/dL 8.7Low     Hematocrit   37.5 - 51.0 %  25.7Low     MCV   79.0 - 97.0 fL 92.4    MCH   26.6 - 33.0 pg 31.3    MCHC   31.5 - 35.7 g/dL 33.9    RDW   12.3 - 15.4 % 16.1High     RDW-SD   37.0 - 54.0 fl 53.6    MPV   6.0 - 12.0 fL 10.0    Platelets   140 - 450 10*3/mm3 313    Resulting Agency [facility]         Specimen Collected: 11/20/20 15:06 Last Resulted: 11/20/20 20:24 Lab Flowsheet Order Details View Encounter Lab and Collection Details Routing Result History         Related Result Highlights           Comprehensive Metabolic Panel  Final result 11/20/2020               Result Communications    Result Notes   Delgado Patricia MD   11/23/2020  7:49 AM EST      Tell patient he is still anemic and I sent a prescription into his local pharmacy for iron sulfate, 1 pill twice daily at breakfast and supper.  We will plan on keeping him on this at least for a month.  If he has problems tolerating the iron twice daily then he can cut it back to once a day but we will have to keep him on it longer.  His kidney function is looking pretty good and actually a little better than it had been previously.  His sodium is just slightly low and that is nothing to worry about.  His liver enzymes are still elevated but they are improving.  I really think he needs to see me in about 2 weeks so I can see how he is doing and repeat some labs.  I do not wait till January to do this.  Schedule him an appointment.          Follow-ups     11/23/2020 Telephone            Contains abnormal data Iron Profile  Order: [id number]    Status:  Final result   Visible to patient:  Yes (MyChart)   Next appt:  01/15/2021 at 10:00 AM in Internal Medicine (Delgado Patricia MD)   Dx:  Chronic anemia  Specimen Information: Blood        Component   Ref Range & Units 3wk ago   Iron   59 - 158 mcg/dL 55Low     Iron Saturation   20 - 50 % 21    Transferrin   200 - 360 mg/dL 180Low     TIBC   298 - 536 mcg/dL 268Low     Resulting Agency  [facility]         Specimen Collected: 11/20/20 15:03 Last  Resulted: 11/20/20 21:16 Lab Flowsheet Order Details View Encounter Lab and Collection Details Routing Result History         Related Result Highlights           CBC (No Diff)  Final result 11/20/2020             Comprehensive Metabolic Panel  Final result 11/20/2020             Lipase  Final result 11/20/2020               Result Communications    Result Notes   Delgado Patricia MD   11/23/2020  7:49 AM EST      Tell patient he is still anemic and I sent a prescription into his local pharmacy for iron sulfate, 1 pill twice daily at breakfast and supper.  We will plan on keeping him on this at least for a month.  If he has problems tolerating the iron twice daily then he can cut it back to once a day but we will have to keep him on it longer.  His kidney function is looking pretty good and actually a little better than it had been previously.  His sodium is just slightly low and that is nothing to worry about.  His liver enzymes are still elevated but they are improving.  I really think he needs to see me in about 2 weeks so I can see how he is doing and repeat some labs.  I do not wait till January to do this.  Schedule him an appointment.          Follow-ups     11/23/2020 Telephone            Lipase  Order: [id number]    Status:  Final result   Visible to patient:  Yes (MyChart)   Next appt:  01/15/2021 at 10:00 AM in Internal Medicine (Delgado Patricia MD)   Dx:  Gallstone pancreatitis  Specimen Information: Blood        Component   Ref Range & Units 3wk ago   Lipase   13 - 60 U/L 50    Resulting Agency [facility]         Specimen Collected: 11/20/20 15:03 Last Resulted: 11/20/20 21:16 Lab Flowsheet Order Details View Encounter Lab and Collection Details Routing Result History         Related Result Highlights           CBC (No Diff)  Final result 11/20/2020             Comprehensive Metabolic Panel  Final result 11/20/2020             Iron Profile  Final result 11/20/2020               Result  Communications    Result Notes   Delgado Patricia MD   11/23/2020  7:49 AM EST      Tell patient he is still anemic and I sent a prescription into his local pharmacy for iron sulfate, 1 pill twice daily at breakfast and supper.  We will plan on keeping him on this at least for a month.  If he has problems tolerating the iron twice daily then he can cut it back to once a day but we will have to keep him on it longer.  His kidney function is looking pretty good and actually a little better than it had been previously.  His sodium is just slightly low and that is nothing to worry about.  His liver enzymes are still elevated but they are improving.  I really think he needs to see me in about 2 weeks so I can see how he is doing and repeat some labs.  I do not wait till January to do this.  Schedule him an appointment.          Follow-ups     11/23/2020 Telephone         Routing History    Priority Sent On From To Message Type    11/23/2020  7:49 AM Delgado Patricia MD Schulz, Sandy, CMA Result Notes    11/20/2020  8:24 PM Lab, Background User Delgado Patricia MD Results   All Reviewers List    Sharon Olsen CMA on 11/23/2020 11:50   Delgado Patricia MD on 11/23/2020 07:49   Lab Component SmartPhrase Guide    Comprehensive Metabolic Panel (Order #[id number]) on 11/20/20   Related Result Highlights           CBC (No Diff)  Final result 11/20/2020             Iron Profile  Final result 11/20/2020             Lipase  Final result 11/20/2020              Assessment/Plan:     Diagnosis Plan   1. Acute cholecystitis     2. Jaundice     3. Elevated liver enzymes     4. Gallstone pancreatitis  Lipase   5. Iron deficiency anemia due to chronic blood loss  Iron Profile   6. Impaired fasting glucose  Comprehensive Metabolic Panel   7. Benign essential hypertension     8. Chronic renal insufficiency, stage IV (severe), 02/09/2016--creatinine 1.85  CBC (No Diff)   9. Anemia due to stage 4 chronic kidney disease (CMS/HCC)  CBC  (No Diff)     December 16, 2020--patient seen in follow-up.  He is doing much better.  Eating well and has gained 7 pounds.  Jaundice appears to have resolved.  Plan is to obtain lab work including CMP to assess his liver enzymes and jaundice, CBC to assess the anemia, iron studies and amylase/lipase.  Patient will follow-up on the phone for the results and possible further instructions.  I will have him keep his previously scheduled lab and follow-up in January of this year.    November 20, 2020--patient seen in follow-up by Dr. Patricia.  Patient reports he is doing well.  He is eating well and has no discomfort.  Jaundice is still present but definitely better.  He is lost some weight, approximately 11 pounds over the past month or so.  He is lost more than that over the past year and this is related to the stress from his wife's illness and passing.  Blood pressure today is 140/70 by my reading.  Medical assistant obtained 118/52 which I feel is incorrect.  He is currently on amlodipine 5 mg/day along with metoprolol succinate 50 mg/day and were not going to make any changes.  The other medications were reviewed and he is taking 100 mg of allopurinol as well as isosorbide 30 mg/day.  Supplements and allergy medicines also noted.  He remains on low-dose aspirin.      Plan is to check lab work including CMP and CBC.  Patient will follow-up on the phone Monday for the results.  I am not going to make any medication changes and instead I will have patient follow-up as planned January 15, 2021 unless I think I need to see him sooner due to the results of the lab work.    November 3, 2020 to November 7, 2020--hospital admission for jaundice:  Hospital Course  Please see history and physical for details. Patient is a 88 y.o. male initially admitted for abdominal pain jaundice fatigue and elevated LFTs.  Etiology of this was made clear after MRCP which demonstrated gallstone pancreatitis with choledocholithiasis/acute  cholecystitis with obstruction.  GI and general surgery saw in consultation and appreciate input and assistance from both.  Patient underwent ERCP per Dr. Mejia on 11/4/2020 and subsequently underwent laparoscopic cholecystectomy per  on 11/6/2020.  Patient tolerated both procedures well without any acute issues.  At this juncture he is now tolerating dietary advancement.  As long as he can tolerate a regular diet with lunch he is free to go home later on this afternoon.  His pain control requirements have been minimal at best postoperatively as he has not required any opiates.  He can use Tylenol as needed post discharge for any discomfort.  Case was discussed with RN on day of discharge.  Son was also present at bedside and all questions answered to the best my ability to son as well as patient.  He was bridged with IV Zosyn while inpatient but no additional antibiotics will be prescribed post discharge unless warranted per surgery.  We did give a unit of blood preoperatively due to dilutional effect from IVF as well as to prevent any cardiac issues once objective anesthesia.  I feel this patient will continue to clinically improve and should do well in an outpatient setting with good follow-up.  All involved are amenable to the above plan.      Procedures

## 2020-12-21 NOTE — PROGRESS NOTES
CHIEF COMPLAINT:     Follow-up from laparoscopic cholecystectomy       HISTORY OF PRESENT ILLNESS:    Radha Win is a 88 y.o. male who underwent laparoscopic cholecystectomy with intraoperative cholangiogram on 11/6/2020 for cholelithiasis.   On the day prior to laparoscopic cholecystectomy, he underwent ERCP and stone extraction for choledocholithiasis.  Since discharge from the hospital,  he has been doing well.  There are no complaints.  Postoperative pain has resolved.  There is no nausea or vomiting.  There have been no problems with bowel movements.  He is tolerating a regular diet. There are no urinary complaints.      EXAM:     Alert. Oriented.  Lungs: Clear.  Heart: Regular.  Abdomen: Soft.  Nondistended.  Incisions are healing.  There is no cellulitis.  Extremities: Warm.     I reviewed the pathology report with the patient in the office today.  It showed chronic cholecystitis.  I reviewed the images and the report from the cholangiogram.  There were no filling defects.  I thought it looked normal.    ASSESSMENT:     Status post laparoscopic cystectomy with cholangiogram on 11/6/2020 for cholelithiasis on the day following ERCP for choledocholithiasis.     PLAN:     Doing well.  We discussed diet and activity.    Follow-up as needed.

## 2021-01-08 ENCOUNTER — LAB (OUTPATIENT)
Dept: LAB | Facility: HOSPITAL | Age: 86
End: 2021-01-08

## 2021-01-08 DIAGNOSIS — D63.1 ANEMIA DUE TO STAGE 4 CHRONIC KIDNEY DISEASE (HCC): Chronic | ICD-10-CM

## 2021-01-08 DIAGNOSIS — E55.9 VITAMIN D DEFICIENCY: Chronic | ICD-10-CM

## 2021-01-08 DIAGNOSIS — E78.2 MIXED HYPERLIPIDEMIA: Chronic | ICD-10-CM

## 2021-01-08 DIAGNOSIS — N18.4 ANEMIA DUE TO STAGE 4 CHRONIC KIDNEY DISEASE (HCC): Chronic | ICD-10-CM

## 2021-01-08 DIAGNOSIS — R73.01 IMPAIRED FASTING GLUCOSE: Chronic | ICD-10-CM

## 2021-01-08 DIAGNOSIS — N18.4 CHRONIC RENAL INSUFFICIENCY, STAGE IV (SEVERE) (HCC): Chronic | ICD-10-CM

## 2021-01-08 LAB
25(OH)D3 SERPL-MCNC: 64.8 NG/ML (ref 30–100)
ALBUMIN SERPL-MCNC: 3.8 G/DL (ref 3.5–5.2)
ALBUMIN/GLOB SERPL: 1.7 G/DL
ALP SERPL-CCNC: 524 U/L (ref 39–117)
ALT SERPL W P-5'-P-CCNC: 99 U/L (ref 1–41)
ANION GAP SERPL CALCULATED.3IONS-SCNC: 9 MMOL/L (ref 5–15)
AST SERPL-CCNC: 76 U/L (ref 1–40)
BILIRUB SERPL-MCNC: 0.9 MG/DL (ref 0–1.2)
BUN SERPL-MCNC: 27 MG/DL (ref 8–23)
BUN/CREAT SERPL: 15.5 (ref 7–25)
CALCIUM SPEC-SCNC: 9.3 MG/DL (ref 8.6–10.5)
CHLORIDE SERPL-SCNC: 103 MMOL/L (ref 98–107)
CK SERPL-CCNC: 35 U/L (ref 20–200)
CO2 SERPL-SCNC: 27 MMOL/L (ref 22–29)
CREAT SERPL-MCNC: 1.74 MG/DL (ref 0.76–1.27)
DEPRECATED RDW RBC AUTO: 44.8 FL (ref 37–54)
ERYTHROCYTE [DISTWIDTH] IN BLOOD BY AUTOMATED COUNT: 12.6 % (ref 12.3–15.4)
GFR SERPL CREATININE-BSD FRML MDRD: 37 ML/MIN/1.73
GLOBULIN UR ELPH-MCNC: 2.3 GM/DL
GLUCOSE SERPL-MCNC: 99 MG/DL (ref 65–99)
HBV SURFACE AB SER RIA-ACNC: NORMAL
HBV SURFACE AG SERPL QL IA: NORMAL
HCT VFR BLD AUTO: 34.5 % (ref 37.5–51)
HCV AB SER DONR QL: NORMAL
HETEROPH AB SER QL LA: NEGATIVE
HGB BLD-MCNC: 11.7 G/DL (ref 13–17.7)
MCH RBC QN AUTO: 32.5 PG (ref 26.6–33)
MCHC RBC AUTO-ENTMCNC: 33.9 G/DL (ref 31.5–35.7)
MCV RBC AUTO: 95.8 FL (ref 79–97)
PLATELET # BLD AUTO: 190 10*3/MM3 (ref 140–450)
PMV BLD AUTO: 10.3 FL (ref 6–12)
POTASSIUM SERPL-SCNC: 4.8 MMOL/L (ref 3.5–5.2)
PROT SERPL-MCNC: 6.1 G/DL (ref 6–8.5)
RBC # BLD AUTO: 3.6 10*6/MM3 (ref 4.14–5.8)
SODIUM SERPL-SCNC: 139 MMOL/L (ref 136–145)
T3FREE SERPL-MCNC: 2.97 PG/ML (ref 2–4.4)
T4 FREE SERPL-MCNC: 1.11 NG/DL (ref 0.93–1.7)
TSH SERPL DL<=0.05 MIU/L-ACNC: 2.29 UIU/ML (ref 0.27–4.2)
WBC # BLD AUTO: 6.4 10*3/MM3 (ref 3.4–10.8)

## 2021-01-08 PROCEDURE — 84443 ASSAY THYROID STIM HORMONE: CPT

## 2021-01-08 PROCEDURE — 86706 HEP B SURFACE ANTIBODY: CPT | Performed by: INTERNAL MEDICINE

## 2021-01-08 PROCEDURE — 85027 COMPLETE CBC AUTOMATED: CPT

## 2021-01-08 PROCEDURE — 86308 HETEROPHILE ANTIBODY SCREEN: CPT | Performed by: INTERNAL MEDICINE

## 2021-01-08 PROCEDURE — 87340 HEPATITIS B SURFACE AG IA: CPT | Performed by: INTERNAL MEDICINE

## 2021-01-08 PROCEDURE — 84481 FREE ASSAY (FT-3): CPT

## 2021-01-08 PROCEDURE — 36415 COLL VENOUS BLD VENIPUNCTURE: CPT

## 2021-01-08 PROCEDURE — 80053 COMPREHEN METABOLIC PANEL: CPT

## 2021-01-08 PROCEDURE — 86803 HEPATITIS C AB TEST: CPT | Performed by: INTERNAL MEDICINE

## 2021-01-08 PROCEDURE — 86708 HEPATITIS A ANTIBODY: CPT | Performed by: INTERNAL MEDICINE

## 2021-01-08 PROCEDURE — 82306 VITAMIN D 25 HYDROXY: CPT

## 2021-01-08 PROCEDURE — 82550 ASSAY OF CK (CPK): CPT

## 2021-01-08 PROCEDURE — 86704 HEP B CORE ANTIBODY TOTAL: CPT | Performed by: INTERNAL MEDICINE

## 2021-01-08 PROCEDURE — 84439 ASSAY OF FREE THYROXINE: CPT

## 2021-01-08 PROCEDURE — 83704 LIPOPROTEIN BLD QUAN PART: CPT

## 2021-01-08 PROCEDURE — 80061 LIPID PANEL: CPT

## 2021-01-09 LAB
HAV AB SER QL IA: POSITIVE
HBV CORE AB SERPL QL IA: NEGATIVE

## 2021-01-10 LAB
CHOLEST SERPL-MCNC: 190 MG/DL (ref 100–199)
HDL SERPL-SCNC: 30.6 UMOL/L
HDLC SERPL-MCNC: 61 MG/DL
LDL SERPL QN: 22.2 NM
LDL SERPL-SCNC: 1021 NMOL/L
LDL SMALL SERPL-SCNC: 106 NMOL/L
LDLC SERPL CALC-MCNC: 112 MG/DL (ref 0–99)
TRIGL SERPL-MCNC: 94 MG/DL (ref 0–149)

## 2021-01-15 ENCOUNTER — OFFICE VISIT (OUTPATIENT)
Dept: INTERNAL MEDICINE | Facility: CLINIC | Age: 86
End: 2021-01-15

## 2021-01-15 VITALS
WEIGHT: 163.8 LBS | BODY MASS INDEX: 24.83 KG/M2 | SYSTOLIC BLOOD PRESSURE: 150 MMHG | DIASTOLIC BLOOD PRESSURE: 76 MMHG | HEIGHT: 68 IN | HEART RATE: 86 BPM | OXYGEN SATURATION: 100 %

## 2021-01-15 DIAGNOSIS — D63.1 ANEMIA DUE TO STAGE 4 CHRONIC KIDNEY DISEASE (HCC): Chronic | ICD-10-CM

## 2021-01-15 DIAGNOSIS — E78.2 MIXED HYPERLIPIDEMIA: Chronic | ICD-10-CM

## 2021-01-15 DIAGNOSIS — F51.09 SITUATIONAL INSOMNIA: ICD-10-CM

## 2021-01-15 DIAGNOSIS — E79.0 HYPERURICEMIA: Chronic | ICD-10-CM

## 2021-01-15 DIAGNOSIS — I10 BENIGN ESSENTIAL HYPERTENSION: Chronic | ICD-10-CM

## 2021-01-15 DIAGNOSIS — K21.9 GASTROESOPHAGEAL REFLUX DISEASE WITHOUT ESOPHAGITIS: Chronic | ICD-10-CM

## 2021-01-15 DIAGNOSIS — F43.21 SITUATIONAL DEPRESSION: ICD-10-CM

## 2021-01-15 DIAGNOSIS — I20.8 CHRONIC STABLE ANGINA (HCC): ICD-10-CM

## 2021-01-15 DIAGNOSIS — E53.8 VITAMIN B12 DEFICIENCY: Chronic | ICD-10-CM

## 2021-01-15 DIAGNOSIS — F41.1 GENERALIZED ANXIETY DISORDER: Chronic | ICD-10-CM

## 2021-01-15 DIAGNOSIS — R73.01 IMPAIRED FASTING GLUCOSE: Primary | Chronic | ICD-10-CM

## 2021-01-15 DIAGNOSIS — I25.10 OCCLUSIVE CORONARY ARTERY DISEASE: Chronic | ICD-10-CM

## 2021-01-15 DIAGNOSIS — E55.9 VITAMIN D DEFICIENCY: Chronic | ICD-10-CM

## 2021-01-15 DIAGNOSIS — N18.4 ANEMIA DUE TO STAGE 4 CHRONIC KIDNEY DISEASE (HCC): Chronic | ICD-10-CM

## 2021-01-15 DIAGNOSIS — Z78.9 STATIN INTOLERANCE: Chronic | ICD-10-CM

## 2021-01-15 DIAGNOSIS — N18.4 CHRONIC RENAL INSUFFICIENCY, STAGE IV (SEVERE) (HCC): Chronic | ICD-10-CM

## 2021-01-15 DIAGNOSIS — M85.89 OSTEOPENIA OF MULTIPLE SITES: Chronic | ICD-10-CM

## 2021-01-15 DIAGNOSIS — I65.23 ATHEROSCLEROSIS OF BOTH CAROTID ARTERIES: Chronic | ICD-10-CM

## 2021-01-15 DIAGNOSIS — N25.81 SECONDARY HYPERPARATHYROIDISM OF RENAL ORIGIN (HCC): ICD-10-CM

## 2021-01-15 DIAGNOSIS — D50.0 IRON DEFICIENCY ANEMIA DUE TO CHRONIC BLOOD LOSS: ICD-10-CM

## 2021-01-15 DIAGNOSIS — Z51.81 THERAPEUTIC DRUG MONITORING: ICD-10-CM

## 2021-01-15 DIAGNOSIS — N40.0 BENIGN NON-NODULAR PROSTATIC HYPERPLASIA WITHOUT LOWER URINARY TRACT SYMPTOMS: Chronic | ICD-10-CM

## 2021-01-15 PROBLEM — K81.0 ACUTE CHOLECYSTITIS: Status: RESOLVED | Noted: 2020-11-04 | Resolved: 2021-01-15

## 2021-01-15 PROBLEM — K85.10 GALLSTONE PANCREATITIS: Status: RESOLVED | Noted: 2020-11-05 | Resolved: 2021-01-15

## 2021-01-15 PROCEDURE — 99214 OFFICE O/P EST MOD 30 MIN: CPT | Performed by: INTERNAL MEDICINE

## 2021-01-15 PROCEDURE — 96372 THER/PROPH/DIAG INJ SC/IM: CPT | Performed by: INTERNAL MEDICINE

## 2021-01-15 RX ORDER — CYANOCOBALAMIN 1000 UG/ML
1000 INJECTION, SOLUTION INTRAMUSCULAR; SUBCUTANEOUS
Status: SHIPPED | OUTPATIENT
Start: 2021-01-15

## 2021-01-15 RX ADMIN — CYANOCOBALAMIN 1000 MCG: 1000 INJECTION, SOLUTION INTRAMUSCULAR; SUBCUTANEOUS at 12:49

## 2021-01-15 NOTE — PROGRESS NOTES
01/15/2021    Patient Information  Radha Win                                                                                          [address]  [place] [address]    Patient has been erroneously marked as diabetic. Based on the available clinical information, he does not have diabetes and should therefore be excluded from diabetic health maintenance and quality measures for the remainder of the reporting period.    7/26/1932  [unfilled]  There is no work phone number on file.    Chief Complaint:     Follow-up lab work in order to monitor chronic medical issues listed in history of present illness.  No new acute complaints.    History of Present Illness:     Patient with a history of impaired fasting glucose, hyperlipidemia, stage IV chronic renal insufficiency, anemia due to chronic renal insufficiency, hypertension, statin intolerance due to Vytorin and pravastatin, occlusive coronary artery disease, hyperuricemia, vitamin B12 deficiency, carotid artery plaque, BPH, vitamin D deficiency, osteopenia, generalized anxiety and situational depression, and situational insomnia, iron deficiency anemia and esophageal reflux.  He presents today for follow-up with lab prior in order to monitor his chronic medical issues.  His past medical history reviewed and updated were necessary including health maintenance parameters.  This reveals he will be up-to-date or else accounted for after today's visit.    Review of Systems   Constitution: Negative.   HENT: Negative.    Eyes: Negative.    Cardiovascular: Negative.    Respiratory: Negative.    Endocrine: Negative.    Hematologic/Lymphatic: Negative.    Skin: Negative.    Musculoskeletal: Negative.    Gastrointestinal: Negative.    Genitourinary: Negative.    Neurological: Negative.    Psychiatric/Behavioral: Negative.    Allergic/Immunologic: Negative.        Active Problems:    Patient Active Problem List   Diagnosis   • Bilateral carotid  artery plaque, 05/24/2018--16-49% bilateral carotid artery stenosis   • Benign prostatic hypertrophy   • Chronic renal insufficiency, stage IV (severe), 02/09/2016--creatinine 1.85   • Diverticulosis of colon   • Hyperlipidemia   • Osteopenia of multiple sites   • Vitamin B12 deficiency   • Vitamin D deficiency   • Allergic rhinitis   • Anemia due to stage 4 chronic kidney disease (CMS/HCC)   • Generalized anxiety disorder   • Benign essential hypertension   • Gastroesophageal reflux disease without esophagitis   • Folic acid deficiency   • Multiple environmental allergies   • Therapeutic drug monitoring   • Bilateral renal cysts   • Primary osteoarthritis of knees, bilateral   • Impaired fasting glucose   • Muscle cramps, statin related, Vytorin and pravastatin.  Tolerates Livalo.   • Hypogonadism in male, on TRT, testosterone pellets, per    • Chronic neck pain   • Cervical radiculopathy   • Statin intolerance, Vytorin and pravastatin   • Degeneration of cervical intervertebral disc   • Exertional angina (CMS/HCC)   • Occlusive coronary artery disease, clinical diagnosis only.  Patient not a candidate for heart catheterization/intervention.   • Situational depression   • Hyperuricemia   • Situational insomnia   • Iron deficiency anemia due to chronic blood loss   • Secondary hyperparathyroidism of renal origin (CMS/HCC)         Past Medical History:   Diagnosis Date   • Allergic rhinitis 2/11/2016   • Anemia due to chronic kidney disease 2/11/2016   • Benign essential hypertension 2/11/2016   • Benign prostatic hypertrophy 2/11/2016 12/21/2016--prostate biopsy reportedly negative.  I will try to obtain the report.  Patient sees urologist.  PSAs run from the 3-8 range   • Bilateral carotid artery plaque, 09/21/2016--16-49% bilateral carotid artery stenosis 2/11/2016 09/21/2016--vascular screen reveals 16-49% bilateral carotid artery stenosis, negative for AAA, negative for PAD.  10/10/2008--vascular  screening study revealed mild bilateral carotid plaque, no aortic aneurysm, no evidence of PAD   • Bilateral renal cysts 2/14/2016 04/07/2016--ultrasound of the kidneys reveals the previously described renal cysts are not well seen.  However, questionable incidental bladder tumor noted.  05/26/2010--ultrasound the kidneys was negative bilaterally except for small renal cysts.   • Cervical radiculopathy 6/28/2018    10/02/2018--patient seen in follow-up and the results of the MRI discussed.  He continues to have intermittent radicular symptoms which is currently only on the left.  Discussed options with patient and I have recommended neurosurgery consultation.  Patient may benefit from epidural injections.  09/06/2018--MRI of the cervical spine reveals moderate neural foraminal compromise on the right at C3-C   • Chronic renal insufficiency, stage IV (severe), 02/09/2016--creatinine 1.85 2/11/2016 02/09/2016--serum creatinine 1.85.  BUN 29.  07/20/2015--patient was evaluated by the nephrologist.  Ultrasound of the kidneys ordered but this was never done.  05/22/2015--BUN 35, creatinine 2.3.  Patient referred to nephrology, Dr. Devyn Muniz.  04/16/2014--BUN 25, creatinine 1.77.  01/03/2013--BUN 37, creatinine 2.14.    05/26/2010---ultrasound of the kidneys reveal a small cyst x 2 right kidney.  Otherwise normal.    Baseline creatinine approximately 1.9-2.0.   • Degeneration of cervical intervertebral disc 5/2/2019   • Diverticulosis of colon 2/11/2016    10/03/2013--colonoscopy revealed markedly redundant colon, pancolonic diverticulosis with several impacted fecalith.  No evidence of diverticulitis or stricture.  Was only able to reach the ascending colon.  Follow up barium enema revealed extensive diverticulosis.  No polyp or other mucosal mass seen.    06/11/2002--incomplete colonoscopy due to redundant colon.  Not able to get past the hepatic flexure.  Patient had followup barium contrast enema which showed  extensive diverticulosis.   • Exertional angina (CMS/HCC) 9/17/2019 October 8, 2019--patient seen in follow-up and he reports the long-acting oral nitrate has definitely helped his exertional anginal symptoms.  However, he has not stressed himself completely such as using a push mower.  His blood pressure seems improved as well.  Patient has an appoint with the cardiologist in the next 2 weeks.  I have contacted his cardiologist about the results of the empiric ni   • Folic acid deficiency 2/11/2016   • Gastroesophageal reflux disease without esophagitis 2/11/2016   • Generalized anxiety disorder 2/11/2016   • History of diverticulitis of colon 2009 and 2003 05/08/2009-acute diverticulitis without complication. 09/05/2003-acute diverticulitis without complication.    • Hyperlipidemia 2/11/2016 01/29/2005--treatment for hyperlipidemia begun.   • Hyperuricemia 8/19/2020   • Hypogonadism in male, on TRT, testosterone pellets, per  6/16/2017 January 2017--patient reports his urologist initiated testosterone replacement therapy consisting of testosterone pellets every 6 months.   • Multiple environmental allergies 2/12/2016    Patient sees the allergist regularly and has been on immunotherapy.   • Muscle cramps, statin related, Vytorin and pravastatin.  Tolerates Livalo. 6/16/2017 12/21/2018--patient seems to be tolerating Livalo well.  10/02/2018--Livalo 2 mg per day initiated.  Recommend CoQ10 400 mg per day with food for better absorption.  Reassess with lab in about 8 weeks.  08/30/2018--patient presents with complaints of muscle cramps.  He discontinue pravastatin and the cramps went away.  Cramps returned even with a half dose.  This is despite the fact he was taking    • Occlusive coronary artery disease, clinical diagnosis only.  Patient not a candidate for heart catheterization/intervention. 12/10/2019    October 8, 2019--patient seen in follow-up and he reports the long-acting oral nitrate has  definitely helped his exertional anginal symptoms.  However, he has not stressed himself completely such as using a push mower.  His blood pressure seems improved as well.  Patient has an appoint with the cardiologist in the next 2 weeks.  I have contacted his cardiologist about the results of the empiric ni   • Osteopenia, 7/12/2019--lumbar spine -0.4.  Left femoral neck -1.7.  Right femoral neck -1.9. 2/11/2016 July 12, 2019--DEXA scan reveals lumbar spine T score -0.4.  Unchanged.  Left femoral neck T score -1.7.  Unchanged.  Right femoral neck T score -1.9.  Unchanged.  Impression is osteopenia of the hips with no significant interval change.  06/16/2017--patient seen in follow-up and reports he doesn't think he ever started Fosamax.  Osteopenia and needs to be reassessed with a DEXA scan.  03/14/2017-   • Primary osteoarthritis of knees, bilateral 9/12/2016 09/12/2016--patient called in the office requesting a steriod injection in his knees.  I explained to him that I do no longer perform these injections and have referred him to Dr. Manuel.   • Statin intolerance, Vytorin and pravastatin 12/21/2018 12/21/2018--patient seems to be tolerating Livalo well.  10/02/2018--Livalo 2 mg per day initiated.  Recommend CoQ10 400 mg per day with food for better absorption.  Reassess with lab in about 8 weeks.  08/30/2018--patient presents with complaints of muscle cramps.  He discontinue pravastatin and the cramps went away.  Cramps returned even with a half dose.  This is despite the fact he was taking    • Vitamin B12 deficiency 2/11/2016 06/25/2009--B12 injections begun.   • Vitamin D deficiency 2/11/2016         Past Surgical History:   Procedure Laterality Date   • CATARACT EXTRACTION Left 12/12/2011 12/12/2011--cataract extirpation with intraocular lens implantation left eye.   • CATARACT EXTRACTION Right 12/2011 December 2011--right cataract extirpation with intraocular lens implantation.   •  CHOLECYSTECTOMY WITH INTRAOPERATIVE CHOLANGIOGRAM N/A 11/6/2020    Procedure: CHOLECYSTECTOMY LAPAROSCOPIC INTRAOPERATIVE CHOLANGIOGRAM;  Surgeon: Sanjay Burgess MD;  Location: [facility] MAIN OR;  Service: General;  Laterality: N/A;   • COLONOSCOPY  06/11/2002 06/11/2002--incomplete colonoscopy due to redundant colon. Not able to get past the hepatic flexure. Patient had followup barium contrast enema which showed extensive diverticulosis   • COLONOSCOPY  10/03/2013    10/03/2013--colonoscopy revealed markedly redundant colon, pancolonic diverticulosis with several impacted fecalith. No evidence of diverticulitis or stricture. Was only able to reach the ascending colon. Follow up barium enema revealed extensive diverticulosis. No polyp or other mucosal mass seen.   • CYSTOSCOPY  05/18/2016 05/18/2016--urology consultation.  Cystoscopy performed for possible bladder mass is negative.   • ERCP N/A 11/5/2020    Procedure: ENDOSCOPIC RETROGRADE CHOLANGIOPANCREATOGRAPHY with sphincterotomy, basket retrieval and balloon sweep;  Surgeon: Joseluis Mejia MD;  Location: [facility] ENDOSCOPY;  Service: Gastroenterology;  Laterality: N/A;  pre/post - CBD stones   • PROSTATE BIOPSY  12/21/2016 12/21/2016--prostate biopsy reportedly negative.  I will try to obtain the report.         No Known Allergies        Current Outpatient Medications:   •  allopurinol (ZYLOPRIM) 100 MG tablet, Take 1 p.o. daily for gout/elevated uric acid, Disp: 90 tablet, Rfl: 3  •  ALPRAZolam (XANAX) 0.5 MG tablet, TAKE ONE TABLET BY MOUTH TWICE A DAY, Disp: 60 tablet, Rfl: 1  •  amLODIPine (NORVASC) 5 MG tablet, Take 1 p.o. daily for high blood pressure, Disp: 90 tablet, Rfl: 3  •  aspirin (ASPIRIN LOW DOSE) 81 MG tablet, Take 1 tablet by mouth daily., Disp: , Rfl:   •  B Complex-Folic Acid (Super B Complex Maxi) tablet, Take 1 p.o. twice daily, Disp:  , Rfl:   •  calcitriol (ROCALTROL) 0.25 MCG capsule, Take 0.25 mcg by mouth Daily., Disp:  , Rfl:   •  Evolocumab 140 MG/ML solution auto-injector, Inject 1 mL under the skin into the appropriate area as directed Every 14 (Fourteen) Days., Disp: 2 pen, Rfl: 11  •  fluticasone (Flonase) 50 MCG/ACT nasal spray, Administer 2 sprays in each nostril once daily for environmental allergies and congestion., Disp: 16 g, Rfl: 6  •  folic acid (FOLVITE) 1 MG tablet, TAKE ONE TABLET BY MOUTH TWICE A DAY, Disp: 180 tablet, Rfl: 0  •  Iron, Ferrous Sulfate, 325 (65 Fe) MG tablet, Take 325 mg by mouth 2 (Two) Times a Day With Meals., Disp: 60 tablet, Rfl: 1  •  isosorbide mononitrate (IMDUR) 30 MG 24 hr tablet, TAKE ONE TABLET BY MOUTH EVERY MORNING FOR HEART, Disp: 30 tablet, Rfl: 5  •  metoprolol succinate XL (TOPROL-XL) 50 MG 24 hr tablet, Take 1 tablet by mouth Daily., Disp: 90 tablet, Rfl: 3  •  nitroglycerin (NITROSTAT) 0.6 MG SL tablet, Place 1 tablet under the tongue Every 5 (Five) Minutes As Needed for Chest Pain. Maximum of 3.Go to ER after third dose., Disp: 30 tablet, Rfl: 12  •  omeprazole (priLOSEC) 40 MG capsule, TAKE ONE CAPSULE BY MOUTH DAILY, Disp: 30 capsule, Rfl: 2    Current Facility-Administered Medications:   •  cyanocobalamin injection 1,000 mcg, 1,000 mcg, Intramuscular, Q28 Days, Delgado Patricia MD, 1,000 mcg at 09/14/20 1322      Family History   Problem Relation Age of Onset   • Diabetes Mother    • Heart disease Mother    • Stroke Father          Social History     Socioeconomic History   • Marital status:      Spouse name: Not on file   • Number of children: 2   • Years of education: 14   • Highest education level: Some college, no degree   Occupational History   • Occupation: Retired      Employer: [employer]   Social Needs   • Financial resource strain: Not hard at all   • Food insecurity     Worry: Never true     Inability: Never true   • Transportation needs     Medical: No     Non-medical: No   Tobacco Use   • Smoking status: Never Smoker   • Smokeless tobacco: Never Used  "  • Tobacco comment: caffeine use: 2 cups coffee daily   Substance and Sexual Activity   • Alcohol use: Yes     Alcohol/week: 5.0 standard drinks     Types: 2 Glasses of wine, 3 Shots of liquor per week     Frequency: Monthly or less     Drinks per session: 1 or 2     Binge frequency: Never     Comment: Moderate alcohol consumption   • Drug use: No   • Sexual activity: Not Currently     Partners: Female   Lifestyle   • Physical activity     Days per week: 3 days     Minutes per session: 30 min   • Stress: Not at all   Relationships   • Social connections     Talks on phone: More than three times a week     Gets together: Once a week     Attends Nondenominational service: More than 4 times per year     Active member of club or organization: Yes     Attends meetings of clubs or organizations: More than 4 times per year     Relationship status:          Vitals:    01/15/21 1028   BP: 150/76   Pulse: 86   SpO2: 100%   Weight: 74.3 kg (163 lb 12.8 oz)   Height: 172.4 cm (67.87\")        Body mass index is 25 kg/m².      Physical Exam:    General: Alert and oriented x 3.  No acute distress.  Normal affect.  HEENT: Pupils equal, round, reactive to light; extraocular movements intact; sclerae nonicteric; pharynx, ear canals and TMs normal.  Neck: Without JVD, thyromegaly, bruit, or adenopathy.  Lungs: Clear to auscultation in all fields.  Heart: Regular rate and rhythm without murmur, rub, gallop, or click.  Abdomen: Soft, nontender, without hepatosplenomegaly or hernia.  Bowel sounds normal.  : Deferred.  Rectal: Deferred.  Extremities: Without clubbing, cyanosis, edema, or pulse deficit.  Neurologic: Intact without focal deficit.  Normal station and gait observed during ingress and egress from the examination room.  Skin: Without significant lesion.  Musculoskeletal: Unremarkable.    Lab/other results:    NMR reveals a total cholesterol of 190.  Triglycerides 94.  LDL particle number just slightly elevated 1021.  Small " LDL particle number excellent 106.  HDL particle number normal at 30.6.  CMP normal except BUN 27 and creatinine 1.74.  ALT elevated at 99 and AST elevated at 76.  Alkaline phosphatase elevated at 524.  Estimated GFR 37.  CBC normal except hemoglobin 11.7 and hematocrit 34.5.  Hepatitis C antibody screening is nonreactive.  CPK is normal.  Hepatitis B is negative.  Hepatitis A antibody is positive.  Monospot negative.  Vitamin D normal at 64.8.  Thyroid function test normal.    Assessment/Plan:     Diagnosis Plan   1. Impaired fasting glucose     2. Hyperlipidemia     3. Chronic renal insufficiency, stage IV (severe), 02/09/2016--creatinine 1.85     4. Anemia due to stage 4 chronic kidney disease (CMS/HCC)     5. Benign essential hypertension     6. Statin intolerance, Vytorin and pravastatin     7. Occlusive coronary artery disease, clinical diagnosis only.  Patient not a candidate for heart catheterization/intervention.     8. Hyperuricemia     9. Vitamin B12 deficiency     10. Bilateral carotid artery plaque, 05/24/2018--16-49% bilateral carotid artery stenosis     11. Benign prostatic hypertrophy     12. Vitamin D deficiency     13. Osteopenia of multiple sites     14. Generalized anxiety disorder     15. Situational depression     16. Situational insomnia     17. Iron deficiency anemia due to chronic blood loss     18. Gastroesophageal reflux disease without esophagitis     19. Therapeutic drug monitoring     20. Secondary hyperparathyroidism of renal origin (CMS/HCC)     21. Chronic stable angina (CMS/HCC)       Patient has very mild impaired fasting glucose which actually may have resolved.  He does not have diabetes and does not need medication for this.  Hyperlipidemia is under excellent control which is important given his occlusive coronary artery disease and angina which is stable.  His chronic renal insufficiency is stage IV and stable.  Actually it is a little bit improved.  His anemia is due to stage  IV chronic kidney disease and there was some element of iron deficiency anemia which appears to have resolved.  His blood pressure appears to be controlled.  He has statin intolerance and is on Repatha and tolerating it well.  Hyperuricemia has been controlled with allopurinol.  He has vitamin B12 deficiency and received a B12 injection today.  He has carotid artery plaque and is up-to-date on his carotid Doppler study.  His BPH symptoms are controlled reasonably although he does have nocturia 3 times per night.  He is followed by the urologist.  Vitamin D is in normal range.  Patient has osteopenia and is up-to-date on his DEXA scan.  Generalized anxiety treated successfully with Xanax.  He also has some problems with insomnia which he currently has not had to take.  He will use this on a as needed basis.  Esophageal reflux seems to be controlled with omeprazole.  His secondary hyperparathyroidism is being monitored by the renal service.  His angina is stable on the current regimen.    Plan is as follows: No change in current medical regimen except discontinue iron supplementation after 1 month.  Normally I would have patient follow-up in about 6 months with lab prior but given his recent hospitalization and the overall clinical picture I will have him obtain fasting lab work in 3 months and then follow-up a week later.        Procedures

## 2021-01-30 DIAGNOSIS — I20.8 EXERTIONAL ANGINA (HCC): ICD-10-CM

## 2021-02-01 RX ORDER — NITROGLYCERIN 0.6 MG/1
TABLET SUBLINGUAL
Qty: 100 TABLET | Refills: 11 | Status: SHIPPED | OUTPATIENT
Start: 2021-02-01 | End: 2023-04-03

## 2021-02-01 RX ORDER — METOPROLOL SUCCINATE 25 MG/1
TABLET, EXTENDED RELEASE ORAL
Qty: 90 TABLET | Refills: 2 | Status: SHIPPED | OUTPATIENT
Start: 2021-02-01 | End: 2022-09-22 | Stop reason: ALTCHOICE

## 2021-02-15 DIAGNOSIS — J30.1 SEASONAL ALLERGIC RHINITIS DUE TO POLLEN: Chronic | ICD-10-CM

## 2021-02-15 RX ORDER — FLUTICASONE PROPIONATE 50 MCG
SPRAY, SUSPENSION (ML) NASAL
Qty: 16 G | Refills: 5 | Status: SHIPPED | OUTPATIENT
Start: 2021-02-15 | End: 2022-02-14

## 2021-02-19 ENCOUNTER — CLINICAL SUPPORT (OUTPATIENT)
Dept: INTERNAL MEDICINE | Facility: CLINIC | Age: 86
End: 2021-02-19

## 2021-02-19 PROCEDURE — 96372 THER/PROPH/DIAG INJ SC/IM: CPT | Performed by: INTERNAL MEDICINE

## 2021-02-19 RX ADMIN — CYANOCOBALAMIN 1000 MCG: 1000 INJECTION, SOLUTION INTRAMUSCULAR; SUBCUTANEOUS at 13:11

## 2021-02-23 ENCOUNTER — OFFICE VISIT (OUTPATIENT)
Dept: INTERNAL MEDICINE | Facility: CLINIC | Age: 86
End: 2021-02-23

## 2021-02-23 VITALS
DIASTOLIC BLOOD PRESSURE: 68 MMHG | HEIGHT: 68 IN | SYSTOLIC BLOOD PRESSURE: 126 MMHG | BODY MASS INDEX: 25.16 KG/M2 | HEART RATE: 88 BPM | OXYGEN SATURATION: 98 % | WEIGHT: 166 LBS

## 2021-02-23 DIAGNOSIS — M50.30 DEGENERATION OF CERVICAL INTERVERTEBRAL DISC: Chronic | ICD-10-CM

## 2021-02-23 DIAGNOSIS — M54.2 CHRONIC NECK PAIN: Primary | Chronic | ICD-10-CM

## 2021-02-23 DIAGNOSIS — N18.4 CHRONIC RENAL INSUFFICIENCY, STAGE IV (SEVERE) (HCC): Chronic | ICD-10-CM

## 2021-02-23 DIAGNOSIS — G89.29 CHRONIC NECK PAIN: Primary | Chronic | ICD-10-CM

## 2021-02-23 PROCEDURE — 99214 OFFICE O/P EST MOD 30 MIN: CPT | Performed by: INTERNAL MEDICINE

## 2021-02-23 RX ORDER — PREDNISONE 10 MG/1
TABLET ORAL
Qty: 56 TABLET | Refills: 0 | Status: SHIPPED | OUTPATIENT
Start: 2021-02-23 | End: 2021-04-30

## 2021-02-23 NOTE — PROGRESS NOTES
02/23/2021    Patient Information  Radha Win                                                                                          [id number]  [facility] [address]      7/26/1932  [unfilled]  There is no work phone number on file.    Chief Complaint:     Complaining of left-sided neck and shoulder pain.    History of Present Illness:    History of rather severe degenerative disc disease and degenerative arthritis of the cervical spine with neural foraminal compromise and stenosis presents today with worsening left-sided neck and shoulder pain as described below.  Past medical history reviewed and updated were necessary including health maintenance parameters.  This reveals he is currently up-to-date or else accounted for.    The history regarding left-sided neck and shoulder pain, cervical disc degeneration and cervical arthritis with radicular symptoms:    February 23, 2021--patient continues to have left-sided neck and proximal shoulder pain that is just exactly like it was June 28, 2018.  It seems to be getting worse here of late and once again it bothers him when he lies down to sleep and when he first gets up in the morning.  When he gets up and gets moving about the symptoms definitely improved to the point that they resolve.  I reviewed the MRI of the cervical spine from September 2018 and I definitely feel that we are dealing with cervical radicular symptoms on the left.  We can make a case for referring patient to neurosurgery or perhaps orthopedic spine but I think given the overall clinical picture at the present time it would be better to be more conservative.  I think he would respond to a round of prednisone.  Patient cannot take nonsteroidal anti-inflammatory drugs because of his kidney function.  Plan is as follows: Prednisone 50 mg p.o. daily x7 days, taper and discontinue.  Patient does have a follow-up in a couple of months and we can review the symptoms.   However, if he does not respond and definitely if the symptoms return then he should contact me and we will likely need to repeat the MRI and make a referral for possible surgical consideration.  Epidural injections would be another consideration but after reviewing the MRI, question whether or not that would be effective.    July 3, 2019--patient continues to have chronic neck pain with cervical radicular symptoms.  I have recommended that he contact the neurosurgery office and get a follow-up appointment.    10/02/2018--patient seen in follow-up and the results of the MRI discussed.  He continues to have intermittent radicular symptoms which is currently only on the left.  Discussed options with patient and I have recommended neurosurgery consultation.  Patient may benefit from epidural injections.    09/06/2018--MRI of the cervical spine reveals moderate neural foraminal compromise on the right at C3-C4.  There is mild canal stenosis at C4-C5 with moderate to severe neural foraminal compromise bilaterally.  At C5-C6 there is moderate neural foraminal compromise on the left and moderate to severe neural foraminal compromise on the right.  There is mild canal stenosis centrally.  At C6-C7 there is facet degenerative disease and a mild central disc osteophyte complex but no significant neural foraminal compromise.  At C7-T1 there is no significant pathology.    08/30/2018--patient continues to have left-sided neck and shoulder discomfort that radiates into his arm down to about the level of the elbow.  MRI of the cervical spine ordered.    06/28/2018--x-ray of the cervical spine reveals posteriorly and indenting osteophytic changes at C4-C5 and C5-C6. Bilateral foraminal stenotic changes are identified at C4-C5 and C5-C6.  Recommend MRI of the cervical spine.    06/28/2018--patient presents with at least a one-month history of left sided neck and proximal shoulder pain which is actually in the supraspinatus area.   No known trauma.  The pain is present only at nighttime when he lies down to go to sleep.  It is interfering with his sleep.  When he is up and about and active he does not notice the discomfort.  No numbness or paresthesias.  No weakness in his upper extremities.  Examination is not particularly revealing.  Patient does have fairly good passive range of motion.  Patient initially felt that he was having a shoulder problem but after evaluation I really think that the symptoms may be coming from his neck.  X-ray of the cervical spine ordered.  Patient will follow-up on the phone for the results and possible further instructions.  Given the prolonged duration and severity of the symptoms, I will treat him empirically with prednisone 50 mg by mouth daily ×7 days, taper and discontinue.  If symptoms persist, he may need further evaluation such as MRI.    Review of Systems   Constitution: Negative.   HENT: Negative.    Eyes: Negative.    Cardiovascular: Negative.    Respiratory: Negative.    Endocrine: Negative.    Hematologic/Lymphatic: Negative.    Skin: Negative.    Musculoskeletal: Positive for neck pain.   Gastrointestinal: Negative.    Genitourinary: Negative.    Neurological: Negative.  Negative for numbness and paresthesias.   Psychiatric/Behavioral: Negative.    Allergic/Immunologic: Negative.        Active Problems:    Patient Active Problem List   Diagnosis   • Bilateral carotid artery plaque, 05/24/2018--16-49% bilateral carotid artery stenosis   • Benign prostatic hypertrophy   • Chronic renal insufficiency, stage IV (severe), 02/09/2016--creatinine 1.85   • Diverticulosis of colon   • Hyperlipidemia   • Osteopenia of multiple sites   • Vitamin B12 deficiency   • Vitamin D deficiency   • Allergic rhinitis   • Anemia due to stage 4 chronic kidney disease (CMS/HCC)   • Generalized anxiety disorder   • Benign essential hypertension   • Gastroesophageal reflux disease without esophagitis   • Folic acid  deficiency   • Multiple environmental allergies   • Therapeutic drug monitoring   • Bilateral renal cysts   • Primary osteoarthritis of knees, bilateral   • Impaired fasting glucose   • Muscle cramps, statin related, Vytorin and pravastatin.  Tolerates Livalo.   • Hypogonadism in male, on TRT, testosterone pellets, per    • Chronic neck pain   • Cervical radiculopathy   • Statin intolerance, Vytorin and pravastatin   • Degeneration of cervical intervertebral disc   • Exertional angina (CMS/HCC)   • Occlusive coronary artery disease, clinical diagnosis only.  Patient not a candidate for heart catheterization/intervention.   • Situational depression   • Hyperuricemia   • Situational insomnia   • Iron deficiency anemia due to chronic blood loss   • Secondary hyperparathyroidism of renal origin (CMS/HCC)         Past Medical History:   Diagnosis Date   • Allergic rhinitis 2/11/2016   • Anemia due to chronic kidney disease 2/11/2016   • Benign essential hypertension 2/11/2016   • Benign prostatic hypertrophy 2/11/2016 12/21/2016--prostate biopsy reportedly negative.  I will try to obtain the report.  Patient sees urologist.  PSAs run from the 3-8 range   • Bilateral carotid artery plaque, 09/21/2016--16-49% bilateral carotid artery stenosis 2/11/2016 09/21/2016--vascular screen reveals 16-49% bilateral carotid artery stenosis, negative for AAA, negative for PAD.  10/10/2008--vascular screening study revealed mild bilateral carotid plaque, no aortic aneurysm, no evidence of PAD   • Bilateral renal cysts 2/14/2016 04/07/2016--ultrasound of the kidneys reveals the previously described renal cysts are not well seen.  However, questionable incidental bladder tumor noted.  05/26/2010--ultrasound the kidneys was negative bilaterally except for small renal cysts.   • Cervical radiculopathy 6/28/2018    10/02/2018--patient seen in follow-up and the results of the MRI discussed.  He continues to have intermittent  radicular symptoms which is currently only on the left.  Discussed options with patient and I have recommended neurosurgery consultation.  Patient may benefit from epidural injections.  09/06/2018--MRI of the cervical spine reveals moderate neural foraminal compromise on the right at C3-C   • Chronic renal insufficiency, stage IV (severe), 02/09/2016--creatinine 1.85 2/11/2016 02/09/2016--serum creatinine 1.85.  BUN 29.  07/20/2015--patient was evaluated by the nephrologist.  Ultrasound of the kidneys ordered but this was never done.  05/22/2015--BUN 35, creatinine 2.3.  Patient referred to nephrology, Dr. Devyn Munzi.  04/16/2014--BUN 25, creatinine 1.77.  01/03/2013--BUN 37, creatinine 2.14.    05/26/2010---ultrasound of the kidneys reveal a small cyst x 2 right kidney.  Otherwise normal.    Baseline creatinine approximately 1.9-2.0.   • Degeneration of cervical intervertebral disc 5/2/2019   • Diverticulosis of colon 2/11/2016    10/03/2013--colonoscopy revealed markedly redundant colon, pancolonic diverticulosis with several impacted fecalith.  No evidence of diverticulitis or stricture.  Was only able to reach the ascending colon.  Follow up barium enema revealed extensive diverticulosis.  No polyp or other mucosal mass seen.    06/11/2002--incomplete colonoscopy due to redundant colon.  Not able to get past the hepatic flexure.  Patient had followup barium contrast enema which showed extensive diverticulosis.   • Exertional angina (CMS/HCC) 9/17/2019 October 8, 2019--patient seen in follow-up and he reports the long-acting oral nitrate has definitely helped his exertional anginal symptoms.  However, he has not stressed himself completely such as using a push mower.  His blood pressure seems improved as well.  Patient has an appoint with the cardiologist in the next 2 weeks.  I have contacted his cardiologist about the results of the empiric ni   • Folic acid deficiency 2/11/2016   • Gastroesophageal reflux  disease without esophagitis 2/11/2016   • Generalized anxiety disorder 2/11/2016   • History of diverticulitis of colon 2009 and 2003 05/08/2009-acute diverticulitis without complication. 09/05/2003-acute diverticulitis without complication.    • Hyperlipidemia 2/11/2016 01/29/2005--treatment for hyperlipidemia begun.   • Hyperuricemia 8/19/2020   • Hypogonadism in male, on TRT, testosterone pellets, per  6/16/2017 January 2017--patient reports his urologist initiated testosterone replacement therapy consisting of testosterone pellets every 6 months.   • Multiple environmental allergies 2/12/2016    Patient sees the allergist regularly and has been on immunotherapy.   • Muscle cramps, statin related, Vytorin and pravastatin.  Tolerates Livalo. 6/16/2017 12/21/2018--patient seems to be tolerating Livalo well.  10/02/2018--Livalo 2 mg per day initiated.  Recommend CoQ10 400 mg per day with food for better absorption.  Reassess with lab in about 8 weeks.  08/30/2018--patient presents with complaints of muscle cramps.  He discontinue pravastatin and the cramps went away.  Cramps returned even with a half dose.  This is despite the fact he was taking    • Occlusive coronary artery disease, clinical diagnosis only.  Patient not a candidate for heart catheterization/intervention. 12/10/2019    October 8, 2019--patient seen in follow-up and he reports the long-acting oral nitrate has definitely helped his exertional anginal symptoms.  However, he has not stressed himself completely such as using a push mower.  His blood pressure seems improved as well.  Patient has an appoint with the cardiologist in the next 2 weeks.  I have contacted his cardiologist about the results of the empiric ni   • Osteopenia, 7/12/2019--lumbar spine -0.4.  Left femoral neck -1.7.  Right femoral neck -1.9. 2/11/2016 July 12, 2019--DEXA scan reveals lumbar spine T score -0.4.  Unchanged.  Left femoral neck T score -1.7.  Unchanged.   Right femoral neck T score -1.9.  Unchanged.  Impression is osteopenia of the hips with no significant interval change.  06/16/2017--patient seen in follow-up and reports he doesn't think he ever started Fosamax.  Osteopenia and needs to be reassessed with a DEXA scan.  03/14/2017-   • Primary osteoarthritis of knees, bilateral 9/12/2016 09/12/2016--patient called in the office requesting a steriod injection in his knees.  I explained to him that I do no longer perform these injections and have referred him to Dr. Manuel.   • Statin intolerance, Vytorin and pravastatin 12/21/2018 12/21/2018--patient seems to be tolerating Livalo well.  10/02/2018--Livalo 2 mg per day initiated.  Recommend CoQ10 400 mg per day with food for better absorption.  Reassess with lab in about 8 weeks.  08/30/2018--patient presents with complaints of muscle cramps.  He discontinue pravastatin and the cramps went away.  Cramps returned even with a half dose.  This is despite the fact he was taking    • Vitamin B12 deficiency 2/11/2016 06/25/2009--B12 injections begun.   • Vitamin D deficiency 2/11/2016         Past Surgical History:   Procedure Laterality Date   • CATARACT EXTRACTION Left 12/12/2011 12/12/2011--cataract extirpation with intraocular lens implantation left eye.   • CATARACT EXTRACTION Right 12/2011 December 2011--right cataract extirpation with intraocular lens implantation.   • CHOLECYSTECTOMY WITH INTRAOPERATIVE CHOLANGIOGRAM N/A 11/6/2020    Procedure: CHOLECYSTECTOMY LAPAROSCOPIC INTRAOPERATIVE CHOLANGIOGRAM;  Surgeon: Sanjay Burgess MD;  Location: [facility];  Service: General;  Laterality: N/A;   • COLONOSCOPY  06/11/2002 06/11/2002--incomplete colonoscopy due to redundant colon. Not able to get past the hepatic flexure. Patient had followup barium contrast enema which showed extensive diverticulosis   • COLONOSCOPY  10/03/2013    10/03/2013--colonoscopy revealed markedly redundant colon,  pancolonic diverticulosis with several impacted fecalith. No evidence of diverticulitis or stricture. Was only able to reach the ascending colon. Follow up barium enema revealed extensive diverticulosis. No polyp or other mucosal mass seen.   • CYSTOSCOPY  05/18/2016 05/18/2016--urology consultation.  Cystoscopy performed for possible bladder mass is negative.   • ERCP N/A 11/5/2020    Procedure: ENDOSCOPIC RETROGRADE CHOLANGIOPANCREATOGRAPHY with sphincterotomy, basket retrieval and balloon sweep;  Surgeon: Joseluis Mejia MD;  Location: [facility] ENDOSCOPY;  Service: Gastroenterology;  Laterality: N/A;  pre/post - CBD stones   • PROSTATE BIOPSY  12/21/2016 12/21/2016--prostate biopsy reportedly negative.  I will try to obtain the report.         No Known Allergies        Current Outpatient Medications:   •  allopurinol (ZYLOPRIM) 100 MG tablet, Take 1 p.o. daily for gout/elevated uric acid, Disp: 90 tablet, Rfl: 3  •  ALPRAZolam (XANAX) 0.5 MG tablet, TAKE ONE TABLET BY MOUTH TWICE A DAY, Disp: 60 tablet, Rfl: 1  •  amLODIPine (NORVASC) 5 MG tablet, Take 1 p.o. daily for high blood pressure, Disp: 90 tablet, Rfl: 3  •  aspirin (ASPIRIN LOW DOSE) 81 MG tablet, Take 1 tablet by mouth daily., Disp: , Rfl:   •  B Complex-Folic Acid (Super B Complex Maxi) tablet, Take 1 p.o. twice daily, Disp:  , Rfl:   •  calcitriol (ROCALTROL) 0.25 MCG capsule, Take 0.25 mcg by mouth Daily., Disp: , Rfl:   •  Evolocumab 140 MG/ML solution auto-injector, Inject 1 mL under the skin into the appropriate area as directed Every 14 (Fourteen) Days., Disp: 2 pen, Rfl: 11  •  fluticasone (FLONASE) 50 MCG/ACT nasal spray, SPRAY TWO SPRAYS IN EACH NOSTRIL ONCE DAILY FOR ENVIROMENTAL ALLERGIES AND CONGESTION, Disp: 16 g, Rfl: 5  •  folic acid (FOLVITE) 1 MG tablet, TAKE ONE TABLET BY MOUTH TWICE A DAY, Disp: 180 tablet, Rfl: 0  •  Iron, Ferrous Sulfate, 325 (65 Fe) MG tablet, Take 325 mg by mouth 2 (Two) Times a Day With Meals., Disp:  60 tablet, Rfl: 1  •  isosorbide mononitrate (IMDUR) 30 MG 24 hr tablet, TAKE ONE TABLET BY MOUTH EVERY MORNING FOR HEART, Disp: 30 tablet, Rfl: 5  •  metoprolol succinate XL (TOPROL-XL) 25 MG 24 hr tablet, TAKE ONE TABLET BY MOUTH DAILY, Disp: 90 tablet, Rfl: 2  •  nitroglycerin (NITROSTAT) 0.6 MG SL tablet, DISSOLVE 1 TAB UNDER TONGUE FOR CHEST PAIN - IF PAIN REMAINS AFTER 5 MIN, CALL 911 AND REPEAT DOSE. MAX 3 TABS IN 15 MINUTES, Disp: 100 tablet, Rfl: 11  •  omeprazole (priLOSEC) 40 MG capsule, TAKE ONE CAPSULE BY MOUTH DAILY, Disp: 30 capsule, Rfl: 2  •  predniSONE (DELTASONE) 10 MG tablet, 5 by mouth daily 7 days, then 4 daily 2 days, 3 daily 2 days, 2 daily 2 days, 1 daily 2 days, one half daily 2 days and discontinue., Disp: 56 tablet, Rfl: 0    Current Facility-Administered Medications:   •  cyanocobalamin injection 1,000 mcg, 1,000 mcg, Intramuscular, Q28 Days, Delgado Patricia MD, 1,000 mcg at 09/14/20 1322  •  cyanocobalamin injection 1,000 mcg, 1,000 mcg, Intramuscular, Q28 Days, Delgado Patricia MD, 1,000 mcg at 02/19/21 1311      Family History   Problem Relation Age of Onset   • Diabetes Mother    • Heart disease Mother    • Stroke Father          Social History     Socioeconomic History   • Marital status:      Spouse name: Not on file   • Number of children: 2   • Years of education: 14   • Highest education level: Some college, no degree   Occupational History   • Occupation: Retired      Employer: [employer]   Social Needs   • Financial resource strain: Not hard at all   • Food insecurity     Worry: Never true     Inability: Never true   • Transportation needs     Medical: No     Non-medical: No   Tobacco Use   • Smoking status: Never Smoker   • Smokeless tobacco: Never Used   • Tobacco comment: caffeine use: 2 cups coffee daily   Substance and Sexual Activity   • Alcohol use: Yes     Alcohol/week: 5.0 standard drinks     Types: 2 Glasses of wine, 3 Shots of liquor per week      "Frequency: Monthly or less     Drinks per session: 1 or 2     Binge frequency: Never     Comment: Moderate alcohol consumption   • Drug use: No   • Sexual activity: Not Currently     Partners: Female   Lifestyle   • Physical activity     Days per week: 3 days     Minutes per session: 30 min   • Stress: Not at all   Relationships   • Social connections     Talks on phone: More than three times a week     Gets together: Once a week     Attends [employer] service: More than 4 times per year     Active member of club or organization: Yes     Attends meetings of clubs or organizations: More than 4 times per year     Relationship status:          Vitals:    02/23/21 0942   BP: 126/68   BP Location: Right arm   Pulse: 88   SpO2: 98%   Weight: 75.3 kg (166 lb)   Height: 172.4 cm (67.87\")        Body mass index is 25.33 kg/m².      Physical Exam:    General: Alert and oriented x 3.  No acute distress.  Normal affect.  HEENT: Pupils equal, round, reactive to light; extraocular movements intact; sclerae nonicteric; pharynx, ear canals and TMs normal.  Neck: Without JVD, thyromegaly, bruit, or adenopathy.  Lungs: Clear to auscultation in all fields.  Heart: Regular rate and rhythm without murmur, rub, gallop, or click.  Abdomen: Soft, nontender, without hepatosplenomegaly or hernia.  Bowel sounds normal.  : Deferred.  Rectal: Deferred.  Extremities: Without clubbing, cyanosis, edema, or pulse deficit.  Neurologic: Intact without focal deficit.  Normal station and gait observed during ingress and egress from the examination room.  Skin: Without significant lesion.  Musculoskeletal: Unremarkable.    Lab/other results:    I reviewed the results of the MRI of the cervical spine from 2018 as well as the previous neck x-rays.    Assessment/Plan:     Diagnosis Plan   1. Chronic neck pain  predniSONE (DELTASONE) 10 MG tablet    Ambulatory Referral to Physical Therapy Evaluate and treat   2. Degeneration of cervical " intervertebral disc  predniSONE (DELTASONE) 10 MG tablet    Ambulatory Referral to Physical Therapy Evaluate and treat   3. Chronic renal insufficiency, stage IV (severe), 02/09/2016--creatinine 1.85       February 23, 2021--patient continues to have left-sided neck and proximal shoulder pain that is just exactly like it was June 28, 2018.  It seems to be getting worse here of late and once again it bothers him when he lies down to sleep and when he first gets up in the morning.  When he gets up and gets moving about the symptoms definitely improved to the point that they resolve.  I reviewed the MRI of the cervical spine from September 2018 and I definitely feel that we are dealing with cervical radicular symptoms on the left.  We can make a case for referring patient to neurosurgery or perhaps orthopedic spine but I think given the overall clinical picture at the present time it would be better to be more conservative.  I think he would respond to a round of prednisone.  Patient cannot take nonsteroidal anti-inflammatory drugs because of his kidney function.      February 23, 2021--plan is as follows: Prednisone 50 mg p.o. daily x7 days, taper and discontinue.  Patient does have a follow-up in a couple of months and we can review the symptoms.  However, if he does not respond and definitely if the symptoms return then he should contact me and we will likely need to repeat the MRI and make a referral for possible surgical consideration.  Epidural injections would be another consideration but after reviewing the MRI, question whether or not that would be effective.    February 23, 2021--addendum: At the end of the visit we considered the possibility of physical therapy.  Patient is willing to undergo physical therapy and I think there is a good chance that he could benefit from this particularly if he gets the right therapist.  I will refer him to Becky Casey.      July 3, 2019--patient continues to have chronic  neck pain with cervical radicular symptoms.  I have recommended that he contact the neurosurgery office and get a follow-up appointment.    10/02/2018--patient seen in follow-up and the results of the MRI discussed.  He continues to have intermittent radicular symptoms which is currently only on the left.  Discussed options with patient and I have recommended neurosurgery consultation.  Patient may benefit from epidural injections.    09/06/2018--MRI of the cervical spine reveals moderate neural foraminal compromise on the right at C3-C4.  There is mild canal stenosis at C4-C5 with moderate to severe neural foraminal compromise bilaterally.  At C5-C6 there is moderate neural foraminal compromise on the left and moderate to severe neural foraminal compromise on the right.  There is mild canal stenosis centrally.  At C6-C7 there is facet degenerative disease and a mild central disc osteophyte complex but no significant neural foraminal compromise.  At C7-T1 there is no significant pathology.    08/30/2018--patient continues to have left-sided neck and shoulder discomfort that radiates into his arm down to about the level of the elbow.  MRI of the cervical spine ordered.    06/28/2018--x-ray of the cervical spine reveals posteriorly and indenting osteophytic changes at C4-C5 and C5-C6. Bilateral foraminal stenotic changes are identified at C4-C5 and C5-C6.  Recommend MRI of the cervical spine.    06/28/2018--patient presents with at least a one-month history of left sided neck and proximal shoulder pain which is actually in the supraspinatus area.  No known trauma.  The pain is present only at nighttime when he lies down to go to sleep.  It is interfering with his sleep.  When he is up and about and active he does not notice the discomfort.  No numbness or paresthesias.  No weakness in his upper extremities.  Examination is not particularly revealing.  Patient does have fairly good passive range of motion.  Patient  initially felt that he was having a shoulder problem but after evaluation I really think that the symptoms may be coming from his neck.  X-ray of the cervical spine ordered.  Patient will follow-up on the phone for the results and possible further instructions.  Given the prolonged duration and severity of the symptoms, I will treat him empirically with prednisone 50 mg by mouth daily ×7 days, taper and discontinue.  If symptoms persist, he may need further evaluation such as MRI.          Procedures

## 2021-03-07 DIAGNOSIS — K21.9 GASTROESOPHAGEAL REFLUX DISEASE WITHOUT ESOPHAGITIS: Chronic | ICD-10-CM

## 2021-03-08 DIAGNOSIS — F41.1 GENERALIZED ANXIETY DISORDER: Primary | ICD-10-CM

## 2021-03-08 RX ORDER — ALPRAZOLAM 0.5 MG/1
TABLET ORAL
Qty: 60 TABLET | Refills: 5 | Status: SHIPPED | OUTPATIENT
Start: 2021-03-08 | End: 2021-10-05

## 2021-03-08 RX ORDER — OMEPRAZOLE 40 MG/1
CAPSULE, DELAYED RELEASE ORAL
Qty: 90 CAPSULE | Refills: 1 | Status: SHIPPED | OUTPATIENT
Start: 2021-03-08 | End: 2021-09-14

## 2021-03-10 ENCOUNTER — OFFICE VISIT (OUTPATIENT)
Dept: CARDIOLOGY | Facility: CLINIC | Age: 86
End: 2021-03-10

## 2021-03-10 ENCOUNTER — TREATMENT (OUTPATIENT)
Dept: PHYSICAL THERAPY | Facility: CLINIC | Age: 86
End: 2021-03-10

## 2021-03-10 VITALS
SYSTOLIC BLOOD PRESSURE: 140 MMHG | BODY MASS INDEX: 25.73 KG/M2 | HEART RATE: 84 BPM | HEIGHT: 68 IN | OXYGEN SATURATION: 100 % | WEIGHT: 169.8 LBS | DIASTOLIC BLOOD PRESSURE: 72 MMHG

## 2021-03-10 DIAGNOSIS — I10 ESSENTIAL HYPERTENSION: ICD-10-CM

## 2021-03-10 DIAGNOSIS — I25.10 CORONARY ARTERY DISEASE INVOLVING NATIVE CORONARY ARTERY OF NATIVE HEART WITHOUT ANGINA PECTORIS: Primary | ICD-10-CM

## 2021-03-10 DIAGNOSIS — M54.2 PAIN, NECK: Primary | ICD-10-CM

## 2021-03-10 DIAGNOSIS — I45.10 RBBB: ICD-10-CM

## 2021-03-10 PROCEDURE — 97161 PT EVAL LOW COMPLEX 20 MIN: CPT | Performed by: PHYSICAL THERAPIST

## 2021-03-10 PROCEDURE — 93000 ELECTROCARDIOGRAM COMPLETE: CPT | Performed by: INTERNAL MEDICINE

## 2021-03-10 PROCEDURE — 97110 THERAPEUTIC EXERCISES: CPT | Performed by: PHYSICAL THERAPIST

## 2021-03-10 PROCEDURE — 97140 MANUAL THERAPY 1/> REGIONS: CPT | Performed by: PHYSICAL THERAPIST

## 2021-03-10 PROCEDURE — 99214 OFFICE O/P EST MOD 30 MIN: CPT | Performed by: INTERNAL MEDICINE

## 2021-03-10 NOTE — PATIENT INSTRUCTIONS
Access Code: [email or web address]  URL: [email or web address]  Date: 03/10/2021  Prepared by: Becky Casey    Exercises  Upper Trapezius Stretch - 5 reps - 1 sets - 1x daily - 7x weekly  Seated Levator Scapulae Stretch - 5 reps - 1 sets - 1x daily - 7x weekly    Pt was educated on the importance of their HEP and their current need for skilled physical therapy. Patients goals and potential limitations were discussed and pt is in agreement with current plan of care and treatment emphasis.

## 2021-03-10 NOTE — PROGRESS NOTES
Date of Office Visit: 03/10/2021    Patient Name: Radha Win  : 1932    Encounter Provider: Theron Alejo MD  Referring Provider: Theron Alejo MD  Place of Service: [facility] CARDIOLOGY  Patient Care Team:  Delgado Patricia MD as PCP - General (Internal Medicine)      Chief Complaint   Patient presents with   • Coronary Artery Disease   • Hypertension     History of Present Illness  Patient is an 88-year-old white male with a history of coronary disease as well as hypertension.  He returns to the office today for a follow-up evaluation.    The patient has been doing well from a cardiac standpoint.  He still in the grieving process with the loss of his wife 6 to 7 months ago.  Since his last office visit here he has undergone a cholecystectomy.    Today in the office the patient's blood pressure is much better than it had previously been.  I reviewed his medications with him.  He is no longer is tachycardic.  He denies any symptoms other than fatigue.  He specifically states there is no angina pectoris or shortness of breath with exertion.      Past Medical History:   Diagnosis Date   • Allergic rhinitis 2016   • Anemia due to chronic kidney disease 2016   • Benign essential hypertension 2016   • Benign prostatic hypertrophy 2016--prostate biopsy reportedly negative.  I will try to obtain the report.  Patient sees urologist.  PSAs run from the 3-8 range   • Bilateral carotid artery plaque, 2016--16-49% bilateral carotid artery stenosis 2016--vascular screen reveals 16-49% bilateral carotid artery stenosis, negative for AAA, negative for PAD.  10/10/2008--vascular screening study revealed mild bilateral carotid plaque, no aortic aneurysm, no evidence of PAD   • Bilateral renal cysts 2016--ultrasound of the kidneys reveals the previously described renal cysts are not well  seen.  However, questionable incidental bladder tumor noted.  05/26/2010--ultrasound the kidneys was negative bilaterally except for small renal cysts.   • Cervical radiculopathy 6/28/2018    10/02/2018--patient seen in follow-up and the results of the MRI discussed.  He continues to have intermittent radicular symptoms which is currently only on the left.  Discussed options with patient and I have recommended neurosurgery consultation.  Patient may benefit from epidural injections.  09/06/2018--MRI of the cervical spine reveals moderate neural foraminal compromise on the right at C3-C   • Chronic renal insufficiency, stage IV (severe), 02/09/2016--creatinine 1.85 2/11/2016 02/09/2016--serum creatinine 1.85.  BUN 29.  07/20/2015--patient was evaluated by the nephrologist.  Ultrasound of the kidneys ordered but this was never done.  05/22/2015--BUN 35, creatinine 2.3.  Patient referred to nephrology, Dr. Devyn Muniz.  04/16/2014--BUN 25, creatinine 1.77.  01/03/2013--BUN 37, creatinine 2.14.    05/26/2010---ultrasound of the kidneys reveal a small cyst x 2 right kidney.  Otherwise normal.    Baseline creatinine approximately 1.9-2.0.   • Degeneration of cervical intervertebral disc 5/2/2019   • Diverticulosis of colon 2/11/2016    10/03/2013--colonoscopy revealed markedly redundant colon, pancolonic diverticulosis with several impacted fecalith.  No evidence of diverticulitis or stricture.  Was only able to reach the ascending colon.  Follow up barium enema revealed extensive diverticulosis.  No polyp or other mucosal mass seen.    06/11/2002--incomplete colonoscopy due to redundant colon.  Not able to get past the hepatic flexure.  Patient had followup barium contrast enema which showed extensive diverticulosis.   • Exertional angina (CMS/HCC) 9/17/2019 October 8, 2019--patient seen in follow-up and he reports the long-acting oral nitrate has definitely helped his exertional anginal symptoms.  However, he has  not stressed himself completely such as using a push mower.  His blood pressure seems improved as well.  Patient has an appoint with the cardiologist in the next 2 weeks.  I have contacted his cardiologist about the results of the empiric ni   • Folic acid deficiency 2/11/2016   • Gastroesophageal reflux disease without esophagitis 2/11/2016   • Generalized anxiety disorder 2/11/2016   • History of diverticulitis of colon 2009 and 2003 05/08/2009-acute diverticulitis without complication. 09/05/2003-acute diverticulitis without complication.    • Hyperlipidemia 2/11/2016 01/29/2005--treatment for hyperlipidemia begun.   • Hyperuricemia 8/19/2020   • Hypogonadism in male, on TRT, testosterone pellets, per  6/16/2017 January 2017--patient reports his urologist initiated testosterone replacement therapy consisting of testosterone pellets every 6 months.   • Multiple environmental allergies 2/12/2016    Patient sees the allergist regularly and has been on immunotherapy.   • Muscle cramps, statin related, Vytorin and pravastatin.  Tolerates Livalo. 6/16/2017 12/21/2018--patient seems to be tolerating Livalo well.  10/02/2018--Livalo 2 mg per day initiated.  Recommend CoQ10 400 mg per day with food for better absorption.  Reassess with lab in about 8 weeks.  08/30/2018--patient presents with complaints of muscle cramps.  He discontinue pravastatin and the cramps went away.  Cramps returned even with a half dose.  This is despite the fact he was taking    • Occlusive coronary artery disease, clinical diagnosis only.  Patient not a candidate for heart catheterization/intervention. 12/10/2019    October 8, 2019--patient seen in follow-up and he reports the long-acting oral nitrate has definitely helped his exertional anginal symptoms.  However, he has not stressed himself completely such as using a push mower.  His blood pressure seems improved as well.  Patient has an appoint with the cardiologist in the next  2 weeks.  I have contacted his cardiologist about the results of the empiric ni   • Osteopenia, 7/12/2019--lumbar spine -0.4.  Left femoral neck -1.7.  Right femoral neck -1.9. 2/11/2016 July 12, 2019--DEXA scan reveals lumbar spine T score -0.4.  Unchanged.  Left femoral neck T score -1.7.  Unchanged.  Right femoral neck T score -1.9.  Unchanged.  Impression is osteopenia of the hips with no significant interval change.  06/16/2017--patient seen in follow-up and reports he doesn't think he ever started Fosamax.  Osteopenia and needs to be reassessed with a DEXA scan.  03/14/2017-   • Primary osteoarthritis of knees, bilateral 9/12/2016 09/12/2016--patient called in the office requesting a steriod injection in his knees.  I explained to him that I do no longer perform these injections and have referred him to Dr. Manuel.   • Statin intolerance, Vytorin and pravastatin 12/21/2018 12/21/2018--patient seems to be tolerating Livalo well.  10/02/2018--Livalo 2 mg per day initiated.  Recommend CoQ10 400 mg per day with food for better absorption.  Reassess with lab in about 8 weeks.  08/30/2018--patient presents with complaints of muscle cramps.  He discontinue pravastatin and the cramps went away.  Cramps returned even with a half dose.  This is despite the fact he was taking    • Vitamin B12 deficiency 2/11/2016 06/25/2009--B12 injections begun.   • Vitamin D deficiency 2/11/2016         Past Surgical History:   Procedure Laterality Date   • CATARACT EXTRACTION Left 12/12/2011 12/12/2011--cataract extirpation with intraocular lens implantation left eye.   • CATARACT EXTRACTION Right 12/2011 December 2011--right cataract extirpation with intraocular lens implantation.   • CHOLECYSTECTOMY WITH INTRAOPERATIVE CHOLANGIOGRAM N/A 11/6/2020    Procedure: CHOLECYSTECTOMY LAPAROSCOPIC INTRAOPERATIVE CHOLANGIOGRAM;  Surgeon: Sanjay Burgess MD;  Location: [facility] OR;  Service: General;  Laterality: N/A;    • COLONOSCOPY  06/11/2002 06/11/2002--incomplete colonoscopy due to redundant colon. Not able to get past the hepatic flexure. Patient had followup barium contrast enema which showed extensive diverticulosis   • COLONOSCOPY  10/03/2013    10/03/2013--colonoscopy revealed markedly redundant colon, pancolonic diverticulosis with several impacted fecalith. No evidence of diverticulitis or stricture. Was only able to reach the ascending colon. Follow up barium enema revealed extensive diverticulosis. No polyp or other mucosal mass seen.   • CYSTOSCOPY  05/18/2016 05/18/2016--urology consultation.  Cystoscopy performed for possible bladder mass is negative.   • ERCP N/A 11/5/2020    Procedure: ENDOSCOPIC RETROGRADE CHOLANGIOPANCREATOGRAPHY with sphincterotomy, basket retrieval and balloon sweep;  Surgeon: Joseluis Mejia MD;  Location: [facility] ENDOSCOPY;  Service: Gastroenterology;  Laterality: N/A;  pre/post - CBD stones   • PROSTATE BIOPSY  12/21/2016 12/21/2016--prostate biopsy reportedly negative.  I will try to obtain the report.           Current Outpatient Medications:   •  allopurinol (ZYLOPRIM) 100 MG tablet, Take 1 p.o. daily for gout/elevated uric acid, Disp: 90 tablet, Rfl: 3  •  ALPRAZolam (XANAX) 0.5 MG tablet, TAKE ONE TABLET BY MOUTH TWICE A DAY, Disp: 60 tablet, Rfl: 5  •  amLODIPine (NORVASC) 5 MG tablet, Take 1 p.o. daily for high blood pressure, Disp: 90 tablet, Rfl: 3  •  aspirin (ASPIRIN LOW DOSE) 81 MG tablet, Take 1 tablet by mouth daily., Disp: , Rfl:   •  B Complex-Folic Acid (Super B Complex Maxi) tablet, Take 1 p.o. twice daily, Disp:  , Rfl:   •  calcitriol (ROCALTROL) 0.25 MCG capsule, Take 0.25 mcg by mouth Daily., Disp: , Rfl:   •  Evolocumab 140 MG/ML solution auto-injector, Inject 1 mL under the skin into the appropriate area as directed Every 14 (Fourteen) Days., Disp: 2 pen, Rfl: 11  •  fluticasone (FLONASE) 50 MCG/ACT nasal spray, SPRAY TWO SPRAYS IN EACH NOSTRIL ONCE  DAILY FOR ENVIROMENTAL ALLERGIES AND CONGESTION, Disp: 16 g, Rfl: 5  •  folic acid (FOLVITE) 1 MG tablet, TAKE ONE TABLET BY MOUTH TWICE A DAY, Disp: 180 tablet, Rfl: 0  •  Iron, Ferrous Sulfate, 325 (65 Fe) MG tablet, Take 325 mg by mouth 2 (Two) Times a Day With Meals., Disp: 60 tablet, Rfl: 1  •  isosorbide mononitrate (IMDUR) 30 MG 24 hr tablet, TAKE ONE TABLET BY MOUTH EVERY MORNING FOR HEART, Disp: 30 tablet, Rfl: 5  •  metoprolol succinate XL (TOPROL-XL) 25 MG 24 hr tablet, TAKE ONE TABLET BY MOUTH DAILY, Disp: 90 tablet, Rfl: 2  •  nitroglycerin (NITROSTAT) 0.6 MG SL tablet, DISSOLVE 1 TAB UNDER TONGUE FOR CHEST PAIN - IF PAIN REMAINS AFTER 5 MIN, CALL 911 AND REPEAT DOSE. MAX 3 TABS IN 15 MINUTES, Disp: 100 tablet, Rfl: 11  •  omeprazole (priLOSEC) 40 MG capsule, TAKE ONE CAPSULE BY MOUTH DAILY, Disp: 90 capsule, Rfl: 1  •  predniSONE (DELTASONE) 10 MG tablet, 5 by mouth daily 7 days, then 4 daily 2 days, 3 daily 2 days, 2 daily 2 days, 1 daily 2 days, one half daily 2 days and discontinue., Disp: 56 tablet, Rfl: 0    Current Facility-Administered Medications:   •  cyanocobalamin injection 1,000 mcg, 1,000 mcg, Intramuscular, Q28 Days, Delgado Patricia MD, 1,000 mcg at 09/14/20 1322  •  cyanocobalamin injection 1,000 mcg, 1,000 mcg, Intramuscular, Q28 Days, Delgado Patricia MD, 1,000 mcg at 02/19/21 1311      Social History     Socioeconomic History   • Marital status:      Spouse name: Not on file   • Number of children: 2   • Years of education: 14   • Highest education level: Some college, no degree   Tobacco Use   • Smoking status: Never Smoker   • Smokeless tobacco: Never Used   • Tobacco comment: caffeine use: 2 cups coffee daily   Vaping Use   • Vaping Use: Never used   Substance and Sexual Activity   • Alcohol use: Yes     Alcohol/week: 5.0 standard drinks     Types: 2 Glasses of wine, 3 Shots of liquor per week     Comment: Moderate alcohol consumption   • Drug use: No   • Sexual  "activity: Not Currently     Partners: Female         Review of Systems   Constitutional: Positive for malaise/fatigue.   HENT: Negative.    Eyes: Negative.    Cardiovascular: Negative.    Respiratory: Negative.    Endocrine: Negative.    Skin: Negative.    Musculoskeletal: Negative.    Gastrointestinal: Negative.    Neurological: Negative.    Psychiatric/Behavioral: Negative.        Procedures      ECG 12 Lead    Date/Time: 3/10/2021 1:44 PM  Performed by: Theron Alejo MD  Authorized by: Theron Alejo MD   Comparison: compared with previous ECG from 9/9/2020  Rhythm: sinus rhythm  Rate: normal  Conduction: right bundle branch block  ST Segments: ST segments normal  T Waves: T waves normal  QRS axis: normal                  Objective:    /72 (BP Location: Left arm, Patient Position: Sitting, Cuff Size: Adult)   Pulse 84   Ht 172.4 cm (67.87\")   Wt 77 kg (169 lb 12.8 oz)   SpO2 100%   BMI 25.92 kg/m²         Vitals reviewed.   Constitutional:       Appearance: Well-developed.   Eyes:      Pupils: Pupils are equal, round, and reactive to light.   HENT:      Head: Normocephalic.   Neck:      Thyroid: No thyromegaly.      Vascular: No carotid bruit or JVD.   Pulmonary:      Effort: Pulmonary effort is normal.      Breath sounds: Normal breath sounds.   Cardiovascular:      Normal rate. Regular rhythm. Normal S1. Normal S2.      Murmurs: There is no murmur.      No gallop.   Pulses:     Intact distal pulses.   Edema:     Peripheral edema absent.   Musculoskeletal:      Cervical back: Normal range of motion. Skin:     General: Skin is warm and dry.      Findings: No erythema.   Neurological:      Mental Status: Alert and oriented to person, place, and time.             Assessment:       Diagnosis Plan   1. Coronary artery disease involving native coronary artery of native heart without angina pectoris     2. Essential hypertension     3. RBBB       1.  Coronary artery disease: Asymptomatic at " present  2.  Hypertension: Controlled  3.  Chronic right bundle branch block: Discussed with the patient  4.  Hyperlipidemia: On Repatha.  Intolerance to statin therapy  5.  Chronic kidney disease:     Plan:       Cardiac wise patient is stable.  He will follow-up in 6 months.

## 2021-03-10 NOTE — PROGRESS NOTES
Physical Therapy Initial Evaluation and Plan of Care      Patient: Radha Win   : 1932  Diagnosis/ICD-10 Code:  Pain, neck [M54.2]  Referring practitioner: Delgado Patricia MD    Subjective Evaluation    History of Present Illness  Mechanism of injury: L sided neck pain to shoulder.    CLARK's a few years ago and PT helped  Gallbladder 2020. Recovered.  Wife passed in August  MD gave prescription for prednisone. Started 3 days ago. I was trying to wait and not take it but pain worsened.  Interferes with sleep. I don't like my pillow      Patient Occupation: retired [employer]  Quality: dull ache, tight, radiating and discomfort  Relieving factors: change in position (movement)  Aggravating factors: sleeping and prolonged positioning  Progression: improved    Social Support  Lives in: multiple-level home  Lives with: alone    Diagnostic Tests  MRI studies: abnormal    Treatments  Previous treatment: physical therapy (3 years ago)  Current treatment: medication  Patient Goals  Patient goals for therapy: decreased pain, increased motion and independence with ADLs/IADLs             Objective          Static Posture     Shoulders  Rounded.    Active Range of Motion   Cervical/Thoracic Spine   Cervical    Flexion: 45 degrees with pain  Extension: 25 degrees   Left lateral flexion: 20 degrees   Right lateral flexion: 20 degrees   Left rotation: 52 degrees   Right rotation: 42 degrees     Additional Active Range of Motion Details  T 2-5  Elevated l first rib    Passive Range of Motion   Cervical/Thoracic Spine   Normal passive range of motion    Additional Passive Range of Motion Details  Decreased L C4-6 seg mobility. Tight levator, trap and scalenes    Strength/Myotome Testing   Cervical Spine     Left   Normal strength    Right   Normal strength    Tests   Cervical     Left   Positive Spurling's sign.     Additional Tests Details  Neck pain to shoulder only          Assessment & Plan      Assessment  Impairments: abnormal muscle tone, abnormal or restricted ROM and lacks appropriate home exercise program  Assessment details: Pt is a good candidate for skilled PT intervention, mg manual therapy for spinal joint mobility, alignment, postural restoration and core strengthening to restore functional AROM and strength to return to previous level of ADL's.  Prognosis: good  Prognosis details: Pt felt much better after manual treatment today and did well with HEP instruction  Functional Limitations: sleeping and uncomfortable because of pain  Goals  Plan Goals: Short Term Goals ( 3 weeks)  1. Pt to be independent with HEP  2. Pt to exhibit increased cervical segmental mobility to allow for increased AROM  3. Pt to demonstrate proper posture without verbal cues  4. Pt to report decreased pain with awakening in the morning    Long Term Goals (  6 weeks)  1. Pt to exhibit 50 degrees of cervical rotation to the L to allow for viewing traffic without pain or limitations  2. Pt to exhibit 5/5 DCF to allow for lifting and more strenuous daily activities  3. Pt to report ability to sleep through the night without awakening and awaken w/ min to no pain  4. Pt to score < 10% on NDI    Plan  Therapy options: will be seen for skilled physical therapy services  Planned modality interventions: thermotherapy (hydrocollator packs) and ultrasound  Planned therapy interventions: home exercise program, joint mobilization, manual therapy, postural training, soft tissue mobilization, spinal/joint mobilization, strengthening and stretching  Frequency: 2x week  Duration in weeks: 6  Treatment plan discussed with: patient        Manual Therapy:    20     mins  45042;  Therapeutic Exercise:    5     mins  98301;     Neuromuscular Barry:        mins  57167;    Therapeutic Activity:          mins  74695;     Gait Training:           mins  80363;     Ultrasound:          mins  34331;    Electrical Stimulation:         mins  61688  ( );  Dry Needling          mins self-pay    Timed Treatment:   25   mins   Total Treatment:     55   mins    PT SIGNATURE: Becky Casey, ARCELIA   [place] License # [id number]  DATE TREATMENT INITIATED: 3/10/2021    Medicare Initial Certification  Certification Period: 6/8/2021  I certify that the therapy services are furnished while this patient is under my care.  The services outlined above are required by this patient, and will be reviewed every 90 days.     PHYSICIAN: Delgado Patricia MD      DATE:     Please sign and return via fax to [phone].. Thank you, [facility].

## 2021-03-16 DIAGNOSIS — I20.8 EXERTIONAL ANGINA (HCC): ICD-10-CM

## 2021-03-16 RX ORDER — ISOSORBIDE MONONITRATE 30 MG/1
TABLET, EXTENDED RELEASE ORAL
Qty: 30 TABLET | Refills: 5 | Status: SHIPPED | OUTPATIENT
Start: 2021-03-16 | End: 2021-09-23

## 2021-03-17 ENCOUNTER — TREATMENT (OUTPATIENT)
Dept: PHYSICAL THERAPY | Facility: CLINIC | Age: 86
End: 2021-03-17

## 2021-03-17 DIAGNOSIS — M54.2 PAIN, NECK: Primary | ICD-10-CM

## 2021-03-17 PROCEDURE — 97140 MANUAL THERAPY 1/> REGIONS: CPT | Performed by: PHYSICAL THERAPIST

## 2021-03-17 NOTE — PROGRESS NOTES
Physical Therapy Daily Progress Note        Subjective     Radha Brumagen reports: Sharp pain gone. Still a little sore but much better    Objective   See Exercise, Manual, and Modality Logs for complete treatment.       Assessment/Plan  No pain with passive stretching today.    Progress per Plan of Care           Manual Therapy:  30       mins  01773;  Therapeutic Exercise:       mins  41800;     Neuromuscular Barry:       mins  28930;    Therapeutic Activity:         mins  35732;     Gait Training:         mins  41890;     Ultrasound:         mins  15639;    Electrical Stimulation:        mins  15851 ( );  Dry Needling         mins self-pay    Timed Treatment:   30   mins   Total Treatment:     30   mins    Becky Casey, PT  Physical Therapist  [place] License # [id number]

## 2021-03-24 ENCOUNTER — TREATMENT (OUTPATIENT)
Dept: PHYSICAL THERAPY | Facility: CLINIC | Age: 86
End: 2021-03-24

## 2021-03-24 DIAGNOSIS — M54.2 PAIN, NECK: Primary | ICD-10-CM

## 2021-03-24 PROCEDURE — 97140 MANUAL THERAPY 1/> REGIONS: CPT | Performed by: PHYSICAL THERAPIST

## 2021-03-24 NOTE — PROGRESS NOTES
Physical Therapy Daily Progress Note        Subjective     Radha Brumagen reports: I may have overdone it working in yard, planting hostas etc. Both arms cramped up a lot. Neck muscles sore but no sharp pain    Objective   See Exercise, Manual, and Modality Logs for complete treatment.       Assessment/Plan  Improving cervical flexibility. No pain with manual treatment    Progress per Plan of Care           Manual Therapy:  30       mins  22152;  Therapeutic Exercise:       mins  37313;     Neuromuscular Barry:       mins  77059;    Therapeutic Activity:         mins  56930;     Gait Training:         mins  51059;     Ultrasound:         mins  48541;    Electrical Stimulation:        mins  25712 ( );  Dry Needling         mins self-pay    Timed Treatment:   30   mins   Total Treatment:     30   mins    Becky Casey PT  Physical Therapist  [place] License # [id number]

## 2021-03-30 ENCOUNTER — TREATMENT (OUTPATIENT)
Dept: PHYSICAL THERAPY | Facility: CLINIC | Age: 86
End: 2021-03-30

## 2021-03-30 DIAGNOSIS — M54.2 PAIN, NECK: Primary | ICD-10-CM

## 2021-03-30 PROCEDURE — 97035 APP MDLTY 1+ULTRASOUND EA 15: CPT | Performed by: PHYSICAL THERAPIST

## 2021-03-30 PROCEDURE — 97140 MANUAL THERAPY 1/> REGIONS: CPT | Performed by: PHYSICAL THERAPIST

## 2021-03-31 NOTE — PROGRESS NOTES
Physical Therapy Daily Progress Note        Subjective     Radha Brumagen reports: Still sore L midscap area. No sharp pain    Objective   See Exercise, Manual, and Modality Logs for complete treatment.       Assessment/Plan  Pt had no complaints of soreness after US/heat /STM today.    Progress per Plan of Care and Progress strengthening /stabilization /functional activity  Add Tband scap strengthening         Manual Therapy: 20       mins  92121;  Therapeutic Exercise:       mins  36070;     Neuromuscular Barry:       mins  55804;    Therapeutic Activity:         mins  29636;     Gait Training:         mins  50433;     Ultrasound:    8     mins  55977;    Electrical Stimulation:        mins  04221 ( );  Dry Needling         mins self-pay    Timed Treatment:   28   mins   Total Treatment:     40   mins    Becky Casey, PT  Physical Therapist  [place] License # [id number]

## 2021-04-06 ENCOUNTER — APPOINTMENT (OUTPATIENT)
Dept: MRI IMAGING | Facility: HOSPITAL | Age: 86
End: 2021-04-06

## 2021-04-09 ENCOUNTER — TREATMENT (OUTPATIENT)
Dept: PHYSICAL THERAPY | Facility: CLINIC | Age: 86
End: 2021-04-09

## 2021-04-09 DIAGNOSIS — M54.2 PAIN, NECK: Primary | ICD-10-CM

## 2021-04-09 PROCEDURE — 97140 MANUAL THERAPY 1/> REGIONS: CPT | Performed by: PHYSICAL THERAPIST

## 2021-04-09 PROCEDURE — 97110 THERAPEUTIC EXERCISES: CPT | Performed by: PHYSICAL THERAPIST

## 2021-04-09 PROCEDURE — 97035 APP MDLTY 1+ULTRASOUND EA 15: CPT | Performed by: PHYSICAL THERAPIST

## 2021-04-09 NOTE — PROGRESS NOTES
Re-Assessment / Re-Certification      Patient: Radha Win   : 1932  Diagnosis/ICD-10 Code:  Pain, neck [M54.2]  Referring practitioner: Delgado Patricia MD  Date of Initial Visit: 3/10/2021  Today's Date: 2021  Patient seen for 5 sessions      Subjective:   Radha Win reports: I was doing well for a while but a little relapse this week. Uncomfortable laying in bed but feels ok during the day  Subjective Questionnaire: NDI:18%  Clinical Progress: improved  Home Program Compliance: Yes  Treatment has included: therapeutic exercise, manual therapy, ultrasound and moist heat    Objective   Active Range of Motion   Cervical/Thoracic Spine   Cervical     Flexion: 55 degrees with pain  Extension: 50 degrees   Left lateral flexion: 20 degrees   Right lateral flexion: 20 degrees   Left rotation: 62 degrees   Right rotation: 62 degrees     Assessment/Plan   Added strengthening exercises today for upper trap, rhomboids for postural improvement  Discussed proper sleeping/pillow positions    Short Term Goals ( 3 weeks) MET  1. Pt to be independent with HEP  2. Pt to exhibit increased cervical segmental mobility to allow for increased AROM  3. Pt to demonstrate proper posture without verbal cues  4. Pt to report decreased pain with awakening in the morning    Long Term Goals (  6 weeks)  1. Pt to exhibit 50 degrees of cervical rotation to the L to allow for viewing traffic without pain or limitations MET  2. Pt to exhibit 5/5 DCF to allow for lifting and more strenuous daily activities PROGRESSING  3. Pt to report ability to sleep through the night without awakening and awaken w/ min to no pain PROGRESSING  4. Pt to score < 10% on NDI NO    Progress toward previous goals: Partially Met        Recommendations: Continue as planned  Timeframe: 1 month  Prognosis to achieve goals: good    PT Signature: Becky Casey, PT  [place] License # [id number]    Based upon review of the patient's progress and continued  therapy plan, it is my medical opinion that Radha Win should continue physical therapy treatment at [facility] PH[facility]  [address] [place] [address], [address] [address] [phone].    Signature: __________________________________  Delgado Patricia MD    Manual Therapy:    20     mins  80704;  Therapeutic Exercise:    10     mins  19537;     Neuromuscular Barry:        mins  79697;    Therapeutic Activity:          mins  20536;     Gait Training:           mins  82471;     Ultrasound:      8    mins  50220;    Electrical Stimulation:         mins  44606 ( );  Dry Needling          mins self-pay    Timed Treatment:   38   mins   Total Treatment:     48   mins

## 2021-04-14 ENCOUNTER — TREATMENT (OUTPATIENT)
Dept: PHYSICAL THERAPY | Facility: CLINIC | Age: 86
End: 2021-04-14

## 2021-04-14 DIAGNOSIS — M54.2 PAIN, NECK: Primary | ICD-10-CM

## 2021-04-14 PROCEDURE — 97035 APP MDLTY 1+ULTRASOUND EA 15: CPT | Performed by: PHYSICAL THERAPIST

## 2021-04-14 PROCEDURE — 97140 MANUAL THERAPY 1/> REGIONS: CPT | Performed by: PHYSICAL THERAPIST

## 2021-04-15 ENCOUNTER — CLINICAL SUPPORT (OUTPATIENT)
Dept: INTERNAL MEDICINE | Facility: CLINIC | Age: 86
End: 2021-04-15

## 2021-04-15 ENCOUNTER — LAB (OUTPATIENT)
Dept: LAB | Facility: HOSPITAL | Age: 86
End: 2021-04-15

## 2021-04-15 DIAGNOSIS — R73.01 IMPAIRED FASTING GLUCOSE: Chronic | ICD-10-CM

## 2021-04-15 DIAGNOSIS — E78.2 MIXED HYPERLIPIDEMIA: Chronic | ICD-10-CM

## 2021-04-15 DIAGNOSIS — E55.9 VITAMIN D DEFICIENCY: Chronic | ICD-10-CM

## 2021-04-15 DIAGNOSIS — E53.8 VITAMIN B12 DEFICIENCY: Chronic | ICD-10-CM

## 2021-04-15 DIAGNOSIS — D50.0 IRON DEFICIENCY ANEMIA DUE TO CHRONIC BLOOD LOSS: ICD-10-CM

## 2021-04-15 DIAGNOSIS — N18.4 CHRONIC RENAL INSUFFICIENCY, STAGE IV (SEVERE) (HCC): Chronic | ICD-10-CM

## 2021-04-15 DIAGNOSIS — E79.0 HYPERURICEMIA: Chronic | ICD-10-CM

## 2021-04-15 LAB
25(OH)D3 SERPL-MCNC: 57.5 NG/ML (ref 30–100)
ALBUMIN SERPL-MCNC: 4 G/DL (ref 3.5–5.2)
ALBUMIN/GLOB SERPL: 2 G/DL
ALP SERPL-CCNC: 196 U/L (ref 39–117)
ALT SERPL W P-5'-P-CCNC: 33 U/L (ref 1–41)
ANION GAP SERPL CALCULATED.3IONS-SCNC: 8.4 MMOL/L (ref 5–15)
AST SERPL-CCNC: 22 U/L (ref 1–40)
BILIRUB SERPL-MCNC: 0.4 MG/DL (ref 0–1.2)
BUN SERPL-MCNC: 33 MG/DL (ref 8–23)
BUN/CREAT SERPL: 18 (ref 7–25)
CALCIUM SPEC-SCNC: 9.3 MG/DL (ref 8.6–10.5)
CHLORIDE SERPL-SCNC: 106 MMOL/L (ref 98–107)
CK SERPL-CCNC: 49 U/L (ref 20–200)
CO2 SERPL-SCNC: 25.6 MMOL/L (ref 22–29)
CREAT SERPL-MCNC: 1.83 MG/DL (ref 0.76–1.27)
DEPRECATED RDW RBC AUTO: 48.8 FL (ref 37–54)
ERYTHROCYTE [DISTWIDTH] IN BLOOD BY AUTOMATED COUNT: 13.9 % (ref 12.3–15.4)
GFR SERPL CREATININE-BSD FRML MDRD: 35 ML/MIN/1.73
GLOBULIN UR ELPH-MCNC: 2 GM/DL
GLUCOSE SERPL-MCNC: 114 MG/DL (ref 65–99)
HBA1C MFR BLD: 5.8 % (ref 4.8–5.6)
HCT VFR BLD AUTO: 37.1 % (ref 37.5–51)
HGB BLD-MCNC: 12.1 G/DL (ref 13–17.7)
IRON 24H UR-MRATE: 135 MCG/DL (ref 59–158)
IRON SATN MFR SERPL: 42 % (ref 20–50)
MCH RBC QN AUTO: 31 PG (ref 26.6–33)
MCHC RBC AUTO-ENTMCNC: 32.6 G/DL (ref 31.5–35.7)
MCV RBC AUTO: 95.1 FL (ref 79–97)
PLATELET # BLD AUTO: 244 10*3/MM3 (ref 140–450)
PMV BLD AUTO: 10 FL (ref 6–12)
POTASSIUM SERPL-SCNC: 4.4 MMOL/L (ref 3.5–5.2)
PROT SERPL-MCNC: 6 G/DL (ref 6–8.5)
RBC # BLD AUTO: 3.9 10*6/MM3 (ref 4.14–5.8)
SODIUM SERPL-SCNC: 140 MMOL/L (ref 136–145)
T3FREE SERPL-MCNC: 2.71 PG/ML (ref 2–4.4)
T4 FREE SERPL-MCNC: 1.18 NG/DL (ref 0.93–1.7)
TIBC SERPL-MCNC: 323 MCG/DL (ref 298–536)
TRANSFERRIN SERPL-MCNC: 217 MG/DL (ref 200–360)
TSH SERPL DL<=0.05 MIU/L-ACNC: 2.57 UIU/ML (ref 0.27–4.2)
URATE SERPL-MCNC: 6 MG/DL (ref 3.4–7)
WBC # BLD AUTO: 7.2 10*3/MM3 (ref 3.4–10.8)

## 2021-04-15 PROCEDURE — 82306 VITAMIN D 25 HYDROXY: CPT

## 2021-04-15 PROCEDURE — 83704 LIPOPROTEIN BLD QUAN PART: CPT

## 2021-04-15 PROCEDURE — 96372 THER/PROPH/DIAG INJ SC/IM: CPT | Performed by: INTERNAL MEDICINE

## 2021-04-15 PROCEDURE — 84550 ASSAY OF BLOOD/URIC ACID: CPT

## 2021-04-15 PROCEDURE — 83921 ORGANIC ACID SINGLE QUANT: CPT

## 2021-04-15 PROCEDURE — 82550 ASSAY OF CK (CPK): CPT

## 2021-04-15 PROCEDURE — 84481 FREE ASSAY (FT-3): CPT

## 2021-04-15 PROCEDURE — 84466 ASSAY OF TRANSFERRIN: CPT

## 2021-04-15 PROCEDURE — 83036 HEMOGLOBIN GLYCOSYLATED A1C: CPT

## 2021-04-15 PROCEDURE — 84443 ASSAY THYROID STIM HORMONE: CPT

## 2021-04-15 PROCEDURE — 80061 LIPID PANEL: CPT

## 2021-04-15 PROCEDURE — 85027 COMPLETE CBC AUTOMATED: CPT

## 2021-04-15 PROCEDURE — 80053 COMPREHEN METABOLIC PANEL: CPT

## 2021-04-15 PROCEDURE — 83540 ASSAY OF IRON: CPT

## 2021-04-15 PROCEDURE — 36415 COLL VENOUS BLD VENIPUNCTURE: CPT

## 2021-04-15 PROCEDURE — 84439 ASSAY OF FREE THYROXINE: CPT

## 2021-04-15 RX ADMIN — CYANOCOBALAMIN 1000 MCG: 1000 INJECTION, SOLUTION INTRAMUSCULAR; SUBCUTANEOUS at 13:59

## 2021-04-15 NOTE — PROGRESS NOTES
Physical Therapy Daily Progress Note        Subjective     Radha Brumagen reports: Feeling much better. No pain laying in bed/sleeping    Objective   See Exercise, Manual, and Modality Logs for complete treatment.       Assessment/Plan  Much improved mobility in neck and less tenderness and trigger points in L levator and upper trap    Progress per Plan of Care           Manual Therapy:  25       mins  80379;  Therapeutic Exercise:       mins  15163;     Neuromuscular Barry:       mins  64950;    Therapeutic Activity:         mins  28361;     Gait Training:         mins  60481;     Ultrasound:   8      mins  73330;    Electrical Stimulation:        mins  82219 ( );  Dry Needling         mins self-pay    Timed Treatment:   33   mins   Total Treatment:     45   mins    Becky Casey, PT  Physical Therapist  [place] License # [id number]

## 2021-04-16 LAB
CHOLEST SERPL-MCNC: 167 MG/DL (ref 100–199)
HDL SERPL-SCNC: 34.1 UMOL/L
HDLC SERPL-MCNC: 53 MG/DL
LDL SERPL QN: 21.1 NM
LDL SERPL-SCNC: 836 NMOL/L
LDL SMALL SERPL-SCNC: 101 NMOL/L
LDLC SERPL CALC-MCNC: 92 MG/DL (ref 0–99)
TRIGL SERPL-MCNC: 123 MG/DL (ref 0–149)

## 2021-04-21 ENCOUNTER — TREATMENT (OUTPATIENT)
Dept: PHYSICAL THERAPY | Facility: CLINIC | Age: 86
End: 2021-04-21

## 2021-04-21 DIAGNOSIS — M54.2 PAIN, NECK: Primary | ICD-10-CM

## 2021-04-21 PROCEDURE — 97035 APP MDLTY 1+ULTRASOUND EA 15: CPT | Performed by: PHYSICAL THERAPIST

## 2021-04-21 PROCEDURE — 97140 MANUAL THERAPY 1/> REGIONS: CPT | Performed by: PHYSICAL THERAPIST

## 2021-04-21 NOTE — PROGRESS NOTES
Physical Therapy Daily Progress Note        Subjective     Radha Brumagen reports: Very minimal soreness L neck. I had to reschedule my MD appointment. I see him Friday    Objective   See Exercise, Manual, and Modality Logs for complete treatment.       Assessment/Plan  Full PROM without pain.     Progress per Plan of Care and Progress strengthening /stabilization /functional activity           Manual Therapy:  30       mins  08330;  Therapeutic Exercise:       mins  55797;     Neuromuscular Barry:       mins  06829;    Therapeutic Activity:         mins  13597;     Gait Training:         mins  48355;     Ultrasound:   8      mins  50670;    Electrical Stimulation:        mins  27935 ( );  Dry Needling         mins self-pay    Timed Treatment:   38   mins   Total Treatment:     48   mins    Becky Casey, PT  Physical Therapist  [place] License # [id number]

## 2021-04-23 LAB
Lab: NORMAL
METHYLMALONATE SERPL-SCNC: 291 NMOL/L (ref 0–378)

## 2021-04-28 ENCOUNTER — TREATMENT (OUTPATIENT)
Dept: PHYSICAL THERAPY | Facility: CLINIC | Age: 86
End: 2021-04-28

## 2021-04-28 DIAGNOSIS — M54.2 PAIN, NECK: Primary | ICD-10-CM

## 2021-04-28 PROCEDURE — 97140 MANUAL THERAPY 1/> REGIONS: CPT | Performed by: PHYSICAL THERAPIST

## 2021-04-28 NOTE — PROGRESS NOTES
Physical Therapy Daily Progress Note        Subjective     Radha Brumagen reports: I am feeling pretty good. I notice it a little before I go to slepp but no trouble sleeping.    Objective   See Exercise, Manual, and Modality Logs for complete treatment.       Assessment/Plan  Full PROM Cspine without. pain. VEry mild segmental restrictions at C 4-6 L    Anticipate DC next Visit           Manual Therapy:  25       mins  96705;  Therapeutic Exercise:       mins  76978;     Neuromuscular Barry:       mins  28146;    Therapeutic Activity:         mins  38934;     Gait Training:         mins  10894;     Ultrasound:         mins  76658;    Electrical Stimulation:        mins  78850 ( );  Dry Needling         mins self-pay    Timed Treatment:   25   mins   Total Treatment:     40   mins    Becky Casey PT  Physical Therapist  [place] License # [id number]

## 2021-04-30 ENCOUNTER — OFFICE VISIT (OUTPATIENT)
Dept: INTERNAL MEDICINE | Facility: CLINIC | Age: 86
End: 2021-04-30

## 2021-04-30 VITALS
HEART RATE: 92 BPM | BODY MASS INDEX: 26.07 KG/M2 | SYSTOLIC BLOOD PRESSURE: 128 MMHG | HEIGHT: 68 IN | OXYGEN SATURATION: 100 % | DIASTOLIC BLOOD PRESSURE: 62 MMHG | WEIGHT: 172 LBS

## 2021-04-30 DIAGNOSIS — I20.8 EXERTIONAL ANGINA (HCC): Chronic | ICD-10-CM

## 2021-04-30 DIAGNOSIS — N25.81 SECONDARY HYPERPARATHYROIDISM OF RENAL ORIGIN (HCC): Chronic | ICD-10-CM

## 2021-04-30 DIAGNOSIS — I10 BENIGN ESSENTIAL HYPERTENSION: Chronic | ICD-10-CM

## 2021-04-30 DIAGNOSIS — E29.1 HYPOGONADISM IN MALE: Chronic | ICD-10-CM

## 2021-04-30 DIAGNOSIS — N40.0 BENIGN NON-NODULAR PROSTATIC HYPERPLASIA WITHOUT LOWER URINARY TRACT SYMPTOMS: Chronic | ICD-10-CM

## 2021-04-30 DIAGNOSIS — E53.8 VITAMIN B12 DEFICIENCY: Chronic | ICD-10-CM

## 2021-04-30 DIAGNOSIS — N18.4 CHRONIC RENAL INSUFFICIENCY, STAGE IV (SEVERE) (HCC): Chronic | ICD-10-CM

## 2021-04-30 DIAGNOSIS — Z78.9 STATIN INTOLERANCE: Chronic | ICD-10-CM

## 2021-04-30 DIAGNOSIS — I65.23 ATHEROSCLEROSIS OF BOTH CAROTID ARTERIES: Chronic | ICD-10-CM

## 2021-04-30 DIAGNOSIS — I25.10 OCCLUSIVE CORONARY ARTERY DISEASE: Chronic | ICD-10-CM

## 2021-04-30 DIAGNOSIS — F41.1 GENERALIZED ANXIETY DISORDER: Chronic | ICD-10-CM

## 2021-04-30 DIAGNOSIS — D50.0 IRON DEFICIENCY ANEMIA DUE TO CHRONIC BLOOD LOSS: ICD-10-CM

## 2021-04-30 DIAGNOSIS — R73.01 IMPAIRED FASTING GLUCOSE: Primary | Chronic | ICD-10-CM

## 2021-04-30 DIAGNOSIS — E79.0 HYPERURICEMIA: Chronic | ICD-10-CM

## 2021-04-30 DIAGNOSIS — N18.4 ANEMIA DUE TO STAGE 4 CHRONIC KIDNEY DISEASE (HCC): Chronic | ICD-10-CM

## 2021-04-30 DIAGNOSIS — M50.30 DEGENERATION OF CERVICAL INTERVERTEBRAL DISC: Chronic | ICD-10-CM

## 2021-04-30 DIAGNOSIS — E55.9 VITAMIN D DEFICIENCY: Chronic | ICD-10-CM

## 2021-04-30 DIAGNOSIS — E78.2 MIXED HYPERLIPIDEMIA: Chronic | ICD-10-CM

## 2021-04-30 DIAGNOSIS — D63.1 ANEMIA DUE TO STAGE 4 CHRONIC KIDNEY DISEASE (HCC): Chronic | ICD-10-CM

## 2021-04-30 DIAGNOSIS — K21.9 GASTROESOPHAGEAL REFLUX DISEASE WITHOUT ESOPHAGITIS: Chronic | ICD-10-CM

## 2021-04-30 PROBLEM — F43.21 SITUATIONAL DEPRESSION: Status: RESOLVED | Noted: 2020-07-07 | Resolved: 2021-04-30

## 2021-04-30 PROBLEM — F51.09 SITUATIONAL INSOMNIA: Status: RESOLVED | Noted: 2020-08-19 | Resolved: 2021-04-30

## 2021-04-30 PROCEDURE — 99214 OFFICE O/P EST MOD 30 MIN: CPT | Performed by: INTERNAL MEDICINE

## 2021-04-30 PROCEDURE — 96372 THER/PROPH/DIAG INJ SC/IM: CPT | Performed by: INTERNAL MEDICINE

## 2021-04-30 RX ADMIN — CYANOCOBALAMIN 1000 MCG: 1000 INJECTION, SOLUTION INTRAMUSCULAR; SUBCUTANEOUS at 13:02

## 2021-04-30 NOTE — PROGRESS NOTES
04/30/2021    Patient Information  Radha Win                                                                                          [address]  [place] [address]      7/26/1932  [unfilled]  There is no work phone number on file.    Chief Complaint:     Follow-up blood work in order to monitor chronic medical issues listed in history of present illness.  No new acute complaints.    History of Present Illness:    Patient with a history of multiple chronic medical problems including impaired fasting glucose, stage IV chronic renal insufficiency, anemia due to chronic renal insufficiency, hyperlipidemia, occlusive coronary artery disease, exertional angina, statin intolerance, hypertension, carotid artery plaque, esophageal reflux, BPH, vitamin B12 deficiency and vitamin D deficiency, generalized anxiety, hypogonadism, cervical disc degeneration, hyperuricemia, secondary hyperparathyroidism of renal origin.  He presents today for follow-up with lab prior in order to monitor his chronic medical issues.  His past medical history reviewed and updated were necessary including health maintenance parameters.  This reveals he is currently up-to-date.    Review of Systems   Constitutional: Negative.   HENT: Negative.    Eyes: Negative.    Cardiovascular: Negative.    Respiratory: Negative.    Endocrine: Negative.    Hematologic/Lymphatic: Negative.    Skin: Negative.    Musculoskeletal: Positive for arthritis, joint pain and neck pain.   Gastrointestinal: Negative.    Genitourinary: Negative.    Neurological: Negative.    Psychiatric/Behavioral: Negative.    Allergic/Immunologic: Negative.        Active Problems:    Patient Active Problem List   Diagnosis   • Bilateral carotid artery plaque, 05/24/2018--16-49% bilateral carotid artery stenosis   • Benign prostatic hypertrophy   • Chronic renal insufficiency, stage IV (severe), 02/09/2016--creatinine 1.85   • Diverticulosis of colon   •  Hyperlipidemia   • Osteopenia of multiple sites   • Vitamin B12 deficiency   • Vitamin D deficiency   • Allergic rhinitis   • Anemia due to stage 4 chronic kidney disease (CMS/HCC)   • Generalized anxiety disorder   • Benign essential hypertension   • Gastroesophageal reflux disease without esophagitis   • Folic acid deficiency   • Multiple environmental allergies   • Therapeutic drug monitoring   • Bilateral renal cysts   • Primary osteoarthritis of knees, bilateral   • Impaired fasting glucose   • Muscle cramps, statin related, Vytorin and pravastatin.  Tolerates Livalo.   • Hypogonadism in male, on TRT, testosterone pellets, per    • Chronic neck pain   • Cervical radiculopathy   • Statin intolerance, Vytorin and pravastatin   • Degeneration of cervical intervertebral disc   • Exertional angina (CMS/HCC)   • Occlusive coronary artery disease, clinical diagnosis only.  Patient not a candidate for heart catheterization/intervention.   • Hyperuricemia   • Iron deficiency anemia due to chronic blood loss   • Secondary hyperparathyroidism of renal origin (CMS/HCC)         Past Medical History:   Diagnosis Date   • Allergic rhinitis 2/11/2016   • Anemia due to chronic kidney disease 2/11/2016   • Benign essential hypertension 2/11/2016   • Benign prostatic hypertrophy 2/11/2016 12/21/2016--prostate biopsy reportedly negative.  I will try to obtain the report.  Patient sees urologist.  PSAs run from the 3-8 range   • Bilateral carotid artery plaque, 09/21/2016--16-49% bilateral carotid artery stenosis 2/11/2016 09/21/2016--vascular screen reveals 16-49% bilateral carotid artery stenosis, negative for AAA, negative for PAD.  10/10/2008--vascular screening study revealed mild bilateral carotid plaque, no aortic aneurysm, no evidence of PAD   • Bilateral renal cysts 2/14/2016 04/07/2016--ultrasound of the kidneys reveals the previously described renal cysts are not well seen.  However, questionable incidental  bladder tumor noted.  05/26/2010--ultrasound the kidneys was negative bilaterally except for small renal cysts.   • Cervical radiculopathy 6/28/2018    10/02/2018--patient seen in follow-up and the results of the MRI discussed.  He continues to have intermittent radicular symptoms which is currently only on the left.  Discussed options with patient and I have recommended neurosurgery consultation.  Patient may benefit from epidural injections.  09/06/2018--MRI of the cervical spine reveals moderate neural foraminal compromise on the right at C3-C   • Chronic renal insufficiency, stage IV (severe), 02/09/2016--creatinine 1.85 2/11/2016 02/09/2016--serum creatinine 1.85.  BUN 29.  07/20/2015--patient was evaluated by the nephrologist.  Ultrasound of the kidneys ordered but this was never done.  05/22/2015--BUN 35, creatinine 2.3.  Patient referred to nephrology, Dr. Devyn Muniz.  04/16/2014--BUN 25, creatinine 1.77.  01/03/2013--BUN 37, creatinine 2.14.    05/26/2010---ultrasound of the kidneys reveal a small cyst x 2 right kidney.  Otherwise normal.    Baseline creatinine approximately 1.9-2.0.   • Degeneration of cervical intervertebral disc 5/2/2019   • Diverticulosis of colon 2/11/2016    10/03/2013--colonoscopy revealed markedly redundant colon, pancolonic diverticulosis with several impacted fecalith.  No evidence of diverticulitis or stricture.  Was only able to reach the ascending colon.  Follow up barium enema revealed extensive diverticulosis.  No polyp or other mucosal mass seen.    06/11/2002--incomplete colonoscopy due to redundant colon.  Not able to get past the hepatic flexure.  Patient had followup barium contrast enema which showed extensive diverticulosis.   • Exertional angina (CMS/HCC) 9/17/2019 October 8, 2019--patient seen in follow-up and he reports the long-acting oral nitrate has definitely helped his exertional anginal symptoms.  However, he has not stressed himself completely such as  using a push mower.  His blood pressure seems improved as well.  Patient has an appoint with the cardiologist in the next 2 weeks.  I have contacted his cardiologist about the results of the empiric ni   • Folic acid deficiency 2/11/2016   • Gastroesophageal reflux disease without esophagitis 2/11/2016   • Generalized anxiety disorder 2/11/2016   • History of diverticulitis of colon 2009 and 2003 05/08/2009-acute diverticulitis without complication. 09/05/2003-acute diverticulitis without complication.    • Hyperlipidemia 2/11/2016 01/29/2005--treatment for hyperlipidemia begun.   • Hyperuricemia 8/19/2020   • Hypogonadism in male, on TRT, testosterone pellets, per  6/16/2017 January 2017--patient reports his urologist initiated testosterone replacement therapy consisting of testosterone pellets every 6 months.   • Multiple environmental allergies 2/12/2016    Patient sees the allergist regularly and has been on immunotherapy.   • Muscle cramps, statin related, Vytorin and pravastatin.  Tolerates Livalo. 6/16/2017 12/21/2018--patient seems to be tolerating Livalo well.  10/02/2018--Livalo 2 mg per day initiated.  Recommend CoQ10 400 mg per day with food for better absorption.  Reassess with lab in about 8 weeks.  08/30/2018--patient presents with complaints of muscle cramps.  He discontinue pravastatin and the cramps went away.  Cramps returned even with a half dose.  This is despite the fact he was taking    • Occlusive coronary artery disease, clinical diagnosis only.  Patient not a candidate for heart catheterization/intervention. 12/10/2019    October 8, 2019--patient seen in follow-up and he reports the long-acting oral nitrate has definitely helped his exertional anginal symptoms.  However, he has not stressed himself completely such as using a push mower.  His blood pressure seems improved as well.  Patient has an appoint with the cardiologist in the next 2 weeks.  I have contacted his  cardiologist about the results of the empiric ni   • Osteopenia, 7/12/2019--lumbar spine -0.4.  Left femoral neck -1.7.  Right femoral neck -1.9. 2/11/2016 July 12, 2019--DEXA scan reveals lumbar spine T score -0.4.  Unchanged.  Left femoral neck T score -1.7.  Unchanged.  Right femoral neck T score -1.9.  Unchanged.  Impression is osteopenia of the hips with no significant interval change.  06/16/2017--patient seen in follow-up and reports he doesn't think he ever started Fosamax.  Osteopenia and needs to be reassessed with a DEXA scan.  03/14/2017-   • Primary osteoarthritis of knees, bilateral 9/12/2016 09/12/2016--patient called in the office requesting a steriod injection in his knees.  I explained to him that I do no longer perform these injections and have referred him to Dr. Manuel.   • Secondary hyperparathyroidism of renal origin (CMS/HCC) 1/15/2021   • Statin intolerance, Vytorin and pravastatin 12/21/2018 12/21/2018--patient seems to be tolerating Livalo well.  10/02/2018--Livalo 2 mg per day initiated.  Recommend CoQ10 400 mg per day with food for better absorption.  Reassess with lab in about 8 weeks.  08/30/2018--patient presents with complaints of muscle cramps.  He discontinue pravastatin and the cramps went away.  Cramps returned even with a half dose.  This is despite the fact he was taking    • Vitamin B12 deficiency 2/11/2016 06/25/2009--B12 injections begun.   • Vitamin D deficiency 2/11/2016         Past Surgical History:   Procedure Laterality Date   • CATARACT EXTRACTION Left 12/12/2011 12/12/2011--cataract extirpation with intraocular lens implantation left eye.   • CATARACT EXTRACTION Right 12/2011 December 2011--right cataract extirpation with intraocular lens implantation.   • CHOLECYSTECTOMY WITH INTRAOPERATIVE CHOLANGIOGRAM N/A 11/6/2020    Procedure: CHOLECYSTECTOMY LAPAROSCOPIC INTRAOPERATIVE CHOLANGIOGRAM;  Surgeon: Sanjay Burgess MD;  Location: [facility]  OR;  Service: General;  Laterality: N/A;   • COLONOSCOPY  06/11/2002 06/11/2002--incomplete colonoscopy due to redundant colon. Not able to get past the hepatic flexure. Patient had followup barium contrast enema which showed extensive diverticulosis   • COLONOSCOPY  10/03/2013    10/03/2013--colonoscopy revealed markedly redundant colon, pancolonic diverticulosis with several impacted fecalith. No evidence of diverticulitis or stricture. Was only able to reach the ascending colon. Follow up barium enema revealed extensive diverticulosis. No polyp or other mucosal mass seen.   • CYSTOSCOPY  05/18/2016 05/18/2016--urology consultation.  Cystoscopy performed for possible bladder mass is negative.   • ERCP N/A 11/5/2020    Procedure: ENDOSCOPIC RETROGRADE CHOLANGIOPANCREATOGRAPHY with sphincterotomy, basket retrieval and balloon sweep;  Surgeon: Joseluis Mejia MD;  Location: [facility] ENDOSCOPY;  Service: Gastroenterology;  Laterality: N/A;  pre/post - CBD stones   • PROSTATE BIOPSY  12/21/2016 12/21/2016--prostate biopsy reportedly negative.  I will try to obtain the report.         No Known Allergies        Current Outpatient Medications:   •  allopurinol (ZYLOPRIM) 100 MG tablet, Take 1 p.o. daily for gout/elevated uric acid, Disp: 90 tablet, Rfl: 3  •  ALPRAZolam (XANAX) 0.5 MG tablet, TAKE ONE TABLET BY MOUTH TWICE A DAY, Disp: 60 tablet, Rfl: 5  •  amLODIPine (NORVASC) 5 MG tablet, Take 1 p.o. daily for high blood pressure, Disp: 90 tablet, Rfl: 3  •  aspirin (ASPIRIN LOW DOSE) 81 MG tablet, Take 1 tablet by mouth daily., Disp: , Rfl:   •  calcitriol (ROCALTROL) 0.25 MCG capsule, Take 0.25 mcg by mouth Daily., Disp: , Rfl:   •  Evolocumab 140 MG/ML solution auto-injector, Inject 1 mL under the skin into the appropriate area as directed Every 14 (Fourteen) Days., Disp: 2 pen, Rfl: 11  •  fluticasone (FLONASE) 50 MCG/ACT nasal spray, SPRAY TWO SPRAYS IN EACH NOSTRIL ONCE DAILY FOR ENVIROMENTAL ALLERGIES  "AND CONGESTION, Disp: 16 g, Rfl: 5  •  folic acid (FOLVITE) 1 MG tablet, TAKE ONE TABLET BY MOUTH TWICE A DAY, Disp: 180 tablet, Rfl: 0  •  isosorbide mononitrate (IMDUR) 30 MG 24 hr tablet, Take 1 tablet by mouth every morning for heart, Disp: 30 tablet, Rfl: 5  •  metoprolol succinate XL (TOPROL-XL) 25 MG 24 hr tablet, TAKE ONE TABLET BY MOUTH DAILY, Disp: 90 tablet, Rfl: 2  •  nitroglycerin (NITROSTAT) 0.6 MG SL tablet, DISSOLVE 1 TAB UNDER TONGUE FOR CHEST PAIN - IF PAIN REMAINS AFTER 5 MIN, CALL 911 AND REPEAT DOSE. MAX 3 TABS IN 15 MINUTES, Disp: 100 tablet, Rfl: 11  •  omeprazole (priLOSEC) 40 MG capsule, TAKE ONE CAPSULE BY MOUTH DAILY, Disp: 90 capsule, Rfl: 1    Current Facility-Administered Medications:   •  cyanocobalamin injection 1,000 mcg, 1,000 mcg, Intramuscular, Q28 Days, Delgado Patricia MD, 1,000 mcg at 09/14/20 1322  •  cyanocobalamin injection 1,000 mcg, 1,000 mcg, Intramuscular, Q28 Days, Delgado Patricia MD, 1,000 mcg at 04/30/21 1302      Family History   Problem Relation Age of Onset   • Diabetes Mother    • Heart disease Mother    • Stroke Father          Social History     Socioeconomic History   • Marital status:      Spouse name: Not on file   • Number of children: 2   • Years of education: 14   • Highest education level: Some college, no degree   Tobacco Use   • Smoking status: Never Smoker   • Smokeless tobacco: Never Used   • Tobacco comment: caffeine use: 2 cups coffee daily   Vaping Use   • Vaping Use: Never used   Substance and Sexual Activity   • Alcohol use: Yes     Alcohol/week: 5.0 standard drinks     Types: 2 Glasses of wine, 3 Shots of liquor per week     Comment: Moderate alcohol consumption   • Drug use: No   • Sexual activity: Not Currently     Partners: Female         Vitals:    04/30/21 1251   BP: 128/62   BP Location: Left arm   Patient Position: Sitting   Cuff Size: Adult   Pulse: 92   SpO2: 100%   Weight: 78 kg (172 lb)   Height: 172.4 cm (67.87\")    "     Body mass index is 26.25 kg/m².      Physical Exam:    General: Alert and oriented x 3.  No acute distress.  Normal affect.  HEENT: Pupils equal, round, reactive to light; extraocular movements intact; sclerae nonicteric; pharynx, ear canals and TMs normal.  Neck: Without JVD, thyromegaly, bruit, or adenopathy.  Lungs: Clear to auscultation in all fields.  Heart: Regular rate and rhythm without murmur, rub, gallop, or click.  Abdomen: Soft, nontender, without hepatosplenomegaly or hernia.  Bowel sounds normal.  : Deferred.  Rectal: Deferred.  Extremities: Without clubbing, cyanosis, edema, or pulse deficit.  Neurologic: Intact without focal deficit.  Normal station and gait observed during ingress and egress from the examination room.  Skin: Without significant lesion.  Musculoskeletal: Unremarkable.    Lab/other results:    NMR is absolutely perfect.  Total cholesterol 167.  Triglycerides 123.  LDL particle number excellent at 836.  Small LDL particle number excellent at 101.  HDL particle number normal at 34.1.  CMP normal except glucose 114, BUN 33, creatinine 1.83.  Estimated GFR 35.  Hemoglobin A1c 5.8.  Vitamin D normal.  Thyroid function test normal.  Uric acid normal at 6.0.  Serum iron studies are normal.  Methylmalonic acid normal at 291.  CBC normal except hemoglobin 12.1 and hematocrit 37.1.  CPK normal.    Assessment/Plan:     Diagnosis Plan   1. Impaired fasting glucose  Comprehensive Metabolic Panel    Hemoglobin A1c   2. Chronic renal insufficiency, stage IV (severe), 02/09/2016--creatinine 1.85  CBC (No Diff)    Comprehensive Metabolic Panel   3. Anemia due to stage 4 chronic kidney disease (CMS/HCC)     4. Iron deficiency anemia due to chronic blood loss     5. Hyperlipidemia  NMR LipoProfile    TSH    T4, Free    T3, Free   6. Occlusive coronary artery disease, clinical diagnosis only.  Patient not a candidate for heart catheterization/intervention.     7. Exertional angina (CMS/HCC)     8.  Statin intolerance, Vytorin and pravastatin     9. Benign essential hypertension     10. Bilateral carotid artery plaque, 05/24/2018--16-49% bilateral carotid artery stenosis     11. Gastroesophageal reflux disease without esophagitis     12. Benign prostatic hypertrophy  PSA DIAGNOSTIC   13. Vitamin B12 deficiency     14. Generalized anxiety disorder     15. Vitamin D deficiency  Vitamin D 25 Hydroxy   16. Hypogonadism in male, on TRT, testosterone pellets, per      17. Degeneration of cervical intervertebral disc     18. Hyperuricemia  Uric Acid   19. Secondary hyperparathyroidism of renal origin (CMS/HCC)       Patient has mild impaired fasting glucose that does not require medication.  Strongly recommend low carbohydrate diet, exercise, and attainment of ideal body weight.  Patient's chronic renal insufficiency appears to have improved and technically he is now stage IIIb.  We will continue to monitor closely as will the renal physician.  Iron deficiency anemia has resolved.  Hyperlipidemia is under perfect control with evolocumab.  This is very important given his occlusive coronary artery disease and exertional angina.  He is followed by the cardiologist.  Patient has carotid artery plaque and is up-to-date on his carotid Doppler study which he had last year which revealed bilateral 16 to 49% stenosis.  Esophageal reflux symptoms controlled with omeprazole.  Patient has BPH and hypogonadism and is on testosterone replacement therapy by the urologist.  He has vitamin B12 deficiency and receives B12 injections.  Methylmalonic acid is normal.  Generalized anxiety treated with alprazolam and I have no evidence of patient is abusing this medication.  He has cervical disc disease and has intermittent cervical radicular symptoms and pain which currently seems to be in somewhat of a remission.  The secondary hyperparathyroidism is being treated/monitored by the nephrology service.    Plan is as follows: No change in  current medical regimen.  Patient will follow-up in 4 months with lab prior and this will also be subsequent Medicare wellness visit.  Otherwise he will follow-up as needed.        Procedures

## 2021-05-06 ENCOUNTER — TREATMENT (OUTPATIENT)
Dept: PHYSICAL THERAPY | Facility: CLINIC | Age: 86
End: 2021-05-06

## 2021-05-06 DIAGNOSIS — M54.2 PAIN, NECK: Primary | ICD-10-CM

## 2021-05-06 PROCEDURE — 97140 MANUAL THERAPY 1/> REGIONS: CPT | Performed by: PHYSICAL THERAPIST

## 2021-05-06 NOTE — PROGRESS NOTES
DIscharge Summary    Patient: Radha Win   : 1932  Diagnosis/ICD-10 Code:  Pain, neck [M54.2]  Referring practitioner: Delgado Patricia MD  Date of Initial Visit: 3/10/2021  Today's Date: 2021  Patient seen for 9 sessions      Subjective:   Radha Win reports: Feeling really good. Min tightness lower left neck but doesn't interfere with ADl's  Subjective Questionnaire: NDI:6%  Clinical Progress: improved  Home Program Compliance: Yes  Treatment has included: therapeutic exercise, manual therapy, ultrasound and moist heat    Objective   Flexion: 55 degrees with pain  Extension: 50 degrees   Left lateral flexion: 20 degrees   Right lateral flexion: 20 degrees   Left rotation: 62 degrees   Right rotation: 62 degrees     UE strength 5/5 throughout and painfree    Assessment/Plan   Long Term Goals (  6 weeks)  1. Pt to exhibit 50 degrees of cervical rotation to the L to allow for viewing traffic without pain or limitations  2. Pt to exhibit 5/5 DCF to allow for lifting and more strenuous daily activities  3. Pt to report ability to sleep through the night without awakening and awaken w/ min to no pain  4. Pt to score < 10% on NDI  Progress toward previous goals: All Met        Recommendations: Discharge    PT Signature: Becky Casey, PT  [place] License # [id number]    Based upon review of the patient's progress it is my medical opinion that Radha Win should discontinue physical therapy treatment at [facility]  [address] [place] [id number] [phone].    Signature: __________________________________  Delgado Patricia MD    Manual Therapy:    30     mins  13942;  Therapeutic Exercise:         mins  89498;     Neuromuscular Barry:        mins  03015;    Therapeutic Activity:          mins  53088;     Gait Training:           mins  19643;     Ultrasound:          mins  50085;    Electrical Stimulation:         mins  67502 (  );  Dry Needling          mins self-pay    Timed Treatment:   30   mins   Total Treatment:     40   mins

## 2021-06-03 RX ORDER — FOLIC ACID 1 MG/1
TABLET ORAL
Qty: 180 TABLET | Refills: 3 | Status: SHIPPED | OUTPATIENT
Start: 2021-06-03 | End: 2022-07-11

## 2021-06-30 DIAGNOSIS — I25.10 OCCLUSIVE CORONARY ARTERY DISEASE: Chronic | ICD-10-CM

## 2021-06-30 DIAGNOSIS — Z78.9 STATIN INTOLERANCE: Chronic | ICD-10-CM

## 2021-06-30 DIAGNOSIS — E78.5 HYPERLIPIDEMIA, UNSPECIFIED HYPERLIPIDEMIA TYPE: Chronic | ICD-10-CM

## 2021-06-30 RX ORDER — EVOLOCUMAB 140 MG/ML
INJECTION, SOLUTION SUBCUTANEOUS
Qty: 1 ML | Refills: 10 | Status: SHIPPED | OUTPATIENT
Start: 2021-06-30 | End: 2022-08-18

## 2021-07-19 ENCOUNTER — HOSPITAL ENCOUNTER (OUTPATIENT)
Dept: GENERAL RADIOLOGY | Facility: HOSPITAL | Age: 86
Discharge: HOME | End: 2021-07-19

## 2021-07-19 ENCOUNTER — APPOINTMENT (OUTPATIENT)
Dept: GENERAL RADIOLOGY | Facility: HOSPITAL | Age: 86
End: 2021-07-19

## 2021-07-19 ENCOUNTER — OFFICE VISIT (OUTPATIENT)
Dept: INTERNAL MEDICINE | Facility: CLINIC | Age: 86
End: 2021-07-19

## 2021-07-19 VITALS
HEIGHT: 68 IN | RESPIRATION RATE: 16 BRPM | SYSTOLIC BLOOD PRESSURE: 138 MMHG | TEMPERATURE: 98 F | WEIGHT: 173.4 LBS | DIASTOLIC BLOOD PRESSURE: 61 MMHG | OXYGEN SATURATION: 98 % | BODY MASS INDEX: 26.28 KG/M2 | HEART RATE: 88 BPM

## 2021-07-19 DIAGNOSIS — G89.29 CHRONIC NECK PAIN: Chronic | ICD-10-CM

## 2021-07-19 DIAGNOSIS — G89.29 CHRONIC NECK PAIN: Primary | Chronic | ICD-10-CM

## 2021-07-19 DIAGNOSIS — I10 BENIGN ESSENTIAL HYPERTENSION: Chronic | ICD-10-CM

## 2021-07-19 DIAGNOSIS — M50.30 DEGENERATION OF CERVICAL INTERVERTEBRAL DISC: Chronic | ICD-10-CM

## 2021-07-19 DIAGNOSIS — N18.4 CHRONIC RENAL INSUFFICIENCY, STAGE IV (SEVERE) (HCC): Chronic | ICD-10-CM

## 2021-07-19 DIAGNOSIS — M25.512 CHRONIC LEFT SHOULDER PAIN: ICD-10-CM

## 2021-07-19 DIAGNOSIS — M54.2 CHRONIC NECK PAIN: Primary | Chronic | ICD-10-CM

## 2021-07-19 DIAGNOSIS — G89.29 CHRONIC LEFT SHOULDER PAIN: ICD-10-CM

## 2021-07-19 DIAGNOSIS — E53.8 VITAMIN B12 DEFICIENCY: Chronic | ICD-10-CM

## 2021-07-19 DIAGNOSIS — M54.2 CHRONIC NECK PAIN: Chronic | ICD-10-CM

## 2021-07-19 DIAGNOSIS — M85.89 OSTEOPENIA OF MULTIPLE SITES: Chronic | ICD-10-CM

## 2021-07-19 DIAGNOSIS — M54.12 CERVICAL RADICULOPATHY: Chronic | ICD-10-CM

## 2021-07-19 DIAGNOSIS — J30.1 SEASONAL ALLERGIC RHINITIS DUE TO POLLEN: Chronic | ICD-10-CM

## 2021-07-19 PROCEDURE — 99214 OFFICE O/P EST MOD 30 MIN: CPT | Performed by: INTERNAL MEDICINE

## 2021-07-19 PROCEDURE — 73030 X-RAY EXAM OF SHOULDER: CPT

## 2021-07-19 PROCEDURE — 72050 X-RAY EXAM NECK SPINE 4/5VWS: CPT

## 2021-07-19 RX ORDER — CYANOCOBALAMIN 1000 UG/ML
1000 INJECTION, SOLUTION INTRAMUSCULAR; SUBCUTANEOUS ONCE
Status: DISCONTINUED | OUTPATIENT
Start: 2021-07-19 | End: 2021-09-01

## 2021-07-19 RX ORDER — PREDNISONE 10 MG/1
TABLET ORAL
Qty: 56 TABLET | Refills: 0 | Status: SHIPPED | OUTPATIENT
Start: 2021-07-19 | End: 2021-09-01

## 2021-07-19 NOTE — PROGRESS NOTES
07/19/2021    Patient Information  Radha Win                                                                                          [id number]  [facility] [address]      7/26/1932  [unfilled]  There is no work phone number on file.    Chief Complaint:     Complaining of continued left shoulder and neck pain.    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  Patient also has osteopenia and needs a DEXA scan which we can order today.  He has cyanocobalamin deficiency and needs a B12 injection today as well.  Past medical history reviewed and updated were necessary including health maintenance parameters.  This reveals he will be up-to-date or else accounted for after today's visit.    The history regarding cervical disc disease and chronic neck and left shoulder pain:    July 19, 2021--patient presents with continued left-sided neck pain that radiates into his left shoulder but does not really go over that far down into the left upper extremity.  The pain is definitely accentuated by leaning his head to the right and rotating his head to the right.  He does not do anything to aggravate it or make it better by moving his head towards the left.  On exam he has good range of motion in the left shoulder and there is no reproduction of symptoms.  Occasionally he will have a little numbness and paresthesias but this is limited to the shoulder somewhat posteriorly.  I gave him a round of prednisone back in February which he reports helped for a while.  We sent him to physical therapy and he had 7 treatments which did not really help at all.  I reviewed the MRI of the cervical spine which we obtained in September 2018 and this reveals bilateral neural foraminal compromise and severe  neural foraminal stenosis at several levels.  Mild spinal stenosis noted.  Plan is as follows: To try another conservative approach with a repeat round of prednisone 50 mg p.o. daily x7 days, taper and discontinue.  If patient's symptoms persist there we may need to proceed with repeat imaging and possible referral to either pain management or Ortho spine or combination of both.    February 23, 2021--patient continues to have left-sided neck and proximal shoulder pain that is just exactly like it was June 28, 2018.  It seems to be getting worse here of late and once again it bothers him when he lies down to sleep and when he first gets up in the morning.  When he gets up and gets moving about the symptoms definitely improved to the point that they resolve.  I reviewed the MRI of the cervical spine from September 2018 and I definitely feel that we are dealing with cervical radicular symptoms on the left.  We can make a case for referring patient to neurosurgery or perhaps orthopedic spine but I think given the overall clinical picture at the present time it would be better to be more conservative.  I think he would respond to a round of prednisone.  Patient cannot take nonsteroidal anti-inflammatory drugs because of his kidney function.  Plan is as follows: Prednisone 50 mg p.o. daily x7 days, taper and discontinue.  Patient does have a follow-up in a couple of months and we can review the symptoms.  However, if he does not respond and definitely if the symptoms return then he should contact me and we will likely need to repeat the MRI and make a referral for possible surgical consideration.  Epidural injections would be another consideration but after reviewing the MRI, question whether or not that would be effective.    July 3, 2019--patient continues to have chronic neck pain with cervical radicular symptoms.  I have recommended that he contact the neurosurgery office and get a follow-up  appointment.    10/02/2018--patient seen in follow-up and the results of the MRI discussed.  He continues to have intermittent radicular symptoms which is currently only on the left.  Discussed options with patient and I have recommended neurosurgery consultation.  Patient may benefit from epidural injections.    09/06/2018--MRI of the cervical spine reveals moderate neural foraminal compromise on the right at C3-C4.  There is mild canal stenosis at C4-C5 with moderate to severe neural foraminal compromise bilaterally.  At C5-C6 there is moderate neural foraminal compromise on the left and moderate to severe neural foraminal compromise on the right.  There is mild canal stenosis centrally.  At C6-C7 there is facet degenerative disease and a mild central disc osteophyte complex but no significant neural foraminal compromise.  At C7-T1 there is no significant pathology.    08/30/2018--patient continues to have left-sided neck and shoulder discomfort that radiates into his arm down to about the level of the elbow.  MRI of the cervical spine ordered.    06/28/2018--x-ray of the cervical spine reveals posteriorly and indenting osteophytic changes at C4-C5 and C5-C6. Bilateral foraminal stenotic changes are identified at C4-C5 and C5-C6.  Recommend MRI of the cervical spine.    06/28/2018--patient presents with at least a one-month history of left sided neck and proximal shoulder pain which is actually in the supraspinatus area.  No known trauma.  The pain is present only at nighttime when he lies down to go to sleep.  It is interfering with his sleep.  When he is up and about and active he does not notice the discomfort.  No numbness or paresthesias.  No weakness in his upper extremities.  Examination is not particularly revealing.  Patient does have fairly good passive range of motion.  Patient initially felt that he was having a shoulder problem but after evaluation I really think that the symptoms may be coming from  his neck.  X-ray of the cervical spine ordered.  Patient will follow-up on the phone for the results and possible further instructions.  Given the prolonged duration and severity of the symptoms, I will treat him empirically with prednisone 50 mg by mouth daily ×7 days, taper and discontinue.  If symptoms persist, he may need further evaluation such as MRI.    Review of Systems   Constitutional: Negative.   HENT: Negative.    Eyes: Negative.    Cardiovascular: Negative.    Respiratory: Negative.    Endocrine: Negative.    Hematologic/Lymphatic: Negative.    Skin: Negative.    Musculoskeletal: Positive for arthritis, joint pain and neck pain.   Gastrointestinal: Negative.    Genitourinary: Negative.    Neurological: Positive for numbness and paresthesias.   Psychiatric/Behavioral: Negative.    Allergic/Immunologic: Negative.        Active Problems:    Patient Active Problem List   Diagnosis   • Bilateral carotid artery plaque, 05/24/2018--16-49% bilateral carotid artery stenosis   • Benign prostatic hypertrophy   • Chronic renal insufficiency, stage IV (severe), 02/09/2016--creatinine 1.85   • Diverticulosis of colon   • Hyperlipidemia   • Osteopenia of multiple sites   • Vitamin B12 deficiency   • Vitamin D deficiency   • Allergic rhinitis   • Anemia due to stage 4 chronic kidney disease (CMS/HCC)   • Generalized anxiety disorder   • Benign essential hypertension   • Gastroesophageal reflux disease without esophagitis   • Folic acid deficiency   • Multiple environmental allergies   • Therapeutic drug monitoring   • Bilateral renal cysts   • Primary osteoarthritis of knees, bilateral   • Impaired fasting glucose   • Muscle cramps, statin related, Vytorin and pravastatin.  Tolerates Livalo.   • Hypogonadism in male, on TRT, testosterone pellets, per    • Chronic neck pain   • Cervical radiculopathy   • Statin intolerance, Vytorin and pravastatin   • Degeneration of cervical intervertebral disc   • Exertional angina  (CMS/HCC)   • Occlusive coronary artery disease, clinical diagnosis only.  Patient not a candidate for heart catheterization/intervention.   • Hyperuricemia   • Iron deficiency anemia due to chronic blood loss   • Secondary hyperparathyroidism of renal origin (CMS/HCC)   • Chronic left shoulder pain         Past Medical History:   Diagnosis Date   • Allergic rhinitis 2/11/2016   • Anemia due to chronic kidney disease 2/11/2016   • Benign essential hypertension 2/11/2016   • Benign prostatic hypertrophy 2/11/2016 12/21/2016--prostate biopsy reportedly negative.  I will try to obtain the report.  Patient sees urologist.  PSAs run from the 3-8 range   • Bilateral carotid artery plaque, 09/21/2016--16-49% bilateral carotid artery stenosis 2/11/2016 09/21/2016--vascular screen reveals 16-49% bilateral carotid artery stenosis, negative for AAA, negative for PAD.  10/10/2008--vascular screening study revealed mild bilateral carotid plaque, no aortic aneurysm, no evidence of PAD   • Bilateral renal cysts 2/14/2016 04/07/2016--ultrasound of the kidneys reveals the previously described renal cysts are not well seen.  However, questionable incidental bladder tumor noted.  05/26/2010--ultrasound the kidneys was negative bilaterally except for small renal cysts.   • Cervical radiculopathy 6/28/2018    10/02/2018--patient seen in follow-up and the results of the MRI discussed.  He continues to have intermittent radicular symptoms which is currently only on the left.  Discussed options with patient and I have recommended neurosurgery consultation.  Patient may benefit from epidural injections.  09/06/2018--MRI of the cervical spine reveals moderate neural foraminal compromise on the right at C3-C   • Chronic renal insufficiency, stage IV (severe), 02/09/2016--creatinine 1.85 2/11/2016 02/09/2016--serum creatinine 1.85.  BUN 29.  07/20/2015--patient was evaluated by the nephrologist.  Ultrasound of the kidneys ordered  but this was never done.  05/22/2015--BUN 35, creatinine 2.3.  Patient referred to nephrology, Dr. Devyn Muniz.  04/16/2014--BUN 25, creatinine 1.77.  01/03/2013--BUN 37, creatinine 2.14.    05/26/2010---ultrasound of the kidneys reveal a small cyst x 2 right kidney.  Otherwise normal.    Baseline creatinine approximately 1.9-2.0.   • Degeneration of cervical intervertebral disc 5/2/2019   • Diverticulosis of colon 2/11/2016    10/03/2013--colonoscopy revealed markedly redundant colon, pancolonic diverticulosis with several impacted fecalith.  No evidence of diverticulitis or stricture.  Was only able to reach the ascending colon.  Follow up barium enema revealed extensive diverticulosis.  No polyp or other mucosal mass seen.    06/11/2002--incomplete colonoscopy due to redundant colon.  Not able to get past the hepatic flexure.  Patient had followup barium contrast enema which showed extensive diverticulosis.   • Exertional angina (CMS/HCC) 9/17/2019 October 8, 2019--patient seen in follow-up and he reports the long-acting oral nitrate has definitely helped his exertional anginal symptoms.  However, he has not stressed himself completely such as using a push mower.  His blood pressure seems improved as well.  Patient has an appoint with the cardiologist in the next 2 weeks.  I have contacted his cardiologist about the results of the empiric ni   • Folic acid deficiency 2/11/2016   • Gastroesophageal reflux disease without esophagitis 2/11/2016   • Generalized anxiety disorder 2/11/2016   • History of diverticulitis of colon 2009 and 2003 05/08/2009-acute diverticulitis without complication. 09/05/2003-acute diverticulitis without complication.    • Hyperlipidemia 2/11/2016 01/29/2005--treatment for hyperlipidemia begun.   • Hyperuricemia 8/19/2020   • Hypogonadism in male, on TRT, testosterone pellets, per  6/16/2017 January 2017--patient reports his urologist initiated testosterone replacement therapy  consisting of testosterone pellets every 6 months.   • Multiple environmental allergies 2/12/2016    Patient sees the allergist regularly and has been on immunotherapy.   • Muscle cramps, statin related, Vytorin and pravastatin.  Tolerates Livalo. 6/16/2017 12/21/2018--patient seems to be tolerating Livalo well.  10/02/2018--Livalo 2 mg per day initiated.  Recommend CoQ10 400 mg per day with food for better absorption.  Reassess with lab in about 8 weeks.  08/30/2018--patient presents with complaints of muscle cramps.  He discontinue pravastatin and the cramps went away.  Cramps returned even with a half dose.  This is despite the fact he was taking    • Occlusive coronary artery disease, clinical diagnosis only.  Patient not a candidate for heart catheterization/intervention. 12/10/2019    October 8, 2019--patient seen in follow-up and he reports the long-acting oral nitrate has definitely helped his exertional anginal symptoms.  However, he has not stressed himself completely such as using a push mower.  His blood pressure seems improved as well.  Patient has an appoint with the cardiologist in the next 2 weeks.  I have contacted his cardiologist about the results of the empiric ni   • Osteopenia, 7/12/2019--lumbar spine -0.4.  Left femoral neck -1.7.  Right femoral neck -1.9. 2/11/2016 July 12, 2019--DEXA scan reveals lumbar spine T score -0.4.  Unchanged.  Left femoral neck T score -1.7.  Unchanged.  Right femoral neck T score -1.9.  Unchanged.  Impression is osteopenia of the hips with no significant interval change.  06/16/2017--patient seen in follow-up and reports he doesn't think he ever started Fosamax.  Osteopenia and needs to be reassessed with a DEXA scan.  03/14/2017-   • Primary osteoarthritis of knees, bilateral 9/12/2016 09/12/2016--patient called in the office requesting a steriod injection in his knees.  I explained to him that I do no longer perform these injections and have referred him  to Dr. Manuel.   • Secondary hyperparathyroidism of renal origin (CMS/HCC) 1/15/2021   • Statin intolerance, Vytorin and pravastatin 12/21/2018 12/21/2018--patient seems to be tolerating Livalo well.  10/02/2018--Livalo 2 mg per day initiated.  Recommend CoQ10 400 mg per day with food for better absorption.  Reassess with lab in about 8 weeks.  08/30/2018--patient presents with complaints of muscle cramps.  He discontinue pravastatin and the cramps went away.  Cramps returned even with a half dose.  This is despite the fact he was taking    • Vitamin B12 deficiency 2/11/2016 06/25/2009--B12 injections begun.   • Vitamin D deficiency 2/11/2016         Past Surgical History:   Procedure Laterality Date   • CATARACT EXTRACTION Left 12/12/2011 12/12/2011--cataract extirpation with intraocular lens implantation left eye.   • CATARACT EXTRACTION Right 12/2011 December 2011--right cataract extirpation with intraocular lens implantation.   • CHOLECYSTECTOMY WITH INTRAOPERATIVE CHOLANGIOGRAM N/A 11/6/2020    Procedure: CHOLECYSTECTOMY LAPAROSCOPIC INTRAOPERATIVE CHOLANGIOGRAM;  Surgeon: Sanjay Burgess MD;  Location: [facility];  Service: General;  Laterality: N/A;   • COLONOSCOPY  06/11/2002 06/11/2002--incomplete colonoscopy due to redundant colon. Not able to get past the hepatic flexure. Patient had followup barium contrast enema which showed extensive diverticulosis   • COLONOSCOPY  10/03/2013    10/03/2013--colonoscopy revealed markedly redundant colon, pancolonic diverticulosis with several impacted fecalith. No evidence of diverticulitis or stricture. Was only able to reach the ascending colon. Follow up barium enema revealed extensive diverticulosis. No polyp or other mucosal mass seen.   • CYSTOSCOPY  05/18/2016 05/18/2016--urology consultation.  Cystoscopy performed for possible bladder mass is negative.   • ERCP N/A 11/5/2020    Procedure: ENDOSCOPIC RETROGRADE CHOLANGIOPANCREATOGRAPHY  with sphincterotomy, basket retrieval and balloon sweep;  Surgeon: Joseluis Mejia MD;  Location: [facility] ENDOSCOPY;  Service: Gastroenterology;  Laterality: N/A;  pre/post - CBD stones   • PROSTATE BIOPSY  12/21/2016 12/21/2016--prostate biopsy reportedly negative.  I will try to obtain the report.         No Known Allergies        Current Outpatient Medications:   •  allopurinol (ZYLOPRIM) 100 MG tablet, Take 1 p.o. daily for gout/elevated uric acid, Disp: 90 tablet, Rfl: 3  •  ALPRAZolam (XANAX) 0.5 MG tablet, TAKE ONE TABLET BY MOUTH TWICE A DAY, Disp: 60 tablet, Rfl: 5  •  amLODIPine (NORVASC) 5 MG tablet, Take 1 p.o. daily for high blood pressure, Disp: 90 tablet, Rfl: 3  •  aspirin (ASPIRIN LOW DOSE) 81 MG tablet, Take 1 tablet by mouth daily., Disp: , Rfl:   •  calcitriol (ROCALTROL) 0.25 MCG capsule, Take 0.25 mcg by mouth Daily., Disp: , Rfl:   •  fluticasone (FLONASE) 50 MCG/ACT nasal spray, SPRAY TWO SPRAYS IN EACH NOSTRIL ONCE DAILY FOR ENVIROMENTAL ALLERGIES AND CONGESTION, Disp: 16 g, Rfl: 5  •  folic acid (FOLVITE) 1 MG tablet, TAKE ONE TABLET BY MOUTH TWICE A DAY, Disp: 180 tablet, Rfl: 3  •  isosorbide mononitrate (IMDUR) 30 MG 24 hr tablet, Take 1 tablet by mouth every morning for heart, Disp: 30 tablet, Rfl: 5  •  metoprolol succinate XL (TOPROL-XL) 25 MG 24 hr tablet, TAKE ONE TABLET BY MOUTH DAILY, Disp: 90 tablet, Rfl: 2  •  nitroglycerin (NITROSTAT) 0.6 MG SL tablet, DISSOLVE 1 TAB UNDER TONGUE FOR CHEST PAIN - IF PAIN REMAINS AFTER 5 MIN, CALL 911 AND REPEAT DOSE. MAX 3 TABS IN 15 MINUTES, Disp: 100 tablet, Rfl: 11  •  omeprazole (priLOSEC) 40 MG capsule, TAKE ONE CAPSULE BY MOUTH DAILY, Disp: 90 capsule, Rfl: 1  •  Repatha SureClick solution auto-injector SureClick injection, INJECT 1 ML UNDER THE SKIN INTO THE APPROPRIATE AREA EVERY 14 DAYS, Disp: 1 mL, Rfl: 10  •  predniSONE (DELTASONE) 10 MG tablet, 5 by mouth daily 7 days, then 4 daily 2 days, 3 daily 2 days, 2 daily 2 days, 1  "daily 2 days, one half daily 2 days and discontinue., Disp: 56 tablet, Rfl: 0    Current Facility-Administered Medications:   •  cyanocobalamin injection 1,000 mcg, 1,000 mcg, Intramuscular, Q28 Days, Delgado Patricia MD, 1,000 mcg at 04/30/21 1302  •  cyanocobalamin injection 1,000 mcg, 1,000 mcg, Subcutaneous, Once, Delgado Patricia MD      Family History   Problem Relation Age of Onset   • Diabetes Mother    • Heart disease Mother    • Stroke Father          Social History     Socioeconomic History   • Marital status:      Spouse name: Not on file   • Number of children: 2   • Years of education: 14   • Highest education level: Some college, no degree   Tobacco Use   • Smoking status: Never Smoker   • Smokeless tobacco: Never Used   • Tobacco comment: caffeine use: 2 cups coffee daily   Vaping Use   • Vaping Use: Never used   Substance and Sexual Activity   • Alcohol use: Yes     Alcohol/week: 5.0 standard drinks     Types: 2 Glasses of wine, 3 Shots of liquor per week     Comment: Moderate alcohol consumption   • Drug use: No   • Sexual activity: Not Currently     Partners: Female         Vitals:    07/19/21 1252   BP: 138/61   Pulse: 88   Resp: 16   Temp: 98 °F (36.7 °C)   SpO2: 98%   Weight: 78.7 kg (173 lb 6.4 oz)   Height: 172.4 cm (67.87\")        Body mass index is 26.47 kg/m².      Physical Exam:    General: Alert and oriented x 3.  No acute distress.  Normal affect.  HEENT: Pupils equal, round, reactive to light; extraocular movements intact; sclerae nonicteric; pharynx, ear canals and TMs normal.  Neck: Without JVD, thyromegaly, bruit, or adenopathy.  Lungs: Clear to auscultation in all fields.  Heart: Regular rate and rhythm without murmur, rub, gallop, or click.  Abdomen: Soft, nontender, without hepatosplenomegaly or hernia.  Bowel sounds normal.  : Deferred.  Rectal: Deferred.  Extremities: Without clubbing, cyanosis, edema, or pulse deficit.  Neurologic: Intact without focal deficit.  " Normal station and gait observed during ingress and egress from the examination room.  Skin: Without significant lesion.  Musculoskeletal: The left shoulder has good range of motion without reproduction of symptoms.  When he rotates his head/neck to the right this reproduces or aggravates the pain.    Lab/other results:    I reviewed the results of the MRI back in 2018.    Assessment/Plan:     Diagnosis Plan   1. Chronic neck pain  predniSONE (DELTASONE) 10 MG tablet    XR Spine Cervical Complete 4 or 5 View   2. Cervical radiculopathy  predniSONE (DELTASONE) 10 MG tablet    XR Spine Cervical Complete 4 or 5 View   3. Degeneration of cervical intervertebral disc  predniSONE (DELTASONE) 10 MG tablet    XR Spine Cervical Complete 4 or 5 View   4. Osteopenia of multiple sites  DEXA Bone Density Axial   5. Chronic left shoulder pain  XR shoulder 2+ vw left   6. Vitamin B12 deficiency  cyanocobalamin injection 1,000 mcg   7. Chronic renal insufficiency, stage IV (severe), 02/09/2016--creatinine 1.85     8. Benign essential hypertension     9. Allergic rhinitis       Patient presents with chronic left-sided neck pain with left shoulder pain.  I think the pathology is primarily in his neck with multilevel neural foraminal compromise.  I do not think he has any significant left shoulder pathology but will obtain an x-ray of the shoulder.  He has documented cervical disc disease with multilevel neural foraminal compromise.  Patient also has osteopenia and needs a DEXA scan.  He has vitamin B12 deficiency and needs a B12 injection today.  He has chronic renal insufficiency which limits our treatment.  We obviously cannot use nonsteroidal anti-inflammatory medications.  Patient blood pressure has been running a little high at the nephrologist.  Patient is monitoring this.  He has a follow-up with the nephrologist in a few weeks and therefore I am not going to make any changes.  One thing we could consider would be to increase  his amlodipine to 5 mg in the morning and 2.5 mg at suppertime.  We will see what the nephrologist has to say.  Also patient is having complaints of problems with his allergic rhinitis.  A while back he had a very watery runny nose for at least 2 weeks and now it is clogged up but appears to be breaking up.  I explained to patient the round of prednisone will probably take care of this issue.  There is no definite signs or symptoms of sinus infection.    Plan is as follows: X-ray of the cervical spine and left shoulder.  Patient will follow up on the phone for the results and possible further instructions.  Prednisone 50 mg p.o. daily x7 days, taper and discontinue.  If patient symptoms persist then we may need to proceed with repeat MRI imaging of the neck.  Otherwise patient has a follow-up September 1, 2021 and we can review his symptoms at that time.        Procedures

## 2021-08-30 ENCOUNTER — LAB (OUTPATIENT)
Dept: LAB | Facility: HOSPITAL | Age: 86
End: 2021-08-30

## 2021-08-30 DIAGNOSIS — E55.9 VITAMIN D DEFICIENCY: Chronic | ICD-10-CM

## 2021-08-30 DIAGNOSIS — E78.2 MIXED HYPERLIPIDEMIA: Chronic | ICD-10-CM

## 2021-08-30 DIAGNOSIS — N18.4 CHRONIC RENAL INSUFFICIENCY, STAGE IV (SEVERE) (HCC): Chronic | ICD-10-CM

## 2021-08-30 DIAGNOSIS — N40.0 BENIGN NON-NODULAR PROSTATIC HYPERPLASIA WITHOUT LOWER URINARY TRACT SYMPTOMS: Chronic | ICD-10-CM

## 2021-08-30 DIAGNOSIS — R73.01 IMPAIRED FASTING GLUCOSE: Chronic | ICD-10-CM

## 2021-08-30 DIAGNOSIS — E79.0 HYPERURICEMIA: Chronic | ICD-10-CM

## 2021-08-30 LAB
25(OH)D3 SERPL-MCNC: 45.3 NG/ML (ref 30–100)
ALBUMIN SERPL-MCNC: 4.3 G/DL (ref 3.5–5.2)
ALBUMIN/GLOB SERPL: 3.1 G/DL
ALP SERPL-CCNC: 100 U/L (ref 39–117)
ALT SERPL W P-5'-P-CCNC: 22 U/L (ref 1–41)
ANION GAP SERPL CALCULATED.3IONS-SCNC: 9.7 MMOL/L (ref 5–15)
AST SERPL-CCNC: 27 U/L (ref 1–40)
BILIRUB SERPL-MCNC: 0.3 MG/DL (ref 0–1.2)
BUN SERPL-MCNC: 29 MG/DL (ref 8–23)
BUN/CREAT SERPL: 13.9 (ref 7–25)
CALCIUM SPEC-SCNC: 9.2 MG/DL (ref 8.6–10.5)
CHLORIDE SERPL-SCNC: 106 MMOL/L (ref 98–107)
CO2 SERPL-SCNC: 24.3 MMOL/L (ref 22–29)
CREAT SERPL-MCNC: 2.08 MG/DL (ref 0.76–1.27)
DEPRECATED RDW RBC AUTO: 44.1 FL (ref 37–54)
ERYTHROCYTE [DISTWIDTH] IN BLOOD BY AUTOMATED COUNT: 12.9 % (ref 12.3–15.4)
GFR SERPL CREATININE-BSD FRML MDRD: 30 ML/MIN/1.73
GLOBULIN UR ELPH-MCNC: 1.4 GM/DL
GLUCOSE SERPL-MCNC: 103 MG/DL (ref 65–99)
HBA1C MFR BLD: 6.4 % (ref 4.8–5.6)
HCT VFR BLD AUTO: 37.1 % (ref 37.5–51)
HGB BLD-MCNC: 11.8 G/DL (ref 13–17.7)
MCH RBC QN AUTO: 29.7 PG (ref 26.6–33)
MCHC RBC AUTO-ENTMCNC: 31.8 G/DL (ref 31.5–35.7)
MCV RBC AUTO: 93.5 FL (ref 79–97)
PLATELET # BLD AUTO: 210 10*3/MM3 (ref 140–450)
PMV BLD AUTO: 10.3 FL (ref 6–12)
POTASSIUM SERPL-SCNC: 5 MMOL/L (ref 3.5–5.2)
PROT SERPL-MCNC: 5.7 G/DL (ref 6–8.5)
PSA SERPL-MCNC: 8.09 NG/ML (ref 0–4)
RBC # BLD AUTO: 3.97 10*6/MM3 (ref 4.14–5.8)
SODIUM SERPL-SCNC: 140 MMOL/L (ref 136–145)
T3FREE SERPL-MCNC: 2.91 PG/ML (ref 2–4.4)
T4 FREE SERPL-MCNC: 1.15 NG/DL (ref 0.93–1.7)
TSH SERPL DL<=0.05 MIU/L-ACNC: 2.72 UIU/ML (ref 0.27–4.2)
URATE SERPL-MCNC: 5.2 MG/DL (ref 3.4–7)
WBC # BLD AUTO: 7.52 10*3/MM3 (ref 3.4–10.8)

## 2021-08-30 PROCEDURE — 84439 ASSAY OF FREE THYROXINE: CPT

## 2021-08-30 PROCEDURE — 82306 VITAMIN D 25 HYDROXY: CPT

## 2021-08-30 PROCEDURE — 84550 ASSAY OF BLOOD/URIC ACID: CPT

## 2021-08-30 PROCEDURE — 84481 FREE ASSAY (FT-3): CPT

## 2021-08-30 PROCEDURE — 84443 ASSAY THYROID STIM HORMONE: CPT

## 2021-08-30 PROCEDURE — 85027 COMPLETE CBC AUTOMATED: CPT

## 2021-08-30 PROCEDURE — 83704 LIPOPROTEIN BLD QUAN PART: CPT

## 2021-08-30 PROCEDURE — 36415 COLL VENOUS BLD VENIPUNCTURE: CPT

## 2021-08-30 PROCEDURE — 80053 COMPREHEN METABOLIC PANEL: CPT

## 2021-08-30 PROCEDURE — 84153 ASSAY OF PSA TOTAL: CPT

## 2021-08-30 PROCEDURE — 80061 LIPID PANEL: CPT

## 2021-08-30 PROCEDURE — 83036 HEMOGLOBIN GLYCOSYLATED A1C: CPT

## 2021-09-01 ENCOUNTER — OFFICE VISIT (OUTPATIENT)
Dept: INTERNAL MEDICINE | Facility: CLINIC | Age: 86
End: 2021-09-01

## 2021-09-01 VITALS
HEART RATE: 96 BPM | BODY MASS INDEX: 26.92 KG/M2 | SYSTOLIC BLOOD PRESSURE: 136 MMHG | HEIGHT: 68 IN | RESPIRATION RATE: 15 BRPM | WEIGHT: 177.6 LBS | DIASTOLIC BLOOD PRESSURE: 62 MMHG | OXYGEN SATURATION: 98 %

## 2021-09-01 DIAGNOSIS — D63.1 ANEMIA DUE TO STAGE 4 CHRONIC KIDNEY DISEASE (HCC): Chronic | ICD-10-CM

## 2021-09-01 DIAGNOSIS — Z51.81 THERAPEUTIC DRUG MONITORING: ICD-10-CM

## 2021-09-01 DIAGNOSIS — E29.1 HYPOGONADISM IN MALE: Chronic | ICD-10-CM

## 2021-09-01 DIAGNOSIS — Z00.00 ENCOUNTER FOR SUBSEQUENT ANNUAL WELLNESS VISIT (AWV) IN MEDICARE PATIENT: ICD-10-CM

## 2021-09-01 DIAGNOSIS — N18.4 ANEMIA DUE TO STAGE 4 CHRONIC KIDNEY DISEASE (HCC): Chronic | ICD-10-CM

## 2021-09-01 DIAGNOSIS — I65.23 ATHEROSCLEROSIS OF BOTH CAROTID ARTERIES: Chronic | ICD-10-CM

## 2021-09-01 DIAGNOSIS — E55.9 VITAMIN D DEFICIENCY: Chronic | ICD-10-CM

## 2021-09-01 DIAGNOSIS — M50.30 DEGENERATION OF CERVICAL INTERVERTEBRAL DISC: Chronic | ICD-10-CM

## 2021-09-01 DIAGNOSIS — E53.8 VITAMIN B12 DEFICIENCY: Chronic | ICD-10-CM

## 2021-09-01 DIAGNOSIS — I10 BENIGN ESSENTIAL HYPERTENSION: Chronic | ICD-10-CM

## 2021-09-01 DIAGNOSIS — M17.0 PRIMARY OSTEOARTHRITIS OF KNEES, BILATERAL: Chronic | ICD-10-CM

## 2021-09-01 DIAGNOSIS — K21.9 GASTROESOPHAGEAL REFLUX DISEASE WITHOUT ESOPHAGITIS: Chronic | ICD-10-CM

## 2021-09-01 DIAGNOSIS — N25.81 SECONDARY HYPERPARATHYROIDISM OF RENAL ORIGIN (HCC): Chronic | ICD-10-CM

## 2021-09-01 DIAGNOSIS — M54.12 CERVICAL RADICULOPATHY: Chronic | ICD-10-CM

## 2021-09-01 DIAGNOSIS — Z78.9 STATIN INTOLERANCE: Chronic | ICD-10-CM

## 2021-09-01 DIAGNOSIS — E79.0 HYPERURICEMIA: Chronic | ICD-10-CM

## 2021-09-01 DIAGNOSIS — N40.0 BENIGN NON-NODULAR PROSTATIC HYPERPLASIA WITHOUT LOWER URINARY TRACT SYMPTOMS: Chronic | ICD-10-CM

## 2021-09-01 DIAGNOSIS — E53.8 FOLIC ACID DEFICIENCY: Chronic | ICD-10-CM

## 2021-09-01 DIAGNOSIS — Z91.09 MULTIPLE ENVIRONMENTAL ALLERGIES: Chronic | ICD-10-CM

## 2021-09-01 DIAGNOSIS — M85.89 OSTEOPENIA OF MULTIPLE SITES: Chronic | ICD-10-CM

## 2021-09-01 DIAGNOSIS — G89.29 CHRONIC NECK PAIN: Chronic | ICD-10-CM

## 2021-09-01 DIAGNOSIS — N18.4 CHRONIC RENAL INSUFFICIENCY, STAGE IV (SEVERE) (HCC): Chronic | ICD-10-CM

## 2021-09-01 DIAGNOSIS — M54.2 CHRONIC NECK PAIN: Chronic | ICD-10-CM

## 2021-09-01 DIAGNOSIS — I25.10 OCCLUSIVE CORONARY ARTERY DISEASE: Chronic | ICD-10-CM

## 2021-09-01 DIAGNOSIS — E78.2 MIXED HYPERLIPIDEMIA: Chronic | ICD-10-CM

## 2021-09-01 DIAGNOSIS — R73.01 IMPAIRED FASTING GLUCOSE: Chronic | ICD-10-CM

## 2021-09-01 DIAGNOSIS — I20.8 EXERTIONAL ANGINA (HCC): Chronic | ICD-10-CM

## 2021-09-01 DIAGNOSIS — D50.0 IRON DEFICIENCY ANEMIA DUE TO CHRONIC BLOOD LOSS: ICD-10-CM

## 2021-09-01 DIAGNOSIS — F41.1 GENERALIZED ANXIETY DISORDER: Chronic | ICD-10-CM

## 2021-09-01 LAB
CHOLEST SERPL-MCNC: 151 MG/DL (ref 100–199)
HDL SERPL-SCNC: 34 UMOL/L
HDLC SERPL-MCNC: 50 MG/DL
LDL SERPL QN: 21 NM
LDL SERPL-SCNC: 756 NMOL/L
LDL SMALL SERPL-SCNC: 132 NMOL/L
LDLC SERPL CALC-MCNC: 78 MG/DL (ref 0–99)
TRIGL SERPL-MCNC: 131 MG/DL (ref 0–149)

## 2021-09-01 PROCEDURE — G0439 PPPS, SUBSEQ VISIT: HCPCS | Performed by: INTERNAL MEDICINE

## 2021-09-01 PROCEDURE — 96372 THER/PROPH/DIAG INJ SC/IM: CPT | Performed by: INTERNAL MEDICINE

## 2021-09-01 PROCEDURE — 99214 OFFICE O/P EST MOD 30 MIN: CPT | Performed by: INTERNAL MEDICINE

## 2021-09-01 RX ORDER — CYANOCOBALAMIN 1000 UG/ML
1000 INJECTION, SOLUTION INTRAMUSCULAR; SUBCUTANEOUS ONCE
Status: COMPLETED | OUTPATIENT
Start: 2021-09-01 | End: 2021-09-01

## 2021-09-01 RX ADMIN — CYANOCOBALAMIN 1000 MCG: 1000 INJECTION, SOLUTION INTRAMUSCULAR; SUBCUTANEOUS at 14:23

## 2021-09-01 NOTE — PROGRESS NOTES
The ABCs of the Annual Wellness Visit  Subsequent Medicare Wellness Visit    No chief complaint on file.     Subjective    History of Present Illness:  Radha Win is a 89 y.o. male who presents for a Subsequent Medicare Wellness Visit.    The following portions of the patient's history were reviewed and   updated as appropriate: allergies, current medications, past family history, past medical history, past social history, past surgical history and problem list.     Compared to one year ago, the patient feels his physical   health is better.    Compared to one year ago, the patient feels his mental   health is better.    Recent Hospitalizations:  This patient has had a [facility] admission record on file within the last 365 days.    Current Medical Providers:  Patient Care Team:  Delgado Patricia MD as PCP - General (Internal Medicine)    Outpatient Medications Prior to Visit   Medication Sig Dispense Refill   • allopurinol (ZYLOPRIM) 100 MG tablet Take 1 p.o. daily for gout/elevated uric acid 90 tablet 3   • ALPRAZolam (XANAX) 0.5 MG tablet TAKE ONE TABLET BY MOUTH TWICE A DAY 60 tablet 5   • amLODIPine (NORVASC) 5 MG tablet Take 1 p.o. daily for high blood pressure 90 tablet 3   • aspirin (ASPIRIN LOW DOSE) 81 MG tablet Take 1 tablet by mouth daily.     • calcitriol (ROCALTROL) 0.25 MCG capsule Take 0.25 mcg by mouth Daily.     • fluticasone (FLONASE) 50 MCG/ACT nasal spray SPRAY TWO SPRAYS IN EACH NOSTRIL ONCE DAILY FOR ENVIROMENTAL ALLERGIES AND CONGESTION 16 g 5   • folic acid (FOLVITE) 1 MG tablet TAKE ONE TABLET BY MOUTH TWICE A  tablet 3   • isosorbide mononitrate (IMDUR) 30 MG 24 hr tablet Take 1 tablet by mouth every morning for heart 30 tablet 5   • metoprolol succinate XL (TOPROL-XL) 25 MG 24 hr tablet TAKE ONE TABLET BY MOUTH DAILY 90 tablet 2   • nitroglycerin (NITROSTAT) 0.6 MG SL tablet DISSOLVE 1 TAB UNDER TONGUE FOR CHEST PAIN - IF PAIN REMAINS AFTER 5 MIN, CALL 911 AND  REPEAT DOSE. MAX 3 TABS IN 15 MINUTES 100 tablet 11   • omeprazole (priLOSEC) 40 MG capsule TAKE ONE CAPSULE BY MOUTH DAILY 90 capsule 1   • Repatha SureClick solution auto-injector SureClick injection INJECT 1 ML UNDER THE SKIN INTO THE APPROPRIATE AREA EVERY 14 DAYS 1 mL 10   • predniSONE (DELTASONE) 10 MG tablet 5 by mouth daily 7 days, then 4 daily 2 days, 3 daily 2 days, 2 daily 2 days, 1 daily 2 days, one half daily 2 days and discontinue. 56 tablet 0     Facility-Administered Medications Prior to Visit   Medication Dose Route Frequency Provider Last Rate Last Admin   • cyanocobalamin injection 1,000 mcg  1,000 mcg Intramuscular Q28 Days Delgado Patricia MD   1,000 mcg at 04/30/21 1302   • cyanocobalamin injection 1,000 mcg  1,000 mcg Subcutaneous Once Delgado Patricia MD           No opioid medication identified on active medication list. I have reviewed chart for other potential  high risk medication/s and harmful drug interactions in the elderly.          Aspirin is on active medication list. Aspirin use is indicated based on review of current medical condition/s. Pros and cons of this therapy have been discussed today. Benefits of this medication outweigh potential harm.  Patient has been encouraged to continue taking this medication.  .      Patient Active Problem List   Diagnosis   • Bilateral carotid artery plaque, 6/16/2020 --16-49% bilateral carotid artery stenosis   • Benign prostatic hypertrophy   • Chronic renal insufficiency, stage IV (severe), 02/09/2016--creatinine 1.85   • Diverticulosis of colon   • Hyperlipidemia   • Osteopenia of multiple sites   • Vitamin B12 deficiency   • Vitamin D deficiency   • Allergic rhinitis   • Anemia due to stage 4 chronic kidney disease (CMS/HCC)   • Generalized anxiety disorder   • Benign essential hypertension   • Gastroesophageal reflux disease without esophagitis   • Folic acid deficiency   • Multiple environmental allergies   • Therapeutic drug  "monitoring   • Bilateral renal cysts   • Primary osteoarthritis of knees, bilateral   • Impaired fasting glucose   • Muscle cramps, statin related, Vytorin and pravastatin.  Tolerates Livalo.   • Hypogonadism in male, on TRT, testosterone pellets, per    • Chronic neck pain   • Cervical radiculopathy   • Statin intolerance, Vytorin and pravastatin   • Degeneration of cervical intervertebral disc   • Exertional angina (CMS/HCC)   • Occlusive coronary artery disease, clinical diagnosis only.  Patient not a candidate for heart catheterization/intervention.   • Hyperuricemia   • Iron deficiency anemia due to chronic blood loss   • Secondary hyperparathyroidism of renal origin (CMS/HCC)   • Chronic left shoulder pain     Advance Care Planning   Advance Directive is on file.  ACP discussion was held with the patient during this visit. Patient has an advance directive in EMR which is still valid.     Review of Systems   Constitutional: Negative.    HENT: Negative.    Eyes: Negative.    Respiratory: Negative.    Cardiovascular: Negative.    Gastrointestinal: Negative.    Endocrine: Negative.    Genitourinary: Negative.    Musculoskeletal: Positive for arthralgias and neck pain.   Skin: Negative.    Allergic/Immunologic: Negative.    Neurological: Negative.    Hematological: Negative.    Psychiatric/Behavioral: Negative.         Objective       Vitals:    09/01/21 1354   BP: 136/62   Pulse: 96   Resp: 15   SpO2: 98%   Weight: 80.6 kg (177 lb 9.6 oz)   Height: 172.4 cm (67.87\")     BMI Readings from Last 1 Encounters:   09/01/21 27.11 kg/m²   BMI is above normal parameters. Recommendations include: educational material, exercise counseling and nutrition counseling    Does the patient have evidence of cognitive impairment? No    Physical Exam     General: Alert and oriented x 3.  No acute distress.  Slightly overweight.  Normal affect.  HEENT: Pupils equal, round, reactive to light; extraocular movements intact; sclerae " nonicteric; pharynx, ear canals and TMs normal.  Neck: Without JVD, thyromegaly, bruit, or adenopathy.  Lungs: Clear to auscultation in all fields.  Heart: Regular rate and rhythm without murmur, rub, gallop, or click.  Abdomen: Soft, nontender, without hepatosplenomegaly or hernia.  Bowel sounds normal.  : Deferred.  Rectal: Deferred.  Extremities: Without clubbing, cyanosis, edema, or pulse deficit.  Neurologic: Intact without focal deficit.  No significant edema today.  Normal station and gait observed during ingress and egress from the examination room.  Skin: Without significant lesion.  Musculoskeletal: Unremarkable.    Lab Results   Component Value Date    CHLPL 151 08/30/2021    TRIG 131 08/30/2021    HGBA1C 6.40 (H) 08/30/2021            HEALTH RISK ASSESSMENT    Smoking Status:  Social History     Tobacco Use   Smoking Status Never Smoker   Smokeless Tobacco Never Used   Tobacco Comment    caffeine use: 2 cups coffee daily     Alcohol Consumption:  Social History     Substance and Sexual Activity   Alcohol Use Yes   • Alcohol/week: 5.0 standard drinks   • Types: 2 Glasses of wine, 3 Shots of liquor per week    Comment: Moderate alcohol consumption     Fall Risk Screen:    [facility] Fall Risk Assessment was completed, and patient is at LOW risk for falls.Assessment completed on:7/19/2021    Depression Screening:  PHQ-2/PHQ-9 Depression Screening 1/15/2021   Little interest or pleasure in doing things 0   Feeling down, depressed, or hopeless 1   Trouble falling or staying asleep, or sleeping too much -   Feeling tired or having little energy -   Poor appetite or overeating -   Feeling bad about yourself - or that you are a failure or have let yourself or your family down -   Trouble concentrating on things, such as reading the newspaper or watching television -   Moving or speaking so slowly that other people could have noticed. Or the opposite - being so fidgety or restless that you have been moving around a  lot more than usual -   Thoughts that you would be better off dead, or of hurting yourself in some way -   Total Score 1   If you checked off any problems, how difficult have these problems made it for you to do your work, take care of things at home, or get along with other people? -       Health Habits and Functional and Cognitive Screening:  Functional & Cognitive Status 9/1/2021   Do you have difficulty preparing food and eating? No   Do you have difficulty bathing yourself, getting dressed or grooming yourself? No   Do you have difficulty using the toilet? No   Do you have difficulty moving around from place to place? No   Do you have trouble with steps or getting out of a bed or a chair? No   Current Diet Well Balanced Diet   Dental Exam Up to date   Eye Exam Up to date   Exercise (times per week) 4 times per week   Current Exercises Include Walking   Current Exercise Activities Include -   Do you need help using the phone?  No   Are you deaf or do you have serious difficulty hearing?  No   Do you need help with transportation? No   Do you need help shopping? No   Do you need help preparing meals?  No   Do you need help with housework?  No   Do you need help with laundry? No   Do you need help taking your medications? No   Do you need help managing money? No   Do you ever drive or ride in a car without wearing a seat belt? No   Have you felt unusual stress, anger or loneliness in the last month? No   Who do you live with? Alone   If you need help, do you have trouble finding someone available to you? No   Have you been bothered in the last four weeks by sexual problems? No   Do you have difficulty concentrating, remembering or making decisions? No       Age-appropriate Screening Schedule:  Refer to the list below for future screening recommendations based on patient's age, sex and/or medical conditions. Orders for these recommended tests are listed in the plan section. The patient has been provided with a  written plan.    Health Maintenance   Topic Date Due   • DXA SCAN  07/12/2021   • INFLUENZA VACCINE  10/01/2021   • LIPID PANEL  08/30/2022   • TDAP/TD VACCINES (2 - Td or Tdap) 03/09/2027   • ZOSTER VACCINE  Discontinued              Assessment/Plan     CMS Preventative Services Quick Reference  Risk Factors Identified During Encounter  Cardiovascular Disease  Immunizations Discussed/Encouraged (specific Immunizations; COVID19  Obesity/Overweight   The above risks/problems have been discussed with the patient.  Follow up actions/plans if indicated are seen below in the Assessment/Plan Section.  Pertinent information has been shared with the patient in the After Visit Summary.    Diagnoses and all orders for this visit:    1. Encounter for subsequent annual wellness visit (AWV) in Medicare patient    2. Impaired fasting glucose  -     Comprehensive Metabolic Panel; Future  -     Hemoglobin A1c; Future    3. Hyperlipidemia  -     CK; Future  -     Comprehensive Metabolic Panel; Future  -     NMR LipoProfile; Future  -     TSH; Future  -     T4, Free; Future  -     T3, Free; Future    4. Chronic renal insufficiency, stage IV (severe), 02/09/2016--creatinine 1.85  -     CBC (No Diff); Future    5. Secondary hyperparathyroidism of renal origin (CMS/[facility], located [facility])    6. Anemia due to stage 4 chronic kidney disease (CMS/[facility], located within [facility])  -     CBC (No Diff); Future    7. Iron deficiency anemia due to chronic blood loss  -     Iron Profile; Future    8. Vitamin B12 deficiency  -     cyanocobalamin injection 1,000 mcg    9. Folic acid deficiency    10. Benign essential hypertension    11. Bilateral carotid artery plaque, 05/24/2018--16-49% bilateral carotid artery stenosis    12. Occlusive coronary artery disease, clinical diagnosis only.  Patient not a candidate for heart catheterization/intervention.    13. Exertional angina (CMS/[facility], located within [facility])    14. Benign prostatic hypertrophy    15. Osteopenia of multiple sites    16. Vitamin D deficiency  -      Vitamin D 25 Hydroxy; Future    17. Hyperuricemia  -     Uric Acid; Future    18. Generalized anxiety disorder    19. Gastroesophageal reflux disease without esophagitis    20. Multiple environmental allergies    21. Hypogonadism in male, on TRT, testosterone pellets, per     22. Primary osteoarthritis of knees, bilateral    23. Cervical radiculopathy    24. Chronic neck pain    25. Statin intolerance, Vytorin and pravastatin    26. Degeneration of cervical intervertebral disc    27. Therapeutic drug monitoring        Follow Up:   No follow-ups on file.     An After Visit Summary and PPPS were given to the patient.

## 2021-09-01 NOTE — PROGRESS NOTES
09/01/2021    Patient Information  Radha Win                                                                                          [id number]  [place] [address]      7/26/1932  [unfilled]  There is no work phone number on file.    Chief Complaint:     Medicare wellness visit.  Follow-up lab work in order to monitor chronic medical issues listed in history of present illness.  No new acute complaints.    History of Present Illness:    Patient with a history of multiple medical problems including impaired fasting glucose, hyperlipidemia, stage IV chronic renal insufficiency, secondary hyperparathyroidism of renal origin, anemia due to chronic stage IV kidney disease, iron deficiency anemia, vitamin B12 and folic acid deficiency, hypertension, carotid artery plaque, coronary artery disease with exertional angina, BPH, osteopenia of multiple sites, vitamin D deficiency, hyperuricemia, generalized anxiety disorder, esophageal reflux, multiple environmental allergies, symptomatic hypogonadism, osteoarthritis of the knees, cervical radiculopathy and chronic neck pain, statin intolerance to Vytorin and pravastatin, cervical disc disease/degeneration.  He presents today for subsequent Medicare wellness visit.  Patient also had lab work in order to monitor his chronic medical issues.  His past medical history reviewed and updated were necessary including health maintenance parameters.  This reveals he will be up-to-date or else accounted for after today's visit.    Review of Systems   Constitutional: Negative.   HENT: Negative.    Eyes: Negative.    Cardiovascular: Negative.    Respiratory: Negative.    Endocrine: Negative.    Hematologic/Lymphatic: Negative.    Skin: Negative.    Musculoskeletal: Positive for arthritis, joint pain and neck pain.   Gastrointestinal: Negative.    Genitourinary: Negative.    Neurological: Negative.    Psychiatric/Behavioral: Negative.     Allergic/Immunologic: Negative.        Active Problems:    Patient Active Problem List   Diagnosis   • Bilateral carotid artery plaque, 6/16/2020 --16-49% bilateral carotid artery stenosis   • Benign prostatic hypertrophy   • Chronic renal insufficiency, stage IV (severe), 02/09/2016--creatinine 1.85   • Diverticulosis of colon   • Hyperlipidemia   • Osteopenia of multiple sites   • Vitamin B12 deficiency   • Vitamin D deficiency   • Allergic rhinitis   • Anemia due to stage 4 chronic kidney disease (CMS/HCC)   • Generalized anxiety disorder   • Benign essential hypertension   • Gastroesophageal reflux disease without esophagitis   • Folic acid deficiency   • Multiple environmental allergies   • Therapeutic drug monitoring   • Bilateral renal cysts   • Primary osteoarthritis of knees, bilateral   • Impaired fasting glucose   • Muscle cramps, statin related, Vytorin and pravastatin.  Tolerates Livalo.   • Hypogonadism in male, on TRT, testosterone pellets, per    • Chronic neck pain   • Cervical radiculopathy   • Statin intolerance, Vytorin and pravastatin   • Degeneration of cervical intervertebral disc   • Exertional angina (CMS/HCC)   • Occlusive coronary artery disease, clinical diagnosis only.  Patient not a candidate for heart catheterization/intervention.   • Hyperuricemia   • Iron deficiency anemia due to chronic blood loss   • Secondary hyperparathyroidism of renal origin (CMS/HCC)   • Chronic left shoulder pain         Past Medical History:   Diagnosis Date   • Allergic rhinitis 2/11/2016   • Anemia due to chronic kidney disease 2/11/2016   • Benign essential hypertension 2/11/2016   • Benign prostatic hypertrophy 2/11/2016 12/21/2016--prostate biopsy reportedly negative.  I will try to obtain the report.  Patient sees urologist.  PSAs run from the 3-8 range   • Bilateral carotid artery plaque, 09/21/2016--16-49% bilateral carotid artery stenosis 2/11/2016 09/21/2016--vascular screen reveals  16-49% bilateral carotid artery stenosis, negative for AAA, negative for PAD.  10/10/2008--vascular screening study revealed mild bilateral carotid plaque, no aortic aneurysm, no evidence of PAD   • Bilateral renal cysts 2/14/2016 04/07/2016--ultrasound of the kidneys reveals the previously described renal cysts are not well seen.  However, questionable incidental bladder tumor noted.  05/26/2010--ultrasound the kidneys was negative bilaterally except for small renal cysts.   • Cervical radiculopathy 6/28/2018    10/02/2018--patient seen in follow-up and the results of the MRI discussed.  He continues to have intermittent radicular symptoms which is currently only on the left.  Discussed options with patient and I have recommended neurosurgery consultation.  Patient may benefit from epidural injections.  09/06/2018--MRI of the cervical spine reveals moderate neural foraminal compromise on the right at C3-C   • Chronic renal insufficiency, stage IV (severe), 02/09/2016--creatinine 1.85 2/11/2016 02/09/2016--serum creatinine 1.85.  BUN 29.  07/20/2015--patient was evaluated by the nephrologist.  Ultrasound of the kidneys ordered but this was never done.  05/22/2015--BUN 35, creatinine 2.3.  Patient referred to nephrology, Dr. Devyn Muniz.  04/16/2014--BUN 25, creatinine 1.77.  01/03/2013--BUN 37, creatinine 2.14.    05/26/2010---ultrasound of the kidneys reveal a small cyst x 2 right kidney.  Otherwise normal.    Baseline creatinine approximately 1.9-2.0.   • Degeneration of cervical intervertebral disc 5/2/2019   • Diverticulosis of colon 2/11/2016    10/03/2013--colonoscopy revealed markedly redundant colon, pancolonic diverticulosis with several impacted fecalith.  No evidence of diverticulitis or stricture.  Was only able to reach the ascending colon.  Follow up barium enema revealed extensive diverticulosis.  No polyp or other mucosal mass seen.    06/11/2002--incomplete colonoscopy due to redundant colon.   Not able to get past the hepatic flexure.  Patient had followup barium contrast enema which showed extensive diverticulosis.   • Exertional angina (CMS/HCC) 9/17/2019 October 8, 2019--patient seen in follow-up and he reports the long-acting oral nitrate has definitely helped his exertional anginal symptoms.  However, he has not stressed himself completely such as using a push mower.  His blood pressure seems improved as well.  Patient has an appoint with the cardiologist in the next 2 weeks.  I have contacted his cardiologist about the results of the empiric ni   • Folic acid deficiency 2/11/2016   • Gastroesophageal reflux disease without esophagitis 2/11/2016   • Generalized anxiety disorder 2/11/2016   • History of diverticulitis of colon 2009 and 2003 05/08/2009-acute diverticulitis without complication. 09/05/2003-acute diverticulitis without complication.    • Hyperlipidemia 2/11/2016 01/29/2005--treatment for hyperlipidemia begun.   • Hyperuricemia 8/19/2020   • Hypogonadism in male, on TRT, testosterone pellets, per  6/16/2017 January 2017--patient reports his urologist initiated testosterone replacement therapy consisting of testosterone pellets every 6 months.   • Multiple environmental allergies 2/12/2016    Patient sees the allergist regularly and has been on immunotherapy.   • Muscle cramps, statin related, Vytorin and pravastatin.  Tolerates Livalo. 6/16/2017 12/21/2018--patient seems to be tolerating Livalo well.  10/02/2018--Livalo 2 mg per day initiated.  Recommend CoQ10 400 mg per day with food for better absorption.  Reassess with lab in about 8 weeks.  08/30/2018--patient presents with complaints of muscle cramps.  He discontinue pravastatin and the cramps went away.  Cramps returned even with a half dose.  This is despite the fact he was taking    • Occlusive coronary artery disease, clinical diagnosis only.  Patient not a candidate for heart catheterization/intervention.  12/10/2019    October 8, 2019--patient seen in follow-up and he reports the long-acting oral nitrate has definitely helped his exertional anginal symptoms.  However, he has not stressed himself completely such as using a push mower.  His blood pressure seems improved as well.  Patient has an appoint with the cardiologist in the next 2 weeks.  I have contacted his cardiologist about the results of the empiric ni   • Osteopenia, 7/12/2019--lumbar spine -0.4.  Left femoral neck -1.7.  Right femoral neck -1.9. 2/11/2016 July 12, 2019--DEXA scan reveals lumbar spine T score -0.4.  Unchanged.  Left femoral neck T score -1.7.  Unchanged.  Right femoral neck T score -1.9.  Unchanged.  Impression is osteopenia of the hips with no significant interval change.  06/16/2017--patient seen in follow-up and reports he doesn't think he ever started Fosamax.  Osteopenia and needs to be reassessed with a DEXA scan.  03/14/2017-   • Primary osteoarthritis of knees, bilateral 9/12/2016 09/12/2016--patient called in the office requesting a steriod injection in his knees.  I explained to him that I do no longer perform these injections and have referred him to Dr. Manuel.   • Secondary hyperparathyroidism of renal origin (CMS/HCC) 1/15/2021   • Statin intolerance, Vytorin and pravastatin 12/21/2018 12/21/2018--patient seems to be tolerating Livalo well.  10/02/2018--Livalo 2 mg per day initiated.  Recommend CoQ10 400 mg per day with food for better absorption.  Reassess with lab in about 8 weeks.  08/30/2018--patient presents with complaints of muscle cramps.  He discontinue pravastatin and the cramps went away.  Cramps returned even with a half dose.  This is despite the fact he was taking    • Vitamin B12 deficiency 2/11/2016 06/25/2009--B12 injections begun.   • Vitamin D deficiency 2/11/2016         Past Surgical History:   Procedure Laterality Date   • CATARACT EXTRACTION Left 12/12/2011 12/12/2011--cataract extirpation  with intraocular lens implantation left eye.   • CATARACT EXTRACTION Right 12/2011 December 2011--right cataract extirpation with intraocular lens implantation.   • CHOLECYSTECTOMY WITH INTRAOPERATIVE CHOLANGIOGRAM N/A 11/6/2020    Procedure: CHOLECYSTECTOMY LAPAROSCOPIC INTRAOPERATIVE CHOLANGIOGRAM;  Surgeon: Sanjay Burgess MD;  Location: [facility] MAIN OR;  Service: General;  Laterality: N/A;   • COLONOSCOPY  06/11/2002 06/11/2002--incomplete colonoscopy due to redundant colon. Not able to get past the hepatic flexure. Patient had followup barium contrast enema which showed extensive diverticulosis   • COLONOSCOPY  10/03/2013    10/03/2013--colonoscopy revealed markedly redundant colon, pancolonic diverticulosis with several impacted fecalith. No evidence of diverticulitis or stricture. Was only able to reach the ascending colon. Follow up barium enema revealed extensive diverticulosis. No polyp or other mucosal mass seen.   • CYSTOSCOPY  05/18/2016 05/18/2016--urology consultation.  Cystoscopy performed for possible bladder mass is negative.   • ERCP N/A 11/5/2020    Procedure: ENDOSCOPIC RETROGRADE CHOLANGIOPANCREATOGRAPHY with sphincterotomy, basket retrieval and balloon sweep;  Surgeon: Joseluis Mejia MD;  Location: [facility] ENDOSCOPY;  Service: Gastroenterology;  Laterality: N/A;  pre/post - CBD stones   • PROSTATE BIOPSY  12/21/2016 12/21/2016--prostate biopsy reportedly negative.  I will try to obtain the report.         No Known Allergies        Current Outpatient Medications:   •  allopurinol (ZYLOPRIM) 100 MG tablet, Take 1 p.o. daily for gout/elevated uric acid, Disp: 90 tablet, Rfl: 3  •  ALPRAZolam (XANAX) 0.5 MG tablet, TAKE ONE TABLET BY MOUTH TWICE A DAY, Disp: 60 tablet, Rfl: 5  •  amLODIPine (NORVASC) 5 MG tablet, Take 1 p.o. daily for high blood pressure, Disp: 90 tablet, Rfl: 3  •  aspirin (ASPIRIN LOW DOSE) 81 MG tablet, Take 1 tablet by mouth daily., Disp: , Rfl:   •   calcitriol (ROCALTROL) 0.25 MCG capsule, Take 0.25 mcg by mouth Daily., Disp: , Rfl:   •  fluticasone (FLONASE) 50 MCG/ACT nasal spray, SPRAY TWO SPRAYS IN EACH NOSTRIL ONCE DAILY FOR ENVIROMENTAL ALLERGIES AND CONGESTION, Disp: 16 g, Rfl: 5  •  folic acid (FOLVITE) 1 MG tablet, TAKE ONE TABLET BY MOUTH TWICE A DAY, Disp: 180 tablet, Rfl: 3  •  isosorbide mononitrate (IMDUR) 30 MG 24 hr tablet, Take 1 tablet by mouth every morning for heart, Disp: 30 tablet, Rfl: 5  •  metoprolol succinate XL (TOPROL-XL) 25 MG 24 hr tablet, TAKE ONE TABLET BY MOUTH DAILY, Disp: 90 tablet, Rfl: 2  •  nitroglycerin (NITROSTAT) 0.6 MG SL tablet, DISSOLVE 1 TAB UNDER TONGUE FOR CHEST PAIN - IF PAIN REMAINS AFTER 5 MIN, CALL 911 AND REPEAT DOSE. MAX 3 TABS IN 15 MINUTES, Disp: 100 tablet, Rfl: 11  •  omeprazole (priLOSEC) 40 MG capsule, TAKE ONE CAPSULE BY MOUTH DAILY, Disp: 90 capsule, Rfl: 1  •  Repatha SureClick solution auto-injector SureClick injection, INJECT 1 ML UNDER THE SKIN INTO THE APPROPRIATE AREA EVERY 14 DAYS, Disp: 1 mL, Rfl: 10    Current Facility-Administered Medications:   •  cyanocobalamin injection 1,000 mcg, 1,000 mcg, Intramuscular, Q28 Days, Delgado Patricia MD, 1,000 mcg at 04/30/21 1302      Family History   Problem Relation Age of Onset   • Diabetes Mother    • Heart disease Mother    • Stroke Father          Social History     Socioeconomic History   • Marital status:      Spouse name: Not on file   • Number of children: 2   • Years of education: 14   • Highest education level: Some college, no degree   Tobacco Use   • Smoking status: Never Smoker   • Smokeless tobacco: Never Used   • Tobacco comment: caffeine use: 2 cups coffee daily   Vaping Use   • Vaping Use: Never used   Substance and Sexual Activity   • Alcohol use: Yes     Alcohol/week: 5.0 standard drinks     Types: 2 Glasses of wine, 3 Shots of liquor per week     Comment: Moderate alcohol consumption   • Drug use: No   • Sexual activity: Not  "Currently     Partners: Female         Vitals:    09/01/21 1354   BP: 136/62   Pulse: 96   Resp: 15   SpO2: 98%   Weight: 80.6 kg (177 lb 9.6 oz)   Height: 172.4 cm (67.87\")        Body mass index is 27.11 kg/m².      Physical Exam:    General: Alert and oriented x 3.  No acute distress.  Slightly overweight.  Normal affect.  HEENT: Pupils equal, round, reactive to light; extraocular movements intact; sclerae nonicteric; pharynx, ear canals and TMs normal.  Neck: Without JVD, thyromegaly, bruit, or adenopathy.  Lungs: Clear to auscultation in all fields.  Heart: Regular rate and rhythm without murmur, rub, gallop, or click.  Abdomen: Soft, nontender, without hepatosplenomegaly or hernia.  Bowel sounds normal.  : Deferred.  Rectal: Deferred.  Extremities: Without clubbing, cyanosis, edema, or pulse deficit.  Neurologic: Intact without focal deficit.  No significant edema today.  Normal station and gait observed during ingress and egress from the examination room.  Skin: Without significant lesion.  Musculoskeletal: Unremarkable.    Lab/other results:    NMR is absolutely perfect with a total cholesterol 151.  LDL particle #756.  HDL particle #34.  CMP normal except glucose mildly elevated 103, BUN elevated at 29, creatinine 2.08.  Estimated GFR is 30.  Vitamin D normal.  Thyroid function test normal.  PSA elevated at 8.09.  Uric acid normal at 5.2.  CBC normal except hemoglobin low at 11.8 hematocrit low 37.1.    Assessment/Plan:         Diagnosis Plan   1. Encounter for subsequent annual wellness visit (AWV) in Medicare patient     2. Impaired fasting glucose  Comprehensive Metabolic Panel    Hemoglobin A1c   3. Hyperlipidemia  CK    Comprehensive Metabolic Panel    NMR LipoProfile    TSH    T4, Free    T3, Free   4. Chronic renal insufficiency, stage IV (severe), 02/09/2016--creatinine 1.85  CBC (No Diff)   5. Secondary hyperparathyroidism of renal origin (CMS/HCC)     6. Anemia due to stage 4 chronic kidney " disease (CMS/HCC)  CBC (No Diff)   7. Iron deficiency anemia due to chronic blood loss  Iron Profile   8. Vitamin B12 deficiency  cyanocobalamin injection 1,000 mcg   9. Folic acid deficiency     10. Benign essential hypertension     11. Bilateral carotid artery plaque, 05/24/2018--16-49% bilateral carotid artery stenosis     12. Occlusive coronary artery disease, clinical diagnosis only.  Patient not a candidate for heart catheterization/intervention.     13. Exertional angina (CMS/HCC)     14. Benign prostatic hypertrophy     15. Osteopenia of multiple sites     16. Vitamin D deficiency  Vitamin D 25 Hydroxy   17. Hyperuricemia  Uric Acid   18. Generalized anxiety disorder     19. Gastroesophageal reflux disease without esophagitis     20. Multiple environmental allergies     21. Hypogonadism in male, on TRT, testosterone pellets, per      22. Primary osteoarthritis of knees, bilateral     23. Cervical radiculopathy     24. Chronic neck pain     25. Statin intolerance, Vytorin and pravastatin     26. Degeneration of cervical intervertebral disc     27. Therapeutic drug monitoring       The subsequent Medicare wellness visit is documented on separate note.    Patient has mild impaired fasting glucose that does not require medication.  Hyperlipidemia is under perfect control which is very important given his coronary artery disease.  He has stable exertional angina and is followed closely by the cardiologist.  His chronic renal insufficiency looks a little worse than previous but it technically is still stage IIIb.  He has secondary hyperparathyroidism which is being treated with calcitriol.  Iron deficiency anemia is no longer present.  Patient has vitamin B12 and folic acid deficiency.  He receives B12 injections and needs one today.  Blood pressure well controlled.  Patient has carotid artery plaque and is up-to-date on his carotid Doppler study.  He has BPH and elevated PSA and also has hypogonadism and all of  this is being managed by his urologist.  He has primary osteoarthritis of the knees and also chronic neck pain and cervical radiculopathy and patient is aware that he should not be taking any nonsteroidal anti-inflammatory medications.    Plan is as follows: No change in current medical regimen.  Patient will follow-up in 6 months with lab prior or follow-up as needed.  Cyanocobalamin injection given today.  I encouraged patient to get Covid vaccine booster.    Procedures

## 2021-09-04 ENCOUNTER — IMMUNIZATION (OUTPATIENT)
Dept: VACCINE CLINIC | Facility: HOSPITAL | Age: 86
End: 2021-09-04

## 2021-09-04 PROCEDURE — 91300 HC SARSCOV02 VAC 30MCG/0.3ML IM: CPT | Performed by: INTERNAL MEDICINE

## 2021-09-04 PROCEDURE — 0003A: CPT | Performed by: INTERNAL MEDICINE

## 2021-09-04 PROCEDURE — 0001A: CPT | Performed by: INTERNAL MEDICINE

## 2021-09-11 DIAGNOSIS — K21.9 GASTROESOPHAGEAL REFLUX DISEASE WITHOUT ESOPHAGITIS: Chronic | ICD-10-CM

## 2021-09-14 RX ORDER — OMEPRAZOLE 40 MG/1
CAPSULE, DELAYED RELEASE ORAL
Qty: 90 CAPSULE | Refills: 1 | Status: SHIPPED | OUTPATIENT
Start: 2021-09-14 | End: 2022-03-21

## 2021-09-15 ENCOUNTER — OFFICE VISIT (OUTPATIENT)
Dept: CARDIOLOGY | Facility: CLINIC | Age: 86
End: 2021-09-15

## 2021-09-15 VITALS
WEIGHT: 179.4 LBS | OXYGEN SATURATION: 97 % | HEIGHT: 68 IN | DIASTOLIC BLOOD PRESSURE: 78 MMHG | BODY MASS INDEX: 27.19 KG/M2 | HEART RATE: 86 BPM | SYSTOLIC BLOOD PRESSURE: 178 MMHG

## 2021-09-15 DIAGNOSIS — E78.2 MIXED HYPERLIPIDEMIA: ICD-10-CM

## 2021-09-15 DIAGNOSIS — I45.10 RIGHT BUNDLE BRANCH BLOCK: ICD-10-CM

## 2021-09-15 DIAGNOSIS — I10 BENIGN ESSENTIAL HYPERTENSION: Chronic | ICD-10-CM

## 2021-09-15 DIAGNOSIS — I25.10 CORONARY ARTERY DISEASE INVOLVING NATIVE CORONARY ARTERY OF NATIVE HEART WITHOUT ANGINA PECTORIS: Primary | ICD-10-CM

## 2021-09-15 DIAGNOSIS — N18.4 CHRONIC RENAL INSUFFICIENCY, STAGE IV (SEVERE) (HCC): Chronic | ICD-10-CM

## 2021-09-15 PROCEDURE — 93000 ELECTROCARDIOGRAM COMPLETE: CPT | Performed by: INTERNAL MEDICINE

## 2021-09-15 PROCEDURE — 99214 OFFICE O/P EST MOD 30 MIN: CPT | Performed by: INTERNAL MEDICINE

## 2021-09-15 NOTE — PROGRESS NOTES
OFFICE VISIT      Date of Office Visit: 09/15/2021    Patient Name: Radha Win  : 1932    Encounter Provider: hTeron Alejo MD  Referring Provider: No ref. provider found  Primary Care Provider: Delgado Patricia MD  Place of Service: [facility] CARDIOLOGY        Chief Complaint   Patient presents with   • Coronary Artery Disease     History of Present Illness    The patient is an 89-year-old white male with a history of coronary disease as well as hypertension who returns the office today for follow-up examination.    After the patient's last visit I believe I had increased his metoprolol to 50 mg/day. However the patient has only been taking 25 mg daily. His blood pressure has been elevated including here today in the office. From a coronary standpoint he does not complain of any angina pectoris. He denies shortness of breath, lightheadedness, dizziness nor orthopnea.  Past Medical History:   Diagnosis Date   • Allergic rhinitis 2016   • Anemia due to chronic kidney disease 2016   • Benign essential hypertension 2016   • Benign prostatic hypertrophy 2016--prostate biopsy reportedly negative.  I will try to obtain the report.  Patient sees urologist.  PSAs run from the 3-8 range   • Bilateral carotid artery plaque, 2016--16-49% bilateral carotid artery stenosis 2016--vascular screen reveals 16-49% bilateral carotid artery stenosis, negative for AAA, negative for PAD.  10/10/2008--vascular screening study revealed mild bilateral carotid plaque, no aortic aneurysm, no evidence of PAD   • Bilateral renal cysts 2016--ultrasound of the kidneys reveals the previously described renal cysts are not well seen.  However, questionable incidental bladder tumor noted.  2010--ultrasound the kidneys was negative bilaterally except for small renal cysts.   • Cervical radiculopathy  6/28/2018    10/02/2018--patient seen in follow-up and the results of the MRI discussed.  He continues to have intermittent radicular symptoms which is currently only on the left.  Discussed options with patient and I have recommended neurosurgery consultation.  Patient may benefit from epidural injections.  09/06/2018--MRI of the cervical spine reveals moderate neural foraminal compromise on the right at C3-C   • Chronic renal insufficiency, stage IV (severe), 02/09/2016--creatinine 1.85 2/11/2016 02/09/2016--serum creatinine 1.85.  BUN 29.  07/20/2015--patient was evaluated by the nephrologist.  Ultrasound of the kidneys ordered but this was never done.  05/22/2015--BUN 35, creatinine 2.3.  Patient referred to nephrology, Dr. Devyn Muniz.  04/16/2014--BUN 25, creatinine 1.77.  01/03/2013--BUN 37, creatinine 2.14.    05/26/2010---ultrasound of the kidneys reveal a small cyst x 2 right kidney.  Otherwise normal.    Baseline creatinine approximately 1.9-2.0.   • Degeneration of cervical intervertebral disc 5/2/2019   • Diverticulosis of colon 2/11/2016    10/03/2013--colonoscopy revealed markedly redundant colon, pancolonic diverticulosis with several impacted fecalith.  No evidence of diverticulitis or stricture.  Was only able to reach the ascending colon.  Follow up barium enema revealed extensive diverticulosis.  No polyp or other mucosal mass seen.    06/11/2002--incomplete colonoscopy due to redundant colon.  Not able to get past the hepatic flexure.  Patient had followup barium contrast enema which showed extensive diverticulosis.   • Exertional angina (CMS/HCC) 9/17/2019 October 8, 2019--patient seen in follow-up and he reports the long-acting oral nitrate has definitely helped his exertional anginal symptoms.  However, he has not stressed himself completely such as using a push mower.  His blood pressure seems improved as well.  Patient has an appoint with the cardiologist in the next 2 weeks.  I have  contacted his cardiologist about the results of the empiric ni   • Folic acid deficiency 2/11/2016   • Gastroesophageal reflux disease without esophagitis 2/11/2016   • Generalized anxiety disorder 2/11/2016   • History of diverticulitis of colon 2009 and 2003 05/08/2009-acute diverticulitis without complication. 09/05/2003-acute diverticulitis without complication.    • Hyperlipidemia 2/11/2016 01/29/2005--treatment for hyperlipidemia begun.   • Hyperuricemia 8/19/2020   • Hypogonadism in male, on TRT, testosterone pellets, per  6/16/2017 January 2017--patient reports his urologist initiated testosterone replacement therapy consisting of testosterone pellets every 6 months.   • Multiple environmental allergies 2/12/2016    Patient sees the allergist regularly and has been on immunotherapy.   • Muscle cramps, statin related, Vytorin and pravastatin.  Tolerates Livalo. 6/16/2017 12/21/2018--patient seems to be tolerating Livalo well.  10/02/2018--Livalo 2 mg per day initiated.  Recommend CoQ10 400 mg per day with food for better absorption.  Reassess with lab in about 8 weeks.  08/30/2018--patient presents with complaints of muscle cramps.  He discontinue pravastatin and the cramps went away.  Cramps returned even with a half dose.  This is despite the fact he was taking    • Occlusive coronary artery disease, clinical diagnosis only.  Patient not a candidate for heart catheterization/intervention. 12/10/2019    October 8, 2019--patient seen in follow-up and he reports the long-acting oral nitrate has definitely helped his exertional anginal symptoms.  However, he has not stressed himself completely such as using a push mower.  His blood pressure seems improved as well.  Patient has an appoint with the cardiologist in the next 2 weeks.  I have contacted his cardiologist about the results of the empiric ni   • Osteopenia, 7/12/2019--lumbar spine -0.4.  Left femoral neck -1.7.  Right femoral neck -1.9.  2/11/2016 July 12, 2019--DEXA scan reveals lumbar spine T score -0.4.  Unchanged.  Left femoral neck T score -1.7.  Unchanged.  Right femoral neck T score -1.9.  Unchanged.  Impression is osteopenia of the hips with no significant interval change.  06/16/2017--patient seen in follow-up and reports he doesn't think he ever started Fosamax.  Osteopenia and needs to be reassessed with a DEXA scan.  03/14/2017-   • Primary osteoarthritis of knees, bilateral 9/12/2016 09/12/2016--patient called in the office requesting a steriod injection in his knees.  I explained to him that I do no longer perform these injections and have referred him to Dr. Manuel.   • Secondary hyperparathyroidism of renal origin (CMS/HCC) 1/15/2021   • Statin intolerance, Vytorin and pravastatin 12/21/2018 12/21/2018--patient seems to be tolerating Livalo well.  10/02/2018--Livalo 2 mg per day initiated.  Recommend CoQ10 400 mg per day with food for better absorption.  Reassess with lab in about 8 weeks.  08/30/2018--patient presents with complaints of muscle cramps.  He discontinue pravastatin and the cramps went away.  Cramps returned even with a half dose.  This is despite the fact he was taking    • Vitamin B12 deficiency 2/11/2016 06/25/2009--B12 injections begun.   • Vitamin D deficiency 2/11/2016         Past Surgical History:   Procedure Laterality Date   • CATARACT EXTRACTION Left 12/12/2011 12/12/2011--cataract extirpation with intraocular lens implantation left eye.   • CATARACT EXTRACTION Right 12/2011 December 2011--right cataract extirpation with intraocular lens implantation.   • CHOLECYSTECTOMY WITH INTRAOPERATIVE CHOLANGIOGRAM N/A 11/6/2020    Procedure: CHOLECYSTECTOMY LAPAROSCOPIC INTRAOPERATIVE CHOLANGIOGRAM;  Surgeon: Sanjay Burgess MD;  Location: [facility];  Service: General;  Laterality: N/A;   • COLONOSCOPY  06/11/2002 06/11/2002--incomplete colonoscopy due to redundant colon. Not able to get  past the hepatic flexure. Patient had followup barium contrast enema which showed extensive diverticulosis   • COLONOSCOPY  10/03/2013    10/03/2013--colonoscopy revealed markedly redundant colon, pancolonic diverticulosis with several impacted fecalith. No evidence of diverticulitis or stricture. Was only able to reach the ascending colon. Follow up barium enema revealed extensive diverticulosis. No polyp or other mucosal mass seen.   • CYSTOSCOPY  05/18/2016 05/18/2016--urology consultation.  Cystoscopy performed for possible bladder mass is negative.   • ERCP N/A 11/5/2020    Procedure: ENDOSCOPIC RETROGRADE CHOLANGIOPANCREATOGRAPHY with sphincterotomy, basket retrieval and balloon sweep;  Surgeon: Joseluis Mejia MD;  Location: [facility] ENDOSCOPY;  Service: Gastroenterology;  Laterality: N/A;  pre/post - CBD stones   • PROSTATE BIOPSY  12/21/2016 12/21/2016--prostate biopsy reportedly negative.  I will try to obtain the report.           Current Outpatient Medications:   •  allopurinol (ZYLOPRIM) 100 MG tablet, Take 1 p.o. daily for gout/elevated uric acid, Disp: 90 tablet, Rfl: 3  •  ALPRAZolam (XANAX) 0.5 MG tablet, TAKE ONE TABLET BY MOUTH TWICE A DAY, Disp: 60 tablet, Rfl: 5  •  amLODIPine (NORVASC) 5 MG tablet, Take 1 p.o. daily for high blood pressure, Disp: 90 tablet, Rfl: 3  •  aspirin (ASPIRIN LOW DOSE) 81 MG tablet, Take 1 tablet by mouth daily., Disp: , Rfl:   •  calcitriol (ROCALTROL) 0.25 MCG capsule, Take 0.25 mcg by mouth Daily., Disp: , Rfl:   •  fluticasone (FLONASE) 50 MCG/ACT nasal spray, SPRAY TWO SPRAYS IN EACH NOSTRIL ONCE DAILY FOR ENVIROMENTAL ALLERGIES AND CONGESTION, Disp: 16 g, Rfl: 5  •  folic acid (FOLVITE) 1 MG tablet, TAKE ONE TABLET BY MOUTH TWICE A DAY, Disp: 180 tablet, Rfl: 3  •  isosorbide mononitrate (IMDUR) 30 MG 24 hr tablet, Take 1 tablet by mouth every morning for heart, Disp: 30 tablet, Rfl: 5  •  metoprolol succinate XL (TOPROL-XL) 25 MG 24 hr tablet, TAKE ONE  TABLET BY MOUTH DAILY, Disp: 90 tablet, Rfl: 2  •  nitroglycerin (NITROSTAT) 0.6 MG SL tablet, DISSOLVE 1 TAB UNDER TONGUE FOR CHEST PAIN - IF PAIN REMAINS AFTER 5 MIN, CALL 911 AND REPEAT DOSE. MAX 3 TABS IN 15 MINUTES, Disp: 100 tablet, Rfl: 11  •  omeprazole (priLOSEC) 40 MG capsule, TAKE ONE CAPSULE BY MOUTH DAILY, Disp: 90 capsule, Rfl: 1  •  Repatha SureClick solution auto-injector SureClick injection, INJECT 1 ML UNDER THE SKIN INTO THE APPROPRIATE AREA EVERY 14 DAYS, Disp: 1 mL, Rfl: 10    Current Facility-Administered Medications:   •  cyanocobalamin injection 1,000 mcg, 1,000 mcg, Intramuscular, Q28 Days, Delgado Patricia MD, 1,000 mcg at 04/30/21 1302      Social History     Socioeconomic History   • Marital status:      Spouse name: Not on file   • Number of children: 2   • Years of education: 14   • Highest education level: Some college, no degree   Tobacco Use   • Smoking status: Never Smoker   • Smokeless tobacco: Never Used   • Tobacco comment: caffeine use: 2 cups coffee daily   Vaping Use   • Vaping Use: Never used   Substance and Sexual Activity   • Alcohol use: Yes     Alcohol/week: 5.0 standard drinks     Types: 2 Glasses of wine, 3 Shots of liquor per week     Comment: Moderate alcohol consumption   • Drug use: No   • Sexual activity: Not Currently     Partners: Female         Review of Systems   Constitutional: Negative.   HENT: Negative.    Eyes: Negative.    Cardiovascular: Negative.    Respiratory: Negative.    Endocrine: Negative.    Skin: Negative.    Musculoskeletal: Negative.    Gastrointestinal: Negative.    Neurological: Negative.    Psychiatric/Behavioral: Negative.        Procedures      ECG 12 Lead    Date/Time: 9/15/2021 11:13 AM  Performed by: Theron Alejo MD  Authorized by: Theron Alejo MD   Comparison: compared with previous ECG from 3/10/2021  Similar to previous ECG  Rhythm: sinus rhythm  Conduction: conduction normal  ST Segments: ST segments  "normal  T Waves: T waves normal                  Objective:    /78 (BP Location: Right arm, Patient Position: Sitting, Cuff Size: Adult)   Pulse 86   Ht 172.7 cm (68\")   Wt 81.4 kg (179 lb 6.4 oz)   SpO2 97%   BMI 27.28 kg/m²         Vitals reviewed.   Constitutional:       Appearance: Healthy appearance. Well-developed.   Neck:      Thyroid: No thyromegaly.      Vascular: No carotid bruit or JVD.   Pulmonary:      Effort: Pulmonary effort is normal.      Breath sounds: Normal breath sounds.   Cardiovascular:      Normal rate. Regular rhythm. Normal S1. Normal S2.      Murmurs: There is no murmur.      No gallop. No click. No rub.   Pulses:     Intact distal pulses.   Edema:     Peripheral edema absent.   Skin:     General: Skin is warm and dry.      Findings: No erythema.   Neurological:      Mental Status: Alert and oriented to person, place, and time.             Assessment:       Diagnosis Plan   1. Coronary artery disease involving native coronary artery of native heart without angina pectoris     2. Benign essential hypertension     3. Chronic renal insufficiency, stage IV (severe), 02/09/2016--creatinine 1.85     4. Right bundle branch block     5. Mixed hyperlipidemia       1. Coronary artery disease: No no angina pectoris at this time. We will continue the same  2. Hypertension: Increase metoprolol to 50 mg daily  3. Chronic right bundle branch block: No change  4. Hyperlipidemia: The patient remains on Repatha. He is intolerant to statin therapy  5. Chronic kidney disease: Stage IV. GFR 30       Plan:       I reviewed again with him his medications. He should be on metoprolol 50 mg a day. I think that will help his blood pressure.  "

## 2021-09-23 DIAGNOSIS — I20.8 EXERTIONAL ANGINA (HCC): ICD-10-CM

## 2021-09-23 RX ORDER — ISOSORBIDE MONONITRATE 30 MG/1
TABLET, EXTENDED RELEASE ORAL
Qty: 30 TABLET | Refills: 3 | Status: SHIPPED | OUTPATIENT
Start: 2021-09-23 | End: 2022-01-27

## 2021-10-05 ENCOUNTER — CLINICAL SUPPORT (OUTPATIENT)
Dept: INTERNAL MEDICINE | Facility: CLINIC | Age: 86
End: 2021-10-05

## 2021-10-05 DIAGNOSIS — F41.1 GENERALIZED ANXIETY DISORDER: ICD-10-CM

## 2021-10-05 DIAGNOSIS — Z23 NEED FOR INFLUENZA VACCINATION: Primary | ICD-10-CM

## 2021-10-05 PROCEDURE — 90662 IIV NO PRSV INCREASED AG IM: CPT | Performed by: INTERNAL MEDICINE

## 2021-10-05 PROCEDURE — G0008 ADMIN INFLUENZA VIRUS VAC: HCPCS | Performed by: INTERNAL MEDICINE

## 2021-10-05 RX ORDER — ALPRAZOLAM 0.5 MG/1
TABLET ORAL
Qty: 60 TABLET | Refills: 5 | Status: SHIPPED | OUTPATIENT
Start: 2021-10-05 | End: 2022-05-10

## 2021-10-05 RX ADMIN — CYANOCOBALAMIN 1000 MCG: 1000 INJECTION, SOLUTION INTRAMUSCULAR; SUBCUTANEOUS at 10:24

## 2021-10-18 ENCOUNTER — HOSPITAL ENCOUNTER (OUTPATIENT)
Dept: BONE DENSITY | Facility: HOSPITAL | Age: 86
Discharge: HOME | End: 2021-10-18
Admitting: INTERNAL MEDICINE

## 2021-10-18 PROCEDURE — 77080 DXA BONE DENSITY AXIAL: CPT

## 2021-10-22 DIAGNOSIS — M54.12 CERVICAL RADICULOPATHY: Chronic | ICD-10-CM

## 2021-10-22 DIAGNOSIS — G89.29 CHRONIC NECK PAIN: Chronic | ICD-10-CM

## 2021-10-22 DIAGNOSIS — M54.2 CHRONIC NECK PAIN: Chronic | ICD-10-CM

## 2021-10-22 DIAGNOSIS — M50.30 DEGENERATION OF CERVICAL INTERVERTEBRAL DISC: Chronic | ICD-10-CM

## 2021-10-22 RX ORDER — PREDNISONE 10 MG/1
TABLET ORAL
Qty: 56 TABLET | Refills: 0 | OUTPATIENT
Start: 2021-10-22

## 2021-10-22 RX ORDER — METOPROLOL SUCCINATE 25 MG/1
25 TABLET, EXTENDED RELEASE ORAL DAILY
Qty: 90 TABLET | Refills: 1 | OUTPATIENT
Start: 2021-10-22

## 2021-11-05 ENCOUNTER — CLINICAL SUPPORT (OUTPATIENT)
Dept: INTERNAL MEDICINE | Facility: CLINIC | Age: 86
End: 2021-11-05

## 2021-11-05 DIAGNOSIS — E53.8 VITAMIN B12 DEFICIENCY: Primary | ICD-10-CM

## 2021-11-05 PROCEDURE — 96372 THER/PROPH/DIAG INJ SC/IM: CPT | Performed by: INTERNAL MEDICINE

## 2021-11-05 RX ADMIN — CYANOCOBALAMIN 1000 MCG: 1000 INJECTION, SOLUTION INTRAMUSCULAR; SUBCUTANEOUS at 16:06

## 2021-12-06 RX ORDER — METOPROLOL SUCCINATE 50 MG/1
TABLET, EXTENDED RELEASE ORAL
Qty: 90 TABLET | Refills: 3 | Status: SHIPPED | OUTPATIENT
Start: 2021-12-06 | End: 2022-03-01

## 2021-12-06 RX ORDER — METOPROLOL SUCCINATE 25 MG/1
TABLET, EXTENDED RELEASE ORAL
Qty: 90 TABLET | Refills: 2 | OUTPATIENT
Start: 2021-12-06

## 2021-12-06 NOTE — TELEPHONE ENCOUNTER
LOV   -   9/15/21 Metoprolol was increased                  to 50 MG Daily  Next   -   3/24/22  Last Labs   -   8/30/21

## 2021-12-07 ENCOUNTER — CLINICAL SUPPORT (OUTPATIENT)
Dept: INTERNAL MEDICINE | Facility: CLINIC | Age: 86
End: 2021-12-07

## 2021-12-07 DIAGNOSIS — E53.8 VITAMIN B12 DEFICIENCY: Chronic | ICD-10-CM

## 2021-12-07 PROCEDURE — 96372 THER/PROPH/DIAG INJ SC/IM: CPT | Performed by: INTERNAL MEDICINE

## 2021-12-07 RX ADMIN — CYANOCOBALAMIN 1000 MCG: 1000 INJECTION, SOLUTION INTRAMUSCULAR; SUBCUTANEOUS at 13:15

## 2021-12-29 ENCOUNTER — TELEPHONE (OUTPATIENT)
Dept: INTERNAL MEDICINE | Facility: CLINIC | Age: 86
End: 2021-12-29

## 2021-12-29 NOTE — TELEPHONE ENCOUNTER
Hub staff attempted to follow warm transfer process and was unsuccessful     Caller: Radha Win    Relationship to patient: Self    Best call back number: [phone]     Patient is needing: PATIENT REQUESTING A COVID TEST DUE TO WAKING UP ON 12/28/21 WITH A SORE THROAT, PATIENT NOTED THAT THE SORE THROAT WENT AWAY AS THE DAY WENT ON, AND IS NOT CURRENTLY HAVING ANY SYMPTOMS.

## 2022-01-04 ENCOUNTER — CLINICAL SUPPORT (OUTPATIENT)
Dept: INTERNAL MEDICINE | Facility: CLINIC | Age: 87
End: 2022-01-04

## 2022-01-04 DIAGNOSIS — E53.8 VITAMIN B12 DEFICIENCY: Chronic | ICD-10-CM

## 2022-01-04 PROCEDURE — 96372 THER/PROPH/DIAG INJ SC/IM: CPT | Performed by: INTERNAL MEDICINE

## 2022-01-04 RX ADMIN — CYANOCOBALAMIN 1000 MCG: 1000 INJECTION, SOLUTION INTRAMUSCULAR; SUBCUTANEOUS at 12:43

## 2022-01-13 RX ORDER — METOPROLOL SUCCINATE 25 MG/1
TABLET, EXTENDED RELEASE ORAL
Qty: 90 TABLET | Refills: 2 | OUTPATIENT
Start: 2022-01-13

## 2022-01-26 DIAGNOSIS — I20.8 EXERTIONAL ANGINA: ICD-10-CM

## 2022-01-27 RX ORDER — ISOSORBIDE MONONITRATE 30 MG/1
TABLET, EXTENDED RELEASE ORAL
Qty: 30 TABLET | Refills: 3 | Status: SHIPPED | OUTPATIENT
Start: 2022-01-27 | End: 2022-06-03

## 2022-02-14 DIAGNOSIS — J30.1 SEASONAL ALLERGIC RHINITIS DUE TO POLLEN: Chronic | ICD-10-CM

## 2022-02-14 RX ORDER — FLUTICASONE PROPIONATE 50 MCG
SPRAY, SUSPENSION (ML) NASAL
Qty: 16 ML | Refills: 3 | Status: SHIPPED | OUTPATIENT
Start: 2022-02-14 | End: 2022-11-22

## 2022-02-15 ENCOUNTER — CLINICAL SUPPORT (OUTPATIENT)
Dept: INTERNAL MEDICINE | Facility: CLINIC | Age: 87
End: 2022-02-15

## 2022-02-15 DIAGNOSIS — E53.8 VITAMIN B12 DEFICIENCY: Chronic | ICD-10-CM

## 2022-02-15 PROCEDURE — 96372 THER/PROPH/DIAG INJ SC/IM: CPT | Performed by: INTERNAL MEDICINE

## 2022-02-15 RX ADMIN — CYANOCOBALAMIN 1000 MCG: 1000 INJECTION, SOLUTION INTRAMUSCULAR; SUBCUTANEOUS at 14:38

## 2022-02-22 ENCOUNTER — LAB (OUTPATIENT)
Dept: LAB | Facility: HOSPITAL | Age: 87
End: 2022-02-22

## 2022-02-22 DIAGNOSIS — N18.4 ANEMIA DUE TO STAGE 4 CHRONIC KIDNEY DISEASE: Chronic | ICD-10-CM

## 2022-02-22 DIAGNOSIS — D50.0 IRON DEFICIENCY ANEMIA DUE TO CHRONIC BLOOD LOSS: ICD-10-CM

## 2022-02-22 DIAGNOSIS — E79.0 HYPERURICEMIA: Chronic | ICD-10-CM

## 2022-02-22 DIAGNOSIS — E55.9 VITAMIN D DEFICIENCY: Chronic | ICD-10-CM

## 2022-02-22 DIAGNOSIS — N18.4 CHRONIC RENAL INSUFFICIENCY, STAGE IV (SEVERE): Chronic | ICD-10-CM

## 2022-02-22 DIAGNOSIS — D63.1 ANEMIA DUE TO STAGE 4 CHRONIC KIDNEY DISEASE: Chronic | ICD-10-CM

## 2022-02-22 DIAGNOSIS — R73.01 IMPAIRED FASTING GLUCOSE: Chronic | ICD-10-CM

## 2022-02-22 DIAGNOSIS — E78.2 MIXED HYPERLIPIDEMIA: Chronic | ICD-10-CM

## 2022-02-22 LAB
25(OH)D3 SERPL-MCNC: 36.6 NG/ML (ref 30–100)
ALBUMIN SERPL-MCNC: 4 G/DL (ref 3.5–5.2)
ALBUMIN/GLOB SERPL: 1.9 G/DL
ALP SERPL-CCNC: 105 U/L (ref 39–117)
ALT SERPL W P-5'-P-CCNC: 34 U/L (ref 1–41)
ANION GAP SERPL CALCULATED.3IONS-SCNC: 9 MMOL/L (ref 5–15)
AST SERPL-CCNC: 24 U/L (ref 1–40)
BILIRUB SERPL-MCNC: 0.3 MG/DL (ref 0–1.2)
BUN SERPL-MCNC: 33 MG/DL (ref 8–23)
BUN/CREAT SERPL: 19.1 (ref 7–25)
CALCIUM SPEC-SCNC: 8.9 MG/DL (ref 8.6–10.5)
CHLORIDE SERPL-SCNC: 106 MMOL/L (ref 98–107)
CK SERPL-CCNC: 75 U/L (ref 20–200)
CO2 SERPL-SCNC: 26 MMOL/L (ref 22–29)
CREAT SERPL-MCNC: 1.73 MG/DL (ref 0.76–1.27)
DEPRECATED RDW RBC AUTO: 40.4 FL (ref 37–54)
ERYTHROCYTE [DISTWIDTH] IN BLOOD BY AUTOMATED COUNT: 12.7 % (ref 12.3–15.4)
GFR SERPL CREATININE-BSD FRML MDRD: 37 ML/MIN/1.73
GLOBULIN UR ELPH-MCNC: 2.1 GM/DL
GLUCOSE SERPL-MCNC: 109 MG/DL (ref 65–99)
HBA1C MFR BLD: 6.4 % (ref 4.8–5.6)
HCT VFR BLD AUTO: 33.3 % (ref 37.5–51)
HGB BLD-MCNC: 11.5 G/DL (ref 13–17.7)
IRON 24H UR-MRATE: 107 MCG/DL (ref 59–158)
IRON SATN MFR SERPL: 31 % (ref 20–50)
MCH RBC QN AUTO: 30.8 PG (ref 26.6–33)
MCHC RBC AUTO-ENTMCNC: 34.5 G/DL (ref 31.5–35.7)
MCV RBC AUTO: 89.3 FL (ref 79–97)
PLATELET # BLD AUTO: 190 10*3/MM3 (ref 140–450)
PMV BLD AUTO: 10.3 FL (ref 6–12)
POTASSIUM SERPL-SCNC: 5 MMOL/L (ref 3.5–5.2)
PROT SERPL-MCNC: 6.1 G/DL (ref 6–8.5)
RBC # BLD AUTO: 3.73 10*6/MM3 (ref 4.14–5.8)
SODIUM SERPL-SCNC: 141 MMOL/L (ref 136–145)
T3FREE SERPL-MCNC: 2.54 PG/ML (ref 2–4.4)
T4 FREE SERPL-MCNC: 1.04 NG/DL (ref 0.93–1.7)
TIBC SERPL-MCNC: 349 MCG/DL (ref 298–536)
TRANSFERRIN SERPL-MCNC: 234 MG/DL (ref 200–360)
TSH SERPL DL<=0.05 MIU/L-ACNC: 2.89 UIU/ML (ref 0.27–4.2)
URATE SERPL-MCNC: 6.5 MG/DL (ref 3.4–7)
WBC NRBC COR # BLD: 7.58 10*3/MM3 (ref 3.4–10.8)

## 2022-02-22 PROCEDURE — 84481 FREE ASSAY (FT-3): CPT | Performed by: INTERNAL MEDICINE

## 2022-02-22 PROCEDURE — 80053 COMPREHEN METABOLIC PANEL: CPT | Performed by: INTERNAL MEDICINE

## 2022-02-22 PROCEDURE — 84439 ASSAY OF FREE THYROXINE: CPT | Performed by: INTERNAL MEDICINE

## 2022-02-22 PROCEDURE — 36415 COLL VENOUS BLD VENIPUNCTURE: CPT

## 2022-02-22 PROCEDURE — 80061 LIPID PANEL: CPT | Performed by: INTERNAL MEDICINE

## 2022-02-22 PROCEDURE — 84466 ASSAY OF TRANSFERRIN: CPT | Performed by: INTERNAL MEDICINE

## 2022-02-22 PROCEDURE — 84443 ASSAY THYROID STIM HORMONE: CPT | Performed by: INTERNAL MEDICINE

## 2022-02-22 PROCEDURE — 82550 ASSAY OF CK (CPK): CPT | Performed by: INTERNAL MEDICINE

## 2022-02-22 PROCEDURE — 83704 LIPOPROTEIN BLD QUAN PART: CPT | Performed by: INTERNAL MEDICINE

## 2022-02-22 PROCEDURE — 82306 VITAMIN D 25 HYDROXY: CPT | Performed by: INTERNAL MEDICINE

## 2022-02-22 PROCEDURE — 84550 ASSAY OF BLOOD/URIC ACID: CPT | Performed by: INTERNAL MEDICINE

## 2022-02-22 PROCEDURE — 83540 ASSAY OF IRON: CPT | Performed by: INTERNAL MEDICINE

## 2022-02-22 PROCEDURE — 83036 HEMOGLOBIN GLYCOSYLATED A1C: CPT | Performed by: INTERNAL MEDICINE

## 2022-02-22 PROCEDURE — 85027 COMPLETE CBC AUTOMATED: CPT | Performed by: INTERNAL MEDICINE

## 2022-02-23 LAB
CHOLEST SERPL-MCNC: 127 MG/DL (ref 100–199)
HDL SERPL-SCNC: 31.1 UMOL/L
HDLC SERPL-MCNC: 37 MG/DL
LDL SERPL QN: 20.4 NM
LDL SERPL-SCNC: 548 NMOL/L
LDL SMALL SERPL-SCNC: 273 NMOL/L
LDLC SERPL CALC-MCNC: 54 MG/DL (ref 0–99)
TRIGL SERPL-MCNC: 221 MG/DL (ref 0–149)

## 2022-03-01 ENCOUNTER — OFFICE VISIT (OUTPATIENT)
Dept: INTERNAL MEDICINE | Facility: CLINIC | Age: 87
End: 2022-03-01

## 2022-03-01 VITALS
HEIGHT: 68 IN | DIASTOLIC BLOOD PRESSURE: 72 MMHG | SYSTOLIC BLOOD PRESSURE: 130 MMHG | WEIGHT: 181.6 LBS | RESPIRATION RATE: 17 BRPM | OXYGEN SATURATION: 98 % | HEART RATE: 90 BPM | BODY MASS INDEX: 27.52 KG/M2

## 2022-03-01 DIAGNOSIS — Z91.09 MULTIPLE ENVIRONMENTAL ALLERGIES: Chronic | ICD-10-CM

## 2022-03-01 DIAGNOSIS — K21.9 GASTROESOPHAGEAL REFLUX DISEASE WITHOUT ESOPHAGITIS: Chronic | ICD-10-CM

## 2022-03-01 DIAGNOSIS — E53.8 VITAMIN B12 DEFICIENCY: Chronic | ICD-10-CM

## 2022-03-01 DIAGNOSIS — N18.4 ANEMIA DUE TO STAGE 4 CHRONIC KIDNEY DISEASE: Chronic | ICD-10-CM

## 2022-03-01 DIAGNOSIS — Z51.81 THERAPEUTIC DRUG MONITORING: ICD-10-CM

## 2022-03-01 DIAGNOSIS — R25.2 MUSCLE CRAMPS: Chronic | ICD-10-CM

## 2022-03-01 DIAGNOSIS — R73.01 IMPAIRED FASTING GLUCOSE: Primary | Chronic | ICD-10-CM

## 2022-03-01 DIAGNOSIS — D50.0 IRON DEFICIENCY ANEMIA DUE TO CHRONIC BLOOD LOSS: ICD-10-CM

## 2022-03-01 DIAGNOSIS — N25.81 SECONDARY HYPERPARATHYROIDISM OF RENAL ORIGIN: Chronic | ICD-10-CM

## 2022-03-01 DIAGNOSIS — N40.0 BENIGN NON-NODULAR PROSTATIC HYPERPLASIA WITHOUT LOWER URINARY TRACT SYMPTOMS: Chronic | ICD-10-CM

## 2022-03-01 DIAGNOSIS — E55.9 VITAMIN D DEFICIENCY: Chronic | ICD-10-CM

## 2022-03-01 DIAGNOSIS — I20.8 EXERTIONAL ANGINA: Chronic | ICD-10-CM

## 2022-03-01 DIAGNOSIS — M85.89 OSTEOPENIA OF MULTIPLE SITES: Chronic | ICD-10-CM

## 2022-03-01 DIAGNOSIS — I25.10 OCCLUSIVE CORONARY ARTERY DISEASE: Chronic | ICD-10-CM

## 2022-03-01 DIAGNOSIS — E53.8 FOLIC ACID DEFICIENCY: Chronic | ICD-10-CM

## 2022-03-01 DIAGNOSIS — M17.0 PRIMARY OSTEOARTHRITIS OF KNEES, BILATERAL: Chronic | ICD-10-CM

## 2022-03-01 DIAGNOSIS — I65.23 ATHEROSCLEROSIS OF BOTH CAROTID ARTERIES: Chronic | ICD-10-CM

## 2022-03-01 DIAGNOSIS — E78.2 MIXED HYPERLIPIDEMIA: Chronic | ICD-10-CM

## 2022-03-01 DIAGNOSIS — N18.4 CHRONIC RENAL INSUFFICIENCY, STAGE IV (SEVERE): Chronic | ICD-10-CM

## 2022-03-01 DIAGNOSIS — D63.1 ANEMIA DUE TO STAGE 4 CHRONIC KIDNEY DISEASE: Chronic | ICD-10-CM

## 2022-03-01 DIAGNOSIS — I10 BENIGN ESSENTIAL HYPERTENSION: Chronic | ICD-10-CM

## 2022-03-01 DIAGNOSIS — E29.1 HYPOGONADISM IN MALE: Chronic | ICD-10-CM

## 2022-03-01 DIAGNOSIS — Z78.9 STATIN INTOLERANCE: Chronic | ICD-10-CM

## 2022-03-01 DIAGNOSIS — E79.0 HYPERURICEMIA: Chronic | ICD-10-CM

## 2022-03-01 PROBLEM — G89.29 CHRONIC LEFT SHOULDER PAIN: Chronic | Status: ACTIVE | Noted: 2021-07-19

## 2022-03-01 PROBLEM — M25.512 CHRONIC LEFT SHOULDER PAIN: Chronic | Status: ACTIVE | Noted: 2021-07-19

## 2022-03-01 PROCEDURE — 99214 OFFICE O/P EST MOD 30 MIN: CPT | Performed by: INTERNAL MEDICINE

## 2022-03-01 NOTE — PROGRESS NOTES
03/01/2022    Patient Information  Radha Win                                                                                          [address]  [facility] [address]      7/26/1932  [unfilled]  There is no work phone number on file.    Chief Complaint:     Follow-up blood work in order to monitor chronic medical issues listed in history of present illness.  No new acute complaints.    History of Present Illness:    Patient with a multitude of chronic medical problems including impaired fasting glucose, hypertension, chronic renal insufficiency stage IV, hyperlipidemia, carotid artery plaque, BPH, vitamin B12 and vitamin D deficiency, anemia due to stage IV kidney disease, esophageal reflux, statin intolerance, occlusive coronary artery disease, exertional angina, hyperuricemia, iron deficiency, symptomatic hypogonadism, intolerance to Vytorin and pravastatin, primary osteoarthritis of multiple sites, folic acid deficiency, osteopenia.  He presents today for follow-up with lab prior in order to monitor his chronic medical issues.  His past medical history reviewed and updated were necessary including health maintenance parameters.  This reveals he is up-to-date or else accounted for after today's visit.      Review of Systems   Constitutional: Negative.   HENT: Negative.    Eyes: Negative.    Cardiovascular: Negative.  Negative for chest pain.   Respiratory: Negative.    Endocrine: Negative.    Hematologic/Lymphatic: Negative.    Skin: Negative.    Musculoskeletal: Positive for arthritis, back pain and joint pain.   Gastrointestinal: Negative.    Genitourinary: Negative.    Neurological: Negative.    Psychiatric/Behavioral: Negative.    Allergic/Immunologic: Negative.        Active Problems:    Patient Active Problem List   Diagnosis   • Bilateral carotid artery plaque, 6/16/2020 --16-49% bilateral carotid artery stenosis   • Benign prostatic hypertrophy   • Chronic renal  insufficiency, stage IV (severe), 02/09/2016--creatinine 1.85   • Diverticulosis of colon   • Hyperlipidemia   • Osteopenia of multiple sites   • Vitamin B12 deficiency   • Vitamin D deficiency   • Allergic rhinitis   • Anemia due to stage 4 chronic kidney disease (HCC)   • Generalized anxiety disorder   • Benign essential hypertension   • Gastroesophageal reflux disease without esophagitis   • Folic acid deficiency   • Multiple environmental allergies   • Therapeutic drug monitoring   • Bilateral renal cysts   • Primary osteoarthritis of knees, bilateral   • Impaired fasting glucose   • Muscle cramps, statin related, Vytorin and pravastatin.  Tolerates Livalo.   • Hypogonadism in male, on TRT, testosterone pellets, per    • Chronic neck pain   • Cervical radiculopathy   • Statin intolerance, Vytorin and pravastatin   • Degeneration of cervical intervertebral disc   • Exertional angina (HCC)   • Occlusive coronary artery disease, clinical diagnosis only.  Patient not a candidate for heart catheterization/intervention.   • Hyperuricemia   • Iron deficiency anemia due to chronic blood loss   • Secondary hyperparathyroidism of renal origin (HCC)   • Chronic left shoulder pain         Past Medical History:   Diagnosis Date   • Allergic rhinitis 2/11/2016   • Anemia due to chronic kidney disease 2/11/2016   • Benign essential hypertension 2/11/2016   • Benign prostatic hypertrophy 2/11/2016 12/21/2016--prostate biopsy reportedly negative.  I will try to obtain the report.  Patient sees urologist.  PSAs run from the 3-8 range   • Bilateral carotid artery plaque, 09/21/2016--16-49% bilateral carotid artery stenosis 2/11/2016 09/21/2016--vascular screen reveals 16-49% bilateral carotid artery stenosis, negative for AAA, negative for PAD.  10/10/2008--vascular screening study revealed mild bilateral carotid plaque, no aortic aneurysm, no evidence of PAD   • Bilateral renal cysts 2/14/2016 04/07/2016--ultrasound of  the kidneys reveals the previously described renal cysts are not well seen.  However, questionable incidental bladder tumor noted.  05/26/2010--ultrasound the kidneys was negative bilaterally except for small renal cysts.   • Cervical radiculopathy 6/28/2018    10/02/2018--patient seen in follow-up and the results of the MRI discussed.  He continues to have intermittent radicular symptoms which is currently only on the left.  Discussed options with patient and I have recommended neurosurgery consultation.  Patient may benefit from epidural injections.  09/06/2018--MRI of the cervical spine reveals moderate neural foraminal compromise on the right at C3-C   • Chronic left shoulder pain 7/19/2021 July 19, 2021--patient presents with continued left-sided neck pain that radiates into his left shoulder but does not really go over that far down into the left upper extremity.  The pain is definitely accentuated by leaning his head to the right and rotating his head to the right.  He does not do anything to aggravate it or make it better by moving his head towards the left.  On exam he has good    • Chronic renal insufficiency, stage IV (severe), 02/09/2016--creatinine 1.85 2/11/2016 02/09/2016--serum creatinine 1.85.  BUN 29.  07/20/2015--patient was evaluated by the nephrologist.  Ultrasound of the kidneys ordered but this was never done.  05/22/2015--BUN 35, creatinine 2.3.  Patient referred to nephrology, Dr. Devyn Muniz.  04/16/2014--BUN 25, creatinine 1.77.  01/03/2013--BUN 37, creatinine 2.14.    05/26/2010---ultrasound of the kidneys reveal a small cyst x 2 right kidney.  Otherwise normal.    Baseline creatinine approximately 1.9-2.0.   • Degeneration of cervical intervertebral disc 5/2/2019   • Diverticulosis of colon 2/11/2016    10/03/2013--colonoscopy revealed markedly redundant colon, pancolonic diverticulosis with several impacted fecalith.  No evidence of diverticulitis or stricture.  Was only able to  reach the ascending colon.  Follow up barium enema revealed extensive diverticulosis.  No polyp or other mucosal mass seen.    06/11/2002--incomplete colonoscopy due to redundant colon.  Not able to get past the hepatic flexure.  Patient had followup barium contrast enema which showed extensive diverticulosis.   • Exertional angina (HCC) 9/17/2019 October 8, 2019--patient seen in follow-up and he reports the long-acting oral nitrate has definitely helped his exertional anginal symptoms.  However, he has not stressed himself completely such as using a push mower.  His blood pressure seems improved as well.  Patient has an appoint with the cardiologist in the next 2 weeks.  I have contacted his cardiologist about the results of the empiric ni   • Folic acid deficiency 2/11/2016   • Gastroesophageal reflux disease without esophagitis 2/11/2016   • Generalized anxiety disorder 2/11/2016   • History of diverticulitis of colon 2009 and 2003 05/08/2009-acute diverticulitis without complication. 09/05/2003-acute diverticulitis without complication.    • Hyperlipidemia 2/11/2016 01/29/2005--treatment for hyperlipidemia begun.   • Hyperuricemia 8/19/2020   • Hypogonadism in male, on TRT, testosterone pellets, per  6/16/2017 January 2017--patient reports his urologist initiated testosterone replacement therapy consisting of testosterone pellets every 6 months.   • Multiple environmental allergies 2/12/2016    Patient sees the allergist regularly and has been on immunotherapy.   • Muscle cramps, statin related, Vytorin and pravastatin.  Tolerates Livalo. 6/16/2017 12/21/2018--patient seems to be tolerating Livalo well.  10/02/2018--Livalo 2 mg per day initiated.  Recommend CoQ10 400 mg per day with food for better absorption.  Reassess with lab in about 8 weeks.  08/30/2018--patient presents with complaints of muscle cramps.  He discontinue pravastatin and the cramps went away.  Cramps returned even with a half  dose.  This is despite the fact he was taking    • Occlusive coronary artery disease, clinical diagnosis only.  Patient not a candidate for heart catheterization/intervention. 12/10/2019    October 8, 2019--patient seen in follow-up and he reports the long-acting oral nitrate has definitely helped his exertional anginal symptoms.  However, he has not stressed himself completely such as using a push mower.  His blood pressure seems improved as well.  Patient has an appoint with the cardiologist in the next 2 weeks.  I have contacted his cardiologist about the results of the empiric ni   • Osteopenia, 7/12/2019--lumbar spine -0.4.  Left femoral neck -1.7.  Right femoral neck -1.9. 2/11/2016 July 12, 2019--DEXA scan reveals lumbar spine T score -0.4.  Unchanged.  Left femoral neck T score -1.7.  Unchanged.  Right femoral neck T score -1.9.  Unchanged.  Impression is osteopenia of the hips with no significant interval change.  06/16/2017--patient seen in follow-up and reports he doesn't think he ever started Fosamax.  Osteopenia and needs to be reassessed with a DEXA scan.  03/14/2017-   • Primary osteoarthritis of knees, bilateral 9/12/2016 09/12/2016--patient called in the office requesting a steriod injection in his knees.  I explained to him that I do no longer perform these injections and have referred him to Dr. Manuel.   • Secondary hyperparathyroidism of renal origin (HCC) 1/15/2021   • Statin intolerance, Vytorin and pravastatin 12/21/2018 12/21/2018--patient seems to be tolerating Livalo well.  10/02/2018--Livalo 2 mg per day initiated.  Recommend CoQ10 400 mg per day with food for better absorption.  Reassess with lab in about 8 weeks.  08/30/2018--patient presents with complaints of muscle cramps.  He discontinue pravastatin and the cramps went away.  Cramps returned even with a half dose.  This is despite the fact he was taking    • Vitamin B12 deficiency 2/11/2016 06/25/2009--B12 injections  begun.   • Vitamin D deficiency 2/11/2016         Past Surgical History:   Procedure Laterality Date   • CATARACT EXTRACTION Left 12/12/2011 12/12/2011--cataract extirpation with intraocular lens implantation left eye.   • CATARACT EXTRACTION Right 12/2011 December 2011--right cataract extirpation with intraocular lens implantation.   • CHOLECYSTECTOMY WITH INTRAOPERATIVE CHOLANGIOGRAM N/A 11/6/2020    Procedure: CHOLECYSTECTOMY LAPAROSCOPIC INTRAOPERATIVE CHOLANGIOGRAM;  Surgeon: Sanjay Burgess MD;  Location: [facility] MAIN OR;  Service: General;  Laterality: N/A;   • COLONOSCOPY  06/11/2002 06/11/2002--incomplete colonoscopy due to redundant colon. Not able to get past the hepatic flexure. Patient had followup barium contrast enema which showed extensive diverticulosis   • COLONOSCOPY  10/03/2013    10/03/2013--colonoscopy revealed markedly redundant colon, pancolonic diverticulosis with several impacted fecalith. No evidence of diverticulitis or stricture. Was only able to reach the ascending colon. Follow up barium enema revealed extensive diverticulosis. No polyp or other mucosal mass seen.   • CYSTOSCOPY  05/18/2016 05/18/2016--urology consultation.  Cystoscopy performed for possible bladder mass is negative.   • ERCP N/A 11/5/2020    Procedure: ENDOSCOPIC RETROGRADE CHOLANGIOPANCREATOGRAPHY with sphincterotomy, basket retrieval and balloon sweep;  Surgeon: Joseluis Mejia MD;  Location: [facility] ENDOSCOPY;  Service: Gastroenterology;  Laterality: N/A;  pre/post - CBD stones   • PROSTATE BIOPSY  12/21/2016 12/21/2016--prostate biopsy reportedly negative.  I will try to obtain the report.         No Known Allergies        Current Outpatient Medications:   •  allopurinol (ZYLOPRIM) 100 MG tablet, Take 1 p.o. daily for gout/elevated uric acid, Disp: 90 tablet, Rfl: 3  •  ALPRAZolam (XANAX) 0.5 MG tablet, TAKE ONE TABLET BY MOUTH TWICE A DAY, Disp: 60 tablet, Rfl: 5  •  amLODIPine (NORVASC) 5  MG tablet, Take 1 p.o. daily for high blood pressure, Disp: 90 tablet, Rfl: 3  •  aspirin (ASPIRIN LOW DOSE) 81 MG tablet, Take 1 tablet by mouth daily., Disp: , Rfl:   •  calcitriol (ROCALTROL) 0.25 MCG capsule, Take 0.25 mcg by mouth Daily., Disp: , Rfl:   •  fluticasone (FLONASE) 50 MCG/ACT nasal spray, SPRAY TWO SPRAYS IN EACH NOSTRIL ONCE DAILY, Disp: 16 mL, Rfl: 3  •  folic acid (FOLVITE) 1 MG tablet, TAKE ONE TABLET BY MOUTH TWICE A DAY, Disp: 180 tablet, Rfl: 3  •  isosorbide mononitrate (IMDUR) 30 MG 24 hr tablet, TAKE ONE TABLET BY MOUTH EVERY MORNING FOR HEART, Disp: 30 tablet, Rfl: 3  •  metoprolol succinate XL (TOPROL-XL) 25 MG 24 hr tablet, TAKE ONE TABLET BY MOUTH DAILY, Disp: 90 tablet, Rfl: 2  •  nitroglycerin (NITROSTAT) 0.6 MG SL tablet, DISSOLVE 1 TAB UNDER TONGUE FOR CHEST PAIN - IF PAIN REMAINS AFTER 5 MIN, CALL 911 AND REPEAT DOSE. MAX 3 TABS IN 15 MINUTES, Disp: 100 tablet, Rfl: 11  •  omeprazole (priLOSEC) 40 MG capsule, TAKE ONE CAPSULE BY MOUTH DAILY, Disp: 90 capsule, Rfl: 1  •  Repatha SureClick solution auto-injector SureClick injection, INJECT 1 ML UNDER THE SKIN INTO THE APPROPRIATE AREA EVERY 14 DAYS, Disp: 1 mL, Rfl: 10  •  Cholecalciferol 50 MCG (2000 UT) capsule, Take 1 capsule by mouth Daily., Disp: , Rfl:     Current Facility-Administered Medications:   •  cyanocobalamin injection 1,000 mcg, 1,000 mcg, Intramuscular, Q28 Days, Delgado Patricia MD, 1,000 mcg at 02/15/22 1438      Family History   Problem Relation Age of Onset   • Diabetes Mother    • Heart disease Mother    • Stroke Father          Social History     Socioeconomic History   • Marital status:    • Number of children: 2   • Years of education: 14   • Highest education level: Some college, no degree   Tobacco Use   • Smoking status: Never Smoker   • Smokeless tobacco: Never Used   • Tobacco comment: caffeine use: 2 cups coffee daily   Vaping Use   • Vaping Use: Never used   Substance and Sexual Activity   •  "Alcohol use: Yes     Alcohol/week: 5.0 standard drinks     Types: 2 Glasses of wine, 3 Shots of liquor per week     Comment: Moderate alcohol consumption   • Drug use: No   • Sexual activity: Not Currently     Partners: Female         Vitals:    03/01/22 1337   BP: 130/72   Pulse: 90   Resp: 17   SpO2: 98%   Weight: 82.4 kg (181 lb 9.6 oz)   Height: 172.7 cm (68\")        Body mass index is 27.61 kg/m².      Physical Exam:    General: Alert and oriented x 3.  No acute distress.  Mildly overweight.  Normal affect.  HEENT: Pupils equal, round, reactive to light; extraocular movements intact; sclerae nonicteric; pharynx, ear canals and TMs normal.  Neck: Without JVD, thyromegaly, bruit, or adenopathy.  Lungs: Clear to auscultation in all fields.  Heart: Regular rate and rhythm without murmur, rub, gallop, or click.  Abdomen: Soft, nontender, without hepatosplenomegaly or hernia.  Bowel sounds normal.  : Deferred.  Rectal: Deferred.  Extremities: Without clubbing, cyanosis, edema, or pulse deficit.  Neurologic: Intact without focal deficit.  Normal station and gait observed during ingress and egress from the examination room.  Skin: Without significant lesion.  Musculoskeletal: Unremarkable.    Lab/other results:    CBC is normal except hemoglobin low 11.5 and hematocrit low at 33.3.  CPK normal at 75.  CMP normal except glucose 109, BUN 33, creatinine 1.73.  Estimated GFR 37.  Hemoglobin A1c 6.4.  NMR reveals a total cholesterol of 127.  Triglycerides mildly elevated 221.  LDL particle number excellent 548.  HDL particle number normal at 31.1.  Thyroid function tests are normal.  Vitamin D normal.  Uric acid normal at 6.5.  Iron studies are normal.    Assessment/Plan:     Diagnosis Plan   1. Impaired fasting glucose  Comprehensive Metabolic Panel    Hemoglobin A1c   2. Benign essential hypertension  Comprehensive Metabolic Panel   3. Chronic renal insufficiency, stage IV (severe), 02/09/2016--creatinine 1.85  CBC (No " Diff)    Comprehensive Metabolic Panel   4. Hyperlipidemia  CK    NMR LipoProfile    TSH    T4, Free    T3, Free   5. Bilateral carotid artery plaque, 6/16/2020 --16-49% bilateral carotid artery stenosis     6. Benign prostatic hypertrophy     7. Vitamin B12 deficiency     8. Vitamin D deficiency  Vitamin D 25 Hydroxy   9. Anemia due to stage 4 chronic kidney disease (HCC)     10. Gastroesophageal reflux disease without esophagitis     11. Statin intolerance, Vytorin and pravastatin     12. Occlusive coronary artery disease, clinical diagnosis only.  Patient not a candidate for heart catheterization/intervention.     13. Exertional angina (HCC)     14. Hyperuricemia  Uric Acid   15. Secondary hyperparathyroidism of renal origin (HCC)  PTH, Intact   16. Iron deficiency anemia due to chronic blood loss  Iron Profile   17. Hypogonadism in male, on TRT, testosterone pellets, per      18. Muscle cramps, statin related, Vytorin and pravastatin.  Tolerates Livalo.     19. Primary osteoarthritis of knees, bilateral     20. Folic acid deficiency     21. Multiple environmental allergies     22. Osteopenia of multiple sites     23. Therapeutic drug monitoring       Patient has been erroneously marked as diabetic. Based on the available clinical information, he does not have diabetes and should therefore be excluded from diabetic health maintenance and quality measures for the remainder of the reporting period.    Patient has multiple medical problems as noted above that seem to be fairly stable.  Patient has impaired fasting glucose and has not technically diabetic at this time.  Someone has given him this diagnosis and this is not strictly correct at this time.  His blood pressure seems well controlled.  His chronic renal insufficiency is severe but stable.  He is followed by the nephrology service.  Hyperlipidemia is under good control.  Patient has carotid artery plaque and is up-to-date on his carotid Doppler study which  was done not long ago and I have reviewed.  He has BPH and also has hypogonadism which is symptomatic and this is being controlled/managed by the urologist.  He receives monthly B12 injections and also takes vitamin D supplementation but this is at the direction of the kidney doctor.  Esophageal reflux controlled with omeprazole.  His anemia is multifactorial and is mild.  It appears that his iron deficiency has resolved.  He has occlusive coronary artery disease and is followed by the cardiology service.  Angina is exertional and stable.  He has hyperuricemia which is adequately controlled.  His environmental allergies currently not an issue.  Patient has osteopenia and is up-to-date on his DEXA scan.    Plan is as follows: No change in current medical regimen.  Patient will follow-up after September 1, 2022 with lab prior and this will also be subsequent Medicare wellness visit.  Otherwise he will follow-up as needed.    Procedures

## 2022-03-16 ENCOUNTER — CLINICAL SUPPORT (OUTPATIENT)
Dept: INTERNAL MEDICINE | Facility: CLINIC | Age: 87
End: 2022-03-16

## 2022-03-16 DIAGNOSIS — E53.8 VITAMIN B12 DEFICIENCY: Chronic | ICD-10-CM

## 2022-03-16 PROCEDURE — 96372 THER/PROPH/DIAG INJ SC/IM: CPT | Performed by: INTERNAL MEDICINE

## 2022-03-16 RX ADMIN — CYANOCOBALAMIN 1000 MCG: 1000 INJECTION, SOLUTION INTRAMUSCULAR; SUBCUTANEOUS at 12:59

## 2022-03-20 DIAGNOSIS — K21.9 GASTROESOPHAGEAL REFLUX DISEASE WITHOUT ESOPHAGITIS: Chronic | ICD-10-CM

## 2022-03-21 RX ORDER — OMEPRAZOLE 40 MG/1
CAPSULE, DELAYED RELEASE ORAL
Qty: 90 CAPSULE | Refills: 3 | Status: SHIPPED | OUTPATIENT
Start: 2022-03-21

## 2022-03-28 ENCOUNTER — OFFICE VISIT (OUTPATIENT)
Dept: INTERNAL MEDICINE | Facility: CLINIC | Age: 87
End: 2022-03-28

## 2022-03-28 VITALS
HEART RATE: 79 BPM | HEIGHT: 68 IN | SYSTOLIC BLOOD PRESSURE: 130 MMHG | DIASTOLIC BLOOD PRESSURE: 75 MMHG | RESPIRATION RATE: 16 BRPM | WEIGHT: 186.8 LBS | BODY MASS INDEX: 28.31 KG/M2 | OXYGEN SATURATION: 99 %

## 2022-03-28 DIAGNOSIS — H53.8 BLURRY VISION, RIGHT EYE: ICD-10-CM

## 2022-03-28 DIAGNOSIS — R73.01 IMPAIRED FASTING GLUCOSE: Chronic | ICD-10-CM

## 2022-03-28 DIAGNOSIS — E78.2 MIXED HYPERLIPIDEMIA: Chronic | ICD-10-CM

## 2022-03-28 DIAGNOSIS — I65.23 BILATERAL CAROTID ARTERY STENOSIS: Chronic | ICD-10-CM

## 2022-03-28 DIAGNOSIS — I25.10 OCCLUSIVE CORONARY ARTERY DISEASE: Chronic | ICD-10-CM

## 2022-03-28 DIAGNOSIS — N18.4 CHRONIC RENAL INSUFFICIENCY, STAGE IV (SEVERE): Chronic | ICD-10-CM

## 2022-03-28 DIAGNOSIS — R42 VERTIGO: Primary | ICD-10-CM

## 2022-03-28 PROCEDURE — 99214 OFFICE O/P EST MOD 30 MIN: CPT | Performed by: INTERNAL MEDICINE

## 2022-03-28 NOTE — PROGRESS NOTES
03/28/2022    Patient Information  Radha Win                                                                                          [id number]  [facility] [address]      7/26/1932  [unfilled]  There is no work phone number on file.    Chief Complaint:     Complaining of problem with right eye and dizziness.    History of Present Illness:    The history regarding new complaints of blurry vision right eye and dizziness as documented under the problem list today and transferred to this note in toto with appropriate date:    March 28, 2022--patient reports approximately 4 to 5-day history of concerns regarding possible stroke.  He reports he has noticed over the past few weeks that his vision has been somewhat blurry particularly in his right eye.  He has been cleaning his glasses thinking that might be a problem.  He had a recent eye exam which was normal.  Patient then over the weekend began to develop episodes of dizziness which he describes as an off-balance sensation and this definitely was aggravated by changes in position such as bending over.  No lightheadedness like near syncope or syncopal episodes.  No definite numbness but patient did have some tingling and had tips of his fingers of the right hand that went away with rubbing.  This was very brief.  No focal neurologic deficit such as muscular weakness or incoordination of the limb.  No changes in speech.  The last time patient had the symptoms was yesterday.  Currently asymptomatic.  On exam patient is alert and oriented x3.  HEENT exam is normal and I do not see any nystagmus.  Extraocular movements are intact.  Speech is normal.  There is no focal neurologic defect.  I think patient is having episodes of paroxysmal positional vertigo although this would not explain the blurriness of vision.  Patient had a normal eye exam 3 or 4 months ago.  The eye symptoms are not present today.  I cannot totally rule out the  possibility of mini stroke/TIA, but I do not think this is likely.  I initially thought of doing an MRA of the head and neck but patient has chronic renal insufficiency and it is not worth the risk to his kidneys using the dye.  Therefore plan is as follows: I have asked patient to go back to the eye doctor and have his eyes reexamined and explain to the eye doctor the blurriness of vision in the right eye.  Patient referred for vestibular therapy/evaluation.  Patient instructed to contact me immediately for any significant changes in his symptoms.    Review of Systems   Constitutional: Negative.   HENT: Negative.    Eyes: Positive for blurred vision.   Cardiovascular: Negative.    Respiratory: Negative.    Endocrine: Negative.    Hematologic/Lymphatic: Negative.    Skin: Negative.    Musculoskeletal: Negative.    Gastrointestinal: Negative.    Genitourinary: Negative.    Neurological: Positive for disturbances in coordination, dizziness, numbness and vertigo. Negative for aphonia, brief paralysis, difficulty with concentration, focal weakness, headaches, light-headedness, paresthesias and weakness.   Psychiatric/Behavioral: Negative.    Allergic/Immunologic: Negative.        Active Problems:    Patient Active Problem List   Diagnosis   • Bilateral carotid artery stenosis   • Benign prostatic hypertrophy   • Chronic renal insufficiency, stage IV (severe), 02/09/2016--creatinine 1.85   • Diverticulosis of colon   • Hyperlipidemia   • Osteopenia of multiple sites, 10/18/2021--lumbar spine 0.0.  Left femoral neck -1.5.  Right femoral neck -1.7.   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  Tolerates Livalo.   • Hypogonadism in male, on TRT, testosterone pellets, per    • Chronic neck pain   • Cervical radiculopathy   • Statin intolerance, Vytorin and pravastatin   • Degeneration of cervical intervertebral disc   • Exertional angina (HCC)   • Occlusive coronary artery disease, clinical diagnosis only.  Patient not a candidate for heart catheterization/intervention.   • Hyperuricemia   • Iron deficiency anemia due to chronic blood loss   • Secondary hyperparathyroidism of renal origin (HCC)   • Chronic left shoulder pain   • Blurry vision, right eye   • Vertigo         Past Medical History:   Diagnosis Date   • Allergic rhinitis 2/11/2016   • Anemia due to chronic kidney disease 2/11/2016   • Benign essential hypertension 2/11/2016   • Benign prostatic hypertrophy 2/11/2016 12/21/2016--prostate biopsy reportedly negative.  I will try to obtain the report.  Patient sees urologist.  PSAs run from the 3-8 range   • Bilateral carotid artery plaque, 09/21/2016--16-49% bilateral carotid artery stenosis 2/11/2016 09/21/2016--vascular screen reveals 16-49% bilateral carotid artery stenosis, negative for AAA, negative for PAD.  10/10/2008--vascular screening study revealed mild bilateral carotid plaque, no aortic aneurysm, no evidence of PAD   • Bilateral renal cysts 2/14/2016 04/07/2016--ultrasound of the kidneys reveals the previously described renal cysts are not well seen.  However, questionable incidental bladder tumor noted.  05/26/2010--ultrasound the kidneys was negative bilaterally except for small renal cysts.   • Cervical radiculopathy 6/28/2018    10/02/2018--patient seen in follow-up and the results of the MRI discussed.  He continues to have intermittent radicular symptoms which is currently only on the left.  Discussed options with patient and I have  recommended neurosurgery consultation.  Patient may benefit from epidural injections.  09/06/2018--MRI of the cervical spine reveals moderate neural foraminal compromise on the right at C3-C   • Chronic left shoulder pain 7/19/2021 July 19, 2021--patient presents with continued left-sided neck pain that radiates into his left shoulder but does not really go over that far down into the left upper extremity.  The pain is definitely accentuated by leaning his head to the right and rotating his head to the right.  He does not do anything to aggravate it or make it better by moving his head towards the left.  On exam he has good    • Chronic renal insufficiency, stage IV (severe), 02/09/2016--creatinine 1.85 2/11/2016 02/09/2016--serum creatinine 1.85.  BUN 29.  07/20/2015--patient was evaluated by the nephrologist.  Ultrasound of the kidneys ordered but this was never done.  05/22/2015--BUN 35, creatinine 2.3.  Patient referred to nephrology, Dr. Devyn Muniz.  04/16/2014--BUN 25, creatinine 1.77.  01/03/2013--BUN 37, creatinine 2.14.    05/26/2010---ultrasound of the kidneys reveal a small cyst x 2 right kidney.  Otherwise normal.    Baseline creatinine approximately 1.9-2.0.   • Degeneration of cervical intervertebral disc 5/2/2019   • Diverticulosis of colon 2/11/2016    10/03/2013--colonoscopy revealed markedly redundant colon, pancolonic diverticulosis with several impacted fecalith.  No evidence of diverticulitis or stricture.  Was only able to reach the ascending colon.  Follow up barium enema revealed extensive diverticulosis.  No polyp or other mucosal mass seen.    06/11/2002--incomplete colonoscopy due to redundant colon.  Not able to get past the hepatic flexure.  Patient had followup barium contrast enema which showed extensive diverticulosis.   • Exertional angina (HCC) 9/17/2019 October 8, 2019--patient seen in follow-up and he reports the long-acting oral nitrate has definitely helped his  exertional anginal symptoms.  However, he has not stressed himself completely such as using a push mower.  His blood pressure seems improved as well.  Patient has an appoint with the cardiologist in the next 2 weeks.  I have contacted his cardiologist about the results of the empiric ni   • Folic acid deficiency 2/11/2016   • Gastroesophageal reflux disease without esophagitis 2/11/2016   • Generalized anxiety disorder 2/11/2016   • History of diverticulitis of colon 2009 and 2003 05/08/2009-acute diverticulitis without complication. 09/05/2003-acute diverticulitis without complication.    • Hyperlipidemia 2/11/2016 01/29/2005--treatment for hyperlipidemia begun.   • Hyperuricemia 8/19/2020   • Hypogonadism in male, on TRT, testosterone pellets, per  6/16/2017 January 2017--patient reports his urologist initiated testosterone replacement therapy consisting of testosterone pellets every 6 months.   • Multiple environmental allergies 2/12/2016    Patient sees the allergist regularly and has been on immunotherapy.   • Muscle cramps, statin related, Vytorin and pravastatin.  Tolerates Livalo. 6/16/2017 12/21/2018--patient seems to be tolerating Livalo well.  10/02/2018--Livalo 2 mg per day initiated.  Recommend CoQ10 400 mg per day with food for better absorption.  Reassess with lab in about 8 weeks.  08/30/2018--patient presents with complaints of muscle cramps.  He discontinue pravastatin and the cramps went away.  Cramps returned even with a half dose.  This is despite the fact he was taking    • Occlusive coronary artery disease, clinical diagnosis only.  Patient not a candidate for heart catheterization/intervention. 12/10/2019    October 8, 2019--patient seen in follow-up and he reports the long-acting oral nitrate has definitely helped his exertional anginal symptoms.  However, he has not stressed himself completely such as using a push mower.  His blood pressure seems improved as well.  Patient has  an appoint with the cardiologist in the next 2 weeks.  I have contacted his cardiologist about the results of the empiric ni   • Osteopenia of multiple sites, 10/18/2021--lumbar spine 0.0.  Left femoral neck -1.5.  Right femoral neck -1.7. 2/11/2016 October 18, 2021--DEXA scan reveals lumbar spine T score 0.0 representing a 3.9% interval increase.  Left femoral neck T score -1.5 which is a 4.6% increase.  Right femoral neck T score -1.7 which is unchanged.  Impression is osteopenia with improvement.  July 12, 2019--DEXA scan reveals lumbar spine T score -0.4.  Unchanged.  Left femoral neck T score -1.7.  Unchanged.  Right femoral neck T score   • Primary osteoarthritis of knees, bilateral 9/12/2016 09/12/2016--patient called in the office requesting a steriod injection in his knees.  I explained to him that I do no longer perform these injections and have referred him to Dr. Manuel.   • Secondary hyperparathyroidism of renal origin (HCC) 1/15/2021   • Statin intolerance, Vytorin and pravastatin 12/21/2018 12/21/2018--patient seems to be tolerating Livalo well.  10/02/2018--Livalo 2 mg per day initiated.  Recommend CoQ10 400 mg per day with food for better absorption.  Reassess with lab in about 8 weeks.  08/30/2018--patient presents with complaints of muscle cramps.  He discontinue pravastatin and the cramps went away.  Cramps returned even with a half dose.  This is despite the fact he was taking    • Vitamin B12 deficiency 2/11/2016 06/25/2009--B12 injections begun.   • Vitamin D deficiency 2/11/2016         Past Surgical History:   Procedure Laterality Date   • CATARACT EXTRACTION Left 12/12/2011 12/12/2011--cataract extirpation with intraocular lens implantation left eye.   • CATARACT EXTRACTION Right 12/2011 December 2011--right cataract extirpation with intraocular lens implantation.   • CHOLECYSTECTOMY WITH INTRAOPERATIVE CHOLANGIOGRAM N/A 11/6/2020    Procedure: CHOLECYSTECTOMY LAPAROSCOPIC  INTRAOPERATIVE CHOLANGIOGRAM;  Surgeon: Sanjay Burgses MD;  Location: [facility] MAIN OR;  Service: General;  Laterality: N/A;   • COLONOSCOPY  06/11/2002 06/11/2002--incomplete colonoscopy due to redundant colon. Not able to get past the hepatic flexure. Patient had followup barium contrast enema which showed extensive diverticulosis   • COLONOSCOPY  10/03/2013    10/03/2013--colonoscopy revealed markedly redundant colon, pancolonic diverticulosis with several impacted fecalith. No evidence of diverticulitis or stricture. Was only able to reach the ascending colon. Follow up barium enema revealed extensive diverticulosis. No polyp or other mucosal mass seen.   • CYSTOSCOPY  05/18/2016 05/18/2016--urology consultation.  Cystoscopy performed for possible bladder mass is negative.   • ERCP N/A 11/5/2020    Procedure: ENDOSCOPIC RETROGRADE CHOLANGIOPANCREATOGRAPHY with sphincterotomy, basket retrieval and balloon sweep;  Surgeon: Joseluis Mejia MD;  Location: [facility] ENDOSCOPY;  Service: Gastroenterology;  Laterality: N/A;  pre/post - CBD stones   • PROSTATE BIOPSY  12/21/2016 12/21/2016--prostate biopsy reportedly negative.  I will try to obtain the report.         No Known Allergies        Current Outpatient Medications:   •  allopurinol (ZYLOPRIM) 100 MG tablet, Take 1 p.o. daily for gout/elevated uric acid, Disp: 90 tablet, Rfl: 3  •  ALPRAZolam (XANAX) 0.5 MG tablet, TAKE ONE TABLET BY MOUTH TWICE A DAY, Disp: 60 tablet, Rfl: 5  •  amLODIPine (NORVASC) 5 MG tablet, Take 1 p.o. daily for high blood pressure, Disp: 90 tablet, Rfl: 3  •  aspirin 81 MG tablet, Take 1 tablet by mouth daily., Disp: , Rfl:   •  calcitriol (ROCALTROL) 0.25 MCG capsule, Take 0.25 mcg by mouth Daily., Disp: , Rfl:   •  Cholecalciferol 50 MCG (2000 UT) capsule, Take 1 capsule by mouth Daily., Disp: , Rfl:   •  fluticasone (FLONASE) 50 MCG/ACT nasal spray, SPRAY TWO SPRAYS IN EACH NOSTRIL ONCE DAILY, Disp: 16 mL, Rfl: 3  •   "folic acid (FOLVITE) 1 MG tablet, TAKE ONE TABLET BY MOUTH TWICE A DAY, Disp: 180 tablet, Rfl: 3  •  isosorbide mononitrate (IMDUR) 30 MG 24 hr tablet, TAKE ONE TABLET BY MOUTH EVERY MORNING FOR HEART, Disp: 30 tablet, Rfl: 3  •  metoprolol succinate XL (TOPROL-XL) 25 MG 24 hr tablet, TAKE ONE TABLET BY MOUTH DAILY, Disp: 90 tablet, Rfl: 2  •  nitroglycerin (NITROSTAT) 0.6 MG SL tablet, DISSOLVE 1 TAB UNDER TONGUE FOR CHEST PAIN - IF PAIN REMAINS AFTER 5 MIN, CALL 911 AND REPEAT DOSE. MAX 3 TABS IN 15 MINUTES, Disp: 100 tablet, Rfl: 11  •  omeprazole (priLOSEC) 40 MG capsule, TAKE ONE CAPSULE BY MOUTH DAILY, Disp: 90 capsule, Rfl: 3  •  Repatha SureClick solution auto-injector SureClick injection, INJECT 1 ML UNDER THE SKIN INTO THE APPROPRIATE AREA EVERY 14 DAYS, Disp: 1 mL, Rfl: 10    Current Facility-Administered Medications:   •  cyanocobalamin injection 1,000 mcg, 1,000 mcg, Intramuscular, Q28 Days, Delgado Patricia MD, 1,000 mcg at 03/16/22 1259      Family History   Problem Relation Age of Onset   • Diabetes Mother    • Heart disease Mother    • Stroke Father          Social History     Socioeconomic History   • Marital status:    • Number of children: 2   • Years of education: 14   • Highest education level: Some college, no degree   Tobacco Use   • Smoking status: Never Smoker   • Smokeless tobacco: Never Used   • Tobacco comment: caffeine use: 2 cups coffee daily   Vaping Use   • Vaping Use: Never used   Substance and Sexual Activity   • Alcohol use: Yes     Alcohol/week: 5.0 standard drinks     Types: 2 Glasses of wine, 3 Shots of liquor per week     Comment: Moderate alcohol consumption   • Drug use: No   • Sexual activity: Not Currently     Partners: Female         Vitals:    03/28/22 1021   BP: 130/75   BP Location: Left arm   Patient Position: Sitting   Cuff Size: Adult   Pulse: 79   Resp: 16   SpO2: 99%   Weight: 84.7 kg (186 lb 12.8 oz)   Height: 172.7 cm (68\")        Body mass index is " 28.4 kg/m².      Physical Exam:    General: Alert and oriented x 3.  No acute distress.  Normal affect.  HEENT: Pupils equal, round, reactive to light; extraocular movements intact; sclerae nonicteric; pharynx, ear canals and TMs normal.  Neck: Without JVD, thyromegaly, bruit, or adenopathy.  Lungs: Clear to auscultation in all fields.  Heart: Regular rate and rhythm without murmur, rub, gallop, or click.  Abdomen: Soft, nontender, without hepatosplenomegaly or hernia.  Bowel sounds normal.  : Deferred.  Rectal: Deferred.  Extremities: Without clubbing, cyanosis, edema, or pulse deficit.  Neurologic: Intact without focal deficit.  Other documentation regarding the exam noted both above and below.  Normal station and gait observed during ingress and egress from the examination room.  Skin: Without significant lesion.  Musculoskeletal: Unremarkable.    Lab/other results:      Assessment/Plan:     Diagnosis Plan   1. Vertigo  Ambulatory Referral to Physical Therapy Vestibular   2. Blurry vision, right eye     3. Bilateral carotid artery stenosis     4. Hyperlipidemia     5. Chronic renal insufficiency, stage IV (severe), 02/09/2016--creatinine 1.85     6. Impaired fasting glucose     7. Occlusive coronary artery disease, clinical diagnosis only.  Patient not a candidate for heart catheterization/intervention.       March 28, 2022--patient reports approximately 4 to 5-day history of concerns regarding possible stroke.  He reports he has noticed over the past few weeks that his vision has been somewhat blurry particularly in his right eye.  He has been cleaning his glasses thinking that might be a problem.  He had a recent eye exam which was normal.  Patient then over the weekend began to develop episodes of dizziness which he describes as an off-balance sensation and this definitely was aggravated by changes in position such as bending over.  No lightheadedness like near syncope or syncopal episodes.  No definite  numbness but patient did have some tingling and had tips of his fingers of the right hand that went away with rubbing.  This was very brief.  No focal neurologic deficit such as muscular weakness or incoordination of the limb.  No changes in speech.  The last time patient had the symptoms was yesterday.  Currently asymptomatic.  On exam patient is alert and oriented x3.  HEENT exam is normal and I do not see any nystagmus.  Extraocular movements are intact.  Speech is normal.  There is no focal neurologic defect.  I think patient is having episodes of paroxysmal positional vertigo although this would not explain the blurriness of vision.  Patient had a normal eye exam 3 or 4 months ago.  The eye symptoms are not present today.  I cannot totally rule out the possibility of mini stroke/TIA, but I do not think this is likely.  I initially thought of doing an MRA of the head and neck but patient has chronic renal insufficiency and it is not worth the risk to his kidneys using the dye.  Therefore plan is as follows: I have asked patient to go back to the eye doctor and have his eyes reexamined and explain to the eye doctor the blurriness of vision in the right eye.  Patient referred for vestibular therapy/evaluation.  Patient instructed to contact me immediately for any significant changes in his symptoms.        Procedures

## 2022-03-31 ENCOUNTER — OFFICE VISIT (OUTPATIENT)
Dept: CARDIOLOGY | Facility: CLINIC | Age: 87
End: 2022-03-31

## 2022-03-31 VITALS
HEART RATE: 75 BPM | HEIGHT: 68 IN | BODY MASS INDEX: 27.92 KG/M2 | SYSTOLIC BLOOD PRESSURE: 120 MMHG | WEIGHT: 184.2 LBS | OXYGEN SATURATION: 97 % | DIASTOLIC BLOOD PRESSURE: 70 MMHG

## 2022-03-31 DIAGNOSIS — I10 BENIGN ESSENTIAL HYPERTENSION: Chronic | ICD-10-CM

## 2022-03-31 DIAGNOSIS — I25.10 CORONARY ARTERY DISEASE INVOLVING NATIVE CORONARY ARTERY OF NATIVE HEART WITHOUT ANGINA PECTORIS: Primary | ICD-10-CM

## 2022-03-31 DIAGNOSIS — I65.23 BILATERAL CAROTID ARTERY STENOSIS: Chronic | ICD-10-CM

## 2022-03-31 PROCEDURE — 99214 OFFICE O/P EST MOD 30 MIN: CPT | Performed by: INTERNAL MEDICINE

## 2022-03-31 PROCEDURE — 93000 ELECTROCARDIOGRAM COMPLETE: CPT | Performed by: INTERNAL MEDICINE

## 2022-03-31 NOTE — PROGRESS NOTES
OFFICE VISIT      Date of Office Visit: 2022    Patient Name: Radha Win  : 1932    Encounter Provider: Theron Alejo MD  Referring Provider: No ref. provider found  Primary Care Provider: Delgado Patricia MD  Place of Service: [facility] CARDIOLOGY        Chief Complaint   Patient presents with   • Coronary Artery Disease   • Follow-up     History of Present Illness  The patient is an 89-year-old white male with coronary artery disease, hypertension and noncritical carotid artery disease who returns to the office today for follow-up.  When he was last here in September medication was adjusted because of his elevated blood pressure.  Since that time his pressure has been much improved.    He states he is not having any symptoms of shortness of breath or palpitations.  He denies fatigue.  He did have an episode of visual disturbance involving his right eye and some dizziness.  That has resolved.  I reviewed with him his carotid Doppler study from  which showed only mild bilateral plaquing.  No significant stenosis was identified.  We discussed whether or not to increase his aspirin to daily which I believe would be beneficial.      Past Medical History:   Diagnosis Date   • Allergic rhinitis 2016   • Anemia due to chronic kidney disease 2016   • Benign essential hypertension 2016   • Benign prostatic hypertrophy 2016--prostate biopsy reportedly negative.  I will try to obtain the report.  Patient sees urologist.  PSAs run from the 3-8 range   • Bilateral carotid artery plaque, 2016--16-49% bilateral carotid artery stenosis 2016--vascular screen reveals 16-49% bilateral carotid artery stenosis, negative for AAA, negative for PAD.  10/10/2008--vascular screening study revealed mild bilateral carotid plaque, no aortic aneurysm, no evidence of PAD   • Bilateral renal cysts 2016     04/07/2016--ultrasound of the kidneys reveals the previously described renal cysts are not well seen.  However, questionable incidental bladder tumor noted.  05/26/2010--ultrasound the kidneys was negative bilaterally except for small renal cysts.   • Cervical radiculopathy 6/28/2018    10/02/2018--patient seen in follow-up and the results of the MRI discussed.  He continues to have intermittent radicular symptoms which is currently only on the left.  Discussed options with patient and I have recommended neurosurgery consultation.  Patient may benefit from epidural injections.  09/06/2018--MRI of the cervical spine reveals moderate neural foraminal compromise on the right at C3-C   • Chronic left shoulder pain 7/19/2021 July 19, 2021--patient presents with continued left-sided neck pain that radiates into his left shoulder but does not really go over that far down into the left upper extremity.  The pain is definitely accentuated by leaning his head to the right and rotating his head to the right.  He does not do anything to aggravate it or make it better by moving his head towards the left.  On exam he has good    • Chronic renal insufficiency, stage IV (severe), 02/09/2016--creatinine 1.85 2/11/2016 02/09/2016--serum creatinine 1.85.  BUN 29.  07/20/2015--patient was evaluated by the nephrologist.  Ultrasound of the kidneys ordered but this was never done.  05/22/2015--BUN 35, creatinine 2.3.  Patient referred to nephrology, Dr. Devyn Muniz.  04/16/2014--BUN 25, creatinine 1.77.  01/03/2013--BUN 37, creatinine 2.14.    05/26/2010---ultrasound of the kidneys reveal a small cyst x 2 right kidney.  Otherwise normal.    Baseline creatinine approximately 1.9-2.0.   • Degeneration of cervical intervertebral disc 5/2/2019   • Diverticulosis of colon 2/11/2016    10/03/2013--colonoscopy revealed markedly redundant colon, pancolonic diverticulosis with several impacted fecalith.  No evidence of diverticulitis or  stricture.  Was only able to reach the ascending colon.  Follow up barium enema revealed extensive diverticulosis.  No polyp or other mucosal mass seen.    06/11/2002--incomplete colonoscopy due to redundant colon.  Not able to get past the hepatic flexure.  Patient had followup barium contrast enema which showed extensive diverticulosis.   • Exertional angina (HCC) 9/17/2019 October 8, 2019--patient seen in follow-up and he reports the long-acting oral nitrate has definitely helped his exertional anginal symptoms.  However, he has not stressed himself completely such as using a push mower.  His blood pressure seems improved as well.  Patient has an appoint with the cardiologist in the next 2 weeks.  I have contacted his cardiologist about the results of the empiric ni   • Folic acid deficiency 2/11/2016   • Gastroesophageal reflux disease without esophagitis 2/11/2016   • Generalized anxiety disorder 2/11/2016   • History of diverticulitis of colon 2009 and 2003 05/08/2009-acute diverticulitis without complication. 09/05/2003-acute diverticulitis without complication.    • Hyperlipidemia 2/11/2016 01/29/2005--treatment for hyperlipidemia begun.   • Hyperuricemia 8/19/2020   • Hypogonadism in male, on TRT, testosterone pellets, per  6/16/2017 January 2017--patient reports his urologist initiated testosterone replacement therapy consisting of testosterone pellets every 6 months.   • Multiple environmental allergies 2/12/2016    Patient sees the allergist regularly and has been on immunotherapy.   • Muscle cramps, statin related, Vytorin and pravastatin.  Tolerates Livalo. 6/16/2017 12/21/2018--patient seems to be tolerating Livalo well.  10/02/2018--Livalo 2 mg per day initiated.  Recommend CoQ10 400 mg per day with food for better absorption.  Reassess with lab in about 8 weeks.  08/30/2018--patient presents with complaints of muscle cramps.  He discontinue pravastatin and the cramps went away.   Cramps returned even with a half dose.  This is despite the fact he was taking    • Occlusive coronary artery disease, clinical diagnosis only.  Patient not a candidate for heart catheterization/intervention. 12/10/2019    October 8, 2019--patient seen in follow-up and he reports the long-acting oral nitrate has definitely helped his exertional anginal symptoms.  However, he has not stressed himself completely such as using a push mower.  His blood pressure seems improved as well.  Patient has an appoint with the cardiologist in the next 2 weeks.  I have contacted his cardiologist about the results of the empiric ni   • Osteopenia of multiple sites, 10/18/2021--lumbar spine 0.0.  Left femoral neck -1.5.  Right femoral neck -1.7. 2/11/2016 October 18, 2021--DEXA scan reveals lumbar spine T score 0.0 representing a 3.9% interval increase.  Left femoral neck T score -1.5 which is a 4.6% increase.  Right femoral neck T score -1.7 which is unchanged.  Impression is osteopenia with improvement.  July 12, 2019--DEXA scan reveals lumbar spine T score -0.4.  Unchanged.  Left femoral neck T score -1.7.  Unchanged.  Right femoral neck T score   • Primary osteoarthritis of knees, bilateral 9/12/2016 09/12/2016--patient called in the office requesting a steriod injection in his knees.  I explained to him that I do no longer perform these injections and have referred him to Dr. Manuel.   • Secondary hyperparathyroidism of renal origin (HCC) 1/15/2021   • Statin intolerance, Vytorin and pravastatin 12/21/2018 12/21/2018--patient seems to be tolerating Livalo well.  10/02/2018--Livalo 2 mg per day initiated.  Recommend CoQ10 400 mg per day with food for better absorption.  Reassess with lab in about 8 weeks.  08/30/2018--patient presents with complaints of muscle cramps.  He discontinue pravastatin and the cramps went away.  Cramps returned even with a half dose.  This is despite the fact he was taking    • Vitamin B12  deficiency 2/11/2016 06/25/2009--B12 injections begun.   • Vitamin D deficiency 2/11/2016         Past Surgical History:   Procedure Laterality Date   • CATARACT EXTRACTION Left 12/12/2011 12/12/2011--cataract extirpation with intraocular lens implantation left eye.   • CATARACT EXTRACTION Right 12/2011 December 2011--right cataract extirpation with intraocular lens implantation.   • CHOLECYSTECTOMY WITH INTRAOPERATIVE CHOLANGIOGRAM N/A 11/6/2020    Procedure: CHOLECYSTECTOMY LAPAROSCOPIC INTRAOPERATIVE CHOLANGIOGRAM;  Surgeon: Sanjay Burgess MD;  Location: [facility] MAIN OR;  Service: General;  Laterality: N/A;   • COLONOSCOPY  06/11/2002 06/11/2002--incomplete colonoscopy due to redundant colon. Not able to get past the hepatic flexure. Patient had followup barium contrast enema which showed extensive diverticulosis   • COLONOSCOPY  10/03/2013    10/03/2013--colonoscopy revealed markedly redundant colon, pancolonic diverticulosis with several impacted fecalith. No evidence of diverticulitis or stricture. Was only able to reach the ascending colon. Follow up barium enema revealed extensive diverticulosis. No polyp or other mucosal mass seen.   • CYSTOSCOPY  05/18/2016 05/18/2016--urology consultation.  Cystoscopy performed for possible bladder mass is negative.   • ERCP N/A 11/5/2020    Procedure: ENDOSCOPIC RETROGRADE CHOLANGIOPANCREATOGRAPHY with sphincterotomy, basket retrieval and balloon sweep;  Surgeon: Joseluis Mejia MD;  Location: [facility] ENDOSCOPY;  Service: Gastroenterology;  Laterality: N/A;  pre/post - CBD stones   • PROSTATE BIOPSY  12/21/2016 12/21/2016--prostate biopsy reportedly negative.  I will try to obtain the report.           Current Outpatient Medications:   •  allopurinol (ZYLOPRIM) 100 MG tablet, Take 1 p.o. daily for gout/elevated uric acid, Disp: 90 tablet, Rfl: 3  •  ALPRAZolam (XANAX) 0.5 MG tablet, TAKE ONE TABLET BY MOUTH TWICE A DAY, Disp: 60 tablet, Rfl:  5  •  amLODIPine (NORVASC) 5 MG tablet, Take 1 p.o. daily for high blood pressure, Disp: 90 tablet, Rfl: 3  •  aspirin 81 MG tablet, Take 1 tablet by mouth daily., Disp: , Rfl:   •  calcitriol (ROCALTROL) 0.25 MCG capsule, Take 0.25 mcg by mouth Daily., Disp: , Rfl:   •  Cholecalciferol 50 MCG (2000 UT) capsule, Take 1 capsule by mouth Daily., Disp: , Rfl:   •  fluticasone (FLONASE) 50 MCG/ACT nasal spray, SPRAY TWO SPRAYS IN EACH NOSTRIL ONCE DAILY, Disp: 16 mL, Rfl: 3  •  folic acid (FOLVITE) 1 MG tablet, TAKE ONE TABLET BY MOUTH TWICE A DAY, Disp: 180 tablet, Rfl: 3  •  isosorbide mononitrate (IMDUR) 30 MG 24 hr tablet, TAKE ONE TABLET BY MOUTH EVERY MORNING FOR HEART, Disp: 30 tablet, Rfl: 3  •  metoprolol succinate XL (TOPROL-XL) 25 MG 24 hr tablet, TAKE ONE TABLET BY MOUTH DAILY, Disp: 90 tablet, Rfl: 2  •  nitroglycerin (NITROSTAT) 0.6 MG SL tablet, DISSOLVE 1 TAB UNDER TONGUE FOR CHEST PAIN - IF PAIN REMAINS AFTER 5 MIN, CALL 911 AND REPEAT DOSE. MAX 3 TABS IN 15 MINUTES, Disp: 100 tablet, Rfl: 11  •  omeprazole (priLOSEC) 40 MG capsule, TAKE ONE CAPSULE BY MOUTH DAILY, Disp: 90 capsule, Rfl: 3  •  Repatha SureClick solution auto-injector SureClick injection, INJECT 1 ML UNDER THE SKIN INTO THE APPROPRIATE AREA EVERY 14 DAYS, Disp: 1 mL, Rfl: 10    Current Facility-Administered Medications:   •  cyanocobalamin injection 1,000 mcg, 1,000 mcg, Intramuscular, Q28 Days, Delgado Patricia MD, 1,000 mcg at 03/16/22 1259      Social History     Socioeconomic History   • Marital status:    • Number of children: 2   • Years of education: 14   • Highest education level: Some college, no degree   Tobacco Use   • Smoking status: Never Smoker   • Smokeless tobacco: Never Used   • Tobacco comment: caffeine use: 2 cups coffee daily   Vaping Use   • Vaping Use: Never used   Substance and Sexual Activity   • Alcohol use: Yes     Alcohol/week: 5.0 standard drinks     Types: 2 Glasses of wine, 3 Shots of liquor per  "week     Comment: Moderate alcohol consumption   • Drug use: No   • Sexual activity: Not Currently     Partners: Female         Review of Systems   Constitutional: Negative.   HENT: Negative.    Eyes: Negative.    Cardiovascular: Negative.    Respiratory: Negative.    Endocrine: Negative.    Skin: Negative.    Musculoskeletal: Negative.    Gastrointestinal: Negative.    Neurological: Positive for light-headedness.   Psychiatric/Behavioral: Negative.        Procedures      ECG 12 Lead    Date/Time: 3/31/2022 10:40 AM  Performed by: Theron Alejo MD  Authorized by: Theron Alejo MD   Comparison: compared with previous ECG from 9/15/2021  Rhythm: sinus rhythm  Rate: normal  Conduction: conduction normal  ST Segments: ST segments normal  T Waves: T waves normal  QRS axis: normal                  Objective:    /70 (BP Location: Left arm, Patient Position: Sitting)   Pulse 75   Ht 172.7 cm (68\")   Wt 83.6 kg (184 lb 3.2 oz)   SpO2 97%   BMI 28.01 kg/m²         Vitals reviewed.   Constitutional:       Appearance: Healthy appearance. Well-developed and not in distress.   HENT:      Head: Normocephalic.   Neck:      Thyroid: No thyromegaly.      Vascular: No carotid bruit or JVD.   Pulmonary:      Effort: Pulmonary effort is normal.      Breath sounds: Normal breath sounds.   Cardiovascular:      Normal rate. Regular rhythm. Normal S1. Normal S2.      Murmurs: There is no murmur.      No gallop.   Pulses:     Carotid: 2+ bilaterally.     Dorsalis pedis: 2+ bilaterally.     Posterior tibial: 2+ bilaterally.  Edema:     Peripheral edema absent.   Musculoskeletal:      Cervical back: Normal range of motion. Skin:     General: Skin is warm and dry.      Findings: No erythema.   Neurological:      Mental Status: Alert and oriented to person, place, and time.             Assessment & Plan:       Diagnosis Plan   1. Coronary artery disease involving native coronary artery of native heart without angina " pectoris     2. Benign essential hypertension     3. Bilateral carotid artery stenosis         1.  Coronary artery disease: No angina pectoris.  2.  Hypertension: Much improved since last visit.  Continue current medical therapy  3.  Benign carotid artery disease: He is concerned about his recent visual disturbance which has been attributed to possible vertigo.  His symptoms have resolved.  Recommend he increase his aspirin to daily  4.  Chronic right bundle branch block: No change

## 2022-04-04 ENCOUNTER — TELEPHONE (OUTPATIENT)
Dept: INTERNAL MEDICINE | Facility: CLINIC | Age: 87
End: 2022-04-04

## 2022-04-15 ENCOUNTER — CLINICAL SUPPORT (OUTPATIENT)
Dept: INTERNAL MEDICINE | Facility: CLINIC | Age: 87
End: 2022-04-15

## 2022-04-15 DIAGNOSIS — E53.8 VITAMIN B12 DEFICIENCY: Chronic | ICD-10-CM

## 2022-04-15 PROCEDURE — 96372 THER/PROPH/DIAG INJ SC/IM: CPT | Performed by: INTERNAL MEDICINE

## 2022-04-15 RX ADMIN — CYANOCOBALAMIN 1000 MCG: 1000 INJECTION, SOLUTION INTRAMUSCULAR; SUBCUTANEOUS at 13:02

## 2022-05-10 DIAGNOSIS — F41.1 GENERALIZED ANXIETY DISORDER: ICD-10-CM

## 2022-05-10 RX ORDER — ALPRAZOLAM 0.5 MG/1
TABLET ORAL
Qty: 60 TABLET | Refills: 3 | Status: SHIPPED | OUTPATIENT
Start: 2022-05-10 | End: 2022-11-10

## 2022-05-16 ENCOUNTER — CLINICAL SUPPORT (OUTPATIENT)
Dept: INTERNAL MEDICINE | Facility: CLINIC | Age: 87
End: 2022-05-16

## 2022-05-16 DIAGNOSIS — E53.8 VITAMIN B12 DEFICIENCY: Chronic | ICD-10-CM

## 2022-05-16 PROCEDURE — 96372 THER/PROPH/DIAG INJ SC/IM: CPT | Performed by: INTERNAL MEDICINE

## 2022-05-16 RX ADMIN — CYANOCOBALAMIN 1000 MCG: 1000 INJECTION, SOLUTION INTRAMUSCULAR; SUBCUTANEOUS at 13:13

## 2022-06-03 DIAGNOSIS — I20.8 EXERTIONAL ANGINA: ICD-10-CM

## 2022-06-03 RX ORDER — ISOSORBIDE MONONITRATE 30 MG/1
TABLET, EXTENDED RELEASE ORAL
Qty: 30 TABLET | Refills: 3 | Status: SHIPPED | OUTPATIENT
Start: 2022-06-03 | End: 2022-10-03

## 2022-07-05 ENCOUNTER — CLINICAL SUPPORT (OUTPATIENT)
Dept: INTERNAL MEDICINE | Facility: CLINIC | Age: 87
End: 2022-07-05

## 2022-07-05 DIAGNOSIS — E53.8 VITAMIN B12 DEFICIENCY: Chronic | ICD-10-CM

## 2022-07-05 PROCEDURE — 96372 THER/PROPH/DIAG INJ SC/IM: CPT | Performed by: INTERNAL MEDICINE

## 2022-07-05 RX ADMIN — CYANOCOBALAMIN 1000 MCG: 1000 INJECTION, SOLUTION INTRAMUSCULAR; SUBCUTANEOUS at 15:24

## 2022-07-11 RX ORDER — FOLIC ACID 1 MG/1
TABLET ORAL
Qty: 180 TABLET | Refills: 3 | Status: SHIPPED | OUTPATIENT
Start: 2022-07-11

## 2022-08-18 ENCOUNTER — CLINICAL SUPPORT (OUTPATIENT)
Dept: INTERNAL MEDICINE | Facility: CLINIC | Age: 87
End: 2022-08-18

## 2022-08-18 DIAGNOSIS — E78.5 HYPERLIPIDEMIA, UNSPECIFIED HYPERLIPIDEMIA TYPE: Chronic | ICD-10-CM

## 2022-08-18 DIAGNOSIS — E53.8 VITAMIN B12 DEFICIENCY: Chronic | ICD-10-CM

## 2022-08-18 DIAGNOSIS — I25.10 OCCLUSIVE CORONARY ARTERY DISEASE: Chronic | ICD-10-CM

## 2022-08-18 DIAGNOSIS — Z78.9 STATIN INTOLERANCE: Chronic | ICD-10-CM

## 2022-08-18 PROCEDURE — 96372 THER/PROPH/DIAG INJ SC/IM: CPT | Performed by: INTERNAL MEDICINE

## 2022-08-18 RX ORDER — EVOLOCUMAB 140 MG/ML
INJECTION, SOLUTION SUBCUTANEOUS
Qty: 2 ML | Refills: 3 | Status: SHIPPED | OUTPATIENT
Start: 2022-08-18 | End: 2022-09-22 | Stop reason: SDUPTHER

## 2022-08-18 RX ADMIN — CYANOCOBALAMIN 1000 MCG: 1000 INJECTION, SOLUTION INTRAMUSCULAR; SUBCUTANEOUS at 14:05

## 2022-09-16 ENCOUNTER — LAB (OUTPATIENT)
Dept: LAB | Facility: HOSPITAL | Age: 87
End: 2022-09-16

## 2022-09-16 DIAGNOSIS — I10 BENIGN ESSENTIAL HYPERTENSION: Chronic | ICD-10-CM

## 2022-09-16 DIAGNOSIS — N18.4 CHRONIC RENAL INSUFFICIENCY, STAGE IV (SEVERE): Chronic | ICD-10-CM

## 2022-09-16 DIAGNOSIS — N25.81 SECONDARY HYPERPARATHYROIDISM OF RENAL ORIGIN: Chronic | ICD-10-CM

## 2022-09-16 DIAGNOSIS — E79.0 HYPERURICEMIA: Chronic | ICD-10-CM

## 2022-09-16 DIAGNOSIS — E55.9 VITAMIN D DEFICIENCY: Chronic | ICD-10-CM

## 2022-09-16 DIAGNOSIS — R73.01 IMPAIRED FASTING GLUCOSE: Chronic | ICD-10-CM

## 2022-09-16 DIAGNOSIS — D50.0 IRON DEFICIENCY ANEMIA DUE TO CHRONIC BLOOD LOSS: ICD-10-CM

## 2022-09-16 DIAGNOSIS — E78.2 MIXED HYPERLIPIDEMIA: Chronic | ICD-10-CM

## 2022-09-16 LAB
25(OH)D3 SERPL-MCNC: 36.7 NG/ML (ref 30–100)
ALBUMIN SERPL-MCNC: 4.4 G/DL (ref 3.5–5.2)
ALBUMIN/GLOB SERPL: 2.4 G/DL
ALP SERPL-CCNC: 98 U/L (ref 39–117)
ALT SERPL W P-5'-P-CCNC: 26 U/L (ref 1–41)
ANION GAP SERPL CALCULATED.3IONS-SCNC: 9 MMOL/L (ref 5–15)
AST SERPL-CCNC: 26 U/L (ref 1–40)
BILIRUB SERPL-MCNC: 0.3 MG/DL (ref 0–1.2)
BUN SERPL-MCNC: 27 MG/DL (ref 8–23)
BUN/CREAT SERPL: 14.8 (ref 7–25)
CALCIUM SPEC-SCNC: 9.4 MG/DL (ref 8.2–9.6)
CHLORIDE SERPL-SCNC: 105 MMOL/L (ref 98–107)
CK SERPL-CCNC: 63 U/L (ref 20–200)
CO2 SERPL-SCNC: 26 MMOL/L (ref 22–29)
CREAT SERPL-MCNC: 1.83 MG/DL (ref 0.76–1.27)
DEPRECATED RDW RBC AUTO: 42.3 FL (ref 37–54)
EGFRCR SERPLBLD CKD-EPI 2021: 34.6 ML/MIN/1.73
ERYTHROCYTE [DISTWIDTH] IN BLOOD BY AUTOMATED COUNT: 12.9 % (ref 12.3–15.4)
GLOBULIN UR ELPH-MCNC: 1.8 GM/DL
GLUCOSE SERPL-MCNC: 114 MG/DL (ref 65–99)
HBA1C MFR BLD: 6.5 % (ref 4.8–5.6)
HCT VFR BLD AUTO: 37.5 % (ref 37.5–51)
HGB BLD-MCNC: 12.5 G/DL (ref 13–17.7)
IRON 24H UR-MRATE: 78 MCG/DL (ref 59–158)
IRON SATN MFR SERPL: 21 % (ref 20–50)
MCH RBC QN AUTO: 30.3 PG (ref 26.6–33)
MCHC RBC AUTO-ENTMCNC: 33.3 G/DL (ref 31.5–35.7)
MCV RBC AUTO: 91 FL (ref 79–97)
PLATELET # BLD AUTO: 184 10*3/MM3 (ref 140–450)
PMV BLD AUTO: 10.3 FL (ref 6–12)
POTASSIUM SERPL-SCNC: 5.3 MMOL/L (ref 3.5–5.2)
PROT SERPL-MCNC: 6.2 G/DL (ref 6–8.5)
PTH-INTACT SERPL-MCNC: 63.3 PG/ML (ref 15–65)
RBC # BLD AUTO: 4.12 10*6/MM3 (ref 4.14–5.8)
SODIUM SERPL-SCNC: 140 MMOL/L (ref 136–145)
T3FREE SERPL-MCNC: 2.76 PG/ML (ref 2–4.4)
T4 FREE SERPL-MCNC: 1.05 NG/DL (ref 0.93–1.7)
TIBC SERPL-MCNC: 368 MCG/DL (ref 298–536)
TRANSFERRIN SERPL-MCNC: 247 MG/DL (ref 200–360)
TSH SERPL DL<=0.05 MIU/L-ACNC: 2.84 UIU/ML (ref 0.27–4.2)
URATE SERPL-MCNC: <0.2 MG/DL (ref 3.4–7)
WBC NRBC COR # BLD: 5.82 10*3/MM3 (ref 3.4–10.8)

## 2022-09-16 PROCEDURE — 80061 LIPID PANEL: CPT

## 2022-09-16 PROCEDURE — 84443 ASSAY THYROID STIM HORMONE: CPT

## 2022-09-16 PROCEDURE — 83036 HEMOGLOBIN GLYCOSYLATED A1C: CPT

## 2022-09-16 PROCEDURE — 82306 VITAMIN D 25 HYDROXY: CPT

## 2022-09-16 PROCEDURE — 82550 ASSAY OF CK (CPK): CPT

## 2022-09-16 PROCEDURE — 84481 FREE ASSAY (FT-3): CPT

## 2022-09-16 PROCEDURE — 36415 COLL VENOUS BLD VENIPUNCTURE: CPT

## 2022-09-16 PROCEDURE — 85027 COMPLETE CBC AUTOMATED: CPT

## 2022-09-16 PROCEDURE — 84439 ASSAY OF FREE THYROXINE: CPT

## 2022-09-16 PROCEDURE — 83704 LIPOPROTEIN BLD QUAN PART: CPT

## 2022-09-16 PROCEDURE — 83970 ASSAY OF PARATHORMONE: CPT

## 2022-09-16 PROCEDURE — 83540 ASSAY OF IRON: CPT

## 2022-09-16 PROCEDURE — 84550 ASSAY OF BLOOD/URIC ACID: CPT

## 2022-09-16 PROCEDURE — 84466 ASSAY OF TRANSFERRIN: CPT

## 2022-09-16 PROCEDURE — 80053 COMPREHEN METABOLIC PANEL: CPT

## 2022-09-18 LAB
CHOLEST SERPL-MCNC: 193 MG/DL (ref 100–199)
HDL SERPL-SCNC: 27.5 UMOL/L
HDLC SERPL-MCNC: 44 MG/DL
LDL SERPL QN: 21.3 NM
LDL SERPL-SCNC: 1521 NMOL/L
LDL SMALL SERPL-SCNC: 714 NMOL/L
LDLC SERPL CALC-MCNC: 130 MG/DL (ref 0–99)
TRIGL SERPL-MCNC: 105 MG/DL (ref 0–149)

## 2022-09-22 ENCOUNTER — OFFICE VISIT (OUTPATIENT)
Dept: INTERNAL MEDICINE | Facility: CLINIC | Age: 87
End: 2022-09-22

## 2022-09-22 VITALS
BODY MASS INDEX: 27.95 KG/M2 | HEART RATE: 82 BPM | HEIGHT: 68 IN | OXYGEN SATURATION: 99 % | WEIGHT: 184.4 LBS | DIASTOLIC BLOOD PRESSURE: 72 MMHG | SYSTOLIC BLOOD PRESSURE: 144 MMHG

## 2022-09-22 DIAGNOSIS — I65.23 BILATERAL CAROTID ARTERY STENOSIS: Chronic | ICD-10-CM

## 2022-09-22 DIAGNOSIS — E55.9 VITAMIN D DEFICIENCY: Chronic | ICD-10-CM

## 2022-09-22 DIAGNOSIS — J30.1 SEASONAL ALLERGIC RHINITIS DUE TO POLLEN: Chronic | ICD-10-CM

## 2022-09-22 DIAGNOSIS — R42 VERTIGO: ICD-10-CM

## 2022-09-22 DIAGNOSIS — N25.81 SECONDARY HYPERPARATHYROIDISM OF RENAL ORIGIN: Chronic | ICD-10-CM

## 2022-09-22 DIAGNOSIS — R25.2 MUSCLE CRAMPS: Chronic | ICD-10-CM

## 2022-09-22 DIAGNOSIS — M54.2 CHRONIC NECK PAIN: Chronic | ICD-10-CM

## 2022-09-22 DIAGNOSIS — N40.0 BENIGN NON-NODULAR PROSTATIC HYPERPLASIA WITHOUT LOWER URINARY TRACT SYMPTOMS: Chronic | ICD-10-CM

## 2022-09-22 DIAGNOSIS — E53.8 FOLIC ACID DEFICIENCY: Chronic | ICD-10-CM

## 2022-09-22 DIAGNOSIS — M50.30 DEGENERATION OF CERVICAL INTERVERTEBRAL DISC: Chronic | ICD-10-CM

## 2022-09-22 DIAGNOSIS — I20.8 EXERTIONAL ANGINA: Chronic | ICD-10-CM

## 2022-09-22 DIAGNOSIS — M85.89 OSTEOPENIA OF MULTIPLE SITES: Chronic | ICD-10-CM

## 2022-09-22 DIAGNOSIS — K21.9 GASTROESOPHAGEAL REFLUX DISEASE WITHOUT ESOPHAGITIS: Chronic | ICD-10-CM

## 2022-09-22 DIAGNOSIS — D50.0 IRON DEFICIENCY ANEMIA DUE TO CHRONIC BLOOD LOSS: ICD-10-CM

## 2022-09-22 DIAGNOSIS — E78.5 HYPERLIPIDEMIA, UNSPECIFIED HYPERLIPIDEMIA TYPE: Chronic | ICD-10-CM

## 2022-09-22 DIAGNOSIS — F41.1 GENERALIZED ANXIETY DISORDER: Chronic | ICD-10-CM

## 2022-09-22 DIAGNOSIS — I25.10 OCCLUSIVE CORONARY ARTERY DISEASE: Chronic | ICD-10-CM

## 2022-09-22 DIAGNOSIS — I10 BENIGN ESSENTIAL HYPERTENSION: Chronic | ICD-10-CM

## 2022-09-22 DIAGNOSIS — E29.1 HYPOGONADISM IN MALE: Chronic | ICD-10-CM

## 2022-09-22 DIAGNOSIS — D63.1 ANEMIA DUE TO STAGE 4 CHRONIC KIDNEY DISEASE: Chronic | ICD-10-CM

## 2022-09-22 DIAGNOSIS — E78.2 MIXED HYPERLIPIDEMIA: Chronic | ICD-10-CM

## 2022-09-22 DIAGNOSIS — E53.8 VITAMIN B12 DEFICIENCY: Chronic | ICD-10-CM

## 2022-09-22 DIAGNOSIS — M54.12 CERVICAL RADICULOPATHY: Chronic | ICD-10-CM

## 2022-09-22 DIAGNOSIS — Z23 NEED FOR INFLUENZA VACCINATION: ICD-10-CM

## 2022-09-22 DIAGNOSIS — N18.4 ANEMIA DUE TO STAGE 4 CHRONIC KIDNEY DISEASE: Chronic | ICD-10-CM

## 2022-09-22 DIAGNOSIS — N18.4 CHRONIC RENAL INSUFFICIENCY, STAGE IV (SEVERE): Chronic | ICD-10-CM

## 2022-09-22 DIAGNOSIS — R73.01 IMPAIRED FASTING GLUCOSE: Primary | Chronic | ICD-10-CM

## 2022-09-22 DIAGNOSIS — E79.0 HYPERURICEMIA: Chronic | ICD-10-CM

## 2022-09-22 DIAGNOSIS — Z78.9 STATIN INTOLERANCE: Chronic | ICD-10-CM

## 2022-09-22 DIAGNOSIS — Z91.09 MULTIPLE ENVIRONMENTAL ALLERGIES: Chronic | ICD-10-CM

## 2022-09-22 DIAGNOSIS — Z51.81 THERAPEUTIC DRUG MONITORING: ICD-10-CM

## 2022-09-22 DIAGNOSIS — G89.29 CHRONIC NECK PAIN: Chronic | ICD-10-CM

## 2022-09-22 DIAGNOSIS — E66.3 OVERWEIGHT (BMI 25.0-29.9): ICD-10-CM

## 2022-09-22 PROBLEM — H53.8 BLURRY VISION, RIGHT EYE: Status: RESOLVED | Noted: 2022-03-28 | Resolved: 2022-09-22

## 2022-09-22 PROCEDURE — 99214 OFFICE O/P EST MOD 30 MIN: CPT | Performed by: INTERNAL MEDICINE

## 2022-09-22 PROCEDURE — G0008 ADMIN INFLUENZA VIRUS VAC: HCPCS | Performed by: INTERNAL MEDICINE

## 2022-09-22 PROCEDURE — 90662 IIV NO PRSV INCREASED AG IM: CPT | Performed by: INTERNAL MEDICINE

## 2022-09-22 RX ORDER — EVOLOCUMAB 140 MG/ML
INJECTION, SOLUTION SUBCUTANEOUS
Qty: 2 ML | Refills: 11
Start: 2022-09-22

## 2022-09-22 RX ORDER — METOPROLOL SUCCINATE 50 MG/1
TABLET, EXTENDED RELEASE ORAL
COMMUNITY
Start: 2022-07-20 | End: 2023-01-25

## 2022-09-22 NOTE — PROGRESS NOTES
09/22/2022    Patient Information  Radha Win                                                                                          [id number]  [facility] [address]      7/26/1932  [unfilled]  There is no work phone number on file.    Chief Complaint:     Follow-up blood work in order to monitor chronic medical issues listed in history of present illness.  No new acute complaints.    History of Present Illness:    Patient with impaired fasting glucose, hyperlipidemia, statin intolerance due to Vytorin and pravastatin causing muscle cramps, hypertension, stage IV chronic renal insufficiency, anemia related to chronic renal disease, iron deficiency anemia, BPH, hypogonadism, occlusive coronary artery disease with stable exertional angina, vitamin B12 and vitamin D deficiency, folic acid deficiency, esophageal reflux, chronic neck pain due to cervical disc disease, cervical radiculopathy, hyperuricemia, environmental allergies, generalized anxiety disorder, carotid artery stenosis, osteopenia.  He presents today for follow-up with lab prior in order to monitor his chronic medical issues.  His past medical history reviewed and updated were necessary including health maintenance parameters.  This reveals he will be up-to-date or else accounted for after today's visit.    Review of Systems   Constitutional: Negative.   HENT: Negative.    Eyes: Negative.    Cardiovascular: Negative.  Negative for chest pain.   Respiratory: Negative.    Endocrine: Negative.    Hematologic/Lymphatic: Negative.    Skin: Negative.    Musculoskeletal: Positive for arthritis, back pain, joint pain and neck pain.   Gastrointestinal: Negative.    Genitourinary: Negative.    Neurological: Negative.    Psychiatric/Behavioral: Negative.    Allergic/Immunologic: Negative.        Active Problems:    Patient Active Problem List   Diagnosis   • Bilateral carotid artery stenosis   • Benign prostatic hypertrophy   •  Chronic renal insufficiency, stage IV (severe), 02/09/2016--creatinine 1.85   • Diverticulosis of colon   • Hyperlipidemia   • Osteopenia of multiple sites, 10/18/2021--lumbar spine 0.0.  Left femoral neck -1.5.  Right femoral neck -1.7.   • Vitamin B12 deficiency   • Vitamin D deficiency   • Allergic rhinitis   • Anemia due to stage 4 chronic kidney disease (HCC)   • Generalized anxiety disorder   • Benign essential hypertension   • Gastroesophageal reflux disease without esophagitis   • Folic acid deficiency   • Multiple environmental allergies   • Therapeutic drug monitoring   • Bilateral renal cysts   • Primary osteoarthritis of knees, bilateral   • Impaired fasting glucose   • Muscle cramps, statin related, Vytorin and pravastatin.  Tolerates Livalo.   • Hypogonadism in male, on TRT, testosterone pellets, per    • Chronic neck pain   • Cervical radiculopathy   • Statin intolerance, Vytorin and pravastatin   • Degeneration of cervical intervertebral disc   • Exertional angina (HCC)   • Occlusive coronary artery disease, clinical diagnosis only.  Patient not a candidate for heart catheterization/intervention.   • Hyperuricemia   • Secondary hyperparathyroidism of renal origin (HCC)   • Chronic left shoulder pain   • Overweight (BMI 25.0-29.9)         Past Medical History:   Diagnosis Date   • Allergic rhinitis 2/11/2016   • Anemia due to chronic kidney disease 2/11/2016   • Benign essential hypertension 2/11/2016   • Benign prostatic hypertrophy 2/11/2016 12/21/2016--prostate biopsy reportedly negative.  I will try to obtain the report.  Patient sees urologist.  PSAs run from the 3-8 range   • Bilateral carotid artery plaque, 09/21/2016--16-49% bilateral carotid artery stenosis 2/11/2016 09/21/2016--vascular screen reveals 16-49% bilateral carotid artery stenosis, negative for AAA, negative for PAD.  10/10/2008--vascular screening study revealed mild bilateral carotid plaque, no aortic aneurysm, no  evidence of PAD   • Bilateral renal cysts 2/14/2016 04/07/2016--ultrasound of the kidneys reveals the previously described renal cysts are not well seen.  However, questionable incidental bladder tumor noted.  05/26/2010--ultrasound the kidneys was negative bilaterally except for small renal cysts.   • Cervical radiculopathy 6/28/2018    10/02/2018--patient seen in follow-up and the results of the MRI discussed.  He continues to have intermittent radicular symptoms which is currently only on the left.  Discussed options with patient and I have recommended neurosurgery consultation.  Patient may benefit from epidural injections.  09/06/2018--MRI of the cervical spine reveals moderate neural foraminal compromise on the right at C3-C   • Chronic left shoulder pain 7/19/2021 July 19, 2021--patient presents with continued left-sided neck pain that radiates into his left shoulder but does not really go over that far down into the left upper extremity.  The pain is definitely accentuated by leaning his head to the right and rotating his head to the right.  He does not do anything to aggravate it or make it better by moving his head towards the left.  On exam he has good    • Chronic renal insufficiency, stage IV (severe), 02/09/2016--creatinine 1.85 2/11/2016 02/09/2016--serum creatinine 1.85.  BUN 29.  07/20/2015--patient was evaluated by the nephrologist.  Ultrasound of the kidneys ordered but this was never done.  05/22/2015--BUN 35, creatinine 2.3.  Patient referred to nephrology, Dr. Devyn Muniz.  04/16/2014--BUN 25, creatinine 1.77.  01/03/2013--BUN 37, creatinine 2.14.    05/26/2010---ultrasound of the kidneys reveal a small cyst x 2 right kidney.  Otherwise normal.    Baseline creatinine approximately 1.9-2.0.   • Degeneration of cervical intervertebral disc 5/2/2019   • Diverticulosis of colon 2/11/2016    10/03/2013--colonoscopy revealed markedly redundant colon, pancolonic diverticulosis with several  impacted fecalith.  No evidence of diverticulitis or stricture.  Was only able to reach the ascending colon.  Follow up barium enema revealed extensive diverticulosis.  No polyp or other mucosal mass seen.    06/11/2002--incomplete colonoscopy due to redundant colon.  Not able to get past the hepatic flexure.  Patient had followup barium contrast enema which showed extensive diverticulosis.   • Exertional angina (HCC) 9/17/2019 October 8, 2019--patient seen in follow-up and he reports the long-acting oral nitrate has definitely helped his exertional anginal symptoms.  However, he has not stressed himself completely such as using a push mower.  His blood pressure seems improved as well.  Patient has an appoint with the cardiologist in the next 2 weeks.  I have contacted his cardiologist about the results of the empiric ni   • Folic acid deficiency 2/11/2016   • Gastroesophageal reflux disease without esophagitis 2/11/2016   • Generalized anxiety disorder 2/11/2016   • History of diverticulitis of colon 2009 and 2003 05/08/2009-acute diverticulitis without complication. 09/05/2003-acute diverticulitis without complication.    • Hyperlipidemia 2/11/2016 01/29/2005--treatment for hyperlipidemia begun.   • Hyperuricemia 8/19/2020   • Hypogonadism in male, on TRT, testosterone pellets, per  6/16/2017 January 2017--patient reports his urologist initiated testosterone replacement therapy consisting of testosterone pellets every 6 months.   • Multiple environmental allergies 2/12/2016    Patient sees the allergist regularly and has been on immunotherapy.   • Muscle cramps, statin related, Vytorin and pravastatin.  Tolerates Livalo. 6/16/2017 12/21/2018--patient seems to be tolerating Livalo well.  10/02/2018--Livalo 2 mg per day initiated.  Recommend CoQ10 400 mg per day with food for better absorption.  Reassess with lab in about 8 weeks.  08/30/2018--patient presents with complaints of muscle cramps.  He  discontinue pravastatin and the cramps went away.  Cramps returned even with a half dose.  This is despite the fact he was taking    • Occlusive coronary artery disease, clinical diagnosis only.  Patient not a candidate for heart catheterization/intervention. 12/10/2019    October 8, 2019--patient seen in follow-up and he reports the long-acting oral nitrate has definitely helped his exertional anginal symptoms.  However, he has not stressed himself completely such as using a push mower.  His blood pressure seems improved as well.  Patient has an appoint with the cardiologist in the next 2 weeks.  I have contacted his cardiologist about the results of the empiric ni   • Osteopenia of multiple sites, 10/18/2021--lumbar spine 0.0.  Left femoral neck -1.5.  Right femoral neck -1.7. 2/11/2016 October 18, 2021--DEXA scan reveals lumbar spine T score 0.0 representing a 3.9% interval increase.  Left femoral neck T score -1.5 which is a 4.6% increase.  Right femoral neck T score -1.7 which is unchanged.  Impression is osteopenia with improvement.  July 12, 2019--DEXA scan reveals lumbar spine T score -0.4.  Unchanged.  Left femoral neck T score -1.7.  Unchanged.  Right femoral neck T score   • Overweight (BMI 25.0-29.9) 9/22/2022   • Primary osteoarthritis of knees, bilateral 9/12/2016 09/12/2016--patient called in the office requesting a steriod injection in his knees.  I explained to him that I do no longer perform these injections and have referred him to Dr. Manuel.   • Secondary hyperparathyroidism of renal origin (HCC) 1/15/2021   • Statin intolerance, Vytorin and pravastatin 12/21/2018 12/21/2018--patient seems to be tolerating Livalo well.  10/02/2018--Livalo 2 mg per day initiated.  Recommend CoQ10 400 mg per day with food for better absorption.  Reassess with lab in about 8 weeks.  08/30/2018--patient presents with complaints of muscle cramps.  He discontinue pravastatin and the cramps went away.  Cramps  returned even with a half dose.  This is despite the fact he was taking    • Vitamin B12 deficiency 2/11/2016 06/25/2009--B12 injections begun.   • Vitamin D deficiency 2/11/2016         Past Surgical History:   Procedure Laterality Date   • CATARACT EXTRACTION Left 12/12/2011 12/12/2011--cataract extirpation with intraocular lens implantation left eye.   • CATARACT EXTRACTION Right 12/2011 December 2011--right cataract extirpation with intraocular lens implantation.   • CHOLECYSTECTOMY WITH INTRAOPERATIVE CHOLANGIOGRAM N/A 11/6/2020    Procedure: CHOLECYSTECTOMY LAPAROSCOPIC INTRAOPERATIVE CHOLANGIOGRAM;  Surgeon: Sanjay Burgess MD;  Location: [facility] MAIN OR;  Service: General;  Laterality: N/A;   • COLONOSCOPY  06/11/2002 06/11/2002--incomplete colonoscopy due to redundant colon. Not able to get past the hepatic flexure. Patient had followup barium contrast enema which showed extensive diverticulosis   • COLONOSCOPY  10/03/2013    10/03/2013--colonoscopy revealed markedly redundant colon, pancolonic diverticulosis with several impacted fecalith. No evidence of diverticulitis or stricture. Was only able to reach the ascending colon. Follow up barium enema revealed extensive diverticulosis. No polyp or other mucosal mass seen.   • CYSTOSCOPY  05/18/2016 05/18/2016--urology consultation.  Cystoscopy performed for possible bladder mass is negative.   • ERCP N/A 11/5/2020    Procedure: ENDOSCOPIC RETROGRADE CHOLANGIOPANCREATOGRAPHY with sphincterotomy, basket retrieval and balloon sweep;  Surgeon: Joseluis Mejia MD;  Location: [facility] ENDOSCOPY;  Service: Gastroenterology;  Laterality: N/A;  pre/post - CBD stones   • PROSTATE BIOPSY  12/21/2016 12/21/2016--prostate biopsy reportedly negative.  I will try to obtain the report.         No Known Allergies        Current Outpatient Medications:   •  ALPRAZolam (XANAX) 0.5 MG tablet, TAKE ONE TABLET BY MOUTH TWICE A DAY, Disp: 60 tablet, Rfl:  3  •  amLODIPine (NORVASC) 5 MG tablet, Take 1 p.o. daily for high blood pressure, Disp: 90 tablet, Rfl: 3  •  aspirin 81 MG tablet, Take 1 tablet by mouth daily., Disp: , Rfl:   •  calcitriol (ROCALTROL) 0.25 MCG capsule, Take 0.25 mcg by mouth Daily., Disp: , Rfl:   •  Cholecalciferol 50 MCG (2000 UT) capsule, Take 1 capsule by mouth Daily., Disp: , Rfl:   •  Evolocumab (Repatha SureClick) solution auto-injector SureClick injection, Inject 1 mL subcutaneously as directed every 2 weeks for high cholesterol, Disp: 2 mL, Rfl: 11  •  fluticasone (FLONASE) 50 MCG/ACT nasal spray, SPRAY TWO SPRAYS IN EACH NOSTRIL ONCE DAILY, Disp: 16 mL, Rfl: 3  •  folic acid (FOLVITE) 1 MG tablet, TAKE ONE TABLET BY MOUTH TWICE A DAY, Disp: 180 tablet, Rfl: 3  •  isosorbide mononitrate (IMDUR) 30 MG 24 hr tablet, TAKE ONE TABLET BY MOUTH EVERY MORNING FOR HEART, Disp: 30 tablet, Rfl: 3  •  metoprolol succinate XL (TOPROL-XL) 50 MG 24 hr tablet, , Disp: , Rfl:   •  nitroglycerin (NITROSTAT) 0.6 MG SL tablet, DISSOLVE 1 TAB UNDER TONGUE FOR CHEST PAIN - IF PAIN REMAINS AFTER 5 MIN, CALL 911 AND REPEAT DOSE. MAX 3 TABS IN 15 MINUTES, Disp: 100 tablet, Rfl: 11  •  omeprazole (priLOSEC) 40 MG capsule, TAKE ONE CAPSULE BY MOUTH DAILY, Disp: 90 capsule, Rfl: 3    Current Facility-Administered Medications:   •  cyanocobalamin injection 1,000 mcg, 1,000 mcg, Intramuscular, Q28 Days, Delgado Patricia MD, 1,000 mcg at 08/18/22 1405      Family History   Problem Relation Age of Onset   • Diabetes Mother    • Heart disease Mother    • Stroke Father          Social History     Socioeconomic History   • Marital status:    • Number of children: 2   • Years of education: 14   • Highest education level: Some college, no degree   Tobacco Use   • Smoking status: Never Smoker   • Smokeless tobacco: Never Used   • Tobacco comment: caffeine use: 2 cups coffee daily   Vaping Use   • Vaping Use: Never used   Substance and Sexual Activity   • Alcohol  "use: Yes     Alcohol/week: 5.0 standard drinks     Types: 2 Glasses of wine, 3 Shots of liquor per week     Comment: Moderate alcohol consumption   • Drug use: No   • Sexual activity: Not Currently     Partners: Female         Vitals:    09/22/22 1302   BP: 144/72   Pulse: 82   SpO2: 99%   Weight: 83.6 kg (184 lb 6.4 oz)   Height: 172.7 cm (68\")        Body mass index is 28.04 kg/m².      Physical Exam:    General: Alert and oriented x 3.  No acute distress.  Overweight.  Normal affect.  HEENT: Pupils equal, round, reactive to light; extraocular movements intact; sclerae nonicteric; pharynx, ear canals and TMs normal.  Neck: Without JVD, thyromegaly, bruit, or adenopathy.  Lungs: Clear to auscultation in all fields.  Heart: Regular rate and rhythm without murmur, rub, gallop, or click.  Abdomen: Soft, nontender, without hepatosplenomegaly or hernia.  Bowel sounds normal.  : Deferred.  Rectal: Deferred.  Extremities: Without clubbing, cyanosis, edema, or pulse deficit.  Neurologic: Intact without focal deficit.  Normal station and gait observed during ingress and egress from the examination room.  Skin: Without significant lesion.  Musculoskeletal: Unremarkable.    Lab/other results:    Uric acid is less than 0.2.  Thyroid function tests are normal.  Intact PTH normal at 63.3.  Vitamin D normal at 36.7.  Iron studies are normal.  Total cholesterol 193, triglycerides 105, LDL particle number elevated 1521, small LDL particle number elevated 714, HDL particle #27.5.  Hemoglobin A1c 6.5.  CPK normal.  CBC normal except hemoglobin low at 12.5.  CMP reveals glucose 114, BUN 27, creatinine 1.83, potassium slightly elevated at 5.3, estimated GFR 34.6.    Assessment/Plan:     Diagnosis Plan   1. Impaired fasting glucose  Comprehensive Metabolic Panel    Hemoglobin A1c   2. Hyperlipidemia  CK    Comprehensive Metabolic Panel    NMR LipoProfile    TSH    T4, Free    T3, Free    Homocysteine    Evolocumab (Repatha SureClick) " solution auto-injector SureClick injection   3. Statin intolerance, Vytorin and pravastatin  Evolocumab (Repatha SureClick) solution auto-injector SureClick injection   4. Muscle cramps, statin related, Vytorin and pravastatin.  Tolerates Livalo.     5. Benign essential hypertension     6. Chronic renal insufficiency, stage IV (severe), 02/09/2016--creatinine 1.85  CBC (No Diff)   7. Anemia due to stage 4 chronic kidney disease (HCC)  CBC (No Diff)   8. Secondary hyperparathyroidism of renal origin (HCC)     9. Iron deficiency anemia due to chronic blood loss     10. Benign prostatic hypertrophy     11. Hypogonadism in male, on TRT, testosterone pellets, per      12. Occlusive coronary artery disease, clinical diagnosis only.  Patient not a candidate for heart catheterization/intervention.  Evolocumab (Repatha SureClick) solution auto-injector SureClick injection   13. Exertional angina (HCC)     14. Vitamin B12 deficiency     15. Vitamin D deficiency  Vitamin D 25 Hydroxy   16. Folic acid deficiency  Homocysteine   17. Gastroesophageal reflux disease without esophagitis     18. Chronic neck pain     19. Cervical radiculopathy     20. Degeneration of cervical intervertebral disc     21. Hyperuricemia  Uric Acid   22. Multiple environmental allergies     23. Generalized anxiety disorder     24. Allergic rhinitis     25. Osteopenia of multiple sites, 10/18/2021--lumbar spine 0.0.  Left femoral neck -1.5.  Right femoral neck -1.7.     26. Bilateral carotid artery stenosis  Duplex Carotid Ultrasound CAR   27. Vertigo     28. Therapeutic drug monitoring     29. Overweight (BMI 25.0-29.9)     30. Need for influenza vaccination  Fluzone High-Dose 65+yrs ([id number])   31. Hyperlipidemia, unspecified hyperlipidemia type  Evolocumab (Repatha SureClick) solution auto-injector SureClick injection     Patient has impaired fasting glucose which may have evolved into mild overt diabetes.  However, I am reluctant to give him this  diagnosis based on 1 hemoglobin A1c.  Patient is a little overweight not strongly recommended low carbohydrate diet, exercise, and weight loss.  Patient has hyperlipidemia and is on Repatha for this but it appears he has not been taking this as his cholesterol is not nearly as good as it was previously.  See below.  Blood pressure is little borderline today but not enough to make any changes.  His chronic renal insufficiency is actually now stage IIIb and is somewhat improved.  No evidence of secondary hyperparathyroidism at the present time.  Iron deficiency has resolved.  Patient has hypogonadism and is being treated by urologist who is also following his BPH.  His coronary artery disease is stable.  He is followed by the cardiology service.  He has vitamin B12 deficiency and needs a B12 injection today.  Vitamin D is in normal range with supplementation.  He has folic acid deficiency treated with supplementation.  Esophageal reflux controlled with omeprazole.  Chronic neck pain and cervical radiculopathy this tends to come and go and currently has not a big issue.  Patient has carotid artery stenosis and is due a carotid Doppler study.  He is up-to-date on his bone density study.    Plan is as follows: Strongly recommend patient continue Repatha.  No changes in current medical regimen except discontinue allopurinol.  Patient strongly recommended to follow a low carbohydrate diet, exercise, and lose weight.  I will have him follow-up in 6 months with lab prior or follow-up as needed.      Procedures

## 2022-10-01 DIAGNOSIS — I20.8 EXERTIONAL ANGINA: ICD-10-CM

## 2022-10-03 ENCOUNTER — OFFICE VISIT (OUTPATIENT)
Dept: CARDIOLOGY | Facility: CLINIC | Age: 87
End: 2022-10-03

## 2022-10-03 VITALS
HEART RATE: 76 BPM | DIASTOLIC BLOOD PRESSURE: 80 MMHG | BODY MASS INDEX: 27.98 KG/M2 | SYSTOLIC BLOOD PRESSURE: 126 MMHG | OXYGEN SATURATION: 98 % | HEIGHT: 68 IN | WEIGHT: 184.6 LBS

## 2022-10-03 DIAGNOSIS — I25.10 OCCLUSIVE CORONARY ARTERY DISEASE: Primary | Chronic | ICD-10-CM

## 2022-10-03 DIAGNOSIS — I10 BENIGN ESSENTIAL HYPERTENSION: Chronic | ICD-10-CM

## 2022-10-03 DIAGNOSIS — I20.8 EXERTIONAL ANGINA: ICD-10-CM

## 2022-10-03 DIAGNOSIS — I65.23 BILATERAL CAROTID ARTERY STENOSIS: Chronic | ICD-10-CM

## 2022-10-03 PROCEDURE — 99214 OFFICE O/P EST MOD 30 MIN: CPT | Performed by: NURSE PRACTITIONER

## 2022-10-03 PROCEDURE — 93000 ELECTROCARDIOGRAM COMPLETE: CPT | Performed by: NURSE PRACTITIONER

## 2022-10-03 RX ORDER — ISOSORBIDE MONONITRATE 30 MG/1
TABLET, EXTENDED RELEASE ORAL
Qty: 30 TABLET | Refills: 3 | Status: SHIPPED | OUTPATIENT
Start: 2022-10-03 | End: 2023-02-09

## 2022-10-03 NOTE — PROGRESS NOTES
Date of Office Visit: 10/03/2022  Encounter Provider: VIRIDIANA Lepe  Place of Service: [facility] CARDIOLOGY  Patient Name: Radha Win  :1932    Chief Complaint   Patient presents with   • Coronary Artery Disease   • Follow-up   :     HPI: Radha Win is a 89 y.o. male who is a patient of Dr. Alejo.  He is new to me today and presents for a 6-month office follow-up.  He has coronary artery disease, hypertension, noncritical carotid artery disease chronic kidney disease.      On his last office visit in March, he had no complaints.  He had an episode of visual disturbance involving her right eyes and some dizziness that resolved quickly.  There is only mild bilateral plaquing and carotid Doppler study from .  It was discussed, and aspirin was increased to daily.    Today patient presents with no complaints.  He has been doing well and has had no medication changes recently.  Blood pressure is well controlled.  Lipid panel has improved immensely since starting Repatha.  He lost his wife a few months ago from cancer and noted that he has gained about 10 to 15 pounds.  He usually has a healthy diet and does remain active.      Previous testing and notes have been reviewed by me.   Past Medical History:   Diagnosis Date   • Allergic rhinitis 2016   • Anemia due to chronic kidney disease 2016   • Benign essential hypertension 2016   • Benign prostatic hypertrophy 2016--prostate biopsy reportedly negative.  I will try to obtain the report.  Patient sees urologist.  PSAs run from the 3-8 range   • Bilateral carotid artery plaque, 2016--16-49% bilateral carotid artery stenosis 2016--vascular screen reveals 16-49% bilateral carotid artery stenosis, negative for AAA, negative for PAD.  10/10/2008--vascular screening study revealed mild bilateral carotid plaque, no aortic aneurysm, no evidence of PAD    • Bilateral renal cysts 2/14/2016 04/07/2016--ultrasound of the kidneys reveals the previously described renal cysts are not well seen.  However, questionable incidental bladder tumor noted.  05/26/2010--ultrasound the kidneys was negative bilaterally except for small renal cysts.   • Cervical radiculopathy 6/28/2018    10/02/2018--patient seen in follow-up and the results of the MRI discussed.  He continues to have intermittent radicular symptoms which is currently only on the left.  Discussed options with patient and I have recommended neurosurgery consultation.  Patient may benefit from epidural injections.  09/06/2018--MRI of the cervical spine reveals moderate neural foraminal compromise on the right at C3-C   • Chronic left shoulder pain 7/19/2021 July 19, 2021--patient presents with continued left-sided neck pain that radiates into his left shoulder but does not really go over that far down into the left upper extremity.  The pain is definitely accentuated by leaning his head to the right and rotating his head to the right.  He does not do anything to aggravate it or make it better by moving his head towards the left.  On exam he has good    • Chronic renal insufficiency, stage IV (severe), 02/09/2016--creatinine 1.85 2/11/2016 02/09/2016--serum creatinine 1.85.  BUN 29.  07/20/2015--patient was evaluated by the nephrologist.  Ultrasound of the kidneys ordered but this was never done.  05/22/2015--BUN 35, creatinine 2.3.  Patient referred to nephrology, Dr. Devyn Muniz.  04/16/2014--BUN 25, creatinine 1.77.  01/03/2013--BUN 37, creatinine 2.14.    05/26/2010---ultrasound of the kidneys reveal a small cyst x 2 right kidney.  Otherwise normal.    Baseline creatinine approximately 1.9-2.0.   • Degeneration of cervical intervertebral disc 5/2/2019   • Diverticulosis of colon 2/11/2016    10/03/2013--colonoscopy revealed markedly redundant colon, pancolonic diverticulosis with several impacted fecalith.   No evidence of diverticulitis or stricture.  Was only able to reach the ascending colon.  Follow up barium enema revealed extensive diverticulosis.  No polyp or other mucosal mass seen.    06/11/2002--incomplete colonoscopy due to redundant colon.  Not able to get past the hepatic flexure.  Patient had followup barium contrast enema which showed extensive diverticulosis.   • Exertional angina (HCC) 9/17/2019 October 8, 2019--patient seen in follow-up and he reports the long-acting oral nitrate has definitely helped his exertional anginal symptoms.  However, he has not stressed himself completely such as using a push mower.  His blood pressure seems improved as well.  Patient has an appoint with the cardiologist in the next 2 weeks.  I have contacted his cardiologist about the results of the empiric ni   • Folic acid deficiency 2/11/2016   • Gastroesophageal reflux disease without esophagitis 2/11/2016   • Generalized anxiety disorder 2/11/2016   • History of diverticulitis of colon 2009 and 2003 05/08/2009-acute diverticulitis without complication. 09/05/2003-acute diverticulitis without complication.    • Hyperlipidemia 2/11/2016 01/29/2005--treatment for hyperlipidemia begun.   • Hyperuricemia 8/19/2020   • Hypogonadism in male, on TRT, testosterone pellets, per  6/16/2017 January 2017--patient reports his urologist initiated testosterone replacement therapy consisting of testosterone pellets every 6 months.   • Multiple environmental allergies 2/12/2016    Patient sees the allergist regularly and has been on immunotherapy.   • Muscle cramps, statin related, Vytorin and pravastatin.  Tolerates Livalo. 6/16/2017 12/21/2018--patient seems to be tolerating Livalo well.  10/02/2018--Livalo 2 mg per day initiated.  Recommend CoQ10 400 mg per day with food for better absorption.  Reassess with lab in about 8 weeks.  08/30/2018--patient presents with complaints of muscle cramps.  He discontinue  pravastatin and the cramps went away.  Cramps returned even with a half dose.  This is despite the fact he was taking    • Occlusive coronary artery disease, clinical diagnosis only.  Patient not a candidate for heart catheterization/intervention. 12/10/2019    October 8, 2019--patient seen in follow-up and he reports the long-acting oral nitrate has definitely helped his exertional anginal symptoms.  However, he has not stressed himself completely such as using a push mower.  His blood pressure seems improved as well.  Patient has an appoint with the cardiologist in the next 2 weeks.  I have contacted his cardiologist about the results of the empiric ni   • Osteopenia of multiple sites, 10/18/2021--lumbar spine 0.0.  Left femoral neck -1.5.  Right femoral neck -1.7. 2/11/2016 October 18, 2021--DEXA scan reveals lumbar spine T score 0.0 representing a 3.9% interval increase.  Left femoral neck T score -1.5 which is a 4.6% increase.  Right femoral neck T score -1.7 which is unchanged.  Impression is osteopenia with improvement.  July 12, 2019--DEXA scan reveals lumbar spine T score -0.4.  Unchanged.  Left femoral neck T score -1.7.  Unchanged.  Right femoral neck T score   • Overweight (BMI 25.0-29.9) 9/22/2022   • Primary osteoarthritis of knees, bilateral 9/12/2016 09/12/2016--patient called in the office requesting a steriod injection in his knees.  I explained to him that I do no longer perform these injections and have referred him to Dr. Manuel.   • Secondary hyperparathyroidism of renal origin (HCC) 1/15/2021   • Statin intolerance, Vytorin and pravastatin 12/21/2018 12/21/2018--patient seems to be tolerating Livalo well.  10/02/2018--Livalo 2 mg per day initiated.  Recommend CoQ10 400 mg per day with food for better absorption.  Reassess with lab in about 8 weeks.  08/30/2018--patient presents with complaints of muscle cramps.  He discontinue pravastatin and the cramps went away.  Cramps returned even  with a half dose.  This is despite the fact he was taking    • Vitamin B12 deficiency 2/11/2016 06/25/2009--B12 injections begun.   • Vitamin D deficiency 2/11/2016       Past Surgical History:   Procedure Laterality Date   • CATARACT EXTRACTION Left 12/12/2011 12/12/2011--cataract extirpation with intraocular lens implantation left eye.   • CATARACT EXTRACTION Right 12/2011 December 2011--right cataract extirpation with intraocular lens implantation.   • CHOLECYSTECTOMY WITH INTRAOPERATIVE CHOLANGIOGRAM N/A 11/6/2020    Procedure: CHOLECYSTECTOMY LAPAROSCOPIC INTRAOPERATIVE CHOLANGIOGRAM;  Surgeon: Sanjay Burgess MD;  Location: [facility] MAIN OR;  Service: General;  Laterality: N/A;   • COLONOSCOPY  06/11/2002 06/11/2002--incomplete colonoscopy due to redundant colon. Not able to get past the hepatic flexure. Patient had followup barium contrast enema which showed extensive diverticulosis   • COLONOSCOPY  10/03/2013    10/03/2013--colonoscopy revealed markedly redundant colon, pancolonic diverticulosis with several impacted fecalith. No evidence of diverticulitis or stricture. Was only able to reach the ascending colon. Follow up barium enema revealed extensive diverticulosis. No polyp or other mucosal mass seen.   • CYSTOSCOPY  05/18/2016 05/18/2016--urology consultation.  Cystoscopy performed for possible bladder mass is negative.   • ERCP N/A 11/5/2020    Procedure: ENDOSCOPIC RETROGRADE CHOLANGIOPANCREATOGRAPHY with sphincterotomy, basket retrieval and balloon sweep;  Surgeon: Joseluis Mejia MD;  Location: [facility] ENDOSCOPY;  Service: Gastroenterology;  Laterality: N/A;  pre/post - CBD stones   • PROSTATE BIOPSY  12/21/2016 12/21/2016--prostate biopsy reportedly negative.  I will try to obtain the report.       Social History     Socioeconomic History   • Marital status:    • Number of children: 2   • Years of education: 14   • Highest education level: Some college, no degree    Tobacco Use   • Smoking status: Never Smoker   • Smokeless tobacco: Never Used   • Tobacco comment: caffeine use: 2 cups coffee daily   Vaping Use   • Vaping Use: Never used   Substance and Sexual Activity   • Alcohol use: Yes     Alcohol/week: 5.0 standard drinks     Types: 2 Glasses of wine, 3 Shots of liquor per week     Comment: Moderate alcohol consumption   • Drug use: No   • Sexual activity: Not Currently     Partners: Female       Family History   Problem Relation Age of Onset   • Diabetes Mother    • Heart disease Mother    • Stroke Father        Review of Systems   Constitutional: Negative.   HENT: Negative.    Eyes: Negative.    Cardiovascular: Negative.    Respiratory: Negative.    Endocrine: Negative.    Hematologic/Lymphatic: Negative.    Skin: Negative.    Musculoskeletal: Negative.    Gastrointestinal: Negative.    Genitourinary: Negative.    Neurological: Negative.    Psychiatric/Behavioral: Negative.    Allergic/Immunologic: Negative.        No Known Allergies      Current Outpatient Medications:   •  ALPRAZolam (XANAX) 0.5 MG tablet, TAKE ONE TABLET BY MOUTH TWICE A DAY, Disp: 60 tablet, Rfl: 3  •  amLODIPine (NORVASC) 5 MG tablet, Take 1 p.o. daily for high blood pressure, Disp: 90 tablet, Rfl: 3  •  aspirin 81 MG tablet, Take 1 tablet by mouth daily., Disp: , Rfl:   •  calcitriol (ROCALTROL) 0.25 MCG capsule, Take 0.25 mcg by mouth Daily., Disp: , Rfl:   •  Cholecalciferol 50 MCG (2000 UT) capsule, Take 1 capsule by mouth Daily., Disp: , Rfl:   •  Evolocumab (Repatha SureClick) solution auto-injector SureClick injection, Inject 1 mL subcutaneously as directed every 2 weeks for high cholesterol, Disp: 2 mL, Rfl: 11  •  fluticasone (FLONASE) 50 MCG/ACT nasal spray, SPRAY TWO SPRAYS IN EACH NOSTRIL ONCE DAILY, Disp: 16 mL, Rfl: 3  •  folic acid (FOLVITE) 1 MG tablet, TAKE ONE TABLET BY MOUTH TWICE A DAY, Disp: 180 tablet, Rfl: 3  •  isosorbide mononitrate (IMDUR) 30 MG 24 hr tablet, TAKE ONE  "TABLET BY MOUTH EVERY MORNING FOR HEART, Disp: 30 tablet, Rfl: 3  •  metoprolol succinate XL (TOPROL-XL) 50 MG 24 hr tablet, , Disp: , Rfl:   •  nitroglycerin (NITROSTAT) 0.6 MG SL tablet, DISSOLVE 1 TAB UNDER TONGUE FOR CHEST PAIN - IF PAIN REMAINS AFTER 5 MIN, CALL 911 AND REPEAT DOSE. MAX 3 TABS IN 15 MINUTES, Disp: 100 tablet, Rfl: 11  •  omeprazole (priLOSEC) 40 MG capsule, TAKE ONE CAPSULE BY MOUTH DAILY, Disp: 90 capsule, Rfl: 3    Current Facility-Administered Medications:   •  cyanocobalamin injection 1,000 mcg, 1,000 mcg, Intramuscular, Q28 Days, Delgado Patricia MD, 1,000 mcg at 08/18/22 1405      Objective:     Vitals:    10/03/22 1102   BP: 126/80   BP Location: Left arm   Patient Position: Sitting   Pulse: 76   SpO2: 98%   Weight: 83.7 kg (184 lb 9.6 oz)   Height: 172.7 cm (68\")     Body mass index is 28.07 kg/m².    PHYSICAL EXAM:    Constitutional:       Appearance: Healthy appearance. Not in distress.   Neck:      Vascular: No JVR. JVD normal.   Pulmonary:      Effort: Pulmonary effort is normal.      Breath sounds: Normal breath sounds. No wheezing. No rhonchi. No rales.   Chest:      Chest wall: Not tender to palpatation.   Cardiovascular:      PMI at left midclavicular line. Normal rate. Regular rhythm. Normal S1. Normal S2.      Murmurs: There is no murmur.      No gallop. No click. No rub.   Pulses:     Intact distal pulses.   Edema:     Peripheral edema absent.   Abdominal:      General: Bowel sounds are normal.      Palpations: Abdomen is soft.      Tenderness: There is no abdominal tenderness.   Musculoskeletal: Normal range of motion.         General: No tenderness. Skin:     General: Skin is warm and dry.   Neurological:      General: No focal deficit present.      Mental Status: Alert and oriented to person, place and time.           ECG 12 Lead    Date/Time: 10/3/2022 11:38 AM  Performed by: Yamini Verdugo APRN  Authorized by: Yamini Verdugo APRN   Comparison: compared with " previous ECG from 3/31/2022  Rhythm: sinus rhythm  Rate: normal  BPM: 76  ST Segments: ST segments normal  T Waves: T waves normal    Clinical impression: normal ECG              Assessment:       Diagnosis Plan   1. Occlusive coronary artery disease, clinical diagnosis only.  Patient not a candidate for heart catheterization/intervention.     2. Benign essential hypertension     3. Bilateral carotid artery stenosis     4. Exertional angina ([facility])       Orders Placed This Encounter   Procedures   • ECG 12 Lead     This order was created via procedure documentation     Order Specific Question:   Release to patient     Answer:   Routine Release          Plan:       1.  Coronary artery disease: Myocardial perfusion study with no evidence of ischemia, EF 64% in 2019.  Patient was started on Imdur in 2019 due to exertional angina.  Long-acting nitrate has helped and patient has had no recurrence  2.  Hypertension: Well-controlled on current medication  3.  Benign carotid artery disease: Last office visit, patient had recent visual disturbance which have been attributed to possible vertigo.  Aspirin was increased to daily  4.  Hyperlipidemia: Now on Repatha every other week.  Monitored by PCP    Patient will follow up with me in 6 months.  He will call sooner for any questions or concerns.           Your medication list          Accurate as of October 3, 2022 11:46 AM. If you have any questions, ask your nurse or doctor.            CONTINUE taking these medications      Instructions Last Dose Given Next Dose Due   ALPRAZolam 0.5 MG tablet  Commonly known as: XANAX      TAKE ONE TABLET BY MOUTH TWICE A DAY       amLODIPine 5 MG tablet  Commonly known as: NORVASC      Take 1 p.o. daily for high blood pressure       aspirin 81 MG tablet      Take 1 tablet by mouth daily.       calcitriol 0.25 MCG capsule  Commonly known as: ROCALTROL      Take 0.25 mcg by mouth Daily.       Cholecalciferol 50 MCG (2000 UT) capsule      Take 1  capsule by mouth Daily.       fluticasone 50 MCG/ACT nasal spray  Commonly known as: FLONASE      SPRAY TWO SPRAYS IN EACH NOSTRIL ONCE DAILY       folic acid 1 MG tablet  Commonly known as: FOLVITE      TAKE ONE TABLET BY MOUTH TWICE A DAY       isosorbide mononitrate 30 MG 24 hr tablet  Commonly known as: IMDUR      TAKE ONE TABLET BY MOUTH EVERY MORNING FOR HEART       metoprolol succinate XL 50 MG 24 hr tablet  Commonly known as: TOPROL-XL           nitroglycerin 0.6 MG SL tablet  Commonly known as: NITROSTAT      DISSOLVE 1 TAB UNDER TONGUE FOR CHEST PAIN - IF PAIN REMAINS AFTER 5 MIN, CALL 911 AND REPEAT DOSE. MAX 3 TABS IN 15 MINUTES       omeprazole 40 MG capsule  Commonly known as: priLOSEC      TAKE ONE CAPSULE BY MOUTH DAILY       Repatha SureClick solution auto-injector SureClick injection  Generic drug: Evolocumab      Inject 1 mL subcutaneously as directed every 2 weeks for high cholesterol                As always, it has been a pleasure to participate in your patient's care.      Sincerely,       VIRIDIANA Angulo

## 2022-10-20 ENCOUNTER — OFFICE VISIT (OUTPATIENT)
Dept: INTERNAL MEDICINE | Facility: CLINIC | Age: 87
End: 2022-10-20

## 2022-10-20 VITALS
DIASTOLIC BLOOD PRESSURE: 64 MMHG | BODY MASS INDEX: 27.28 KG/M2 | RESPIRATION RATE: 18 BRPM | HEIGHT: 68 IN | SYSTOLIC BLOOD PRESSURE: 120 MMHG | WEIGHT: 180 LBS | HEART RATE: 75 BPM | OXYGEN SATURATION: 99 %

## 2022-10-20 DIAGNOSIS — R73.01 IMPAIRED FASTING GLUCOSE: Chronic | ICD-10-CM

## 2022-10-20 DIAGNOSIS — E53.8 FOLIC ACID DEFICIENCY: Chronic | ICD-10-CM

## 2022-10-20 DIAGNOSIS — E78.2 MIXED HYPERLIPIDEMIA: Chronic | ICD-10-CM

## 2022-10-20 DIAGNOSIS — E55.9 VITAMIN D DEFICIENCY: Chronic | ICD-10-CM

## 2022-10-20 DIAGNOSIS — N18.4 ANEMIA DUE TO STAGE 4 CHRONIC KIDNEY DISEASE: Chronic | ICD-10-CM

## 2022-10-20 DIAGNOSIS — E79.0 HYPERURICEMIA: Chronic | ICD-10-CM

## 2022-10-20 DIAGNOSIS — Z00.00 ENCOUNTER FOR SUBSEQUENT ANNUAL WELLNESS VISIT (AWV) IN MEDICARE PATIENT: Primary | ICD-10-CM

## 2022-10-20 DIAGNOSIS — D63.1 ANEMIA DUE TO STAGE 4 CHRONIC KIDNEY DISEASE: Chronic | ICD-10-CM

## 2022-10-20 DIAGNOSIS — N18.4 CHRONIC RENAL INSUFFICIENCY, STAGE IV (SEVERE): Chronic | ICD-10-CM

## 2022-10-20 PROBLEM — I20.89 EXERTIONAL ANGINA: Chronic | Status: RESOLVED | Noted: 2019-09-17 | Resolved: 2022-10-20

## 2022-10-20 PROBLEM — I20.8 EXERTIONAL ANGINA: Chronic | Status: RESOLVED | Noted: 2019-09-17 | Resolved: 2022-10-20

## 2022-10-20 PROCEDURE — G0439 PPPS, SUBSEQ VISIT: HCPCS | Performed by: INTERNAL MEDICINE

## 2022-10-20 PROCEDURE — 1170F FXNL STATUS ASSESSED: CPT | Performed by: INTERNAL MEDICINE

## 2022-10-20 PROCEDURE — 1159F MED LIST DOCD IN RCRD: CPT | Performed by: INTERNAL MEDICINE

## 2022-10-20 NOTE — PROGRESS NOTES
The ABCs of the Annual Wellness Visit  Subsequent Medicare Wellness Visit    No chief complaint on file.     Subjective    History of Present Illness:  Radha Wni is a 89 y.o. male who presents for a Subsequent Medicare Wellness Visit.    The following portions of the patient's history were reviewed and   updated as appropriate: allergies, current medications, past family history, past medical history, past social history, past surgical history and problem list.    Compared to one year ago, the patient feels his physical   health is better.    Compared to one year ago, the patient feels his mental   health is the same.    Recent Hospitalizations:  He was not admitted to the hospital during the last year.       Current Medical Providers:  Patient Care Team:  Delgado Patricia MD as PCP - General (Internal Medicine)    Outpatient Medications Prior to Visit   Medication Sig Dispense Refill   • ALPRAZolam (XANAX) 0.5 MG tablet TAKE ONE TABLET BY MOUTH TWICE A DAY 60 tablet 3   • amLODIPine (NORVASC) 5 MG tablet Take 1 p.o. daily for high blood pressure 90 tablet 3   • aspirin 81 MG tablet Take 1 tablet by mouth daily.     • calcitriol (ROCALTROL) 0.25 MCG capsule Take 0.25 mcg by mouth Daily.     • Cholecalciferol 50 MCG (2000 UT) capsule Take 1 capsule by mouth Daily.     • Evolocumab (Repatha SureClick) solution auto-injector SureClick injection Inject 1 mL subcutaneously as directed every 2 weeks for high cholesterol 2 mL 11   • fluticasone (FLONASE) 50 MCG/ACT nasal spray SPRAY TWO SPRAYS IN EACH NOSTRIL ONCE DAILY 16 mL 3   • folic acid (FOLVITE) 1 MG tablet TAKE ONE TABLET BY MOUTH TWICE A  tablet 3   • isosorbide mononitrate (IMDUR) 30 MG 24 hr tablet TAKE ONE TABLET BY MOUTH EVERY MORNING 30 tablet 3   • metoprolol succinate XL (TOPROL-XL) 50 MG 24 hr tablet      • nitroglycerin (NITROSTAT) 0.6 MG SL tablet DISSOLVE 1 TAB UNDER TONGUE FOR CHEST PAIN - IF PAIN REMAINS AFTER 5 MIN, CALL 911 AND  REPEAT DOSE. MAX 3 TABS IN 15 MINUTES 100 tablet 11   • omeprazole (priLOSEC) 40 MG capsule TAKE ONE CAPSULE BY MOUTH DAILY 90 capsule 3     Facility-Administered Medications Prior to Visit   Medication Dose Route Frequency Provider Last Rate Last Admin   • cyanocobalamin injection 1,000 mcg  1,000 mcg Intramuscular Q28 Days Delgado Patricia MD   1,000 mcg at 08/18/22 1405       No opioid medication identified on active medication list. I have reviewed chart for other potential  high risk medication/s and harmful drug interactions in the elderly.          Aspirin is on active medication list. Aspirin use is indicated based on review of current medical condition/s. Pros and cons of this therapy have been discussed today. Benefits of this medication outweigh potential harm.  Patient has been encouraged to continue taking this medication.  .      Patient Active Problem List   Diagnosis   • Bilateral carotid artery stenosis   • Benign prostatic hypertrophy   • Chronic renal insufficiency, stage IV (severe), 02/09/2016--creatinine 1.85   • Diverticulosis of colon   • Hyperlipidemia   • Osteopenia of multiple sites, 10/18/2021--lumbar spine 0.0.  Left femoral neck -1.5.  Right femoral neck -1.7.   • Vitamin B12 deficiency   • Vitamin D deficiency   • Allergic rhinitis   • Anemia due to stage 4 chronic kidney disease (HCC)   • Generalized anxiety disorder   • Benign essential hypertension   • Gastroesophageal reflux disease without esophagitis   • Folic acid deficiency   • Multiple environmental allergies   • Therapeutic drug monitoring   • Bilateral renal cysts   • Primary osteoarthritis of knees, bilateral   • Impaired fasting glucose   • Muscle cramps, statin related, Vytorin and pravastatin.  Tolerates Livalo.   • Hypogonadism in male, on TRT, testosterone pellets, per    • Chronic neck pain   • Cervical radiculopathy   • Statin intolerance, Vytorin and pravastatin   • Degeneration of cervical intervertebral disc  "  • Exertional angina (HCC)   • Occlusive coronary artery disease, clinical diagnosis only.  Patient not a candidate for heart catheterization/intervention.   • Hyperuricemia   • Secondary hyperparathyroidism of renal origin (HCC)   • Chronic left shoulder pain   • Overweight (BMI 25.0-29.9)     Advance Care Planning  Advance Directive is on file.  ACP discussion was held with the patient during this visit. Patient has an advance directive in EMR which is still valid.     Review of Systems   Constitutional: Negative.    HENT: Negative.    Eyes: Negative.    Respiratory: Negative.    Cardiovascular: Negative.    Gastrointestinal: Negative.    Endocrine: Negative.    Genitourinary: Negative.    Musculoskeletal: Negative.    Skin: Negative.    Allergic/Immunologic: Negative.    Neurological: Negative.    Hematological: Negative.    Psychiatric/Behavioral: Negative.         Objective    There were no vitals filed for this visit.  Estimated body mass index is 28.07 kg/m² as calculated from the following:    Height as of 10/3/22: 172.7 cm (68\").    Weight as of 10/3/22: 83.7 kg (184 lb 9.6 oz).    BMI is >= 25 and <30. (Overweight) The following options were offered after discussion;: exercise counseling/recommendations and nutrition counseling/recommendations      Does the patient have evidence of cognitive impairment? No    Physical Exam     General: Alert and oriented x 3.  No acute distress.  Normal affect.  HEENT: Pupils equal, round, reactive to light; extraocular movements intact; sclerae nonicteric; pharynx, ear canals and TMs normal.  Neck: Without JVD, thyromegaly, bruit, or adenopathy.  Lungs: Clear to auscultation in all fields.  Heart: Regular rate and rhythm without murmur, rub, gallop, or click.  Abdomen: Soft, nontender, without hepatosplenomegaly or hernia.  Bowel sounds normal.  : Deferred.  Rectal: Deferred.  Extremities: Without clubbing, cyanosis, edema, or pulse deficit.  Neurologic: Intact without " focal deficit.  Normal station and gait observed during ingress and egress from the examination room.  Skin: Without significant lesion.  Musculoskeletal: Unremarkable.    Lab Results   Component Value Date    CHLPL 193 09/16/2022    TRIG 105 09/16/2022    HGBA1C 6.50 (H) 09/16/2022       HEALTH RISK ASSESSMENT    Smoking Status:  Social History     Tobacco Use   Smoking Status Never   Smokeless Tobacco Never   Tobacco Comments    caffeine use: 2 cups coffee daily     Alcohol Consumption:  Social History     Substance and Sexual Activity   Alcohol Use Yes   • Alcohol/week: 5.0 standard drinks   • Types: 2 Glasses of wine, 3 Shots of liquor per week    Comment: Moderate alcohol consumption     Fall Risk Screen:    LOREN Fall Risk Assessment was completed, and patient is at LOW risk for falls.Assessment completed on:3/1/2022    Depression Screening:  PHQ-2/PHQ-9 Depression Screening 3/1/2022   Retired PHQ-9 Total Score 0   Retired Total Score 0       Health Habits and Functional and Cognitive Screening:  Functional & Cognitive Status 9/1/2021   Do you have difficulty preparing food and eating? No   Do you have difficulty bathing yourself, getting dressed or grooming yourself? No   Do you have difficulty using the toilet? No   Do you have difficulty moving around from place to place? No   Do you have trouble with steps or getting out of a bed or a chair? No   Current Diet Well Balanced Diet   Dental Exam Up to date   Eye Exam Up to date   Exercise (times per week) 4 times per week   Current Exercises Include Walking   Current Exercise Activities Include -   Do you need help using the phone?  No   Are you deaf or do you have serious difficulty hearing?  No   Do you need help with transportation? No   Do you need help shopping? No   Do you need help preparing meals?  No   Do you need help with housework?  No   Do you need help with laundry? No   Do you need help taking your medications? No   Do you need help managing  money? No   Do you ever drive or ride in a car without wearing a seat belt? No   Have you felt unusual stress, anger or loneliness in the last month? No   Who do you live with? Alone   If you need help, do you have trouble finding someone available to you? No   Have you been bothered in the last four weeks by sexual problems? No   Do you have difficulty concentrating, remembering or making decisions? No       Age-appropriate Screening Schedule:  Refer to the list below for future screening recommendations based on patient's age, sex and/or medical conditions. Orders for these recommended tests are listed in the plan section. The patient has been provided with a written plan.    Health Maintenance   Topic Date Due   • LIPID PANEL  09/16/2023   • DXA SCAN  10/18/2023   • TDAP/TD VACCINES (2 - Td or Tdap) 03/09/2027   • INFLUENZA VACCINE  Completed   • ZOSTER VACCINE  Discontinued              Assessment & Plan   CMS Preventative Services Quick Reference  Risk Factors Identified During Encounter  Cardiovascular Disease  Immunizations Discussed/Encouraged (specific Immunizations; COVID19  Obesity/Overweight   The above risks/problems have been discussed with the patient.  Follow up actions/plans if indicated are seen below in the Assessment/Plan Section.  Pertinent information has been shared with the patient in the After Visit Summary.    Diagnoses and all orders for this visit:    1. Encounter for subsequent annual wellness visit (AWV) in Medicare patient (Primary)    2. Chronic renal insufficiency, stage IV (severe), 02/09/2016--creatinine 1.85  -     CBC (No Diff)    3. Anemia due to stage 4 chronic kidney disease (HCC)  -     CBC (No Diff)    4. Hyperlipidemia  -     CK  -     Comprehensive Metabolic Panel  -     NMR LipoProfile  -     TSH  -     T4, Free  -     T3, Free  -     Homocysteine    5. Impaired fasting glucose  -     Comprehensive Metabolic Panel  -     Hemoglobin A1c    6. Vitamin D deficiency  -      Vitamin D 25 Hydroxy    7. Folic acid deficiency  -     Homocysteine    8. Hyperuricemia  -     Uric Acid        Follow Up:   No follow-ups on file.     An After Visit Summary and PPPS were made available to the patient.

## 2022-10-21 LAB
25(OH)D3+25(OH)D2 SERPL-MCNC: 41.7 NG/ML (ref 30–100)
ALBUMIN SERPL-MCNC: 4.4 G/DL (ref 3.5–5.2)
ALBUMIN/GLOB SERPL: 2.4 G/DL
ALP SERPL-CCNC: 95 U/L (ref 39–117)
ALT SERPL-CCNC: 26 U/L (ref 1–41)
AST SERPL-CCNC: 24 U/L (ref 1–40)
BILIRUB SERPL-MCNC: 0.4 MG/DL (ref 0–1.2)
BUN SERPL-MCNC: 23 MG/DL (ref 8–23)
BUN/CREAT SERPL: 13.1 (ref 7–25)
CALCIUM SERPL-MCNC: 9.4 MG/DL (ref 8.2–9.6)
CHLORIDE SERPL-SCNC: 105 MMOL/L (ref 98–107)
CHOLEST SERPL-MCNC: 139 MG/DL (ref 100–199)
CK SERPL-CCNC: 66 U/L (ref 20–200)
CO2 SERPL-SCNC: 26 MMOL/L (ref 22–29)
CREAT SERPL-MCNC: 1.75 MG/DL (ref 0.76–1.27)
EGFRCR SERPLBLD CKD-EPI 2021: 36.5 ML/MIN/1.73
ERYTHROCYTE [DISTWIDTH] IN BLOOD BY AUTOMATED COUNT: 12.1 % (ref 12.3–15.4)
GLOBULIN SER CALC-MCNC: 1.8 GM/DL
GLUCOSE SERPL-MCNC: 115 MG/DL (ref 65–99)
HBA1C MFR BLD: 6.8 % (ref 4.8–5.6)
HCT VFR BLD AUTO: 35.3 % (ref 37.5–51)
HCYS SERPL-SCNC: 9.9 UMOL/L (ref 0–21.3)
HDL SERPL-SCNC: 32 UMOL/L
HDLC SERPL-MCNC: 43 MG/DL
HGB BLD-MCNC: 12.2 G/DL (ref 13–17.7)
LDL SERPL QN: 20.2 NM
LDL SERPL-SCNC: 734 NMOL/L
LDL SMALL SERPL-SCNC: 450 NMOL/L
LDLC SERPL CALC-MCNC: 71 MG/DL (ref 0–99)
MCH RBC QN AUTO: 30.3 PG (ref 26.6–33)
MCHC RBC AUTO-ENTMCNC: 34.6 G/DL (ref 31.5–35.7)
MCV RBC AUTO: 87.6 FL (ref 79–97)
PLATELET # BLD AUTO: 187 10*3/MM3 (ref 140–450)
POTASSIUM SERPL-SCNC: 5.6 MMOL/L (ref 3.5–5.2)
PROT SERPL-MCNC: 6.2 G/DL (ref 6–8.5)
RBC # BLD AUTO: 4.03 10*6/MM3 (ref 4.14–5.8)
SODIUM SERPL-SCNC: 142 MMOL/L (ref 136–145)
T3FREE SERPL-MCNC: 2.4 PG/ML (ref 2–4.4)
T4 FREE SERPL-MCNC: 1.11 NG/DL (ref 0.93–1.7)
TRIGL SERPL-MCNC: 140 MG/DL (ref 0–149)
TSH SERPL DL<=0.005 MIU/L-ACNC: 3.7 UIU/ML (ref 0.27–4.2)
URATE SERPL-MCNC: 6.5 MG/DL (ref 3.4–7)
WBC # BLD AUTO: 5.83 10*3/MM3 (ref 3.4–10.8)

## 2022-11-08 ENCOUNTER — OFFICE VISIT (OUTPATIENT)
Dept: INTERNAL MEDICINE | Facility: CLINIC | Age: 87
End: 2022-11-08

## 2022-11-08 VITALS
HEIGHT: 68 IN | SYSTOLIC BLOOD PRESSURE: 142 MMHG | OXYGEN SATURATION: 98 % | TEMPERATURE: 97.1 F | BODY MASS INDEX: 28.16 KG/M2 | WEIGHT: 185.8 LBS | HEART RATE: 60 BPM | DIASTOLIC BLOOD PRESSURE: 75 MMHG

## 2022-11-08 DIAGNOSIS — E55.9 VITAMIN D DEFICIENCY: Chronic | ICD-10-CM

## 2022-11-08 DIAGNOSIS — I65.23 BILATERAL CAROTID ARTERY STENOSIS: Chronic | ICD-10-CM

## 2022-11-08 DIAGNOSIS — Z51.81 THERAPEUTIC DRUG MONITORING: ICD-10-CM

## 2022-11-08 DIAGNOSIS — E66.3 OVERWEIGHT (BMI 25.0-29.9): Chronic | ICD-10-CM

## 2022-11-08 DIAGNOSIS — K21.9 GASTROESOPHAGEAL REFLUX DISEASE WITHOUT ESOPHAGITIS: Chronic | ICD-10-CM

## 2022-11-08 DIAGNOSIS — N18.4 CHRONIC RENAL INSUFFICIENCY, STAGE IV (SEVERE): Chronic | ICD-10-CM

## 2022-11-08 DIAGNOSIS — R73.01 IMPAIRED FASTING GLUCOSE: Primary | Chronic | ICD-10-CM

## 2022-11-08 DIAGNOSIS — E53.8 FOLIC ACID DEFICIENCY: Chronic | ICD-10-CM

## 2022-11-08 DIAGNOSIS — I25.10 OCCLUSIVE CORONARY ARTERY DISEASE: Chronic | ICD-10-CM

## 2022-11-08 DIAGNOSIS — E78.2 MIXED HYPERLIPIDEMIA: Chronic | ICD-10-CM

## 2022-11-08 DIAGNOSIS — N25.81 SECONDARY HYPERPARATHYROIDISM OF RENAL ORIGIN: Chronic | ICD-10-CM

## 2022-11-08 DIAGNOSIS — E29.1 HYPOGONADISM IN MALE: Chronic | ICD-10-CM

## 2022-11-08 DIAGNOSIS — M85.89 OSTEOPENIA OF MULTIPLE SITES: Chronic | ICD-10-CM

## 2022-11-08 DIAGNOSIS — N40.0 BENIGN NON-NODULAR PROSTATIC HYPERPLASIA WITHOUT LOWER URINARY TRACT SYMPTOMS: Chronic | ICD-10-CM

## 2022-11-08 DIAGNOSIS — N18.4 ANEMIA DUE TO STAGE 4 CHRONIC KIDNEY DISEASE: Chronic | ICD-10-CM

## 2022-11-08 DIAGNOSIS — E53.8 VITAMIN B12 DEFICIENCY: Chronic | ICD-10-CM

## 2022-11-08 DIAGNOSIS — M17.0 PRIMARY OSTEOARTHRITIS OF KNEES, BILATERAL: Chronic | ICD-10-CM

## 2022-11-08 DIAGNOSIS — Z78.9 STATIN INTOLERANCE: Chronic | ICD-10-CM

## 2022-11-08 DIAGNOSIS — I10 BENIGN ESSENTIAL HYPERTENSION: Chronic | ICD-10-CM

## 2022-11-08 DIAGNOSIS — D63.1 ANEMIA DUE TO STAGE 4 CHRONIC KIDNEY DISEASE: Chronic | ICD-10-CM

## 2022-11-08 DIAGNOSIS — E79.0 HYPERURICEMIA: Chronic | ICD-10-CM

## 2022-11-08 DIAGNOSIS — R25.2 MUSCLE CRAMPS: Chronic | ICD-10-CM

## 2022-11-08 DIAGNOSIS — Z91.09 MULTIPLE ENVIRONMENTAL ALLERGIES: Chronic | ICD-10-CM

## 2022-11-08 PROCEDURE — 99214 OFFICE O/P EST MOD 30 MIN: CPT | Performed by: INTERNAL MEDICINE

## 2022-11-08 PROCEDURE — 96372 THER/PROPH/DIAG INJ SC/IM: CPT | Performed by: INTERNAL MEDICINE

## 2022-11-08 RX ORDER — CALCITRIOL 0.25 UG/1
0.25 CAPSULE, LIQUID FILLED ORAL
COMMUNITY
Start: 2022-10-24 | End: 2023-04-22

## 2022-11-08 RX ADMIN — CYANOCOBALAMIN 1000 MCG: 1000 INJECTION, SOLUTION INTRAMUSCULAR; SUBCUTANEOUS at 14:13

## 2022-11-08 NOTE — PROGRESS NOTES
11/08/2022    Patient Information  Radha Win                                                                                          [id number]  [facility] [address]      7/26/1932  [unfilled]  There is no work phone number on file.    Chief Complaint:      Follow-up blood work in order to monitor chronic medical issues listed in the history of present illness.  No new acute complaints.    History of Present Illness:    Patient with multiple chronic medical problems including impaired fasting glucose, stage IV chronic renal insufficiency, anemia due to chronic renal insufficiency, secondary hyperparathyroidism of renal origin, hyperlipidemia, BPH, vitamin B12 and vitamin D deficiency, folic acid deficiency, hypertension, osteopenia, carotid artery stenosis, esophageal reflux, environmental allergies, osteoarthritis of the knees, muscle cramps/intolerant to Vytorin and pravastatin.  Occlusive coronary artery disease per clinical diagnosis, hyperuricemia, overweight.  He presents today for follow-up with lab prior in order to monitor his chronic medical issues.  His past medical history reviewed and updated were necessary including health maintenance parameters.  This reveals he will be up-to-date or else accounted for after today's visit.    Review of Systems   Constitutional: Negative.   HENT: Negative.    Eyes: Negative.    Cardiovascular: Negative.  Negative for chest pain.   Respiratory: Negative.    Endocrine: Negative.    Hematologic/Lymphatic: Negative.    Skin: Negative.    Musculoskeletal: Positive for arthritis, joint pain and neck pain.   Gastrointestinal: Negative.    Genitourinary: Negative.    Neurological: Negative.    Psychiatric/Behavioral: Negative.    Allergic/Immunologic: Negative.        Active Problems:    Patient Active Problem List   Diagnosis   • Bilateral carotid artery stenosis   • Benign prostatic hypertrophy   • Chronic renal insufficiency, stage IV  (severe), 02/09/2016--creatinine 1.85   • Diverticulosis of colon   • Hyperlipidemia   • Osteopenia of multiple sites, 10/18/2021--lumbar spine 0.0.  Left femoral neck -1.5.  Right femoral neck -1.7.   • Vitamin B12 deficiency   • Vitamin D deficiency   • Allergic rhinitis   • Anemia due to stage 4 chronic kidney disease (HCC)   • Generalized anxiety disorder   • Benign essential hypertension   • Gastroesophageal reflux disease without esophagitis   • Folic acid deficiency   • Multiple environmental allergies   • Therapeutic drug monitoring   • Bilateral renal cysts   • Primary osteoarthritis of knees, bilateral   • Impaired fasting glucose   • Muscle cramps, statin related, Vytorin and pravastatin.  Tolerates Livalo.   • Hypogonadism in male, on TRT, testosterone pellets, per    • Chronic neck pain   • Cervical radiculopathy   • Statin intolerance, Vytorin and pravastatin   • Degeneration of cervical intervertebral disc   • Occlusive coronary artery disease, clinical diagnosis only.  Patient not a candidate for heart catheterization/intervention.   • Hyperuricemia   • Secondary hyperparathyroidism of renal origin (HCC)   • Chronic left shoulder pain   • Overweight (BMI 25.0-29.9)         Past Medical History:   Diagnosis Date   • Allergic rhinitis 2/11/2016   • Anemia due to chronic kidney disease 2/11/2016   • Benign essential hypertension 2/11/2016   • Benign prostatic hypertrophy 2/11/2016 12/21/2016--prostate biopsy reportedly negative.  I will try to obtain the report.  Patient sees urologist.  PSAs run from the 3-8 range   • Bilateral carotid artery plaque, 09/21/2016--16-49% bilateral carotid artery stenosis 2/11/2016 09/21/2016--vascular screen reveals 16-49% bilateral carotid artery stenosis, negative for AAA, negative for PAD.  10/10/2008--vascular screening study revealed mild bilateral carotid plaque, no aortic aneurysm, no evidence of PAD   • Bilateral renal cysts 2/14/2016     04/07/2016--ultrasound of the kidneys reveals the previously described renal cysts are not well seen.  However, questionable incidental bladder tumor noted.  05/26/2010--ultrasound the kidneys was negative bilaterally except for small renal cysts.   • Cervical radiculopathy 6/28/2018    10/02/2018--patient seen in follow-up and the results of the MRI discussed.  He continues to have intermittent radicular symptoms which is currently only on the left.  Discussed options with patient and I have recommended neurosurgery consultation.  Patient may benefit from epidural injections.  09/06/2018--MRI of the cervical spine reveals moderate neural foraminal compromise on the right at C3-C   • Chronic left shoulder pain 7/19/2021 July 19, 2021--patient presents with continued left-sided neck pain that radiates into his left shoulder but does not really go over that far down into the left upper extremity.  The pain is definitely accentuated by leaning his head to the right and rotating his head to the right.  He does not do anything to aggravate it or make it better by moving his head towards the left.  On exam he has good    • Chronic renal insufficiency, stage IV (severe), 02/09/2016--creatinine 1.85 2/11/2016 02/09/2016--serum creatinine 1.85.  BUN 29.  07/20/2015--patient was evaluated by the nephrologist.  Ultrasound of the kidneys ordered but this was never done.  05/22/2015--BUN 35, creatinine 2.3.  Patient referred to nephrology, Dr. Devyn Muniz.  04/16/2014--BUN 25, creatinine 1.77.  01/03/2013--BUN 37, creatinine 2.14.    05/26/2010---ultrasound of the kidneys reveal a small cyst x 2 right kidney.  Otherwise normal.    Baseline creatinine approximately 1.9-2.0.   • Degeneration of cervical intervertebral disc 5/2/2019   • Diverticulosis of colon 2/11/2016    10/03/2013--colonoscopy revealed markedly redundant colon, pancolonic diverticulosis with several impacted fecalith.  No evidence of diverticulitis or  stricture.  Was only able to reach the ascending colon.  Follow up barium enema revealed extensive diverticulosis.  No polyp or other mucosal mass seen.    06/11/2002--incomplete colonoscopy due to redundant colon.  Not able to get past the hepatic flexure.  Patient had followup barium contrast enema which showed extensive diverticulosis.   • Exertional angina (HCC) 9/17/2019 October 8, 2019--patient seen in follow-up and he reports the long-acting oral nitrate has definitely helped his exertional anginal symptoms.  However, he has not stressed himself completely such as using a push mower.  His blood pressure seems improved as well.  Patient has an appoint with the cardiologist in the next 2 weeks.  I have contacted his cardiologist about the results of the empiric ni   • Folic acid deficiency 2/11/2016   • Gastroesophageal reflux disease without esophagitis 2/11/2016   • Generalized anxiety disorder 2/11/2016   • History of diverticulitis of colon 2009 and 2003 05/08/2009-acute diverticulitis without complication. 09/05/2003-acute diverticulitis without complication.    • Hyperlipidemia 2/11/2016 01/29/2005--treatment for hyperlipidemia begun.   • Hyperuricemia 8/19/2020   • Hypogonadism in male, on TRT, testosterone pellets, per  6/16/2017 January 2017--patient reports his urologist initiated testosterone replacement therapy consisting of testosterone pellets every 6 months.   • Multiple environmental allergies 2/12/2016    Patient sees the allergist regularly and has been on immunotherapy.   • Muscle cramps, statin related, Vytorin and pravastatin.  Tolerates Livalo. 6/16/2017 12/21/2018--patient seems to be tolerating Livalo well.  10/02/2018--Livalo 2 mg per day initiated.  Recommend CoQ10 400 mg per day with food for better absorption.  Reassess with lab in about 8 weeks.  08/30/2018--patient presents with complaints of muscle cramps.  He discontinue pravastatin and the cramps went away.   Cramps returned even with a half dose.  This is despite the fact he was taking    • Occlusive coronary artery disease, clinical diagnosis only.  Patient not a candidate for heart catheterization/intervention. 12/10/2019    October 8, 2019--patient seen in follow-up and he reports the long-acting oral nitrate has definitely helped his exertional anginal symptoms.  However, he has not stressed himself completely such as using a push mower.  His blood pressure seems improved as well.  Patient has an appoint with the cardiologist in the next 2 weeks.  I have contacted his cardiologist about the results of the empiric ni   • Osteopenia of multiple sites, 10/18/2021--lumbar spine 0.0.  Left femoral neck -1.5.  Right femoral neck -1.7. 2/11/2016 October 18, 2021--DEXA scan reveals lumbar spine T score 0.0 representing a 3.9% interval increase.  Left femoral neck T score -1.5 which is a 4.6% increase.  Right femoral neck T score -1.7 which is unchanged.  Impression is osteopenia with improvement.  July 12, 2019--DEXA scan reveals lumbar spine T score -0.4.  Unchanged.  Left femoral neck T score -1.7.  Unchanged.  Right femoral neck T score   • Overweight (BMI 25.0-29.9) 9/22/2022   • Primary osteoarthritis of knees, bilateral 9/12/2016 09/12/2016--patient called in the office requesting a steriod injection in his knees.  I explained to him that I do no longer perform these injections and have referred him to Dr. Manuel.   • Secondary hyperparathyroidism of renal origin (HCC) 1/15/2021   • Statin intolerance, Vytorin and pravastatin 12/21/2018 12/21/2018--patient seems to be tolerating Livalo well.  10/02/2018--Livalo 2 mg per day initiated.  Recommend CoQ10 400 mg per day with food for better absorption.  Reassess with lab in about 8 weeks.  08/30/2018--patient presents with complaints of muscle cramps.  He discontinue pravastatin and the cramps went away.  Cramps returned even with a half dose.  This is despite the  fact he was taking    • Vitamin B12 deficiency 2/11/2016 06/25/2009--B12 injections begun.   • Vitamin D deficiency 2/11/2016         Past Surgical History:   Procedure Laterality Date   • CATARACT EXTRACTION Left 12/12/2011 12/12/2011--cataract extirpation with intraocular lens implantation left eye.   • CATARACT EXTRACTION Right 12/2011 December 2011--right cataract extirpation with intraocular lens implantation.   • CHOLECYSTECTOMY WITH INTRAOPERATIVE CHOLANGIOGRAM N/A 11/6/2020    Procedure: CHOLECYSTECTOMY LAPAROSCOPIC INTRAOPERATIVE CHOLANGIOGRAM;  Surgeon: Sanjay Burgess MD;  Location: [facility] MAIN OR;  Service: General;  Laterality: N/A;   • COLONOSCOPY  06/11/2002 06/11/2002--incomplete colonoscopy due to redundant colon. Not able to get past the hepatic flexure. Patient had followup barium contrast enema which showed extensive diverticulosis   • COLONOSCOPY  10/03/2013    10/03/2013--colonoscopy revealed markedly redundant colon, pancolonic diverticulosis with several impacted fecalith. No evidence of diverticulitis or stricture. Was only able to reach the ascending colon. Follow up barium enema revealed extensive diverticulosis. No polyp or other mucosal mass seen.   • CYSTOSCOPY  05/18/2016 05/18/2016--urology consultation.  Cystoscopy performed for possible bladder mass is negative.   • ERCP N/A 11/5/2020    Procedure: ENDOSCOPIC RETROGRADE CHOLANGIOPANCREATOGRAPHY with sphincterotomy, basket retrieval and balloon sweep;  Surgeon: Joseluis Mejia MD;  Location: [facility] ENDOSCOPY;  Service: Gastroenterology;  Laterality: N/A;  pre/post - CBD stones   • PROSTATE BIOPSY  12/21/2016 12/21/2016--prostate biopsy reportedly negative.  I will try to obtain the report.         No Known Allergies        Current Outpatient Medications:   •  calcitriol (ROCALTROL) 0.25 MCG capsule, Take 1 capsule by mouth., Disp: , Rfl:   •  ALPRAZolam (XANAX) 0.5 MG tablet, TAKE ONE TABLET BY MOUTH TWICE  A DAY, Disp: 60 tablet, Rfl: 3  •  amLODIPine (NORVASC) 5 MG tablet, Take 1 p.o. daily for high blood pressure, Disp: 90 tablet, Rfl: 3  •  aspirin 81 MG tablet, Take 1 tablet by mouth daily., Disp: , Rfl:   •  Cholecalciferol 50 MCG (2000 UT) capsule, Take 1 capsule by mouth Daily., Disp: , Rfl:   •  Evolocumab (Repatha SureClick) solution auto-injector SureClick injection, Inject 1 mL subcutaneously as directed every 2 weeks for high cholesterol, Disp: 2 mL, Rfl: 11  •  fluticasone (FLONASE) 50 MCG/ACT nasal spray, SPRAY TWO SPRAYS IN EACH NOSTRIL ONCE DAILY, Disp: 16 mL, Rfl: 3  •  folic acid (FOLVITE) 1 MG tablet, TAKE ONE TABLET BY MOUTH TWICE A DAY, Disp: 180 tablet, Rfl: 3  •  isosorbide mononitrate (IMDUR) 30 MG 24 hr tablet, TAKE ONE TABLET BY MOUTH EVERY MORNING, Disp: 30 tablet, Rfl: 3  •  metoprolol succinate XL (TOPROL-XL) 50 MG 24 hr tablet, , Disp: , Rfl:   •  nitroglycerin (NITROSTAT) 0.6 MG SL tablet, DISSOLVE 1 TAB UNDER TONGUE FOR CHEST PAIN - IF PAIN REMAINS AFTER 5 MIN, CALL 911 AND REPEAT DOSE. MAX 3 TABS IN 15 MINUTES, Disp: 100 tablet, Rfl: 11  •  omeprazole (priLOSEC) 40 MG capsule, TAKE ONE CAPSULE BY MOUTH DAILY, Disp: 90 capsule, Rfl: 3    Current Facility-Administered Medications:   •  cyanocobalamin injection 1,000 mcg, 1,000 mcg, Intramuscular, Q28 Days, Delgado Patricia MD, 1,000 mcg at 11/08/22 1413      Family History   Problem Relation Age of Onset   • Diabetes Mother    • Heart disease Mother    • Stroke Father          Social History     Socioeconomic History   • Marital status:    • Number of children: 2   • Years of education: 14   • Highest education level: Some college, no degree   Tobacco Use   • Smoking status: Never   • Smokeless tobacco: Never   • Tobacco comments:     caffeine use: 2 cups coffee daily   Vaping Use   • Vaping Use: Never used   Substance and Sexual Activity   • Alcohol use: Yes     Alcohol/week: 5.0 standard drinks     Types: 2 Glasses of wine, 3  "Shots of liquor per week     Comment: Moderate alcohol consumption   • Drug use: No   • Sexual activity: Not Currently     Partners: Female         Vitals:    11/08/22 1400   BP: 142/75   Pulse: 60   Temp: 97.1 °F (36.2 °C)   TempSrc: Temporal   SpO2: 98%   Weight: 84.3 kg (185 lb 12.8 oz)   Height: 172.7 cm (68\")        Body mass index is 28.25 kg/m².      Physical Exam:    General: Alert and oriented x 3.  No acute distress.  Normal affect.  HEENT: Pupils equal, round, reactive to light; extraocular movements intact; sclerae nonicteric; pharynx, ear canals and TMs normal.  Neck: Without JVD, thyromegaly, bruit, or adenopathy.  Lungs: Clear to auscultation in all fields.  Heart: Regular rate and rhythm without murmur, rub, gallop, or click.  Abdomen: Soft, nontender, without hepatosplenomegaly or hernia.  Bowel sounds normal.  : Deferred.  Rectal: Deferred.  Extremities: Without clubbing, cyanosis, edema, or pulse deficit.  Neurologic: Intact without focal deficit.  Normal station and gait observed during ingress and egress from the examination room.  Skin: Without significant lesion.  Musculoskeletal: Unremarkable.    Lab/other results:    CBC reveals a low hemoglobin of 12.2 and hematocrit 35.3.  CPK normal at 66.  CMP normal except glucose 115, creatinine 1.75, potassium elevated at 5.6, estimated GFR 36.5.  Hemoglobin A1c 6.8.  Total cholesterol 139, triglycerides 140, LDL particle #734, small LDL particle #450, HDL particle #32.  Thyroid function tests are normal.  Vitamin D normal at 41.7.  Homocystine normal at 9.9.  Uric acid normal at 6.5.    Assessment/Plan:     Diagnosis Plan   1. Impaired fasting glucose  Comprehensive Metabolic Panel    Hemoglobin A1c      2. Chronic renal insufficiency, stage IV (severe), 02/09/2016--creatinine 1.85  CBC (No Diff)    Comprehensive Metabolic Panel      3. Anemia due to stage 4 chronic kidney disease (HCC)  CBC (No Diff)      4. Secondary hyperparathyroidism of renal " origin (HCC)        5. Hyperlipidemia  CK    Comprehensive Metabolic Panel    NMR LipoProfile    TSH    T4, Free    T3, Free    Homocysteine      6. Benign prostatic hypertrophy  PSA DIAGNOSTIC      7. Vitamin B12 deficiency  Methylmalonic Acid, Serum      8. Vitamin D deficiency  Vitamin D,25-Hydroxy      9. Benign essential hypertension        10. Folic acid deficiency  Homocysteine      11. Osteopenia of multiple sites, 10/18/2021--lumbar spine 0.0.  Left femoral neck -1.5.  Right femoral neck -1.7.        12. Bilateral carotid artery stenosis  Duplex Carotid Ultrasound CAR      13. Gastroesophageal reflux disease without esophagitis        14. Multiple environmental allergies        15. Primary osteoarthritis of knees, bilateral        16. Muscle cramps, statin related, Vytorin and pravastatin.  Tolerates Livalo.        17. Hypogonadism in male, on TRT, testosterone pellets, per         18. Statin intolerance, Vytorin and pravastatin        19. Occlusive coronary artery disease, clinical diagnosis only.  Patient not a candidate for heart catheterization/intervention.        20. Hyperuricemia  Uric Acid      21. Overweight (BMI 25.0-29.9)        22. Therapeutic drug monitoring          Patient with multiple chronic medical problems as noted above which are fairly stable at the present time with the exception that his impaired fasting glucose may have evolved into mild overt diabetes.  I have strongly recommended limiting carbohydrate intake and losing weight and obtaining ideal body weight.  Patient is followed by several specialists including the nephrologist, cardiologist, and neurologist.  He seems to be tolerating all of his medications well and they seem to be fairly effective.  He is up-to-date on all his immunizations and other health maintenance parameters.  He does need a carotid Doppler study which was ordered a few months ago but never done.  I will reorder it.    Plan is as follows: No change in  current medical regimen.  Patient will work on diet as we discussed above.  Carotid Doppler study ordered.  Patient will follow-up in 6 months with lab prior or follow-up as needed.  If his A1c remains elevated then we will have no choice at that time to give him the formal diagnosis of diabetes.  Cyanocobalamin injection given.  Continue with monthly B12 injections.          Procedures

## 2022-11-10 DIAGNOSIS — F41.1 GENERALIZED ANXIETY DISORDER: ICD-10-CM

## 2022-11-10 RX ORDER — ALPRAZOLAM 0.5 MG/1
TABLET ORAL
Qty: 60 TABLET | Refills: 5 | Status: SHIPPED | OUTPATIENT
Start: 2022-11-10

## 2022-11-22 DIAGNOSIS — J30.1 SEASONAL ALLERGIC RHINITIS DUE TO POLLEN: Chronic | ICD-10-CM

## 2022-11-22 RX ORDER — FLUTICASONE PROPIONATE 50 MCG
SPRAY, SUSPENSION (ML) NASAL
Qty: 16 ML | Refills: 3 | Status: SHIPPED | OUTPATIENT
Start: 2022-11-22

## 2022-11-23 ENCOUNTER — HOSPITAL ENCOUNTER (OUTPATIENT)
Dept: CARDIOLOGY | Facility: HOSPITAL | Age: 87
Discharge: HOME | End: 2022-11-23
Admitting: INTERNAL MEDICINE

## 2022-11-23 LAB
BH CV XLRA MEAS LEFT DIST CCA EDV: 14.3 CM/SEC
BH CV XLRA MEAS LEFT DIST CCA PSV: 97.7 CM/SEC
BH CV XLRA MEAS LEFT DIST ICA EDV: -18.6 CM/SEC
BH CV XLRA MEAS LEFT DIST ICA PSV: -95.3 CM/SEC
BH CV XLRA MEAS LEFT ICA/CCA RATIO: 1.05
BH CV XLRA MEAS LEFT MID ICA EDV: -19.8 CM/SEC
BH CV XLRA MEAS LEFT MID ICA PSV: -83.4 CM/SEC
BH CV XLRA MEAS LEFT PROX CCA EDV: -16.5 CM/SEC
BH CV XLRA MEAS LEFT PROX CCA PSV: -80.1 CM/SEC
BH CV XLRA MEAS LEFT PROX ECA EDV: -16.5 CM/SEC
BH CV XLRA MEAS LEFT PROX ECA PSV: 120 CM/SEC
BH CV XLRA MEAS LEFT PROX ICA EDV: 16.5 CM/SEC
BH CV XLRA MEAS LEFT PROX ICA PSV: 103 CM/SEC
BH CV XLRA MEAS LEFT PROX SCLA PSV: 131 CM/SEC
BH CV XLRA MEAS LEFT VERTEBRAL A EDV: 11 CM/SEC
BH CV XLRA MEAS LEFT VERTEBRAL A PSV: 43.9 CM/SEC
BH CV XLRA MEAS RIGHT DIST CCA EDV: 14.8 CM/SEC
BH CV XLRA MEAS RIGHT DIST CCA PSV: 93.2 CM/SEC
BH CV XLRA MEAS RIGHT DIST ICA EDV: -15.7 CM/SEC
BH CV XLRA MEAS RIGHT DIST ICA PSV: -76.8 CM/SEC
BH CV XLRA MEAS RIGHT ICA/CCA RATIO: 1.46
BH CV XLRA MEAS RIGHT MID ICA EDV: 16.5 CM/SEC
BH CV XLRA MEAS RIGHT MID ICA PSV: 112 CM/SEC
BH CV XLRA MEAS RIGHT PROX CCA EDV: 11.8 CM/SEC
BH CV XLRA MEAS RIGHT PROX CCA PSV: 103 CM/SEC
BH CV XLRA MEAS RIGHT PROX ECA EDV: -11.5 CM/SEC
BH CV XLRA MEAS RIGHT PROX ECA PSV: 175 CM/SEC
BH CV XLRA MEAS RIGHT PROX ICA EDV: -20.5 CM/SEC
BH CV XLRA MEAS RIGHT PROX ICA PSV: 136 CM/SEC
BH CV XLRA MEAS RIGHT PROX SCLA PSV: 112 CM/SEC
BH CV XLRA MEAS RIGHT VERTEBRAL A EDV: -12.3 CM/SEC
BH CV XLRA MEAS RIGHT VERTEBRAL A PSV: -50.1 CM/SEC
LEFT ARM BP: NORMAL MMHG
MAXIMAL PREDICTED HEART RATE: 130 BPM
RIGHT ARM BP: NORMAL MMHG
STRESS TARGET HR: 111 BPM

## 2022-11-23 PROCEDURE — 93880 EXTRACRANIAL BILAT STUDY: CPT

## 2023-01-20 ENCOUNTER — CLINICAL SUPPORT (OUTPATIENT)
Dept: INTERNAL MEDICINE | Facility: CLINIC | Age: 88
End: 2023-01-20
Payer: MEDICARE

## 2023-01-20 DIAGNOSIS — E53.8 VITAMIN B12 DEFICIENCY: Chronic | ICD-10-CM

## 2023-01-20 PROCEDURE — 96372 THER/PROPH/DIAG INJ SC/IM: CPT | Performed by: INTERNAL MEDICINE

## 2023-01-20 RX ADMIN — CYANOCOBALAMIN 1000 MCG: 1000 INJECTION, SOLUTION INTRAMUSCULAR; SUBCUTANEOUS at 14:14

## 2023-01-25 RX ORDER — METOPROLOL SUCCINATE 50 MG/1
TABLET, EXTENDED RELEASE ORAL
Qty: 90 TABLET | Refills: 1 | Status: SHIPPED | OUTPATIENT
Start: 2023-01-25 | End: 2023-04-03

## 2023-02-09 DIAGNOSIS — I20.8 EXERTIONAL ANGINA: ICD-10-CM

## 2023-02-09 RX ORDER — ISOSORBIDE MONONITRATE 30 MG/1
TABLET, EXTENDED RELEASE ORAL
Qty: 90 TABLET | Refills: 1 | Status: SHIPPED | OUTPATIENT
Start: 2023-02-09 | End: 2023-03-24 | Stop reason: SDUPTHER

## 2023-02-22 ENCOUNTER — CLINICAL SUPPORT (OUTPATIENT)
Dept: INTERNAL MEDICINE | Facility: CLINIC | Age: 88
End: 2023-02-22
Payer: MEDICARE

## 2023-02-22 DIAGNOSIS — E53.8 VITAMIN B12 DEFICIENCY: Primary | Chronic | ICD-10-CM

## 2023-02-22 PROCEDURE — 96372 THER/PROPH/DIAG INJ SC/IM: CPT | Performed by: INTERNAL MEDICINE

## 2023-02-22 RX ADMIN — CYANOCOBALAMIN 1000 MCG: 1000 INJECTION, SOLUTION INTRAMUSCULAR; SUBCUTANEOUS at 15:34

## 2023-03-15 DIAGNOSIS — E79.0 HYPERURICEMIA: Chronic | ICD-10-CM

## 2023-03-15 DIAGNOSIS — E78.2 MIXED HYPERLIPIDEMIA: Chronic | ICD-10-CM

## 2023-03-15 DIAGNOSIS — D63.1 ANEMIA DUE TO STAGE 4 CHRONIC KIDNEY DISEASE: Chronic | ICD-10-CM

## 2023-03-15 DIAGNOSIS — N40.0 BENIGN NON-NODULAR PROSTATIC HYPERPLASIA WITHOUT LOWER URINARY TRACT SYMPTOMS: Chronic | ICD-10-CM

## 2023-03-15 DIAGNOSIS — E53.8 FOLIC ACID DEFICIENCY: Chronic | ICD-10-CM

## 2023-03-15 DIAGNOSIS — E53.8 VITAMIN B12 DEFICIENCY: Chronic | ICD-10-CM

## 2023-03-15 DIAGNOSIS — N18.4 ANEMIA DUE TO STAGE 4 CHRONIC KIDNEY DISEASE: Chronic | ICD-10-CM

## 2023-03-15 DIAGNOSIS — E55.9 VITAMIN D DEFICIENCY: Chronic | ICD-10-CM

## 2023-03-15 DIAGNOSIS — R73.01 IMPAIRED FASTING GLUCOSE: Chronic | ICD-10-CM

## 2023-03-15 DIAGNOSIS — N18.4 CHRONIC RENAL INSUFFICIENCY, STAGE IV (SEVERE): Chronic | ICD-10-CM

## 2023-03-20 LAB
25(OH)D3+25(OH)D2 SERPL-MCNC: 36 NG/ML (ref 30–100)
ALBUMIN SERPL-MCNC: 4.3 G/DL (ref 3.5–4.6)
ALBUMIN/GLOB SERPL: 2.3 {RATIO} (ref 1.2–2.2)
ALP SERPL-CCNC: 95 IU/L (ref 44–121)
ALT SERPL-CCNC: 24 IU/L (ref 0–44)
AST SERPL-CCNC: 25 IU/L (ref 0–40)
BILIRUB SERPL-MCNC: 0.3 MG/DL (ref 0–1.2)
BUN SERPL-MCNC: 26 MG/DL (ref 10–36)
BUN/CREAT SERPL: 15 (ref 10–24)
CALCIUM SERPL-MCNC: 8.9 MG/DL (ref 8.6–10.2)
CHLORIDE SERPL-SCNC: 107 MMOL/L (ref 96–106)
CHOLEST SERPL-MCNC: 114 MG/DL (ref 100–199)
CK SERPL-CCNC: 67 U/L (ref 30–208)
CO2 SERPL-SCNC: 23 MMOL/L (ref 20–29)
CREAT SERPL-MCNC: 1.73 MG/DL (ref 0.76–1.27)
EGFRCR SERPLBLD CKD-EPI 2021: 37 ML/MIN/1.73
ERYTHROCYTE [DISTWIDTH] IN BLOOD BY AUTOMATED COUNT: 12.5 % (ref 11.6–15.4)
GLOBULIN SER CALC-MCNC: 1.9 G/DL (ref 1.5–4.5)
GLUCOSE SERPL-MCNC: 124 MG/DL (ref 70–99)
HBA1C MFR BLD: 6.4 % (ref 4.8–5.6)
HCT VFR BLD AUTO: 36.8 % (ref 37.5–51)
HCYS SERPL-SCNC: 9.3 UMOL/L (ref 0–21.3)
HDL SERPL-SCNC: 35.4 UMOL/L
HDLC SERPL-MCNC: 45 MG/DL
HGB BLD-MCNC: 12.6 G/DL (ref 13–17.7)
LDL SERPL QN: 20.4 NM
LDL SERPL-SCNC: 534 NMOL/L
LDL SMALL SERPL-SCNC: 234 NMOL/L
LDLC SERPL CALC-MCNC: 51 MG/DL (ref 0–99)
MCH RBC QN AUTO: 30.3 PG (ref 26.6–33)
MCHC RBC AUTO-ENTMCNC: 34.2 G/DL (ref 31.5–35.7)
MCV RBC AUTO: 89 FL (ref 79–97)
METHYLMALONATE SERPL-SCNC: 293 NMOL/L (ref 0–378)
PLATELET # BLD AUTO: 207 X10E3/UL (ref 150–450)
POTASSIUM SERPL-SCNC: 5.4 MMOL/L (ref 3.5–5.2)
PROT SERPL-MCNC: 6.2 G/DL (ref 6–8.5)
PSA SERPL-MCNC: 8.2 NG/ML (ref 0–4)
RBC # BLD AUTO: 4.16 X10E6/UL (ref 4.14–5.8)
SODIUM SERPL-SCNC: 143 MMOL/L (ref 134–144)
T3FREE SERPL-MCNC: 2.8 PG/ML (ref 2–4.4)
T4 FREE SERPL-MCNC: 1.22 NG/DL (ref 0.82–1.77)
TRIGL SERPL-MCNC: 97 MG/DL (ref 0–149)
TSH SERPL DL<=0.005 MIU/L-ACNC: 4.41 UIU/ML (ref 0.45–4.5)
URATE SERPL-MCNC: 6.6 MG/DL (ref 3.8–8.4)
WBC # BLD AUTO: 5.8 X10E3/UL (ref 3.4–10.8)

## 2023-03-24 ENCOUNTER — OFFICE VISIT (OUTPATIENT)
Dept: INTERNAL MEDICINE | Facility: CLINIC | Age: 88
End: 2023-03-24
Payer: MEDICARE

## 2023-03-24 VITALS
WEIGHT: 187.4 LBS | OXYGEN SATURATION: 98 % | SYSTOLIC BLOOD PRESSURE: 146 MMHG | HEIGHT: 68 IN | DIASTOLIC BLOOD PRESSURE: 64 MMHG | HEART RATE: 78 BPM | BODY MASS INDEX: 28.4 KG/M2

## 2023-03-24 DIAGNOSIS — E79.0 HYPERURICEMIA: Chronic | ICD-10-CM

## 2023-03-24 DIAGNOSIS — I65.23 BILATERAL CAROTID ARTERY STENOSIS: Chronic | ICD-10-CM

## 2023-03-24 DIAGNOSIS — K21.9 GASTROESOPHAGEAL REFLUX DISEASE WITHOUT ESOPHAGITIS: Chronic | ICD-10-CM

## 2023-03-24 DIAGNOSIS — N25.81 SECONDARY HYPERPARATHYROIDISM OF RENAL ORIGIN: Chronic | ICD-10-CM

## 2023-03-24 DIAGNOSIS — N18.4 ANEMIA DUE TO STAGE 4 CHRONIC KIDNEY DISEASE: Chronic | ICD-10-CM

## 2023-03-24 DIAGNOSIS — E66.3 OVERWEIGHT (BMI 25.0-29.9): Chronic | ICD-10-CM

## 2023-03-24 DIAGNOSIS — I10 BENIGN ESSENTIAL HYPERTENSION: Chronic | ICD-10-CM

## 2023-03-24 DIAGNOSIS — E78.2 MIXED HYPERLIPIDEMIA: Chronic | ICD-10-CM

## 2023-03-24 DIAGNOSIS — M85.89 OSTEOPENIA OF MULTIPLE SITES: Chronic | ICD-10-CM

## 2023-03-24 DIAGNOSIS — N40.0 BENIGN NON-NODULAR PROSTATIC HYPERPLASIA WITHOUT LOWER URINARY TRACT SYMPTOMS: Chronic | ICD-10-CM

## 2023-03-24 DIAGNOSIS — R73.01 IMPAIRED FASTING GLUCOSE: Primary | Chronic | ICD-10-CM

## 2023-03-24 DIAGNOSIS — F41.1 GENERALIZED ANXIETY DISORDER: Chronic | ICD-10-CM

## 2023-03-24 DIAGNOSIS — I25.10 OCCLUSIVE CORONARY ARTERY DISEASE: Chronic | ICD-10-CM

## 2023-03-24 DIAGNOSIS — E53.8 VITAMIN B12 DEFICIENCY: Chronic | ICD-10-CM

## 2023-03-24 DIAGNOSIS — Z78.9 STATIN INTOLERANCE: Chronic | ICD-10-CM

## 2023-03-24 DIAGNOSIS — Z91.09 MULTIPLE ENVIRONMENTAL ALLERGIES: Chronic | ICD-10-CM

## 2023-03-24 DIAGNOSIS — I20.8 EXERTIONAL ANGINA: ICD-10-CM

## 2023-03-24 DIAGNOSIS — E53.8 FOLIC ACID DEFICIENCY: Chronic | ICD-10-CM

## 2023-03-24 DIAGNOSIS — Z51.81 THERAPEUTIC DRUG MONITORING: ICD-10-CM

## 2023-03-24 DIAGNOSIS — N18.4 CHRONIC RENAL INSUFFICIENCY, STAGE IV (SEVERE): Chronic | ICD-10-CM

## 2023-03-24 DIAGNOSIS — D63.1 ANEMIA DUE TO STAGE 4 CHRONIC KIDNEY DISEASE: Chronic | ICD-10-CM

## 2023-03-24 DIAGNOSIS — E29.1 HYPOGONADISM IN MALE: Chronic | ICD-10-CM

## 2023-03-24 DIAGNOSIS — E55.9 VITAMIN D DEFICIENCY: Chronic | ICD-10-CM

## 2023-03-24 RX ORDER — ISOSORBIDE MONONITRATE 30 MG/1
30 TABLET, EXTENDED RELEASE ORAL EVERY MORNING
Qty: 90 TABLET | Refills: 3
Start: 2023-03-24 | End: 2023-04-03

## 2023-03-24 RX ADMIN — CYANOCOBALAMIN 1000 MCG: 1000 INJECTION, SOLUTION INTRAMUSCULAR; SUBCUTANEOUS at 11:57

## 2023-03-24 NOTE — PROGRESS NOTES
03/24/2023    Patient Information  Radha Win                                                                                          [address]  [place] [address]      7/26/1932  [unfilled]  There is no work phone number on file.    Chief Complaint:     Follow-up multiple medical problems as noted below.  No new acute complaints.    History of Present Illness:    Patient with multiple chronic medical problems as outlined below in the assessment and plan presents today for follow-up and lab prior in order to monitor his chronic medical issues.  His past medical history reviewed and updated were necessary including health maintenance parameters.  This reveals he is up-to-date or else accounted for.    Review of Systems   Constitutional: Negative.   HENT: Negative.    Eyes: Negative.    Cardiovascular: Negative.    Respiratory: Negative.    Endocrine: Negative.    Hematologic/Lymphatic: Negative.    Skin: Negative.    Musculoskeletal: Positive for arthritis, joint pain and neck pain.   Gastrointestinal: Negative.    Genitourinary: Negative.    Neurological: Negative.    Psychiatric/Behavioral: Negative.    Allergic/Immunologic: Negative.        Active Problems:    Patient Active Problem List   Diagnosis   • Bilateral carotid artery stenosis   • Benign prostatic hypertrophy   • Chronic renal insufficiency, stage IV (severe), 02/09/2016--creatinine 1.85   • Diverticulosis of colon   • Hyperlipidemia   • Osteopenia of multiple sites, 10/18/2021--lumbar spine 0.0.  Left femoral neck -1.5.  Right femoral neck -1.7.   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  Tolerates Livalo.   • Hypogonadism in male, on TRT, testosterone pellets, per    • Chronic neck pain   • Cervical radiculopathy   • Statin intolerance, Vytorin and pravastatin   • Degeneration of cervical intervertebral disc   • Occlusive coronary artery disease, clinical diagnosis only.  Patient not a candidate for heart catheterization/intervention.   • Hyperuricemia   • Secondary hyperparathyroidism of renal origin (HCC)   • Chronic left shoulder pain   • Overweight (BMI 25.0-29.9)         Past Medical History:   Diagnosis Date   • Allergic rhinitis 2/11/2016   • Anemia due to chronic kidney disease 2/11/2016   • Benign essential hypertension 2/11/2016   • Benign prostatic hypertrophy 2/11/2016 12/21/2016--prostate biopsy reportedly negative.  I will try to obtain the report.  Patient sees urologist.  PSAs run from the 3-8 range   • Bilateral carotid artery plaque, 09/21/2016--16-49% bilateral carotid artery stenosis 2/11/2016 09/21/2016--vascular screen reveals 16-49% bilateral carotid artery stenosis, negative for AAA, negative for PAD.  10/10/2008--vascular screening study revealed mild bilateral carotid plaque, no aortic aneurysm, no evidence of PAD   • Bilateral renal cysts 2/14/2016 04/07/2016--ultrasound of the kidneys reveals the previously described renal cysts are not well seen.  However, questionable incidental bladder tumor noted.  05/26/2010--ultrasound the kidneys was negative bilaterally except for small renal cysts.   • Cervical radiculopathy 6/28/2018    10/02/2018--patient seen in follow-up and the results of the MRI discussed.  He continues to have intermittent radicular symptoms which is currently only on the left.  Discussed options with patient and I have recommended neurosurgery consultation.  Patient may benefit from epidural injections.   09/06/2018--MRI of the cervical spine reveals moderate neural foraminal compromise on the right at C3-C   • Chronic left shoulder pain 7/19/2021 July 19, 2021--patient presents with continued left-sided neck pain that radiates into his left shoulder but does not really go over that far down into the left upper extremity.  The pain is definitely accentuated by leaning his head to the right and rotating his head to the right.  He does not do anything to aggravate it or make it better by moving his head towards the left.  On exam he has good    • Chronic renal insufficiency, stage IV (severe), 02/09/2016--creatinine 1.85 2/11/2016 02/09/2016--serum creatinine 1.85.  BUN 29.  07/20/2015--patient was evaluated by the nephrologist.  Ultrasound of the kidneys ordered but this was never done.  05/22/2015--BUN 35, creatinine 2.3.  Patient referred to nephrology, Dr. Devyn Muniz.  04/16/2014--BUN 25, creatinine 1.77.  01/03/2013--BUN 37, creatinine 2.14.    05/26/2010---ultrasound of the kidneys reveal a small cyst x 2 right kidney.  Otherwise normal.    Baseline creatinine approximately 1.9-2.0.   • Degeneration of cervical intervertebral disc 5/2/2019   • Diverticulosis of colon 2/11/2016    10/03/2013--colonoscopy revealed markedly redundant colon, pancolonic diverticulosis with several impacted fecalith.  No evidence of diverticulitis or stricture.  Was only able to reach the ascending colon.  Follow up barium enema revealed extensive diverticulosis.  No polyp or other mucosal mass seen.    06/11/2002--incomplete colonoscopy due to redundant colon.  Not able to get past the hepatic flexure.  Patient had followup barium contrast enema which showed extensive diverticulosis.   • Exertional angina (HCC) 9/17/2019 October 8, 2019--patient seen in follow-up and he reports the long-acting oral nitrate has definitely helped his exertional anginal symptoms.  However, he has not stressed himself completely such as using a  push mower.  His blood pressure seems improved as well.  Patient has an appoint with the cardiologist in the next 2 weeks.  I have contacted his cardiologist about the results of the empiric ni   • Folic acid deficiency 2/11/2016   • Gastroesophageal reflux disease without esophagitis 2/11/2016   • Generalized anxiety disorder 2/11/2016   • History of diverticulitis of colon 2009 and 2003 05/08/2009-acute diverticulitis without complication. 09/05/2003-acute diverticulitis without complication.    • Hyperlipidemia 2/11/2016 01/29/2005--treatment for hyperlipidemia begun.   • Hyperuricemia 8/19/2020   • Hypogonadism in male, on TRT, testosterone pellets, per  6/16/2017 January 2017--patient reports his urologist initiated testosterone replacement therapy consisting of testosterone pellets every 6 months.   • Multiple environmental allergies 2/12/2016    Patient sees the allergist regularly and has been on immunotherapy.   • Muscle cramps, statin related, Vytorin and pravastatin.  Tolerates Livalo. 6/16/2017 12/21/2018--patient seems to be tolerating Livalo well.  10/02/2018--Livalo 2 mg per day initiated.  Recommend CoQ10 400 mg per day with food for better absorption.  Reassess with lab in about 8 weeks.  08/30/2018--patient presents with complaints of muscle cramps.  He discontinue pravastatin and the cramps went away.  Cramps returned even with a half dose.  This is despite the fact he was taking    • Occlusive coronary artery disease, clinical diagnosis only.  Patient not a candidate for heart catheterization/intervention. 12/10/2019    October 8, 2019--patient seen in follow-up and he reports the long-acting oral nitrate has definitely helped his exertional anginal symptoms.  However, he has not stressed himself completely such as using a push mower.  His blood pressure seems improved as well.  Patient has an appoint with the cardiologist in the next 2 weeks.  I have contacted his cardiologist  about the results of the empiric ni   • Osteopenia of multiple sites, 10/18/2021--lumbar spine 0.0.  Left femoral neck -1.5.  Right femoral neck -1.7. 2/11/2016 October 18, 2021--DEXA scan reveals lumbar spine T score 0.0 representing a 3.9% interval increase.  Left femoral neck T score -1.5 which is a 4.6% increase.  Right femoral neck T score -1.7 which is unchanged.  Impression is osteopenia with improvement.  July 12, 2019--DEXA scan reveals lumbar spine T score -0.4.  Unchanged.  Left femoral neck T score -1.7.  Unchanged.  Right femoral neck T score   • Overweight (BMI 25.0-29.9) 9/22/2022   • Primary osteoarthritis of knees, bilateral 9/12/2016 09/12/2016--patient called in the office requesting a steriod injection in his knees.  I explained to him that I do no longer perform these injections and have referred him to Dr. Manuel.   • Secondary hyperparathyroidism of renal origin (HCC) 1/15/2021   • Statin intolerance, Vytorin and pravastatin 12/21/2018 12/21/2018--patient seems to be tolerating Livalo well.  10/02/2018--Livalo 2 mg per day initiated.  Recommend CoQ10 400 mg per day with food for better absorption.  Reassess with lab in about 8 weeks.  08/30/2018--patient presents with complaints of muscle cramps.  He discontinue pravastatin and the cramps went away.  Cramps returned even with a half dose.  This is despite the fact he was taking    • Vitamin B12 deficiency 2/11/2016 06/25/2009--B12 injections begun.   • Vitamin D deficiency 2/11/2016         Past Surgical History:   Procedure Laterality Date   • CATARACT EXTRACTION Left 12/12/2011 12/12/2011--cataract extirpation with intraocular lens implantation left eye.   • CATARACT EXTRACTION Right 12/2011 December 2011--right cataract extirpation with intraocular lens implantation.   • CHOLECYSTECTOMY WITH INTRAOPERATIVE CHOLANGIOGRAM N/A 11/6/2020    Procedure: CHOLECYSTECTOMY LAPAROSCOPIC INTRAOPERATIVE CHOLANGIOGRAM;  Surgeon:  Sanjay Burgess MD;  Location: [facility] OR;  Service: General;  Laterality: N/A;   • COLONOSCOPY  06/11/2002 06/11/2002--incomplete colonoscopy due to redundant colon. Not able to get past the hepatic flexure. Patient had followup barium contrast enema which showed extensive diverticulosis   • COLONOSCOPY  10/03/2013    10/03/2013--colonoscopy revealed markedly redundant colon, pancolonic diverticulosis with several impacted fecalith. No evidence of diverticulitis or stricture. Was only able to reach the ascending colon. Follow up barium enema revealed extensive diverticulosis. No polyp or other mucosal mass seen.   • CYSTOSCOPY  05/18/2016 05/18/2016--urology consultation.  Cystoscopy performed for possible bladder mass is negative.   • ERCP N/A 11/5/2020    Procedure: ENDOSCOPIC RETROGRADE CHOLANGIOPANCREATOGRAPHY with sphincterotomy, basket retrieval and balloon sweep;  Surgeon: Joseluis Mejia MD;  Location: [facility];  Service: Gastroenterology;  Laterality: N/A;  pre/post - CBD stones   • PROSTATE BIOPSY  12/21/2016 12/21/2016--prostate biopsy reportedly negative.  I will try to obtain the report.         No Known Allergies        Current Outpatient Medications:   •  ALPRAZolam (XANAX) 0.5 MG tablet, TAKE ONE TABLET BY MOUTH TWICE A DAY, Disp: 60 tablet, Rfl: 5  •  amLODIPine (NORVASC) 5 MG tablet, Take 1 p.o. daily for high blood pressure, Disp: 90 tablet, Rfl: 3  •  aspirin 81 MG tablet, Take 1 tablet by mouth Daily., Disp: , Rfl:   •  calcitriol (ROCALTROL) 0.25 MCG capsule, Take 1 capsule by mouth., Disp: , Rfl:   •  Cholecalciferol 50 MCG (2000 UT) capsule, Take 1 capsule by mouth Daily., Disp: , Rfl:   •  Evolocumab (Repatha SureClick) solution auto-injector SureClick injection, Inject 1 mL subcutaneously as directed every 2 weeks for high cholesterol, Disp: 2 mL, Rfl: 11  •  fluticasone (FLONASE) 50 MCG/ACT nasal spray, SPRAY TWO SPRAYS IN EACH NOSTRIL ONCE DAILY, Disp: 16  "mL, Rfl: 3  •  folic acid (FOLVITE) 1 MG tablet, TAKE ONE TABLET BY MOUTH TWICE A DAY, Disp: 180 tablet, Rfl: 3  •  isosorbide mononitrate (IMDUR) 30 MG 24 hr tablet, Take 1 tablet by mouth Every Morning., Disp: 90 tablet, Rfl: 3  •  metoprolol succinate XL (TOPROL-XL) 50 MG 24 hr tablet, TAKE ONE TABLET BY MOUTH DAILY, Disp: 90 tablet, Rfl: 1  •  nitroglycerin (NITROSTAT) 0.6 MG SL tablet, DISSOLVE 1 TAB UNDER TONGUE FOR CHEST PAIN - IF PAIN REMAINS AFTER 5 MIN, CALL 911 AND REPEAT DOSE. MAX 3 TABS IN 15 MINUTES, Disp: 100 tablet, Rfl: 11  •  omeprazole (priLOSEC) 40 MG capsule, TAKE ONE CAPSULE BY MOUTH DAILY, Disp: 90 capsule, Rfl: 3    Current Facility-Administered Medications:   •  cyanocobalamin injection 1,000 mcg, 1,000 mcg, Intramuscular, Q28 Days, Delgado Patricia MD, 1,000 mcg at 03/24/23 1157      Family History   Problem Relation Age of Onset   • Diabetes Mother    • Heart disease Mother    • Stroke Father          Social History     Socioeconomic History   • Marital status:    • Number of children: 2   • Years of education: 14   • Highest education level: Some college, no degree   Tobacco Use   • Smoking status: Never   • Smokeless tobacco: Never   • Tobacco comments:     caffeine use: 2 cups coffee daily   Vaping Use   • Vaping Use: Never used   Substance and Sexual Activity   • Alcohol use: Yes     Alcohol/week: 5.0 standard drinks     Types: 2 Glasses of wine, 3 Shots of liquor per week     Comment: Moderate alcohol consumption   • Drug use: No   • Sexual activity: Not Currently     Partners: Female         Vitals:    03/24/23 1143   BP: 146/64   BP Location: Left arm   Patient Position: Sitting   Cuff Size: Adult   Pulse: 78   SpO2: 98%   Weight: 85 kg (187 lb 6.4 oz)   Height: 172.7 cm (68\")        Body mass index is 28.49 kg/m².      Physical Exam:    General: Alert and oriented x 3.  No acute distress.  Normal affect.  HEENT: Pupils equal, round, reactive to light; extraocular movements " intact; sclerae nonicteric; pharynx, ear canals and TMs normal.  Neck: Without JVD, thyromegaly, bruit, or adenopathy.  Lungs: Clear to auscultation in all fields.  Heart: Regular rate and rhythm without murmur, rub, gallop, or click.  Abdomen: Soft, nontender, without hepatosplenomegaly or hernia.  Bowel sounds normal.  : Deferred.  Rectal: Deferred.  Extremities: Without clubbing, cyanosis, edema, or pulse deficit.  Neurologic: Intact without focal deficit.  Normal station and gait observed during ingress and egress from the examination room.  Skin: Without significant lesion.  Musculoskeletal: Unremarkable.    Lab/other results:    Methylmalonic acid and homocystine are normal.  Uric acid normal at 6.6.  Vitamin D normal at 36.0.  PSA elevated at 8.2.  Thyroid function tests are normal.  Total cholesterol 114, triglyceride 97, LDL particle #534, HDL particle #35.4.  Hemoglobin A1c 6.4.  CMP normal except glucose 124, creatinine 1.73, estimated GFR 37, potassium mildly elevated at 5.4.  CPK normal.  Hemoglobin low at 12.6 and hematocrit low at 36.0.    Assessment/Plan:     Diagnosis Plan   1. Impaired fasting glucose  Comprehensive Metabolic Panel    Hemoglobin A1c      2. Hyperlipidemia  CK    Comprehensive Metabolic Panel    NMR LipoProfile    TSH    T4, Free    T3, Free    Homocysteine      3. Chronic renal insufficiency, stage IV (severe), 02/09/2016--creatinine 1.85  CBC (No Diff)      4. Anemia due to stage 4 chronic kidney disease (HCC)        5. Vitamin D deficiency  Vitamin D,25-Hydroxy      6. Vitamin B12 deficiency  Methylmalonic Acid, Serum      7. Benign prostatic hypertrophy        8. Benign essential hypertension  Homocysteine      9. Folic acid deficiency        10. Gastroesophageal reflux disease without esophagitis        11. Hypogonadism in male, on TRT, testosterone pellets, per         12. Statin intolerance, Vytorin and pravastatin        13. Occlusive coronary artery disease, clinical  diagnosis only.  Patient not a candidate for heart catheterization/intervention.        14. Hyperuricemia        15. Secondary hyperparathyroidism of renal origin (HCC)        16. Bilateral carotid artery stenosis        17. Osteopenia of multiple sites, 10/18/2021--lumbar spine 0.0.  Left femoral neck -1.5.  Right femoral neck -1.7.        18. Generalized anxiety disorder        19. Multiple environmental allergies        20. Overweight (BMI 25.0-29.9)        21. Therapeutic drug monitoring        22. Exertional angina (HCC)  isosorbide mononitrate (IMDUR) 30 MG 24 hr tablet        Patient has impaired fasting glucose which is borderline.  His A1c is better than it was previously and have encouraged him to continue with dietary and lifestyle changes.  Hyperlipidemia is under good control.  His chronic renal insufficiency is stable.  He is followed by the renal service.  His anemia is due to chronic renal disease and is mild and stable.  Vitamin D is in normal range with supplementation.  He has vitamin B12 and folic acid deficiency and these levels are normal with supplementation.  Patient has BPH and elevated PSA and is followed by the urologist.  His blood pressure is little elevated today but not enough to make any changes.  Patient is not taking isosorbide any longer and this could possibly be the etiology.  See below.  His coronary artery disease is stable and is followed by the cardiologist.  Hyperuricemia seems to be controlled.  Patient has carotid artery stenosis and had a carotid Doppler study in November of last year.  No significant change.  He is also up-to-date on his bone density study for his osteopenia.  Generalized anxiety seems to be controlled with Xanax.  His environmental allergies are stable.  He is a little overweight and diet discussed as noted above.    Plan is as follows: Patient will continue to work on diet, exercise, and attainment of ideal body weight.  I will have him follow-up on or  shortly after October 20, 2023 for his subsequent Medicare wellness visit.  Otherwise he will follow-up as needed. Restart Imdur.      Procedures

## 2023-04-03 ENCOUNTER — OFFICE VISIT (OUTPATIENT)
Dept: CARDIOLOGY | Facility: CLINIC | Age: 88
End: 2023-04-03
Payer: MEDICARE

## 2023-04-03 VITALS
WEIGHT: 188 LBS | SYSTOLIC BLOOD PRESSURE: 138 MMHG | HEART RATE: 82 BPM | BODY MASS INDEX: 28.49 KG/M2 | HEIGHT: 68 IN | DIASTOLIC BLOOD PRESSURE: 78 MMHG

## 2023-04-03 DIAGNOSIS — I10 BENIGN ESSENTIAL HYPERTENSION: Chronic | ICD-10-CM

## 2023-04-03 DIAGNOSIS — I45.10 RIGHT BUNDLE BRANCH BLOCK: ICD-10-CM

## 2023-04-03 DIAGNOSIS — Z78.9 STATIN INTOLERANCE: Primary | Chronic | ICD-10-CM

## 2023-04-03 DIAGNOSIS — E78.2 MIXED HYPERLIPIDEMIA: Chronic | ICD-10-CM

## 2023-04-03 PROCEDURE — 99214 OFFICE O/P EST MOD 30 MIN: CPT | Performed by: NURSE PRACTITIONER

## 2023-04-03 PROCEDURE — 93000 ELECTROCARDIOGRAM COMPLETE: CPT | Performed by: NURSE PRACTITIONER

## 2023-04-03 RX ORDER — METOPROLOL SUCCINATE 25 MG/1
25 TABLET, EXTENDED RELEASE ORAL DAILY
Qty: 100 TABLET | Refills: 3 | Status: SHIPPED | OUTPATIENT
Start: 2023-04-03

## 2023-04-03 NOTE — PROGRESS NOTES
Date of Office Visit: 2023  Encounter Provider: VIRIDIANA Lepe  Place of Service: [facility] CARDIOLOGY  Patient Name: Radha Win  :1932    No chief complaint on file.  : 6-month follow-up    HPI: Radha Win is a 89 y.o. male who previously followed with Dr. Alejo.  He is presents for a 6-month office follow-up.  He has coronary artery disease, hypertension, noncritical carotid artery disease chronic kidney disease.      Patient lost his wife a couple of years ago to cancer.  At that time, he was having some episodes of chest pain.  He underwent Lexiscan stress test that showed normal myocardial perfusion with no evidence of ischemia, EF 64%.  He was prescribed isosorbide; however, he has not needed this in at least a year.  Patient denies chest pain, pressure, shortness of breath, lightheadedness and lower extremity edema.  He remains very active.  He does all of his yard work and keeps his house up.  Patient enjoys walking for exercise.  Does monitor his blood pressure and notices that his systolic is in the 130s to 140s on a normal basis.  This is unchanged over the last few years.  Patient now takes Repatha and has noticed his drastic change in his LDL.  His most recent lipid panel is in target range.    Previous testing and notes have been reviewed by me.   Past Medical History:   Diagnosis Date   • Allergic rhinitis 2016   • Anemia due to chronic kidney disease 2016   • Benign essential hypertension 2016   • Benign prostatic hypertrophy 2016--prostate biopsy reportedly negative.  I will try to obtain the report.  Patient sees urologist.  PSAs run from the 3-8 range   • Bilateral carotid artery plaque, 2016--16-49% bilateral carotid artery stenosis 2016--vascular screen reveals 16-49% bilateral carotid artery stenosis, negative for AAA, negative for PAD.  10/10/2008--vascular  screening study revealed mild bilateral carotid plaque, no aortic aneurysm, no evidence of PAD   • Bilateral renal cysts 2/14/2016 04/07/2016--ultrasound of the kidneys reveals the previously described renal cysts are not well seen.  However, questionable incidental bladder tumor noted.  05/26/2010--ultrasound the kidneys was negative bilaterally except for small renal cysts.   • Cervical radiculopathy 6/28/2018    10/02/2018--patient seen in follow-up and the results of the MRI discussed.  He continues to have intermittent radicular symptoms which is currently only on the left.  Discussed options with patient and I have recommended neurosurgery consultation.  Patient may benefit from epidural injections.  09/06/2018--MRI of the cervical spine reveals moderate neural foraminal compromise on the right at C3-C   • Chronic left shoulder pain 7/19/2021 July 19, 2021--patient presents with continued left-sided neck pain that radiates into his left shoulder but does not really go over that far down into the left upper extremity.  The pain is definitely accentuated by leaning his head to the right and rotating his head to the right.  He does not do anything to aggravate it or make it better by moving his head towards the left.  On exam he has good    • Chronic renal insufficiency, stage IV (severe), 02/09/2016--creatinine 1.85 2/11/2016 02/09/2016--serum creatinine 1.85.  BUN 29.  07/20/2015--patient was evaluated by the nephrologist.  Ultrasound of the kidneys ordered but this was never done.  05/22/2015--BUN 35, creatinine 2.3.  Patient referred to nephrology, Dr. Devyn Muniz.  04/16/2014--BUN 25, creatinine 1.77.  01/03/2013--BUN 37, creatinine 2.14.    05/26/2010---ultrasound of the kidneys reveal a small cyst x 2 right kidney.  Otherwise normal.    Baseline creatinine approximately 1.9-2.0.   • Degeneration of cervical intervertebral disc 5/2/2019   • Diverticulosis of colon 2/11/2016    10/03/2013--colonoscopy  revealed markedly redundant colon, pancolonic diverticulosis with several impacted fecalith.  No evidence of diverticulitis or stricture.  Was only able to reach the ascending colon.  Follow up barium enema revealed extensive diverticulosis.  No polyp or other mucosal mass seen.    06/11/2002--incomplete colonoscopy due to redundant colon.  Not able to get past the hepatic flexure.  Patient had followup barium contrast enema which showed extensive diverticulosis.   • Exertional angina 9/17/2019 October 8, 2019--patient seen in follow-up and he reports the long-acting oral nitrate has definitely helped his exertional anginal symptoms.  However, he has not stressed himself completely such as using a push mower.  His blood pressure seems improved as well.  Patient has an appoint with the cardiologist in the next 2 weeks.  I have contacted his cardiologist about the results of the empiric ni   • Folic acid deficiency 2/11/2016   • Gastroesophageal reflux disease without esophagitis 2/11/2016   • Generalized anxiety disorder 2/11/2016   • History of diverticulitis of colon 2009 and 2003 05/08/2009-acute diverticulitis without complication. 09/05/2003-acute diverticulitis without complication.    • Hyperlipidemia 2/11/2016 01/29/2005--treatment for hyperlipidemia begun.   • Hyperuricemia 8/19/2020   • Hypogonadism in male, on TRT, testosterone pellets, per  6/16/2017 January 2017--patient reports his urologist initiated testosterone replacement therapy consisting of testosterone pellets every 6 months.   • Multiple environmental allergies 2/12/2016    Patient sees the allergist regularly and has been on immunotherapy.   • Muscle cramps, statin related, Vytorin and pravastatin.  Tolerates Livalo. 6/16/2017 12/21/2018--patient seems to be tolerating Livalo well.  10/02/2018--Livalo 2 mg per day initiated.  Recommend CoQ10 400 mg per day with food for better absorption.  Reassess with lab in about 8 weeks.   08/30/2018--patient presents with complaints of muscle cramps.  He discontinue pravastatin and the cramps went away.  Cramps returned even with a half dose.  This is despite the fact he was taking    • Occlusive coronary artery disease, clinical diagnosis only.  Patient not a candidate for heart catheterization/intervention. 12/10/2019    October 8, 2019--patient seen in follow-up and he reports the long-acting oral nitrate has definitely helped his exertional anginal symptoms.  However, he has not stressed himself completely such as using a push mower.  His blood pressure seems improved as well.  Patient has an appoint with the cardiologist in the next 2 weeks.  I have contacted his cardiologist about the results of the empiric ni   • Osteopenia of multiple sites, 10/18/2021--lumbar spine 0.0.  Left femoral neck -1.5.  Right femoral neck -1.7. 2/11/2016 October 18, 2021--DEXA scan reveals lumbar spine T score 0.0 representing a 3.9% interval increase.  Left femoral neck T score -1.5 which is a 4.6% increase.  Right femoral neck T score -1.7 which is unchanged.  Impression is osteopenia with improvement.  July 12, 2019--DEXA scan reveals lumbar spine T score -0.4.  Unchanged.  Left femoral neck T score -1.7.  Unchanged.  Right femoral neck T score   • Overweight (BMI 25.0-29.9) 9/22/2022   • Primary osteoarthritis of knees, bilateral 9/12/2016 09/12/2016--patient called in the office requesting a steriod injection in his knees.  I explained to him that I do no longer perform these injections and have referred him to Dr. Manuel.   • Secondary hyperparathyroidism of renal origin 1/15/2021   • Statin intolerance, Vytorin and pravastatin 12/21/2018 12/21/2018--patient seems to be tolerating Livalo well.  10/02/2018--Livalo 2 mg per day initiated.  Recommend CoQ10 400 mg per day with food for better absorption.  Reassess with lab in about 8 weeks.  08/30/2018--patient presents with complaints of muscle cramps.   He discontinue pravastatin and the cramps went away.  Cramps returned even with a half dose.  This is despite the fact he was taking    • Vitamin B12 deficiency 2/11/2016 06/25/2009--B12 injections begun.   • Vitamin D deficiency 2/11/2016       Past Surgical History:   Procedure Laterality Date   • CATARACT EXTRACTION Left 12/12/2011 12/12/2011--cataract extirpation with intraocular lens implantation left eye.   • CATARACT EXTRACTION Right 12/2011 December 2011--right cataract extirpation with intraocular lens implantation.   • CHOLECYSTECTOMY WITH INTRAOPERATIVE CHOLANGIOGRAM N/A 11/6/2020    Procedure: CHOLECYSTECTOMY LAPAROSCOPIC INTRAOPERATIVE CHOLANGIOGRAM;  Surgeon: Sanjay Burgess MD;  Location: [facility] MAIN OR;  Service: General;  Laterality: N/A;   • COLONOSCOPY  06/11/2002 06/11/2002--incomplete colonoscopy due to redundant colon. Not able to get past the hepatic flexure. Patient had followup barium contrast enema which showed extensive diverticulosis   • COLONOSCOPY  10/03/2013    10/03/2013--colonoscopy revealed markedly redundant colon, pancolonic diverticulosis with several impacted fecalith. No evidence of diverticulitis or stricture. Was only able to reach the ascending colon. Follow up barium enema revealed extensive diverticulosis. No polyp or other mucosal mass seen.   • CYSTOSCOPY  05/18/2016 05/18/2016--urology consultation.  Cystoscopy performed for possible bladder mass is negative.   • ERCP N/A 11/5/2020    Procedure: ENDOSCOPIC RETROGRADE CHOLANGIOPANCREATOGRAPHY with sphincterotomy, basket retrieval and balloon sweep;  Surgeon: Joseluis Mejia MD;  Location: [facility] ENDOSCOPY;  Service: Gastroenterology;  Laterality: N/A;  pre/post - CBD stones   • PROSTATE BIOPSY  12/21/2016 12/21/2016--prostate biopsy reportedly negative.  I will try to obtain the report.       Social History     Socioeconomic History   • Marital status:    • Number of children: 2   •  Years of education: 14   • Highest education level: Some college, no degree   Tobacco Use   • Smoking status: Never   • Smokeless tobacco: Never   • Tobacco comments:     caffeine use: 2 cups coffee daily   Vaping Use   • Vaping Use: Never used   Substance and Sexual Activity   • Alcohol use: Yes     Alcohol/week: 5.0 standard drinks     Types: 2 Glasses of wine, 3 Shots of liquor per week     Comment: Moderate alcohol consumption   • Drug use: No   • Sexual activity: Not Currently     Partners: Female       Family History   Problem Relation Age of Onset   • Diabetes Mother    • Heart disease Mother    • Stroke Father        Review of Systems   Constitutional: Negative.   HENT: Negative.    Eyes: Negative.    Cardiovascular: Negative.    Respiratory: Negative.    Endocrine: Negative.    Hematologic/Lymphatic: Negative.    Skin: Negative.    Musculoskeletal: Negative.    Gastrointestinal: Negative.    Genitourinary: Negative.    Neurological: Negative.    Psychiatric/Behavioral: Negative.    Allergic/Immunologic: Negative.        No Known Allergies      Current Outpatient Medications:   •  ALPRAZolam (XANAX) 0.5 MG tablet, TAKE ONE TABLET BY MOUTH TWICE A DAY, Disp: 60 tablet, Rfl: 5  •  amLODIPine (NORVASC) 5 MG tablet, Take 1 p.o. daily for high blood pressure, Disp: 90 tablet, Rfl: 3  •  aspirin 81 MG tablet, Take 1 tablet by mouth Daily. TAKE 1 TABLET TWICE A WEEK, Disp: , Rfl:   •  calcitriol (ROCALTROL) 0.25 MCG capsule, Take 1 capsule by mouth., Disp: , Rfl:   •  Cholecalciferol 50 MCG (2000 UT) capsule, Take 1 capsule by mouth Daily., Disp: , Rfl:   •  Evolocumab (Repatha SureClick) solution auto-injector SureClick injection, Inject 1 mL subcutaneously as directed every 2 weeks for high cholesterol, Disp: 2 mL, Rfl: 11  •  fluticasone (FLONASE) 50 MCG/ACT nasal spray, SPRAY TWO SPRAYS IN EACH NOSTRIL ONCE DAILY, Disp: 16 mL, Rfl: 3  •  folic acid (FOLVITE) 1 MG tablet, TAKE ONE TABLET BY MOUTH TWICE A DAY,  "Disp: 180 tablet, Rfl: 3  •  metoprolol succinate XL (TOPROL-XL) 25 MG 24 hr tablet, Take 1 tablet by mouth Daily., Disp: 100 tablet, Rfl: 3  •  omeprazole (priLOSEC) 40 MG capsule, TAKE ONE CAPSULE BY MOUTH DAILY, Disp: 90 capsule, Rfl: 3    Current Facility-Administered Medications:   •  cyanocobalamin injection 1,000 mcg, 1,000 mcg, Intramuscular, Q28 Days, Delgado Patricia MD, 1,000 mcg at 03/24/23 1157      Objective:     Vitals:    04/03/23 1252   BP: 138/78   Pulse: 82   Weight: 85.3 kg (188 lb)   Height: 172.7 cm (67.99\")     Body mass index is 28.59 kg/m².     Lexiscan stress test 5/24/2019:  • Myocardial perfusion imaging indicates a normal myocardial perfusion study with no evidence of ischemia.  • Left ventricular ejection fraction is normal (Calculated EF = 64%).  • Impressions are consistent with a low risk study.  • Diaphragmatic attenuation artifact is present.     Vascular screening bundle 9/21/2016:  • Carotid Artery Disease and Stroke Screening: The right vessel has no significant carotid stenosis (less than50%). The left vessel has no significant carotid stenosis (less than50%). Carotid screening recommendations: annual screening.  • Abdominal Aortic Aneurysm (AAA) Screening: Maximum 1.59 cm. The artery was normal.  • Peripheral Artery Disease (P.A.D.) Screening: The right has normal arterial pressures. The left has normal arterial pressures.    PHYSICAL EXAM:    Constitutional:       Appearance: Healthy appearance. Not in distress.   Neck:      Vascular: No JVR. JVD normal.   Pulmonary:      Effort: Pulmonary effort is normal.      Breath sounds: Normal breath sounds. No wheezing. No rhonchi. No rales.   Chest:      Chest wall: Not tender to palpatation.   Cardiovascular:      PMI at left midclavicular line. Normal rate. Regular rhythm. Normal S1. Normal S2.      Murmurs: There is no murmur.      No gallop. No click. No rub.   Pulses:     Intact distal pulses.   Edema:     Peripheral edema " absent.   Abdominal:      General: Bowel sounds are normal.      Palpations: Abdomen is soft.      Tenderness: There is no abdominal tenderness.   Musculoskeletal: Normal range of motion.         General: No tenderness. Skin:     General: Skin is warm and dry.   Neurological:      General: No focal deficit present.      Mental Status: Alert and oriented to person, place and time.           ECG 12 Lead    Date/Time: 4/3/2023 2:03 PM  Performed by: Yamini Verdugo APRN  Authorized by: Yamini Verdugo APRN   Comparison: compared with previous ECG from 10/3/2022  Rhythm: sinus rhythm  Rate: normal  BPM: 82  Conduction: right bundle branch block  ST Segments: ST segments normal  T Waves: T waves normal                Assessment:       Diagnosis Plan   1. Statin intolerance, Vytorin and pravastatin        2. Benign essential hypertension        3. Hyperlipidemia        4. Right bundle branch block          Orders Placed This Encounter   Procedures   • ECG 12 Lead     This order was created via procedure documentation     Order Specific Question:   Release to patient     Answer:   Routine Release          Plan:       1.  Coronary artery disease: Myocardial perfusion study with no evidence of ischemia, EF 64% in 2019.  Patient was started on Imdur in 2019 due to exertional angina. He has not been taking any nitrates for at least a year and has no further episodes of chest discomfort.    2.  Hypertension: Well-controlled on current medication. Continue to monitor BP. Systolic normally 130's-140's.   3.  Benign carotid artery disease:  Aspirin daily. Repatha  4.  Hyperlipidemia: Now on Repatha every other week.  Lipid panel in target range. Monitored by PCP  5.  Right bundle branch block: chronic    Mr. Win will follow up with Dr. Barroso in 6 months.  He will call sooner for any questions or concerns.           Your medication list          Accurate as of April 3, 2023  2:06 PM. If you have any questions, ask  your nurse or doctor.            CHANGE how you take these medications      Instructions Last Dose Given Next Dose Due   metoprolol succinate XL 25 MG 24 hr tablet  Commonly known as: TOPROL-XL  What changed:   · medication strength  · how much to take  Changed by: VIRIDIANA Lepe      Take 1 tablet by mouth Daily.          CONTINUE taking these medications      Instructions Last Dose Given Next Dose Due   ALPRAZolam 0.5 MG tablet  Commonly known as: XANAX      TAKE ONE TABLET BY MOUTH TWICE A DAY       amLODIPine 5 MG tablet  Commonly known as: NORVASC      Take 1 p.o. daily for high blood pressure       aspirin 81 MG tablet      Take 1 tablet by mouth Daily. TAKE 1 TABLET TWICE A WEEK       calcitriol 0.25 MCG capsule  Commonly known as: ROCALTROL      Take 1 capsule by mouth.       Cholecalciferol 50 MCG (2000 UT) capsule      Take 1 capsule by mouth Daily.       fluticasone 50 MCG/ACT nasal spray  Commonly known as: FLONASE      SPRAY TWO SPRAYS IN EACH NOSTRIL ONCE DAILY       folic acid 1 MG tablet  Commonly known as: FOLVITE      TAKE ONE TABLET BY MOUTH TWICE A DAY       omeprazole 40 MG capsule  Commonly known as: priLOSEC      TAKE ONE CAPSULE BY MOUTH DAILY       Repatha SureClick solution auto-injector SureClick injection  Generic drug: Evolocumab      Inject 1 mL subcutaneously as directed every 2 weeks for high cholesterol          STOP taking these medications    isosorbide mononitrate 30 MG 24 hr tablet  Commonly known as: IMDUR  Stopped by: VIRIDIANA Lepe        nitroglycerin 0.6 MG SL tablet  Commonly known as: NITROSTAT  Stopped by: VIRIDIANA Lepe              Where to Get Your Medications      These medications were sent to [facility] [id number] - [facility] [address] [place] [address] RD AT [facility] [phone] [facility] [phone]   [address] [place] [address], [facility] [address]    Phone: [phone]   · metoprolol succinate XL 25 MG 24 hr tablet            As always, it has been a pleasure to participate in your patient's care.      Sincerely,       VIRIDIANA Angulo

## 2023-04-07 ENCOUNTER — TELEPHONE (OUTPATIENT)
Dept: CARDIOLOGY | Facility: CLINIC | Age: 88
End: 2023-04-07
Payer: MEDICARE

## 2023-04-07 NOTE — TELEPHONE ENCOUNTER
----- Message from Anika Guo sent at 4/3/2023  1:57 PM EDT -----  I need 6 months with Dr. Barroso for this patient. He is a first time for Dr. Barroso he is a previous Dr. Alejo patient.

## 2023-04-24 DIAGNOSIS — K21.9 GASTROESOPHAGEAL REFLUX DISEASE WITHOUT ESOPHAGITIS: Chronic | ICD-10-CM

## 2023-04-25 RX ORDER — OMEPRAZOLE 40 MG/1
CAPSULE, DELAYED RELEASE ORAL
Qty: 90 CAPSULE | Refills: 3 | Status: SHIPPED | OUTPATIENT
Start: 2023-04-25

## 2023-04-28 ENCOUNTER — TELEPHONE (OUTPATIENT)
Dept: INTERNAL MEDICINE | Facility: CLINIC | Age: 88
End: 2023-04-28

## 2023-05-02 NOTE — TELEPHONE ENCOUNTER
Left detailed message for pt.  I am keeping a list of patients who need injections and I will call him as soon as we get it.

## 2023-05-18 ENCOUNTER — CLINICAL SUPPORT (OUTPATIENT)
Dept: INTERNAL MEDICINE | Facility: CLINIC | Age: 88
End: 2023-05-18
Payer: MEDICARE

## 2023-05-18 DIAGNOSIS — E53.8 VITAMIN B12 DEFICIENCY: Chronic | ICD-10-CM

## 2023-05-18 PROCEDURE — 96372 THER/PROPH/DIAG INJ SC/IM: CPT | Performed by: INTERNAL MEDICINE

## 2023-05-18 RX ADMIN — CYANOCOBALAMIN 1000 MCG: 1000 INJECTION, SOLUTION INTRAMUSCULAR; SUBCUTANEOUS at 13:13

## 2023-05-20 DIAGNOSIS — F41.1 GENERALIZED ANXIETY DISORDER: ICD-10-CM

## 2023-05-24 RX ORDER — ALPRAZOLAM 0.5 MG/1
TABLET ORAL
Qty: 60 TABLET | Refills: 0 | Status: SHIPPED | OUTPATIENT
Start: 2023-05-24

## 2023-05-30 DIAGNOSIS — I25.10 OCCLUSIVE CORONARY ARTERY DISEASE: Chronic | ICD-10-CM

## 2023-05-30 DIAGNOSIS — E78.2 MIXED HYPERLIPIDEMIA: Chronic | ICD-10-CM

## 2023-05-30 DIAGNOSIS — Z78.9 STATIN INTOLERANCE: Chronic | ICD-10-CM

## 2023-05-30 DIAGNOSIS — E78.5 HYPERLIPIDEMIA, UNSPECIFIED HYPERLIPIDEMIA TYPE: Chronic | ICD-10-CM

## 2023-05-31 DIAGNOSIS — J30.1 SEASONAL ALLERGIC RHINITIS DUE TO POLLEN: Chronic | ICD-10-CM

## 2023-05-31 RX ORDER — EVOLOCUMAB 140 MG/ML
INJECTION, SOLUTION SUBCUTANEOUS
Qty: 2 ML | Refills: 11 | Status: SHIPPED | OUTPATIENT
Start: 2023-05-31

## 2023-05-31 RX ORDER — FLUTICASONE PROPIONATE 50 MCG
SPRAY, SUSPENSION (ML) NASAL
Qty: 16 ML | Refills: 3 | Status: SHIPPED | OUTPATIENT
Start: 2023-05-31

## 2023-07-26 ENCOUNTER — TELEPHONE (OUTPATIENT)
Dept: INTERNAL MEDICINE | Facility: CLINIC | Age: 88
End: 2023-07-26

## 2023-07-26 NOTE — TELEPHONE ENCOUNTER
Hub staff attempted to follow warm transfer process and was unsuccessful     Caller: [facility] NI    Relationship to patient: OTHER     Best call back number:  OPTION 3, CASE #: [id number]    Patient is needing: PLEASE RETURN THE CALL TO MAYUR REGARDING THE REPORT ON A DEVICE THAT [facility] CONTACTED THEM ABOUT.

## 2023-07-26 NOTE — TELEPHONE ENCOUNTER
[facility] has been contacted and we discussed what happened with the Repatha pens that did not engage.

## 2023-07-31 RX ORDER — METOPROLOL SUCCINATE 25 MG/1
25 TABLET, EXTENDED RELEASE ORAL DAILY
Qty: 90 TABLET | Refills: 0 | Status: SHIPPED | OUTPATIENT
Start: 2023-07-31

## 2023-08-08 ENCOUNTER — CLINICAL SUPPORT (OUTPATIENT)
Dept: INTERNAL MEDICINE | Facility: CLINIC | Age: 88
End: 2023-08-08
Payer: MEDICARE

## 2023-08-08 DIAGNOSIS — E78.5 HYPERLIPIDEMIA, UNSPECIFIED HYPERLIPIDEMIA TYPE: Primary | ICD-10-CM

## 2023-08-08 PROCEDURE — 96372 THER/PROPH/DIAG INJ SC/IM: CPT | Performed by: INTERNAL MEDICINE

## 2023-08-08 RX ADMIN — CYANOCOBALAMIN 1000 MCG: 1000 INJECTION, SOLUTION INTRAMUSCULAR; SUBCUTANEOUS at 15:57

## 2023-08-08 NOTE — PROGRESS NOTES
Injection  Injection performed in ld by Rodolfo Van MA. Patient tolerated the procedure well without complications.  08/08/23   Rodolfo Van MA

## 2023-08-21 DIAGNOSIS — F41.1 GENERALIZED ANXIETY DISORDER: ICD-10-CM

## 2023-08-21 RX ORDER — ALPRAZOLAM 0.5 MG/1
0.5 TABLET ORAL 2 TIMES DAILY
Qty: 60 TABLET | Refills: 2 | Status: SHIPPED | OUTPATIENT
Start: 2023-08-21

## 2023-09-12 ENCOUNTER — CLINICAL SUPPORT (OUTPATIENT)
Dept: INTERNAL MEDICINE | Facility: CLINIC | Age: 88
End: 2023-09-12
Payer: MEDICARE

## 2023-09-12 DIAGNOSIS — E53.8 VITAMIN B12 DEFICIENCY: Chronic | ICD-10-CM

## 2023-09-12 DIAGNOSIS — E78.2 MIXED HYPERLIPIDEMIA: Primary | ICD-10-CM

## 2023-09-12 PROCEDURE — 96372 THER/PROPH/DIAG INJ SC/IM: CPT | Performed by: INTERNAL MEDICINE

## 2023-09-12 RX ADMIN — CYANOCOBALAMIN 1000 MCG: 1000 INJECTION, SOLUTION INTRAMUSCULAR; SUBCUTANEOUS at 15:30

## 2023-09-19 ENCOUNTER — OFFICE VISIT (OUTPATIENT)
Dept: INTERNAL MEDICINE | Facility: CLINIC | Age: 88
End: 2023-09-19
Payer: MEDICARE

## 2023-09-19 VITALS
HEART RATE: 81 BPM | DIASTOLIC BLOOD PRESSURE: 76 MMHG | RESPIRATION RATE: 16 BRPM | WEIGHT: 186 LBS | HEIGHT: 68 IN | SYSTOLIC BLOOD PRESSURE: 132 MMHG | BODY MASS INDEX: 28.19 KG/M2 | TEMPERATURE: 98 F | OXYGEN SATURATION: 98 %

## 2023-09-28 ENCOUNTER — OFFICE VISIT (OUTPATIENT)
Dept: CARDIOLOGY | Facility: CLINIC | Age: 88
End: 2023-09-28
Payer: MEDICARE

## 2023-09-28 VITALS
WEIGHT: 183 LBS | DIASTOLIC BLOOD PRESSURE: 64 MMHG | BODY MASS INDEX: 27.74 KG/M2 | HEART RATE: 80 BPM | HEIGHT: 68 IN | SYSTOLIC BLOOD PRESSURE: 128 MMHG

## 2023-09-28 DIAGNOSIS — Z78.9 STATIN INTOLERANCE: Primary | Chronic | ICD-10-CM

## 2023-09-28 DIAGNOSIS — I65.23 BILATERAL CAROTID ARTERY STENOSIS: Chronic | ICD-10-CM

## 2023-09-28 DIAGNOSIS — I10 BENIGN ESSENTIAL HYPERTENSION: Chronic | ICD-10-CM

## 2023-09-28 RX ORDER — METOPROLOL SUCCINATE 25 MG/1
25 TABLET, EXTENDED RELEASE ORAL DAILY
Qty: 90 TABLET | Refills: 3 | Status: SHIPPED | OUTPATIENT
Start: 2023-09-28

## 2023-09-28 RX ORDER — TADALAFIL 20 MG/1
40 TABLET ORAL
COMMUNITY

## 2023-09-28 NOTE — PROGRESS NOTES
Date of Office Visit: 2023  Encounter Provider: VIRIDIANA Lepe  Place of Service: [facility] CARDIOLOGY  Patient Name: Radha Win  :1932    No chief complaint on file.  : 6 month follow up     HPI: Radha Win is a 89 y.o. male who previously followed with Dr. Alejo.  He is presents for a 6-month office follow-up.  He has coronary artery disease, hypertension, noncritical carotid artery disease, chronic kidney disease.    He underwent Lexiscan stress test that showed normal myocardial perfusion with no evidence of ischemia, EF 64%.  He was prescribed isosorbide; however, he has not needed this in at least a year.      Mr. Gonzalez again presents today with no new complaints.  His blood pressure is well controlled.  He denies any chest pain, shortness of air, lightheadedness or edema.  He does have a little twinge in his left chest that last for couple seconds if he changes position in his recliner at night.  He remains very active.  He does all of his yard work and keeps his house up.  Patient enjoys walking for exercise.  As a matter of fact, the elevator stopped on the third floor and would not go up any further on his way up to the office today, therefore he walked up 3 flights of stairs and had no issues.  He has no chest pain with exertion.  He monitors his blood pressure which is controlled.  He continues to take Repatha however he has now decreased it to once a month rather than twice monthly.  Most recent lipid panel is in target range.  EKG is normal.    Previous testing and notes have been reviewed by me.   Past Medical History:   Diagnosis Date    Allergic rhinitis 2016    Anemia due to chronic kidney disease 2016    Benign essential hypertension 2016    Benign prostatic hypertrophy 2016--prostate biopsy reportedly negative.  I will try to obtain the report.  Patient sees urologist.  PSAs run from the  3-8 range    Bilateral carotid artery plaque, 09/21/2016--16-49% bilateral carotid artery stenosis 2/11/2016 09/21/2016--vascular screen reveals 16-49% bilateral carotid artery stenosis, negative for AAA, negative for PAD.  10/10/2008--vascular screening study revealed mild bilateral carotid plaque, no aortic aneurysm, no evidence of PAD    Bilateral renal cysts 2/14/2016 04/07/2016--ultrasound of the kidneys reveals the previously described renal cysts are not well seen.  However, questionable incidental bladder tumor noted.  05/26/2010--ultrasound the kidneys was negative bilaterally except for small renal cysts.    Cervical radiculopathy 6/28/2018    10/02/2018--patient seen in follow-up and the results of the MRI discussed.  He continues to have intermittent radicular symptoms which is currently only on the left.  Discussed options with patient and I have recommended neurosurgery consultation.  Patient may benefit from epidural injections.  09/06/2018--MRI of the cervical spine reveals moderate neural foraminal compromise on the right at C3-C    Chronic left shoulder pain 7/19/2021 July 19, 2021--patient presents with continued left-sided neck pain that radiates into his left shoulder but does not really go over that far down into the left upper extremity.  The pain is definitely accentuated by leaning his head to the right and rotating his head to the right.  He does not do anything to aggravate it or make it better by moving his head towards the left.  On exam he has good     Chronic renal insufficiency, stage IV (severe), 02/09/2016--creatinine 1.85 2/11/2016 02/09/2016--serum creatinine 1.85.  BUN 29.  07/20/2015--patient was evaluated by the nephrologist.  Ultrasound of the kidneys ordered but this was never done.  05/22/2015--BUN 35, creatinine 2.3.  Patient referred to nephrology, Dr. Devyn Muniz.  04/16/2014--BUN 25, creatinine 1.77.  01/03/2013--BUN 37, creatinine 2.14.     05/26/2010---ultrasound of the kidneys reveal a small cyst x 2 right kidney.  Otherwise normal.    Baseline creatinine approximately 1.9-2.0.    Degeneration of cervical intervertebral disc 5/2/2019    Diverticulosis of colon 2/11/2016    10/03/2013--colonoscopy revealed markedly redundant colon, pancolonic diverticulosis with several impacted fecalith.  No evidence of diverticulitis or stricture.  Was only able to reach the ascending colon.  Follow up barium enema revealed extensive diverticulosis.  No polyp or other mucosal mass seen.    06/11/2002--incomplete colonoscopy due to redundant colon.  Not able to get past the hepatic flexure.  Patient had followup barium contrast enema which showed extensive diverticulosis.    Exertional angina 9/17/2019 October 8, 2019--patient seen in follow-up and he reports the long-acting oral nitrate has definitely helped his exertional anginal symptoms.  However, he has not stressed himself completely such as using a push mower.  His blood pressure seems improved as well.  Patient has an appoint with the cardiologist in the next 2 weeks.  I have contacted his cardiologist about the results of the empiric ni    Folic acid deficiency 2/11/2016    Gastroesophageal reflux disease without esophagitis 2/11/2016    Generalized anxiety disorder 2/11/2016    History of diverticulitis of colon 2009 and 2003 05/08/2009-acute diverticulitis without complication. 09/05/2003-acute diverticulitis without complication.     Hyperlipidemia 2/11/2016 01/29/2005--treatment for hyperlipidemia begun.    Hyperuricemia 8/19/2020    Hypogonadism in male, on TRT, testosterone pellets, per  6/16/2017 January 2017--patient reports his urologist initiated testosterone replacement therapy consisting of testosterone pellets every 6 months.    Multiple environmental allergies 2/12/2016    Patient sees the allergist regularly and has been on immunotherapy.    Muscle cramps, statin related, Vytorin  and pravastatin.  Tolerates Livalo. 6/16/2017 12/21/2018--patient seems to be tolerating Livalo well.  10/02/2018--Livalo 2 mg per day initiated.  Recommend CoQ10 400 mg per day with food for better absorption.  Reassess with lab in about 8 weeks.  08/30/2018--patient presents with complaints of muscle cramps.  He discontinue pravastatin and the cramps went away.  Cramps returned even with a half dose.  This is despite the fact he was taking     Occlusive coronary artery disease, clinical diagnosis only.  Patient not a candidate for heart catheterization/intervention. 12/10/2019    October 8, 2019--patient seen in follow-up and he reports the long-acting oral nitrate has definitely helped his exertional anginal symptoms.  However, he has not stressed himself completely such as using a push mower.  His blood pressure seems improved as well.  Patient has an appoint with the cardiologist in the next 2 weeks.  I have contacted his cardiologist about the results of the empiric ni    Osteopenia of multiple sites, 10/18/2021--lumbar spine 0.0.  Left femoral neck -1.5.  Right femoral neck -1.7. 2/11/2016 October 18, 2021--DEXA scan reveals lumbar spine T score 0.0 representing a 3.9% interval increase.  Left femoral neck T score -1.5 which is a 4.6% increase.  Right femoral neck T score -1.7 which is unchanged.  Impression is osteopenia with improvement.  July 12, 2019--DEXA scan reveals lumbar spine T score -0.4.  Unchanged.  Left femoral neck T score -1.7.  Unchanged.  Right femoral neck T score    Overweight (BMI 25.0-29.9) 9/22/2022    Primary osteoarthritis of knees, bilateral 9/12/2016 09/12/2016--patient called in the office requesting a steriod injection in his knees.  I explained to him that I do no longer perform these injections and have referred him to Dr. Manuel.    Secondary hyperparathyroidism of renal origin 1/15/2021    Statin intolerance, Vytorin and pravastatin 12/21/2018 12/21/2018--patient  seems to be tolerating Livalo well.  10/02/2018--Livalo 2 mg per day initiated.  Recommend CoQ10 400 mg per day with food for better absorption.  Reassess with lab in about 8 weeks.  08/30/2018--patient presents with complaints of muscle cramps.  He discontinue pravastatin and the cramps went away.  Cramps returned even with a half dose.  This is despite the fact he was taking     Vitamin B12 deficiency 2/11/2016 06/25/2009--B12 injections begun.    Vitamin D deficiency 2/11/2016       Past Surgical History:   Procedure Laterality Date    CATARACT EXTRACTION Left 12/12/2011 12/12/2011--cataract extirpation with intraocular lens implantation left eye.    CATARACT EXTRACTION Right 12/2011 December 2011--right cataract extirpation with intraocular lens implantation.    CHOLECYSTECTOMY WITH INTRAOPERATIVE CHOLANGIOGRAM N/A 11/6/2020    Procedure: CHOLECYSTECTOMY LAPAROSCOPIC INTRAOPERATIVE CHOLANGIOGRAM;  Surgeon: Sanjay Burgess MD;  Location: [facility] OR;  Service: General;  Laterality: N/A;    COLONOSCOPY  06/11/2002 06/11/2002--incomplete colonoscopy due to redundant colon. Not able to get past the hepatic flexure. Patient had followup barium contrast enema which showed extensive diverticulosis    COLONOSCOPY  10/03/2013    10/03/2013--colonoscopy revealed markedly redundant colon, pancolonic diverticulosis with several impacted fecalith. No evidence of diverticulitis or stricture. Was only able to reach the ascending colon. Follow up barium enema revealed extensive diverticulosis. No polyp or other mucosal mass seen.    CYSTOSCOPY  05/18/2016 05/18/2016--urology consultation.  Cystoscopy performed for possible bladder mass is negative.    ERCP N/A 11/5/2020    Procedure: ENDOSCOPIC RETROGRADE CHOLANGIOPANCREATOGRAPHY with sphincterotomy, basket retrieval and balloon sweep;  Surgeon: Joseluis Mejia MD;  Location: [facility] ENDOSCOPY;  Service: Gastroenterology;  Laterality: N/A;  pre/post -  CBD stones    PROSTATE BIOPSY  12/21/2016 12/21/2016--prostate biopsy reportedly negative.  I will try to obtain the report.       Social History     Socioeconomic History    Marital status:     Number of children: 2    Years of education: 14    Highest education level: Some college, no degree   Tobacco Use    Smoking status: Never    Smokeless tobacco: Never    Tobacco comments:     caffeine use: 2 cups coffee daily   Vaping Use    Vaping Use: Never used   Substance and Sexual Activity    Alcohol use: Yes     Alcohol/week: 5.0 standard drinks     Types: 2 Glasses of wine, 3 Shots of liquor per week     Comment: Moderate alcohol consumption    Drug use: No    Sexual activity: Not Currently     Partners: Female       Family History   Problem Relation Age of Onset    Diabetes Mother     Heart disease Mother     Stroke Father        Review of Systems   Constitutional: Negative.   HENT: Negative.     Eyes: Negative.    Cardiovascular: Negative.    Respiratory: Negative.     Endocrine: Negative.    Hematologic/Lymphatic: Negative.    Skin: Negative.    Musculoskeletal: Negative.    Gastrointestinal: Negative.    Genitourinary: Negative.    Neurological: Negative.    Psychiatric/Behavioral: Negative.     Allergic/Immunologic: Negative.      No Known Allergies      Current Outpatient Medications:     ALPRAZolam (XANAX) 0.5 MG tablet, Take 1 tablet by mouth 2 (Two) Times a Day., Disp: 60 tablet, Rfl: 2    amLODIPine (NORVASC) 5 MG tablet, Take 1 p.o. daily for high blood pressure, Disp: 90 tablet, Rfl: 3    aspirin 81 MG tablet, Take 1 tablet by mouth Daily. TAKE 1 TABLET TWICE A WEEK, Disp: , Rfl:     Cholecalciferol 50 MCG (2000 UT) capsule, Take 1 capsule by mouth Daily., Disp: , Rfl:     Evolocumab (Repatha SureClick) solution auto-injector SureClick injection, INJECT 1 ML UNDER THE SKIN INTO THE APPROPRIATE AREA EVERY 14 DAYS, Disp: 2 mL, Rfl: 11    fluticasone (FLONASE) 50 MCG/ACT nasal spray, SPRAY TWO  "SPRAYS IN EACH NOSTRIL ONCE DAILY, Disp: 16 mL, Rfl: 3    folic acid (FOLVITE) 1 MG tablet, TAKE ONE TABLET BY MOUTH TWICE A DAY, Disp: 180 tablet, Rfl: 0    metoprolol succinate XL (TOPROL-XL) 25 MG 24 hr tablet, Take 1 tablet by mouth Daily., Disp: 90 tablet, Rfl: 3    omeprazole (priLOSEC) 40 MG capsule, TAKE ONE CAPSULE BY MOUTH DAILY, Disp: 90 capsule, Rfl: 3    tadalafil (ADCIRCA) 20 MG tablet tablet, Take 2 tablets by mouth Daily., Disp: , Rfl:     Current Facility-Administered Medications:     cyanocobalamin injection 1,000 mcg, 1,000 mcg, Intramuscular, Q28 Days, Delgado Patricia MD, 1,000 mcg at 09/12/23 1530      Objective:     Vitals:    09/28/23 1246   BP: 128/64   Pulse: 80   Weight: 83 kg (183 lb)   Height: 172.7 cm (67.99\")     Body mass index is 27.83 kg/m².     Bilateral carotid artery Dopplers 11/23/2022:  Right ICA Prox: Imaging indicates 50-59% stenosis.   Left ICA Prox: Imaging indicates 16-49% stenosis.     Lexiscan stress test 5/24/2019:  Myocardial perfusion imaging indicates a normal myocardial perfusion study with no evidence of ischemia.  Left ventricular ejection fraction is normal (Calculated EF = 64%).  Impressions are consistent with a low risk study.  Diaphragmatic attenuation artifact is present.     Vascular screening bundle 9/21/2016:  Carotid Artery Disease and Stroke Screening: The right vessel has no significant carotid stenosis (less than50%). The left vessel has no significant carotid stenosis (less than50%). Carotid screening recommendations: annual screening.  Abdominal Aortic Aneurysm (AAA) Screening: Maximum 1.59 cm. The artery was normal.  Peripheral Artery Disease (P.A.D.) Screening: The right has normal arterial pressures. The left has normal arterial pressures.    PHYSICAL EXAM:    Constitutional:       Appearance: Healthy appearance. Not in distress.   Neck:      Vascular: No JVR. JVD normal.   Pulmonary:      Effort: Pulmonary effort is normal.      Breath sounds: " Normal breath sounds. No wheezing. No rhonchi. No rales.   Chest:      Chest wall: Not tender to palpatation.   Cardiovascular:      PMI at left midclavicular line. Normal rate. Regular rhythm. Normal S1. Normal S2.       Murmurs: There is no murmur.      No gallop.  No click. No rub.   Pulses:     Intact distal pulses.   Edema:     Peripheral edema absent.   Abdominal:      General: Bowel sounds are normal.      Palpations: Abdomen is soft.      Tenderness: There is no abdominal tenderness.   Musculoskeletal: Normal range of motion.         General: No tenderness. Skin:     General: Skin is warm and dry.   Neurological:      General: No focal deficit present.      Mental Status: Alert and oriented to person, place and time.         ECG 12 Lead    Date/Time: 9/28/2023 1:31 PM  Performed by: Yamini Verdugo APRN  Authorized by: Yamini Verdugo APRN   Comparison: compared with previous ECG from 4/3/2023  Similar to previous ECG  Rhythm: sinus rhythm  BPM: 80  Conduction: conduction normal    Clinical impression: normal ECG          Assessment:       Diagnosis Plan   1. Statin intolerance, Vytorin and pravastatin        2. Benign essential hypertension        3. Bilateral carotid artery stenosis          No orders of the defined types were placed in this encounter.         Plan:       1.  Coronary artery disease: Myocardial perfusion study with no evidence of ischemia, EF 64% in 2019.  No angina.  EKG normal.  On aspirin  2.  Hypertension: Well-controlled on current medication. Continue to monitor BP. Systolic normally 130's-140's.   3.  Benign carotid artery disease:  Aspirin daily. Repatha  4.  Hyperlipidemia: Now on Repatha once a month.  Lipid panel in target range. Monitored by PCP    Mr. Win will follow up with Dr. Barroso in 6 months.  He will call sooner for any questions or concerns.           Your medication list            Accurate as of September 28, 2023  1:29 PM. If you have any questions, ask  your nurse or doctor.                CONTINUE taking these medications        Instructions Last Dose Given Next Dose Due   ALPRAZolam 0.5 MG tablet  Commonly known as: XANAX      Take 1 tablet by mouth 2 (Two) Times a Day.       amLODIPine 5 MG tablet  Commonly known as: NORVASC      Take 1 p.o. daily for high blood pressure       aspirin 81 MG tablet      Take 1 tablet by mouth Daily. TAKE 1 TABLET TWICE A WEEK       Cholecalciferol 50 MCG (2000 UT) capsule      Take 1 capsule by mouth Daily.       fluticasone 50 MCG/ACT nasal spray  Commonly known as: FLONASE      SPRAY TWO SPRAYS IN EACH NOSTRIL ONCE DAILY       folic acid 1 MG tablet  Commonly known as: FOLVITE      TAKE ONE TABLET BY MOUTH TWICE A DAY       metoprolol succinate XL 25 MG 24 hr tablet  Commonly known as: TOPROL-XL      Take 1 tablet by mouth Daily.       omeprazole 40 MG capsule  Commonly known as: priLOSEC      TAKE ONE CAPSULE BY MOUTH DAILY       Repatha SureClick solution auto-injector SureClick injection  Generic drug: Evolocumab      INJECT 1 ML UNDER THE SKIN INTO THE APPROPRIATE AREA EVERY 14 DAYS       tadalafil 20 MG tablet tablet  Commonly known as: ADCIRCA      Take 2 tablets by mouth Daily.                 Where to Get Your Medications        These medications were sent to [facility] [id number] [address] [facility] [phone] [facility] [phone] [address], [place] [id number]      Phone: [phone]   metoprolol succinate XL 25 MG 24 hr tablet           As always, it has been a pleasure to participate in your patient's care.      Sincerely,       VIRIDIANA Angulo

## 2023-10-06 DIAGNOSIS — J30.1 SEASONAL ALLERGIC RHINITIS DUE TO POLLEN: Chronic | ICD-10-CM

## 2023-10-06 RX ORDER — FLUTICASONE PROPIONATE 50 MCG
SPRAY, SUSPENSION (ML) NASAL
Qty: 16 ML | Refills: 3 | Status: SHIPPED | OUTPATIENT
Start: 2023-10-06

## 2023-10-10 ENCOUNTER — TELEPHONE (OUTPATIENT)
Dept: INTERNAL MEDICINE | Facility: CLINIC | Age: 88
End: 2023-10-10

## 2023-10-10 ENCOUNTER — CLINICAL SUPPORT (OUTPATIENT)
Dept: INTERNAL MEDICINE | Facility: CLINIC | Age: 88
End: 2023-10-10
Payer: MEDICARE

## 2023-10-10 DIAGNOSIS — Z23 FLU VACCINE NEED: Primary | ICD-10-CM

## 2023-10-10 PROCEDURE — G0008 ADMIN INFLUENZA VIRUS VAC: HCPCS | Performed by: INTERNAL MEDICINE

## 2023-10-10 PROCEDURE — 90662 IIV NO PRSV INCREASED AG IM: CPT | Performed by: INTERNAL MEDICINE

## 2023-10-10 RX ADMIN — CYANOCOBALAMIN 1000 MCG: 1000 INJECTION, SOLUTION INTRAMUSCULAR; SUBCUTANEOUS at 15:06

## 2023-10-10 NOTE — TELEPHONE ENCOUNTER
PATIENT CALLED TO HAVE A B-12 SHOT TODAY AT 3:00-3:30    PLEASE CALL [phone]    ATTEMPTED WARM TRANSFER

## 2023-10-23 ENCOUNTER — OFFICE VISIT (OUTPATIENT)
Dept: INTERNAL MEDICINE | Facility: CLINIC | Age: 88
End: 2023-10-23
Payer: MEDICARE

## 2023-10-23 VITALS
TEMPERATURE: 97.8 F | BODY MASS INDEX: 27.74 KG/M2 | OXYGEN SATURATION: 99 % | DIASTOLIC BLOOD PRESSURE: 72 MMHG | HEART RATE: 97 BPM | WEIGHT: 183 LBS | RESPIRATION RATE: 16 BRPM | HEIGHT: 68 IN | SYSTOLIC BLOOD PRESSURE: 136 MMHG

## 2023-10-23 DIAGNOSIS — E29.1 HYPOGONADISM IN MALE: Chronic | ICD-10-CM

## 2023-10-23 DIAGNOSIS — E53.8 VITAMIN B12 DEFICIENCY: Chronic | ICD-10-CM

## 2023-10-23 DIAGNOSIS — I25.10 OCCLUSIVE CORONARY ARTERY DISEASE: Chronic | ICD-10-CM

## 2023-10-23 DIAGNOSIS — D63.1 ANEMIA DUE TO STAGE 4 CHRONIC KIDNEY DISEASE: Chronic | ICD-10-CM

## 2023-10-23 DIAGNOSIS — Z78.9 STATIN INTOLERANCE: Chronic | ICD-10-CM

## 2023-10-23 DIAGNOSIS — M17.0 PRIMARY OSTEOARTHRITIS OF KNEES, BILATERAL: Chronic | ICD-10-CM

## 2023-10-23 DIAGNOSIS — G89.29 CHRONIC NECK PAIN: Chronic | ICD-10-CM

## 2023-10-23 DIAGNOSIS — E78.2 MIXED HYPERLIPIDEMIA: Chronic | ICD-10-CM

## 2023-10-23 DIAGNOSIS — F41.1 GENERALIZED ANXIETY DISORDER: Chronic | ICD-10-CM

## 2023-10-23 DIAGNOSIS — Z91.09 MULTIPLE ENVIRONMENTAL ALLERGIES: Chronic | ICD-10-CM

## 2023-10-23 DIAGNOSIS — N40.0 BENIGN NON-NODULAR PROSTATIC HYPERPLASIA WITHOUT LOWER URINARY TRACT SYMPTOMS: Chronic | ICD-10-CM

## 2023-10-23 DIAGNOSIS — E66.3 OVERWEIGHT (BMI 25.0-29.9): Chronic | ICD-10-CM

## 2023-10-23 DIAGNOSIS — I65.23 BILATERAL CAROTID ARTERY STENOSIS: Chronic | ICD-10-CM

## 2023-10-23 DIAGNOSIS — Z51.81 THERAPEUTIC DRUG MONITORING: ICD-10-CM

## 2023-10-23 DIAGNOSIS — E79.0 HYPERURICEMIA: Chronic | ICD-10-CM

## 2023-10-23 DIAGNOSIS — N18.4 ANEMIA DUE TO STAGE 4 CHRONIC KIDNEY DISEASE: Chronic | ICD-10-CM

## 2023-10-23 DIAGNOSIS — K21.9 GASTROESOPHAGEAL REFLUX DISEASE WITHOUT ESOPHAGITIS: Chronic | ICD-10-CM

## 2023-10-23 DIAGNOSIS — N25.81 SECONDARY HYPERPARATHYROIDISM OF RENAL ORIGIN: Chronic | ICD-10-CM

## 2023-10-23 DIAGNOSIS — E55.9 VITAMIN D DEFICIENCY: Chronic | ICD-10-CM

## 2023-10-23 DIAGNOSIS — N18.4 CHRONIC RENAL INSUFFICIENCY, STAGE IV (SEVERE): Chronic | ICD-10-CM

## 2023-10-23 DIAGNOSIS — M85.89 OSTEOPENIA OF MULTIPLE SITES: Chronic | ICD-10-CM

## 2023-10-23 DIAGNOSIS — E53.8 FOLIC ACID DEFICIENCY: Chronic | ICD-10-CM

## 2023-10-23 DIAGNOSIS — Z00.00 ENCOUNTER FOR SUBSEQUENT ANNUAL WELLNESS VISIT (AWV) IN MEDICARE PATIENT: Primary | ICD-10-CM

## 2023-10-23 DIAGNOSIS — M54.2 CHRONIC NECK PAIN: Chronic | ICD-10-CM

## 2023-10-23 DIAGNOSIS — R73.01 IMPAIRED FASTING GLUCOSE: Chronic | ICD-10-CM

## 2023-10-23 DIAGNOSIS — I10 BENIGN ESSENTIAL HYPERTENSION: Chronic | ICD-10-CM

## 2023-10-23 DIAGNOSIS — M50.30 DEGENERATION OF CERVICAL INTERVERTEBRAL DISC: Chronic | ICD-10-CM

## 2023-10-23 NOTE — PROGRESS NOTES
The ABCs of the Annual Wellness Visit  Subsequent Medicare Wellness Visit    Subjective      Radha Win is a 89 y.o. male who presents for a Subsequent Medicare Wellness Visit.    The following portions of the patient's history were reviewed and   updated as appropriate: allergies, current medications, past family history, past medical history, past social history, past surgical history, and problem list.    Compared to one year ago, the patient feels his physical   health is the same.    Compared to one year ago, the patient feels his mental   health is the same.    Recent Hospitalizations:  He was not admitted to the hospital during the last year.       Current Medical Providers:  Patient Care Team:  Delgado Patricia MD as PCP - General (Internal Medicine)    Outpatient Medications Prior to Visit   Medication Sig Dispense Refill    ALPRAZolam (XANAX) 0.5 MG tablet Take 1 tablet by mouth 2 (Two) Times a Day. 60 tablet 2    amLODIPine (NORVASC) 5 MG tablet Take 1 p.o. daily for high blood pressure 90 tablet 3    aspirin 81 MG tablet Take 1 tablet by mouth Daily. TAKE 1 TABLET TWICE A WEEK      Cholecalciferol 50 MCG (2000 UT) capsule Take 1 capsule by mouth Daily.      Evolocumab (Repatha SureClick) solution auto-injector SureClick injection INJECT 1 ML UNDER THE SKIN INTO THE APPROPRIATE AREA EVERY 14 DAYS 2 mL 11    fluticasone (FLONASE) 50 MCG/ACT nasal spray SPRAY TWO SPRAYS IN EACH NOSTRIL ONCE DAILY 16 mL 3    folic acid (FOLVITE) 1 MG tablet TAKE ONE TABLET BY MOUTH TWICE A  tablet 0    metoprolol succinate XL (TOPROL-XL) 25 MG 24 hr tablet Take 1 tablet by mouth Daily. 90 tablet 3    omeprazole (priLOSEC) 40 MG capsule TAKE ONE CAPSULE BY MOUTH DAILY 90 capsule 3    tadalafil (ADCIRCA) 20 MG tablet tablet Take 2 tablets by mouth Daily.       Facility-Administered Medications Prior to Visit   Medication Dose Route Frequency Provider Last Rate Last Admin    cyanocobalamin injection 1,000 mcg   1,000 mcg Intramuscular Q28 Days Delgado Patricia MD   1,000 mcg at 10/10/23 1506       No opioid medication identified on active medication list. I have reviewed chart for other potential  high risk medication/s and harmful drug interactions in the elderly.        Aspirin is on active medication list. Aspirin use is indicated based on review of current medical condition/s. Pros and cons of this therapy have been discussed today. Benefits of this medication outweigh potential harm.  Patient has been encouraged to continue taking this medication.  .      Patient Active Problem List   Diagnosis    Bilateral carotid artery stenosis    Benign prostatic hypertrophy    Chronic renal insufficiency, stage IV (severe), 02/09/2016--creatinine 1.85    Diverticulosis of colon    Hyperlipidemia    Osteopenia of multiple sites, 10/18/2021--lumbar spine 0.0.  Left femoral neck -1.5.  Right femoral neck -1.7.    Vitamin B12 deficiency    Vitamin D deficiency    Allergic rhinitis    Anemia due to stage 4 chronic kidney disease    Generalized anxiety disorder    Benign essential hypertension    Gastroesophageal reflux disease without esophagitis    Folic acid deficiency    Multiple environmental allergies    Therapeutic drug monitoring    Bilateral renal cysts    Primary osteoarthritis of knees, bilateral    Impaired fasting glucose    Muscle cramps, statin related, Vytorin and pravastatin.  Tolerates Livalo.    Hypogonadism in male, on TRT, testosterone pellets, per     Chronic neck pain    Cervical radiculopathy    Statin intolerance, Vytorin and pravastatin    Degeneration of cervical intervertebral disc    Occlusive coronary artery disease, clinical diagnosis only.  Patient not a candidate for heart catheterization/intervention.    Hyperuricemia    Secondary hyperparathyroidism of renal origin    Chronic left shoulder pain    Overweight (BMI 25.0-29.9)    Right bundle branch block     Advance Care Planning   Advance Care  "Planning     Advance Directive is on file.  ACP discussion was held with the patient during this visit. Patient has an advance directive in EMR which is still valid.      Objective    Vitals:    10/23/23 0915   BP: 136/72   Pulse: 97   Resp: 16   Temp: 97.8 °F (36.6 °C)   TempSrc: Temporal   SpO2: 99%   Weight: 83 kg (183 lb)   Height: 172.7 cm (67.99\")     Estimated body mass index is 27.83 kg/m² as calculated from the following:    Height as of this encounter: 172.7 cm (67.99\").    Weight as of this encounter: 83 kg (183 lb).           Does the patient have evidence of cognitive impairment?   No            HEALTH RISK ASSESSMENT    Smoking Status:  Social History     Tobacco Use   Smoking Status Never   Smokeless Tobacco Never   Tobacco Comments    caffeine use: 2 cups coffee daily     Alcohol Consumption:  Social History     Substance and Sexual Activity   Alcohol Use Yes    Alcohol/week: 5.0 standard drinks of alcohol    Types: 2 Glasses of wine, 3 Shots of liquor per week    Comment: Moderate alcohol consumption     Fall Risk Screen:    STEADI Fall Risk Assessment was completed, and patient is at MODERATE risk for falls. Assessment completed on:10/23/2023    Depression Screening:      10/23/2023     9:21 AM   PHQ-2/PHQ-9 Depression Screening   Little Interest or Pleasure in Doing Things 0-->not at all   Feeling Down, Depressed or Hopeless 1-->several days   PHQ-9: Brief Depression Severity Measure Score 1       Health Habits and Functional and Cognitive Screening:      10/23/2023     9:23 AM   Functional & Cognitive Status   Do you have difficulty preparing food and eating? No   Do you have difficulty bathing yourself, getting dressed or grooming yourself? No   Do you have difficulty using the toilet? No   Do you have difficulty moving around from place to place? No   Do you have trouble with steps or getting out of a bed or a chair? No   Current Diet Well Balanced Diet   Dental Exam Up to date   Eye Exam Up to " date   Exercise (times per week) 3 times per week   Current Exercises Include Walking   Do you need help using the phone?  No   Are you deaf or do you have serious difficulty hearing?  No   Do you need help to go to places out of walking distance? No   Do you need help shopping? No   Do you need help preparing meals?  No   Do you need help with housework?  No   Do you need help with laundry? No   Do you need help taking your medications? No   Do you need help managing money? No   Do you ever drive or ride in a car without wearing a seat belt? No   Have you felt unusual stress, anger or loneliness in the last month? No   Who do you live with? Alone   If you need help, do you have trouble finding someone available to you? No   Have you been bothered in the last four weeks by sexual problems? No   Do you have difficulty concentrating, remembering or making decisions? No       Age-appropriate Screening Schedule:  Refer to the list below for future screening recommendations based on patient's age, sex and/or medical conditions. Orders for these recommended tests are listed in the plan section. The patient has been provided with a written plan.    Health Maintenance   Topic Date Due    HEMOGLOBIN A1C  09/15/2023    DXA SCAN  10/18/2023    LIPID PANEL  03/15/2024    ANNUAL WELLNESS VISIT  10/23/2024    BMI FOLLOWUP  10/23/2024    TDAP/TD VACCINES (2 - Td or Tdap) 03/09/2027    INFLUENZA VACCINE  Completed    Pneumococcal Vaccine 65+  Completed    Hepatitis B  Discontinued    COVID-19 Vaccine  Discontinued    DIABETIC EYE EXAM  Discontinued    URINE MICROALBUMIN  Discontinued    ZOSTER VACCINE  Discontinued                  CMS Preventative Services Quick Reference  Risk Factors Identified During Encounter:    Immunizations Discussed/Encouraged:  RSV    The above risks/problems have been discussed with the patient.  Pertinent information has been shared with the patient in the After Visit Summary.    Diagnoses and all  orders for this visit:    1. Encounter for subsequent annual wellness visit (AWV) in Medicare patient (Primary)    2. Impaired fasting glucose    3. Hyperuricemia    4. Hypogonadism in male, on TRT, testosterone pellets, per   -     DEXA Bone Density Axial    5. Statin intolerance, Vytorin and pravastatin    6. Vitamin B12 deficiency    7. Vitamin D deficiency    8. Chronic renal insufficiency, stage IV (severe), 02/09/2016--creatinine 1.85    9. Anemia due to stage 4 chronic kidney disease    10. Benign essential hypertension    11. Benign prostatic hypertrophy    12. Bilateral carotid artery stenosis    13. Chronic neck pain    14. Degeneration of cervical intervertebral disc    15. Folic acid deficiency    16. Gastroesophageal reflux disease without esophagitis    17. Generalized anxiety disorder    18. Hyperlipidemia    19. Multiple environmental allergies    20. Occlusive coronary artery disease, clinical diagnosis only.  Patient not a candidate for heart catheterization/intervention.    21. Osteopenia of multiple sites, 10/18/2021--lumbar spine 0.0.  Left femoral neck -1.5.  Right femoral neck -1.7.  -     DEXA Bone Density Axial    22. Overweight (BMI 25.0-29.9)    23. Primary osteoarthritis of knees, bilateral    24. Secondary hyperparathyroidism of renal origin    25. Therapeutic drug monitoring        Follow Up:   Next Medicare Wellness visit to be scheduled in 1 year.      An After Visit Summary and PPPS were made available to the patient.

## 2023-10-31 ENCOUNTER — HOSPITAL ENCOUNTER (OUTPATIENT)
Dept: BONE DENSITY | Facility: HOSPITAL | Age: 88
Discharge: HOME | End: 2023-10-31
Admitting: INTERNAL MEDICINE
Payer: MEDICARE

## 2023-10-31 PROCEDURE — 77080 DXA BONE DENSITY AXIAL: CPT

## 2023-11-06 ENCOUNTER — OFFICE VISIT (OUTPATIENT)
Dept: INTERNAL MEDICINE | Facility: CLINIC | Age: 88
End: 2023-11-06
Payer: MEDICARE

## 2023-11-06 VITALS
BODY MASS INDEX: 27.74 KG/M2 | RESPIRATION RATE: 12 BRPM | HEIGHT: 68 IN | SYSTOLIC BLOOD PRESSURE: 128 MMHG | DIASTOLIC BLOOD PRESSURE: 70 MMHG | WEIGHT: 183 LBS | OXYGEN SATURATION: 97 % | HEART RATE: 102 BPM

## 2023-11-06 DIAGNOSIS — E66.3 OVERWEIGHT (BMI 25.0-29.9): Chronic | ICD-10-CM

## 2023-11-06 DIAGNOSIS — E78.2 MIXED HYPERLIPIDEMIA: Chronic | ICD-10-CM

## 2023-11-06 DIAGNOSIS — I25.10 OCCLUSIVE CORONARY ARTERY DISEASE: Chronic | ICD-10-CM

## 2023-11-06 DIAGNOSIS — E53.8 FOLIC ACID DEFICIENCY: Chronic | ICD-10-CM

## 2023-11-06 DIAGNOSIS — I65.23 BILATERAL CAROTID ARTERY STENOSIS: Chronic | ICD-10-CM

## 2023-11-06 DIAGNOSIS — Z78.9 STATIN INTOLERANCE: Chronic | ICD-10-CM

## 2023-11-06 DIAGNOSIS — K21.9 GASTROESOPHAGEAL REFLUX DISEASE WITHOUT ESOPHAGITIS: Chronic | ICD-10-CM

## 2023-11-06 DIAGNOSIS — F41.1 GENERALIZED ANXIETY DISORDER: Chronic | ICD-10-CM

## 2023-11-06 DIAGNOSIS — E55.9 VITAMIN D DEFICIENCY: Chronic | ICD-10-CM

## 2023-11-06 DIAGNOSIS — E79.0 HYPERURICEMIA: Chronic | ICD-10-CM

## 2023-11-06 DIAGNOSIS — E29.1 HYPOGONADISM IN MALE: Chronic | ICD-10-CM

## 2023-11-06 DIAGNOSIS — R73.01 IMPAIRED FASTING GLUCOSE: Primary | Chronic | ICD-10-CM

## 2023-11-06 DIAGNOSIS — N18.4 CHRONIC RENAL INSUFFICIENCY, STAGE IV (SEVERE): Chronic | ICD-10-CM

## 2023-11-06 DIAGNOSIS — N40.0 BENIGN NON-NODULAR PROSTATIC HYPERPLASIA WITHOUT LOWER URINARY TRACT SYMPTOMS: Chronic | ICD-10-CM

## 2023-11-06 DIAGNOSIS — N25.81 SECONDARY HYPERPARATHYROIDISM OF RENAL ORIGIN: Chronic | ICD-10-CM

## 2023-11-06 DIAGNOSIS — D63.1 ANEMIA DUE TO STAGE 4 CHRONIC KIDNEY DISEASE: Chronic | ICD-10-CM

## 2023-11-06 DIAGNOSIS — M85.89 OSTEOPENIA OF MULTIPLE SITES: Chronic | ICD-10-CM

## 2023-11-06 DIAGNOSIS — E53.8 VITAMIN B12 DEFICIENCY: Chronic | ICD-10-CM

## 2023-11-06 DIAGNOSIS — N18.4 ANEMIA DUE TO STAGE 4 CHRONIC KIDNEY DISEASE: Chronic | ICD-10-CM

## 2023-11-06 DIAGNOSIS — Z51.81 THERAPEUTIC DRUG MONITORING: ICD-10-CM

## 2023-11-06 PROBLEM — I45.10 RIGHT BUNDLE BRANCH BLOCK: Chronic | Status: ACTIVE | Noted: 2023-04-03

## 2023-11-06 RX ORDER — CALCITRIOL 0.25 UG/1
CAPSULE, LIQUID FILLED ORAL
COMMUNITY
Start: 2023-10-28

## 2023-11-06 RX ORDER — CYANOCOBALAMIN 1000 UG/ML
1000 INJECTION, SOLUTION INTRAMUSCULAR; SUBCUTANEOUS ONCE
Status: COMPLETED | OUTPATIENT
Start: 2023-11-06 | End: 2023-11-06

## 2023-11-06 RX ADMIN — CYANOCOBALAMIN 1000 MCG: 1000 INJECTION, SOLUTION INTRAMUSCULAR; SUBCUTANEOUS at 13:38

## 2023-11-06 NOTE — PROGRESS NOTES
11/06/2023    Patient Information  Radha Win                                                                                          [address]  [place] [address]      7/26/1932  [unfilled]  There is no work phone number on file.    Chief Complaint:     Follow-up medical problems noted below.  Recent blood work.    History of Present Illness:    Patient with multiple chronic medical problems as noted below in assessment and plan presents today for a follow-up.  Patient had lab work in order to monitor these chronic medical issues.  He reports he is doing fairly well and has no new acute complaints.  His past medical history reviewed and updated were necessary including health maintenance parameters.  This reveals he is up-to-date or else accounted for although we did discuss RSV vaccine which I have recommended.    Review of Systems   Constitutional: Negative.   HENT: Negative.     Eyes: Negative.    Cardiovascular: Negative.    Respiratory: Negative.     Endocrine: Negative.    Hematologic/Lymphatic: Negative.    Skin: Negative.    Musculoskeletal: Negative.    Gastrointestinal: Negative.    Genitourinary: Negative.    Neurological: Negative.    Psychiatric/Behavioral: Negative.     Allergic/Immunologic: Negative.        Active Problems:    Patient Active Problem List   Diagnosis    Bilateral carotid artery stenosis, 11/23/2022--50-59% right; 16-49% left ICA stenosis    Benign prostatic hypertrophy    Chronic renal insufficiency, stage IV (severe), 02/09/2016--creatinine 1.85    Diverticulosis of colon    Hyperlipidemia    Osteopenia of multiple sites, 10/31/2023--lumbar spine -0.1.  Left femoral neck -1.5.  Right femoral neck -1.9.    Vitamin B12 deficiency    Vitamin D deficiency    Allergic rhinitis    Anemia due to stage 4 chronic kidney disease    Generalized anxiety disorder    Benign essential hypertension    Gastroesophageal reflux disease without esophagitis    Folic  acid deficiency    Multiple environmental allergies    Therapeutic drug monitoring    Bilateral renal cysts    Primary osteoarthritis of knees, bilateral    Impaired fasting glucose    Muscle cramps, statin related, Vytorin and pravastatin.  Tolerates Livalo.    Hypogonadism in male, on TRT, testosterone pellets, per     Chronic neck pain    Cervical radiculopathy    Statin intolerance, Vytorin and pravastatin    Degeneration of cervical intervertebral disc    Occlusive coronary artery disease, clinical diagnosis only.  Patient not a candidate for heart catheterization/intervention.    Hyperuricemia    Secondary hyperparathyroidism of renal origin    Chronic left shoulder pain    Overweight (BMI 25.0-29.9)    Right bundle branch block         Past Medical History:   Diagnosis Date    Allergic rhinitis 02/11/2016    Anemia due to stage 4 chronic kidney disease 02/11/2016    Benign essential hypertension 02/11/2016    Benign prostatic hypertrophy 02/11/2016 12/21/2016--prostate biopsy reportedly negative.  I will try to obtain the report.  Patient sees urologist.  PSAs run from the 3-8 range    Bilateral carotid artery stenosis, 11/23/2022--50-59% right; 16-49% left ICA stenosis 02/11/2016 November 23, 2022--50-59% right ICA stenosis and 16-49% left ICA stenosis.     July 16, 2020--carotid Doppler study reveals bilateral 16-49% stenosis.     05/24/2018--carotid Doppler study reveals bilateral 16-49% stenosis.  Stable.     09/21/2016--vascular screen reveals 16-49% bilateral carotid artery stenosis, negative for AAA, negative for PAD.     10/10/2008--vascular screening study revealed    Bilateral renal cysts 02/14/2016 04/07/2016--ultrasound of the kidneys reveals the previously described renal cysts are not well seen.  However, questionable incidental bladder tumor noted.  05/26/2010--ultrasound the kidneys was negative bilaterally except for small renal cysts.    Cervical radiculopathy 06/28/2018     10/02/2018--patient seen in follow-up and the results of the MRI discussed.  He continues to have intermittent radicular symptoms which is currently only on the left.  Discussed options with patient and I have recommended neurosurgery consultation.  Patient may benefit from epidural injections.  09/06/2018--MRI of the cervical spine reveals moderate neural foraminal compromise on the right at C3-C    Chronic left shoulder pain 07/19/2021 July 19, 2021--patient presents with continued left-sided neck pain that radiates into his left shoulder but does not really go over that far down into the left upper extremity.  The pain is definitely accentuated by leaning his head to the right and rotating his head to the right.  He does not do anything to aggravate it or make it better by moving his head towards the left.  On exam he has good     Chronic renal insufficiency, stage IV (severe), 02/09/2016--creatinine 1.85 02/11/2016 02/09/2016--serum creatinine 1.85.  BUN 29.  07/20/2015--patient was evaluated by the nephrologist.  Ultrasound of the kidneys ordered but this was never done.  05/22/2015--BUN 35, creatinine 2.3.  Patient referred to nephrology, Dr. Devyn Muniz.  04/16/2014--BUN 25, creatinine 1.77.  01/03/2013--BUN 37, creatinine 2.14.    05/26/2010---ultrasound of the kidneys reveal a small cyst x 2 right kidney.  Otherwise normal.    Baseline creatinine approximately 1.9-2.0.    Degeneration of cervical intervertebral disc 05/02/2019    Diverticulosis of colon 02/11/2016    10/03/2013--colonoscopy revealed markedly redundant colon, pancolonic diverticulosis with several impacted fecalith.  No evidence of diverticulitis or stricture.  Was only able to reach the ascending colon.  Follow up barium enema revealed extensive diverticulosis.  No polyp or other mucosal mass seen.    06/11/2002--incomplete colonoscopy due to redundant colon.  Not able to get past the hepatic flexure.  Patient had followup barium  contrast enema which showed extensive diverticulosis.    Exertional angina 09/17/2019 October 8, 2019--patient seen in follow-up and he reports the long-acting oral nitrate has definitely helped his exertional anginal symptoms.  However, he has not stressed himself completely such as using a push mower.  His blood pressure seems improved as well.  Patient has an appoint with the cardiologist in the next 2 weeks.  I have contacted his cardiologist about the results of the empiric ni    Folic acid deficiency 02/11/2016    Gastroesophageal reflux disease without esophagitis 02/11/2016    Generalized anxiety disorder 02/11/2016    History of diverticulitis of colon 2009 and 2003 05/08/2009-acute diverticulitis without complication. 09/05/2003-acute diverticulitis without complication.     Hyperlipidemia 02/11/2016 01/29/2005--treatment for hyperlipidemia begun.    Hyperuricemia 08/19/2020    Hypogonadism in male, on TRT, testosterone pellets, per  06/16/2017 January 2017--patient reports his urologist initiated testosterone replacement therapy consisting of testosterone pellets every 6 months.    Multiple environmental allergies 02/12/2016    Patient sees the allergist regularly and has been on immunotherapy.    Muscle cramps, statin related, Vytorin and pravastatin.  Tolerates Livalo. 06/16/2017 12/21/2018--patient seems to be tolerating Livalo well.  10/02/2018--Livalo 2 mg per day initiated.  Recommend CoQ10 400 mg per day with food for better absorption.  Reassess with lab in about 8 weeks.  08/30/2018--patient presents with complaints of muscle cramps.  He discontinue pravastatin and the cramps went away.  Cramps returned even with a half dose.  This is despite the fact he was taking     Occlusive coronary artery disease, clinical diagnosis only.  Patient not a candidate for heart catheterization/intervention. 12/10/2019    October 8, 2019--patient seen in follow-up and he reports the long-acting  oral nitrate has definitely helped his exertional anginal symptoms.  However, he has not stressed himself completely such as using a push mower.  His blood pressure seems improved as well.  Patient has an appoint with the cardiologist in the next 2 weeks.  I have contacted his cardiologist about the results of the empiric ni    Osteopenia of multiple sites, 10/31/2023--lumbar spine -0.1.  Left femoral neck -1.5.  Right femoral neck -1.9. 02/11/2016 October 31, 2023--DEXA scan revealed lumbar spine T score -0.1.  Left femoral neck T score -1.5.  Right femoral neck T score -1.9.  Could not compare to previous scans due to technique.     October 18, 2021--DEXA scan reveals lumbar spine T score 0.0 representing a 3.9% interval increase.  Left femoral neck T score -1.5 which is a 4.6% increase.  Right femoral neck T score -1.7 which is unchanged.    Overweight (BMI 25.0-29.9) 09/22/2022    Primary osteoarthritis of knees, bilateral 09/12/2016 09/12/2016--patient called in the office requesting a steriod injection in his knees.  I explained to him that I do no longer perform these injections and have referred him to Dr. Manuel.    Right bundle branch block 04/03/2023    Secondary hyperparathyroidism of renal origin 01/15/2021    Statin intolerance, Vytorin and pravastatin 12/21/2018 12/21/2018--patient seems to be tolerating Livalo well.  10/02/2018--Livalo 2 mg per day initiated.  Recommend CoQ10 400 mg per day with food for better absorption.  Reassess with lab in about 8 weeks.  08/30/2018--patient presents with complaints of muscle cramps.  He discontinue pravastatin and the cramps went away.  Cramps returned even with a half dose.  This is despite the fact he was taking     Vitamin B12 deficiency 02/11/2016 06/25/2009--B12 injections begun.    Vitamin D deficiency 02/11/2016         Past Surgical History:   Procedure Laterality Date    CATARACT EXTRACTION Left 12/12/2011 12/12/2011--cataract extirpation  with intraocular lens implantation left eye.    CATARACT EXTRACTION Right 12/2011 December 2011--right cataract extirpation with intraocular lens implantation.    CHOLECYSTECTOMY WITH INTRAOPERATIVE CHOLANGIOGRAM N/A 11/6/2020    Procedure: CHOLECYSTECTOMY LAPAROSCOPIC INTRAOPERATIVE CHOLANGIOGRAM;  Surgeon: Sanjay Burgess MD;  Location: [facility] OR;  Service: General;  Laterality: N/A;    COLONOSCOPY  06/11/2002 06/11/2002--incomplete colonoscopy due to redundant colon. Not able to get past the hepatic flexure. Patient had followup barium contrast enema which showed extensive diverticulosis    COLONOSCOPY  10/03/2013    10/03/2013--colonoscopy revealed markedly redundant colon, pancolonic diverticulosis with several impacted fecalith. No evidence of diverticulitis or stricture. Was only able to reach the ascending colon. Follow up barium enema revealed extensive diverticulosis. No polyp or other mucosal mass seen.    CYSTOSCOPY  05/18/2016 05/18/2016--urology consultation.  Cystoscopy performed for possible bladder mass is negative.    ERCP N/A 11/5/2020    Procedure: ENDOSCOPIC RETROGRADE CHOLANGIOPANCREATOGRAPHY with sphincterotomy, basket retrieval and balloon sweep;  Surgeon: Joseluis Mejia MD;  Location: [facility] ENDOSCOPY;  Service: Gastroenterology;  Laterality: N/A;  pre/post - CBD stones    PROSTATE BIOPSY  12/21/2016 12/21/2016--prostate biopsy reportedly negative.  I will try to obtain the report.         No Known Allergies        Current Outpatient Medications:     ALPRAZolam (XANAX) 0.5 MG tablet, Take 1 tablet by mouth 2 (Two) Times a Day., Disp: 60 tablet, Rfl: 2    amLODIPine (NORVASC) 5 MG tablet, Take 1 p.o. daily for high blood pressure, Disp: 90 tablet, Rfl: 3    aspirin 81 MG tablet, Take 1 tablet by mouth Daily. TAKE 1 TABLET TWICE A WEEK, Disp: , Rfl:     calcitriol (ROCALTROL) 0.25 MCG capsule, , Disp: , Rfl:     Cholecalciferol 50 MCG (2000 UT) capsule, Take 1  "capsule by mouth Daily., Disp: , Rfl:     Evolocumab (Repatha SureClick) solution auto-injector SureClick injection, INJECT 1 ML UNDER THE SKIN INTO THE APPROPRIATE AREA EVERY 14 DAYS, Disp: 2 mL, Rfl: 11    fluticasone (FLONASE) 50 MCG/ACT nasal spray, SPRAY TWO SPRAYS IN EACH NOSTRIL ONCE DAILY, Disp: 16 mL, Rfl: 3    folic acid (FOLVITE) 1 MG tablet, TAKE ONE TABLET BY MOUTH TWICE A DAY, Disp: 180 tablet, Rfl: 0    metoprolol succinate XL (TOPROL-XL) 25 MG 24 hr tablet, Take 1 tablet by mouth Daily., Disp: 90 tablet, Rfl: 3    omeprazole (priLOSEC) 40 MG capsule, TAKE ONE CAPSULE BY MOUTH DAILY, Disp: 90 capsule, Rfl: 3    tadalafil (ADCIRCA) 20 MG tablet tablet, Take 2 tablets by mouth Daily., Disp: , Rfl:     Current Facility-Administered Medications:     cyanocobalamin injection 1,000 mcg, 1,000 mcg, Intramuscular, Q28 Days, Delgado Patricia MD, 1,000 mcg at 10/10/23 1506      Family History   Problem Relation Age of Onset    Diabetes Mother     Heart disease Mother     Stroke Father          Social History     Socioeconomic History    Marital status:     Number of children: 2    Years of education: 14    Highest education level: Some college, no degree   Tobacco Use    Smoking status: Never    Smokeless tobacco: Never    Tobacco comments:     caffeine use: 2 cups coffee daily   Vaping Use    Vaping Use: Never used   Substance and Sexual Activity    Alcohol use: Yes     Alcohol/week: 5.0 standard drinks of alcohol     Types: 2 Glasses of wine, 3 Shots of liquor per week     Comment: Moderate alcohol consumption    Drug use: No    Sexual activity: Not Currently     Partners: Female         Vitals:    11/06/23 1329   BP: 128/70   BP Location: Left arm   Patient Position: Sitting   Cuff Size: Adult   Pulse: 102   Resp: 12   SpO2: 97%   Weight: 83 kg (183 lb)   Height: 172.7 cm (67.99\")        Body mass index is 27.83 kg/m².      Physical Exam:    General: Alert and oriented x 3.  No acute distress.  " Normal affect.  HEENT: Pupils equal, round, reactive to light; extraocular movements intact; sclerae nonicteric; pharynx, ear canals and TMs normal.  Neck: Without JVD, thyromegaly, bruit, or adenopathy.  Lungs: Clear to auscultation in all fields.  Heart: Regular rate and rhythm without murmur, rub, gallop, or click.  Abdomen: Soft, nontender, without hepatosplenomegaly or hernia.  Bowel sounds normal.  : Deferred.  Rectal: Deferred.  Extremities: Without clubbing, cyanosis, edema, or pulse deficit.  Neurologic: Intact without focal deficit.  Normal station and gait observed during ingress and egress from the examination room.  Skin: Without significant lesion.  Musculoskeletal: Unremarkable.    Lab/other results:    CBC normal except hemoglobin slightly low at 12.7.  Hematocrit is normal at 37.7.  CMP normal except glucose 123, creatinine 1.82, estimated GFR 35.  Hemoglobin A1c 6.1.  His cholesterol is perfect with a total cholesterol 149, triglycerides 93, LDL particle #813, HDL particle #34.  Thyroid function tests are normal.  Vitamin D normal at 31.1.  Methylmalonic acid normal at 241, homocystine normal at 10.6.    Assessment/Plan:     Diagnosis Plan   1. Impaired fasting glucose  Comprehensive Metabolic Panel    Hemoglobin A1c      2. Hyperlipidemia  Evolocumab (REPATHA) injection 140 mg    CK    Comprehensive Metabolic Panel    NMR LipoProfile    TSH    T4, Free    T3, Free    Homocysteine      3. Folic acid deficiency  Homocysteine      4. Chronic renal insufficiency, stage IV (severe), 02/09/2016--creatinine 1.85  CBC (No Diff)    Comprehensive Metabolic Panel      5. Anemia due to stage 4 chronic kidney disease        6. Benign prostatic hypertrophy        7. Bilateral carotid artery stenosis        8. Gastroesophageal reflux disease without esophagitis        9. Generalized anxiety disorder        10. Hyperuricemia  Uric Acid      11. Hypogonadism in male, on TRT, testosterone pellets, per          12. Occlusive coronary artery disease, clinical diagnosis only.  Patient not a candidate for heart catheterization/intervention.        13. Osteopenia of multiple sites, 10/18/2021--lumbar spine 0.0.  Left femoral neck -1.5.  Right femoral neck -1.7.        14. Overweight (BMI 25.0-29.9)        15. Secondary hyperparathyroidism of renal origin        16. Statin intolerance, Vytorin and pravastatin  Evolocumab (REPATHA) injection 140 mg      17. Vitamin B12 deficiency  cyanocobalamin injection 1,000 mcg    Methylmalonic Acid, Serum      18. Therapeutic drug monitoring        19. Vitamin D deficiency  Vitamin D,25-Hydroxy        Patient has impaired fasting glucose that is mild and does not require medication.  He is little bit overweight and I have encouraged low carbohydrate diet and attainment of ideal body weight.  Hyperlipidemia is under perfect control.  His chronic renal insufficiency is stable and actually has improved somewhat to stage IIIb instead of stage IV.  He is followed by the renal physician.  His anemia due to chronic renal disease is mild and stable.  BPH is asymptomatic and is normal PSA.  Patient has carotid artery stenosis and is up-to-date on his carotid Doppler study which was done November of last year and revealed 50-59% right ICA stenosis and 16-49% left ICA stenosis.  Patient has osteopenia and is up-to-date on his DEXA scan which was done October 31, 2023 and revealed lumbar spine T score -0.1.  Left femoral neck T score -1.5.  Right femoral neck T score -1.9.  Could not compare to previous scans due to technique.  Patient has vitamin B12 deficiency and receives B12 injections which are therapeutic as evidenced by normalization of methylmalonic acid.  Homocystine is also in the normal range.  Patient has statin intolerance with Vytorin and pravastatin and therefore is on Repatha with good control.    Plan is as follows: No changes in current medical regimen.  Work on diet and weight  loss.  Follow-up 6 months with lab prior or follow-up as needed.      Procedures

## 2023-11-07 ENCOUNTER — TELEPHONE (OUTPATIENT)
Dept: CARDIOLOGY | Facility: CLINIC | Age: 88
End: 2023-11-07
Payer: MEDICARE

## 2023-11-07 NOTE — TELEPHONE ENCOUNTER
"Caller: Radha Win \"BW\"    Relationship to patient: Self    Best call back number:[phone]    Type of visit: FOLLOW UP    Requested date: ANYTIME AFTER 3/28/24 WOULD LIKE AS CLOSE TO THAT AS POSSIBLE    If rescheduling, when is the original appointment: 6/10/24     Additional notes:PATIENT WOULD LIKE TO MOVE UP CLOSER TO HIS 6 MONTH TIME, HE SAW [facility] BACK  9/28/23 AND WOULD LIKE TO SEE DR CORDON THIS TIME.         "

## 2023-12-05 ENCOUNTER — TELEPHONE (OUTPATIENT)
Dept: INTERNAL MEDICINE | Facility: CLINIC | Age: 88
End: 2023-12-05

## 2023-12-05 NOTE — TELEPHONE ENCOUNTER
"  Caller: Radha Win \"BW\"    Relationship: Self    Best call back number: [phone]     What is the best time to reach you: ANYTIME    Who are you requesting to speak with (clinical staff, provider,  specific staff member): OFFICE    What was the call regarding: PATIENT WOULD LIKE TO SPEAK TO ROB ABOUT A RESPIRATORY MEDICATION WOULDN'T GIVE HUB ANY FURTHER DETAILS     Is it okay if the provider responds through MyChart: NO        "

## 2023-12-05 NOTE — TELEPHONE ENCOUNTER
Pt stopped by office and since Dr. Patricia is out of office today, pt was referred to UC for treatment.

## 2023-12-18 ENCOUNTER — CLINICAL SUPPORT (OUTPATIENT)
Dept: INTERNAL MEDICINE | Facility: CLINIC | Age: 88
End: 2023-12-18
Payer: MEDICARE

## 2023-12-18 DIAGNOSIS — E53.8 VITAMIN B12 DEFICIENCY: Chronic | ICD-10-CM

## 2023-12-18 DIAGNOSIS — E78.2 MIXED HYPERLIPIDEMIA: Primary | ICD-10-CM

## 2023-12-18 PROCEDURE — 96372 THER/PROPH/DIAG INJ SC/IM: CPT | Performed by: INTERNAL MEDICINE

## 2023-12-18 RX ADMIN — CYANOCOBALAMIN 1000 MCG: 1000 INJECTION, SOLUTION INTRAMUSCULAR; SUBCUTANEOUS at 11:59

## 2023-12-22 DIAGNOSIS — F41.1 GENERALIZED ANXIETY DISORDER: ICD-10-CM

## 2023-12-22 RX ORDER — ALPRAZOLAM 0.5 MG/1
0.5 TABLET ORAL 2 TIMES DAILY
Qty: 60 TABLET | Refills: 4 | Status: SHIPPED | OUTPATIENT
Start: 2023-12-22

## 2024-01-10 ENCOUNTER — CLINICAL SUPPORT (OUTPATIENT)
Dept: INTERNAL MEDICINE | Facility: CLINIC | Age: 89
End: 2024-01-10
Payer: MEDICARE

## 2024-01-10 DIAGNOSIS — E53.8 VITAMIN B12 DEFICIENCY: Primary | Chronic | ICD-10-CM

## 2024-01-10 NOTE — PROGRESS NOTES
Injection  Injection performed in left arm by Rodolfo Van MA. Patient tolerated the procedure well without complications.  01/10/24   Rodolfo Van MA

## 2024-02-02 RX ORDER — FOLIC ACID 1 MG/1
1000 TABLET ORAL 2 TIMES DAILY
Qty: 180 TABLET | Refills: 0 | Status: SHIPPED | OUTPATIENT
Start: 2024-02-02

## 2024-03-04 ENCOUNTER — CLINICAL SUPPORT (OUTPATIENT)
Dept: INTERNAL MEDICINE | Facility: CLINIC | Age: 89
End: 2024-03-04
Payer: MEDICARE

## 2024-03-04 DIAGNOSIS — E53.8 VITAMIN B12 DEFICIENCY: Primary | Chronic | ICD-10-CM

## 2024-03-04 NOTE — PROGRESS NOTES
Injection  Injection performed in rd by Rodolfo Van MA. Patient tolerated the procedure well without complications.  03/04/24   Rodolfo Van MA

## 2024-03-20 ENCOUNTER — OFFICE VISIT (OUTPATIENT)
Age: 89
End: 2024-03-20
Payer: MEDICARE

## 2024-03-20 VITALS
HEIGHT: 68 IN | BODY MASS INDEX: 27.74 KG/M2 | DIASTOLIC BLOOD PRESSURE: 70 MMHG | HEART RATE: 82 BPM | SYSTOLIC BLOOD PRESSURE: 142 MMHG | WEIGHT: 183 LBS

## 2024-03-20 DIAGNOSIS — I10 BENIGN ESSENTIAL HYPERTENSION: Primary | ICD-10-CM

## 2024-03-20 DIAGNOSIS — E78.2 MIXED HYPERLIPIDEMIA: ICD-10-CM

## 2024-03-20 DIAGNOSIS — I65.23 BILATERAL CAROTID ARTERY STENOSIS: ICD-10-CM

## 2024-03-20 DIAGNOSIS — I45.10 RIGHT BUNDLE BRANCH BLOCK: ICD-10-CM

## 2024-03-20 PROCEDURE — 93000 ELECTROCARDIOGRAM COMPLETE: CPT | Performed by: INTERNAL MEDICINE

## 2024-03-20 PROCEDURE — 99214 OFFICE O/P EST MOD 30 MIN: CPT | Performed by: INTERNAL MEDICINE

## 2024-03-20 RX ORDER — NITROGLYCERIN 0.4 MG/1
TABLET SUBLINGUAL
Qty: 30 TABLET | Refills: 2 | Status: SHIPPED | OUTPATIENT
Start: 2024-03-20

## 2024-03-20 NOTE — PROGRESS NOTES
Date of Office Visit: 24    Encounter Provider: Puneet Barroso MD  Place of Service: [facility] CARDIOLOGY  Patient Name: Radha Win  :1932    Chief complaint  Coronary artery calcification  Essential hypertension  Carotid artery disease    HPI: Radha Win is a 89 y.o. male who previously followed with Dr. Alejo.  He is presents for a 6-month office follow-up.  He has coronary artery calcification, hypertension, noncritical carotid artery disease, chronic kidney disease.    He underwent Lexiscan stress test 2019 that showed normal myocardial perfusion with no evidence of ischemia, EF 64%.  He was prescribed isosorbide; however, he has not needed this in at least a year.      It was noted that he had coronary artery disease on prior cardiac workup, however I do not see that he has ever had a prior catheterization.  He does have coronary artery calcification documented on prior CT scan of the chest however.  He remains active and denies any chest pain.  Lipid panel has been much better controlled on Repatha        Previous testing and notes have been reviewed by me.     Past Medical History:   Diagnosis Date    Allergic rhinitis 2016    Anemia due to stage 4 chronic kidney disease 2016    Benign essential hypertension 2016    Benign prostatic hypertrophy 2016--prostate biopsy reportedly negative.  I will try to obtain the report.  Patient sees urologist.  PSAs run from the 3-8 range    Bilateral carotid artery stenosis, 2022--50-59% right; 16-49% left ICA stenosis 2016--50-59% right ICA stenosis and 16-49% left ICA stenosis.     2020--carotid Doppler study reveals bilateral 16-49% stenosis.     2018--carotid Doppler study reveals bilateral 16-49% stenosis.  Stable.     2016--vascular screen reveals 16-49% bilateral carotid artery stenosis, negative for  AAA, negative for PAD.     10/10/2008--vascular screening study revealed    Bilateral renal cysts 02/14/2016 04/07/2016--ultrasound of the kidneys reveals the previously described renal cysts are not well seen.  However, questionable incidental bladder tumor noted.  05/26/2010--ultrasound the kidneys was negative bilaterally except for small renal cysts.    Cervical radiculopathy 06/28/2018    10/02/2018--patient seen in follow-up and the results of the MRI discussed.  He continues to have intermittent radicular symptoms which is currently only on the left.  Discussed options with patient and I have recommended neurosurgery consultation.  Patient may benefit from epidural injections.  09/06/2018--MRI of the cervical spine reveals moderate neural foraminal compromise on the right at C3-C    Chronic left shoulder pain 07/19/2021 July 19, 2021--patient presents with continued left-sided neck pain that radiates into his left shoulder but does not really go over that far down into the left upper extremity.  The pain is definitely accentuated by leaning his head to the right and rotating his head to the right.  He does not do anything to aggravate it or make it better by moving his head towards the left.  On exam he has good     Chronic renal insufficiency, stage IV (severe), 02/09/2016--creatinine 1.85 02/11/2016 02/09/2016--serum creatinine 1.85.  BUN 29.  07/20/2015--patient was evaluated by the nephrologist.  Ultrasound of the kidneys ordered but this was never done.  05/22/2015--BUN 35, creatinine 2.3.  Patient referred to nephrology, Dr. Devyn Muniz.  04/16/2014--BUN 25, creatinine 1.77.  01/03/2013--BUN 37, creatinine 2.14.    05/26/2010---ultrasound of the kidneys reveal a small cyst x 2 right kidney.  Otherwise normal.    Baseline creatinine approximately 1.9-2.0.    Degeneration of cervical intervertebral disc 05/02/2019    Diverticulosis of colon 02/11/2016    10/03/2013--colonoscopy revealed markedly  redundant colon, pancolonic diverticulosis with several impacted fecalith.  No evidence of diverticulitis or stricture.  Was only able to reach the ascending colon.  Follow up barium enema revealed extensive diverticulosis.  No polyp or other mucosal mass seen.    06/11/2002--incomplete colonoscopy due to redundant colon.  Not able to get past the hepatic flexure.  Patient had followup barium contrast enema which showed extensive diverticulosis.    Exertional angina 09/17/2019 October 8, 2019--patient seen in follow-up and he reports the long-acting oral nitrate has definitely helped his exertional anginal symptoms.  However, he has not stressed himself completely such as using a push mower.  His blood pressure seems improved as well.  Patient has an appoint with the cardiologist in the next 2 weeks.  I have contacted his cardiologist about the results of the empiric ni    Folic acid deficiency 02/11/2016    Gastroesophageal reflux disease without esophagitis 02/11/2016    Generalized anxiety disorder 02/11/2016    History of diverticulitis of colon 2009 and 2003 05/08/2009-acute diverticulitis without complication. 09/05/2003-acute diverticulitis without complication.     Hyperlipidemia 02/11/2016 01/29/2005--treatment for hyperlipidemia begun.    Hyperuricemia 08/19/2020    Hypogonadism in male, on TRT, testosterone pellets, per  06/16/2017 January 2017--patient reports his urologist initiated testosterone replacement therapy consisting of testosterone pellets every 6 months.    Multiple environmental allergies 02/12/2016    Patient sees the allergist regularly and has been on immunotherapy.    Muscle cramps, statin related, Vytorin and pravastatin.  Tolerates Livalo. 06/16/2017 12/21/2018--patient seems to be tolerating Livalo well.  10/02/2018--Livalo 2 mg per day initiated.  Recommend CoQ10 400 mg per day with food for better absorption.  Reassess with lab in about 8 weeks.  08/30/2018--patient  presents with complaints of muscle cramps.  He discontinue pravastatin and the cramps went away.  Cramps returned even with a half dose.  This is despite the fact he was taking     Occlusive coronary artery disease, clinical diagnosis only.  Patient not a candidate for heart catheterization/intervention. 12/10/2019    October 8, 2019--patient seen in follow-up and he reports the long-acting oral nitrate has definitely helped his exertional anginal symptoms.  However, he has not stressed himself completely such as using a push mower.  His blood pressure seems improved as well.  Patient has an appoint with the cardiologist in the next 2 weeks.  I have contacted his cardiologist about the results of the empiric ni    Osteopenia of multiple sites, 10/31/2023--lumbar spine -0.1.  Left femoral neck -1.5.  Right femoral neck -1.9. 02/11/2016 October 31, 2023--DEXA scan revealed lumbar spine T score -0.1.  Left femoral neck T score -1.5.  Right femoral neck T score -1.9.  Could not compare to previous scans due to technique.     October 18, 2021--DEXA scan reveals lumbar spine T score 0.0 representing a 3.9% interval increase.  Left femoral neck T score -1.5 which is a 4.6% increase.  Right femoral neck T score -1.7 which is unchanged.    Overweight (BMI 25.0-29.9) 09/22/2022    Primary osteoarthritis of knees, bilateral 09/12/2016 09/12/2016--patient called in the office requesting a steriod injection in his knees.  I explained to him that I do no longer perform these injections and have referred him to Dr. Manuel.    Right bundle branch block 04/03/2023    Secondary hyperparathyroidism of renal origin 01/15/2021    Statin intolerance, Vytorin and pravastatin 12/21/2018 12/21/2018--patient seems to be tolerating Livalo well.  10/02/2018--Livalo 2 mg per day initiated.  Recommend CoQ10 400 mg per day with food for better absorption.  Reassess with lab in about 8 weeks.  08/30/2018--patient presents with complaints  of muscle cramps.  He discontinue pravastatin and the cramps went away.  Cramps returned even with a half dose.  This is despite the fact he was taking     Vitamin B12 deficiency 02/11/2016 06/25/2009--B12 injections begun.    Vitamin D deficiency 02/11/2016       Past Surgical History:   Procedure Laterality Date    CATARACT EXTRACTION Left 12/12/2011 12/12/2011--cataract extirpation with intraocular lens implantation left eye.    CATARACT EXTRACTION Right 12/2011 December 2011--right cataract extirpation with intraocular lens implantation.    CHOLECYSTECTOMY WITH INTRAOPERATIVE CHOLANGIOGRAM N/A 11/6/2020    Procedure: CHOLECYSTECTOMY LAPAROSCOPIC INTRAOPERATIVE CHOLANGIOGRAM;  Surgeon: Sanjay Burgess MD;  Location: [facility] OR;  Service: General;  Laterality: N/A;    COLONOSCOPY  06/11/2002 06/11/2002--incomplete colonoscopy due to redundant colon. Not able to get past the hepatic flexure. Patient had followup barium contrast enema which showed extensive diverticulosis    COLONOSCOPY  10/03/2013    10/03/2013--colonoscopy revealed markedly redundant colon, pancolonic diverticulosis with several impacted fecalith. No evidence of diverticulitis or stricture. Was only able to reach the ascending colon. Follow up barium enema revealed extensive diverticulosis. No polyp or other mucosal mass seen.    CYSTOSCOPY  05/18/2016 05/18/2016--urology consultation.  Cystoscopy performed for possible bladder mass is negative.    ERCP N/A 11/5/2020    Procedure: ENDOSCOPIC RETROGRADE CHOLANGIOPANCREATOGRAPHY with sphincterotomy, basket retrieval and balloon sweep;  Surgeon: Joseluis Mejia MD;  Location: [facility] ENDOSCOPY;  Service: Gastroenterology;  Laterality: N/A;  pre/post - CBD stones    PROSTATE BIOPSY  12/21/2016 12/21/2016--prostate biopsy reportedly negative.  I will try to obtain the report.       Social History     Socioeconomic History    Marital status:     Number of children:  2    Years of education: 14    Highest education level: Some college, no degree   Tobacco Use    Smoking status: Never    Smokeless tobacco: Never    Tobacco comments:     caffeine use: 2 cups coffee daily   Vaping Use    Vaping status: Never Used   Substance and Sexual Activity    Alcohol use: Yes     Alcohol/week: 5.0 standard drinks of alcohol     Types: 2 Glasses of wine, 3 Shots of liquor per week     Comment: Moderate alcohol consumption    Drug use: No    Sexual activity: Not Currently     Partners: Female       Family History   Problem Relation Age of Onset    Diabetes Mother     Heart disease Mother     Stroke Father        Review of Systems   Constitutional: Negative. Negative for fever and malaise/fatigue.   HENT: Negative.  Negative for nosebleeds and sore throat.    Eyes: Negative.  Negative for blurred vision and double vision.   Cardiovascular: Negative.  Negative for chest pain, claudication, palpitations and syncope.   Respiratory: Negative.  Negative for cough, shortness of breath and snoring.    Endocrine: Negative.  Negative for cold intolerance, heat intolerance and polydipsia.   Hematologic/Lymphatic: Negative.    Skin: Negative.  Negative for itching, poor wound healing and rash.   Musculoskeletal: Negative.  Negative for joint pain, joint swelling, muscle weakness and myalgias.   Gastrointestinal: Negative.  Negative for abdominal pain, melena, nausea and vomiting.   Genitourinary: Negative.    Neurological: Negative.  Negative for light-headedness, loss of balance, seizures, vertigo and weakness.   Psychiatric/Behavioral: Negative.  Negative for altered mental status and depression.    Allergic/Immunologic: Negative.        No Known Allergies      Current Outpatient Medications:     ALPRAZolam (XANAX) 0.5 MG tablet, TAKE 1 TABLET BY MOUTH TWICE A DAY, Disp: 60 tablet, Rfl: 4    amLODIPine (NORVASC) 5 MG tablet, Take 1 p.o. daily for high blood pressure, Disp: 90 tablet, Rfl: 3    aspirin 81  MG tablet, Take 1 tablet by mouth Daily. TAKE 1 TABLET TWICE A WEEK, Disp: , Rfl:     calcitriol (ROCALTROL) 0.25 MCG capsule, , Disp: , Rfl:     Cholecalciferol 50 MCG (2000 UT) capsule, Take 1 capsule by mouth Daily., Disp: , Rfl:     Evolocumab (Repatha SureClick) solution auto-injector SureClick injection, INJECT 1 ML UNDER THE SKIN INTO THE APPROPRIATE AREA EVERY 14 DAYS, Disp: 2 mL, Rfl: 11    fluticasone (FLONASE) 50 MCG/ACT nasal spray, SPRAY TWO SPRAYS IN EACH NOSTRIL ONCE DAILY, Disp: 16 mL, Rfl: 3    folic acid (FOLVITE) 1 MG tablet, TAKE 1 TABLET BY MOUTH TWICE A DAY, Disp: 180 tablet, Rfl: 0    metoprolol succinate XL (TOPROL-XL) 25 MG 24 hr tablet, Take 1 tablet by mouth Daily., Disp: 90 tablet, Rfl: 3    omeprazole (priLOSEC) 40 MG capsule, TAKE ONE CAPSULE BY MOUTH DAILY, Disp: 90 capsule, Rfl: 3    tadalafil (ADCIRCA) 20 MG tablet tablet, Take 2 tablets by mouth Daily., Disp: , Rfl:     Current Facility-Administered Medications:     cyanocobalamin injection 1,000 mcg, 1,000 mcg, Intramuscular, Q28 Days, Delgado Patricia MD, 1,000 mcg at 12/18/23 1159    Evolocumab (REPATHA) injection 140 mg, 140 mg, Subcutaneous, Q14 Days, Delgado Patricia MD, 140 mg at 03/04/24 1553      Objective:     There were no vitals filed for this visit.    There is no height or weight on file to calculate BMI.     PHYSICAL EXAM:    Constitutional:       Appearance: Healthy appearance. Not in distress.   Neck:      Vascular: No JVR. JVD normal.   Pulmonary:      Effort: Pulmonary effort is normal.      Breath sounds: Normal breath sounds. No wheezing. No rhonchi. No rales.   Chest:      Chest wall: Not tender to palpatation.   Cardiovascular:      PMI at left midclavicular line. Normal rate. Regular rhythm. Normal S1. Normal S2.       Murmurs: There is no murmur.      No gallop.  No click. No rub.   Pulses:     Intact distal pulses.   Edema:     Peripheral edema absent.   Abdominal:      General: Bowel sounds are normal.       Palpations: Abdomen is soft.      Tenderness: There is no abdominal tenderness.   Musculoskeletal: Normal range of motion.         General: No tenderness. Skin:     General: Skin is warm and dry.   Neurological:      General: No focal deficit present.      Mental Status: Alert and oriented to person, place and time.           ECG 12 Lead    Date/Time: 3/20/2024 12:27 PM  Performed by: Puneet Barroso MD    Authorized by: Puneet Barroso MD  Comparison: compared with previous ECG from 9/28/2023  Similar to previous ECG  Rhythm: sinus rhythm  Rate: normal  Conduction: right bundle branch block            Bilateral carotid artery Dopplers 11/23/2022:  Right ICA Prox: Imaging indicates 50-59% stenosis.   Left ICA Prox: Imaging indicates 16-49% stenosis.     Lexiscan stress test 5/24/2019:  Myocardial perfusion imaging indicates a normal myocardial perfusion study with no evidence of ischemia.  Left ventricular ejection fraction is normal (Calculated EF = 64%).  Impressions are consistent with a low risk study.  Diaphragmatic attenuation artifact is present.        Assessment:      Plan:       1.  Coronary artery calcification: Myocardial perfusion study with no evidence of ischemia, EF 64% in 2019.  No angina.  EKG normal.  On aspirin  -Continue Repatha.  Cholesterol control is reasonable.    2.  Hypertension: Well-controlled on current medication. Continue to monitor BP.     3.  Moderate carotid artery disease:  Aspirin daily. Repatha    4.  Hyperlipidemia: Now on Repatha once a month.  Lipid panel markedly improved.  Denies myalgias       Your medication list            Accurate as of March 20, 2024 12:19 PM. If you have any questions, ask your nurse or doctor.                CONTINUE taking these medications        Instructions Last Dose Given Next Dose Due   ALPRAZolam 0.5 MG tablet  Commonly known as: XANAX      TAKE 1 TABLET BY MOUTH TWICE A DAY       amLODIPine 5 MG tablet  Commonly known as:  NORVASC      Take 1 p.o. daily for high blood pressure       aspirin 81 MG tablet      Take 1 tablet by mouth Daily. TAKE 1 TABLET TWICE A WEEK       calcitriol 0.25 MCG capsule  Commonly known as: ROCALTROL           Cholecalciferol 50 MCG (2000 UT) capsule      Take 1 capsule by mouth Daily.       fluticasone 50 MCG/ACT nasal spray  Commonly known as: FLONASE      SPRAY TWO SPRAYS IN EACH NOSTRIL ONCE DAILY       folic acid 1 MG tablet  Commonly known as: FOLVITE      TAKE 1 TABLET BY MOUTH TWICE A DAY       metoprolol succinate XL 25 MG 24 hr tablet  Commonly known as: TOPROL-XL      Take 1 tablet by mouth Daily.       omeprazole 40 MG capsule  Commonly known as: priLOSEC      TAKE ONE CAPSULE BY MOUTH DAILY       Repatha SureClick solution auto-injector SureClick injection  Generic drug: Evolocumab      INJECT 1 ML UNDER THE SKIN INTO THE APPROPRIATE AREA EVERY 14 DAYS       tadalafil 20 MG tablet tablet  Commonly known as: ADCIRCA      Take 2 tablets by mouth Daily.

## 2024-04-03 ENCOUNTER — CLINICAL SUPPORT (OUTPATIENT)
Dept: INTERNAL MEDICINE | Facility: CLINIC | Age: 89
End: 2024-04-03
Payer: MEDICARE

## 2024-04-03 RX ADMIN — CYANOCOBALAMIN 1000 MCG: 1000 INJECTION, SOLUTION INTRAMUSCULAR; SUBCUTANEOUS at 14:41

## 2024-05-08 DIAGNOSIS — J30.1 SEASONAL ALLERGIC RHINITIS DUE TO POLLEN: Chronic | ICD-10-CM

## 2024-05-08 RX ORDER — FLUTICASONE PROPIONATE 50 MCG
SPRAY, SUSPENSION (ML) NASAL
Qty: 16 ML | Refills: 1 | Status: SHIPPED | OUTPATIENT
Start: 2024-05-08

## 2024-05-10 ENCOUNTER — CLINICAL SUPPORT (OUTPATIENT)
Dept: INTERNAL MEDICINE | Facility: CLINIC | Age: 89
End: 2024-05-10
Payer: MEDICARE

## 2024-05-10 DIAGNOSIS — E53.8 VITAMIN B12 DEFICIENCY: Primary | Chronic | ICD-10-CM

## 2024-05-14 DIAGNOSIS — K21.9 GASTROESOPHAGEAL REFLUX DISEASE WITHOUT ESOPHAGITIS: Chronic | ICD-10-CM

## 2024-05-14 RX ORDER — OMEPRAZOLE 40 MG/1
CAPSULE, DELAYED RELEASE ORAL
Qty: 90 CAPSULE | Refills: 3 | Status: SHIPPED | OUTPATIENT
Start: 2024-05-14

## 2024-05-14 NOTE — TELEPHONE ENCOUNTER
Rx Refill Note  Requested Prescriptions     Pending Prescriptions Disp Refills    omeprazole (priLOSEC) 40 MG capsule [Pharmacy Med Name: OMEPRAZOLE DR 40 MG CAPSULE] 90 capsule 3     Sig: TAKE ONE CAPSULE BY MOUTH DAILY      Last office visit with prescribing clinician: 11/6/2023   Last telemedicine visit with prescribing clinician: Visit date not found   Next office visit with prescribing clinician: 5/20/2024       Rodolfo Van MA  05/14/24, 13:17 EDT

## 2024-05-20 ENCOUNTER — OFFICE VISIT (OUTPATIENT)
Dept: INTERNAL MEDICINE | Facility: CLINIC | Age: 89
End: 2024-05-20
Payer: MEDICARE

## 2024-05-20 VITALS
HEART RATE: 96 BPM | RESPIRATION RATE: 16 BRPM | SYSTOLIC BLOOD PRESSURE: 140 MMHG | TEMPERATURE: 96.8 F | OXYGEN SATURATION: 98 % | WEIGHT: 182 LBS | DIASTOLIC BLOOD PRESSURE: 66 MMHG | HEIGHT: 68 IN | BODY MASS INDEX: 27.58 KG/M2

## 2024-05-20 DIAGNOSIS — N40.0 BENIGN NON-NODULAR PROSTATIC HYPERPLASIA WITHOUT LOWER URINARY TRACT SYMPTOMS: Chronic | ICD-10-CM

## 2024-05-20 DIAGNOSIS — N18.32 ANEMIA DUE TO STAGE 3B CHRONIC KIDNEY DISEASE: ICD-10-CM

## 2024-05-20 DIAGNOSIS — E79.0 HYPERURICEMIA: Chronic | ICD-10-CM

## 2024-05-20 DIAGNOSIS — M54.2 CHRONIC NECK PAIN: Chronic | ICD-10-CM

## 2024-05-20 DIAGNOSIS — G89.29 CHRONIC NECK PAIN: Chronic | ICD-10-CM

## 2024-05-20 DIAGNOSIS — M85.89 OSTEOPENIA OF MULTIPLE SITES: Chronic | ICD-10-CM

## 2024-05-20 DIAGNOSIS — N25.81 SECONDARY HYPERPARATHYROIDISM OF RENAL ORIGIN: Chronic | ICD-10-CM

## 2024-05-20 DIAGNOSIS — D63.1 ANEMIA DUE TO STAGE 3B CHRONIC KIDNEY DISEASE: ICD-10-CM

## 2024-05-20 DIAGNOSIS — Z78.9 STATIN INTOLERANCE: Chronic | ICD-10-CM

## 2024-05-20 DIAGNOSIS — N18.32 STAGE 3B CHRONIC KIDNEY DISEASE: Chronic | ICD-10-CM

## 2024-05-20 DIAGNOSIS — I65.23 BILATERAL CAROTID ARTERY STENOSIS: Chronic | ICD-10-CM

## 2024-05-20 DIAGNOSIS — M17.0 PRIMARY OSTEOARTHRITIS OF KNEES, BILATERAL: Chronic | ICD-10-CM

## 2024-05-20 DIAGNOSIS — K21.9 GASTROESOPHAGEAL REFLUX DISEASE WITHOUT ESOPHAGITIS: Chronic | ICD-10-CM

## 2024-05-20 DIAGNOSIS — I10 BENIGN ESSENTIAL HYPERTENSION: Chronic | ICD-10-CM

## 2024-05-20 DIAGNOSIS — E78.2 MIXED HYPERLIPIDEMIA: Chronic | ICD-10-CM

## 2024-05-20 DIAGNOSIS — R73.01 IMPAIRED FASTING GLUCOSE: Primary | Chronic | ICD-10-CM

## 2024-05-20 DIAGNOSIS — F41.1 GENERALIZED ANXIETY DISORDER: Chronic | ICD-10-CM

## 2024-05-20 DIAGNOSIS — E55.9 VITAMIN D DEFICIENCY: Chronic | ICD-10-CM

## 2024-05-20 DIAGNOSIS — Z51.81 THERAPEUTIC DRUG MONITORING: ICD-10-CM

## 2024-05-20 DIAGNOSIS — I25.10 OCCLUSIVE CORONARY ARTERY DISEASE: Chronic | ICD-10-CM

## 2024-05-20 DIAGNOSIS — E29.1 HYPOGONADISM IN MALE: Chronic | ICD-10-CM

## 2024-05-20 DIAGNOSIS — E53.8 FOLIC ACID DEFICIENCY: Chronic | ICD-10-CM

## 2024-05-20 DIAGNOSIS — E66.3 OVERWEIGHT (BMI 25.0-29.9): Chronic | ICD-10-CM

## 2024-05-20 DIAGNOSIS — M50.30 DEGENERATION OF CERVICAL INTERVERTEBRAL DISC: Chronic | ICD-10-CM

## 2024-05-20 DIAGNOSIS — E53.8 VITAMIN B12 DEFICIENCY: Chronic | ICD-10-CM

## 2024-05-20 PROCEDURE — 1126F AMNT PAIN NOTED NONE PRSNT: CPT | Performed by: INTERNAL MEDICINE

## 2024-05-20 PROCEDURE — 99214 OFFICE O/P EST MOD 30 MIN: CPT | Performed by: INTERNAL MEDICINE

## 2024-05-20 PROCEDURE — 1159F MED LIST DOCD IN RCRD: CPT | Performed by: INTERNAL MEDICINE

## 2024-05-20 PROCEDURE — 1160F RVW MEDS BY RX/DR IN RCRD: CPT | Performed by: INTERNAL MEDICINE

## 2024-05-20 RX ORDER — TAMSULOSIN HYDROCHLORIDE 0.4 MG/1
CAPSULE ORAL
COMMUNITY
Start: 2024-03-06

## 2024-05-20 RX ORDER — AMLODIPINE BESYLATE 10 MG/1
1 TABLET ORAL DAILY
COMMUNITY
Start: 2024-03-22 | End: 2024-05-20

## 2024-05-20 NOTE — PROGRESS NOTES
05/20/2024    Patient Information  Radha Win                                                                                          [id number]  [facility] [address]      7/26/1932  [unfilled]  There is no work phone number on file.    Chief Complaint:     Follow-up chronic medical problems noted below.  Recent lab work.    History of Present Illness:    Patient with a multitude of chronic medical problems as noted below in assessment plan presents today for follow-up with lab prior in order to monitor these chronic medical issues.  Past medical history reviewed and updated were necessary including health maintenance parameters.  This reveals patient is currently up-to-date or else accounted for after today's visit.    Review of Systems   Constitutional: Negative.   HENT: Negative.     Eyes: Negative.    Cardiovascular: Negative.    Respiratory: Negative.     Endocrine: Negative.    Hematologic/Lymphatic: Negative.    Skin: Negative.    Musculoskeletal: Negative.    Gastrointestinal: Negative.    Genitourinary: Negative.    Neurological: Negative.    Psychiatric/Behavioral: Negative.     Allergic/Immunologic: Negative.        Active Problems:    Patient Active Problem List   Diagnosis    Bilateral carotid artery stenosis, 11/23/2022--50-59% right; 16-49% left ICA stenosis    Benign prostatic hypertrophy    Stage 3b chronic kidney disease    Diverticulosis of colon    Hyperlipidemia    Osteopenia of multiple sites, 10/31/2023--lumbar spine -0.1.  Left femoral neck -1.5.  Right femoral neck -1.9.    Vitamin B12 deficiency    Vitamin D deficiency    Allergic rhinitis    Anemia due to stage 3b chronic kidney disease    Generalized anxiety disorder    Benign essential hypertension    Gastroesophageal reflux disease without esophagitis    Folic acid deficiency    Multiple environmental allergies    Therapeutic drug monitoring    Bilateral renal cysts    Primary osteoarthritis of knees,  bilateral    Impaired fasting glucose    Muscle cramps, statin related, Vytorin and pravastatin.  Tolerates Livalo.    Hypogonadism in male, on TRT, testosterone pellets, per     Chronic neck pain    Cervical radiculopathy    Statin intolerance, Vytorin and pravastatin    Degeneration of cervical intervertebral disc    Occlusive coronary artery disease, clinical diagnosis only.  Patient not a candidate for heart catheterization/intervention.    Hyperuricemia    Secondary hyperparathyroidism of renal origin    Chronic left shoulder pain    Overweight (BMI 25.0-29.9)    Right bundle branch block         Past Medical History:   Diagnosis Date    Allergic rhinitis 02/11/2016    Anemia due to stage 3b chronic kidney disease 02/11/2016    Benign essential hypertension 02/11/2016    Benign prostatic hypertrophy 02/11/2016 12/21/2016--prostate biopsy reportedly negative.  I will try to obtain the report.  Patient sees urologist.  PSAs run from the 3-8 range    Bilateral carotid artery stenosis, 11/23/2022--50-59% right; 16-49% left ICA stenosis 02/11/2016 November 23, 2022--50-59% right ICA stenosis and 16-49% left ICA stenosis.     July 16, 2020--carotid Doppler study reveals bilateral 16-49% stenosis.     05/24/2018--carotid Doppler study reveals bilateral 16-49% stenosis.  Stable.     09/21/2016--vascular screen reveals 16-49% bilateral carotid artery stenosis, negative for AAA, negative for PAD.     10/10/2008--vascular screening study revealed    Bilateral renal cysts 02/14/2016 04/07/2016--ultrasound of the kidneys reveals the previously described renal cysts are not well seen.  However, questionable incidental bladder tumor noted.  05/26/2010--ultrasound the kidneys was negative bilaterally except for small renal cysts.    Cervical radiculopathy 06/28/2018    10/02/2018--patient seen in follow-up and the results of the MRI discussed.  He continues to have intermittent radicular symptoms which is currently  only on the left.  Discussed options with patient and I have recommended neurosurgery consultation.  Patient may benefit from epidural injections.  09/06/2018--MRI of the cervical spine reveals moderate neural foraminal compromise on the right at C3-C    Chronic left shoulder pain 07/19/2021 July 19, 2021--patient presents with continued left-sided neck pain that radiates into his left shoulder but does not really go over that far down into the left upper extremity.  The pain is definitely accentuated by leaning his head to the right and rotating his head to the right.  He does not do anything to aggravate it or make it better by moving his head towards the left.  On exam he has good     Degeneration of cervical intervertebral disc 05/02/2019    Diverticulosis of colon 02/11/2016    10/03/2013--colonoscopy revealed markedly redundant colon, pancolonic diverticulosis with several impacted fecalith.  No evidence of diverticulitis or stricture.  Was only able to reach the ascending colon.  Follow up barium enema revealed extensive diverticulosis.  No polyp or other mucosal mass seen.    06/11/2002--incomplete colonoscopy due to redundant colon.  Not able to get past the hepatic flexure.  Patient had followup barium contrast enema which showed extensive diverticulosis.    Exertional angina 09/17/2019 October 8, 2019--patient seen in follow-up and he reports the long-acting oral nitrate has definitely helped his exertional anginal symptoms.  However, he has not stressed himself completely such as using a push mower.  His blood pressure seems improved as well.  Patient has an appoint with the cardiologist in the next 2 weeks.  I have contacted his cardiologist about the results of the empiric ni    Folic acid deficiency 02/11/2016    Gastroesophageal reflux disease without esophagitis 02/11/2016    Generalized anxiety disorder 02/11/2016    History of diverticulitis of colon 2009 and 2003 05/08/2009-acute  diverticulitis without complication. 09/05/2003-acute diverticulitis without complication.     Hyperlipidemia 02/11/2016 01/29/2005--treatment for hyperlipidemia begun.    Hyperuricemia 08/19/2020    Hypogonadism in male, on TRT, testosterone pellets, per  06/16/2017 January 2017--patient reports his urologist initiated testosterone replacement therapy consisting of testosterone pellets every 6 months.    Multiple environmental allergies 02/12/2016    Patient sees the allergist regularly and has been on immunotherapy.    Muscle cramps, statin related, Vytorin and pravastatin.  Tolerates Livalo. 06/16/2017 12/21/2018--patient seems to be tolerating Livalo well.  10/02/2018--Livalo 2 mg per day initiated.  Recommend CoQ10 400 mg per day with food for better absorption.  Reassess with lab in about 8 weeks.  08/30/2018--patient presents with complaints of muscle cramps.  He discontinue pravastatin and the cramps went away.  Cramps returned even with a half dose.  This is despite the fact he was taking     Occlusive coronary artery disease, clinical diagnosis only.  Patient not a candidate for heart catheterization/intervention. 12/10/2019    October 8, 2019--patient seen in follow-up and he reports the long-acting oral nitrate has definitely helped his exertional anginal symptoms.  However, he has not stressed himself completely such as using a push mower.  His blood pressure seems improved as well.  Patient has an appoint with the cardiologist in the next 2 weeks.  I have contacted his cardiologist about the results of the empiric ni    Osteopenia of multiple sites, 10/31/2023--lumbar spine -0.1.  Left femoral neck -1.5.  Right femoral neck -1.9. 02/11/2016 October 31, 2023--DEXA scan revealed lumbar spine T score -0.1.  Left femoral neck T score -1.5.  Right femoral neck T score -1.9.  Could not compare to previous scans due to technique.     October 18, 2021--DEXA scan reveals lumbar spine T score 0.0  representing a 3.9% interval increase.  Left femoral neck T score -1.5 which is a 4.6% increase.  Right femoral neck T score -1.7 which is unchanged.    Overweight (BMI 25.0-29.9) 09/22/2022    Primary osteoarthritis of knees, bilateral 09/12/2016 09/12/2016--patient called in the office requesting a steriod injection in his knees.  I explained to him that I do no longer perform these injections and have referred him to Dr. Manuel.    Right bundle branch block 04/03/2023    Secondary hyperparathyroidism of renal origin 01/15/2021    Stage 3b chronic kidney disease 02/11/2016 02/09/2016--serum creatinine 1.85.  BUN 29.     07/20/2015--patient was evaluated by the nephrologist.  Ultrasound of the kidneys ordered but this was never done.     05/22/2015--BUN 35, creatinine 2.3.  Patient referred to nephrology, Dr. Devyn Muniz.     04/16/2014--BUN 25, creatinine 1.77.     01/03/2013--BUN 37, creatinine 2.14.       05/26/2010---ultrasound of the kidneys reveal a small cyst x     Statin intolerance, Vytorin and pravastatin 12/21/2018 12/21/2018--patient seems to be tolerating Livalo well.  10/02/2018--Livalo 2 mg per day initiated.  Recommend CoQ10 400 mg per day with food for better absorption.  Reassess with lab in about 8 weeks.  08/30/2018--patient presents with complaints of muscle cramps.  He discontinue pravastatin and the cramps went away.  Cramps returned even with a half dose.  This is despite the fact he was taking     Vitamin B12 deficiency 02/11/2016 06/25/2009--B12 injections begun.    Vitamin D deficiency 02/11/2016         Past Surgical History:   Procedure Laterality Date    CATARACT EXTRACTION Left 12/12/2011 12/12/2011--cataract extirpation with intraocular lens implantation left eye.    CATARACT EXTRACTION Right 12/2011 December 2011--right cataract extirpation with intraocular lens implantation.    CHOLECYSTECTOMY WITH INTRAOPERATIVE CHOLANGIOGRAM N/A 11/6/2020    Procedure:  CHOLECYSTECTOMY LAPAROSCOPIC INTRAOPERATIVE CHOLANGIOGRAM;  Surgeon: Sanjay Burgess MD;  Location: [facility] OR;  Service: General;  Laterality: N/A;    COLONOSCOPY  06/11/2002 06/11/2002--incomplete colonoscopy due to redundant colon. Not able to get past the hepatic flexure. Patient had followup barium contrast enema which showed extensive diverticulosis    COLONOSCOPY  10/03/2013    10/03/2013--colonoscopy revealed markedly redundant colon, pancolonic diverticulosis with several impacted fecalith. No evidence of diverticulitis or stricture. Was only able to reach the ascending colon. Follow up barium enema revealed extensive diverticulosis. No polyp or other mucosal mass seen.    CYSTOSCOPY  05/18/2016 05/18/2016--urology consultation.  Cystoscopy performed for possible bladder mass is negative.    ERCP N/A 11/5/2020    Procedure: ENDOSCOPIC RETROGRADE CHOLANGIOPANCREATOGRAPHY with sphincterotomy, basket retrieval and balloon sweep;  Surgeon: Joseluis Mejia MD;  Location: [facility] ENDOSCOPY;  Service: Gastroenterology;  Laterality: N/A;  pre/post - CBD stones    PROSTATE BIOPSY  12/21/2016 12/21/2016--prostate biopsy reportedly negative.  I will try to obtain the report.         No Known Allergies        Current Outpatient Medications:     ALPRAZolam (XANAX) 0.5 MG tablet, TAKE 1 TABLET BY MOUTH TWICE A DAY, Disp: 60 tablet, Rfl: 4    amLODIPine (NORVASC) 5 MG tablet, Take 1 p.o. daily for high blood pressure, Disp: 90 tablet, Rfl: 3    aspirin 81 MG tablet, Take 1 tablet by mouth Daily. TAKE 1 TABLET TWICE A WEEK, Disp: , Rfl:     calcitriol (ROCALTROL) 0.25 MCG capsule, , Disp: , Rfl:     Cholecalciferol 50 MCG (2000 UT) capsule, Take 1 capsule by mouth Daily., Disp: , Rfl:     Evolocumab (Repatha SureClick) solution auto-injector SureClick injection, INJECT 1 ML UNDER THE SKIN INTO THE APPROPRIATE AREA EVERY 14 DAYS, Disp: 2 mL, Rfl: 11    fluticasone (FLONASE) 50 MCG/ACT nasal spray,  "SPRAY TWO SPRAYS IN EACH NOSTRIL ONCE DAILY, Disp: 16 mL, Rfl: 1    folic acid (FOLVITE) 1 MG tablet, TAKE 1 TABLET BY MOUTH TWICE A DAY, Disp: 180 tablet, Rfl: 0    nitroglycerin (NITROSTAT) 0.4 MG SL tablet, 1 under the tongue as needed for angina, may repeat q5mins for up three doses, Disp: 30 tablet, Rfl: 2    omeprazole (priLOSEC) 40 MG capsule, TAKE ONE CAPSULE BY MOUTH DAILY, Disp: 90 capsule, Rfl: 3    tadalafil (ADCIRCA) 20 MG tablet tablet, Take 2 tablets by mouth Daily., Disp: , Rfl:     tamsulosin (FLOMAX) 0.4 MG capsule 24 hr capsule, , Disp: , Rfl:     metoprolol succinate XL (TOPROL-XL) 25 MG 24 hr tablet, Take 1 tablet by mouth Daily., Disp: 90 tablet, Rfl: 3    Current Facility-Administered Medications:     cyanocobalamin injection 1,000 mcg, 1,000 mcg, Intramuscular, Q28 Days, Delgado Patricia MD, 1,000 mcg at 04/03/24 1441    [START ON 6/1/2024] Evolocumab (REPATHA) SureClick injection 140 mg, 140 mg, Subcutaneous, Q30 Days, Delgado Patricia MD      Family History   Problem Relation Age of Onset    Diabetes Mother     Heart disease Mother     Stroke Father          Social History     Socioeconomic History    Marital status:     Number of children: 2    Years of education: 14    Highest education level: Some college, no degree   Tobacco Use    Smoking status: Never    Smokeless tobacco: Never    Tobacco comments:     caffeine use: 2 cups coffee daily   Vaping Use    Vaping status: Never Used   Substance and Sexual Activity    Alcohol use: Yes     Alcohol/week: 5.0 standard drinks of alcohol     Types: 2 Glasses of wine, 3 Shots of liquor per week     Comment: Moderate alcohol consumption    Drug use: No    Sexual activity: Not Currently     Partners: Female         Vitals:    05/20/24 0849   BP: 140/66   Pulse: 96   Resp: 16   Temp: 96.8 °F (36 °C)   TempSrc: Temporal   SpO2: 98%   Weight: 82.6 kg (182 lb)   Height: 172.7 cm (67.99\")        Body mass index is 27.68 kg/m².      Physical " Exam:    General: Alert and oriented x 3.  No acute distress.  Normal affect.  Slightly overweight.  HEENT: Pupils equal, round, reactive to light; extraocular movements intact; sclerae nonicteric; pharynx, ear canals and TMs normal.  Neck: Without JVD, thyromegaly, bruit, or adenopathy.  Lungs: Clear to auscultation in all fields.  Heart: Regular rate and rhythm without murmur, rub, gallop, or click.  Abdomen: Soft, nontender, without hepatosplenomegaly or hernia.  Bowel sounds normal.  : Deferred.  Rectal: Deferred.  Extremities: Without clubbing, cyanosis, edema, or pulse deficit.  Neurologic: Intact without focal deficit.  Normal station and gait observed during ingress and egress from the examination room.  Skin: Without significant lesion.  Musculoskeletal: Unremarkable.    Lab/other results:    Uric acid normal at 7.1.  Protein/creatinine ratio 162.  Vitamin D normal at 53.  Intact PTH 62.  CBC reveals a low hemoglobin 11.6 and hematocrit 35.7.  CPK normal.  CMP normal except glucose 112, creatinine 1.91, estimated GFR 33.  Potassium mildly elevated 5.3.  Hemoglobin A1c 6.3.  Total cholesterol 162, triglycerides 88, LDL particle #1000, HDL particle number slightly low at 29.5.  Thyroid function tests are normal.  Vitamin D normal at 41.6.  Uric acid 7.4.  Homocystine normal at 12.2.  Methylmalonic acid normal at 251.    Assessment/Plan:     Diagnosis Plan   1. Impaired fasting glucose  Comprehensive Metabolic Panel    Hemoglobin A1c    Urinalysis With Microscopic If Indicated (No Culture) - Urine, Clean Catch      2. Hyperlipidemia  CK    Comprehensive Metabolic Panel    Homocysteine    NMR LipoProfile    TSH    T4, Free    T3, Free    Evolocumab (REPATHA) SureClick injection 140 mg      3. Hyperuricemia  Uric Acid      4. Stage 3b chronic kidney disease  CBC (No Diff)    Urinalysis With Microscopic If Indicated (No Culture) - Urine, Clean Catch      5. Vitamin B12 deficiency  CBC (No Diff)    Methylmalonic  Acid, Serum      6. Vitamin D deficiency  Vitamin D,25-Hydroxy      7. Folic acid deficiency        8. Hypogonadism in male, on TRT, testosterone pellets, per         9. Benign prostatic hypertrophy  PSA DIAGNOSTIC      10. Benign essential hypertension  Comprehensive Metabolic Panel      11. Anemia due to stage 3b chronic kidney disease        12. Bilateral carotid artery stenosis, 11/23/2022--50-59% right; 16-49% left ICA stenosis        13. Chronic neck pain        14. Degeneration of cervical intervertebral disc        15. Gastroesophageal reflux disease without esophagitis        16. Generalized anxiety disorder        17. Occlusive coronary artery disease, clinical diagnosis only.  Patient not a candidate for heart catheterization/intervention.        18. Osteopenia of multiple sites, 10/31/2023--lumbar spine -0.1.  Left femoral neck -1.5.  Right femoral neck -1.9.        19. Overweight (BMI 25.0-29.9)        20. Secondary hyperparathyroidism of renal origin        21. Statin intolerance, Vytorin and pravastatin        22. Primary osteoarthritis of knees, bilateral        23. Therapeutic drug monitoring          Patient has impaired fasting glucose and is at risk for development of overt diabetes.  He is a little overweight strongly encouraged low carbohydrate diet, exercise, and attainment of ideal body weight.  Hyperlipidemia is under reasonable control.  Hyperuricemia seems to be controlled without allopurinol.  His chronic renal disease has actually improved and he is now stage IIIb.  He is receiving adequate vitamin B12 supplementation as evidenced by normalization of methylmalonic acid.  His homocystine is normal indicating that he is getting enough folic acid as well.  Patient has hypogonadism and is on testosterone replacement therapy which is being managed by his urologist.  He has symptomatic BPH and Flomax seems to help.  Hypertension seems controlled.  His anemia is mild and attributed to to his  "kidney disease.  Patient has carotid artery stenosis and is up-to-date on his carotid Doppler study.  He has chronic neck pain due to degenerative disc disease and this is currently tolerable.  Esophageal reflux controlled with omeprazole.  He has generalized anxiety requiring benzodiazepine therapy and I have no evidence of patient is abusing this medication.  He has osteopenia and is up-to-date on his DEXA scan.  Secondary hyperparathyroidism seems to be controlled.  He is statin intolerant and therefore receives Repatha injections which are working well.    Plan is as follows: Continue to work on low-carb diet and weight loss.  No changes to current medical regimen.  Patient will follow-up after October 23, 2024 for his subsequent Medicare wellness visit.  Otherwise he will follow-up as needed.    This patient has a PCP that is the continuing focal point for all health care services, and the patient sees this physician to be evaluated for multiple chronic medical problems. The inherent complexity that this code () captures is not in the clinical condition itself, but rather the cognitition of the continued responsibility of being the focal point for all needed services for this patient.\"       Procedures        "

## 2024-06-03 ENCOUNTER — CLINICAL SUPPORT (OUTPATIENT)
Dept: INTERNAL MEDICINE | Facility: CLINIC | Age: 89
End: 2024-06-03
Payer: MEDICARE

## 2024-06-03 DIAGNOSIS — E78.2 MIXED HYPERLIPIDEMIA: Chronic | ICD-10-CM

## 2024-06-03 DIAGNOSIS — E53.8 VITAMIN B12 DEFICIENCY: Chronic | ICD-10-CM

## 2024-06-03 DIAGNOSIS — E78.5 HYPERLIPIDEMIA, UNSPECIFIED HYPERLIPIDEMIA TYPE: Chronic | ICD-10-CM

## 2024-06-03 DIAGNOSIS — Z78.9 STATIN INTOLERANCE: Chronic | ICD-10-CM

## 2024-06-03 DIAGNOSIS — I25.10 OCCLUSIVE CORONARY ARTERY DISEASE: Chronic | ICD-10-CM

## 2024-06-03 DIAGNOSIS — D63.1 ANEMIA DUE TO STAGE 3B CHRONIC KIDNEY DISEASE: Primary | ICD-10-CM

## 2024-06-03 DIAGNOSIS — N18.32 ANEMIA DUE TO STAGE 3B CHRONIC KIDNEY DISEASE: Primary | ICD-10-CM

## 2024-06-03 PROCEDURE — 96372 THER/PROPH/DIAG INJ SC/IM: CPT | Performed by: INTERNAL MEDICINE

## 2024-06-03 RX ADMIN — CYANOCOBALAMIN 1000 MCG: 1000 INJECTION, SOLUTION INTRAMUSCULAR; SUBCUTANEOUS at 15:18

## 2024-06-03 NOTE — PROGRESS NOTES
Injection  Injection performed in right arm by Rodolfo Van MA. Patient tolerated the procedure well without complications. B-12.  06/03/24   Rodolfo Van MA       Injection  Injection performed in left arm by Rodolfo Van MA. Patient tolerated the procedure well without complications.  06/03/24   JOSE Harrell

## 2024-06-04 DIAGNOSIS — I25.10 OCCLUSIVE CORONARY ARTERY DISEASE: Chronic | ICD-10-CM

## 2024-06-04 DIAGNOSIS — Z78.9 STATIN INTOLERANCE: Chronic | ICD-10-CM

## 2024-06-04 DIAGNOSIS — E78.5 HYPERLIPIDEMIA, UNSPECIFIED HYPERLIPIDEMIA TYPE: Chronic | ICD-10-CM

## 2024-06-04 DIAGNOSIS — E78.2 MIXED HYPERLIPIDEMIA: Chronic | ICD-10-CM

## 2024-06-04 RX ORDER — EVOLOCUMAB 140 MG/ML
INJECTION, SOLUTION SUBCUTANEOUS
Qty: 2 ML | Refills: 6 | OUTPATIENT
Start: 2024-06-04

## 2024-06-04 RX ORDER — EVOLOCUMAB 140 MG/ML
INJECTION, SOLUTION SUBCUTANEOUS
Qty: 2 ML | Refills: 11 | Status: SHIPPED | OUTPATIENT
Start: 2024-06-04

## 2024-07-09 ENCOUNTER — CLINICAL SUPPORT (OUTPATIENT)
Dept: INTERNAL MEDICINE | Facility: CLINIC | Age: 89
End: 2024-07-09
Payer: MEDICARE

## 2024-07-09 DIAGNOSIS — E53.8 VITAMIN B12 DEFICIENCY: Primary | Chronic | ICD-10-CM

## 2024-07-09 DIAGNOSIS — E78.2 MIXED HYPERLIPIDEMIA: ICD-10-CM

## 2024-07-09 PROCEDURE — 96372 THER/PROPH/DIAG INJ SC/IM: CPT | Performed by: INTERNAL MEDICINE

## 2024-07-09 RX ADMIN — CYANOCOBALAMIN 1000 MCG: 1000 INJECTION, SOLUTION INTRAMUSCULAR; SUBCUTANEOUS at 14:01

## 2024-07-09 NOTE — PROGRESS NOTES
Injection  Injection performed in RD by Rodolfo Van MA. Patient tolerated the procedure well without complications.  07/09/24   Rodolfo Van MA

## 2024-08-07 ENCOUNTER — CLINICAL SUPPORT (OUTPATIENT)
Dept: INTERNAL MEDICINE | Facility: CLINIC | Age: 89
End: 2024-08-07
Payer: MEDICARE

## 2024-08-07 DIAGNOSIS — E53.8 VITAMIN B12 DEFICIENCY: Primary | Chronic | ICD-10-CM

## 2024-08-07 PROCEDURE — 96372 THER/PROPH/DIAG INJ SC/IM: CPT | Performed by: INTERNAL MEDICINE

## 2024-08-07 RX ADMIN — CYANOCOBALAMIN 1000 MCG: 1000 INJECTION, SOLUTION INTRAMUSCULAR; SUBCUTANEOUS at 13:21

## 2024-08-07 NOTE — PROGRESS NOTES
Injection  Injection performed in left arm repatha right arm b12 by Rodolfo Van MA. Patient tolerated the procedure well without complications.  08/07/24   Rodolfo Van MA

## 2024-08-17 DIAGNOSIS — J30.1 SEASONAL ALLERGIC RHINITIS DUE TO POLLEN: Chronic | ICD-10-CM

## 2024-08-19 RX ORDER — FLUTICASONE PROPIONATE 50 MCG
SPRAY, SUSPENSION (ML) NASAL
Qty: 16 ML | Refills: 1 | Status: SHIPPED | OUTPATIENT
Start: 2024-08-19

## 2024-08-22 NOTE — PROGRESS NOTES
Adult Nutrition  Assessment/PES    Patient Name:  Radha Win  YOB: 1932  MRN: [id number]  Admit Date:  11/3/2020    Assessment Date:  11/6/2020    Comments:  Follow up for weight loss & poor PO intake. EMR reviewed for data - pt NPO for teo magallanes today. IVF at 100 cc/hr. RD to continue to follow.     Reason for Assessment     Row Name 11/06/20 0714          Reason for Assessment    Reason For Assessment  follow-up protocol         Nutrition/Diet History     Row Name 11/06/20 0714          Nutrition/Diet History    Typical Food/Fluid Intake  NPO for teo magallanes today           Labs/Tests/Procedures/Meds     Row Name 11/06/20 0714          Labs/Procedures/Meds    Lab Results Reviewed  reviewed     Lab Results Comments  Na, Cr, ALT, AST, Alb, WBC        Diagnostic Tests/Procedures    Diagnostic Test/Procedure Reviewed  reviewed        Medications    Pertinent Medications Reviewed  reviewed             Nutrition Prescription Ordered     Row Name 11/06/20 0715          Nutrition Prescription PO    Current PO Diet  NPO         Evaluation of Received Nutrient/Fluid Intake     Row Name 11/06/20 0715          Fluid Intake Evaluation    IV Fluid (mL)  2400               Problem/Interventions:          Intervention Goal     Row Name 11/06/20 0715          Intervention Goal    General  Reduce/improve symptoms;Disease management/therapy     PO  Initiate feeding     Weight  Maintain weight         Nutrition Intervention     Row Name 11/06/20 0716          Nutrition Intervention    RD/Tech Action  Follow Tx progress;Care plan reviewd           Education/Evaluation     Row Name 11/06/20 0716          Education    Education  Will Instruct as appropriate        Monitor/Evaluation    Monitor  Per protocol           Electronically signed by:  Luz Sampson RD  11/06/20 07:16 EST   Hide Include Location In Plan Question?: No Detail Level: Zone

## 2024-09-03 RX ORDER — FOLIC ACID 1 MG/1
1000 TABLET ORAL 2 TIMES DAILY
Qty: 180 TABLET | Refills: 0 | Status: SHIPPED | OUTPATIENT
Start: 2024-09-03

## 2024-09-06 ENCOUNTER — CLINICAL SUPPORT (OUTPATIENT)
Dept: INTERNAL MEDICINE | Facility: CLINIC | Age: 89
End: 2024-09-06
Payer: MEDICARE

## 2024-09-06 DIAGNOSIS — E53.8 VITAMIN B 12 DEFICIENCY: Primary | ICD-10-CM

## 2024-09-06 PROCEDURE — 96372 THER/PROPH/DIAG INJ SC/IM: CPT | Performed by: INTERNAL MEDICINE

## 2024-09-06 RX ADMIN — CYANOCOBALAMIN 1000 MCG: 1000 INJECTION, SOLUTION INTRAMUSCULAR; SUBCUTANEOUS at 13:29

## 2024-09-06 NOTE — PROGRESS NOTES
Injection  Injection performed in right by Gladys Alvarez MA. Patient tolerated the procedure well without complications.  09/06/24   Gladys Alvarez MA

## 2024-09-15 DIAGNOSIS — F41.1 GENERALIZED ANXIETY DISORDER: ICD-10-CM

## 2024-09-16 RX ORDER — ALPRAZOLAM 0.5 MG
0.5 TABLET ORAL 2 TIMES DAILY
Qty: 60 TABLET | Refills: 3 | Status: SHIPPED | OUTPATIENT
Start: 2024-09-16

## 2024-10-07 ENCOUNTER — CLINICAL SUPPORT (OUTPATIENT)
Dept: INTERNAL MEDICINE | Facility: CLINIC | Age: 89
End: 2024-10-07
Payer: MEDICARE

## 2024-10-07 ENCOUNTER — FLU SHOT (OUTPATIENT)
Dept: INTERNAL MEDICINE | Facility: CLINIC | Age: 89
End: 2024-10-07
Payer: MEDICARE

## 2024-10-07 DIAGNOSIS — E53.8 VITAMIN B12 DEFICIENCY: Primary | Chronic | ICD-10-CM

## 2024-10-07 DIAGNOSIS — Z23 NEED FOR INFLUENZA VACCINATION: Primary | ICD-10-CM

## 2024-10-07 PROCEDURE — 96372 THER/PROPH/DIAG INJ SC/IM: CPT | Performed by: INTERNAL MEDICINE

## 2024-10-07 PROCEDURE — G0008 ADMIN INFLUENZA VIRUS VAC: HCPCS | Performed by: INTERNAL MEDICINE

## 2024-10-07 PROCEDURE — 90662 IIV NO PRSV INCREASED AG IM: CPT | Performed by: INTERNAL MEDICINE

## 2024-10-07 RX ADMIN — CYANOCOBALAMIN 1000 MCG: 1000 INJECTION, SOLUTION INTRAMUSCULAR; SUBCUTANEOUS at 14:29

## 2024-11-15 ENCOUNTER — LAB (OUTPATIENT)
Dept: LAB | Facility: HOSPITAL | Age: 89
End: 2024-11-15
Payer: MEDICARE

## 2024-11-15 ENCOUNTER — OFFICE VISIT (OUTPATIENT)
Dept: INTERNAL MEDICINE | Facility: CLINIC | Age: 89
End: 2024-11-15
Payer: MEDICARE

## 2024-11-15 VITALS
HEIGHT: 68 IN | HEART RATE: 102 BPM | OXYGEN SATURATION: 98 % | TEMPERATURE: 98 F | DIASTOLIC BLOOD PRESSURE: 84 MMHG | RESPIRATION RATE: 16 BRPM | BODY MASS INDEX: 27.58 KG/M2 | WEIGHT: 182 LBS | SYSTOLIC BLOOD PRESSURE: 130 MMHG

## 2024-11-15 DIAGNOSIS — E55.9 VITAMIN D DEFICIENCY: Chronic | ICD-10-CM

## 2024-11-15 DIAGNOSIS — E53.8 VITAMIN B12 DEFICIENCY: Chronic | ICD-10-CM

## 2024-11-15 DIAGNOSIS — Z00.00 ENCOUNTER FOR SUBSEQUENT ANNUAL WELLNESS VISIT (AWV) IN MEDICARE PATIENT: Primary | ICD-10-CM

## 2024-11-15 DIAGNOSIS — E78.2 MIXED HYPERLIPIDEMIA: Chronic | ICD-10-CM

## 2024-11-15 DIAGNOSIS — I65.23 BILATERAL CAROTID ARTERY STENOSIS: Chronic | ICD-10-CM

## 2024-11-15 DIAGNOSIS — I25.10 OCCLUSIVE CORONARY ARTERY DISEASE: Chronic | ICD-10-CM

## 2024-11-15 PROCEDURE — 84550 ASSAY OF BLOOD/URIC ACID: CPT | Performed by: INTERNAL MEDICINE

## 2024-11-15 PROCEDURE — 1170F FXNL STATUS ASSESSED: CPT | Performed by: INTERNAL MEDICINE

## 2024-11-15 PROCEDURE — 84153 ASSAY OF PSA TOTAL: CPT | Performed by: INTERNAL MEDICINE

## 2024-11-15 PROCEDURE — 84439 ASSAY OF FREE THYROXINE: CPT | Performed by: INTERNAL MEDICINE

## 2024-11-15 PROCEDURE — 1126F AMNT PAIN NOTED NONE PRSNT: CPT | Performed by: INTERNAL MEDICINE

## 2024-11-15 PROCEDURE — 83090 ASSAY OF HOMOCYSTEINE: CPT | Performed by: INTERNAL MEDICINE

## 2024-11-15 PROCEDURE — 85027 COMPLETE CBC AUTOMATED: CPT | Performed by: INTERNAL MEDICINE

## 2024-11-15 PROCEDURE — 83704 LIPOPROTEIN BLD QUAN PART: CPT | Performed by: INTERNAL MEDICINE

## 2024-11-15 PROCEDURE — 82306 VITAMIN D 25 HYDROXY: CPT | Performed by: INTERNAL MEDICINE

## 2024-11-15 PROCEDURE — 81001 URINALYSIS AUTO W/SCOPE: CPT | Performed by: INTERNAL MEDICINE

## 2024-11-15 PROCEDURE — 80061 LIPID PANEL: CPT | Performed by: INTERNAL MEDICINE

## 2024-11-15 PROCEDURE — G0439 PPPS, SUBSEQ VISIT: HCPCS | Performed by: INTERNAL MEDICINE

## 2024-11-15 PROCEDURE — 83036 HEMOGLOBIN GLYCOSYLATED A1C: CPT | Performed by: INTERNAL MEDICINE

## 2024-11-15 PROCEDURE — 82550 ASSAY OF CK (CPK): CPT | Performed by: INTERNAL MEDICINE

## 2024-11-15 PROCEDURE — 84443 ASSAY THYROID STIM HORMONE: CPT | Performed by: INTERNAL MEDICINE

## 2024-11-15 PROCEDURE — 83921 ORGANIC ACID SINGLE QUANT: CPT | Performed by: INTERNAL MEDICINE

## 2024-11-15 PROCEDURE — 80053 COMPREHEN METABOLIC PANEL: CPT | Performed by: INTERNAL MEDICINE

## 2024-11-15 PROCEDURE — 84481 FREE ASSAY (FT-3): CPT | Performed by: INTERNAL MEDICINE

## 2024-11-15 RX ORDER — CALCITRIOL 0.25 UG/1
CAPSULE, LIQUID FILLED ORAL
Start: 2024-11-15

## 2024-11-15 RX ORDER — EVOLOCUMAB 140 MG/ML
INJECTION, SOLUTION SUBCUTANEOUS
Start: 2024-11-15

## 2024-11-15 RX ORDER — ASPIRIN 81 MG/1
TABLET ORAL
Start: 2024-11-15

## 2024-11-15 NOTE — PROGRESS NOTES
Subjective   The ABCs of the Annual Wellness Visit  Medicare Wellness Visit      Radha Win is a 89 y.o. patient who presents for a Medicare Wellness Visit.    The following portions of the patient's history were reviewed and   updated as appropriate: allergies, current medications, past family history, past medical history, past social history, past surgical history, and problem list.    Compared to one year ago, the patient's physical   health is the same.  Compared to one year ago, the patient's mental   health is the same.    Recent Hospitalizations:  He was not admitted to the hospital during the last year.     Current Medical Providers:  Patient Care Team:  Delgado Patricia MD as PCP - General (Internal Medicine)    Outpatient Medications Prior to Visit   Medication Sig Dispense Refill    ALPRAZolam (XANAX) 0.5 MG tablet TAKE 1 TABLET BY MOUTH TWICE A DAY 60 tablet 3    amLODIPine (NORVASC) 5 MG tablet Take 1 p.o. daily for high blood pressure 90 tablet 3    fluticasone (FLONASE) 50 MCG/ACT nasal spray SPRAY 2 SPRAYS IN EACH NOSTRIL ONCE DAILY 16 mL 1    folic acid (FOLVITE) 1 MG tablet TAKE 1 TABLET BY MOUTH TWICE A  tablet 0    metoprolol succinate XL (TOPROL-XL) 25 MG 24 hr tablet Take 1 tablet by mouth Daily. 90 tablet 3    nitroglycerin (NITROSTAT) 0.4 MG SL tablet 1 under the tongue as needed for angina, may repeat q5mins for up three doses 30 tablet 2    omeprazole (priLOSEC) 40 MG capsule TAKE ONE CAPSULE BY MOUTH DAILY 90 capsule 3    tadalafil (ADCIRCA) 20 MG tablet tablet Take 2 tablets by mouth Daily.      tamsulosin (FLOMAX) 0.4 MG capsule 24 hr capsule       aspirin 81 MG tablet Take 1 tablet by mouth Daily. TAKE 1 TABLET TWICE A WEEK      calcitriol (ROCALTROL) 0.25 MCG capsule       Cholecalciferol 50 MCG (2000 UT) capsule Take 1 capsule by mouth Daily.      Evolocumab (Repatha SureClick) solution auto-injector SureClick injection Inject 1 mL (140 mg) every 14 days as directed  for high cholesterol 2 mL 11    Evolocumab (REPATHA) solution auto-injector SureClick injection Inject 1 mL under the skin into the appropriate area as directed Every 14 (Fourteen) Days.      Evolocumab (REPATHA) solution auto-injector SureClick injection Inject 1 mL under the skin into the appropriate area as directed Every 14 (Fourteen) Days.       Facility-Administered Medications Prior to Visit   Medication Dose Route Frequency Provider Last Rate Last Admin    cyanocobalamin injection 1,000 mcg  1,000 mcg Intramuscular Q28 Days Delgado Patricia MD   1,000 mcg at 10/07/24 1429    Evolocumab (REPATHA) SureClick injection 140 mg  140 mg Subcutaneous Q14 Days Delgado Patricia MD   140 mg at 10/07/24 1427     No opioid medication identified on active medication list. I have reviewed chart for other potential  high risk medication/s and harmful drug interactions in the elderly.      Aspirin is on active medication list. Aspirin use is indicated based on review of current medical condition/s. Pros and cons of this therapy have been discussed today. Benefits of this medication outweigh potential harm.  Patient has been encouraged to continue taking this medication.  .      Patient Active Problem List   Diagnosis    Bilateral carotid artery stenosis, 11/23/2022--50-59% right; 16-49% left ICA stenosis    Benign prostatic hypertrophy    Stage 3b chronic kidney disease    Diverticulosis of colon    Hyperlipidemia    Osteopenia of multiple sites, 10/31/2023--lumbar spine -0.1.  Left femoral neck -1.5.  Right femoral neck -1.9.    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  Tolerates Livalo.    Hypogonadism in male, on TRT, testosterone pellets, per     Chronic neck pain    Cervical radiculopathy    Statin intolerance, Vytorin and pravastatin    Degeneration of cervical intervertebral disc    Occlusive coronary artery disease, clinical diagnosis only.  Patient not a candidate for heart catheterization/intervention.    Hyperuricemia    Secondary hyperparathyroidism of renal origin    Chronic left shoulder pain    Overweight (BMI 25.0-29.9)    Right bundle branch block     Advance Care Planning Advance Directive is on file.  ACP discussion was held with the patient during this visit. Patient has an advance directive in EMR which is still valid.             Objective   Vitals:    11/15/24 1132   BP: 130/84   Pulse: 102   Resp: 16   Temp: 98 °F (36.7 °C)   TempSrc: Oral   SpO2: 98%   Weight: 82.6 kg (182 lb)   Height: 172.7 cm (67.99\")       Estimated body mass index is 27.68 kg/m² as calculated from the following:    Height as of this encounter: 172.7 cm (67.99\").    Weight as of this encounter: 82.6 kg (182 lb).            Does the patient have evidence of cognitive impairment? No                                                                                               Health  Risk Assessment    Smoking Status:  Social History     Tobacco Use   Smoking Status Never   Smokeless Tobacco Never   Tobacco Comments    none     Alcohol Consumption:  Social History     Substance and Sexual Activity   Alcohol Use Yes    Alcohol/week: 5.0 standard drinks of alcohol    Types: 2 Glasses of wine, 3 Shots of liquor per week    Comment: Moderate alcohol consumption       Fall Risk Screen  STEADI Fall Risk Assessment was completed, and patient is at LOW risk for falls.Assessment completed on:11/15/2024    Depression Screening   Little interest or pleasure in doing things? Not at all   Feeling down, " depressed, or hopeless? Not at all   PHQ-2 Total Score 0      Health Habits and Functional and Cognitive Screenin/15/2024    11:39 AM   Functional & Cognitive Status   Do you have difficulty preparing food and eating? No   Do you have difficulty bathing yourself, getting dressed or grooming yourself? No   Do you have difficulty using the toilet? No   Do you have difficulty moving around from place to place? No   Do you have trouble with steps or getting out of a bed or a chair? No   Current Diet Well Balanced Diet   Dental Exam Up to date   Eye Exam Up to date   Exercise (times per week) 0 times per week   Current Exercises Include No Regular Exercise   Do you need help using the phone?  No   Are you deaf or do you have serious difficulty hearing?  No   Do you need help to go to places out of walking distance? No   Do you need help shopping? No   Do you need help preparing meals?  No   Do you need help with housework?  No   Do you need help with laundry? No   Do you need help taking your medications? No   Do you need help managing money? No   Do you ever drive or ride in a car without wearing a seat belt? No   Have you felt unusual stress, anger or loneliness in the last month? No   Who do you live with? Alone   If you need help, do you have trouble finding someone available to you? No   Have you been bothered in the last four weeks by sexual problems? No   Do you have difficulty concentrating, remembering or making decisions? No           Age-appropriate Screening Schedule:  Refer to the list below for future screening recommendations based on patient's age, sex and/or medical conditions. Orders for these recommended tests are listed in the plan section. The patient has been provided with a written plan.    Health Maintenance List  Health Maintenance   Topic Date Due    RSV Vaccine - Adults (1 - 1-dose 75+ series) 2025 (Originally 2007)    LIPID PANEL  2025    ANNUAL WELLNESS VISIT   11/15/2025    BMI FOLLOWUP  11/15/2025    TDAP/TD VACCINES (2 - Td or Tdap) 03/09/2027    INFLUENZA VACCINE  Completed    Pneumococcal Vaccine 65+  Completed    Hepatitis B  Discontinued    COVID-19 Vaccine  Discontinued    URINE MICROALBUMIN  Discontinued    ZOSTER VACCINE  Discontinued                                                                                                                                                CMS Preventative Services Quick Reference  Risk Factors Identified During Encounter  Immunizations Discussed/Encouraged: Influenza and RSV (Respiratory Syncytial Virus)    The above risks/problems have been discussed with the patient.  Pertinent information has been shared with the patient in the After Visit Summary.  An After Visit Summary and PPPS were made available to the patient.    Follow Up:   Next Medicare Wellness visit to be scheduled in 1 year.     Assessment & Plan  Encounter for subsequent annual wellness visit (AWV) in Medicare patient         Hyperlipidemia       Orders:    Evolocumab (Repatha SureClick) solution auto-injector SureClick injection; Inject 1 mL (140 mg) every 30 days as directed for high cholesterol    Vitamin D deficiency    Orders:    calcitriol (ROCALTROL) 0.25 MCG capsule; Take 1 capsule daily as directed for vitamin D    Bilateral carotid artery stenosis, 11/23/2022--50-59% right; 16-49% left ICA stenosis    Orders:    aspirin 81 MG EC tablet; Take 1 tablet every Monday, Wednesday, and Friday    Occlusive coronary artery disease, clinical diagnosis only.  Patient not a candidate for heart catheterization/intervention.      Orders:    aspirin 81 MG EC tablet; Take 1 tablet every Monday, Wednesday, and Friday    Vitamin B12 deficiency              Follow Up:   Return in about 1 week (around 11/22/2024).

## 2024-11-15 NOTE — ASSESSMENT & PLAN NOTE
Orders:    Evolocumab (Repatha SureClick) solution auto-injector SureClick injection; Inject 1 mL (140 mg) every 30 days as directed for high cholesterol    
    Orders:    aspirin 81 MG EC tablet; Take 1 tablet every Monday, Wednesday, and Friday    
  Orders:    aspirin 81 MG EC tablet; Take 1 tablet every Monday, Wednesday, and Friday    
  Orders:    calcitriol (ROCALTROL) 0.25 MCG capsule; Take 1 capsule daily as directed for vitamin D    
no

## 2024-11-21 DIAGNOSIS — J30.1 SEASONAL ALLERGIC RHINITIS DUE TO POLLEN: Chronic | ICD-10-CM

## 2024-11-21 RX ORDER — FLUTICASONE PROPIONATE 50 MCG
SPRAY, SUSPENSION (ML) NASAL
Qty: 16 G | Refills: 3 | Status: SHIPPED | OUTPATIENT
Start: 2024-11-21

## 2024-12-06 ENCOUNTER — CLINICAL SUPPORT (OUTPATIENT)
Dept: INTERNAL MEDICINE | Facility: CLINIC | Age: 89
End: 2024-12-06
Payer: MEDICARE

## 2024-12-06 DIAGNOSIS — E53.8 VITAMIN B 12 DEFICIENCY: Primary | ICD-10-CM

## 2024-12-06 PROCEDURE — 96372 THER/PROPH/DIAG INJ SC/IM: CPT | Performed by: INTERNAL MEDICINE

## 2024-12-06 RX ADMIN — CYANOCOBALAMIN 1000 MCG: 1000 INJECTION, SOLUTION INTRAMUSCULAR; SUBCUTANEOUS at 13:59

## 2024-12-06 NOTE — PROGRESS NOTES
Injection  Injection performed in right arm by Gladys Alvarez MA. Patient tolerated the procedure well without complications.  12/06/24   Gladys Alvarez MA

## 2025-01-14 ENCOUNTER — TELEPHONE (OUTPATIENT)
Dept: INTERNAL MEDICINE | Facility: CLINIC | Age: OVER 89
End: 2025-01-14
Payer: MEDICARE

## 2025-01-15 ENCOUNTER — CLINICAL SUPPORT (OUTPATIENT)
Dept: INTERNAL MEDICINE | Facility: CLINIC | Age: OVER 89
End: 2025-01-15
Payer: MEDICARE

## 2025-01-15 ENCOUNTER — PRIOR AUTHORIZATION (OUTPATIENT)
Dept: INTERNAL MEDICINE | Facility: CLINIC | Age: OVER 89
End: 2025-01-15

## 2025-01-15 DIAGNOSIS — E78.2 MIXED HYPERLIPIDEMIA: Chronic | ICD-10-CM

## 2025-01-15 DIAGNOSIS — E53.8 VITAMIN B12 DEFICIENCY: Primary | Chronic | ICD-10-CM

## 2025-01-15 PROCEDURE — 96372 THER/PROPH/DIAG INJ SC/IM: CPT | Performed by: INTERNAL MEDICINE

## 2025-01-15 RX ORDER — EVOLOCUMAB 140 MG/ML
INJECTION, SOLUTION SUBCUTANEOUS
Qty: 1 ML | Refills: 3 | Status: SHIPPED | OUTPATIENT
Start: 2025-01-15

## 2025-01-15 RX ADMIN — CYANOCOBALAMIN 1000 MCG: 1000 INJECTION, SOLUTION INTRAMUSCULAR; SUBCUTANEOUS at 13:21

## 2025-01-15 NOTE — TELEPHONE ENCOUNTER
Repatha SureClick 140MG/ML auto-injectors PA done on cover my meds and no PA needed.     (Key: [id number])    Available without authorization. The member is able to fill the requested drug at the pharmacy. If coverage is still needed or requesting prior to the expiration of a current authorization, a request can be made by sending a fax or calling the number on the back of the member's ID card.

## 2025-01-15 NOTE — TELEPHONE ENCOUNTER
Patient informed medication is available without a PA and I have left a co pay card at the  for him to . See PA note for additional details.

## 2025-01-15 NOTE — PROGRESS NOTES
Injection  B12 1000 MG Injection performed in LT DM  by Irwin Onofre MA. Patient tolerated the procedure well without complications.  01/15/25   Irwin Onofre MA

## 2025-01-31 ENCOUNTER — CLINICAL SUPPORT (OUTPATIENT)
Dept: INTERNAL MEDICINE | Facility: CLINIC | Age: OVER 89
End: 2025-01-31
Payer: MEDICARE

## 2025-01-31 DIAGNOSIS — E78.2 MIXED HYPERLIPIDEMIA: Primary | Chronic | ICD-10-CM

## 2025-01-31 NOTE — PROGRESS NOTES
Injection  Repatha Injection performed in LT DM by Irwin Onofre MA. Patient tolerated the procedure well without complications. Patient brought in the medication.   01/31/25   Irwin Onofre MA

## 2025-03-03 ENCOUNTER — CLINICAL SUPPORT (OUTPATIENT)
Dept: INTERNAL MEDICINE | Facility: CLINIC | Age: OVER 89
End: 2025-03-03
Payer: MEDICARE

## 2025-03-03 DIAGNOSIS — E78.2 MIXED HYPERLIPIDEMIA: Chronic | ICD-10-CM

## 2025-03-03 DIAGNOSIS — E55.9 VITAMIN D DEFICIENCY: Primary | Chronic | ICD-10-CM

## 2025-03-03 PROCEDURE — 96372 THER/PROPH/DIAG INJ SC/IM: CPT | Performed by: INTERNAL MEDICINE

## 2025-03-03 RX ORDER — EVOLOCUMAB 140 MG/ML
INJECTION, SOLUTION SUBCUTANEOUS
Qty: 1 ML | Refills: 3 | COMMUNITY
Start: 2025-03-03

## 2025-03-03 RX ADMIN — CYANOCOBALAMIN 1000 MCG: 1000 INJECTION, SOLUTION INTRAMUSCULAR; SUBCUTANEOUS at 15:01

## 2025-03-03 NOTE — PROGRESS NOTES
Injection  Injection performed in Left deltoid  by Mary Parkinson MA. Patient tolerated the procedure well without complications.  03/03/25   Mary Parkinson MA

## 2025-03-14 NOTE — PROGRESS NOTES
RM:________     PCP: Delgado Patricia MD                     Last EKG : 3/20/24    : 1932  AGE: 89 y.o.    REASON FOR VISIT: __________________________________________    ____________________________________________________________                                                   Wt Readings from Last 3 Encounters:   11/15/24 82.6 kg (182 lb)   24 82.6 kg (182 lb)   24 83 kg (183 lb)      BP Readings from Last 3 Encounters:   11/15/24 130/84   24 140/66   24 142/70      WT: ____________ HT: ______ BP: __________ HR ______   02% _______               Lipid Panel          2024    10:42 11/15/2024    12:27   Lipid Panel   Total Cholesterol 162  160    Triglycerides 88  183

## 2025-03-21 ENCOUNTER — OFFICE VISIT (OUTPATIENT)
Dept: CARDIOLOGY | Facility: CLINIC | Age: OVER 89
End: 2025-03-21
Payer: MEDICARE

## 2025-03-21 VITALS
BODY MASS INDEX: 27.61 KG/M2 | HEIGHT: 68 IN | OXYGEN SATURATION: 99 % | SYSTOLIC BLOOD PRESSURE: 130 MMHG | DIASTOLIC BLOOD PRESSURE: 78 MMHG | WEIGHT: 182.2 LBS | HEART RATE: 83 BPM

## 2025-03-21 DIAGNOSIS — I45.10 RIGHT BUNDLE BRANCH BLOCK: Chronic | ICD-10-CM

## 2025-03-21 DIAGNOSIS — I65.23 BILATERAL CAROTID ARTERY STENOSIS: Chronic | ICD-10-CM

## 2025-03-21 DIAGNOSIS — E78.2 MIXED HYPERLIPIDEMIA: Chronic | ICD-10-CM

## 2025-03-21 DIAGNOSIS — I10 BENIGN ESSENTIAL HYPERTENSION: Primary | Chronic | ICD-10-CM

## 2025-03-21 DIAGNOSIS — I25.10 OCCLUSIVE CORONARY ARTERY DISEASE: Chronic | ICD-10-CM

## 2025-03-21 RX ORDER — OMEPRAZOLE 40 MG/1
40 CAPSULE, DELAYED RELEASE ORAL DAILY
COMMUNITY
End: 2025-03-21

## 2025-03-21 RX ORDER — METOPROLOL SUCCINATE 25 MG/1
25 TABLET, EXTENDED RELEASE ORAL DAILY
Qty: 90 TABLET | Refills: 3 | Status: SHIPPED | OUTPATIENT
Start: 2025-03-21

## 2025-03-21 NOTE — PROGRESS NOTES
"    CARDIOLOGY        Patient Name: Radha Win  :1932  Age: 89 y.o.  Primary Cardiologist: Puneet Barroso MD  Encounter Provider:  VIRIDIANA Lopes    Date of Service: 2025      CHIEF COMPLAINT / REASON FOR OFFICE VISIT     Follow-up, Coronary Artery Disease, and Hypertension      HISTORY OF PRESENT ILLNESS       HPI  Radha Win is a 89 y.o. male who presents today for annual assessment.     Pt has a  history significant for HTN, carotid stenosis, CAD, RBBB, stage III renal disease, hyperlipidemia.    In 2019 patient had a Lexiscan stress test that revealed normal myocardial perfusion without evidence of ischemia.  Patient was prescribed isosorbide mononitrate however he has not needed this for 1 year.    Patient has known documentation of coronary artery calcification on previous CT scan.  He has never had a cardiac catheterization.    Patient reports that he has done well sicne the last assessment.  He continues to remain active and cares for his yard, he mows his yard with a self propelled .  He remains independent.  He has some age related fatigue. He notes some occasional positional lightheadedness.  No chest pain or dyspnea.  He is doing well with all mediations.     The following portions of the patient's history were reviewed and updated as appropriate: allergies, current medications, past family history, past medical history, past social history, past surgical history and problem list.      VITAL SIGNS     Visit Vitals  /78 (BP Location: Left arm, Patient Position: Sitting, Cuff Size: Adult)   Pulse 83   Ht 172.7 cm (67.99\")   Wt 82.6 kg (182 lb 3.2 oz)   SpO2 99%   BMI 27.71 kg/m²         Wt Readings from Last 3 Encounters:   25 82.6 kg (182 lb 3.2 oz)   11/15/24 82.6 kg (182 lb)   24 82.6 kg (182 lb)     Body mass index is 27.71 kg/m².      REVIEW OF SYSTEMS     Review of Systems   Constitutional: Negative for chills, fever, " malaise/fatigue, weight gain and weight loss.   Cardiovascular:  Negative for chest pain and leg swelling.   Respiratory:  Negative for cough, shortness of breath, snoring and wheezing.    Hematologic/Lymphatic: Negative for bleeding problem. Does not bruise/bleed easily.   Skin:  Negative for color change.   Musculoskeletal:  Negative for falls, joint pain and myalgias.   Gastrointestinal:  Negative for melena.   Genitourinary:  Negative for hematuria.   Neurological:  Negative for excessive daytime sleepiness and light-headedness.   Psychiatric/Behavioral:  Negative for depression. The patient is not nervous/anxious.    All other systems reviewed and are negative.          PHYSICAL EXAMINATION     Constitutional:       Appearance: Normal appearance. Well-developed.   Eyes:      Conjunctiva/sclera: Conjunctivae normal.   Neck:      Vascular: No carotid bruit.   Pulmonary:      Effort: Pulmonary effort is normal.      Breath sounds: Normal breath sounds.   Cardiovascular:      Normal rate. Regular rhythm. Normal S1. Normal S2.       Murmurs: There is no murmur.      No gallop.  No click. No rub.   Edema:     Peripheral edema absent.   Musculoskeletal: Normal range of motion. Skin:     General: Skin is warm and dry.   Neurological:      Mental Status: Alert and oriented to person, place, and time.      GCS: GCS eye subscore is 4. GCS verbal subscore is 5. GCS motor subscore is 6.   Psychiatric:         Speech: Speech normal.         Behavior: Behavior normal.         Thought Content: Thought content normal.         Judgment: Judgment normal.           REVIEWED DATA       ECG 12 Lead    Date/Time: 3/21/2025 2:13 PM  Performed by: Mckenna Lugo APRN    Authorized by: Mckenna Lugo APRN  Comparison: compared with previous ECG   Similar to previous ECG  Rhythm: sinus rhythm  Rate: normal  BPM: 83  Conduction: right bundle branch block  ST Segments: ST segments normal  T Waves: T waves normal  Other findings:  "left ventricular hypertrophy    Clinical impression: abnormal EKG              Lipid Panel          5/13/2024    10:42 11/15/2024    12:27   Lipid Panel   Total Cholesterol 162  160    Triglycerides 88  183        Lab Results   Component Value Date     11/15/2024     05/13/2024    K 4.9 11/15/2024    K 5.3 (H) 05/13/2024     11/15/2024     05/13/2024    CO2 23.2 11/15/2024    CO2 22 05/13/2024    BUN 27 (H) 11/15/2024    BUN 30 05/13/2024    CREATININE 1.72 (H) 11/15/2024    CREATININE 1.91 (H) 05/13/2024    EGFRIFNONA 37 (L) 02/22/2022    EGFRIFNONA 30 (L) 08/30/2021    EGFRIFAFRI 42 (L) 02/06/2020    EGFRIFAFRI 34 (L) 12/03/2019    GLUCOSE 98 11/15/2024    GLUCOSE 112 (H) 05/13/2024    CALCIUM 8.8 11/15/2024    CALCIUM 8.7 05/13/2024    ALBUMIN 4.2 11/15/2024    ALBUMIN 4.2 05/13/2024    BILITOT 0.3 11/15/2024    BILITOT 0.4 05/13/2024    AST 17 11/15/2024    AST 16 05/13/2024    ALT 20 11/15/2024    ALT 15 05/13/2024     Lab Results   Component Value Date    WBC 5.66 11/15/2024    WBC 5.0 05/13/2024    HGB 12.1 (L) 11/15/2024    HGB 11.6 (L) 05/13/2024    HCT 35.5 (L) 11/15/2024    HCT 35.7 (L) 05/13/2024    MCV 89.2 11/15/2024    MCV 91 05/13/2024     11/15/2024     05/13/2024     No results found for: \"PROBNP\", \"BNP\"  Lab Results   Component Value Date    CKTOTAL 84 11/15/2024     Lab Results   Component Value Date    TSH 3.220 11/15/2024    TSH 2.890 05/13/2024             ASSESSMENT & PLAN     Diagnoses and all orders for this visit:    1. Benign essential hypertension (Primary)  Overview:  Amlodipine 5 mg/day  Metoprolol succinate 25 mg/day    Assessment & Plan:  Hypertension is stable and controlled  Continue current treatment regimen.  Dietary sodium restriction.  Regular aerobic exercise.  Ambulatory blood pressure monitoring.  Blood pressure will be reassessed in 1 year.    Orders:  -     metoprolol succinate XL (TOPROL-XL) 25 MG 24 hr tablet; Take 1 tablet by mouth " Daily.  Dispense: 90 tablet; Refill: 3    2. Bilateral carotid artery stenosis, 11/23/2022--50-59% right; 16-49% left ICA stenosis  Overview:  November 23, 2022--50-59% right ICA stenosis and 16-49% left ICA stenosis.    July 16, 2020--carotid Doppler study reveals bilateral 16-49% stenosis.    05/24/2018--carotid Doppler study reveals bilateral 16-49% stenosis.  Stable.    09/21/2016--vascular screen reveals 16-49% bilateral carotid artery stenosis, negative for AAA, negative for PAD.    10/10/2008--vascular screening study revealed mild bilateral carotid plaque, no aortic aneurysm, no evidence of PAD    Assessment & Plan:  Continue aspirin  Continue Repatha      3. Occlusive coronary artery disease, clinical diagnosis only.  Patient not a candidate for heart catheterization/intervention.  Overview:  October 8, 2019--patient seen in follow-up and he reports the long-acting oral nitrate has definitely helped his exertional anginal symptoms.  However, he has not stressed himself completely such as using a push mower.  His blood pressure seems improved as well.  Patient has an appoint with the cardiologist in the next 2 weeks.  I have contacted his cardiologist about the results of the empiric nitrates.  Also explained to patient that if we can prove he has coronary artery disease then 1 of the newer injectable cholesterol medications could very well control his cholesterol.  I will have him follow-up in the first part of December to reassess the situation.    September 17, 2019--patient seen in follow-up and reports he still has exertional chest pain such as climbing stairs or going on a long walk and mowing grass.  Symptoms are relieved rather quickly with rest.  Patient did not  the prescription for the 10 mg of amlodipine that the cardiologist recommended for reasons that are not clear to me.  He is currently taking 5 mg of amlodipine per day and his blood pressure remains elevated 174/76.  I have  recommended that he increase the amlodipine to 10 mg/day as recommended by the cardiologist and I will have him follow-up in the near future to reassess the situation.  I will also contact Dr. Alejo and give him an update, particularly given the fact patient continues to have exertional chest pain.    July 31, 2019--patient was evaluated by the cardiologist and his assessment was typical angina.  He noted the patient had not had any symptoms in 3 months and his Cardiolite study was negative.  At this point he did not feel compelled to proceed with cardiac catheterization.  He increased his amlodipine to 10 mg/day to help cover the angina and also patient's blood pressure was elevated with a systolic of 160.  He was noted to have a right bundle branch block which the cardiologist deemed to be not significant.  He recommended a follow-up in 4 months and for patient to report any change in anginal pattern.    July 3, 2019--patient seen in follow-up and the results of the stress test discussed.  The stress test returned negative/low risk study.  Patient continues to have exertional chest discomfort described as a burning sensation that comes on with fairly minimal exertion and resolves with rest.  Therefore, I am referring him to the cardiologist as soon as possible.    May 20, 2019--patient presents with approximately 1 month history of intermittent upper chest discomfort that he describes as a burning sensation and comes on with exertion such as climbing stairs.  No radiation into the neck or down the arms.  There is some associated shortness of breath.  The symptoms resolved very quickly with rest.  He reports he has had 10 or 12 episodes since the onset a month ago.  They always come on with exertion and do not occur at rest.  Examination reveals heart to be regular rate and rhythm without murmur rub or gallop.  Lungs are clear.  Given the risk factors, we need to proceed with cardiovascular evaluation and we  will start with a nuclear stress test.  This returned negative, low risk study.    Assessment & Plan:  Denies angina or dyspnea  Continue aspirin  Continue Repatha    Orders:  -     metoprolol succinate XL (TOPROL-XL) 25 MG 24 hr tablet; Take 1 tablet by mouth Daily.  Dispense: 90 tablet; Refill: 3    4. Right bundle branch block    5. Hyperlipidemia  Overview:  Statin intolerant  Continue Repatha    Assessment & Plan:   Lipid abnormalities are stable    Plan:  Continue same medication/s without change.      Discussed medication dosage, use, side effects, and goals of treatment in detail.    Counseled patient on lifestyle modifications to help control hyperlipidemia.     Patient Treatment Goals:   LDL goal is less than 70    Followup in 1 year.      Other orders  -     ECG 12 Lead        Return in about 1 year (around 3/21/2026) for Dr. Barroso, routine assessment.    Future Appointments         Provider Department Center    5/15/2025 11:00 AM [facility]    5/22/2025 11:00 AM Delgado Patricia MD [facility]    3/30/2026 2:00 PM Puneet Barroso MD [facility] CARDIOLOGY               MEDICATIONS         Discharge Medications            Accurate as of March 21, 2025  3:15 PM. If you have any questions, ask your nurse or doctor.                Continue These Medications        Instructions Start Date   ALPRAZolam 0.5 MG tablet  Commonly known as: XANAX   0.5 mg, Oral, 2 Times Daily      amLODIPine 5 MG tablet  Commonly known as: NORVASC   Take 1 p.o. daily for high blood pressure      aspirin 81 MG EC tablet   Take 1 tablet every Monday, Wednesday, and Friday      B COMPLEX 1 PO   Take  by mouth.      calcitriol 0.25 MCG capsule  Commonly known as: ROCALTROL   Take 1 capsule daily as directed for vitamin D      fluticasone 50 MCG/ACT nasal spray  Commonly known as: FLONASE   SPRAY 2 SPRAYS IN EACH NOSTRIL  ONCE DAILY      folic acid 1 MG tablet  Commonly known as: FOLVITE   1,000 mcg, Oral, 2 Times Daily      metoprolol succinate XL 25 MG 24 hr tablet  Commonly known as: TOPROL-XL   25 mg, Oral, Daily      nitroglycerin 0.4 MG SL tablet  Commonly known as: NITROSTAT   1 under the tongue as needed for angina, may repeat q5mins for up three doses      omeprazole 40 MG capsule  Commonly known as: priLOSEC   TAKE ONE CAPSULE BY MOUTH DAILY      Repatha SureClick solution auto-injector SureClick injection  Generic drug: Evolocumab   Inject 1 mL (140 mg) every 30 days as directed for high cholesterol      tadalafil 20 MG tablet tablet  Commonly known as: ADCIRCA   40 mg, Every 24 Hours Scheduled      tamsulosin 0.4 MG capsule 24 hr capsule  Commonly known as: FLOMAX       VITAMIN D2 PO                    **Dragon Disclaimer:   Much of this encounter note is an electronic transcription/translation of spoken language to printed text. The electronic translation of spoken language may permit erroneous, or at times, nonsensical words or phrases to be inadvertently transcribed. Although I have reviewed the note for such errors, some may still exist.

## 2025-04-01 ENCOUNTER — CLINICAL SUPPORT (OUTPATIENT)
Dept: INTERNAL MEDICINE | Facility: CLINIC | Age: OVER 89
End: 2025-04-01
Payer: MEDICARE

## 2025-04-01 VITALS — BODY MASS INDEX: 27.71 KG/M2 | HEIGHT: 68 IN

## 2025-04-01 RX ADMIN — CYANOCOBALAMIN 1000 MCG: 1000 INJECTION, SOLUTION INTRAMUSCULAR; SUBCUTANEOUS at 14:00

## 2025-04-11 DIAGNOSIS — F41.1 GENERALIZED ANXIETY DISORDER: ICD-10-CM

## 2025-04-11 RX ORDER — ALPRAZOLAM 0.5 MG
0.5 TABLET ORAL 2 TIMES DAILY
Qty: 60 TABLET | Refills: 2 | Status: SHIPPED | OUTPATIENT
Start: 2025-04-11

## 2025-05-03 DIAGNOSIS — J30.1 SEASONAL ALLERGIC RHINITIS DUE TO POLLEN: Chronic | ICD-10-CM

## 2025-05-06 RX ORDER — FLUTICASONE PROPIONATE 50 MCG
SPRAY, SUSPENSION (ML) NASAL
Qty: 16 G | Refills: 3 | Status: SHIPPED | OUTPATIENT
Start: 2025-05-06

## 2025-05-15 ENCOUNTER — CLINICAL SUPPORT (OUTPATIENT)
Dept: INTERNAL MEDICINE | Facility: CLINIC | Age: OVER 89
End: 2025-05-15
Payer: MEDICARE

## 2025-05-15 ENCOUNTER — LAB (OUTPATIENT)
Dept: INTERNAL MEDICINE | Facility: CLINIC | Age: OVER 89
End: 2025-05-15
Payer: MEDICARE

## 2025-05-15 ENCOUNTER — LAB (OUTPATIENT)
Dept: LAB | Facility: HOSPITAL | Age: OVER 89
End: 2025-05-15
Payer: MEDICARE

## 2025-05-15 DIAGNOSIS — E78.2 MIXED HYPERLIPIDEMIA: ICD-10-CM

## 2025-05-15 DIAGNOSIS — E53.8 VITAMIN B12 DEFICIENCY: ICD-10-CM

## 2025-05-15 DIAGNOSIS — E78.00 ELEVATED CHOLESTEROL: Primary | ICD-10-CM

## 2025-05-15 DIAGNOSIS — I10 ESSENTIAL (PRIMARY) HYPERTENSION: ICD-10-CM

## 2025-05-15 DIAGNOSIS — E29.1 HYPOGONADISM IN MALE: Chronic | ICD-10-CM

## 2025-05-15 DIAGNOSIS — E55.9 VITAMIN D DEFICIENCY: Chronic | ICD-10-CM

## 2025-05-15 DIAGNOSIS — I10 BENIGN ESSENTIAL HYPERTENSION: ICD-10-CM

## 2025-05-15 DIAGNOSIS — Z00.00 ENCOUNTER FOR MEDICARE ANNUAL WELLNESS EXAM: ICD-10-CM

## 2025-05-15 DIAGNOSIS — R73.01 IMPAIRED FASTING GLUCOSE: ICD-10-CM

## 2025-05-15 DIAGNOSIS — N40.0 BENIGN NON-NODULAR PROSTATIC HYPERPLASIA WITHOUT LOWER URINARY TRACT SYMPTOMS: Primary | ICD-10-CM

## 2025-05-15 LAB
25(OH)D3 SERPL-MCNC: 39.1 NG/ML (ref 30–100)
ALBUMIN SERPL-MCNC: 4.1 G/DL (ref 3.5–5.2)
ALBUMIN/GLOB SERPL: 1.8 G/DL
ALP SERPL-CCNC: 75 U/L (ref 39–117)
ALT SERPL W P-5'-P-CCNC: 16 U/L (ref 1–41)
ANION GAP SERPL CALCULATED.3IONS-SCNC: 9.4 MMOL/L (ref 5–15)
AST SERPL-CCNC: 21 U/L (ref 1–40)
BASOPHILS # BLD AUTO: 0.03 10*3/MM3 (ref 0–0.2)
BASOPHILS NFR BLD AUTO: 0.6 % (ref 0–1.5)
BILIRUB SERPL-MCNC: 0.3 MG/DL (ref 0–1.2)
BILIRUB UR QL STRIP: NEGATIVE
BUN SERPL-MCNC: 32 MG/DL (ref 8–23)
BUN/CREAT SERPL: 16.9 (ref 7–25)
CALCIUM SPEC-SCNC: 9 MG/DL (ref 8.2–9.6)
CHLORIDE SERPL-SCNC: 106 MMOL/L (ref 98–107)
CLARITY UR: CLEAR
CO2 SERPL-SCNC: 23.6 MMOL/L (ref 22–29)
COLOR UR: YELLOW
CREAT SERPL-MCNC: 1.89 MG/DL (ref 0.76–1.27)
DEPRECATED RDW RBC AUTO: 41.4 FL (ref 37–54)
EGFRCR SERPLBLD CKD-EPI 2021: 32.9 ML/MIN/1.73
EOSINOPHIL # BLD AUTO: 0.09 10*3/MM3 (ref 0–0.4)
EOSINOPHIL NFR BLD AUTO: 1.7 % (ref 0.3–6.2)
ERYTHROCYTE [DISTWIDTH] IN BLOOD BY AUTOMATED COUNT: 12.6 % (ref 12.3–15.4)
GLOBULIN UR ELPH-MCNC: 2.3 GM/DL
GLUCOSE SERPL-MCNC: 86 MG/DL (ref 65–99)
GLUCOSE UR STRIP-MCNC: NEGATIVE MG/DL
HBA1C MFR BLD: 6.4 % (ref 4.8–5.6)
HCT VFR BLD AUTO: 34.2 % (ref 37.5–51)
HCYS SERPL-MCNC: 13.2 UMOL/L (ref 0–15)
HGB BLD-MCNC: 11.5 G/DL (ref 13–17.7)
HGB UR QL STRIP.AUTO: NEGATIVE
IMM GRANULOCYTES # BLD AUTO: 0.02 10*3/MM3 (ref 0–0.05)
IMM GRANULOCYTES NFR BLD AUTO: 0.4 % (ref 0–0.5)
KETONES UR QL STRIP: NEGATIVE
LEUKOCYTE ESTERASE UR QL STRIP.AUTO: NEGATIVE
LYMPHOCYTES # BLD AUTO: 1.81 10*3/MM3 (ref 0.7–3.1)
LYMPHOCYTES NFR BLD AUTO: 34.2 % (ref 19.6–45.3)
MCH RBC QN AUTO: 30.5 PG (ref 26.6–33)
MCHC RBC AUTO-ENTMCNC: 33.6 G/DL (ref 31.5–35.7)
MCV RBC AUTO: 90.7 FL (ref 79–97)
MONOCYTES # BLD AUTO: 0.36 10*3/MM3 (ref 0.1–0.9)
MONOCYTES NFR BLD AUTO: 6.8 % (ref 5–12)
NEUTROPHILS NFR BLD AUTO: 2.98 10*3/MM3 (ref 1.7–7)
NEUTROPHILS NFR BLD AUTO: 56.3 % (ref 42.7–76)
NITRITE UR QL STRIP: NEGATIVE
NRBC BLD AUTO-RTO: 0 /100 WBC (ref 0–0.2)
PH UR STRIP.AUTO: 6 [PH] (ref 5–8)
PLATELET # BLD AUTO: 183 10*3/MM3 (ref 140–450)
PMV BLD AUTO: 10 FL (ref 6–12)
POTASSIUM SERPL-SCNC: 5.6 MMOL/L (ref 3.5–5.2)
PROT SERPL-MCNC: 6.4 G/DL (ref 6–8.5)
PROT UR QL STRIP: ABNORMAL
PSA SERPL-MCNC: 8.79 NG/ML (ref 0–4)
RBC # BLD AUTO: 3.77 10*6/MM3 (ref 4.14–5.8)
SODIUM SERPL-SCNC: 139 MMOL/L (ref 136–145)
SP GR UR STRIP: 1.02 (ref 1–1.03)
T3FREE SERPL-MCNC: 2.7 PG/ML (ref 2–4.4)
T4 FREE SERPL-MCNC: 1.2 NG/DL (ref 0.92–1.68)
TSH SERPL DL<=0.05 MIU/L-ACNC: 3 UIU/ML (ref 0.27–4.2)
URATE SERPL-MCNC: 6.9 MG/DL (ref 3.4–7)
UROBILINOGEN UR QL STRIP: ABNORMAL
WBC NRBC COR # BLD AUTO: 5.29 10*3/MM3 (ref 3.4–10.8)

## 2025-05-15 PROCEDURE — 84153 ASSAY OF PSA TOTAL: CPT | Performed by: INTERNAL MEDICINE

## 2025-05-15 PROCEDURE — 83090 ASSAY OF HOMOCYSTEINE: CPT | Performed by: INTERNAL MEDICINE

## 2025-05-15 PROCEDURE — 84550 ASSAY OF BLOOD/URIC ACID: CPT | Performed by: INTERNAL MEDICINE

## 2025-05-15 PROCEDURE — 81003 URINALYSIS AUTO W/O SCOPE: CPT | Performed by: INTERNAL MEDICINE

## 2025-05-15 PROCEDURE — 80053 COMPREHEN METABOLIC PANEL: CPT | Performed by: INTERNAL MEDICINE

## 2025-05-15 PROCEDURE — 80061 LIPID PANEL: CPT | Performed by: INTERNAL MEDICINE

## 2025-05-15 PROCEDURE — 84439 ASSAY OF FREE THYROXINE: CPT | Performed by: INTERNAL MEDICINE

## 2025-05-15 PROCEDURE — 84481 FREE ASSAY (FT-3): CPT | Performed by: INTERNAL MEDICINE

## 2025-05-15 PROCEDURE — 36415 COLL VENOUS BLD VENIPUNCTURE: CPT | Performed by: INTERNAL MEDICINE

## 2025-05-15 PROCEDURE — 83921 ORGANIC ACID SINGLE QUANT: CPT | Performed by: INTERNAL MEDICINE

## 2025-05-15 PROCEDURE — 84443 ASSAY THYROID STIM HORMONE: CPT | Performed by: INTERNAL MEDICINE

## 2025-05-15 PROCEDURE — 82306 VITAMIN D 25 HYDROXY: CPT | Performed by: INTERNAL MEDICINE

## 2025-05-15 PROCEDURE — 83036 HEMOGLOBIN GLYCOSYLATED A1C: CPT | Performed by: INTERNAL MEDICINE

## 2025-05-15 PROCEDURE — 83704 LIPOPROTEIN BLD QUAN PART: CPT | Performed by: INTERNAL MEDICINE

## 2025-05-15 PROCEDURE — 85025 COMPLETE CBC W/AUTO DIFF WBC: CPT | Performed by: INTERNAL MEDICINE

## 2025-05-15 NOTE — PROGRESS NOTES
Injection  Repatha Injection performed in right arm by Gladys Alvarez MA. Patient tolerated the procedure well without complications.  05/15/25   Gladys Alvarez MA

## 2025-05-17 LAB
CHOLEST SERPL-MCNC: 203 MG/DL (ref 100–199)
HDL SERPL-SCNC: 31.9 UMOL/L
HDLC SERPL-MCNC: 49 MG/DL
LDL SERPL QN: 21.1 NM
LDL SERPL-SCNC: 1463 NMOL/L
LDL SMALL SERPL-SCNC: 597 NMOL/L
LDLC SERPL CALC-MCNC: 123 MG/DL (ref 0–99)
TRIGL SERPL-MCNC: 174 MG/DL (ref 0–149)

## 2025-05-22 ENCOUNTER — OFFICE VISIT (OUTPATIENT)
Dept: INTERNAL MEDICINE | Facility: CLINIC | Age: OVER 89
End: 2025-05-22
Payer: MEDICARE

## 2025-05-22 VITALS
BODY MASS INDEX: 27.89 KG/M2 | RESPIRATION RATE: 16 BRPM | TEMPERATURE: 97.8 F | HEIGHT: 68 IN | SYSTOLIC BLOOD PRESSURE: 134 MMHG | DIASTOLIC BLOOD PRESSURE: 72 MMHG | WEIGHT: 184 LBS | OXYGEN SATURATION: 98 % | HEART RATE: 98 BPM

## 2025-05-22 DIAGNOSIS — M54.2 CHRONIC NECK PAIN: Chronic | ICD-10-CM

## 2025-05-22 DIAGNOSIS — E55.9 VITAMIN D DEFICIENCY: Chronic | ICD-10-CM

## 2025-05-22 DIAGNOSIS — N28.1 BILATERAL RENAL CYSTS: Chronic | ICD-10-CM

## 2025-05-22 DIAGNOSIS — Z51.81 THERAPEUTIC DRUG MONITORING: ICD-10-CM

## 2025-05-22 DIAGNOSIS — E53.8 FOLIC ACID DEFICIENCY: Chronic | ICD-10-CM

## 2025-05-22 DIAGNOSIS — Z78.9 STATIN INTOLERANCE: Chronic | ICD-10-CM

## 2025-05-22 DIAGNOSIS — E79.0 HYPERURICEMIA: Chronic | ICD-10-CM

## 2025-05-22 DIAGNOSIS — N40.0 BENIGN NON-NODULAR PROSTATIC HYPERPLASIA WITHOUT LOWER URINARY TRACT SYMPTOMS: Chronic | ICD-10-CM

## 2025-05-22 DIAGNOSIS — E29.1 HYPOGONADISM IN MALE: Chronic | ICD-10-CM

## 2025-05-22 DIAGNOSIS — M17.0 PRIMARY OSTEOARTHRITIS OF KNEES, BILATERAL: Chronic | ICD-10-CM

## 2025-05-22 DIAGNOSIS — G89.29 CHRONIC NECK PAIN: Chronic | ICD-10-CM

## 2025-05-22 DIAGNOSIS — E53.8 VITAMIN B12 DEFICIENCY: Chronic | ICD-10-CM

## 2025-05-22 DIAGNOSIS — N18.32 ANEMIA DUE TO STAGE 3B CHRONIC KIDNEY DISEASE: Chronic | ICD-10-CM

## 2025-05-22 DIAGNOSIS — M85.89 OSTEOPENIA OF MULTIPLE SITES: Chronic | ICD-10-CM

## 2025-05-22 DIAGNOSIS — Z91.09 MULTIPLE ENVIRONMENTAL ALLERGIES: Chronic | ICD-10-CM

## 2025-05-22 DIAGNOSIS — D63.1 ANEMIA DUE TO STAGE 3B CHRONIC KIDNEY DISEASE: Chronic | ICD-10-CM

## 2025-05-22 DIAGNOSIS — F41.1 GENERALIZED ANXIETY DISORDER: Chronic | ICD-10-CM

## 2025-05-22 DIAGNOSIS — J30.1 SEASONAL ALLERGIC RHINITIS DUE TO POLLEN: Chronic | ICD-10-CM

## 2025-05-22 DIAGNOSIS — I10 BENIGN ESSENTIAL HYPERTENSION: Chronic | ICD-10-CM

## 2025-05-22 DIAGNOSIS — I25.10 OCCLUSIVE CORONARY ARTERY DISEASE: Chronic | ICD-10-CM

## 2025-05-22 DIAGNOSIS — N18.32 STAGE 3B CHRONIC KIDNEY DISEASE: Chronic | ICD-10-CM

## 2025-05-22 DIAGNOSIS — E78.2 MIXED HYPERLIPIDEMIA: Chronic | ICD-10-CM

## 2025-05-22 DIAGNOSIS — R25.2 MUSCLE CRAMPS: Chronic | ICD-10-CM

## 2025-05-22 DIAGNOSIS — M50.30 DEGENERATION OF CERVICAL INTERVERTEBRAL DISC: Chronic | ICD-10-CM

## 2025-05-22 DIAGNOSIS — N25.81 SECONDARY HYPERPARATHYROIDISM OF RENAL ORIGIN: Chronic | ICD-10-CM

## 2025-05-22 DIAGNOSIS — E66.3 OVERWEIGHT (BMI 25.0-29.9): Chronic | ICD-10-CM

## 2025-05-22 DIAGNOSIS — R73.01 IMPAIRED FASTING GLUCOSE: Primary | Chronic | ICD-10-CM

## 2025-05-22 DIAGNOSIS — K21.9 GASTROESOPHAGEAL REFLUX DISEASE WITHOUT ESOPHAGITIS: Chronic | ICD-10-CM

## 2025-05-22 PROBLEM — M25.512 CHRONIC LEFT SHOULDER PAIN: Chronic | Status: RESOLVED | Noted: 2021-07-19 | Resolved: 2025-05-22

## 2025-05-22 PROBLEM — M54.12 CERVICAL RADICULOPATHY: Chronic | Status: RESOLVED | Noted: 2018-06-28 | Resolved: 2025-05-22

## 2025-05-22 NOTE — PROGRESS NOTES
05/22/2025    Patient Information  Radha Win                                                                                          [address]  [facility] [address]      7/26/1932  [unfilled]  There is no work phone number on file.    Chief Complaint:     Follow-up chronic medical problems and recent lab work.  No new acute complaints.    History of Present Illness:    Patient with multiple chronic medical problems as noted below in assessment and plan presents today for follow-up with lab prior in order to monitor his chronic medical issues.  No new acute complaints.  Past medical history reviewed and updated were necessary including health maintenance parameters.  This reveals he is currently up-to-date or else accounted for.    Review of Systems   Constitutional: Negative.   HENT: Negative.     Eyes: Negative.    Cardiovascular: Negative.    Respiratory: Negative.     Endocrine: Negative.    Hematologic/Lymphatic: Negative.    Skin: Negative.    Musculoskeletal: Negative.    Gastrointestinal: Negative.    Genitourinary: Negative.    Neurological: Negative.    Psychiatric/Behavioral: Negative.     Allergic/Immunologic: Negative.        Active Problems:    Patient Active Problem List   Diagnosis    Bilateral carotid artery stenosis, 11/23/2022--50-59% right; 16-49% left ICA stenosis    Benign prostatic hypertrophy    Stage 3b chronic kidney disease    Diverticulosis of colon    Hyperlipidemia    Osteopenia of multiple sites, 10/31/2023--lumbar spine -0.1.  Left femoral neck -1.5.  Right femoral neck -1.9.    Vitamin B12 deficiency    Vitamin D deficiency    Allergic rhinitis    Anemia due to stage 3b chronic kidney disease    Generalized anxiety disorder    Benign essential hypertension    Gastroesophageal reflux disease without esophagitis    Folic acid deficiency    Multiple environmental allergies    Therapeutic drug monitoring    Bilateral renal cysts    Primary  osteoarthritis of knees, bilateral    Impaired fasting glucose    Muscle cramps, statin related, Vytorin and pravastatin.  Tolerates Livalo.    Hypogonadism in male, on TRT, testosterone pellets, per     Chronic neck pain    Statin intolerance, Vytorin and pravastatin    Degeneration of cervical intervertebral disc    Occlusive coronary artery disease, clinical diagnosis only.  Patient not a candidate for heart catheterization/intervention.    Hyperuricemia    Secondary hyperparathyroidism of renal origin    Overweight (BMI 25.0-29.9)    Right bundle branch block         Past Medical History:   Diagnosis Date    Allergic rhinitis 02/11/2016    Anemia due to stage 3b chronic kidney disease 02/11/2016    Benign essential hypertension 02/11/2016    Benign prostatic hypertrophy 02/11/2016 12/21/2016--prostate biopsy reportedly negative.  I will try to obtain the report.  Patient sees urologist.  PSAs run from the 3-8 range    Bilateral carotid artery stenosis, 11/23/2022--50-59% right; 16-49% left ICA stenosis 02/11/2016 November 23, 2022--50-59% right ICA stenosis and 16-49% left ICA stenosis.     July 16, 2020--carotid Doppler study reveals bilateral 16-49% stenosis.     05/24/2018--carotid Doppler study reveals bilateral 16-49% stenosis.  Stable.     09/21/2016--vascular screen reveals 16-49% bilateral carotid artery stenosis, negative for AAA, negative for PAD.     10/10/2008--vascular screening study revealed    Bilateral renal cysts 02/14/2016 04/07/2016--ultrasound of the kidneys reveals the previously described renal cysts are not well seen.  However, questionable incidental bladder tumor noted.  05/26/2010--ultrasound the kidneys was negative bilaterally except for small renal cysts.    Cervical radiculopathy 06/28/2018    10/02/2018--patient seen in follow-up and the results of the MRI discussed.  He continues to have intermittent radicular symptoms which is currently only on the left.  Discussed  options with patient and I have recommended neurosurgery consultation.  Patient may benefit from epidural injections.  09/06/2018--MRI of the cervical spine reveals moderate neural foraminal compromise on the right at C3-C    Chronic left shoulder pain 07/19/2021 July 19, 2021--patient presents with continued left-sided neck pain that radiates into his left shoulder but does not really go over that far down into the left upper extremity.  The pain is definitely accentuated by leaning his head to the right and rotating his head to the right.  He does not do anything to aggravate it or make it better by moving his head towards the left.  On exam he has good     Degeneration of cervical intervertebral disc 05/02/2019    Diverticulosis of colon 02/11/2016    10/03/2013--colonoscopy revealed markedly redundant colon, pancolonic diverticulosis with several impacted fecalith.  No evidence of diverticulitis or stricture.  Was only able to reach the ascending colon.  Follow up barium enema revealed extensive diverticulosis.  No polyp or other mucosal mass seen.    06/11/2002--incomplete colonoscopy due to redundant colon.  Not able to get past the hepatic flexure.  Patient had followup barium contrast enema which showed extensive diverticulosis.    Exertional angina 09/17/2019 October 8, 2019--patient seen in follow-up and he reports the long-acting oral nitrate has definitely helped his exertional anginal symptoms.  However, he has not stressed himself completely such as using a push mower.  His blood pressure seems improved as well.  Patient has an appoint with the cardiologist in the next 2 weeks.  I have contacted his cardiologist about the results of the empiric ni    Folic acid deficiency 02/11/2016    Gastroesophageal reflux disease without esophagitis 02/11/2016    Generalized anxiety disorder 02/11/2016    History of diverticulitis of colon 2009 and 2003 05/08/2009-acute diverticulitis without complication.  09/05/2003-acute diverticulitis without complication.     Hyperlipidemia 02/11/2016 01/29/2005--treatment for hyperlipidemia begun.    Hyperuricemia 08/19/2020    Hypogonadism in male, on TRT, testosterone pellets, per  06/16/2017 January 2017--patient reports his urologist initiated testosterone replacement therapy consisting of testosterone pellets every 6 months.    Multiple environmental allergies 02/12/2016    Patient sees the allergist regularly and has been on immunotherapy.    Muscle cramps, statin related, Vytorin and pravastatin.  Tolerates Livalo. 06/16/2017 12/21/2018--patient seems to be tolerating Livalo well.  10/02/2018--Livalo 2 mg per day initiated.  Recommend CoQ10 400 mg per day with food for better absorption.  Reassess with lab in about 8 weeks.  08/30/2018--patient presents with complaints of muscle cramps.  He discontinue pravastatin and the cramps went away.  Cramps returned even with a half dose.  This is despite the fact he was taking     Occlusive coronary artery disease, clinical diagnosis only.  Patient not a candidate for heart catheterization/intervention. 12/10/2019    October 8, 2019--patient seen in follow-up and he reports the long-acting oral nitrate has definitely helped his exertional anginal symptoms.  However, he has not stressed himself completely such as using a push mower.  His blood pressure seems improved as well.  Patient has an appoint with the cardiologist in the next 2 weeks.  I have contacted his cardiologist about the results of the empiric ni    Osteopenia of multiple sites, 10/31/2023--lumbar spine -0.1.  Left femoral neck -1.5.  Right femoral neck -1.9. 02/11/2016 October 31, 2023--DEXA scan revealed lumbar spine T score -0.1.  Left femoral neck T score -1.5.  Right femoral neck T score -1.9.  Could not compare to previous scans due to technique.     October 18, 2021--DEXA scan reveals lumbar spine T score 0.0 representing a 3.9% interval increase.   Left femoral neck T score -1.5 which is a 4.6% increase.  Right femoral neck T score -1.7 which is unchanged.    Overweight (BMI 25.0-29.9) 09/22/2022    Primary osteoarthritis of knees, bilateral 09/12/2016 09/12/2016--patient called in the office requesting a steriod injection in his knees.  I explained to him that I do no longer perform these injections and have referred him to Dr. Manuel.    Right bundle branch block 04/03/2023    Secondary hyperparathyroidism of renal origin 01/15/2021    Stage 3b chronic kidney disease 02/11/2016 02/09/2016--serum creatinine 1.85.  BUN 29.     07/20/2015--patient was evaluated by the nephrologist.  Ultrasound of the kidneys ordered but this was never done.     05/22/2015--BUN 35, creatinine 2.3.  Patient referred to nephrology, Dr. Devyn Muniz.     04/16/2014--BUN 25, creatinine 1.77.     01/03/2013--BUN 37, creatinine 2.14.       05/26/2010---ultrasound of the kidneys reveal a small cyst x     Statin intolerance, Vytorin and pravastatin 12/21/2018 12/21/2018--patient seems to be tolerating Livalo well.  10/02/2018--Livalo 2 mg per day initiated.  Recommend CoQ10 400 mg per day with food for better absorption.  Reassess with lab in about 8 weeks.  08/30/2018--patient presents with complaints of muscle cramps.  He discontinue pravastatin and the cramps went away.  Cramps returned even with a half dose.  This is despite the fact he was taking     Vitamin B12 deficiency 02/11/2016 06/25/2009--B12 injections begun.    Vitamin D deficiency 02/11/2016         Past Surgical History:   Procedure Laterality Date    CATARACT EXTRACTION Left 12/12/2011 12/12/2011--cataract extirpation with intraocular lens implantation left eye.    CATARACT EXTRACTION Right 12/2011 December 2011--right cataract extirpation with intraocular lens implantation.    CHOLECYSTECTOMY  11/2020    CHOLECYSTECTOMY WITH INTRAOPERATIVE CHOLANGIOGRAM N/A 11/06/2020    Procedure: CHOLECYSTECTOMY  LAPAROSCOPIC INTRAOPERATIVE CHOLANGIOGRAM;  Surgeon: Sanjay Burgess MD;  Location: [facility] OR;  Service: General;  Laterality: N/A;    COLONOSCOPY  06/11/2002 06/11/2002--incomplete colonoscopy due to redundant colon. Not able to get past the hepatic flexure. Patient had followup barium contrast enema which showed extensive diverticulosis    COLONOSCOPY  10/03/2013    10/03/2013--colonoscopy revealed markedly redundant colon, pancolonic diverticulosis with several impacted fecalith. No evidence of diverticulitis or stricture. Was only able to reach the ascending colon. Follow up barium enema revealed extensive diverticulosis. No polyp or other mucosal mass seen.    CYSTOSCOPY  05/18/2016 05/18/2016--urology consultation.  Cystoscopy performed for possible bladder mass is negative.    ERCP N/A 11/05/2020    Procedure: ENDOSCOPIC RETROGRADE CHOLANGIOPANCREATOGRAPHY with sphincterotomy, basket retrieval and balloon sweep;  Surgeon: Joseluis Mejia MD;  Location: [facility] ENDOSCOPY;  Service: Gastroenterology;  Laterality: N/A;  pre/post - CBD stones    PROSTATE BIOPSY  12/21/2016 12/21/2016--prostate biopsy reportedly negative.  I will try to obtain the report.         No Known Allergies        Current Outpatient Medications:     ALPRAZolam (XANAX) 0.5 MG tablet, TAKE 1 TABLET BY MOUTH 2 TIMES A DAY, Disp: 60 tablet, Rfl: 2    amLODIPine (NORVASC) 5 MG tablet, Take 1 p.o. daily for high blood pressure, Disp: 90 tablet, Rfl: 3    aspirin 81 MG EC tablet, Take 1 tablet every Monday, Wednesday, and Friday, Disp: , Rfl:     B Complex Vitamins (B COMPLEX 1 PO), Take  by mouth., Disp: , Rfl:     calcitriol (ROCALTROL) 0.25 MCG capsule, Take 1 capsule daily as directed for vitamin D, Disp: , Rfl:     Ergocalciferol (VITAMIN D2 PO), , Disp: , Rfl:     Evolocumab (Repatha SureClick) solution auto-injector SureClick injection, Inject 1 mL (140 mg) every 30 days as directed for high cholesterol, Disp: 1 mL,  Rfl: 3    fluticasone (FLONASE) 50 MCG/ACT nasal spray, SPRAY 2 SPRAYS IN EACH NOSTRIL ONCE DAILY, Disp: 16 g, Rfl: 3    folic acid (FOLVITE) 1 MG tablet, TAKE 1 TABLET BY MOUTH TWICE A DAY, Disp: 180 tablet, Rfl: 0    metoprolol succinate XL (TOPROL-XL) 25 MG 24 hr tablet, Take 1 tablet by mouth Daily., Disp: 90 tablet, Rfl: 3    nitroglycerin (NITROSTAT) 0.4 MG SL tablet, 1 under the tongue as needed for angina, may repeat q5mins for up three doses, Disp: 30 tablet, Rfl: 2    omeprazole (priLOSEC) 40 MG capsule, TAKE ONE CAPSULE BY MOUTH DAILY, Disp: 90 capsule, Rfl: 3    tadalafil (ADCIRCA) 20 MG tablet tablet, Take 2 tablets by mouth Daily., Disp: , Rfl:     tamsulosin (FLOMAX) 0.4 MG capsule 24 hr capsule, , Disp: , Rfl:     Current Facility-Administered Medications:     cyanocobalamin injection 1,000 mcg, 1,000 mcg, Intramuscular, Q28 Days, Delgado Patricia MD, 1,000 mcg at 04/01/25 1400    Evolocumab (REPATHA) injection 140 mg, 140 mg, Subcutaneous, Q30 Days, Delgado Patricia MD, 140 mg at 05/15/25 1118      Family History   Problem Relation Age of Onset    Diabetes Mother     Heart disease Mother     Stroke Father     Arthritis Son     Arthritis Daughter          Social History     Socioeconomic History    Marital status:     Number of children: 2    Years of education: 14    Highest education level: Some college, no degree   Tobacco Use    Smoking status: Never    Smokeless tobacco: Never    Tobacco comments:     none   Vaping Use    Vaping status: Never Used   Substance and Sexual Activity    Alcohol use: Yes     Alcohol/week: 5.0 standard drinks of alcohol     Types: 2 Glasses of wine, 3 Shots of liquor per week     Comment: Moderate alcohol consumption    Drug use: No    Sexual activity: Not Currently     Partners: Female         Vitals:    05/22/25 1054   BP: 134/72   Pulse: 98   Resp: 16   Temp: 97.8 °F (36.6 °C)   TempSrc: Oral   SpO2: 98%   Weight: 83.5 kg (184 lb)   Height: 172.7 cm  "(67.99\")        Body mass index is 27.98 kg/m².      Physical Exam:    General: Alert and oriented x 3.  No acute distress.  Normal affect.  Mildly overweight.  HEENT: Pupils equal, round, reactive to light; extraocular movements intact; sclerae nonicteric; pharynx, ear canals and TMs normal.  Neck: Without JVD, thyromegaly, bruit, or adenopathy.  Lungs: Clear to auscultation in all fields.  Heart: Regular rate and rhythm without murmur, rub, gallop, or click.  Abdomen: Soft, nontender, without hepatosplenomegaly or hernia.  Bowel sounds normal.  : Deferred.  Rectal: Deferred.  Extremities: Without clubbing, cyanosis, edema, or pulse deficit.  Neurologic: Intact without focal deficit.  Normal station and gait observed during ingress and egress from the examination room.  Skin: Without significant lesion.  Musculoskeletal: Unremarkable.    Lab/other results:    CMP reveals BUN of 32, creatinine 1.89, potassium elevated 5.6.  Estimated GFR 32.9.  Total cholesterol 203, triglycerides 174, LDL particle #1463, HDL particle #31.9.  Hemoglobin A1c borderline at 6.4.  Thyroid function tests are normal.  PSA elevated at 8.79.  Vitamin D normal at 39.1.  Uric acid normal at 6.9.  Methylmalonic acid pending.  Homocystine normal at 13.2.  CBC reveals hemoglobin low at 11.5 and hematocrit low at 32.2.  Differential is normal.  Urinalysis reveals trace protein.    Assessment/Plan:     Diagnosis Plan   1. Impaired fasting glucose  Comprehensive Metabolic Panel    Hemoglobin A1c      2. Stage 3b chronic kidney disease  CBC (No Diff)    Comprehensive Metabolic Panel    Urinalysis With Microscopic If Indicated (No Culture) - Urine, Clean Catch      3. Benign prostatic hypertrophy  PSA DIAGNOSTIC      4. Hyperlipidemia  CK    Comprehensive Metabolic Panel    NMR LipoProfile    TSH    T4, Free    T3, Free    Homocysteine      5. Vitamin B12 deficiency  CBC (No Diff)    CK    Comprehensive Metabolic Panel    Hemoglobin A1c    NMR " LipoProfile    TSH    T4, Free    T3, Free    PSA DIAGNOSTIC    Urinalysis With Microscopic If Indicated (No Culture) - Urine, Clean Catch    Vitamin D,25-Hydroxy    Methylmalonic Acid, Serum    Homocysteine      6. Vitamin D deficiency  Vitamin D,25-Hydroxy      7. Anemia due to stage 3b chronic kidney disease  CBC (No Diff)      8. Benign essential hypertension  Comprehensive Metabolic Panel      9. Folic acid deficiency        10. Muscle cramps, statin related, Vytorin and pravastatin.  Tolerates Livalo.        11. Statin intolerance, Vytorin and pravastatin        12. Occlusive coronary artery disease, clinical diagnosis only.  Patient not a candidate for heart catheterization/intervention.        13. Hyperuricemia  Uric Acid      14. Secondary hyperparathyroidism of renal origin        15. Osteopenia of multiple sites, 10/31/2023--lumbar spine -0.1.  Left femoral neck -1.5.  Right femoral neck -1.9.        16. Allergic rhinitis        17. Multiple environmental allergies        18. Gastroesophageal reflux disease without esophagitis        19. Generalized anxiety disorder        20. Bilateral renal cysts        21. Primary osteoarthritis of knees, bilateral        22. Hypogonadism in male, on TRT, testosterone pellets, per         23. Chronic neck pain        24. Degeneration of cervical intervertebral disc        25. Overweight (BMI 25.0-29.9)        26. Therapeutic drug monitoring          Patient has impaired fasting glucose and his A1c is borderline at 6.4.  He is a little overweight and I strongly encouraged him to follow a low carbohydrate diet and attain ideal body weight.  Exercise will help as well.  He has stage IIIb chronic renal insufficiency that is followed by the nephrologist.  Potassium is little elevated and I will leave it up to them to make an adjustment if necessary.  BPH and hypogonadism is followed by the urologist.  His PSA is elevated and once again they are monitoring.  He has vitamin  B12 deficiency and his methylmalonic acid is pending.  His homocystine is normal.  Vitamin D is normal.  His anemia is related to his chronic kidney disease and stable.  Blood pressure seems controlled.  He has muscle cramps and intolerance to statins and is on Repatha.  Cholesterol is not quite as good as it has been and there may have been a missed dose.  Hyperuricemia will be monitored.  His secondary hyperparathyroidism also being monitored by the renal.  He has osteopenia of multiple sites and is due for DEXA scan later this year.  His coronary artery disease is stable.  He has generalized osteoarthritis but not severe except he has been having problems with his left knee and it has been evaluated recently and treated by orthopedics..  Overall seems to be doing fairly well.    Plan is as follows: Be sure to keep up with specialist follow-up and medications.  I am not going to make any changes at the present time.  Patient will follow-up after November 15, 2025 for subsequent Medicare wellness visit.  Otherwise he will follow-up as needed.        Procedures

## 2025-05-24 LAB — METHYLMALONATE SERPL-SCNC: 388 NMOL/L (ref 0–378)

## 2025-07-09 ENCOUNTER — CLINICAL SUPPORT (OUTPATIENT)
Dept: INTERNAL MEDICINE | Facility: CLINIC | Age: OVER 89
End: 2025-07-09
Payer: MEDICARE

## 2025-07-09 DIAGNOSIS — E78.5 HYPERLIPIDEMIA ASSOCIATED WITH TYPE 2 DIABETES MELLITUS: Primary | ICD-10-CM

## 2025-07-09 DIAGNOSIS — E53.8 VITAMIN B 12 DEFICIENCY: ICD-10-CM

## 2025-07-09 DIAGNOSIS — E11.69 HYPERLIPIDEMIA ASSOCIATED WITH TYPE 2 DIABETES MELLITUS: Primary | ICD-10-CM

## 2025-07-09 PROCEDURE — 96372 THER/PROPH/DIAG INJ SC/IM: CPT | Performed by: INTERNAL MEDICINE

## 2025-07-09 RX ADMIN — CYANOCOBALAMIN 1000 MCG: 1000 INJECTION, SOLUTION INTRAMUSCULAR; SUBCUTANEOUS at 13:51

## 2025-07-09 NOTE — PROGRESS NOTES
Injection  B 12 Injection performed in right arm by Gladys Alvarez MA. Patient tolerated the procedure well without complications.  07/09/25   Gladys Alvarez MA

## 2025-07-09 NOTE — PROGRESS NOTES
"  Injection  Repatha Injection performed in Left arm by Gladys Alvarez MA. Patient tolerated the procedure well without complications.  \" PATIENT SUPPLIED \"  07/09/25   Gladys Alvarez MA    "

## 2025-08-12 ENCOUNTER — CLINICAL SUPPORT (OUTPATIENT)
Dept: INTERNAL MEDICINE | Facility: CLINIC | Age: OVER 89
End: 2025-08-12
Payer: MEDICARE

## 2025-08-12 PROCEDURE — 96372 THER/PROPH/DIAG INJ SC/IM: CPT | Performed by: INTERNAL MEDICINE

## 2025-08-12 RX ADMIN — CYANOCOBALAMIN 1000 MCG: 1000 INJECTION, SOLUTION INTRAMUSCULAR; SUBCUTANEOUS at 11:45

## (undated) DEVICE — CATH IV INSYTE AUTOGARD 14G 1 1/2IN ORNG

## (undated) DEVICE — 3M™ STERI-STRIP™ COMPOUND BENZOIN TINCTURE 40 BAGS/CARTON 4 CARTONS/CASE C1544: Brand: 3M™ STERI-STRIP™

## (undated) DEVICE — APPL HEMO SURG ARISTA/AH/FLEXITIP XL 38CM

## (undated) DEVICE — SPHINCTEROTOME: Brand: HYDRATOME RX 44

## (undated) DEVICE — STPCK 3WY D201 DISCOFIX

## (undated) DEVICE — SUT MNCRYL PLS ANTIB UD 4/0 PS2 18IN

## (undated) DEVICE — VISUALIZATION SYSTEM: Brand: CLEARIFY

## (undated) DEVICE — KT ORCA ORCAPOD DISP STRL

## (undated) DEVICE — TUBING, SUCTION, 1/4" X 10', STRAIGHT: Brand: MEDLINE

## (undated) DEVICE — DRP C/ARM 41X74IN

## (undated) DEVICE — LN SMPL CO2 SHTRM SD STREAM W/M LUER

## (undated) DEVICE — DEV LK WIREGUIDE FUSN OLYMP SCP

## (undated) DEVICE — MSK ENDO PORT O2 POM ELITE CURAPLEX A/

## (undated) DEVICE — DRAPE,REIN 53X77,STERILE: Brand: MEDLINE

## (undated) DEVICE — ENDOCUT SCISSOR TIP, DISPOSABLE: Brand: RENEW

## (undated) DEVICE — ADAPT CLN BIOGUARD AIR/H2O DISP

## (undated) DEVICE — ENDOPATH XCEL BLADELESS TROCARS WITH STABILITY SLEEVES: Brand: ENDOPATH XCEL

## (undated) DEVICE — GLV SURG BIOGEL LTX PF 8

## (undated) DEVICE — EXTENSION SET, MALE LUER LOCK ADAPTER WITH RETRACTABLE COLLAR

## (undated) DEVICE — PDS II VLT 0 107CM AG ST3: Brand: ENDOLOOP

## (undated) DEVICE — WIREGUIDED RETRIEVAL BASKET: Brand: TRAPEZOID RX

## (undated) DEVICE — ENDOPATH XCEL BLUNT TIP TROCARS WITH SMOOTH SLEEVES: Brand: ENDOPATH XCEL

## (undated) DEVICE — SOL NACL 0.9PCT 1000ML

## (undated) DEVICE — RETRIEVAL BALLOON CATHETER: Brand: EXTRACTOR™ PRO RX

## (undated) DEVICE — CATH CHOLANG 4.5F18IN BRGNDY

## (undated) DEVICE — ERBE NESSY®PLATE 170 SPLIT; 168CM²; CABLE 3M: Brand: ERBE

## (undated) DEVICE — BITEBLOCK OMNI BLOC

## (undated) DEVICE — CONTAINER,SPECIMEN,OR STERILE,4OZ: Brand: MEDLINE

## (undated) DEVICE — SENSR O2 OXIMAX FNGR A/ 18IN NONSTR

## (undated) DEVICE — SUT VIC 0 CT2 CR8 18IN DYED J727D

## (undated) DEVICE — LAPAROSCOPIC SMOKE FILTRATION SYSTEM: Brand: PALL LAPAROSHIELD® PLUS LAPAROSCOPIC SMOKE FILTRATION SYSTEM

## (undated) DEVICE — CANN O2 ETCO2 FITS ALL CONN CO2 SMPL A/ 7IN DISP LF

## (undated) DEVICE — ENDOPOUCH RETRIEVER SPECIMEN RETRIEVAL BAGS: Brand: ENDOPOUCH RETRIEVER

## (undated) DEVICE — APPL CHLORAPREP HI/LITE 26ML ORNG

## (undated) DEVICE — LOU LAP CHOLE: Brand: MEDLINE INDUSTRIES, INC.

## (undated) DEVICE — ENDOPATH XCEL UNIVERSAL TROCAR STABLILITY SLEEVES: Brand: ENDOPATH XCEL